# Patient Record
Sex: MALE | Race: WHITE | NOT HISPANIC OR LATINO
[De-identification: names, ages, dates, MRNs, and addresses within clinical notes are randomized per-mention and may not be internally consistent; named-entity substitution may affect disease eponyms.]

---

## 2016-10-12 RX ORDER — DOCUSATE SODIUM 100 MG
1 CAPSULE ORAL
Qty: 0 | Refills: 0 | DISCHARGE
Start: 2016-10-12

## 2017-01-17 ENCOUNTER — APPOINTMENT (OUTPATIENT)
Dept: SPINE | Facility: CLINIC | Age: 62
End: 2017-01-17

## 2017-01-17 VITALS
DIASTOLIC BLOOD PRESSURE: 98 MMHG | SYSTOLIC BLOOD PRESSURE: 186 MMHG | HEART RATE: 70 BPM | WEIGHT: 205 LBS | BODY MASS INDEX: 29.35 KG/M2 | HEIGHT: 70 IN

## 2017-03-02 ENCOUNTER — APPOINTMENT (OUTPATIENT)
Dept: RADIOLOGY | Facility: HOSPITAL | Age: 62
End: 2017-03-02

## 2017-03-02 ENCOUNTER — OUTPATIENT (OUTPATIENT)
Dept: OUTPATIENT SERVICES | Facility: HOSPITAL | Age: 62
LOS: 1 days | End: 2017-03-02
Payer: COMMERCIAL

## 2017-03-02 DIAGNOSIS — Z98.89 OTHER SPECIFIED POSTPROCEDURAL STATES: Chronic | ICD-10-CM

## 2017-03-02 DIAGNOSIS — Z98.1 ARTHRODESIS STATUS: ICD-10-CM

## 2017-03-02 PROCEDURE — 72083 X-RAY EXAM ENTIRE SPI 4/5 VW: CPT | Mod: 26

## 2017-03-07 ENCOUNTER — APPOINTMENT (OUTPATIENT)
Dept: SPINE | Facility: CLINIC | Age: 62
End: 2017-03-07

## 2017-03-07 VITALS
WEIGHT: 210 LBS | SYSTOLIC BLOOD PRESSURE: 186 MMHG | HEIGHT: 70 IN | DIASTOLIC BLOOD PRESSURE: 99 MMHG | HEART RATE: 73 BPM | BODY MASS INDEX: 30.06 KG/M2

## 2017-05-15 ENCOUNTER — INPATIENT (INPATIENT)
Facility: HOSPITAL | Age: 62
LOS: 6 days | Discharge: HOME | DRG: 31 | End: 2017-05-22
Attending: NEUROLOGICAL SURGERY | Admitting: NEUROLOGICAL SURGERY
Payer: COMMERCIAL

## 2017-05-15 VITALS
RESPIRATION RATE: 16 BRPM | DIASTOLIC BLOOD PRESSURE: 105 MMHG | OXYGEN SATURATION: 97 % | TEMPERATURE: 97 F | HEART RATE: 81 BPM | SYSTOLIC BLOOD PRESSURE: 152 MMHG

## 2017-05-15 DIAGNOSIS — Z98.89 OTHER SPECIFIED POSTPROCEDURAL STATES: Chronic | ICD-10-CM

## 2017-05-15 DIAGNOSIS — Z98.2 PRESENCE OF CEREBROSPINAL FLUID DRAINAGE DEVICE: ICD-10-CM

## 2017-05-15 DIAGNOSIS — T85.618A BREAKDOWN (MECHANICAL) OF OTHER SPECIFIED INTERNAL PROSTHETIC DEVICES, IMPLANTS AND GRAFTS, INITIAL ENCOUNTER: ICD-10-CM

## 2017-05-15 DIAGNOSIS — Z98.2 PRESENCE OF CEREBROSPINAL FLUID DRAINAGE DEVICE: Chronic | ICD-10-CM

## 2017-05-15 LAB
ALBUMIN SERPL ELPH-MCNC: 4 G/DL — SIGNIFICANT CHANGE UP (ref 3.3–5)
ALP SERPL-CCNC: 102 U/L — SIGNIFICANT CHANGE UP (ref 40–120)
ALT FLD-CCNC: 22 U/L RC — SIGNIFICANT CHANGE UP (ref 10–45)
APTT BLD: 26.1 SEC — LOW (ref 27.5–37.4)
AST SERPL-CCNC: 71 U/L — HIGH (ref 10–40)
BASOPHILS # BLD AUTO: 0.1 K/UL — SIGNIFICANT CHANGE UP (ref 0–0.2)
BASOPHILS NFR BLD AUTO: 0.8 % — SIGNIFICANT CHANGE UP (ref 0–2)
BILIRUB SERPL-MCNC: 0.2 MG/DL — SIGNIFICANT CHANGE UP (ref 0.2–1.2)
BLD GP AB SCN SERPL QL: NEGATIVE — SIGNIFICANT CHANGE UP
BUN SERPL-MCNC: 19 MG/DL — SIGNIFICANT CHANGE UP (ref 7–23)
CALCIUM SERPL-MCNC: 9.9 MG/DL — SIGNIFICANT CHANGE UP (ref 8.4–10.5)
CHLORIDE SERPL-SCNC: 96 MMOL/L — SIGNIFICANT CHANGE UP (ref 96–108)
CO2 SERPL-SCNC: 32 MMOL/L — HIGH (ref 22–31)
CREAT SERPL-MCNC: 1.12 MG/DL — SIGNIFICANT CHANGE UP (ref 0.5–1.3)
EOSINOPHIL # BLD AUTO: 0.5 K/UL — SIGNIFICANT CHANGE UP (ref 0–0.5)
EOSINOPHIL NFR BLD AUTO: 6.8 % — HIGH (ref 0–6)
GAS PNL BLDV: SIGNIFICANT CHANGE UP
GLUCOSE SERPL-MCNC: 109 MG/DL — HIGH (ref 70–99)
HCT VFR BLD CALC: 40 % — SIGNIFICANT CHANGE UP (ref 39–50)
HGB BLD-MCNC: 12.7 G/DL — LOW (ref 13–17)
INR BLD: 1.14 RATIO — SIGNIFICANT CHANGE UP (ref 0.88–1.16)
LYMPHOCYTES # BLD AUTO: 1.6 K/UL — SIGNIFICANT CHANGE UP (ref 1–3.3)
LYMPHOCYTES # BLD AUTO: 21.7 % — SIGNIFICANT CHANGE UP (ref 13–44)
MCHC RBC-ENTMCNC: 30.8 PG — SIGNIFICANT CHANGE UP (ref 27–34)
MCHC RBC-ENTMCNC: 31.9 GM/DL — LOW (ref 32–36)
MCV RBC AUTO: 96.6 FL — SIGNIFICANT CHANGE UP (ref 80–100)
MONOCYTES # BLD AUTO: 0.6 K/UL — SIGNIFICANT CHANGE UP (ref 0–0.9)
MONOCYTES NFR BLD AUTO: 7.6 % — SIGNIFICANT CHANGE UP (ref 2–14)
NEUTROPHILS # BLD AUTO: 4.8 K/UL — SIGNIFICANT CHANGE UP (ref 1.8–7.4)
NEUTROPHILS NFR BLD AUTO: 63.1 % — SIGNIFICANT CHANGE UP (ref 43–77)
PLATELET # BLD AUTO: 286 K/UL — SIGNIFICANT CHANGE UP (ref 150–400)
POTASSIUM SERPL-MCNC: 5.4 MMOL/L — HIGH (ref 3.5–5.3)
POTASSIUM SERPL-SCNC: 5.4 MMOL/L — HIGH (ref 3.5–5.3)
PROT SERPL-MCNC: 8.3 G/DL — SIGNIFICANT CHANGE UP (ref 6–8.3)
PROTHROM AB SERPL-ACNC: 12.4 SEC — SIGNIFICANT CHANGE UP (ref 9.8–12.7)
RBC # BLD: 4.14 M/UL — LOW (ref 4.2–5.8)
RBC # FLD: 13.2 % — SIGNIFICANT CHANGE UP (ref 10.3–14.5)
RH IG SCN BLD-IMP: POSITIVE — SIGNIFICANT CHANGE UP
SODIUM SERPL-SCNC: 141 MMOL/L — SIGNIFICANT CHANGE UP (ref 135–145)
WBC # BLD: 7.6 K/UL — SIGNIFICANT CHANGE UP (ref 3.8–10.5)
WBC # FLD AUTO: 7.6 K/UL — SIGNIFICANT CHANGE UP (ref 3.8–10.5)

## 2017-05-15 PROCEDURE — 70450 CT HEAD/BRAIN W/O DYE: CPT | Mod: 26

## 2017-05-15 PROCEDURE — 99285 EMERGENCY DEPT VISIT HI MDM: CPT

## 2017-05-15 RX ORDER — DIAZEPAM 5 MG
2 TABLET ORAL THREE TIMES A DAY
Qty: 0 | Refills: 0 | Status: DISCONTINUED | OUTPATIENT
Start: 2017-05-15 | End: 2017-05-16

## 2017-05-15 RX ORDER — SENNA PLUS 8.6 MG/1
2 TABLET ORAL AT BEDTIME
Qty: 0 | Refills: 0 | Status: DISCONTINUED | OUTPATIENT
Start: 2017-05-15 | End: 2017-05-16

## 2017-05-15 RX ORDER — DOCUSATE SODIUM 100 MG
100 CAPSULE ORAL THREE TIMES A DAY
Qty: 0 | Refills: 0 | Status: DISCONTINUED | OUTPATIENT
Start: 2017-05-15 | End: 2017-05-16

## 2017-05-15 RX ORDER — MORPHINE SULFATE 50 MG/1
30 CAPSULE, EXTENDED RELEASE ORAL EVERY 4 HOURS
Qty: 0 | Refills: 0 | Status: DISCONTINUED | OUTPATIENT
Start: 2017-05-15 | End: 2017-05-16

## 2017-05-15 RX ORDER — PANTOPRAZOLE SODIUM 20 MG/1
40 TABLET, DELAYED RELEASE ORAL
Qty: 0 | Refills: 0 | Status: DISCONTINUED | OUTPATIENT
Start: 2017-05-15 | End: 2017-05-16

## 2017-05-15 RX ORDER — ACETAMINOPHEN 500 MG
650 TABLET ORAL EVERY 6 HOURS
Qty: 0 | Refills: 0 | Status: DISCONTINUED | OUTPATIENT
Start: 2017-05-15 | End: 2017-05-16

## 2017-05-15 RX ORDER — HYDROMORPHONE HYDROCHLORIDE 2 MG/ML
1 INJECTION INTRAMUSCULAR; INTRAVENOUS; SUBCUTANEOUS ONCE
Qty: 0 | Refills: 0 | Status: DISCONTINUED | OUTPATIENT
Start: 2017-05-15 | End: 2017-05-15

## 2017-05-15 RX ORDER — METHADONE HYDROCHLORIDE 40 MG/1
10 TABLET ORAL EVERY 6 HOURS
Qty: 0 | Refills: 0 | Status: DISCONTINUED | OUTPATIENT
Start: 2017-05-15 | End: 2017-05-16

## 2017-05-15 RX ORDER — MORPHINE SULFATE 50 MG/1
4 CAPSULE, EXTENDED RELEASE ORAL ONCE
Qty: 0 | Refills: 0 | Status: DISCONTINUED | OUTPATIENT
Start: 2017-05-15 | End: 2017-05-15

## 2017-05-15 RX ORDER — VANCOMYCIN HCL 1 G
1000 VIAL (EA) INTRAVENOUS ONCE
Qty: 0 | Refills: 0 | Status: COMPLETED | OUTPATIENT
Start: 2017-05-15 | End: 2017-05-15

## 2017-05-15 RX ORDER — SODIUM CHLORIDE 9 MG/ML
3 INJECTION INTRAMUSCULAR; INTRAVENOUS; SUBCUTANEOUS EVERY 8 HOURS
Qty: 0 | Refills: 0 | Status: DISCONTINUED | OUTPATIENT
Start: 2017-05-15 | End: 2017-05-16

## 2017-05-15 RX ORDER — CEFTRIAXONE 500 MG/1
2 INJECTION, POWDER, FOR SOLUTION INTRAMUSCULAR; INTRAVENOUS ONCE
Qty: 0 | Refills: 0 | Status: COMPLETED | OUTPATIENT
Start: 2017-05-15 | End: 2017-05-15

## 2017-05-15 RX ORDER — DULOXETINE HYDROCHLORIDE 30 MG/1
60 CAPSULE, DELAYED RELEASE ORAL DAILY
Qty: 0 | Refills: 0 | Status: DISCONTINUED | OUTPATIENT
Start: 2017-05-15 | End: 2017-05-16

## 2017-05-15 RX ORDER — DIPHENHYDRAMINE HCL 50 MG
25 CAPSULE ORAL AT BEDTIME
Qty: 0 | Refills: 0 | Status: DISCONTINUED | OUTPATIENT
Start: 2017-05-15 | End: 2017-05-16

## 2017-05-15 RX ORDER — MORPHINE SULFATE 50 MG/1
10 CAPSULE, EXTENDED RELEASE ORAL
Qty: 0 | Refills: 0 | COMMUNITY

## 2017-05-15 RX ADMIN — MORPHINE SULFATE 30 MILLIGRAM(S): 50 CAPSULE, EXTENDED RELEASE ORAL at 23:49

## 2017-05-15 RX ADMIN — Medication 250 MILLIGRAM(S): at 14:47

## 2017-05-15 RX ADMIN — HYDROMORPHONE HYDROCHLORIDE 1 MILLIGRAM(S): 2 INJECTION INTRAMUSCULAR; INTRAVENOUS; SUBCUTANEOUS at 20:19

## 2017-05-15 RX ADMIN — Medication 25 MILLIGRAM(S): at 21:17

## 2017-05-15 RX ADMIN — Medication 2 MILLIGRAM(S): at 21:17

## 2017-05-15 RX ADMIN — MORPHINE SULFATE 30 MILLIGRAM(S): 50 CAPSULE, EXTENDED RELEASE ORAL at 19:56

## 2017-05-15 RX ADMIN — MORPHINE SULFATE 4 MILLIGRAM(S): 50 CAPSULE, EXTENDED RELEASE ORAL at 13:39

## 2017-05-15 RX ADMIN — SENNA PLUS 2 TABLET(S): 8.6 TABLET ORAL at 21:17

## 2017-05-15 RX ADMIN — Medication 100 MILLIGRAM(S): at 21:17

## 2017-05-15 RX ADMIN — MORPHINE SULFATE 30 MILLIGRAM(S): 50 CAPSULE, EXTENDED RELEASE ORAL at 19:15

## 2017-05-15 RX ADMIN — MORPHINE SULFATE 30 MILLIGRAM(S): 50 CAPSULE, EXTENDED RELEASE ORAL at 23:15

## 2017-05-15 RX ADMIN — HYDROMORPHONE HYDROCHLORIDE 1 MILLIGRAM(S): 2 INJECTION INTRAMUSCULAR; INTRAVENOUS; SUBCUTANEOUS at 14:46

## 2017-05-15 RX ADMIN — Medication 0.1 MILLIGRAM(S): at 21:17

## 2017-05-15 RX ADMIN — CEFTRIAXONE 100 GRAM(S): 500 INJECTION, POWDER, FOR SOLUTION INTRAMUSCULAR; INTRAVENOUS at 13:39

## 2017-05-15 RX ADMIN — HYDROMORPHONE HYDROCHLORIDE 1 MILLIGRAM(S): 2 INJECTION INTRAMUSCULAR; INTRAVENOUS; SUBCUTANEOUS at 18:21

## 2017-05-15 RX ADMIN — SODIUM CHLORIDE 3 MILLILITER(S): 9 INJECTION INTRAMUSCULAR; INTRAVENOUS; SUBCUTANEOUS at 21:20

## 2017-05-15 RX ADMIN — METHADONE HYDROCHLORIDE 10 MILLIGRAM(S): 40 TABLET ORAL at 21:18

## 2017-05-15 RX ADMIN — DULOXETINE HYDROCHLORIDE 60 MILLIGRAM(S): 30 CAPSULE, DELAYED RELEASE ORAL at 21:18

## 2017-05-15 NOTE — H&P ADULT. - HISTORY OF PRESENT ILLNESS
61M s/p previous fusion in October 2016, course c/b CSF leak, s/p VPS, presents now to the ED with erythema and pain around the shunt site. He states that 2 weeks ago he felt fine. Then while in the shower noticed that the shunt valve was tender. Since that time it has become more painful, and the skin around the distal catheter has become painful and erythematous. He presented to the ED for possible shunt infection. He denies fevers/chills/ or systemic symtpoms. Reports HA.     Exam:   AOX3  PERRL, EOMI, Face equal, tongue m/l  5/5 BUE  5/5 BLE  SILT

## 2017-05-15 NOTE — ED ADULT NURSE NOTE - OBJECTIVE STATEMENT
62 y/o male arrived to ED c.o sudden onset head pain when he woke up this morning. Pt states that he feels like "it shifted or has a hole in it it feels different then it did before and is really painful". Pt had shunt placed in 10/2016 for csf leak s/p lumbar procedure. Pt began having difficulty with shunt in january and was scheduled to have it removed 6/1.  Today pain is different then previously. Pt has yellow dried drainage to right occipital head, redness to right neck and chest wall around catheter of shunt. Painful to touch, painful with movement. Pt a&ox4, neg vision changes, neg dizziness, + chills, neg fevers, neg sob, neg chest pain, abd soft nontender, neg nausea/ vomiting, pulse motor sensoryx4, pt ambulates with cane, skin warm dry intact. VSS will continue to monitor.

## 2017-05-15 NOTE — ED ADULT NURSE NOTE - PMH
Anxiety and depression    Diastolic dysfunction  stage I  Empyema lung  11/2015 left lung, s/p VATS, decortication  H/O peptic ulcer  over 10 years ago  Herniated Disc  S/P work injury 2001  history of renal calculus    Hypertension  Dx: 2002  Postlaminectomy Syndrome    Spinal Stenosis

## 2017-05-15 NOTE — ED ADULT NURSE NOTE - CHIEF COMPLAINT
The patient is a 61y Male complaining of The patient is a 61y Male complaining of head pain and redness around shunt site.

## 2017-05-15 NOTE — ED ADULT TRIAGE NOTE - CHIEF COMPLAINT QUOTE
pt scheduled for shunt removal in June states pain right side of head with elevated erythema area right side of neck

## 2017-05-15 NOTE — ED PROVIDER NOTE - SKIN COLOR
right occcipital region ttp, yellow crusting, erythema/ttp over shunt site on right chest and right neck

## 2017-05-15 NOTE — ED PROVIDER NOTE - OBJECTIVE STATEMENT
60yo male s/p  shunt in September. Planned for shunt removal June 1. Pt. has spinal cord stimulator in place. Pt. reports right head pain since this AM, located at site of shunt. Pt. reports shunt areas erythematous x1 week, some drainage since friday, yellow. Denies fevers, changes in vision/hearing, shortness of breath, chest pain, nausea, vomiting, difficulty ambulating, recent antibiotics/recent illnesses. Nsg: Jessica PCP: Dr. Adama Fernández. 62yo male s/p  shunt in September p/w right head pain, redness. Planned for shunt removal June 1. Pt. has spinal cord stimulator in place. Pt. reports right head pain since this AM, located at site of shunt. Pt. reports shunt areas erythematous x1 week, some drainage since friday, yellow. Denies fevers, changes in vision/hearing, shortness of breath, chest pain, nausea, vomiting, difficulty ambulating, recent antibiotics/recent illnesses. Nsg: Jessica PCP: Dr. Adama Fernández.

## 2017-05-15 NOTE — ED PROVIDER NOTE - ATTENDING CONTRIBUTION TO CARE
I have seen and evaluated this patient with the resident.   I agree with the findings  unless other wise stated.  I have made appropriate changes in documentations where needed, After my face to face bedside evaluation, I am further  noting: Pt has possible infection around shunt redness tenderness 62yo male s/p  shunt in September p/w right head pain, redness. TTP at site of shunt, erythema, yellow crusting. Concern for  shunt infection. Will obtain labs, CT head, provide antibiotics, neurosurgery consult.

## 2017-05-15 NOTE — ED PROVIDER NOTE - MEDICAL DECISION MAKING DETAILS
62yo male s/p  shunt in September p/w right head pain, redness. TTP at site of shunt, erythema, yellow crusting. Concern for  shunt infection. Will obtain labs, CT head, provide antibiotics, neurosurgery consult.

## 2017-05-15 NOTE — ED ADULT NURSE NOTE - PSH
Ankle Fracture  s/p ORIF left ankle 2008  History of lumbar fusion  2008  Insertion of Intrathecal Dilaudid Pump  4/2011  S/P Arthroscopy of Left Knee    S/P insertion of spinal cord stimulator  2013  S/P Knee Replacement  left knee 2009  S/P Spinal Surgery  corrective lumbar fusion 2012  S/P Tonsillectomy  childhood Ankle Fracture  s/p ORIF left ankle 2008  History of lumbar fusion  2008  Insertion of Intrathecal Dilaudid Pump  4/2011  S/P Arthroscopy of Left Knee    S/P insertion of spinal cord stimulator  2013  S/P Knee Replacement  left knee 2009  S/P Spinal Surgery  corrective lumbar fusion 2012  S/P Tonsillectomy  childhood  S/P  shunt

## 2017-05-15 NOTE — ED ADULT NURSE NOTE - CHPI ED SYMPTOMS NEG
no fever/no weakness/no confusion/no vomiting/no numbness/no change in level of consciousness/no blurred vision/no dizziness

## 2017-05-15 NOTE — PATIENT PROFILE ADULT. - PSH
Ankle Fracture  s/p ORIF left ankle 2008  History of lumbar fusion  2008  Insertion of Intrathecal Dilaudid Pump  4/2011  S/P Arthroscopy of Left Knee    S/P insertion of spinal cord stimulator  2013  S/P Knee Replacement  left knee 2009  S/P Spinal Surgery  corrective lumbar fusion 2012  S/P Tonsillectomy  childhood

## 2017-05-16 ENCOUNTER — RESULT REVIEW (OUTPATIENT)
Age: 62
End: 2017-05-16

## 2017-05-16 ENCOUNTER — APPOINTMENT (OUTPATIENT)
Dept: SPINE | Facility: CLINIC | Age: 62
End: 2017-05-16

## 2017-05-16 LAB
ANION GAP SERPL CALC-SCNC: 12 MMOL/L — SIGNIFICANT CHANGE UP (ref 5–17)
BLD GP AB SCN SERPL QL: NEGATIVE — SIGNIFICANT CHANGE UP
BUN SERPL-MCNC: 21 MG/DL — SIGNIFICANT CHANGE UP (ref 7–23)
CALCIUM SERPL-MCNC: 8.9 MG/DL — SIGNIFICANT CHANGE UP (ref 8.4–10.5)
CHLORIDE SERPL-SCNC: 97 MMOL/L — SIGNIFICANT CHANGE UP (ref 96–108)
CO2 SERPL-SCNC: 32 MMOL/L — HIGH (ref 22–31)
CREAT SERPL-MCNC: 1.11 MG/DL — SIGNIFICANT CHANGE UP (ref 0.5–1.3)
GLUCOSE SERPL-MCNC: 222 MG/DL — HIGH (ref 70–99)
HCT VFR BLD CALC: 34.5 % — LOW (ref 39–50)
HGB BLD-MCNC: 12.1 G/DL — LOW (ref 13–17)
MAGNESIUM SERPL-MCNC: 2.1 MG/DL — SIGNIFICANT CHANGE UP (ref 1.6–2.6)
MCHC RBC-ENTMCNC: 33.4 PG — SIGNIFICANT CHANGE UP (ref 27–34)
MCHC RBC-ENTMCNC: 34.9 GM/DL — SIGNIFICANT CHANGE UP (ref 32–36)
MCV RBC AUTO: 95.6 FL — SIGNIFICANT CHANGE UP (ref 80–100)
PHOSPHATE SERPL-MCNC: 3 MG/DL — SIGNIFICANT CHANGE UP (ref 2.5–4.5)
PLATELET # BLD AUTO: 237 K/UL — SIGNIFICANT CHANGE UP (ref 150–400)
POTASSIUM SERPL-MCNC: 3.4 MMOL/L — LOW (ref 3.5–5.3)
POTASSIUM SERPL-SCNC: 3.4 MMOL/L — LOW (ref 3.5–5.3)
RBC # BLD: 3.61 M/UL — LOW (ref 4.2–5.8)
RBC # FLD: 13 % — SIGNIFICANT CHANGE UP (ref 10.3–14.5)
RH IG SCN BLD-IMP: POSITIVE — SIGNIFICANT CHANGE UP
SODIUM SERPL-SCNC: 141 MMOL/L — SIGNIFICANT CHANGE UP (ref 135–145)
WBC # BLD: 6.7 K/UL — SIGNIFICANT CHANGE UP (ref 3.8–10.5)
WBC # FLD AUTO: 6.7 K/UL — SIGNIFICANT CHANGE UP (ref 3.8–10.5)

## 2017-05-16 PROCEDURE — 62256 REMOVE BRAIN CAVITY SHUNT: CPT

## 2017-05-16 PROCEDURE — 71010: CPT | Mod: 26

## 2017-05-16 PROCEDURE — 88300 SURGICAL PATH GROSS: CPT | Mod: 26

## 2017-05-16 PROCEDURE — 99291 CRITICAL CARE FIRST HOUR: CPT

## 2017-05-16 RX ORDER — DULOXETINE HYDROCHLORIDE 30 MG/1
60 CAPSULE, DELAYED RELEASE ORAL DAILY
Qty: 0 | Refills: 0 | Status: DISCONTINUED | OUTPATIENT
Start: 2017-05-16 | End: 2017-05-18

## 2017-05-16 RX ORDER — DIPHENHYDRAMINE HCL 50 MG
25 CAPSULE ORAL AT BEDTIME
Qty: 0 | Refills: 0 | Status: DISCONTINUED | OUTPATIENT
Start: 2017-05-16 | End: 2017-05-22

## 2017-05-16 RX ORDER — CEFTRIAXONE 500 MG/1
2 INJECTION, POWDER, FOR SOLUTION INTRAMUSCULAR; INTRAVENOUS EVERY 24 HOURS
Qty: 0 | Refills: 0 | Status: DISCONTINUED | OUTPATIENT
Start: 2017-05-16 | End: 2017-05-17

## 2017-05-16 RX ORDER — SENNA PLUS 8.6 MG/1
2 TABLET ORAL AT BEDTIME
Qty: 0 | Refills: 0 | Status: DISCONTINUED | OUTPATIENT
Start: 2017-05-16 | End: 2017-05-22

## 2017-05-16 RX ORDER — DOCUSATE SODIUM 100 MG
100 CAPSULE ORAL THREE TIMES A DAY
Qty: 0 | Refills: 0 | Status: DISCONTINUED | OUTPATIENT
Start: 2017-05-16 | End: 2017-05-22

## 2017-05-16 RX ORDER — PANTOPRAZOLE SODIUM 20 MG/1
40 TABLET, DELAYED RELEASE ORAL
Qty: 0 | Refills: 0 | Status: DISCONTINUED | OUTPATIENT
Start: 2017-05-16 | End: 2017-05-22

## 2017-05-16 RX ORDER — VANCOMYCIN HCL 1 G
1000 VIAL (EA) INTRAVENOUS ONCE
Qty: 0 | Refills: 0 | Status: COMPLETED | OUTPATIENT
Start: 2017-05-16 | End: 2017-05-16

## 2017-05-16 RX ORDER — VANCOMYCIN HCL 1 G
VIAL (EA) INTRAVENOUS
Qty: 0 | Refills: 0 | Status: DISCONTINUED | OUTPATIENT
Start: 2017-05-16 | End: 2017-05-17

## 2017-05-16 RX ORDER — DIAZEPAM 5 MG
2 TABLET ORAL THREE TIMES A DAY
Qty: 0 | Refills: 0 | Status: DISCONTINUED | OUTPATIENT
Start: 2017-05-16 | End: 2017-05-18

## 2017-05-16 RX ORDER — HYDROMORPHONE HYDROCHLORIDE 2 MG/ML
0.5 INJECTION INTRAMUSCULAR; INTRAVENOUS; SUBCUTANEOUS ONCE
Qty: 0 | Refills: 0 | Status: DISCONTINUED | OUTPATIENT
Start: 2017-05-16 | End: 2017-05-16

## 2017-05-16 RX ORDER — ACETAMINOPHEN 500 MG
650 TABLET ORAL EVERY 6 HOURS
Qty: 0 | Refills: 0 | Status: DISCONTINUED | OUTPATIENT
Start: 2017-05-16 | End: 2017-05-22

## 2017-05-16 RX ORDER — VANCOMYCIN HCL 1 G
1000 VIAL (EA) INTRAVENOUS EVERY 24 HOURS
Qty: 0 | Refills: 0 | Status: DISCONTINUED | OUTPATIENT
Start: 2017-05-17 | End: 2017-05-17

## 2017-05-16 RX ORDER — METHADONE HYDROCHLORIDE 40 MG/1
20 TABLET ORAL EVERY 6 HOURS
Qty: 0 | Refills: 0 | Status: DISCONTINUED | OUTPATIENT
Start: 2017-05-16 | End: 2017-05-16

## 2017-05-16 RX ORDER — HYDROMORPHONE HYDROCHLORIDE 2 MG/ML
0.5 INJECTION INTRAMUSCULAR; INTRAVENOUS; SUBCUTANEOUS
Qty: 0 | Refills: 0 | Status: DISCONTINUED | OUTPATIENT
Start: 2017-05-16 | End: 2017-05-17

## 2017-05-16 RX ORDER — MORPHINE SULFATE 50 MG/1
30 CAPSULE, EXTENDED RELEASE ORAL EVERY 4 HOURS
Qty: 0 | Refills: 0 | Status: DISCONTINUED | OUTPATIENT
Start: 2017-05-16 | End: 2017-05-22

## 2017-05-16 RX ORDER — SODIUM CHLORIDE 9 MG/ML
3 INJECTION INTRAMUSCULAR; INTRAVENOUS; SUBCUTANEOUS EVERY 8 HOURS
Qty: 0 | Refills: 0 | Status: DISCONTINUED | OUTPATIENT
Start: 2017-05-16 | End: 2017-05-22

## 2017-05-16 RX ORDER — METHADONE HYDROCHLORIDE 40 MG/1
10 TABLET ORAL EVERY 6 HOURS
Qty: 0 | Refills: 0 | Status: DISCONTINUED | OUTPATIENT
Start: 2017-05-16 | End: 2017-05-16

## 2017-05-16 RX ORDER — METHADONE HYDROCHLORIDE 40 MG/1
20 TABLET ORAL EVERY 6 HOURS
Qty: 0 | Refills: 0 | Status: DISCONTINUED | OUTPATIENT
Start: 2017-05-16 | End: 2017-05-22

## 2017-05-16 RX ORDER — HYDROMORPHONE HYDROCHLORIDE 2 MG/ML
2 INJECTION INTRAMUSCULAR; INTRAVENOUS; SUBCUTANEOUS EVERY 4 HOURS
Qty: 0 | Refills: 0 | Status: DISCONTINUED | OUTPATIENT
Start: 2017-05-16 | End: 2017-05-18

## 2017-05-16 RX ORDER — SODIUM CHLORIDE 9 MG/ML
1000 INJECTION INTRAMUSCULAR; INTRAVENOUS; SUBCUTANEOUS
Qty: 0 | Refills: 0 | Status: DISCONTINUED | OUTPATIENT
Start: 2017-05-16 | End: 2017-05-16

## 2017-05-16 RX ADMIN — SODIUM CHLORIDE 3 MILLILITER(S): 9 INJECTION INTRAMUSCULAR; INTRAVENOUS; SUBCUTANEOUS at 15:50

## 2017-05-16 RX ADMIN — METHADONE HYDROCHLORIDE 10 MILLIGRAM(S): 40 TABLET ORAL at 09:20

## 2017-05-16 RX ADMIN — MORPHINE SULFATE 30 MILLIGRAM(S): 50 CAPSULE, EXTENDED RELEASE ORAL at 23:00

## 2017-05-16 RX ADMIN — METHADONE HYDROCHLORIDE 20 MILLIGRAM(S): 40 TABLET ORAL at 22:10

## 2017-05-16 RX ADMIN — MORPHINE SULFATE 30 MILLIGRAM(S): 50 CAPSULE, EXTENDED RELEASE ORAL at 07:45

## 2017-05-16 RX ADMIN — SODIUM CHLORIDE 3 MILLILITER(S): 9 INJECTION INTRAMUSCULAR; INTRAVENOUS; SUBCUTANEOUS at 05:25

## 2017-05-16 RX ADMIN — MORPHINE SULFATE 30 MILLIGRAM(S): 50 CAPSULE, EXTENDED RELEASE ORAL at 12:10

## 2017-05-16 RX ADMIN — Medication 100 MILLIGRAM(S): at 22:10

## 2017-05-16 RX ADMIN — Medication 250 MILLIGRAM(S): at 22:13

## 2017-05-16 RX ADMIN — HYDROMORPHONE HYDROCHLORIDE 0.5 MILLIGRAM(S): 2 INJECTION INTRAMUSCULAR; INTRAVENOUS; SUBCUTANEOUS at 20:45

## 2017-05-16 RX ADMIN — Medication 0.1 MILLIGRAM(S): at 22:09

## 2017-05-16 RX ADMIN — Medication 2 MILLIGRAM(S): at 05:43

## 2017-05-16 RX ADMIN — HYDROMORPHONE HYDROCHLORIDE 0.5 MILLIGRAM(S): 2 INJECTION INTRAMUSCULAR; INTRAVENOUS; SUBCUTANEOUS at 20:28

## 2017-05-16 RX ADMIN — MORPHINE SULFATE 30 MILLIGRAM(S): 50 CAPSULE, EXTENDED RELEASE ORAL at 04:14

## 2017-05-16 RX ADMIN — MORPHINE SULFATE 30 MILLIGRAM(S): 50 CAPSULE, EXTENDED RELEASE ORAL at 15:21

## 2017-05-16 RX ADMIN — MORPHINE SULFATE 30 MILLIGRAM(S): 50 CAPSULE, EXTENDED RELEASE ORAL at 03:43

## 2017-05-16 RX ADMIN — PANTOPRAZOLE SODIUM 40 MILLIGRAM(S): 20 TABLET, DELAYED RELEASE ORAL at 05:44

## 2017-05-16 RX ADMIN — METHADONE HYDROCHLORIDE 20 MILLIGRAM(S): 40 TABLET ORAL at 12:09

## 2017-05-16 RX ADMIN — Medication 0.1 MILLIGRAM(S): at 05:44

## 2017-05-16 RX ADMIN — DULOXETINE HYDROCHLORIDE 60 MILLIGRAM(S): 30 CAPSULE, DELAYED RELEASE ORAL at 12:11

## 2017-05-16 RX ADMIN — Medication 0.1 MILLIGRAM(S): at 16:05

## 2017-05-16 RX ADMIN — Medication 100 MILLIGRAM(S): at 05:43

## 2017-05-16 RX ADMIN — Medication 25 MILLIGRAM(S): at 22:39

## 2017-05-16 RX ADMIN — Medication 2 MILLIGRAM(S): at 16:05

## 2017-05-16 RX ADMIN — Medication 2 MILLIGRAM(S): at 22:39

## 2017-05-16 RX ADMIN — SODIUM CHLORIDE 3 MILLILITER(S): 9 INJECTION INTRAMUSCULAR; INTRAVENOUS; SUBCUTANEOUS at 21:51

## 2017-05-16 RX ADMIN — MORPHINE SULFATE 30 MILLIGRAM(S): 50 CAPSULE, EXTENDED RELEASE ORAL at 22:10

## 2017-05-16 RX ADMIN — METHADONE HYDROCHLORIDE 10 MILLIGRAM(S): 40 TABLET ORAL at 03:44

## 2017-05-17 ENCOUNTER — TRANSCRIPTION ENCOUNTER (OUTPATIENT)
Age: 62
End: 2017-05-17

## 2017-05-17 LAB
GRAM STN FLD: SIGNIFICANT CHANGE UP
GRAM STN FLD: SIGNIFICANT CHANGE UP
HBA1C BLD-MCNC: 5.7 % — HIGH (ref 4–5.6)
SPECIMEN SOURCE: SIGNIFICANT CHANGE UP
SPECIMEN SOURCE: SIGNIFICANT CHANGE UP

## 2017-05-17 PROCEDURE — 70450 CT HEAD/BRAIN W/O DYE: CPT | Mod: 26

## 2017-05-17 PROCEDURE — 99233 SBSQ HOSP IP/OBS HIGH 50: CPT

## 2017-05-17 RX ORDER — VANCOMYCIN HCL 1 G
VIAL (EA) INTRAVENOUS
Qty: 0 | Refills: 0 | Status: DISCONTINUED | OUTPATIENT
Start: 2017-05-17 | End: 2017-05-17

## 2017-05-17 RX ORDER — CEFTRIAXONE 500 MG/1
2 INJECTION, POWDER, FOR SOLUTION INTRAMUSCULAR; INTRAVENOUS EVERY 12 HOURS
Qty: 0 | Refills: 0 | Status: DISCONTINUED | OUTPATIENT
Start: 2017-05-17 | End: 2017-05-18

## 2017-05-17 RX ORDER — VANCOMYCIN HCL 1 G
1000 VIAL (EA) INTRAVENOUS ONCE
Qty: 0 | Refills: 0 | Status: COMPLETED | OUTPATIENT
Start: 2017-05-17 | End: 2017-05-17

## 2017-05-17 RX ORDER — POTASSIUM CHLORIDE 20 MEQ
10 PACKET (EA) ORAL
Qty: 0 | Refills: 0 | Status: COMPLETED | OUTPATIENT
Start: 2017-05-17 | End: 2017-05-17

## 2017-05-17 RX ORDER — SODIUM CHLORIDE 9 MG/ML
1000 INJECTION INTRAMUSCULAR; INTRAVENOUS; SUBCUTANEOUS ONCE
Qty: 0 | Refills: 0 | Status: COMPLETED | OUTPATIENT
Start: 2017-05-17 | End: 2017-05-17

## 2017-05-17 RX ORDER — NICOTINE POLACRILEX 2 MG
4 GUM BUCCAL EVERY 4 HOURS
Qty: 0 | Refills: 0 | Status: DISCONTINUED | OUTPATIENT
Start: 2017-05-17 | End: 2017-05-17

## 2017-05-17 RX ORDER — INSULIN LISPRO 100/ML
VIAL (ML) SUBCUTANEOUS EVERY 6 HOURS
Qty: 0 | Refills: 0 | Status: DISCONTINUED | OUTPATIENT
Start: 2017-05-17 | End: 2017-05-17

## 2017-05-17 RX ORDER — VANCOMYCIN HCL 1 G
1000 VIAL (EA) INTRAVENOUS EVERY 12 HOURS
Qty: 0 | Refills: 0 | Status: DISCONTINUED | OUTPATIENT
Start: 2017-05-18 | End: 2017-05-18

## 2017-05-17 RX ORDER — NICOTINE POLACRILEX 2 MG
4 GUM BUCCAL EVERY 4 HOURS
Qty: 0 | Refills: 0 | Status: DISCONTINUED | OUTPATIENT
Start: 2017-05-17 | End: 2017-05-22

## 2017-05-17 RX ORDER — VANCOMYCIN HCL 1 G
VIAL (EA) INTRAVENOUS
Qty: 0 | Refills: 0 | Status: DISCONTINUED | OUTPATIENT
Start: 2017-05-17 | End: 2017-05-18

## 2017-05-17 RX ADMIN — HYDROMORPHONE HYDROCHLORIDE 2 MILLIGRAM(S): 2 INJECTION INTRAMUSCULAR; INTRAVENOUS; SUBCUTANEOUS at 20:30

## 2017-05-17 RX ADMIN — HYDROMORPHONE HYDROCHLORIDE 0.5 MILLIGRAM(S): 2 INJECTION INTRAMUSCULAR; INTRAVENOUS; SUBCUTANEOUS at 00:47

## 2017-05-17 RX ADMIN — DULOXETINE HYDROCHLORIDE 60 MILLIGRAM(S): 30 CAPSULE, DELAYED RELEASE ORAL at 13:14

## 2017-05-17 RX ADMIN — Medication 0.1 MILLIGRAM(S): at 06:13

## 2017-05-17 RX ADMIN — SODIUM CHLORIDE 3 MILLILITER(S): 9 INJECTION INTRAMUSCULAR; INTRAVENOUS; SUBCUTANEOUS at 21:06

## 2017-05-17 RX ADMIN — SODIUM CHLORIDE 3 MILLILITER(S): 9 INJECTION INTRAMUSCULAR; INTRAVENOUS; SUBCUTANEOUS at 06:14

## 2017-05-17 RX ADMIN — Medication 2 MILLIGRAM(S): at 06:14

## 2017-05-17 RX ADMIN — Medication 0.1 MILLIGRAM(S): at 14:15

## 2017-05-17 RX ADMIN — Medication 4 MILLIGRAM(S): at 18:30

## 2017-05-17 RX ADMIN — Medication 100 MILLIGRAM(S): at 06:14

## 2017-05-17 RX ADMIN — Medication 100 MILLIEQUIVALENT(S): at 03:29

## 2017-05-17 RX ADMIN — PANTOPRAZOLE SODIUM 40 MILLIGRAM(S): 20 TABLET, DELAYED RELEASE ORAL at 07:08

## 2017-05-17 RX ADMIN — MORPHINE SULFATE 30 MILLIGRAM(S): 50 CAPSULE, EXTENDED RELEASE ORAL at 09:42

## 2017-05-17 RX ADMIN — Medication 100 MILLIEQUIVALENT(S): at 00:31

## 2017-05-17 RX ADMIN — METHADONE HYDROCHLORIDE 20 MILLIGRAM(S): 40 TABLET ORAL at 03:34

## 2017-05-17 RX ADMIN — HYDROMORPHONE HYDROCHLORIDE 0.5 MILLIGRAM(S): 2 INJECTION INTRAMUSCULAR; INTRAVENOUS; SUBCUTANEOUS at 07:00

## 2017-05-17 RX ADMIN — METHADONE HYDROCHLORIDE 20 MILLIGRAM(S): 40 TABLET ORAL at 16:33

## 2017-05-17 RX ADMIN — CEFTRIAXONE 100 GRAM(S): 500 INJECTION, POWDER, FOR SOLUTION INTRAMUSCULAR; INTRAVENOUS at 05:05

## 2017-05-17 RX ADMIN — Medication 2 MILLIGRAM(S): at 14:15

## 2017-05-17 RX ADMIN — METHADONE HYDROCHLORIDE 20 MILLIGRAM(S): 40 TABLET ORAL at 09:39

## 2017-05-17 RX ADMIN — SODIUM CHLORIDE 1000 MILLILITER(S): 9 INJECTION INTRAMUSCULAR; INTRAVENOUS; SUBCUTANEOUS at 21:18

## 2017-05-17 RX ADMIN — MORPHINE SULFATE 30 MILLIGRAM(S): 50 CAPSULE, EXTENDED RELEASE ORAL at 04:36

## 2017-05-17 RX ADMIN — HYDROMORPHONE HYDROCHLORIDE 2 MILLIGRAM(S): 2 INJECTION INTRAMUSCULAR; INTRAVENOUS; SUBCUTANEOUS at 20:00

## 2017-05-17 RX ADMIN — MORPHINE SULFATE 30 MILLIGRAM(S): 50 CAPSULE, EXTENDED RELEASE ORAL at 05:00

## 2017-05-17 RX ADMIN — Medication 100 MILLIEQUIVALENT(S): at 02:12

## 2017-05-17 RX ADMIN — CEFTRIAXONE 100 GRAM(S): 500 INJECTION, POWDER, FOR SOLUTION INTRAMUSCULAR; INTRAVENOUS at 17:52

## 2017-05-17 RX ADMIN — MORPHINE SULFATE 30 MILLIGRAM(S): 50 CAPSULE, EXTENDED RELEASE ORAL at 13:45

## 2017-05-17 RX ADMIN — Medication 100 MILLIGRAM(S): at 22:05

## 2017-05-17 RX ADMIN — HYDROMORPHONE HYDROCHLORIDE 0.5 MILLIGRAM(S): 2 INJECTION INTRAMUSCULAR; INTRAVENOUS; SUBCUTANEOUS at 07:15

## 2017-05-17 RX ADMIN — Medication 100 MILLIGRAM(S): at 14:16

## 2017-05-17 RX ADMIN — SODIUM CHLORIDE 3 MILLILITER(S): 9 INJECTION INTRAMUSCULAR; INTRAVENOUS; SUBCUTANEOUS at 14:09

## 2017-05-17 RX ADMIN — MORPHINE SULFATE 30 MILLIGRAM(S): 50 CAPSULE, EXTENDED RELEASE ORAL at 09:09

## 2017-05-17 RX ADMIN — METHADONE HYDROCHLORIDE 20 MILLIGRAM(S): 40 TABLET ORAL at 22:05

## 2017-05-17 RX ADMIN — HYDROMORPHONE HYDROCHLORIDE 0.5 MILLIGRAM(S): 2 INJECTION INTRAMUSCULAR; INTRAVENOUS; SUBCUTANEOUS at 00:32

## 2017-05-17 RX ADMIN — MORPHINE SULFATE 30 MILLIGRAM(S): 50 CAPSULE, EXTENDED RELEASE ORAL at 13:14

## 2017-05-17 RX ADMIN — Medication 250 MILLIGRAM(S): at 18:45

## 2017-05-17 NOTE — PROVIDER CONTACT NOTE (OTHER) - ASSESSMENT
patient complains of 10/10 pain in back and head. Patient not due for morphine yet.
100/64, HR 77. Pt is asymptomatic.

## 2017-05-18 LAB
-  OXACILLIN: SIGNIFICANT CHANGE UP
-  OXACILLIN: SIGNIFICANT CHANGE UP
-  RIFAMPIN: SIGNIFICANT CHANGE UP
-  RIFAMPIN: SIGNIFICANT CHANGE UP
-  TRIMETHOPRIM/SULFAMETHOXAZOLE: SIGNIFICANT CHANGE UP
-  TRIMETHOPRIM/SULFAMETHOXAZOLE: SIGNIFICANT CHANGE UP
-  VANCOMYCIN: SIGNIFICANT CHANGE UP
-  VANCOMYCIN: SIGNIFICANT CHANGE UP
BASOPHILS # BLD AUTO: 0.01 K/UL — SIGNIFICANT CHANGE UP (ref 0–0.2)
BASOPHILS NFR BLD AUTO: 0.2 % — SIGNIFICANT CHANGE UP (ref 0–2)
EOSINOPHIL # BLD AUTO: 0.38 K/UL — SIGNIFICANT CHANGE UP (ref 0–0.5)
EOSINOPHIL NFR BLD AUTO: 6.6 % — HIGH (ref 0–6)
HCT VFR BLD CALC: 34 % — LOW (ref 39–50)
HGB BLD-MCNC: 10.8 G/DL — LOW (ref 13–17)
IMM GRANULOCYTES NFR BLD AUTO: 0.2 % — SIGNIFICANT CHANGE UP (ref 0–1.5)
LYMPHOCYTES # BLD AUTO: 1.85 K/UL — SIGNIFICANT CHANGE UP (ref 1–3.3)
LYMPHOCYTES # BLD AUTO: 32 % — SIGNIFICANT CHANGE UP (ref 13–44)
MCHC RBC-ENTMCNC: 30.2 PG — SIGNIFICANT CHANGE UP (ref 27–34)
MCHC RBC-ENTMCNC: 31.8 GM/DL — LOW (ref 32–36)
MCV RBC AUTO: 95 FL — SIGNIFICANT CHANGE UP (ref 80–100)
METHOD TYPE: SIGNIFICANT CHANGE UP
METHOD TYPE: SIGNIFICANT CHANGE UP
MONOCYTES # BLD AUTO: 0.5 K/UL — SIGNIFICANT CHANGE UP (ref 0–0.9)
MONOCYTES NFR BLD AUTO: 8.6 % — SIGNIFICANT CHANGE UP (ref 2–14)
NEUTROPHILS # BLD AUTO: 3.04 K/UL — SIGNIFICANT CHANGE UP (ref 1.8–7.4)
NEUTROPHILS NFR BLD AUTO: 52.4 % — SIGNIFICANT CHANGE UP (ref 43–77)
PLATELET # BLD AUTO: 213 K/UL — SIGNIFICANT CHANGE UP (ref 150–400)
RBC # BLD: 3.58 M/UL — LOW (ref 4.2–5.8)
RBC # FLD: 13.9 % — SIGNIFICANT CHANGE UP (ref 10.3–14.5)
VANCOMYCIN TROUGH SERPL-MCNC: 9.7 UG/ML — LOW (ref 10–20)
WBC # BLD: 5.79 K/UL — SIGNIFICANT CHANGE UP (ref 3.8–10.5)
WBC # FLD AUTO: 5.79 K/UL — SIGNIFICANT CHANGE UP (ref 3.8–10.5)

## 2017-05-18 RX ORDER — NAFCILLIN 10 G/100ML
2 INJECTION, POWDER, FOR SOLUTION INTRAVENOUS EVERY 4 HOURS
Qty: 0 | Refills: 0 | Status: DISCONTINUED | OUTPATIENT
Start: 2017-05-18 | End: 2017-05-22

## 2017-05-18 RX ORDER — DULOXETINE HYDROCHLORIDE 30 MG/1
60 CAPSULE, DELAYED RELEASE ORAL
Qty: 0 | Refills: 0 | Status: DISCONTINUED | OUTPATIENT
Start: 2017-05-18 | End: 2017-05-22

## 2017-05-18 RX ORDER — NAFCILLIN 10 G/100ML
2 INJECTION, POWDER, FOR SOLUTION INTRAVENOUS ONCE
Qty: 0 | Refills: 0 | Status: COMPLETED | OUTPATIENT
Start: 2017-05-18 | End: 2017-05-18

## 2017-05-18 RX ORDER — NAFCILLIN 10 G/100ML
INJECTION, POWDER, FOR SOLUTION INTRAVENOUS
Qty: 0 | Refills: 0 | Status: DISCONTINUED | OUTPATIENT
Start: 2017-05-18 | End: 2017-05-22

## 2017-05-18 RX ORDER — DIAZEPAM 5 MG
2 TABLET ORAL THREE TIMES A DAY
Qty: 0 | Refills: 0 | Status: DISCONTINUED | OUTPATIENT
Start: 2017-05-18 | End: 2017-05-22

## 2017-05-18 RX ADMIN — Medication 100 MILLIGRAM(S): at 05:39

## 2017-05-18 RX ADMIN — METHADONE HYDROCHLORIDE 20 MILLIGRAM(S): 40 TABLET ORAL at 11:35

## 2017-05-18 RX ADMIN — DULOXETINE HYDROCHLORIDE 60 MILLIGRAM(S): 30 CAPSULE, DELAYED RELEASE ORAL at 18:01

## 2017-05-18 RX ADMIN — METHADONE HYDROCHLORIDE 20 MILLIGRAM(S): 40 TABLET ORAL at 18:01

## 2017-05-18 RX ADMIN — Medication 250 MILLIGRAM(S): at 06:27

## 2017-05-18 RX ADMIN — SENNA PLUS 2 TABLET(S): 8.6 TABLET ORAL at 21:25

## 2017-05-18 RX ADMIN — PANTOPRAZOLE SODIUM 40 MILLIGRAM(S): 20 TABLET, DELAYED RELEASE ORAL at 05:39

## 2017-05-18 RX ADMIN — Medication 30 MILLILITER(S): at 02:20

## 2017-05-18 RX ADMIN — SODIUM CHLORIDE 3 MILLILITER(S): 9 INJECTION INTRAMUSCULAR; INTRAVENOUS; SUBCUTANEOUS at 05:26

## 2017-05-18 RX ADMIN — SODIUM CHLORIDE 3 MILLILITER(S): 9 INJECTION INTRAMUSCULAR; INTRAVENOUS; SUBCUTANEOUS at 14:48

## 2017-05-18 RX ADMIN — MORPHINE SULFATE 30 MILLIGRAM(S): 50 CAPSULE, EXTENDED RELEASE ORAL at 02:46

## 2017-05-18 RX ADMIN — MORPHINE SULFATE 30 MILLIGRAM(S): 50 CAPSULE, EXTENDED RELEASE ORAL at 02:16

## 2017-05-18 RX ADMIN — Medication 2 MILLIGRAM(S): at 11:35

## 2017-05-18 RX ADMIN — MORPHINE SULFATE 30 MILLIGRAM(S): 50 CAPSULE, EXTENDED RELEASE ORAL at 09:40

## 2017-05-18 RX ADMIN — NAFCILLIN 200 GRAM(S): 10 INJECTION, POWDER, FOR SOLUTION INTRAVENOUS at 19:40

## 2017-05-18 RX ADMIN — CEFTRIAXONE 100 GRAM(S): 500 INJECTION, POWDER, FOR SOLUTION INTRAMUSCULAR; INTRAVENOUS at 05:39

## 2017-05-18 RX ADMIN — MORPHINE SULFATE 30 MILLIGRAM(S): 50 CAPSULE, EXTENDED RELEASE ORAL at 19:51

## 2017-05-18 RX ADMIN — METHADONE HYDROCHLORIDE 20 MILLIGRAM(S): 40 TABLET ORAL at 05:32

## 2017-05-18 RX ADMIN — Medication 100 MILLIGRAM(S): at 14:52

## 2017-05-18 RX ADMIN — Medication 0.1 MILLIGRAM(S): at 21:25

## 2017-05-18 RX ADMIN — Medication 2 MILLIGRAM(S): at 02:36

## 2017-05-18 RX ADMIN — SODIUM CHLORIDE 3 MILLILITER(S): 9 INJECTION INTRAMUSCULAR; INTRAVENOUS; SUBCUTANEOUS at 21:06

## 2017-05-18 RX ADMIN — Medication 250 MILLIGRAM(S): at 18:02

## 2017-05-18 RX ADMIN — MORPHINE SULFATE 30 MILLIGRAM(S): 50 CAPSULE, EXTENDED RELEASE ORAL at 20:21

## 2017-05-18 RX ADMIN — MORPHINE SULFATE 30 MILLIGRAM(S): 50 CAPSULE, EXTENDED RELEASE ORAL at 15:26

## 2017-05-18 RX ADMIN — Medication 100 MILLIGRAM(S): at 21:25

## 2017-05-18 RX ADMIN — Medication 0.1 MILLIGRAM(S): at 08:22

## 2017-05-18 RX ADMIN — Medication 0.1 MILLIGRAM(S): at 05:32

## 2017-05-18 RX ADMIN — CEFTRIAXONE 100 GRAM(S): 500 INJECTION, POWDER, FOR SOLUTION INTRAMUSCULAR; INTRAVENOUS at 18:01

## 2017-05-18 RX ADMIN — Medication 30 MILLILITER(S): at 21:27

## 2017-05-18 RX ADMIN — Medication 2 MILLIGRAM(S): at 18:01

## 2017-05-18 RX ADMIN — MORPHINE SULFATE 30 MILLIGRAM(S): 50 CAPSULE, EXTENDED RELEASE ORAL at 14:51

## 2017-05-18 RX ADMIN — MORPHINE SULFATE 30 MILLIGRAM(S): 50 CAPSULE, EXTENDED RELEASE ORAL at 10:30

## 2017-05-19 ENCOUNTER — TRANSCRIPTION ENCOUNTER (OUTPATIENT)
Age: 62
End: 2017-05-19

## 2017-05-19 LAB
ANION GAP SERPL CALC-SCNC: 10 MMOL/L — SIGNIFICANT CHANGE UP (ref 5–17)
BASOPHILS # BLD AUTO: 0.1 K/UL — SIGNIFICANT CHANGE UP (ref 0–0.2)
BASOPHILS NFR BLD AUTO: 1.2 % — SIGNIFICANT CHANGE UP (ref 0–2)
BUN SERPL-MCNC: 19 MG/DL — SIGNIFICANT CHANGE UP (ref 7–23)
CALCIUM SERPL-MCNC: 9.1 MG/DL — SIGNIFICANT CHANGE UP (ref 8.4–10.5)
CHLORIDE SERPL-SCNC: 100 MMOL/L — SIGNIFICANT CHANGE UP (ref 96–108)
CO2 SERPL-SCNC: 34 MMOL/L — HIGH (ref 22–31)
CREAT SERPL-MCNC: 1.16 MG/DL — SIGNIFICANT CHANGE UP (ref 0.5–1.3)
EOSINOPHIL # BLD AUTO: 0.3 K/UL — SIGNIFICANT CHANGE UP (ref 0–0.5)
EOSINOPHIL NFR BLD AUTO: 6.2 % — HIGH (ref 0–6)
GLUCOSE SERPL-MCNC: 111 MG/DL — HIGH (ref 70–99)
HCT VFR BLD CALC: 34.3 % — LOW (ref 39–50)
HGB BLD-MCNC: 11.1 G/DL — LOW (ref 13–17)
LYMPHOCYTES # BLD AUTO: 1.8 K/UL — SIGNIFICANT CHANGE UP (ref 1–3.3)
LYMPHOCYTES # BLD AUTO: 33.6 % — SIGNIFICANT CHANGE UP (ref 13–44)
MCHC RBC-ENTMCNC: 31 PG — SIGNIFICANT CHANGE UP (ref 27–34)
MCHC RBC-ENTMCNC: 32.3 GM/DL — SIGNIFICANT CHANGE UP (ref 32–36)
MCV RBC AUTO: 96.1 FL — SIGNIFICANT CHANGE UP (ref 80–100)
MONOCYTES # BLD AUTO: 0.4 K/UL — SIGNIFICANT CHANGE UP (ref 0–0.9)
MONOCYTES NFR BLD AUTO: 8.1 % — SIGNIFICANT CHANGE UP (ref 2–14)
NEUTROPHILS # BLD AUTO: 2.8 K/UL — SIGNIFICANT CHANGE UP (ref 1.8–7.4)
NEUTROPHILS NFR BLD AUTO: 50.9 % — SIGNIFICANT CHANGE UP (ref 43–77)
PLATELET # BLD AUTO: 200 K/UL — SIGNIFICANT CHANGE UP (ref 150–400)
POTASSIUM SERPL-MCNC: 4.2 MMOL/L — SIGNIFICANT CHANGE UP (ref 3.5–5.3)
POTASSIUM SERPL-SCNC: 4.2 MMOL/L — SIGNIFICANT CHANGE UP (ref 3.5–5.3)
RBC # BLD: 3.57 M/UL — LOW (ref 4.2–5.8)
RBC # FLD: 13.2 % — SIGNIFICANT CHANGE UP (ref 10.3–14.5)
SODIUM SERPL-SCNC: 144 MMOL/L — SIGNIFICANT CHANGE UP (ref 135–145)
WBC # BLD: 5.5 K/UL — SIGNIFICANT CHANGE UP (ref 3.8–10.5)
WBC # FLD AUTO: 5.5 K/UL — SIGNIFICANT CHANGE UP (ref 3.8–10.5)

## 2017-05-19 PROCEDURE — 71010: CPT | Mod: 26

## 2017-05-19 RX ORDER — ENOXAPARIN SODIUM 100 MG/ML
40 INJECTION SUBCUTANEOUS
Qty: 0 | Refills: 0 | Status: DISCONTINUED | OUTPATIENT
Start: 2017-05-19 | End: 2017-05-22

## 2017-05-19 RX ORDER — NAFCILLIN 10 G/100ML
100 INJECTION, POWDER, FOR SOLUTION INTRAVENOUS
Qty: 72 | Refills: 0 | OUTPATIENT
Start: 2017-05-19 | End: 2017-05-31

## 2017-05-19 RX ADMIN — NAFCILLIN 200 GRAM(S): 10 INJECTION, POWDER, FOR SOLUTION INTRAVENOUS at 23:10

## 2017-05-19 RX ADMIN — MORPHINE SULFATE 30 MILLIGRAM(S): 50 CAPSULE, EXTENDED RELEASE ORAL at 20:01

## 2017-05-19 RX ADMIN — DULOXETINE HYDROCHLORIDE 60 MILLIGRAM(S): 30 CAPSULE, DELAYED RELEASE ORAL at 17:27

## 2017-05-19 RX ADMIN — METHADONE HYDROCHLORIDE 20 MILLIGRAM(S): 40 TABLET ORAL at 05:04

## 2017-05-19 RX ADMIN — SODIUM CHLORIDE 3 MILLILITER(S): 9 INJECTION INTRAMUSCULAR; INTRAVENOUS; SUBCUTANEOUS at 21:18

## 2017-05-19 RX ADMIN — MORPHINE SULFATE 30 MILLIGRAM(S): 50 CAPSULE, EXTENDED RELEASE ORAL at 08:12

## 2017-05-19 RX ADMIN — Medication 100 MILLIGRAM(S): at 13:03

## 2017-05-19 RX ADMIN — SODIUM CHLORIDE 3 MILLILITER(S): 9 INJECTION INTRAMUSCULAR; INTRAVENOUS; SUBCUTANEOUS at 05:05

## 2017-05-19 RX ADMIN — METHADONE HYDROCHLORIDE 20 MILLIGRAM(S): 40 TABLET ORAL at 13:03

## 2017-05-19 RX ADMIN — NAFCILLIN 200 GRAM(S): 10 INJECTION, POWDER, FOR SOLUTION INTRAVENOUS at 16:27

## 2017-05-19 RX ADMIN — NAFCILLIN 200 GRAM(S): 10 INJECTION, POWDER, FOR SOLUTION INTRAVENOUS at 09:55

## 2017-05-19 RX ADMIN — MORPHINE SULFATE 30 MILLIGRAM(S): 50 CAPSULE, EXTENDED RELEASE ORAL at 19:31

## 2017-05-19 RX ADMIN — SODIUM CHLORIDE 3 MILLILITER(S): 9 INJECTION INTRAMUSCULAR; INTRAVENOUS; SUBCUTANEOUS at 13:07

## 2017-05-19 RX ADMIN — MORPHINE SULFATE 30 MILLIGRAM(S): 50 CAPSULE, EXTENDED RELEASE ORAL at 15:45

## 2017-05-19 RX ADMIN — MORPHINE SULFATE 30 MILLIGRAM(S): 50 CAPSULE, EXTENDED RELEASE ORAL at 08:52

## 2017-05-19 RX ADMIN — MORPHINE SULFATE 30 MILLIGRAM(S): 50 CAPSULE, EXTENDED RELEASE ORAL at 15:15

## 2017-05-19 RX ADMIN — Medication 100 MILLIGRAM(S): at 05:04

## 2017-05-19 RX ADMIN — Medication 100 MILLIGRAM(S): at 21:16

## 2017-05-19 RX ADMIN — METHADONE HYDROCHLORIDE 20 MILLIGRAM(S): 40 TABLET ORAL at 00:03

## 2017-05-19 RX ADMIN — DULOXETINE HYDROCHLORIDE 60 MILLIGRAM(S): 30 CAPSULE, DELAYED RELEASE ORAL at 05:04

## 2017-05-19 RX ADMIN — Medication 0.1 MILLIGRAM(S): at 05:02

## 2017-05-19 RX ADMIN — NAFCILLIN 200 GRAM(S): 10 INJECTION, POWDER, FOR SOLUTION INTRAVENOUS at 04:49

## 2017-05-19 RX ADMIN — Medication 25 MILLIGRAM(S): at 23:59

## 2017-05-19 RX ADMIN — METHADONE HYDROCHLORIDE 20 MILLIGRAM(S): 40 TABLET ORAL at 23:09

## 2017-05-19 RX ADMIN — Medication 25 MILLIGRAM(S): at 00:03

## 2017-05-19 RX ADMIN — Medication 0.1 MILLIGRAM(S): at 13:03

## 2017-05-19 RX ADMIN — SENNA PLUS 2 TABLET(S): 8.6 TABLET ORAL at 21:16

## 2017-05-19 RX ADMIN — Medication 2 MILLIGRAM(S): at 11:24

## 2017-05-19 RX ADMIN — Medication 2 MILLIGRAM(S): at 20:34

## 2017-05-19 RX ADMIN — NAFCILLIN 200 GRAM(S): 10 INJECTION, POWDER, FOR SOLUTION INTRAVENOUS at 13:03

## 2017-05-19 RX ADMIN — ENOXAPARIN SODIUM 40 MILLIGRAM(S): 100 INJECTION SUBCUTANEOUS at 17:27

## 2017-05-19 RX ADMIN — METHADONE HYDROCHLORIDE 20 MILLIGRAM(S): 40 TABLET ORAL at 17:28

## 2017-05-19 RX ADMIN — MORPHINE SULFATE 30 MILLIGRAM(S): 50 CAPSULE, EXTENDED RELEASE ORAL at 02:43

## 2017-05-19 RX ADMIN — PANTOPRAZOLE SODIUM 40 MILLIGRAM(S): 20 TABLET, DELAYED RELEASE ORAL at 05:04

## 2017-05-19 RX ADMIN — NAFCILLIN 200 GRAM(S): 10 INJECTION, POWDER, FOR SOLUTION INTRAVENOUS at 00:04

## 2017-05-19 RX ADMIN — NAFCILLIN 200 GRAM(S): 10 INJECTION, POWDER, FOR SOLUTION INTRAVENOUS at 21:15

## 2017-05-19 RX ADMIN — Medication 0.1 MILLIGRAM(S): at 21:16

## 2017-05-19 RX ADMIN — MORPHINE SULFATE 30 MILLIGRAM(S): 50 CAPSULE, EXTENDED RELEASE ORAL at 03:13

## 2017-05-19 NOTE — PHYSICAL THERAPY INITIAL EVALUATION ADULT - ADDITIONAL COMMENTS
61 year old man who PTA was living in apartment no stairs, (one step) to enter, independent in all functional mobility and all ALD's prior, ambulates with a straight cane community distances. independent in IADL's and BADL's states famly can assist as needed upon DC.

## 2017-05-19 NOTE — DISCHARGE NOTE ADULT - PLAN OF CARE
s/p vps resection on 5/16 s/p vps resection follow up with primary care follow up with cardiologist Dr. Olvera [phone] follow up with cardiologist Dr. Olvera [phone] continue hypertension medication follow up with Dr. Lopez s/p vps removal on 5/16 s/p vps removal due to erythema around the shunt --ID following will need weekly CBC/BMP and c/w Nafcillin till 5/31/17

## 2017-05-19 NOTE — PHYSICAL THERAPY INITIAL EVALUATION ADULT - CRITERIA FOR SKILLED THERAPEUTIC INTERVENTIONS
rehab potential/impairments found/risk reduction/prevention/functional limitations in following categories/therapy frequency/anticipated equipment needs at discharge/anticipated discharge recommendation/predicted duration of therapy intervention

## 2017-05-19 NOTE — DISCHARGE NOTE ADULT - NS AS ACTIVITY OBS
Stairs allowed/Walking-Outdoors allowed/Do not make important decisions/No Heavy lifting/straining/Do not drive or operate machinery/Walking-Indoors allowed/Showering allowed

## 2017-05-19 NOTE — DISCHARGE NOTE ADULT - MEDICATION SUMMARY - MEDICATIONS TO TAKE
I will START or STAY ON the medications listed below when I get home from the hospital:    cbc and bmp  -- weekly infectious disease Dr. Abreu   -- Indication: For for monitoring    sodium chloride 0.9% lock flush  -- 3 milliliter(s) intravenous every 8 hours  -- Indication: For for picc line flush    nicotine polacrilex gum 4 mg  -- 1 dose(s) by mouth every 4 hours, As Needed  -- Indication: For Smoking     acetaminophen 325 mg oral tablet  -- 2 tab(s) by mouth every 6 hours, As needed, Mild Pain (1 - 3)  -- Indication: For pain .fevers    methadone 10 mg oral tablet  -- 2 tab(s) by mouth every 6 hours pain MDD:4 tabs  -- Indication: For pain    Morphine IR  -- 30 milligram(s) by mouth every 4 hours, As Needed  -- Indication: For pain    cloNIDine 0.1 mg oral tablet  -- 1 tab(s) by mouth every 8 hours  -- Indication: For Htn    Valium 2 mg oral tablet  -- 1 tab(s) by mouth 3 times a day  -- Indication: For Anxiety and depression    Cymbalta 60 mg oral delayed release capsule  -- 1 cap(s) by mouth 2 times a day  -- Indication: For Anxiety and depression    docusate sodium 100 mg oral capsule  -- 1 cap(s) by mouth 3 times a day  -- Indication: For Stool softener    lactulose 10 g/15 mL oral syrup  -- 30 milliliter(s) by mouth every 4 hours  -- Indication: For Constipation    senna oral tablet  -- 2 tab(s) by mouth once a day (at bedtime)  -- Indication: For Stool softener    nafcillin 2 g/100 mL intravenous solution  -- 100 milliliter(s) intravenous every 6 hours  -- Indication: For MSSA in CNS    pantoprazole 40 mg oral delayed release tablet  -- 1 tab(s) by mouth once a day (before a meal)  -- Indication: For GERD I will START or STAY ON the medications listed below when I get home from the hospital:    cbc and bmp  -- weekly infectious disease Dr. Abreu   -- Indication: For for monitoring    sodium chloride 0.9% lock flush  -- 3 milliliter(s) intravenous every 8 hours  -- Indication: For for picc line flush    nicotine polacrilex gum 4 mg  -- 1 dose(s) by mouth every 4 hours, As Needed  -- Indication: For Smoking     acetaminophen 325 mg oral tablet  -- 2 tab(s) by mouth every 6 hours, As needed, Mild Pain (1 - 3)  -- Indication: For pain .fevers    methadone 10 mg oral tablet  -- 2 tab(s) by mouth every 6 hours pain MDD:4 tabs  -- Indication: For pain    Morphine IR  -- 30 milligram(s) by mouth every 4 hours, As Needed  -- Indication: For pain    cloNIDine 0.1 mg oral tablet  -- 1 tab(s) by mouth every 8 hours  -- Indication: For Htn    Valium 2 mg oral tablet  -- 1 tab(s) by mouth 3 times a day  -- Indication: For Anxiety and depression    Cymbalta 60 mg oral delayed release capsule  -- 1 cap(s) by mouth 2 times a day  -- Indication: For Anxiety and depression    docusate sodium 100 mg oral capsule  -- 1 cap(s) by mouth 3 times a day  -- Indication: For Stool softener    lactulose 10 g/15 mL oral syrup  -- 30 milliliter(s) by mouth every 4 hours  -- Indication: For Constipation    senna oral tablet  -- 2 tab(s) by mouth once a day (at bedtime)  -- Indication: For Stool softener    nafcillin 2 g/100 mL intravenous solution  -- 100 milliliter(s) intravenous every 4 hours, dispense x 12 days   -- Indication: For MSSA CNS     pantoprazole 40 mg oral delayed release tablet  -- 1 tab(s) by mouth once a day (before a meal)  -- Indication: For GERD

## 2017-05-19 NOTE — DISCHARGE NOTE ADULT - SECONDARY DIAGNOSIS.
Anxiety and depression Diastolic dysfunction Empyema lung Hypertension History of lumbar fusion Spinal stenosis

## 2017-05-19 NOTE — PHYSICAL THERAPY INITIAL EVALUATION ADULT - PERTINENT HX OF CURRENT PROBLEM, REHAB EVAL
61M s/p previous fusion in October 2016, course c/b CSF leak, s/p VPS, presents now to the ED with erythema and pain around the shunt site. He states that 2 weeks ago he felt fine. Then while in the shower noticed that the shunt valve was tender. Since that time it has become more painful, and the skin around the distal catheter has become painful and erythematous.

## 2017-05-19 NOTE — DISCHARGE NOTE ADULT - MEDICATION SUMMARY - MEDICATIONS TO STOP TAKING
I will STOP taking the medications listed below when I get home from the hospital:    enoxaparin  -- 40 milligram(s) subcutaneous once a day (at bedtime)

## 2017-05-19 NOTE — PHYSICAL THERAPY INITIAL EVALUATION ADULT - PRECAUTIONS/LIMITATIONS, REHAB EVAL
He presented to the ED for possible shunt infection. s/p procedure for wound dehisence/infection onsite Sx: Removal of  shunt. Patient is currently on IV abx and pending wound cultures

## 2017-05-19 NOTE — DISCHARGE NOTE ADULT - ADDITIONAL INSTRUCTIONS
follow up in office with Dr. Lopez [phone]  continue nafacillin per infectious disease every 4 hours until 5/31/17.  follow up with infectious disease next week.  Dr. Abreu [phone], cbc and bmp weekly  let attending know if any change in mental status, headaches, pain not controlled by medication, nausea and or vomiting

## 2017-05-19 NOTE — DISCHARGE NOTE ADULT - CARE PROVIDER_API CALL
Bill Abreu (MD), Infectious Disease  [address]  [place] [address]  Phone: [phone]  Fax: [phone]    Bong Lopez (DO), Neurological Surgery  [address] [place] [address]  Phone: [phone]  Fax: [phone]

## 2017-05-19 NOTE — DISCHARGE NOTE ADULT - PATIENT PORTAL LINK FT
“You can access the Follow[facility] Patient Portal, offered by [facility] [place], by registering with the following website: [email or web address]”

## 2017-05-19 NOTE — DISCHARGE NOTE ADULT - HOSPITAL COURSE
60 y/o male PMHx anxiety, depression, diastolic dysfunction, lung empyema, peptic ulcer, htn, spinal stenosis, Recent 9/12/16 L2-S1 revision of hardware, L3 VCR, T7-Illiac fusion and 10/3/16 s/p VPS for CSF leak, Adm 5/15 with pain and erythema around shunt and wound dehiscence over shunt site. 5/16/17 s/p Removal of VPS (CSF ++MSSA) evaluated by Infectious Disease and treated with Nafcillin --recommended continue untill 5/31/17. Patient advised will need Weekly CBC/BMP (rx given next one 5/26/17)  while in hospital: Patient refused Home OT (all the risk were d/w patient he understands and verbalizes).   Advised patient to Follow up with Dr. Lopez x 1 week with Dr. Abreu x 1 week, and Dr. Plummer (Medical DrCas or own PCP)   Private pain mgmt Dr. Keith Sharma called [phone]  May (Cardiologist)-Followed;   ID- F ->(Brady), Plastics (moon)-saw 62 y/o male PMHx anxiety, depression, diastolic dysfunction, lung empyema, peptic ulcer, htn, spinal stenosis, Recent 9/12/16 L2-S1 revision of hardware, L3 VCR, T7-Illiac fusion and 10/3/16 s/p VPS for CSF leak, Adm 5/15 with pain and erythema around shunt and wound dehiscence over shunt site. 5/16/17 s/p Removal of VPS (CSF ++MSSA) evaluated by Infectious Disease and treated with Nafcillin --recommended continue untill 5/31/17. Patient advised will need Weekly CBC/BMP (rx given next one 5/26/17)  while in hospital: Patient refused Home OT (all the risk were d/w patient he understands and verbalizes).  On the Day of Discharge patient was cleared  and agreed with current care PICC line out patient management by Dr. Lopez, and or ID, Cardiologist --   Advised patient to Follow up with Dr. Lopez x 1 week with Dr. Abreu x 1 week, and Dr. Plummer (Medical Dr. or own PCP)   Private pain mgmt Dr. Keith Sharma called [phone]  May (Cardiologist)-Followed;   ID- F ->(Brady), Plastics (moon)-saw

## 2017-05-19 NOTE — DISCHARGE NOTE ADULT - CARE PLAN
Principal Discharge DX:	Shunt malfunction  Goal:	s/p vps resection on 5/16  Instructions for follow-up, activity and diet:	s/p vps resection  Secondary Diagnosis:	Anxiety and depression  Goal:	follow up with primary care  Secondary Diagnosis:	Diastolic dysfunction  Goal:	follow up with cardiologist Dr. Olvera [phone]  Secondary Diagnosis:	Empyema lung  Goal:	follow up with primary care  Secondary Diagnosis:	Hypertension  Goal:	follow up with cardiologist Dr. Olvera [phone] continue hypertension medication  Secondary Diagnosis:	History of lumbar fusion  Goal:	follow up with Dr. Lopez  Secondary Diagnosis:	Spinal stenosis  Goal:	follow up with Dr. Lopez Principal Discharge DX:	Shunt malfunction  Goal:	s/p vps removal on 5/16  Instructions for follow-up, activity and diet:	s/p vps removal due to erythema around the shunt --ID following will need weekly CBC/BMP and c/w Nafcillin till 5/31/17  Secondary Diagnosis:	Anxiety and depression  Goal:	follow up with primary care  Secondary Diagnosis:	Diastolic dysfunction  Goal:	follow up with cardiologist Dr. Olvera [phone]  Secondary Diagnosis:	Empyema lung  Goal:	follow up with primary care  Secondary Diagnosis:	Hypertension  Goal:	follow up with cardiologist Dr. Olvera [phone] continue hypertension medication  Secondary Diagnosis:	History of lumbar fusion  Goal:	follow up with Dr. Lopez  Secondary Diagnosis:	Spinal stenosis  Goal:	follow up with Dr. Lopez Principal Discharge DX:	Shunt malfunction  Goal:	s/p vps removal on 5/16  Instructions for follow-up, activity and diet:	s/p vps removal due to erythema around the shunt --ID following will need weekly CBC/BMP and c/w Nafcillin till 5/31/17  Secondary Diagnosis:	Anxiety and depression  Goal:	follow up with primary care  Secondary Diagnosis:	Diastolic dysfunction  Goal:	follow up with cardiologist Dr. Olvera [phone]  Secondary Diagnosis:	Empyema lung  Goal:	follow up with primary care  Secondary Diagnosis:	Hypertension  Goal:	follow up with cardiologist Dr. Olvera [phone] continue hypertension medication  Secondary Diagnosis:	History of lumbar fusion  Goal:	follow up with Dr. Lopez  Secondary Diagnosis:	Spinal stenosis  Goal:	follow up with Dr. Lopez Principal Discharge DX:	Shunt malfunction  Goal:	s/p vps removal on 5/16  Instructions for follow-up, activity and diet:	s/p vps removal due to erythema around the shunt --ID following will need weekly CBC/BMP and c/w Nafcillin till 5/31/17  Secondary Diagnosis:	Anxiety and depression  Goal:	follow up with primary care  Secondary Diagnosis:	Diastolic dysfunction  Goal:	follow up with cardiologist Dr. Olvera [phone]  Secondary Diagnosis:	Empyema lung  Goal:	follow up with primary care  Secondary Diagnosis:	Hypertension  Goal:	follow up with cardiologist Dr. Olvera [phone] continue hypertension medication  Secondary Diagnosis:	History of lumbar fusion  Goal:	follow up with Dr. Lopez  Secondary Diagnosis:	Spinal stenosis  Goal:	follow up with Dr. Lopez Principal Discharge DX:	Shunt malfunction  Goal:	s/p vps removal on 5/16  Instructions for follow-up, activity and diet:	s/p vps removal due to erythema around the shunt --ID following will need weekly CBC/BMP and c/w Nafcillin till 5/31/17  Secondary Diagnosis:	Anxiety and depression  Goal:	follow up with primary care  Secondary Diagnosis:	Diastolic dysfunction  Goal:	follow up with cardiologist Dr. Olvera [phone]  Secondary Diagnosis:	Empyema lung  Goal:	follow up with primary care  Secondary Diagnosis:	Hypertension  Goal:	follow up with cardiologist Dr. Olvera [phone] continue hypertension medication  Secondary Diagnosis:	History of lumbar fusion  Goal:	follow up with Dr. Lopez  Secondary Diagnosis:	Spinal stenosis  Goal:	follow up with Dr. Lopez Principal Discharge DX:	Shunt malfunction  Goal:	s/p vps removal on 5/16  Instructions for follow-up, activity and diet:	s/p vps removal due to erythema around the shunt --ID following will need weekly CBC/BMP and c/w Nafcillin till 5/31/17  Secondary Diagnosis:	Anxiety and depression  Goal:	follow up with primary care  Secondary Diagnosis:	Diastolic dysfunction  Goal:	follow up with cardiologist Dr. Olvera [phone]  Secondary Diagnosis:	Empyema lung  Goal:	follow up with primary care  Secondary Diagnosis:	Hypertension  Goal:	follow up with cardiologist Dr. Olvera [phone] continue hypertension medication  Secondary Diagnosis:	History of lumbar fusion  Goal:	follow up with Dr. Lopez  Secondary Diagnosis:	Spinal stenosis  Goal:	follow up with Dr. Lopez Principal Discharge DX:	Shunt malfunction  Goal:	s/p vps removal on 5/16  Instructions for follow-up, activity and diet:	s/p vps removal due to erythema around the shunt --ID following will need weekly CBC/BMP and c/w Nafcillin till 5/31/17  Secondary Diagnosis:	Anxiety and depression  Goal:	follow up with primary care  Secondary Diagnosis:	Diastolic dysfunction  Goal:	follow up with cardiologist Dr. Olvera [phone]  Secondary Diagnosis:	Empyema lung  Goal:	follow up with primary care  Secondary Diagnosis:	Hypertension  Goal:	follow up with cardiologist Dr. Olvera [phone] continue hypertension medication  Secondary Diagnosis:	History of lumbar fusion  Goal:	follow up with Dr. Lopez  Secondary Diagnosis:	Spinal stenosis  Goal:	follow up with Dr. Lopez Principal Discharge DX:	Shunt malfunction  Goal:	s/p vps removal on 5/16  Instructions for follow-up, activity and diet:	s/p vps removal due to erythema around the shunt --ID following will need weekly CBC/BMP and c/w Nafcillin till 5/31/17  Secondary Diagnosis:	Anxiety and depression  Goal:	follow up with primary care  Secondary Diagnosis:	Diastolic dysfunction  Goal:	follow up with cardiologist Dr. Olvera [phone]  Secondary Diagnosis:	Empyema lung  Goal:	follow up with primary care  Secondary Diagnosis:	Hypertension  Goal:	follow up with cardiologist Dr. Olvera [phone] continue hypertension medication  Secondary Diagnosis:	History of lumbar fusion  Goal:	follow up with Dr. Lopez  Secondary Diagnosis:	Spinal stenosis  Goal:	follow up with Dr. Lopez

## 2017-05-20 LAB
CULTURE RESULTS: SIGNIFICANT CHANGE UP
CULTURE RESULTS: SIGNIFICANT CHANGE UP
SPECIMEN SOURCE: SIGNIFICANT CHANGE UP
SPECIMEN SOURCE: SIGNIFICANT CHANGE UP

## 2017-05-20 RX ADMIN — Medication 100 MILLIGRAM(S): at 21:28

## 2017-05-20 RX ADMIN — METHADONE HYDROCHLORIDE 20 MILLIGRAM(S): 40 TABLET ORAL at 05:55

## 2017-05-20 RX ADMIN — DULOXETINE HYDROCHLORIDE 60 MILLIGRAM(S): 30 CAPSULE, DELAYED RELEASE ORAL at 05:55

## 2017-05-20 RX ADMIN — MORPHINE SULFATE 30 MILLIGRAM(S): 50 CAPSULE, EXTENDED RELEASE ORAL at 10:30

## 2017-05-20 RX ADMIN — ENOXAPARIN SODIUM 40 MILLIGRAM(S): 100 INJECTION SUBCUTANEOUS at 17:51

## 2017-05-20 RX ADMIN — NAFCILLIN 200 GRAM(S): 10 INJECTION, POWDER, FOR SOLUTION INTRAVENOUS at 05:08

## 2017-05-20 RX ADMIN — METHADONE HYDROCHLORIDE 20 MILLIGRAM(S): 40 TABLET ORAL at 23:58

## 2017-05-20 RX ADMIN — Medication 100 MILLIGRAM(S): at 14:13

## 2017-05-20 RX ADMIN — METHADONE HYDROCHLORIDE 20 MILLIGRAM(S): 40 TABLET ORAL at 11:57

## 2017-05-20 RX ADMIN — Medication 2 MILLIGRAM(S): at 21:32

## 2017-05-20 RX ADMIN — SODIUM CHLORIDE 3 MILLILITER(S): 9 INJECTION INTRAMUSCULAR; INTRAVENOUS; SUBCUTANEOUS at 22:23

## 2017-05-20 RX ADMIN — PANTOPRAZOLE SODIUM 40 MILLIGRAM(S): 20 TABLET, DELAYED RELEASE ORAL at 05:55

## 2017-05-20 RX ADMIN — SENNA PLUS 2 TABLET(S): 8.6 TABLET ORAL at 21:28

## 2017-05-20 RX ADMIN — NAFCILLIN 200 GRAM(S): 10 INJECTION, POWDER, FOR SOLUTION INTRAVENOUS at 11:57

## 2017-05-20 RX ADMIN — NAFCILLIN 200 GRAM(S): 10 INJECTION, POWDER, FOR SOLUTION INTRAVENOUS at 16:02

## 2017-05-20 RX ADMIN — Medication 2 MILLIGRAM(S): at 11:57

## 2017-05-20 RX ADMIN — Medication 0.1 MILLIGRAM(S): at 21:28

## 2017-05-20 RX ADMIN — NAFCILLIN 200 GRAM(S): 10 INJECTION, POWDER, FOR SOLUTION INTRAVENOUS at 20:00

## 2017-05-20 RX ADMIN — METHADONE HYDROCHLORIDE 20 MILLIGRAM(S): 40 TABLET ORAL at 17:51

## 2017-05-20 RX ADMIN — Medication 0.1 MILLIGRAM(S): at 14:13

## 2017-05-20 RX ADMIN — DULOXETINE HYDROCHLORIDE 60 MILLIGRAM(S): 30 CAPSULE, DELAYED RELEASE ORAL at 17:51

## 2017-05-20 RX ADMIN — NAFCILLIN 200 GRAM(S): 10 INJECTION, POWDER, FOR SOLUTION INTRAVENOUS at 09:25

## 2017-05-20 RX ADMIN — Medication 0.1 MILLIGRAM(S): at 05:55

## 2017-05-20 RX ADMIN — NAFCILLIN 200 GRAM(S): 10 INJECTION, POWDER, FOR SOLUTION INTRAVENOUS at 23:58

## 2017-05-20 RX ADMIN — MORPHINE SULFATE 30 MILLIGRAM(S): 50 CAPSULE, EXTENDED RELEASE ORAL at 16:01

## 2017-05-20 RX ADMIN — Medication 2 MILLIGRAM(S): at 00:00

## 2017-05-20 RX ADMIN — SODIUM CHLORIDE 3 MILLILITER(S): 9 INJECTION INTRAMUSCULAR; INTRAVENOUS; SUBCUTANEOUS at 05:55

## 2017-05-20 RX ADMIN — MORPHINE SULFATE 30 MILLIGRAM(S): 50 CAPSULE, EXTENDED RELEASE ORAL at 09:38

## 2017-05-20 RX ADMIN — Medication 100 MILLIGRAM(S): at 05:55

## 2017-05-20 RX ADMIN — SODIUM CHLORIDE 3 MILLILITER(S): 9 INJECTION INTRAMUSCULAR; INTRAVENOUS; SUBCUTANEOUS at 14:11

## 2017-05-21 RX ADMIN — SODIUM CHLORIDE 3 MILLILITER(S): 9 INJECTION INTRAMUSCULAR; INTRAVENOUS; SUBCUTANEOUS at 15:41

## 2017-05-21 RX ADMIN — ENOXAPARIN SODIUM 40 MILLIGRAM(S): 100 INJECTION SUBCUTANEOUS at 17:09

## 2017-05-21 RX ADMIN — SODIUM CHLORIDE 3 MILLILITER(S): 9 INJECTION INTRAMUSCULAR; INTRAVENOUS; SUBCUTANEOUS at 21:58

## 2017-05-21 RX ADMIN — METHADONE HYDROCHLORIDE 20 MILLIGRAM(S): 40 TABLET ORAL at 12:15

## 2017-05-21 RX ADMIN — SODIUM CHLORIDE 3 MILLILITER(S): 9 INJECTION INTRAMUSCULAR; INTRAVENOUS; SUBCUTANEOUS at 06:13

## 2017-05-21 RX ADMIN — Medication 0.1 MILLIGRAM(S): at 15:46

## 2017-05-21 RX ADMIN — Medication 100 MILLIGRAM(S): at 06:12

## 2017-05-21 RX ADMIN — NAFCILLIN 200 GRAM(S): 10 INJECTION, POWDER, FOR SOLUTION INTRAVENOUS at 07:40

## 2017-05-21 RX ADMIN — Medication 2 MILLIGRAM(S): at 15:42

## 2017-05-21 RX ADMIN — NAFCILLIN 200 GRAM(S): 10 INJECTION, POWDER, FOR SOLUTION INTRAVENOUS at 04:26

## 2017-05-21 RX ADMIN — METHADONE HYDROCHLORIDE 20 MILLIGRAM(S): 40 TABLET ORAL at 06:11

## 2017-05-21 RX ADMIN — DULOXETINE HYDROCHLORIDE 60 MILLIGRAM(S): 30 CAPSULE, DELAYED RELEASE ORAL at 06:12

## 2017-05-21 RX ADMIN — NAFCILLIN 200 GRAM(S): 10 INJECTION, POWDER, FOR SOLUTION INTRAVENOUS at 23:48

## 2017-05-21 RX ADMIN — METHADONE HYDROCHLORIDE 20 MILLIGRAM(S): 40 TABLET ORAL at 17:10

## 2017-05-21 RX ADMIN — Medication 30 MILLILITER(S): at 17:11

## 2017-05-21 RX ADMIN — SENNA PLUS 2 TABLET(S): 8.6 TABLET ORAL at 22:02

## 2017-05-21 RX ADMIN — NAFCILLIN 200 GRAM(S): 10 INJECTION, POWDER, FOR SOLUTION INTRAVENOUS at 15:47

## 2017-05-21 RX ADMIN — MORPHINE SULFATE 30 MILLIGRAM(S): 50 CAPSULE, EXTENDED RELEASE ORAL at 11:00

## 2017-05-21 RX ADMIN — Medication 0.1 MILLIGRAM(S): at 06:11

## 2017-05-21 RX ADMIN — MORPHINE SULFATE 30 MILLIGRAM(S): 50 CAPSULE, EXTENDED RELEASE ORAL at 10:01

## 2017-05-21 RX ADMIN — PANTOPRAZOLE SODIUM 40 MILLIGRAM(S): 20 TABLET, DELAYED RELEASE ORAL at 06:12

## 2017-05-21 RX ADMIN — Medication 100 MILLIGRAM(S): at 15:41

## 2017-05-21 RX ADMIN — NAFCILLIN 200 GRAM(S): 10 INJECTION, POWDER, FOR SOLUTION INTRAVENOUS at 12:15

## 2017-05-21 RX ADMIN — DULOXETINE HYDROCHLORIDE 60 MILLIGRAM(S): 30 CAPSULE, DELAYED RELEASE ORAL at 17:10

## 2017-05-21 RX ADMIN — Medication 0.1 MILLIGRAM(S): at 22:01

## 2017-05-21 RX ADMIN — Medication 100 MILLIGRAM(S): at 22:02

## 2017-05-21 RX ADMIN — NAFCILLIN 200 GRAM(S): 10 INJECTION, POWDER, FOR SOLUTION INTRAVENOUS at 19:55

## 2017-05-21 RX ADMIN — METHADONE HYDROCHLORIDE 20 MILLIGRAM(S): 40 TABLET ORAL at 23:48

## 2017-05-22 VITALS — WEIGHT: 222.45 LBS

## 2017-05-22 LAB
ALBUMIN SERPL ELPH-MCNC: 3.4 G/DL — SIGNIFICANT CHANGE UP (ref 3.3–5)
ALP SERPL-CCNC: 70 U/L — SIGNIFICANT CHANGE UP (ref 40–120)
ALT FLD-CCNC: 10 U/L — SIGNIFICANT CHANGE UP (ref 10–45)
ANION GAP SERPL CALC-SCNC: 16 MMOL/L — SIGNIFICANT CHANGE UP (ref 5–17)
AST SERPL-CCNC: 15 U/L — SIGNIFICANT CHANGE UP (ref 10–40)
BASOPHILS # BLD AUTO: 0.03 K/UL — SIGNIFICANT CHANGE UP (ref 0–0.2)
BASOPHILS NFR BLD AUTO: 0.6 % — SIGNIFICANT CHANGE UP (ref 0–2)
BILIRUB SERPL-MCNC: 0.2 MG/DL — SIGNIFICANT CHANGE UP (ref 0.2–1.2)
BUN SERPL-MCNC: 17 MG/DL — SIGNIFICANT CHANGE UP (ref 7–23)
CALCIUM SERPL-MCNC: 9.1 MG/DL — SIGNIFICANT CHANGE UP (ref 8.4–10.5)
CHLORIDE SERPL-SCNC: 102 MMOL/L — SIGNIFICANT CHANGE UP (ref 96–108)
CO2 SERPL-SCNC: 26 MMOL/L — SIGNIFICANT CHANGE UP (ref 22–31)
CREAT SERPL-MCNC: 0.98 MG/DL — SIGNIFICANT CHANGE UP (ref 0.5–1.3)
EOSINOPHIL # BLD AUTO: 0.28 K/UL — SIGNIFICANT CHANGE UP (ref 0–0.5)
EOSINOPHIL NFR BLD AUTO: 5.5 % — SIGNIFICANT CHANGE UP (ref 0–6)
GLUCOSE SERPL-MCNC: 80 MG/DL — SIGNIFICANT CHANGE UP (ref 70–99)
HCT VFR BLD CALC: 33.2 % — LOW (ref 39–50)
HGB BLD-MCNC: 10.4 G/DL — LOW (ref 13–17)
IMM GRANULOCYTES NFR BLD AUTO: 0 % — SIGNIFICANT CHANGE UP (ref 0–1.5)
LYMPHOCYTES # BLD AUTO: 1.89 K/UL — SIGNIFICANT CHANGE UP (ref 1–3.3)
LYMPHOCYTES # BLD AUTO: 37.1 % — SIGNIFICANT CHANGE UP (ref 13–44)
MCHC RBC-ENTMCNC: 29.5 PG — SIGNIFICANT CHANGE UP (ref 27–34)
MCHC RBC-ENTMCNC: 31.3 GM/DL — LOW (ref 32–36)
MCV RBC AUTO: 94.3 FL — SIGNIFICANT CHANGE UP (ref 80–100)
MONOCYTES # BLD AUTO: 0.32 K/UL — SIGNIFICANT CHANGE UP (ref 0–0.9)
MONOCYTES NFR BLD AUTO: 6.3 % — SIGNIFICANT CHANGE UP (ref 2–14)
NEUTROPHILS # BLD AUTO: 2.57 K/UL — SIGNIFICANT CHANGE UP (ref 1.8–7.4)
NEUTROPHILS NFR BLD AUTO: 50.5 % — SIGNIFICANT CHANGE UP (ref 43–77)
PLATELET # BLD AUTO: 204 K/UL — SIGNIFICANT CHANGE UP (ref 150–400)
POTASSIUM SERPL-MCNC: 4 MMOL/L — SIGNIFICANT CHANGE UP (ref 3.5–5.3)
POTASSIUM SERPL-SCNC: 4 MMOL/L — SIGNIFICANT CHANGE UP (ref 3.5–5.3)
PROT SERPL-MCNC: 6.7 G/DL — SIGNIFICANT CHANGE UP (ref 6–8.3)
RBC # BLD: 3.52 M/UL — LOW (ref 4.2–5.8)
RBC # FLD: 14.1 % — SIGNIFICANT CHANGE UP (ref 10.3–14.5)
SODIUM SERPL-SCNC: 144 MMOL/L — SIGNIFICANT CHANGE UP (ref 135–145)
WBC # BLD: 5.09 K/UL — SIGNIFICANT CHANGE UP (ref 3.8–10.5)
WBC # FLD AUTO: 5.09 K/UL — SIGNIFICANT CHANGE UP (ref 3.8–10.5)

## 2017-05-22 PROCEDURE — 82435 ASSAY OF BLOOD CHLORIDE: CPT

## 2017-05-22 PROCEDURE — 87040 BLOOD CULTURE FOR BACTERIA: CPT

## 2017-05-22 PROCEDURE — 80053 COMPREHEN METABOLIC PANEL: CPT

## 2017-05-22 PROCEDURE — 84100 ASSAY OF PHOSPHORUS: CPT

## 2017-05-22 PROCEDURE — 36569 INSJ PICC 5 YR+ W/O IMAGING: CPT

## 2017-05-22 PROCEDURE — 83735 ASSAY OF MAGNESIUM: CPT

## 2017-05-22 PROCEDURE — 84295 ASSAY OF SERUM SODIUM: CPT

## 2017-05-22 PROCEDURE — 99285 EMERGENCY DEPT VISIT HI MDM: CPT | Mod: 25

## 2017-05-22 PROCEDURE — 71045 X-RAY EXAM CHEST 1 VIEW: CPT

## 2017-05-22 PROCEDURE — 85610 PROTHROMBIN TIME: CPT

## 2017-05-22 PROCEDURE — 87205 SMEAR GRAM STAIN: CPT

## 2017-05-22 PROCEDURE — 85730 THROMBOPLASTIN TIME PARTIAL: CPT

## 2017-05-22 PROCEDURE — 70450 CT HEAD/BRAIN W/O DYE: CPT

## 2017-05-22 PROCEDURE — 87186 SC STD MICRODIL/AGAR DIL: CPT

## 2017-05-22 PROCEDURE — 86901 BLOOD TYPING SEROLOGIC RH(D): CPT

## 2017-05-22 PROCEDURE — 80202 ASSAY OF VANCOMYCIN: CPT

## 2017-05-22 PROCEDURE — 88300 SURGICAL PATH GROSS: CPT

## 2017-05-22 PROCEDURE — 85027 COMPLETE CBC AUTOMATED: CPT

## 2017-05-22 PROCEDURE — 80048 BASIC METABOLIC PNL TOTAL CA: CPT

## 2017-05-22 PROCEDURE — 83605 ASSAY OF LACTIC ACID: CPT

## 2017-05-22 PROCEDURE — 86900 BLOOD TYPING SEROLOGIC ABO: CPT

## 2017-05-22 PROCEDURE — 82803 BLOOD GASES ANY COMBINATION: CPT

## 2017-05-22 PROCEDURE — 83036 HEMOGLOBIN GLYCOSYLATED A1C: CPT

## 2017-05-22 PROCEDURE — 87070 CULTURE OTHR SPECIMN AEROBIC: CPT

## 2017-05-22 PROCEDURE — C1751: CPT

## 2017-05-22 PROCEDURE — 82947 ASSAY GLUCOSE BLOOD QUANT: CPT

## 2017-05-22 PROCEDURE — 85014 HEMATOCRIT: CPT

## 2017-05-22 PROCEDURE — 82330 ASSAY OF CALCIUM: CPT

## 2017-05-22 PROCEDURE — 86850 RBC ANTIBODY SCREEN: CPT

## 2017-05-22 PROCEDURE — 97161 PT EVAL LOW COMPLEX 20 MIN: CPT

## 2017-05-22 PROCEDURE — 84132 ASSAY OF SERUM POTASSIUM: CPT

## 2017-05-22 RX ORDER — DOCUSATE SODIUM 100 MG
100 CAPSULE ORAL DAILY
Qty: 0 | Refills: 0 | Status: DISCONTINUED | OUTPATIENT
Start: 2017-05-22 | End: 2017-05-22

## 2017-05-22 RX ORDER — METHADONE HYDROCHLORIDE 40 MG/1
1 TABLET ORAL
Qty: 0 | Refills: 0 | COMMUNITY

## 2017-05-22 RX ORDER — NAFCILLIN 10 G/100ML
100 INJECTION, POWDER, FOR SOLUTION INTRAVENOUS
Qty: 94 | Refills: 0 | OUTPATIENT
Start: 2017-05-22 | End: 2017-06-03

## 2017-05-22 RX ORDER — ACETAMINOPHEN 500 MG
2 TABLET ORAL
Qty: 56 | Refills: 0 | OUTPATIENT
Start: 2017-05-22 | End: 2017-05-29

## 2017-05-22 RX ORDER — SODIUM CHLORIDE 9 MG/ML
3 INJECTION INTRAMUSCULAR; INTRAVENOUS; SUBCUTANEOUS
Qty: 90 | Refills: 0 | OUTPATIENT
Start: 2017-05-22 | End: 2017-06-01

## 2017-05-22 RX ORDER — METHADONE HYDROCHLORIDE 40 MG/1
2 TABLET ORAL
Qty: 24 | Refills: 0 | OUTPATIENT
Start: 2017-05-22 | End: 2017-05-25

## 2017-05-22 RX ADMIN — Medication 100 MILLIGRAM(S): at 13:43

## 2017-05-22 RX ADMIN — DULOXETINE HYDROCHLORIDE 60 MILLIGRAM(S): 30 CAPSULE, DELAYED RELEASE ORAL at 05:46

## 2017-05-22 RX ADMIN — NAFCILLIN 200 GRAM(S): 10 INJECTION, POWDER, FOR SOLUTION INTRAVENOUS at 04:01

## 2017-05-22 RX ADMIN — NAFCILLIN 200 GRAM(S): 10 INJECTION, POWDER, FOR SOLUTION INTRAVENOUS at 08:01

## 2017-05-22 RX ADMIN — Medication 100 MILLIGRAM(S): at 05:45

## 2017-05-22 RX ADMIN — MORPHINE SULFATE 30 MILLIGRAM(S): 50 CAPSULE, EXTENDED RELEASE ORAL at 08:06

## 2017-05-22 RX ADMIN — NAFCILLIN 200 GRAM(S): 10 INJECTION, POWDER, FOR SOLUTION INTRAVENOUS at 11:35

## 2017-05-22 RX ADMIN — PANTOPRAZOLE SODIUM 40 MILLIGRAM(S): 20 TABLET, DELAYED RELEASE ORAL at 05:45

## 2017-05-22 RX ADMIN — METHADONE HYDROCHLORIDE 20 MILLIGRAM(S): 40 TABLET ORAL at 05:45

## 2017-05-22 RX ADMIN — SODIUM CHLORIDE 3 MILLILITER(S): 9 INJECTION INTRAMUSCULAR; INTRAVENOUS; SUBCUTANEOUS at 05:42

## 2017-05-22 RX ADMIN — Medication 0.1 MILLIGRAM(S): at 05:46

## 2017-05-22 RX ADMIN — SODIUM CHLORIDE 3 MILLILITER(S): 9 INJECTION INTRAMUSCULAR; INTRAVENOUS; SUBCUTANEOUS at 13:43

## 2017-05-22 RX ADMIN — METHADONE HYDROCHLORIDE 20 MILLIGRAM(S): 40 TABLET ORAL at 11:35

## 2017-05-22 RX ADMIN — MORPHINE SULFATE 30 MILLIGRAM(S): 50 CAPSULE, EXTENDED RELEASE ORAL at 08:50

## 2017-05-22 RX ADMIN — Medication 0.1 MILLIGRAM(S): at 13:42

## 2017-05-22 NOTE — DIETITIAN INITIAL EVALUATION ADULT. - NS AS NUTRI INTERV ED CONTENT
Priority modifications/Purpose of the nutrition education/Nutrition relationship to health/disease/1) Discussed need for protein to promote wound healing. Discussed sources of protein. 2) Discussed general healthy eating, limiting concentrated sweets, sugary beverages and balancing protein and CHO together. Reviewed sources of CHO and protein./Recommended modifications

## 2017-05-22 NOTE — DIETITIAN INITIAL EVALUATION ADULT. - ADHERENCE
n/a/No specific dietary restrictions PTA, tries to be "healthy".  Confirms NKFA and states he takes a daily MVI, vitamin D and vitamin E PTA.

## 2017-05-22 NOTE — DIETITIAN INITIAL EVALUATION ADULT. - OTHER INFO
Pt seen for length of stay. Pt reports -225lbs, per previous RD note in September 2016 Pt weighed 222.5lbs, current Wt 218lbs. Pt reports good appetite in-house and denies any GI distress. Denies difficulty chewing/swallowing. Questions on general healthy eating for prediabetes, verbal education provided and noted below.

## 2017-05-22 NOTE — DIETITIAN INITIAL EVALUATION ADULT. - NS AS NUTRI INTERV MEALS SNACK
1) Provide food preferences as feasible upon request. 2) Encourage po intake w/ snacks ordered at meals./General/healthful diet

## 2017-05-22 NOTE — DIETITIAN INITIAL EVALUATION ADULT. - ENERGY NEEDS
ht: 71 inches, wt: 218 pounds, BMI: 30.4 kg/m2, IBW: 172 pounds (+/- 10%), 127 %IBW  Edema: none noted. Skin: intact.  Other pertinent information: 60 y/o male presented c erythema and pain around shunt catheter site. POD #6 s/p VPS removal secondary to wound, PICC placed.

## 2017-05-30 ENCOUNTER — APPOINTMENT (OUTPATIENT)
Dept: SPINE | Facility: CLINIC | Age: 62
End: 2017-05-30

## 2017-05-30 VITALS
BODY MASS INDEX: 30.06 KG/M2 | WEIGHT: 210 LBS | SYSTOLIC BLOOD PRESSURE: 190 MMHG | HEIGHT: 70 IN | HEART RATE: 82 BPM | DIASTOLIC BLOOD PRESSURE: 106 MMHG

## 2017-05-30 VITALS — DIASTOLIC BLOOD PRESSURE: 89 MMHG | HEART RATE: 79 BPM | SYSTOLIC BLOOD PRESSURE: 169 MMHG

## 2017-05-30 LAB
CULTURE RESULTS: SIGNIFICANT CHANGE UP
CULTURE RESULTS: SIGNIFICANT CHANGE UP
ORGANISM # SPEC MICROSCOPIC CNT: SIGNIFICANT CHANGE UP
SPECIMEN SOURCE: SIGNIFICANT CHANGE UP
SPECIMEN SOURCE: SIGNIFICANT CHANGE UP

## 2017-06-13 ENCOUNTER — APPOINTMENT (OUTPATIENT)
Dept: SPINE | Facility: CLINIC | Age: 62
End: 2017-06-13

## 2017-06-13 ENCOUNTER — CLINICAL ADVICE (OUTPATIENT)
Age: 62
End: 2017-06-13

## 2017-06-13 ENCOUNTER — OTHER (OUTPATIENT)
Age: 62
End: 2017-06-13

## 2017-07-18 ENCOUNTER — APPOINTMENT (OUTPATIENT)
Dept: SPINE | Facility: CLINIC | Age: 62
End: 2017-07-18
Payer: OTHER MISCELLANEOUS

## 2017-07-18 VITALS
BODY MASS INDEX: 30.78 KG/M2 | HEIGHT: 70 IN | DIASTOLIC BLOOD PRESSURE: 90 MMHG | WEIGHT: 215 LBS | SYSTOLIC BLOOD PRESSURE: 164 MMHG | HEART RATE: 68 BPM

## 2017-07-18 DIAGNOSIS — Q76.49 OTHER CONGENITAL MALFORMATIONS OF SPINE, NOT ASSOCIATED WITH SCOLIOSIS: ICD-10-CM

## 2017-07-18 PROCEDURE — 99214 OFFICE O/P EST MOD 30 MIN: CPT | Mod: 24

## 2017-08-22 ENCOUNTER — APPOINTMENT (OUTPATIENT)
Dept: SPINE | Facility: CLINIC | Age: 62
End: 2017-08-22
Payer: OTHER MISCELLANEOUS

## 2017-08-22 VITALS
WEIGHT: 220 LBS | SYSTOLIC BLOOD PRESSURE: 183 MMHG | BODY MASS INDEX: 31.5 KG/M2 | HEART RATE: 62 BPM | HEIGHT: 70 IN | DIASTOLIC BLOOD PRESSURE: 115 MMHG

## 2017-08-22 PROCEDURE — 99214 OFFICE O/P EST MOD 30 MIN: CPT

## 2017-08-22 RX ORDER — CEPHALEXIN 500 MG/1
500 CAPSULE ORAL
Qty: 30 | Refills: 0 | Status: DISCONTINUED | COMMUNITY
Start: 2017-04-06

## 2017-08-22 RX ORDER — SUVOREXANT 10 MG/1
10 TABLET, FILM COATED ORAL
Qty: 30 | Refills: 0 | Status: DISCONTINUED | COMMUNITY
Start: 2017-05-08

## 2017-08-29 ENCOUNTER — APPOINTMENT (OUTPATIENT)
Dept: SPINE | Facility: CLINIC | Age: 62
End: 2017-08-29

## 2017-09-19 ENCOUNTER — APPOINTMENT (OUTPATIENT)
Dept: SPINE | Facility: CLINIC | Age: 62
End: 2017-09-19

## 2017-09-26 ENCOUNTER — APPOINTMENT (OUTPATIENT)
Dept: SPINE | Facility: CLINIC | Age: 62
End: 2017-09-26
Payer: OTHER MISCELLANEOUS

## 2017-09-26 VITALS
HEIGHT: 70 IN | DIASTOLIC BLOOD PRESSURE: 85 MMHG | HEART RATE: 76 BPM | WEIGHT: 230 LBS | BODY MASS INDEX: 32.93 KG/M2 | SYSTOLIC BLOOD PRESSURE: 155 MMHG

## 2017-09-26 PROCEDURE — 99213 OFFICE O/P EST LOW 20 MIN: CPT

## 2017-10-25 DIAGNOSIS — J98.4 OTHER DISORDERS OF LUNG: ICD-10-CM

## 2017-11-07 ENCOUNTER — APPOINTMENT (OUTPATIENT)
Dept: SPINE | Facility: CLINIC | Age: 62
End: 2017-11-07
Payer: OTHER MISCELLANEOUS

## 2017-11-07 VITALS
BODY MASS INDEX: 32.93 KG/M2 | DIASTOLIC BLOOD PRESSURE: 80 MMHG | HEART RATE: 80 BPM | WEIGHT: 230 LBS | SYSTOLIC BLOOD PRESSURE: 151 MMHG | HEIGHT: 70 IN

## 2017-11-07 DIAGNOSIS — R91.8 OTHER NONSPECIFIC ABNORMAL FINDING OF LUNG FIELD: ICD-10-CM

## 2017-11-07 PROCEDURE — 99214 OFFICE O/P EST MOD 30 MIN: CPT

## 2017-12-19 ENCOUNTER — APPOINTMENT (OUTPATIENT)
Dept: SPINE | Facility: CLINIC | Age: 62
End: 2017-12-19

## 2018-01-01 ENCOUNTER — APPOINTMENT (OUTPATIENT)
Dept: SPINE | Facility: CLINIC | Age: 63
End: 2018-01-01

## 2018-03-06 ENCOUNTER — APPOINTMENT (OUTPATIENT)
Dept: SPINE | Facility: CLINIC | Age: 63
End: 2018-03-06
Payer: OTHER MISCELLANEOUS

## 2018-03-06 VITALS
HEART RATE: 90 BPM | SYSTOLIC BLOOD PRESSURE: 127 MMHG | BODY MASS INDEX: 32.93 KG/M2 | DIASTOLIC BLOOD PRESSURE: 75 MMHG | HEIGHT: 70 IN | WEIGHT: 230 LBS

## 2018-03-06 PROCEDURE — 99213 OFFICE O/P EST LOW 20 MIN: CPT

## 2018-03-13 ENCOUNTER — APPOINTMENT (OUTPATIENT)
Dept: SPINE | Facility: CLINIC | Age: 63
End: 2018-03-13

## 2018-03-16 ENCOUNTER — APPOINTMENT (OUTPATIENT)
Dept: RADIOLOGY | Facility: HOSPITAL | Age: 63
End: 2018-03-16

## 2018-03-19 ENCOUNTER — OUTPATIENT (OUTPATIENT)
Dept: OUTPATIENT SERVICES | Facility: HOSPITAL | Age: 63
LOS: 1 days | End: 2018-03-19
Payer: COMMERCIAL

## 2018-03-19 ENCOUNTER — APPOINTMENT (OUTPATIENT)
Dept: RADIOLOGY | Facility: HOSPITAL | Age: 63
End: 2018-03-19

## 2018-03-19 DIAGNOSIS — Z98.89 OTHER SPECIFIED POSTPROCEDURAL STATES: Chronic | ICD-10-CM

## 2018-03-19 DIAGNOSIS — Z98.2 PRESENCE OF CEREBROSPINAL FLUID DRAINAGE DEVICE: Chronic | ICD-10-CM

## 2018-03-19 DIAGNOSIS — M43.9 DEFORMING DORSOPATHY, UNSPECIFIED: ICD-10-CM

## 2018-03-19 PROCEDURE — 72083 X-RAY EXAM ENTIRE SPI 4/5 VW: CPT | Mod: 26

## 2018-03-20 ENCOUNTER — APPOINTMENT (OUTPATIENT)
Dept: SPINE | Facility: CLINIC | Age: 63
End: 2018-03-20
Payer: OTHER MISCELLANEOUS

## 2018-03-20 VITALS
DIASTOLIC BLOOD PRESSURE: 79 MMHG | BODY MASS INDEX: 32.93 KG/M2 | SYSTOLIC BLOOD PRESSURE: 128 MMHG | WEIGHT: 230 LBS | HEART RATE: 94 BPM | HEIGHT: 70 IN

## 2018-03-20 PROCEDURE — 99213 OFFICE O/P EST LOW 20 MIN: CPT

## 2018-03-25 ENCOUNTER — INPATIENT (INPATIENT)
Facility: HOSPITAL | Age: 63
LOS: 2 days | Discharge: HOME | DRG: 193 | End: 2018-03-28
Attending: INTERNAL MEDICINE | Admitting: INTERNAL MEDICINE
Payer: COMMERCIAL

## 2018-03-25 VITALS
OXYGEN SATURATION: 92 % | DIASTOLIC BLOOD PRESSURE: 70 MMHG | TEMPERATURE: 99 F | SYSTOLIC BLOOD PRESSURE: 121 MMHG | RESPIRATION RATE: 17 BRPM | WEIGHT: 229.94 LBS | HEART RATE: 80 BPM

## 2018-03-25 DIAGNOSIS — M54.9 DORSALGIA, UNSPECIFIED: ICD-10-CM

## 2018-03-25 DIAGNOSIS — Z98.89 OTHER SPECIFIED POSTPROCEDURAL STATES: Chronic | ICD-10-CM

## 2018-03-25 DIAGNOSIS — K92.2 GASTROINTESTINAL HEMORRHAGE, UNSPECIFIED: ICD-10-CM

## 2018-03-25 DIAGNOSIS — I10 ESSENTIAL (PRIMARY) HYPERTENSION: ICD-10-CM

## 2018-03-25 DIAGNOSIS — J18.9 PNEUMONIA, UNSPECIFIED ORGANISM: ICD-10-CM

## 2018-03-25 DIAGNOSIS — J96.01 ACUTE RESPIRATORY FAILURE WITH HYPOXIA: ICD-10-CM

## 2018-03-25 DIAGNOSIS — Z98.2 PRESENCE OF CEREBROSPINAL FLUID DRAINAGE DEVICE: Chronic | ICD-10-CM

## 2018-03-25 LAB
ALBUMIN SERPL ELPH-MCNC: 3.6 G/DL — SIGNIFICANT CHANGE UP (ref 3.3–5)
ALP SERPL-CCNC: 120 U/L — SIGNIFICANT CHANGE UP (ref 40–120)
ALT FLD-CCNC: 22 U/L RC — SIGNIFICANT CHANGE UP (ref 10–45)
ANION GAP SERPL CALC-SCNC: 14 MMOL/L — SIGNIFICANT CHANGE UP (ref 5–17)
APPEARANCE UR: CLEAR — SIGNIFICANT CHANGE UP
APTT BLD: 27.3 SEC — LOW (ref 27.5–37.4)
AST SERPL-CCNC: 21 U/L — SIGNIFICANT CHANGE UP (ref 10–40)
BASE EXCESS BLDV CALC-SCNC: 9.1 MMOL/L — HIGH (ref -2–2)
BASOPHILS # BLD AUTO: 0 K/UL — SIGNIFICANT CHANGE UP (ref 0–0.2)
BASOPHILS NFR BLD AUTO: 0.4 % — SIGNIFICANT CHANGE UP (ref 0–2)
BILIRUB SERPL-MCNC: 0.6 MG/DL — SIGNIFICANT CHANGE UP (ref 0.2–1.2)
BILIRUB UR-MCNC: NEGATIVE — SIGNIFICANT CHANGE UP
BLD GP AB SCN SERPL QL: NEGATIVE — SIGNIFICANT CHANGE UP
BUN SERPL-MCNC: 17 MG/DL — SIGNIFICANT CHANGE UP (ref 7–23)
CA-I SERPL-SCNC: 1.2 MMOL/L — SIGNIFICANT CHANGE UP (ref 1.12–1.3)
CALCIUM SERPL-MCNC: 10.2 MG/DL — SIGNIFICANT CHANGE UP (ref 8.4–10.5)
CHLORIDE BLDV-SCNC: 93 MMOL/L — LOW (ref 96–108)
CHLORIDE SERPL-SCNC: 91 MMOL/L — LOW (ref 96–108)
CK MB BLD-MCNC: 3.7 % — HIGH (ref 0–3.5)
CK MB CFR SERPL CALC: 1.9 NG/ML — SIGNIFICANT CHANGE UP (ref 0–6.7)
CK SERPL-CCNC: 52 U/L — SIGNIFICANT CHANGE UP (ref 30–200)
CO2 BLDV-SCNC: 36 MMOL/L — HIGH (ref 22–30)
CO2 SERPL-SCNC: 31 MMOL/L — SIGNIFICANT CHANGE UP (ref 22–31)
COLOR SPEC: YELLOW — SIGNIFICANT CHANGE UP
CREAT SERPL-MCNC: 1.09 MG/DL — SIGNIFICANT CHANGE UP (ref 0.5–1.3)
DIFF PNL FLD: NEGATIVE — SIGNIFICANT CHANGE UP
EOSINOPHIL # BLD AUTO: 0.1 K/UL — SIGNIFICANT CHANGE UP (ref 0–0.5)
EOSINOPHIL NFR BLD AUTO: 1 % — SIGNIFICANT CHANGE UP (ref 0–6)
EPI CELLS # UR: SIGNIFICANT CHANGE UP /HPF
GAS PNL BLDV: 135 MMOL/L — LOW (ref 136–145)
GAS PNL BLDV: SIGNIFICANT CHANGE UP
GLUCOSE BLDV-MCNC: 133 MG/DL — HIGH (ref 70–99)
GLUCOSE SERPL-MCNC: 141 MG/DL — HIGH (ref 70–99)
GLUCOSE UR QL: NEGATIVE — SIGNIFICANT CHANGE UP
HCO3 BLDV-SCNC: 35 MMOL/L — HIGH (ref 21–29)
HCT VFR BLD CALC: 29 % — LOW (ref 39–50)
HCT VFR BLD CALC: 33 % — LOW (ref 39–50)
HCT VFR BLDA CALC: 32 % — LOW (ref 39–50)
HGB BLD CALC-MCNC: 10.4 G/DL — LOW (ref 13–17)
HGB BLD-MCNC: 10.9 G/DL — LOW (ref 13–17)
HGB BLD-MCNC: 9.3 G/DL — LOW (ref 13–17)
INR BLD: 1.42 RATIO — HIGH (ref 0.88–1.16)
KETONES UR-MCNC: NEGATIVE — SIGNIFICANT CHANGE UP
LACTATE BLDV-MCNC: 1.9 MMOL/L — SIGNIFICANT CHANGE UP (ref 0.7–2)
LEUKOCYTE ESTERASE UR-ACNC: NEGATIVE — SIGNIFICANT CHANGE UP
LYMPHOCYTES # BLD AUTO: 0.9 K/UL — LOW (ref 1–3.3)
LYMPHOCYTES # BLD AUTO: 11.1 % — LOW (ref 13–44)
MCHC RBC-ENTMCNC: 30.6 PG — SIGNIFICANT CHANGE UP (ref 27–34)
MCHC RBC-ENTMCNC: 31.8 PG — SIGNIFICANT CHANGE UP (ref 27–34)
MCHC RBC-ENTMCNC: 32.1 GM/DL — SIGNIFICANT CHANGE UP (ref 32–36)
MCHC RBC-ENTMCNC: 33.1 GM/DL — SIGNIFICANT CHANGE UP (ref 32–36)
MCV RBC AUTO: 95.4 FL — SIGNIFICANT CHANGE UP (ref 80–100)
MCV RBC AUTO: 96 FL — SIGNIFICANT CHANGE UP (ref 80–100)
MONOCYTES # BLD AUTO: 1 K/UL — HIGH (ref 0–0.9)
MONOCYTES NFR BLD AUTO: 13.2 % — SIGNIFICANT CHANGE UP (ref 2–14)
NEUTROPHILS # BLD AUTO: 5.9 K/UL — SIGNIFICANT CHANGE UP (ref 1.8–7.4)
NEUTROPHILS NFR BLD AUTO: 74.3 % — SIGNIFICANT CHANGE UP (ref 43–77)
NITRITE UR-MCNC: NEGATIVE — SIGNIFICANT CHANGE UP
NRBC # BLD: 0 /100 WBCS — SIGNIFICANT CHANGE UP (ref 0–0)
NT-PROBNP SERPL-SCNC: 185 PG/ML — SIGNIFICANT CHANGE UP (ref 0–300)
PCO2 BLDV: 55 MMHG — HIGH (ref 35–50)
PH BLDV: 7.42 — SIGNIFICANT CHANGE UP (ref 7.35–7.45)
PH UR: 7.5 — SIGNIFICANT CHANGE UP (ref 5–8)
PLATELET # BLD AUTO: 268 K/UL — SIGNIFICANT CHANGE UP (ref 150–400)
PLATELET # BLD AUTO: 307 K/UL — SIGNIFICANT CHANGE UP (ref 150–400)
PO2 BLDV: 38 MMHG — SIGNIFICANT CHANGE UP (ref 25–45)
POTASSIUM BLDV-SCNC: 3.4 MMOL/L — LOW (ref 3.5–5)
POTASSIUM SERPL-MCNC: 3.5 MMOL/L — SIGNIFICANT CHANGE UP (ref 3.5–5.3)
POTASSIUM SERPL-SCNC: 3.5 MMOL/L — SIGNIFICANT CHANGE UP (ref 3.5–5.3)
PROT SERPL-MCNC: 7.8 G/DL — SIGNIFICANT CHANGE UP (ref 6–8.3)
PROT UR-MCNC: NEGATIVE — SIGNIFICANT CHANGE UP
PROTHROM AB SERPL-ACNC: 15.5 SEC — HIGH (ref 9.8–12.7)
RBC # BLD: 3.04 M/UL — LOW (ref 4.2–5.8)
RBC # BLD: 3.44 M/UL — LOW (ref 4.2–5.8)
RBC # FLD: 13.3 % — SIGNIFICANT CHANGE UP (ref 10.3–14.5)
RBC # FLD: 15.1 % — HIGH (ref 10.3–14.5)
RBC CASTS # UR COMP ASSIST: ABNORMAL /HPF (ref 0–2)
RH IG SCN BLD-IMP: POSITIVE — SIGNIFICANT CHANGE UP
SAO2 % BLDV: 65 % — LOW (ref 67–88)
SODIUM SERPL-SCNC: 136 MMOL/L — SIGNIFICANT CHANGE UP (ref 135–145)
SP GR SPEC: >1.03 — SIGNIFICANT CHANGE UP (ref 1.01–1.02)
TROPONIN T SERPL-MCNC: <0.01 NG/ML — SIGNIFICANT CHANGE UP (ref 0–0.06)
UROBILINOGEN FLD QL: NEGATIVE — SIGNIFICANT CHANGE UP
WBC # BLD: 6.61 K/UL — SIGNIFICANT CHANGE UP (ref 3.8–10.5)
WBC # BLD: 7.9 K/UL — SIGNIFICANT CHANGE UP (ref 3.8–10.5)
WBC # FLD AUTO: 6.61 K/UL — SIGNIFICANT CHANGE UP (ref 3.8–10.5)
WBC # FLD AUTO: 7.9 K/UL — SIGNIFICANT CHANGE UP (ref 3.8–10.5)
WBC UR QL: SIGNIFICANT CHANGE UP /HPF (ref 0–5)

## 2018-03-25 PROCEDURE — 93010 ELECTROCARDIOGRAM REPORT: CPT | Mod: NC

## 2018-03-25 PROCEDURE — 74177 CT ABD & PELVIS W/CONTRAST: CPT | Mod: 26

## 2018-03-25 PROCEDURE — 99285 EMERGENCY DEPT VISIT HI MDM: CPT | Mod: 25

## 2018-03-25 PROCEDURE — 71046 X-RAY EXAM CHEST 2 VIEWS: CPT | Mod: 26

## 2018-03-25 RX ORDER — INFLUENZA VIRUS VACCINE 15; 15; 15; 15 UG/.5ML; UG/.5ML; UG/.5ML; UG/.5ML
0.5 SUSPENSION INTRAMUSCULAR ONCE
Qty: 0 | Refills: 0 | Status: DISCONTINUED | OUTPATIENT
Start: 2018-03-25 | End: 2018-03-28

## 2018-03-25 RX ORDER — CEFTRIAXONE 500 MG/1
1 INJECTION, POWDER, FOR SOLUTION INTRAMUSCULAR; INTRAVENOUS EVERY 24 HOURS
Qty: 0 | Refills: 0 | Status: DISCONTINUED | OUTPATIENT
Start: 2018-03-25 | End: 2018-03-28

## 2018-03-25 RX ORDER — AZITHROMYCIN 500 MG/1
500 TABLET, FILM COATED ORAL ONCE
Qty: 0 | Refills: 0 | Status: COMPLETED | OUTPATIENT
Start: 2018-03-25 | End: 2018-03-25

## 2018-03-25 RX ORDER — MORPHINE SULFATE 50 MG/1
30 CAPSULE, EXTENDED RELEASE ORAL EVERY 6 HOURS
Qty: 0 | Refills: 0 | Status: DISCONTINUED | OUTPATIENT
Start: 2018-03-25 | End: 2018-03-26

## 2018-03-25 RX ORDER — AZITHROMYCIN 500 MG/1
500 TABLET, FILM COATED ORAL EVERY 24 HOURS
Qty: 0 | Refills: 0 | Status: DISCONTINUED | OUTPATIENT
Start: 2018-03-25 | End: 2018-03-28

## 2018-03-25 RX ORDER — MORPHINE SULFATE 50 MG/1
30 CAPSULE, EXTENDED RELEASE ORAL EVERY 6 HOURS
Qty: 0 | Refills: 0 | Status: DISCONTINUED | OUTPATIENT
Start: 2018-03-25 | End: 2018-03-25

## 2018-03-25 RX ORDER — CEFTRIAXONE 500 MG/1
1 INJECTION, POWDER, FOR SOLUTION INTRAMUSCULAR; INTRAVENOUS EVERY 24 HOURS
Qty: 0 | Refills: 0 | Status: DISCONTINUED | OUTPATIENT
Start: 2018-03-25 | End: 2018-03-25

## 2018-03-25 RX ORDER — ENOXAPARIN SODIUM 100 MG/ML
40 INJECTION SUBCUTANEOUS DAILY
Qty: 0 | Refills: 0 | Status: DISCONTINUED | OUTPATIENT
Start: 2018-03-25 | End: 2018-03-28

## 2018-03-25 RX ORDER — CEFTRIAXONE 500 MG/1
1 INJECTION, POWDER, FOR SOLUTION INTRAMUSCULAR; INTRAVENOUS ONCE
Qty: 0 | Refills: 0 | Status: COMPLETED | OUTPATIENT
Start: 2018-03-25 | End: 2018-03-25

## 2018-03-25 RX ORDER — HYDROCHLOROTHIAZIDE 25 MG
50 TABLET ORAL DAILY
Qty: 0 | Refills: 0 | Status: DISCONTINUED | OUTPATIENT
Start: 2018-03-25 | End: 2018-03-28

## 2018-03-25 RX ORDER — POLYETHYLENE GLYCOL 3350 17 G/17G
17 POWDER, FOR SOLUTION ORAL AT BEDTIME
Qty: 0 | Refills: 0 | Status: DISCONTINUED | OUTPATIENT
Start: 2018-03-25 | End: 2018-03-28

## 2018-03-25 RX ORDER — PANTOPRAZOLE SODIUM 20 MG/1
40 TABLET, DELAYED RELEASE ORAL ONCE
Qty: 0 | Refills: 0 | Status: COMPLETED | OUTPATIENT
Start: 2018-03-25 | End: 2018-03-25

## 2018-03-25 RX ORDER — MORPHINE SULFATE 50 MG/1
30 CAPSULE, EXTENDED RELEASE ORAL
Qty: 0 | Refills: 0 | COMMUNITY

## 2018-03-25 RX ORDER — DOCUSATE SODIUM 100 MG
100 CAPSULE ORAL DAILY
Qty: 0 | Refills: 0 | Status: DISCONTINUED | OUTPATIENT
Start: 2018-03-25 | End: 2018-03-28

## 2018-03-25 RX ORDER — METHADONE HYDROCHLORIDE 40 MG/1
20 TABLET ORAL EVERY 6 HOURS
Qty: 0 | Refills: 0 | Status: DISCONTINUED | OUTPATIENT
Start: 2018-03-25 | End: 2018-03-28

## 2018-03-25 RX ORDER — DULOXETINE HYDROCHLORIDE 30 MG/1
60 CAPSULE, DELAYED RELEASE ORAL
Qty: 0 | Refills: 0 | Status: DISCONTINUED | OUTPATIENT
Start: 2018-03-25 | End: 2018-03-28

## 2018-03-25 RX ORDER — CEFTRIAXONE 500 MG/1
1000 INJECTION, POWDER, FOR SOLUTION INTRAMUSCULAR; INTRAVENOUS ONCE
Qty: 0 | Refills: 0 | Status: DISCONTINUED | OUTPATIENT
Start: 2018-03-25 | End: 2018-03-25

## 2018-03-25 RX ORDER — METHADONE HYDROCHLORIDE 40 MG/1
20 TABLET ORAL EVERY 8 HOURS
Qty: 0 | Refills: 0 | Status: DISCONTINUED | OUTPATIENT
Start: 2018-03-25 | End: 2018-03-25

## 2018-03-25 RX ORDER — DIAZEPAM 5 MG
2 TABLET ORAL THREE TIMES A DAY
Qty: 0 | Refills: 0 | Status: DISCONTINUED | OUTPATIENT
Start: 2018-03-25 | End: 2018-03-28

## 2018-03-25 RX ORDER — ACETAMINOPHEN 500 MG
1000 TABLET ORAL ONCE
Qty: 0 | Refills: 0 | Status: COMPLETED | OUTPATIENT
Start: 2018-03-25 | End: 2018-03-25

## 2018-03-25 RX ORDER — PANTOPRAZOLE SODIUM 20 MG/1
40 TABLET, DELAYED RELEASE ORAL
Qty: 0 | Refills: 0 | Status: DISCONTINUED | OUTPATIENT
Start: 2018-03-25 | End: 2018-03-28

## 2018-03-25 RX ORDER — SODIUM CHLORIDE 9 MG/ML
1000 INJECTION INTRAMUSCULAR; INTRAVENOUS; SUBCUTANEOUS ONCE
Qty: 0 | Refills: 0 | Status: COMPLETED | OUTPATIENT
Start: 2018-03-25 | End: 2018-03-25

## 2018-03-25 RX ADMIN — METHADONE HYDROCHLORIDE 20 MILLIGRAM(S): 40 TABLET ORAL at 22:10

## 2018-03-25 RX ADMIN — Medication 400 MILLIGRAM(S): at 12:17

## 2018-03-25 RX ADMIN — PANTOPRAZOLE SODIUM 40 MILLIGRAM(S): 20 TABLET, DELAYED RELEASE ORAL at 12:15

## 2018-03-25 RX ADMIN — Medication 2 MILLIGRAM(S): at 22:10

## 2018-03-25 RX ADMIN — POLYETHYLENE GLYCOL 3350 17 GRAM(S): 17 POWDER, FOR SOLUTION ORAL at 22:10

## 2018-03-25 RX ADMIN — AZITHROMYCIN 250 MILLIGRAM(S): 500 TABLET, FILM COATED ORAL at 17:58

## 2018-03-25 RX ADMIN — DULOXETINE HYDROCHLORIDE 60 MILLIGRAM(S): 30 CAPSULE, DELAYED RELEASE ORAL at 22:10

## 2018-03-25 RX ADMIN — PANTOPRAZOLE SODIUM 40 MILLIGRAM(S): 20 TABLET, DELAYED RELEASE ORAL at 22:10

## 2018-03-25 RX ADMIN — MORPHINE SULFATE 30 MILLIGRAM(S): 50 CAPSULE, EXTENDED RELEASE ORAL at 23:12

## 2018-03-25 RX ADMIN — SODIUM CHLORIDE 1000 MILLILITER(S): 9 INJECTION INTRAMUSCULAR; INTRAVENOUS; SUBCUTANEOUS at 12:14

## 2018-03-25 RX ADMIN — CEFTRIAXONE 100 GRAM(S): 500 INJECTION, POWDER, FOR SOLUTION INTRAMUSCULAR; INTRAVENOUS at 16:30

## 2018-03-25 NOTE — ED PROVIDER NOTE - PROGRESS NOTE DETAILS
Patient hypoxic to low 90s in need of supplemental O2 and confirmed to have multifocal PNA. No surgical pathology on CT. Admitted to Medicine. IV ABXs provided.

## 2018-03-25 NOTE — ED PROVIDER NOTE - OBJECTIVE STATEMENT
62M pmhx chronic pain 2/2 traumatic work injury s/p several spine surgeries, GERD, htn, chronic constipation requiring nightly miralax, here c/o lower abdominal pain assoc w/ worse-than-usual constipation last 2 weeks and minimal black stools. Pt endorses having black stools before in the past but only a few times and they'd usually self-resolve. This time he has them every time he goes. Assoc. abdominal pain is lower and gives him constant moderate discomfort. Pt also has felt a little bit light-headed as of late and his partner notes he looks more pale than usual. No recent f/c, cp/sob, n/v/d, recent travel. Pt is able to pass gas. Pt says he recently had his urine tested showing microscopic hematuria and he is scheduled to see a urologist this week about it.

## 2018-03-25 NOTE — ED PROVIDER NOTE - ATTENDING CONTRIBUTION TO CARE
63 y/o male with a h/o chronic pain and a significant Ortho history who presents with a cc of abdominal discomfort. It has been present for approximately 2 weeks, constant, mild, perhaps exacerbated by what he describes as constipation, without relieving factor and now associated with black stools. His stools are however described as formed, small pieces as opposed to thick or sticky paste. On exam, he appears comfortable. VSs noted, neck supple, lungs CTA, cardiac sounds s/ audible m/r/g, abdomen soft but initially c/ periumbilical ttp s/ rebound or guarding that was then not as significant on repeat examination, rectal as per the resident, extremities s/ asymmetry, skin c/ trace B/L LE pitting edema and bit pale in complexion and neurologically intact. EKG s/ acute ischemic change. Labs significant for a HGB of 10.9 with an INR of 1.42 and BUN of 17 that to me suggests he is not having an UGIB. His CXR is appreciated. While he hasn't offered any significant symptoms suggestive of PNA, his SpO2 on RA was in the low 90s. We are awaiting his abd CT in light of his presenting complaint and initial abdominal ttp. We will follow his CT, reassess and treat/dispo accordingly.

## 2018-03-25 NOTE — ED PROVIDER NOTE - MEDICAL DECISION MAKING DETAILS
62F pmhx GERD, constipation, chronic pain 2/2 multiple back surgeries here w/ black stool and constipation. No numbness/tingling or focal weakness anywehere. Pt obese, ttp periumbilically. Appears pale and desats down to 90 on room air. Plan for cbc, cmp, vbg, cxr, ua, pro bnp, coags, type&screen, guaic, ctap.

## 2018-03-25 NOTE — ED ADULT NURSE REASSESSMENT NOTE - NS ED NURSE REASSESS COMMENT FT1
Bloods sent. Pt being treated with IV medications. Pt tolerating medication well. Will continue to monitor pt.
Order was placed after first set of antibiotics were administered to pt. Cultures were drawn after the Ceftriaxone was administered. Admitting Mayra ALMANZA called RN to draw cultures after antibiotics were administered.
Pt is receiving IV antibiotics, tolerating them well. Pt is also eating a sandwich and awaiting a bed. Wife at bedside, will continue to monitor pt.
Received report from previous shift RN. Patient resting in bed with family at bedside, no apparent distress noted. Patient reporting chronic back pain and requesting pain medication. Pt reporting lower abd cramping, currently tolerable. Denies CP, SOB,  n/v/d, fever, chills. VSS on 3L NC. Pt and family aware of plan for admission, awaiting bed assignment. Reviewed PM meds with patient, will call pharmacy for medications

## 2018-03-25 NOTE — H&P ADULT - NEGATIVE CARDIOVASCULAR SYMPTOMS
no chest pain/no paroxysmal nocturnal dyspnea/no claudication/no peripheral edema/no dyspnea on exertion/no palpitations/no orthopnea

## 2018-03-25 NOTE — ED ADULT NURSE NOTE - OBJECTIVE STATEMENT
61 y/o M, reported to ED from home. A&Ox3, c/o melena X 3 days. Pt reports black tarry stools and abd pain. Pt reports generalized abd pain with abd distention. Abd soft, tender to palpation in all quadrants. Pt reports dull aching pain in the lower quadrants rating 4/10 on pain scale. Pt reports that this pain has been constant for the past few days. Pt reports feeling weak but denies dizziness or lightheadedness. Pt's Sp02 is 80% on RA, wife reports that when the pt is sick his Sp02 always drops and he is started on 02. Pt is on 4L of NC and Sp02 increased to 94%. Pt doesn't appear to be in respiratory distress currently. Lungs adventitious sounds heard in the LML & LLL and RLL. Pt reports that the melena began after he had severe constipation. Pt reports that for the last 2 weeks he has been adjusting his medications for constipation. Pt reports that he was on Miralax and switched to Metamucil and recently switched back to Miralax since Metamucil wasn't helping him. Pt has some slight BL lower leg edema. Pt reports "I sometimes get this leg swelling." Pt uses a walker to walk primarily. Pt denies LOC, SOB, C/P, N/V/D, chills. Wife at bedside, will continue to monitor pt.

## 2018-03-25 NOTE — ED ADULT NURSE NOTE - PSH
Ankle Fracture  s/p ORIF left ankle 2008  History of lumbar fusion  2008  Insertion of Intrathecal Dilaudid Pump  4/2011  S/P Arthroscopy of Left Knee    S/P insertion of spinal cord stimulator  2013  S/P Knee Replacement  left knee 2009  S/P Spinal Surgery  corrective lumbar fusion 2012  S/P Tonsillectomy  childhood  S/P  shunt

## 2018-03-26 LAB
ANION GAP SERPL CALC-SCNC: 12 MMOL/L — SIGNIFICANT CHANGE UP (ref 5–17)
BUN SERPL-MCNC: 14 MG/DL — SIGNIFICANT CHANGE UP (ref 7–23)
CALCIUM SERPL-MCNC: 8.9 MG/DL — SIGNIFICANT CHANGE UP (ref 8.4–10.5)
CHLORIDE SERPL-SCNC: 93 MMOL/L — LOW (ref 96–108)
CO2 SERPL-SCNC: 29 MMOL/L — SIGNIFICANT CHANGE UP (ref 22–31)
CREAT SERPL-MCNC: 1.13 MG/DL — SIGNIFICANT CHANGE UP (ref 0.5–1.3)
GLUCOSE SERPL-MCNC: 123 MG/DL — HIGH (ref 70–99)
HCT VFR BLD CALC: 29 % — LOW (ref 39–50)
HGB BLD-MCNC: 9.5 G/DL — LOW (ref 13–17)
LACTATE SERPL-SCNC: 0.8 MMOL/L — SIGNIFICANT CHANGE UP (ref 0.7–2)
MCHC RBC-ENTMCNC: 30.8 PG — SIGNIFICANT CHANGE UP (ref 27–34)
MCHC RBC-ENTMCNC: 32.8 GM/DL — SIGNIFICANT CHANGE UP (ref 32–36)
MCV RBC AUTO: 94.2 FL — SIGNIFICANT CHANGE UP (ref 80–100)
NRBC # BLD: 0 /100 WBCS — SIGNIFICANT CHANGE UP (ref 0–0)
PLATELET # BLD AUTO: 269 K/UL — SIGNIFICANT CHANGE UP (ref 150–400)
POTASSIUM SERPL-MCNC: 3.9 MMOL/L — SIGNIFICANT CHANGE UP (ref 3.5–5.3)
POTASSIUM SERPL-SCNC: 3.9 MMOL/L — SIGNIFICANT CHANGE UP (ref 3.5–5.3)
PROCALCITONIN SERPL-MCNC: 0.41 NG/ML — HIGH (ref 0–0.04)
RAPID RVP RESULT: SIGNIFICANT CHANGE UP
RBC # BLD: 3.08 M/UL — LOW (ref 4.2–5.8)
RBC # FLD: 14.9 % — HIGH (ref 10.3–14.5)
SODIUM SERPL-SCNC: 134 MMOL/L — LOW (ref 135–145)
WBC # BLD: 9.36 K/UL — SIGNIFICANT CHANGE UP (ref 3.8–10.5)
WBC # FLD AUTO: 9.36 K/UL — SIGNIFICANT CHANGE UP (ref 3.8–10.5)

## 2018-03-26 RX ORDER — MORPHINE SULFATE 50 MG/1
30 CAPSULE, EXTENDED RELEASE ORAL EVERY 4 HOURS
Qty: 0 | Refills: 0 | Status: DISCONTINUED | OUTPATIENT
Start: 2018-03-26 | End: 2018-03-28

## 2018-03-26 RX ORDER — ACETAMINOPHEN 500 MG
650 TABLET ORAL ONCE
Qty: 0 | Refills: 0 | Status: COMPLETED | OUTPATIENT
Start: 2018-03-26 | End: 2018-03-26

## 2018-03-26 RX ORDER — MORPHINE SULFATE 50 MG/1
30 CAPSULE, EXTENDED RELEASE ORAL EVERY 4 HOURS
Qty: 0 | Refills: 0 | Status: DISCONTINUED | OUTPATIENT
Start: 2018-03-26 | End: 2018-03-26

## 2018-03-26 RX ADMIN — PANTOPRAZOLE SODIUM 40 MILLIGRAM(S): 20 TABLET, DELAYED RELEASE ORAL at 06:58

## 2018-03-26 RX ADMIN — MORPHINE SULFATE 30 MILLIGRAM(S): 50 CAPSULE, EXTENDED RELEASE ORAL at 21:32

## 2018-03-26 RX ADMIN — MORPHINE SULFATE 30 MILLIGRAM(S): 50 CAPSULE, EXTENDED RELEASE ORAL at 17:24

## 2018-03-26 RX ADMIN — MORPHINE SULFATE 30 MILLIGRAM(S): 50 CAPSULE, EXTENDED RELEASE ORAL at 13:15

## 2018-03-26 RX ADMIN — MORPHINE SULFATE 30 MILLIGRAM(S): 50 CAPSULE, EXTENDED RELEASE ORAL at 06:59

## 2018-03-26 RX ADMIN — PANTOPRAZOLE SODIUM 40 MILLIGRAM(S): 20 TABLET, DELAYED RELEASE ORAL at 17:26

## 2018-03-26 RX ADMIN — Medication 650 MILLIGRAM(S): at 01:57

## 2018-03-26 RX ADMIN — METHADONE HYDROCHLORIDE 20 MILLIGRAM(S): 40 TABLET ORAL at 04:36

## 2018-03-26 RX ADMIN — MORPHINE SULFATE 30 MILLIGRAM(S): 50 CAPSULE, EXTENDED RELEASE ORAL at 07:30

## 2018-03-26 RX ADMIN — DULOXETINE HYDROCHLORIDE 60 MILLIGRAM(S): 30 CAPSULE, DELAYED RELEASE ORAL at 06:58

## 2018-03-26 RX ADMIN — METHADONE HYDROCHLORIDE 20 MILLIGRAM(S): 40 TABLET ORAL at 22:32

## 2018-03-26 RX ADMIN — METHADONE HYDROCHLORIDE 20 MILLIGRAM(S): 40 TABLET ORAL at 15:37

## 2018-03-26 RX ADMIN — MORPHINE SULFATE 30 MILLIGRAM(S): 50 CAPSULE, EXTENDED RELEASE ORAL at 18:20

## 2018-03-26 RX ADMIN — METHADONE HYDROCHLORIDE 20 MILLIGRAM(S): 40 TABLET ORAL at 09:29

## 2018-03-26 RX ADMIN — MORPHINE SULFATE 30 MILLIGRAM(S): 50 CAPSULE, EXTENDED RELEASE ORAL at 12:15

## 2018-03-26 RX ADMIN — CEFTRIAXONE 100 GRAM(S): 500 INJECTION, POWDER, FOR SOLUTION INTRAMUSCULAR; INTRAVENOUS at 17:25

## 2018-03-26 RX ADMIN — MORPHINE SULFATE 30 MILLIGRAM(S): 50 CAPSULE, EXTENDED RELEASE ORAL at 00:12

## 2018-03-26 RX ADMIN — DULOXETINE HYDROCHLORIDE 60 MILLIGRAM(S): 30 CAPSULE, DELAYED RELEASE ORAL at 17:26

## 2018-03-26 RX ADMIN — ENOXAPARIN SODIUM 40 MILLIGRAM(S): 100 INJECTION SUBCUTANEOUS at 17:26

## 2018-03-26 RX ADMIN — Medication 2 MILLIGRAM(S): at 06:58

## 2018-03-26 RX ADMIN — MORPHINE SULFATE 30 MILLIGRAM(S): 50 CAPSULE, EXTENDED RELEASE ORAL at 22:36

## 2018-03-26 RX ADMIN — Medication 100 MILLIGRAM(S): at 12:15

## 2018-03-26 RX ADMIN — Medication 50 MILLIGRAM(S): at 13:56

## 2018-03-26 RX ADMIN — POLYETHYLENE GLYCOL 3350 17 GRAM(S): 17 POWDER, FOR SOLUTION ORAL at 21:32

## 2018-03-26 RX ADMIN — Medication 30 MILLILITER(S): at 13:50

## 2018-03-26 RX ADMIN — Medication 2 MILLIGRAM(S): at 13:51

## 2018-03-26 RX ADMIN — Medication 2 MILLIGRAM(S): at 22:32

## 2018-03-26 RX ADMIN — AZITHROMYCIN 250 MILLIGRAM(S): 500 TABLET, FILM COATED ORAL at 17:39

## 2018-03-26 NOTE — CHART NOTE - NSCHARTNOTEFT_GEN_A_CORE
Called by RN that pt spiked Temp of 103.1F [Orally]. Pt was evaluated. c/o Feeling weakness and SOB. Otherwise denies CP/Palpitations/ Diaphoresis, HA/Dizziness/Blurry vision/syncope, Abdominal Pain/N/V/D/C, Dysuria/ Melena/Hematochezia.    PAST MEDICAL & SURGICAL HISTORY:  Anxiety and depression  Diastolic dysfunction: stage I  Empyema lun2015 left lung, s/p VATS, decortication  H/O peptic ulcer: over 10 years ago  history of renal calculus  Herniated Disc: S/P work injury   Hypertension: Dx:   Spinal Stenosis  Postlaminectomy Syndrome  S/P  shunt  S/P insertion of spinal cord stimulator:   History of lumbar fusion:   Insertion of Intrathecal Dilaudid Pump: 2011  S/P Spinal Surgery: corrective lumbar fusion   S/P Knee Replacement: left knee   S/P Arthroscopy of Left Knee  S/P Tonsillectomy: childhood  Ankle Fracture: s/p ORIF left ankle     MEDICATIONS  (STANDING):  azithromycin  IVPB 500 milliGRAM(s) IV Intermittent every 24 hours  cefTRIAXone   IVPB 1 Gram(s) IV Intermittent every 24 hours  cloNIDine 0.1 milliGRAM(s) Oral every 8 hours  diazepam    Tablet 2 milliGRAM(s) Oral three times a day  docusate sodium 100 milliGRAM(s) Oral daily  DULoxetine 60 milliGRAM(s) Oral two times a day  enoxaparin Injectable 40 milliGRAM(s) SubCutaneous daily  hydrochlorothiazide 50 milliGRAM(s) Oral daily  influenza   Vaccine 0.5 milliLiter(s) IntraMuscular once  methadone    Tablet 20 milliGRAM(s) Oral every 6 hours  morphine  IR 30 milliGRAM(s) Oral every 6 hours  pantoprazole    Tablet 40 milliGRAM(s) Oral two times a day  polyethylene glycol 3350 17 Gram(s) Oral at bedtime    MEDICATIONS  (PRN):    Vital Signs Last 24 Hrs  T(C): 39.5 (26 Mar 2018 01:49), Max: 39.5 (26 Mar 2018 01:49)  T(F): 103.1 (26 Mar 2018 01:49), Max: 103.1 (26 Mar 2018 01:49)  HR: 93 (26 Mar 2018 01:49) (61 - 93)  BP: 121/66 (26 Mar 2018 01:49) (108/71 - 122/75)  BP(mean): --  RR: 18 (26 Mar 2018 01:49) (17 - 20)  SpO2: 91% (26 Mar 2018 01:49) (89% - 97%)    PHYSICAL EXAM.  General: NAD.  HEENT: NC/AT.  Neck: No JVD.  Cardio: +s1/s2, RRR, no m/g/t.  Lungs: Bibasilar rales, no wheezing/crackles noted.   GI: Soft, NT,ND, +Normoactive BS in all 4 quadrants.  Neuro: A&O X3, NON- FOCAL.  Vascular: 2+ Pulses in all UE and LE.                       9.3    6.61  )-----------( 268      ( 25 Mar 2018 23:03 )             29.0     03-25    136  |  91<L>  |  17  ----------------------------<  141<H>  3.5   |  31  |  1.09    Ca    10.2      25 Mar 2018 12:15    TPro  7.8  /  Alb  3.6  /  TBili  0.6  /  DBili  x   /  AST  21  /  ALT  22  /  AlkPhos  120  03-25    PT/INR - ( 25 Mar 2018 12:15 )   PT: 15.5 sec;   INR: 1.42 ratio         PTT - ( 25 Mar 2018 12:15 )  PTT:27.3 sec  CARDIAC MARKERS ( 25 Mar 2018 12:15 )  x     / <0.01 ng/mL / 52 U/L / x     / 1.9 ng/mL    A/P: 62M pmhx chronic pain 2/2 traumatic work injury s/p several spine surgeries, GERD, htn, chronic constipation requiring nightly miralax, here c/o lower abdominal pain assoc w/ worse-than-usual constipation last 2 weeks and minimal black stools. Pt endorses having black stools before in the past but only a few times and they'd usually self-resolve. This time he has them every time he goes. Assoc. abdominal pain is lower and gives him constant moderate discomfort. Pt also has felt a little bit light-headed as of late and his partner notes he looks more pale than usual. No recent f/c, cp/sob, n/v/d, recent travel. Pt is able to pass gas. Pt says he recently had his urine tested showing microscopic hematuria and he is scheduled to see a urologist this week about it. Now noted to be spiking fevers.    # Sepsis 2/ LLL PNA.  - WBC: 6.61  - UA []: NTD.  - F/U UCX, BCX sent on .  - CXR []: LLL PNA.  - Abx: c/w Ceftriaxone and Zithromax.  - c/w IVF.  - Tylenol prn fever.  - Cooling Measures.  - If spikes again consider more broad spectrum coverage.  - f/u ID recommendations.  - Will endorse to primary team in am.    BINDU. WADE LORA  #[id number]  Medicine PA. Called by RN that pt spiked Temp of 103.1F [Orally]. Pt was evaluated. c/o Feeling weakness and SOB. Otherwise denies CP/Palpitations/ Diaphoresis, HA/Dizziness/Blurry vision/syncope, Abdominal Pain/N/V/D/C, Dysuria/ Melena/Hematochezia.    PAST MEDICAL & SURGICAL HISTORY:  Anxiety and depression  Diastolic dysfunction: stage I  Empyema lun2015 left lung, s/p VATS, decortication  H/O peptic ulcer: over 10 years ago  history of renal calculus  Herniated Disc: S/P work injury   Hypertension: Dx:   Spinal Stenosis  Postlaminectomy Syndrome  S/P  shunt  S/P insertion of spinal cord stimulator:   History of lumbar fusion:   Insertion of Intrathecal Dilaudid Pump: 2011  S/P Spinal Surgery: corrective lumbar fusion   S/P Knee Replacement: left knee   S/P Arthroscopy of Left Knee  S/P Tonsillectomy: childhood  Ankle Fracture: s/p ORIF left ankle     MEDICATIONS  (STANDING):  azithromycin  IVPB 500 milliGRAM(s) IV Intermittent every 24 hours  cefTRIAXone   IVPB 1 Gram(s) IV Intermittent every 24 hours  cloNIDine 0.1 milliGRAM(s) Oral every 8 hours  diazepam    Tablet 2 milliGRAM(s) Oral three times a day  docusate sodium 100 milliGRAM(s) Oral daily  DULoxetine 60 milliGRAM(s) Oral two times a day  enoxaparin Injectable 40 milliGRAM(s) SubCutaneous daily  hydrochlorothiazide 50 milliGRAM(s) Oral daily  influenza   Vaccine 0.5 milliLiter(s) IntraMuscular once  methadone    Tablet 20 milliGRAM(s) Oral every 6 hours  morphine  IR 30 milliGRAM(s) Oral every 6 hours  pantoprazole    Tablet 40 milliGRAM(s) Oral two times a day  polyethylene glycol 3350 17 Gram(s) Oral at bedtime    MEDICATIONS  (PRN):    Vital Signs Last 24 Hrs  T(C): 39.5 (26 Mar 2018 01:49), Max: 39.5 (26 Mar 2018 01:49)  T(F): 103.1 (26 Mar 2018 01:49), Max: 103.1 (26 Mar 2018 01:49)  HR: 93 (26 Mar 2018 01:49) (61 - 93)  BP: 121/66 (26 Mar 2018 01:49) (108/71 - 122/75)  BP(mean): --  RR: 18 (26 Mar 2018 01:49) (17 - 20)  SpO2: 91% (26 Mar 2018 01:49) (89% - 97%)    PHYSICAL EXAM.  General: NAD.  HEENT: NC/AT.  Neck: No JVD.  Cardio: +s1/s2, RRR, no m/g/t.  Lungs: Bibasilar rales, no wheezing/crackles noted.   GI: Soft, NT,ND, +Normoactive BS in all 4 quadrants.  Neuro: A&O X3, NON- FOCAL.  Vascular: 2+ Pulses in all UE and LE.                       9.3    6.61  )-----------( 268      ( 25 Mar 2018 23:03 )             29.0     03-25    136  |  91<L>  |  17  ----------------------------<  141<H>  3.5   |  31  |  1.09    Ca    10.2      25 Mar 2018 12:15    TPro  7.8  /  Alb  3.6  /  TBili  0.6  /  DBili  x   /  AST  21  /  ALT  22  /  AlkPhos  120  03-25    PT/INR - ( 25 Mar 2018 12:15 )   PT: 15.5 sec;   INR: 1.42 ratio         PTT - ( 25 Mar 2018 12:15 )  PTT:27.3 sec  CARDIAC MARKERS ( 25 Mar 2018 12:15 )  x     / <0.01 ng/mL / 52 U/L / x     / 1.9 ng/mL    A/P: 62M pmhx chronic pain 2/2 traumatic work injury s/p several spine surgeries, GERD, htn, chronic constipation requiring nightly miralax, here c/o lower abdominal pain assoc w/ worse-than-usual constipation last 2 weeks and minimal black stools. Pt endorses having black stools before in the past but only a few times and they'd usually self-resolve. This time he has them every time he goes. Assoc. abdominal pain is lower and gives him constant moderate discomfort. Pt also has felt a little bit light-headed as of late and his partner notes he looks more pale than usual. No recent f/c, cp/sob, n/v/d, recent travel. Pt is able to pass gas. Pt says he recently had his urine tested showing microscopic hematuria and he is scheduled to see a urologist this week about it. Now noted to be spiking fevers.    # Sepsis 2/2 LLL and RLL PNA.  - WBC: 6.61  - UA []: NTD.  - F/U UCX, BCX sent on .  - CXR []: LLL PNA.  - CT A/P []; Left lower lobe pneumonia, additional patchy consolidation in   the right lower lobe concerning for multifocal pneumonia.   - Abx: c/w Ceftriaxone and Zithromax.  - c/w IVF.  - Tylenol prn fever.  - Cooling Measures.  - If spikes again consider more broad spectrum coverage.  - f/u ID recommendations.  - Will endorse to primary team in amCas RUANO. WADE TANG.  #[id number]  Medicine PA. Called by RN that pt spiked Temp of 103.1F [Orally]. Pt was evaluated. c/o Feeling weakness and SOB. Otherwise denies CP/Palpitations/ Diaphoresis, HA/Dizziness/Blurry vision/syncope, Abdominal Pain/N/V/D/C, Dysuria/ Melena/Hematochezia.    PAST MEDICAL & SURGICAL HISTORY:  Anxiety and depression  Diastolic dysfunction: stage I  Empyema lun2015 left lung, s/p VATS, decortication  H/O peptic ulcer: over 10 years ago  history of renal calculus  Herniated Disc: S/P work injury   Hypertension: Dx:   Spinal Stenosis  Postlaminectomy Syndrome  S/P  shunt  S/P insertion of spinal cord stimulator:   History of lumbar fusion:   Insertion of Intrathecal Dilaudid Pump: 2011  S/P Spinal Surgery: corrective lumbar fusion   S/P Knee Replacement: left knee   S/P Arthroscopy of Left Knee  S/P Tonsillectomy: childhood  Ankle Fracture: s/p ORIF left ankle     MEDICATIONS  (STANDING):  azithromycin  IVPB 500 milliGRAM(s) IV Intermittent every 24 hours  cefTRIAXone   IVPB 1 Gram(s) IV Intermittent every 24 hours  cloNIDine 0.1 milliGRAM(s) Oral every 8 hours  diazepam    Tablet 2 milliGRAM(s) Oral three times a day  docusate sodium 100 milliGRAM(s) Oral daily  DULoxetine 60 milliGRAM(s) Oral two times a day  enoxaparin Injectable 40 milliGRAM(s) SubCutaneous daily  hydrochlorothiazide 50 milliGRAM(s) Oral daily  influenza   Vaccine 0.5 milliLiter(s) IntraMuscular once  methadone    Tablet 20 milliGRAM(s) Oral every 6 hours  morphine  IR 30 milliGRAM(s) Oral every 6 hours  pantoprazole    Tablet 40 milliGRAM(s) Oral two times a day  polyethylene glycol 3350 17 Gram(s) Oral at bedtime    MEDICATIONS  (PRN):    Vital Signs Last 24 Hrs  T(C): 39.5 (26 Mar 2018 01:49), Max: 39.5 (26 Mar 2018 01:49)  T(F): 103.1 (26 Mar 2018 01:49), Max: 103.1 (26 Mar 2018 01:49)  HR: 93 (26 Mar 2018 01:49) (61 - 93)  BP: 121/66 (26 Mar 2018 01:49) (108/71 - 122/75)  BP(mean): --  RR: 18 (26 Mar 2018 01:49) (17 - 20)  SpO2: 91% (26 Mar 2018 01:49) (89% - 97%)    PHYSICAL EXAM.  General: NAD.  HEENT: NC/AT.  Neck: No JVD.  Cardio: +s1/s2, RRR, no m/g/t.  Lungs: Bibasilar rales, no wheezing/crackles noted.   GI: Soft, NT,ND, +Normoactive BS in all 4 quadrants.  Neuro: A&O X3, NON- FOCAL.  Vascular: 2+ Pulses in all UE and LE.                       9.3    6.61  )-----------( 268      ( 25 Mar 2018 23:03 )             29.0     03-25    136  |  91<L>  |  17  ----------------------------<  141<H>  3.5   |  31  |  1.09    Ca    10.2      25 Mar 2018 12:15    TPro  7.8  /  Alb  3.6  /  TBili  0.6  /  DBili  x   /  AST  21  /  ALT  22  /  AlkPhos  120  03-25    PT/INR - ( 25 Mar 2018 12:15 )   PT: 15.5 sec;   INR: 1.42 ratio         PTT - ( 25 Mar 2018 12:15 )  PTT:27.3 sec  CARDIAC MARKERS ( 25 Mar 2018 12:15 )  x     / <0.01 ng/mL / 52 U/L / x     / 1.9 ng/mL    A/P: 63 YO M with PMH of HTN,  chronic pain 2/2 traumatic work injury s/p several spine surgeries, GERD,  chronic constipation requiring nightly Miralax, here c/o lower abdominal pain assoc w/ worse-than-usual constipation last 2 weeks and minimal black stools.  Assoc. abdominal pain is lower and gives him constant moderate discomfort. Pt also has felt a little bit light-headed as of late and his partner notes he looks more pale than usual.  Pt says he recently had his urine tested showing microscopic hematuria and he is scheduled to see a urologist this week about it. Pt admitted with Acute Hypoxic Respiratory Failure 2/2 Multifocal PNA and GIB . Now noted to be spiking fevers.    # Sepsis 2/2 LLL and RLL PNA.  - WBC: 6.61  - UA []: NTD.  - F/U UCX, BCX sent on .  - F/U RVP sent on .   - CXR []: LLL PNA.  - CT A/P []; Left lower lobe pneumonia, additional patchy consolidation in   the right lower lobe concerning for multifocal pneumonia.   - Abx: c/w Ceftriaxone and Zithromax.  - c/w IVF.  - Tylenol prn fever.  - Cooling Measures.  - If spikes again consider more broad spectrum coverage.  - f/u ID recommendations.  - Will endorse to primary team in am.    BINDU. WADE LORA  #[id number]  Medicine PA.

## 2018-03-27 ENCOUNTER — TRANSCRIPTION ENCOUNTER (OUTPATIENT)
Age: 63
End: 2018-03-27

## 2018-03-27 LAB
ANION GAP SERPL CALC-SCNC: 12 MMOL/L — SIGNIFICANT CHANGE UP (ref 5–17)
BUN SERPL-MCNC: 14 MG/DL — SIGNIFICANT CHANGE UP (ref 7–23)
CALCIUM SERPL-MCNC: 9 MG/DL — SIGNIFICANT CHANGE UP (ref 8.4–10.5)
CHLORIDE SERPL-SCNC: 94 MMOL/L — LOW (ref 96–108)
CO2 SERPL-SCNC: 30 MMOL/L — SIGNIFICANT CHANGE UP (ref 22–31)
CREAT SERPL-MCNC: 1.05 MG/DL — SIGNIFICANT CHANGE UP (ref 0.5–1.3)
FERRITIN SERPL-MCNC: 178 NG/ML — SIGNIFICANT CHANGE UP (ref 30–400)
FOLATE SERPL-MCNC: >20 NG/ML — SIGNIFICANT CHANGE UP (ref 4.8–24.2)
GLUCOSE SERPL-MCNC: 82 MG/DL — SIGNIFICANT CHANGE UP (ref 70–99)
HAPTOGLOB SERPL-MCNC: 385 MG/DL — HIGH (ref 34–200)
HCT VFR BLD CALC: 31 % — LOW (ref 39–50)
HGB BLD-MCNC: 9.8 G/DL — LOW (ref 13–17)
IRON SATN MFR SERPL: 22 UG/DL — LOW (ref 45–165)
IRON SATN MFR SERPL: 9 % — LOW (ref 16–55)
MCHC RBC-ENTMCNC: 30 PG — SIGNIFICANT CHANGE UP (ref 27–34)
MCHC RBC-ENTMCNC: 31.6 GM/DL — LOW (ref 32–36)
MCV RBC AUTO: 94.8 FL — SIGNIFICANT CHANGE UP (ref 80–100)
NRBC # BLD: 0 /100 WBCS — SIGNIFICANT CHANGE UP (ref 0–0)
OB PNL STL: NEGATIVE — SIGNIFICANT CHANGE UP
PLATELET # BLD AUTO: 285 K/UL — SIGNIFICANT CHANGE UP (ref 150–400)
POTASSIUM SERPL-MCNC: 4 MMOL/L — SIGNIFICANT CHANGE UP (ref 3.5–5.3)
POTASSIUM SERPL-SCNC: 4 MMOL/L — SIGNIFICANT CHANGE UP (ref 3.5–5.3)
RBC # BLD: 3.27 M/UL — LOW (ref 4.2–5.8)
RBC # FLD: 14.5 % — SIGNIFICANT CHANGE UP (ref 10.3–14.5)
SODIUM SERPL-SCNC: 136 MMOL/L — SIGNIFICANT CHANGE UP (ref 135–145)
TIBC SERPL-MCNC: 240 UG/DL — SIGNIFICANT CHANGE UP (ref 220–430)
UIBC SERPL-MCNC: 218 UG/DL — SIGNIFICANT CHANGE UP (ref 110–370)
VIT B12 SERPL-MCNC: >2000 PG/ML — HIGH (ref 232–1245)
WBC # BLD: 7.12 K/UL — SIGNIFICANT CHANGE UP (ref 3.8–10.5)
WBC # FLD AUTO: 7.12 K/UL — SIGNIFICANT CHANGE UP (ref 3.8–10.5)

## 2018-03-27 RX ORDER — ACETAMINOPHEN 500 MG
650 TABLET ORAL ONCE
Qty: 0 | Refills: 0 | Status: COMPLETED | OUTPATIENT
Start: 2018-03-27 | End: 2018-03-27

## 2018-03-27 RX ORDER — MULTIVIT WITH MIN/MFOLATE/K2 340-15/3 G
300 POWDER (GRAM) ORAL ONCE
Qty: 0 | Refills: 0 | Status: COMPLETED | OUTPATIENT
Start: 2018-03-27 | End: 2018-03-27

## 2018-03-27 RX ORDER — CHOLECALCIFEROL (VITAMIN D3) 125 MCG
0 CAPSULE ORAL
Qty: 0 | Refills: 0 | COMMUNITY

## 2018-03-27 RX ORDER — PREGABALIN 225 MG/1
0 CAPSULE ORAL
Qty: 0 | Refills: 0 | COMMUNITY

## 2018-03-27 RX ORDER — ACETAMINOPHEN 500 MG
650 TABLET ORAL EVERY 6 HOURS
Qty: 0 | Refills: 0 | Status: DISCONTINUED | OUTPATIENT
Start: 2018-03-27 | End: 2018-03-28

## 2018-03-27 RX ORDER — DIAZEPAM 5 MG
1 TABLET ORAL
Qty: 0 | Refills: 0 | COMMUNITY

## 2018-03-27 RX ORDER — CINNAMON BARK 500 MG
0 CAPSULE ORAL
Qty: 0 | Refills: 0 | COMMUNITY

## 2018-03-27 RX ORDER — PANTOPRAZOLE SODIUM 20 MG/1
1 TABLET, DELAYED RELEASE ORAL
Qty: 60 | Refills: 0 | OUTPATIENT
Start: 2018-03-27 | End: 2018-04-25

## 2018-03-27 RX ORDER — MULTIVIT-MIN/FERROUS GLUCONATE 9 MG/15 ML
0 LIQUID (ML) ORAL
Qty: 0 | Refills: 0 | COMMUNITY

## 2018-03-27 RX ORDER — LANOLIN ALCOHOL/MO/W.PET/CERES
3 CREAM (GRAM) TOPICAL ONCE
Qty: 0 | Refills: 0 | Status: COMPLETED | OUTPATIENT
Start: 2018-03-27 | End: 2018-03-27

## 2018-03-27 RX ADMIN — MORPHINE SULFATE 30 MILLIGRAM(S): 50 CAPSULE, EXTENDED RELEASE ORAL at 21:33

## 2018-03-27 RX ADMIN — Medication 30 MILLILITER(S): at 10:20

## 2018-03-27 RX ADMIN — MORPHINE SULFATE 30 MILLIGRAM(S): 50 CAPSULE, EXTENDED RELEASE ORAL at 09:30

## 2018-03-27 RX ADMIN — MORPHINE SULFATE 30 MILLIGRAM(S): 50 CAPSULE, EXTENDED RELEASE ORAL at 12:48

## 2018-03-27 RX ADMIN — PANTOPRAZOLE SODIUM 40 MILLIGRAM(S): 20 TABLET, DELAYED RELEASE ORAL at 06:38

## 2018-03-27 RX ADMIN — DULOXETINE HYDROCHLORIDE 60 MILLIGRAM(S): 30 CAPSULE, DELAYED RELEASE ORAL at 06:38

## 2018-03-27 RX ADMIN — Medication 3 MILLIGRAM(S): at 00:28

## 2018-03-27 RX ADMIN — Medication 650 MILLIGRAM(S): at 10:20

## 2018-03-27 RX ADMIN — Medication 2 MILLIGRAM(S): at 13:50

## 2018-03-27 RX ADMIN — Medication 650 MILLIGRAM(S): at 01:06

## 2018-03-27 RX ADMIN — Medication 650 MILLIGRAM(S): at 00:06

## 2018-03-27 RX ADMIN — MORPHINE SULFATE 30 MILLIGRAM(S): 50 CAPSULE, EXTENDED RELEASE ORAL at 17:51

## 2018-03-27 RX ADMIN — METHADONE HYDROCHLORIDE 20 MILLIGRAM(S): 40 TABLET ORAL at 22:02

## 2018-03-27 RX ADMIN — Medication 0.1 MILLIGRAM(S): at 13:51

## 2018-03-27 RX ADMIN — PANTOPRAZOLE SODIUM 40 MILLIGRAM(S): 20 TABLET, DELAYED RELEASE ORAL at 17:00

## 2018-03-27 RX ADMIN — MORPHINE SULFATE 30 MILLIGRAM(S): 50 CAPSULE, EXTENDED RELEASE ORAL at 17:00

## 2018-03-27 RX ADMIN — METHADONE HYDROCHLORIDE 20 MILLIGRAM(S): 40 TABLET ORAL at 15:16

## 2018-03-27 RX ADMIN — POLYETHYLENE GLYCOL 3350 17 GRAM(S): 17 POWDER, FOR SOLUTION ORAL at 21:04

## 2018-03-27 RX ADMIN — Medication 0.1 MILLIGRAM(S): at 21:03

## 2018-03-27 RX ADMIN — MORPHINE SULFATE 30 MILLIGRAM(S): 50 CAPSULE, EXTENDED RELEASE ORAL at 02:33

## 2018-03-27 RX ADMIN — ENOXAPARIN SODIUM 40 MILLIGRAM(S): 100 INJECTION SUBCUTANEOUS at 17:01

## 2018-03-27 RX ADMIN — MORPHINE SULFATE 30 MILLIGRAM(S): 50 CAPSULE, EXTENDED RELEASE ORAL at 01:33

## 2018-03-27 RX ADMIN — CEFTRIAXONE 100 GRAM(S): 500 INJECTION, POWDER, FOR SOLUTION INTRAMUSCULAR; INTRAVENOUS at 16:59

## 2018-03-27 RX ADMIN — Medication 30 MILLILITER(S): at 21:59

## 2018-03-27 RX ADMIN — MORPHINE SULFATE 30 MILLIGRAM(S): 50 CAPSULE, EXTENDED RELEASE ORAL at 08:34

## 2018-03-27 RX ADMIN — MORPHINE SULFATE 30 MILLIGRAM(S): 50 CAPSULE, EXTENDED RELEASE ORAL at 21:03

## 2018-03-27 RX ADMIN — MORPHINE SULFATE 30 MILLIGRAM(S): 50 CAPSULE, EXTENDED RELEASE ORAL at 13:30

## 2018-03-27 RX ADMIN — Medication 2 MILLIGRAM(S): at 06:38

## 2018-03-27 RX ADMIN — Medication 2 MILLIGRAM(S): at 23:22

## 2018-03-27 RX ADMIN — Medication 50 MILLIGRAM(S): at 12:48

## 2018-03-27 RX ADMIN — METHADONE HYDROCHLORIDE 20 MILLIGRAM(S): 40 TABLET ORAL at 04:49

## 2018-03-27 RX ADMIN — Medication 650 MILLIGRAM(S): at 11:20

## 2018-03-27 RX ADMIN — Medication 100 MILLIGRAM(S): at 13:51

## 2018-03-27 RX ADMIN — Medication 300 MILLILITER(S): at 21:04

## 2018-03-27 RX ADMIN — METHADONE HYDROCHLORIDE 20 MILLIGRAM(S): 40 TABLET ORAL at 09:13

## 2018-03-27 RX ADMIN — DULOXETINE HYDROCHLORIDE 60 MILLIGRAM(S): 30 CAPSULE, DELAYED RELEASE ORAL at 17:00

## 2018-03-27 RX ADMIN — AZITHROMYCIN 250 MILLIGRAM(S): 500 TABLET, FILM COATED ORAL at 17:49

## 2018-03-27 NOTE — PROVIDER CONTACT NOTE (MEDICATION) - ASSESSMENT
Pt a&oX4; vss; Pt c/o headache 8/10; pt received morphine IR at 2130 and valium and methadone at 2230

## 2018-03-27 NOTE — CONSULT NOTE ADULT - ASSESSMENT
62 M with h/o chronic pain 2/2 traumatic work injury (on daily opiates), GERD, HTN, chronic constipation who presents with generalized fatigue, weakness, SOB and worsened constipation for the last 2 weeks. Pt febrile during hospital course and found to have multifocal PNA. Currently on antibiotics.   GI consulted because of "black stool". However, stool occult is negative.   Pt reports taking Pepto-Bismol daily for about 2 week due to heartburn, which can turn stool black.   His H/H is stable. No evidence of overt GI bleeding. Heartburn improved with PPI therapy. Pt's primary gastroenterologist is Dr. Edison Bernardo.     - Continue to monitor H/H. No need for inpatient endoscopic work-up at this time.   - Pantoprazole 40mg PO BID for GERD  - Miralax 17 g PO daily. Pt feels "backed up" so I ordered a 1 bottle of magnesium citrate (no evidence of bowel obstruction on recent CT scan).  - Pt should follow-up with his outpatient gastroenterologist, Dr. Bernardo after discharge to schedule upper endoscopy. 62 M with h/o chronic pain 2/2 traumatic work injury (on daily opiates), GERD, HTN, chronic constipation who presents with generalized fatigue, weakness, SOB and worsened constipation for the last 2 weeks. Pt febrile during hospital course and found to have multifocal PNA. Currently on antibiotics.   GI consulted because of "black stool". However, stool occult is negative.   Pt reports taking Pepto-Bismol daily for about 2 week due to heartburn, which can turn stool black.   His H/H is stable. No evidence of overt GI bleeding. Heartburn improved with PPI therapy. Pt's primary gastroenterologist is Dr. Edison Bernardo.     - Continue to monitor H/H. No need for inpatient endoscopic work-up at this time.   - Pantoprazole 40mg PO BID for GERD  - Miralax 17 g PO daily. Pt feels "backed up" so I ordered a 1 bottle of magnesium citrate (no evidence of bowel obstruction on recent CT scan).  - Pt should follow-up with his outpatient gastroenterologist, Dr. Bernardo after discharge to schedule upper endoscopy.    - Continue antibiotics for PNA

## 2018-03-27 NOTE — DISCHARGE NOTE ADULT - CARE PROVIDER_API CALL
Edison Bernardo), Gastroenterology; Internal Medicine  [address] [address] [address]  Phone: [phone]  Fax: [phone]

## 2018-03-27 NOTE — PHYSICAL THERAPY INITIAL EVALUATION ADULT - RANGE OF MOTION EXAMINATION, REHAB EVAL
bilateral upper extremity ROM was WFL (within functional limits)/bilateral lower extremity ROM was WFL (within functional limits) middle R/lower L/lower R

## 2018-03-27 NOTE — DISCHARGE NOTE ADULT - HOSPITAL COURSE
62M pmhx chronic pain 2/2 traumatic work injury s/p several spine surgeries, GERD, htn, chronic constipation requiring nightly miralax, here c/o lower abdominal pain assoc w/ worse-than-usual constipation last 2 weeks and minimal black stools. Pt endorses having black stools before in the past but only a few times and they'd usually self-resolve. This time he has them every time he goes. Assoc. abdominal pain is lower and gives him constant moderate discomfort. Pt also has felt a little bit light-headed as of late and his partner notes he looks more pale than usual. No recent f/c, cp/sob, n/v/d, recent travel. Pt is able to pass gas. Pt says he recently had his urine tested showing microscopic hematuria and he is scheduled to see a urologist this week about it.     Problem: Acute respiratory failure with hypoxia.  Plan: Secondary to Pneumonia.   Multifocal Pneumonia.  Received IV abxs Zithromax and Rocephin  . Blood cultures negative so far.  seen by ID, will complete 5more days of Levaquin  pt also a/w dark stool- guaiac neg- seen by GI, will f/u w/ outpt GI doctor

## 2018-03-27 NOTE — PHYSICAL THERAPY INITIAL EVALUATION ADULT - PERTINENT HX OF CURRENT PROBLEM, REHAB EVAL
62M pmhx chronic pain 2/2 traumatic work injury s/p several spine surgeries, GERD, htn, chronic constipation requiring nightly miralax, here c/o lower abdominal pain assoc w/ worse-than-usual constipation last 2 weeks and minimal black stools.

## 2018-03-27 NOTE — DISCHARGE NOTE ADULT - ADDITIONAL INSTRUCTIONS
Follow up with your Primary care doctor Follow up with your Primary care doctor  Follow-up with your gastroenterologist, Dr. Bernardo after discharge to schedule upper endoscopy. Follow up with your Primary care doctor. You will need a repeat chest xray in 6-8 weeks  Follow-up with your gastroenterologist, Dr. Bernardo after discharge to schedule upper endoscopy.

## 2018-03-27 NOTE — DISCHARGE NOTE ADULT - CARE PLAN
Principal Discharge DX:	Multifocal pneumonia  Goal:	Free from respiratory distress  Assessment and plan of treatment:	Completed course of intravenous antibiotics  Secondary Diagnosis:	Other chronic back pain  Assessment and plan of treatment:	Continue with pain control  Secondary Diagnosis:	Dark stools  Assessment and plan of treatment:	Occult blood negative  continue with Protonix Principal Discharge DX:	Multifocal pneumonia  Goal:	Free from respiratory distress  Assessment and plan of treatment:	Complete course of oral antibiotics  Secondary Diagnosis:	Other chronic back pain  Assessment and plan of treatment:	Continue with pain control  Secondary Diagnosis:	Dark stools  Assessment and plan of treatment:	Occult blood negative  continue with Protonix

## 2018-03-27 NOTE — PHYSICAL THERAPY INITIAL EVALUATION ADULT - PRECAUTIONS/LIMITATIONS, REHAB EVAL
cont... Pt endorses having black stools before in the past but only a few times and they'd usually self-resolve. This time he has them every time he goes. Assoc. abdominal pain is lower and gives him constant moderate discomfort. Pt also has felt a little bit light-headed as of late and his partner notes he looks more pale than usual. No recent f/c, cp/sob, n/v/d, recent travel. Pt is able to pass gas. Pt says he recently had his urine tested showing microscopic hematuria and he is scheduled to see a urologist this week about it.

## 2018-03-27 NOTE — PHYSICAL THERAPY INITIAL EVALUATION ADULT - ADDITIONAL COMMENTS
Patient lives in apt with wife. patient ambulates with cane, has rollator. patient verbalized he goes to outpatient physical therapy for back pain control and overall strengthening.

## 2018-03-27 NOTE — DISCHARGE NOTE ADULT - PLAN OF CARE
Free from respiratory distress Completed course of intravenous antibiotics Continue with pain control Occult blood negative  continue with Protonix Complete course of oral antibiotics

## 2018-03-27 NOTE — PHYSICAL THERAPY INITIAL EVALUATION ADULT - DISCHARGE DISPOSITION, PT EVAL
Pt. verbalized going to outpatient Physical therapy for pain control in low back and overall strengthening./outpatient PT

## 2018-03-27 NOTE — DISCHARGE NOTE ADULT - PATIENT PORTAL LINK FT
You can access the [facility], offered by [facility], by registering with the following website: [email or web address]

## 2018-03-27 NOTE — CONSULT NOTE ADULT - SUBJECTIVE AND OBJECTIVE BOX
Patient is a 62y old  Male who presents with a chief complaint of Black stools and sob (27 Mar 2018 15:14)      HPI:  62 M with h/o chronic pain 2/2 traumatic work injury (on daily opiates), GERD, HTN, chronic constipation who presents with generalized fatigue, weakness, SOB and worsened constipation for the last 2 weeks. Pt felt increasing lethargic, which prompted him to come to ED. Pt also states that he has been passing "black stool" and increased abdominal bloating and lower abdominal cramping. He states that he typically takes miralax everyday, but has noticed that it hasn't been as effective.   Pt also complaining of significant heartburn symptoms and was taking Pepto-Bismol every day for 2 weeks.   He otherwise denies cough, upper abdominal pain, nausea, vomiting, diarrhea, blood in stool.   Yesterday, the patient had a 103 fever. CT of the abdomen and pelvis revealed multifocal PNA. There was no acute GI pathology.   Pt has a primary gastroenterologist in [place], Dr. Edison Bernardo. Pt states that he had a colonoscopy about 3 years ago, which was normal. He went to Dr. Bernardo recently because of severe heartburn and was prescribed Nexium. Pt took and and heartburn was significantly improved. Dr. Bernardo was considering an upper endoscopy but patient ended up in the hospital.     PAST MEDICAL & SURGICAL HISTORY:  Anxiety and depression  Diastolic dysfunction: stage I  Empyema lun2015 left lung, s/p VATS, decortication  H/O peptic ulcer: over 10 years ago  history of renal calculus  Herniated Disc: S/P work injury   Hypertension: Dx: 2002  Spinal Stenosis  Postlaminectomy Syndrome  S/P  shunt  S/P insertion of spinal cord stimulator:   History of lumbar fusion:   Insertion of Intrathecal Dilaudid Pump: 2011  S/P Spinal Surgery: corrective lumbar fusion   S/P Knee Replacement: left knee   S/P Arthroscopy of Left Knee  S/P Tonsillectomy: childhood  Ankle Fracture: s/p ORIF left ankle       Allergies    No Known Allergies      MEDICATIONS  (STANDING):  azithromycin  IVPB 500 milliGRAM(s) IV Intermittent every 24 hours  cefTRIAXone   IVPB 1 Gram(s) IV Intermittent every 24 hours  cloNIDine 0.1 milliGRAM(s) Oral every 8 hours  diazepam    Tablet 2 milliGRAM(s) Oral three times a day  docusate sodium 100 milliGRAM(s) Oral daily  DULoxetine 60 milliGRAM(s) Oral two times a day  enoxaparin Injectable 40 milliGRAM(s) SubCutaneous daily  hydrochlorothiazide 50 milliGRAM(s) Oral daily  influenza   Vaccine 0.5 milliLiter(s) IntraMuscular once  magnesium citrate Solution 300 milliLiter(s) Oral once  methadone    Tablet 20 milliGRAM(s) Oral every 6 hours  pantoprazole    Tablet 40 milliGRAM(s) Oral two times a day  polyethylene glycol 3350 17 Gram(s) Oral at bedtime    MEDICATIONS  (PRN):  acetaminophen   Tablet. 650 milliGRAM(s) Oral every 6 hours PRN Mild Pain (1 - 3)  aluminum hydroxide/magnesium hydroxide/simethicone Suspension 30 milliLiter(s) Oral every 6 hours PRN Dyspepsia  morphine  IR 30 milliGRAM(s) Oral every 4 hours PRN Moderate Pain (4 - 6)      Social History: Denies ETOH, illicit drug use or active smoking.     Family History: No pertinent family history.     Review of Systems:    General:  Fever, fatigue.  CV:  No pain, palpitations, hypo/hypertension  Resp:  No dyspnea, cough, tachypnea, wheezing  GI:  Constipation, Abdominal pain. Black stool. No nausea, No vomiting, No diarrhea.  :  No pain, bleeding, incontinence, nocturia  Muscle:  No pain, weakness  Neuro:  No weakness, tingling, memory problems  Psych:  Denies depression  Endocrine:  No polyuria, polydypsia, cold/heat intolerance  Heme:  No petechiae, ecchymosis, easy bruisability  Skin:  No rash, tattoos, scars, edema    Vital Signs Last 24 Hrs  T(C): 36.8 (27 Mar 2018 16:15), Max: 37.5 (26 Mar 2018 19:48)  T(F): 98.3 (27 Mar 2018 16:15), Max: 99.5 (26 Mar 2018 19:48)  HR: 71 (27 Mar 2018 16:15) (64 - 90)  BP: 123/76 (27 Mar 2018 16:15) (115/69 - 139/90)  BP(mean): --  RR: 18 (27 Mar 2018 16:15) (18 - 18)  SpO2: 94% (27 Mar 2018 16:15) (91% - 97%)    PHYSICAL EXAM:    Constitutional: NAD, well-developed, obese  Neck: No LAD, supple  Respiratory: CTA and P  Cardiovascular: S1 and S2, RRR, no M  Gastrointestinal: BS+, soft, mid periumblical tenderness without rebound or guarding, non-distended. Obese abdomen.  Extremities: No peripheral edema, neg clubing, cyanosis  Vascular: 2+ peripheral pulses  Neurological: A/O x 3, no focal deficits  Psychiatric: Normal mood, normal affect  Skin: No rashes      LABS:                        9.8    7.12  )-----------( 285      ( 27 Mar 2018 07:28 )             31.0     03-    136  |  94<L>  |  14  ----------------------------<  82  4.0   |  30  |  1.05    Ca    9.0      27 Mar 2018 06:57          LIVER FUNCTIONS - ( 25 Mar 2018 12:15 )  Alb: 3.6 g/dL / Pro: 7.8 g/dL / ALK PHOS: 120 U/L / ALT: 22 U/L RC / AST: 21 U/L / GGT: x             RADIOLOGY & ADDITIONAL TESTS:  < from: CT Abdomen and Pelvis w/ Oral Cont and w/ IV Cont (18 @ 13:41) >  FINDINGS:    LOWER CHEST: Small hiatal hernia. Left lower lobe pneumoniaadditional   patchy consolidation in the right lower lobe concerning for multifocal   pneumonia.    LIVER: Within normal limits.  BILE DUCTS: Normal caliber.  GALLBLADDER: Layering sludge and/or stones.  SPLEEN: Within normal limits.  PANCREAS: Within normal limits.  ADRENALS: No change small right adrenal nodule.  KIDNEYS/URETERS: Nonobstructing left renal calculi.    BLADDER: Within normal limits.  REPRODUCTIVE ORGANS: The prostate is within normal limits.    BOWEL: Small to moderate hiatal hernia. No bowel obstruction. Appendix is   normal.  PERITONEUM: No ascites.  VESSELS:  Within normal limits.  RETROPERITONEUM: No lymphadenopathy.    ABDOMINAL WALL: Within normal limits.  BONES: Status post posterior spinal fusion. Interbody disc spacer implant   are again noted at L2-L3 and L3-L4. Chronic appearing compression   deformity of the L3 vertebral body. Degenerative changes of the bilateral   hip joints.    IMPRESSION: Left lower lobe pneumonia, additional patchy consolidation in   the right lower lobe concerning for multifocal pneumonia.    Hiatal hernia.    No acute intra-abdominal pathology.      < end of copied text >

## 2018-03-27 NOTE — DISCHARGE NOTE ADULT - MEDICATION SUMMARY - MEDICATIONS TO TAKE
I will START or STAY ON the medications listed below when I get home from the hospital:    methadone 10 mg oral tablet  -- 2 tab(s) by mouth every 6 to 8 hours, As Needed  -- Indication: For Pain    Morphine IR 30 mg oral tablet  -- 1 tab(s) by mouth every 4 to 6 hours, As Needed  -- Indication: For Pain    cloNIDine 0.1 mg oral tablet  -- 1 tab(s) by mouth every 8 hours  -- Indication: For blood pressure    diazePAM 2 mg oral tablet  -- 1 tab(s) by mouth 3 times a day  -- Indication: For mood    DULoxetine 60 mg oral delayed release capsule  -- 1 cap(s) by mouth 2 times a day  -- Indication: For mood    hydroCHLOROthiazide 50 mg oral tablet  -- 1 tab(s) by mouth once a day  -- Indication: For blood pressure    docusate sodium 100 mg oral capsule  -- 1 cap(s) by mouth once a day  -- Indication: For stool softener    MiraLax oral powder for reconstitution  -- 17 gram(s) by mouth once a day (at bedtime)  -- Indication: For constipation    pantoprazole 40 mg oral delayed release tablet  -- 1 tab(s) by mouth 2 times a day  -- Indication: For stomach I will START or STAY ON the medications listed below when I get home from the hospital:    Physical Therapy  -- Indication: For therapy    methadone 10 mg oral tablet  -- 2 tab(s) by mouth every 6 to 8 hours, As Needed  -- Indication: For Pain    Morphine IR 30 mg oral tablet  -- 1 tab(s) by mouth every 4 to 6 hours, As Needed  -- Indication: For Pain    cloNIDine 0.1 mg oral tablet  -- 1 tab(s) by mouth every 8 hours  -- Indication: For blood pressure    diazePAM 2 mg oral tablet  -- 1 tab(s) by mouth 3 times a day  -- Indication: For mood    DULoxetine 60 mg oral delayed release capsule  -- 1 cap(s) by mouth 2 times a day  -- Indication: For mood    hydroCHLOROthiazide 50 mg oral tablet  -- 1 tab(s) by mouth once a day  -- Indication: For blood pressure    MiraLax oral powder for reconstitution  -- 17 gram(s) by mouth once a day (at bedtime)  -- Indication: For constipation    docusate sodium 100 mg oral capsule  -- 1 cap(s) by mouth once a day  -- Indication: For stool softener    pantoprazole 40 mg oral delayed release tablet  -- 1 tab(s) by mouth 2 times a day  -- Indication: For stomach    Levaquin 500 mg oral tablet  -- 1 tab(s) by mouth every 24 hours  -- Indication: For Antibiotic

## 2018-03-28 VITALS
DIASTOLIC BLOOD PRESSURE: 73 MMHG | TEMPERATURE: 98 F | HEART RATE: 73 BPM | RESPIRATION RATE: 18 BRPM | SYSTOLIC BLOOD PRESSURE: 114 MMHG | OXYGEN SATURATION: 93 %

## 2018-03-28 PROCEDURE — 86900 BLOOD TYPING SEROLOGIC ABO: CPT

## 2018-03-28 PROCEDURE — 85014 HEMATOCRIT: CPT

## 2018-03-28 PROCEDURE — 96375 TX/PRO/DX INJ NEW DRUG ADDON: CPT

## 2018-03-28 PROCEDURE — 93005 ELECTROCARDIOGRAM TRACING: CPT

## 2018-03-28 PROCEDURE — 86850 RBC ANTIBODY SCREEN: CPT

## 2018-03-28 PROCEDURE — 84484 ASSAY OF TROPONIN QUANT: CPT

## 2018-03-28 PROCEDURE — 82947 ASSAY GLUCOSE BLOOD QUANT: CPT

## 2018-03-28 PROCEDURE — 71046 X-RAY EXAM CHEST 2 VIEWS: CPT

## 2018-03-28 PROCEDURE — 82607 VITAMIN B-12: CPT

## 2018-03-28 PROCEDURE — 87633 RESP VIRUS 12-25 TARGETS: CPT

## 2018-03-28 PROCEDURE — 82550 ASSAY OF CK (CPK): CPT

## 2018-03-28 PROCEDURE — 80048 BASIC METABOLIC PNL TOTAL CA: CPT

## 2018-03-28 PROCEDURE — 83605 ASSAY OF LACTIC ACID: CPT

## 2018-03-28 PROCEDURE — 84145 PROCALCITONIN (PCT): CPT

## 2018-03-28 PROCEDURE — 84132 ASSAY OF SERUM POTASSIUM: CPT

## 2018-03-28 PROCEDURE — 80053 COMPREHEN METABOLIC PANEL: CPT

## 2018-03-28 PROCEDURE — 82272 OCCULT BLD FECES 1-3 TESTS: CPT

## 2018-03-28 PROCEDURE — 97161 PT EVAL LOW COMPLEX 20 MIN: CPT

## 2018-03-28 PROCEDURE — 87581 M.PNEUMON DNA AMP PROBE: CPT

## 2018-03-28 PROCEDURE — 82803 BLOOD GASES ANY COMBINATION: CPT

## 2018-03-28 PROCEDURE — 87486 CHLMYD PNEUM DNA AMP PROBE: CPT

## 2018-03-28 PROCEDURE — 84295 ASSAY OF SERUM SODIUM: CPT

## 2018-03-28 PROCEDURE — 81001 URINALYSIS AUTO W/SCOPE: CPT

## 2018-03-28 PROCEDURE — 87040 BLOOD CULTURE FOR BACTERIA: CPT

## 2018-03-28 PROCEDURE — 86901 BLOOD TYPING SEROLOGIC RH(D): CPT

## 2018-03-28 PROCEDURE — 87798 DETECT AGENT NOS DNA AMP: CPT

## 2018-03-28 PROCEDURE — 82746 ASSAY OF FOLIC ACID SERUM: CPT

## 2018-03-28 PROCEDURE — 99254 IP/OBS CNSLTJ NEW/EST MOD 60: CPT

## 2018-03-28 PROCEDURE — 99285 EMERGENCY DEPT VISIT HI MDM: CPT | Mod: 25

## 2018-03-28 PROCEDURE — 74177 CT ABD & PELVIS W/CONTRAST: CPT

## 2018-03-28 PROCEDURE — 82435 ASSAY OF BLOOD CHLORIDE: CPT

## 2018-03-28 PROCEDURE — 82728 ASSAY OF FERRITIN: CPT

## 2018-03-28 PROCEDURE — 96374 THER/PROPH/DIAG INJ IV PUSH: CPT | Mod: XU

## 2018-03-28 PROCEDURE — 83010 ASSAY OF HAPTOGLOBIN QUANT: CPT

## 2018-03-28 PROCEDURE — 83550 IRON BINDING TEST: CPT

## 2018-03-28 PROCEDURE — 82553 CREATINE MB FRACTION: CPT

## 2018-03-28 PROCEDURE — 85730 THROMBOPLASTIN TIME PARTIAL: CPT

## 2018-03-28 PROCEDURE — 85610 PROTHROMBIN TIME: CPT

## 2018-03-28 PROCEDURE — 83880 ASSAY OF NATRIURETIC PEPTIDE: CPT

## 2018-03-28 PROCEDURE — 82330 ASSAY OF CALCIUM: CPT

## 2018-03-28 PROCEDURE — 85027 COMPLETE CBC AUTOMATED: CPT

## 2018-03-28 RX ORDER — PANTOPRAZOLE SODIUM 20 MG/1
1 TABLET, DELAYED RELEASE ORAL
Qty: 60 | Refills: 0 | OUTPATIENT
Start: 2018-03-28 | End: 2018-04-26

## 2018-03-28 RX ORDER — CIPROFLOXACIN LACTATE 400MG/40ML
1 VIAL (ML) INTRAVENOUS
Qty: 5 | Refills: 0 | OUTPATIENT
Start: 2018-03-28 | End: 2018-04-01

## 2018-03-28 RX ADMIN — DULOXETINE HYDROCHLORIDE 60 MILLIGRAM(S): 30 CAPSULE, DELAYED RELEASE ORAL at 05:30

## 2018-03-28 RX ADMIN — METHADONE HYDROCHLORIDE 20 MILLIGRAM(S): 40 TABLET ORAL at 04:31

## 2018-03-28 RX ADMIN — MORPHINE SULFATE 30 MILLIGRAM(S): 50 CAPSULE, EXTENDED RELEASE ORAL at 01:03

## 2018-03-28 RX ADMIN — MORPHINE SULFATE 30 MILLIGRAM(S): 50 CAPSULE, EXTENDED RELEASE ORAL at 05:30

## 2018-03-28 RX ADMIN — Medication 2 MILLIGRAM(S): at 06:52

## 2018-03-28 RX ADMIN — Medication 2 MILLIGRAM(S): at 13:04

## 2018-03-28 RX ADMIN — Medication 0.1 MILLIGRAM(S): at 05:30

## 2018-03-28 RX ADMIN — Medication 100 MILLIGRAM(S): at 11:02

## 2018-03-28 RX ADMIN — MORPHINE SULFATE 30 MILLIGRAM(S): 50 CAPSULE, EXTENDED RELEASE ORAL at 10:30

## 2018-03-28 RX ADMIN — Medication 50 MILLIGRAM(S): at 13:05

## 2018-03-28 RX ADMIN — MORPHINE SULFATE 30 MILLIGRAM(S): 50 CAPSULE, EXTENDED RELEASE ORAL at 06:00

## 2018-03-28 RX ADMIN — MORPHINE SULFATE 30 MILLIGRAM(S): 50 CAPSULE, EXTENDED RELEASE ORAL at 09:51

## 2018-03-28 RX ADMIN — METHADONE HYDROCHLORIDE 20 MILLIGRAM(S): 40 TABLET ORAL at 09:09

## 2018-03-28 RX ADMIN — PANTOPRAZOLE SODIUM 40 MILLIGRAM(S): 20 TABLET, DELAYED RELEASE ORAL at 05:30

## 2018-03-28 RX ADMIN — MORPHINE SULFATE 30 MILLIGRAM(S): 50 CAPSULE, EXTENDED RELEASE ORAL at 01:33

## 2018-03-28 NOTE — CONSULT NOTE ADULT - SUBJECTIVE AND OBJECTIVE BOX
HPI:  62M with chronic back pain and constipation presented on  with a two week history of not feeling well.   He states that he was tired and just did not feel right.  He noted associated constipation- that would get better transiently with laxatives.     He describes fatigue that was persistent.  He denies fever while at home.    He denies associated shortness of breath or cough.     After presentation, he was diagnosed with pna based on imaging and his fever.    With treatment for pneumonia, he feels much better.   His fever has resolved afebrile for 48 hours) .  Less fatigue.             PAST MEDICAL & SURGICAL HISTORY:  Anxiety and depression  Diastolic dysfunction: stage I  Empyema lun2015 left lung, s/p VATS, decortication         - s/p decortication- tx with augmentin for strep intermedius    H/O peptic ulcer: over 10 years ago  history of renal calculus  Herniated Disc: S/P work injury   Hypertension: Dx:   Spinal Stenosis  Postlaminectomy Syndrome  S/P  shunt  	- MSSA infection 2017- s/p removal and tx with nafcillin  S/P insertion of spinal cord stimulator:   History of lumbar fusion:   Insertion of Intrathecal Dilaudid Pump: 2011  S/P Spinal Surgery: corrective lumbar fusion   S/P Knee Replacement: left knee   S/P Arthroscopy of Left Knee  S/P Tonsillectomy: childhood  Ankle Fracture: s/p ORIF left ankle       Allergies    No Known Allergies    Intolerances        ANTIMICROBIALS:  azithromycin  IVPB 500 every 24 hours  cefTRIAXone   IVPB 1 every 24 hours      OTHER MEDS:  acetaminophen   Tablet. 650 milliGRAM(s) Oral every 6 hours PRN  aluminum hydroxide/magnesium hydroxide/simethicone Suspension 30 milliLiter(s) Oral every 6 hours PRN  cloNIDine 0.1 milliGRAM(s) Oral every 8 hours  diazepam    Tablet 2 milliGRAM(s) Oral three times a day  docusate sodium 100 milliGRAM(s) Oral daily  DULoxetine 60 milliGRAM(s) Oral two times a day  enoxaparin Injectable 40 milliGRAM(s) SubCutaneous daily  hydrochlorothiazide 50 milliGRAM(s) Oral daily  influenza   Vaccine 0.5 milliLiter(s) IntraMuscular once  methadone    Tablet 20 milliGRAM(s) Oral every 6 hours  morphine  IR 30 milliGRAM(s) Oral every 4 hours PRN  pantoprazole    Tablet 40 milliGRAM(s) Oral two times a day  polyethylene glycol 3350 17 Gram(s) Oral at bedtime      SOCIAL HISTORY: , no tobacco, no ilicit drug use.      FAMILY HISTORY:  No pertinent family history in first degree relatives      REVIEW OF SYSTEMS  [  ] ROS unobtainable because:    [ x ] All other systems negative except as noted below:	    Constitutional:  [x ] fever [ ] chills  [ ] weight loss  [x ] weakness  Skin:  [ ] rash [ ] phlebitis	  Eyes: [ ] icterus [ ] pain  [ ] discharge	  ENMT: [ ] sore throat  [ ] thrush [ ] ulcers [ ] exudates  Respiratory: [ ] dyspnea [ ] hemoptysis [ ] cough [ ] sputum	  Cardiovascular:  [ ] chest pain [ ] palpitations [ ] edema	  Gastrointestinal:  [ ] nausea [ ] vomiting [ ] diarrhea [ x] constipation [ ] pain	  Genitourinary:  [ ] dysuria [ ] frequency [ ] hematuria [ ] discharge [ ] flank pain  [ ] incontinence  Musculoskeletal:  [ ] myalgias [ ] arthralgias [ ] arthritis  [ ] back pain  Neurological:  [ ] headache [ ] seizures  [ ] confusion/altered mental status  Psychiatric:  [ ] anxiety [ ] depression	  Hematology/Lymphatics:  [ ] lymphadenopathy  Endocrine:  [ ] adrenal [ ] thyroid  Allergic/Immunologic:	 [ ] transplant [ ] seasonal    PHYSICAL EXAM:  General: [x ] non-toxic  HEAD/EYES: [ ] PERRL [ x] white sclera [ ] icterus  ENT:  [ ] normal [x ] supple [ ] thrush [ ] pharyngeal exudate  Cardiovascular:   [ ] murmur [x ] normal [ ] PPM/AICD  Respiratory:  [x ] clear to ausculation bilaterally  GI:  [ x] soft, non-tender, normal bowel sounds  :  [ ] saavedra [ ] no CVA tenderness   Musculoskeletal:  [ ] no synovitis  Neurologic:  [x ] non-focal exam   Skin:  [x ] no rash  Lymph: [ ] no lymphadenopathy  Psychiatric:  [ x] appropriate affect [ ] alert & oriented  Lines:  [x ] no phlebitis [ ] central line          Drug Dosing Weight  Height (cm): 177.8 (25 Mar 2018 21:20)  Weight (kg): 115.6 (25 Mar 2018 21:20)  BMI (kg/m2): 36.6 (25 Mar 2018 21:20)  BSA (m2): 2.31 (25 Mar 2018 21:20)    Vital Signs Last 24 Hrs  T(F): 97.9 (18 @ 05:29), Max: 103.1 (18 @ 01:49)    Vital Signs Last 24 Hrs  HR: 68 (18 @ 05:29) (68 - 84)  BP: 120/78 (18 @ 05:29) (112/73 - 139/90)  RR: 18 (18 @ 05:29)  SpO2: 93% (18 @ 05:29) (93% - 94%)  Wt(kg): --                          9.8    7.12  )-----------( 285      ( 27 Mar 2018 07:28 )             31.0       -    136  |  94<L>  |  14  ----------------------------<  82  4.0   |  30  |  1.05    Ca    9.0      27 Mar 2018 06:57            MICROBIOLOGY:  Culture - Blood (18 @ 22:02)    Specimen Source: .Blood Blood-Peripheral    Culture Results:   No growth to date.        RADIOLOGY:    < from: CT Abdomen and Pelvis w/ Oral Cont and w/ IV Cont (18 @ 13:41) >  IMPRESSION: Left lower lobe pneumonia, additional patchy consolidation in   the right lower lobe concerning for multifocal pneumonia.    Hiatal hernia.    No acute intra-abdominal pathology.    < end of copied text >  < from: Xray Chest 2 Views PA/Lat (18 @ 12:50) >  IMPRESSION:   Left lower lobe opacity likely represents pneumonia.    < end of copied text >

## 2018-03-28 NOTE — PROGRESS NOTE ADULT - ASSESSMENT
62M pmhx chronic pain 2/2 traumatic work injury s/p several spine surgeries, GERD, htn, chronic constipation requiring nightly miralax, here c/o lower abdominal pain assoc w/ worse-than-usual constipation last 2 weeks and minimal black stools. Pt endorses having black stools before in the past but only a few times and they'd usually self-resolve. This time he has them every time he goes. Assoc. abdominal pain is lower and gives him constant moderate discomfort. Pt also has felt a little bit light-headed as of late and his partner notes he looks more pale than usual. No recent f/c, cp/sob, n/v/d, recent travel. Pt is able to pass gas. Pt says he recently had his urine tested showing microscopic hematuria and he is scheduled to see a urologist this week about it.      Problem/Plan - 1:  ·  Problem: Acute respiratory failure with hypoxia.  Plan: Secondary to Pneumonia.  Oxygen to keep saturation >92%.      Problem/Plan - 2:  ·  Problem: Pneumonia.  Plan: Multifocal . IV abxs . Blood cultures Pending.      Problem/Plan - 3:  ·  Problem: GI bleed.  Plan: Hemodynamically stable . CBC Q 12 hrs and will transfuse for Hgb<8G . PPI and stool occult pending.      Problem/Plan - 4:  ·  Problem: Hypertension.  Plan: BP meds with Parameters.      Problem/Plan - 5:  ·  Problem: Anemia .  Plan: Normocytic ; Work up in progress.      Problem/Plan 6:  ·  Problem: Other chronic back pain.  Plan: Morphine and Methadone . PT.
62 M with h/o chronic pain 2/2 traumatic work injury (on daily opiates), GERD, HTN, chronic constipation who presents with generalized fatigue, weakness, SOB and worsened constipation for the last 2 weeks. Pt febrile during hospital course and found to have multifocal PNA. Currently on antibiotics.   GI consulted because of "black stool". However, stool occult is negative. H/H has been stable.  Black stool likely from Pepto-Bismol. Pt feels much better after having multiple large BMs after taking magnesium citrate.     - Pantoprazole 40mg PO BID for GERD  - Miralax daily for chronic constipation  - Continue antibiotics for PNA  - No objections to discharging home from GI standpoint when medically stable.   - Pt has scheduled appointment with his gastroenterologist, Dr. Edison Bernardo tomorrow. If patient can be discharged today, he should make that appointment.
62M pmhx chronic pain 2/2 traumatic work injury s/p several spine surgeries, GERD, htn, chronic constipation requiring nightly miralax, here c/o lower abdominal pain assoc w/ worse-than-usual constipation last 2 weeks and minimal black stools. Pt endorses having black stools before in the past but only a few times and they'd usually self-resolve. This time he has them every time he goes. Assoc. abdominal pain is lower and gives him constant moderate discomfort. Pt also has felt a little bit light-headed as of late and his partner notes he looks more pale than usual. No recent f/c, cp/sob, n/v/d, recent travel. Pt is able to pass gas. Pt says he recently had his urine tested showing microscopic hematuria and he is scheduled to see a urologist this week about it.      Problem/Plan - 1:  ·  Problem: Acute respiratory failure with hypoxia.  Plan: Off Oxygen . Secondary to Pneumonia.  Oxygen to keep saturation >92%.      Problem/Plan - 2:  ·  Problem: Multifocal Pneumonia.  Plan: Spiked temp .  IV abxs Zithromax and Rocephin  . Blood cultures negative so far. D/W ID attending and okay to dc home on PO levaquin.      Problem/Plan - 3:  ·  Problem: GI bleed.  Plan: Hemodynamically stable . CBC Q 12 hrs and stable . Will transfuse for Hgb<8G . PPI and stool occult negative . GI helping and dhas appointment with his GI tomorrow.      Problem/Plan - 4:  ·  Problem: Hypertension.  Plan: BP meds with Parameters.      Problem/Plan - 5:  ·  Problem: Anemia .  Plan: Normocytic ; Work up noted and outpt f/u.      Problem/Plan 6:  ·  Problem: Other chronic back pain.  Plan: Morphine and Methadone .     Plan discussed with pt in detail the dc plan .
62M pmhx chronic pain 2/2 traumatic work injury s/p several spine surgeries, GERD, htn, chronic constipation requiring nightly miralax, here c/o lower abdominal pain assoc w/ worse-than-usual constipation last 2 weeks and minimal black stools. Pt endorses having black stools before in the past but only a few times and they'd usually self-resolve. This time he has them every time he goes. Assoc. abdominal pain is lower and gives him constant moderate discomfort. Pt also has felt a little bit light-headed as of late and his partner notes he looks more pale than usual. No recent f/c, cp/sob, n/v/d, recent travel. Pt is able to pass gas. Pt says he recently had his urine tested showing microscopic hematuria and he is scheduled to see a urologist this week about it.      Problem/Plan - 1:  ·  Problem: Acute respiratory failure with hypoxia.  Plan: Secondary to Pneumonia.  Oxygen to keep saturation >92%.      Problem/Plan - 2:  ·  Problem: Multifocal Pneumonia.  Plan: Spiked temp .  IV abxs Zithromax and Rocephin  . Blood cultures negative so far.  ID consulted.      Problem/Plan - 3:  ·  Problem: GI bleed.  Plan: Hemodynamically stable . CBC Q 12 hrs and stable . Will transfuse for Hgb<8G . PPI and stool occult negative . GI consulted.      Problem/Plan - 4:  ·  Problem: Hypertension.  Plan: BP meds with Parameters.      Problem/Plan - 5:  ·  Problem: Anemia .  Plan: Normocytic ; Work up in progress.      Problem/Plan 6:  ·  Problem: Other chronic back pain.  Plan: Morphine and Methadone . PT.

## 2018-03-28 NOTE — PROGRESS NOTE ADULT - SUBJECTIVE AND OBJECTIVE BOX
INTERVAL HPI/OVERNIGHT EVENTS:    SUBJECTIVE: The patient has no new complaints. He drank 1 bottle of magnesium citrate and had 4 large bowel movements. His abdomen feels much better and less distended/bloated. Stool was "green". No more black stool. Pt eager to go home soon. He states that he has a scheduled appointment with his gastroenterologist. Dr Bernardo tomorrow.      MEDICATIONS  (STANDING):  azithromycin  IVPB 500 milliGRAM(s) IV Intermittent every 24 hours  cefTRIAXone   IVPB 1 Gram(s) IV Intermittent every 24 hours  cloNIDine 0.1 milliGRAM(s) Oral every 8 hours  diazepam    Tablet 2 milliGRAM(s) Oral three times a day  docusate sodium 100 milliGRAM(s) Oral daily  DULoxetine 60 milliGRAM(s) Oral two times a day  enoxaparin Injectable 40 milliGRAM(s) SubCutaneous daily  hydrochlorothiazide 50 milliGRAM(s) Oral daily  influenza   Vaccine 0.5 milliLiter(s) IntraMuscular once  methadone    Tablet 20 milliGRAM(s) Oral every 6 hours  pantoprazole    Tablet 40 milliGRAM(s) Oral two times a day  polyethylene glycol 3350 17 Gram(s) Oral at bedtime    MEDICATIONS  (PRN):  acetaminophen   Tablet. 650 milliGRAM(s) Oral every 6 hours PRN Mild Pain (1 - 3)  aluminum hydroxide/magnesium hydroxide/simethicone Suspension 30 milliLiter(s) Oral every 6 hours PRN Dyspepsia  morphine  IR 30 milliGRAM(s) Oral every 4 hours PRN Moderate Pain (4 - 6)        OBJECTIVE:  Vital Signs Last 24 Hrs  T(C): 36.6 (28 Mar 2018 05:29), Max: 36.8 (27 Mar 2018 16:15)  T(F): 97.9 (28 Mar 2018 05:29), Max: 98.3 (27 Mar 2018 16:15)  HR: 68 (28 Mar 2018 05:29) (68 - 84)  BP: 120/78 (28 Mar 2018 05:29) (112/73 - 139/90)  BP(mean): --  RR: 18 (28 Mar 2018 05:29) (18 - 18)  SpO2: 93% (28 Mar 2018 05:29) (91% - 94%)    PHYSICAL EXAM:  Constitutional: NAD, well-developed, obese  Respiratory: CTA and P  Cardiovascular: S1 and S2, RRR, no M  Gastrointestinal: BS+, soft, non-tender, non-distended. Obese abdomen.  Extremities: No peripheral edema, neg clubbing, cyanosis        LABS:                        9.8    7.12  )-----------( 285      ( 27 Mar 2018 07:28 )             31.0     03-27    136  |  94<L>  |  14  ----------------------------<  82  4.0   |  30  |  1.05    Ca    9.0      27 Mar 2018 06:57          LIVER FUNCTIONS - ( 25 Mar 2018 12:15 )  Alb: 3.6 g/dL / Pro: 7.8 g/dL / ALK PHOS: 120 U/L / ALT: 22 U/L RC / AST: 21 U/L / GGT: x             RADIOLOGY & ADDITIONAL TESTS:
INTERVAL HPI/OVERNIGHT EVENTS: I am ready to go home.   Vital Signs Last 24 Hrs  T(C): 36.7 (28 Mar 2018 12:29), Max: 36.8 (27 Mar 2018 16:15)  T(F): 98 (28 Mar 2018 12:29), Max: 98.3 (27 Mar 2018 16:15)  HR: 73 (28 Mar 2018 12:29) (68 - 84)  BP: 114/73 (28 Mar 2018 12:29) (112/73 - 139/79)  BP(mean): --  RR: 18 (28 Mar 2018 12:29) (18 - 18)  SpO2: 93% (28 Mar 2018 12:29) (93% - 94%)  I&O's Summary    27 Mar 2018 07:01  -  28 Mar 2018 07:00  --------------------------------------------------------  IN: 2280 mL / OUT: 0 mL / NET: 2280 mL    28 Mar 2018 07:01  -  28 Mar 2018 13:58  --------------------------------------------------------  IN: 240 mL / OUT: 0 mL / NET: 240 mL      MEDICATIONS  (STANDING):  azithromycin  IVPB 500 milliGRAM(s) IV Intermittent every 24 hours  cefTRIAXone   IVPB 1 Gram(s) IV Intermittent every 24 hours  cloNIDine 0.1 milliGRAM(s) Oral every 8 hours  diazepam    Tablet 2 milliGRAM(s) Oral three times a day  docusate sodium 100 milliGRAM(s) Oral daily  DULoxetine 60 milliGRAM(s) Oral two times a day  enoxaparin Injectable 40 milliGRAM(s) SubCutaneous daily  hydrochlorothiazide 50 milliGRAM(s) Oral daily  influenza   Vaccine 0.5 milliLiter(s) IntraMuscular once  methadone    Tablet 20 milliGRAM(s) Oral every 6 hours  pantoprazole    Tablet 40 milliGRAM(s) Oral two times a day  polyethylene glycol 3350 17 Gram(s) Oral at bedtime    MEDICATIONS  (PRN):  acetaminophen   Tablet. 650 milliGRAM(s) Oral every 6 hours PRN Mild Pain (1 - 3)  aluminum hydroxide/magnesium hydroxide/simethicone Suspension 30 milliLiter(s) Oral every 6 hours PRN Dyspepsia  morphine  IR 30 milliGRAM(s) Oral every 4 hours PRN Moderate Pain (4 - 6)    LABS:                        9.8    7.12  )-----------( 285      ( 27 Mar 2018 07:28 )             31.0     03-27    136  |  94<L>  |  14  ----------------------------<  82  4.0   |  30  |  1.05    Ca    9.0      27 Mar 2018 06:57          CAPILLARY BLOOD GLUCOSE              REVIEW OF SYSTEMS:  CONSTITUTIONAL: No fever, weight loss, or fatigue  EYES: No eye pain, visual disturbances, or discharge  ENMT:  No difficulty hearing, tinnitus, vertigo; No sinus or throat pain  NECK: No pain or stiffness  BREASTS: No pain, masses, or nipple discharge  RESPIRATORY: No cough, wheezing, chills or hemoptysis; No shortness of breath  CARDIOVASCULAR: No chest pain, palpitations, dizziness, or leg swelling  GASTROINTESTINAL: No abdominal or epigastric pain. No nausea, vomiting, or hematemesis; No diarrhea or constipation. No melena or hematochezia.  GENITOURINARY: No dysuria, frequency, hematuria, or incontinence  NEUROLOGICAL: No headaches, memory loss, loss of strength, numbness, or tremors  SKIN: No itching, burning, rashes, or lesions   LYMPH NODES: No enlarged glands  ENDOCRINE: No heat or cold intolerance; No hair loss  MUSCULOSKELETAL: No joint pain or swelling; No muscle, back, or extremity pain  PSYCHIATRIC: No depression, anxiety, mood swings, or difficulty sleeping  HEME/LYMPH: No easy bruising, or bleeding gums  ALLERY AND IMMUNOLOGIC: No hives or eczema    RADIOLOGY & ADDITIONAL TESTS:    Consultant(s) Notes Reviewed:  [x ] YES  [ ] NO    PHYSICAL EXAM:  GENERAL: NAD, well-groomed, well-developed, not in any distress ,  HEAD:  Atraumatic, Normocephalic  EYES: EOMI, PERRLA, conjunctiva and sclera clear  ENMT: No tonsillar erythema, exudates, or enlargement; Moist mucous membranes, Good dentition, No lesions  NECK: Supple, No JVD, Normal thyroid  NERVOUS SYSTEM:  Alert & Oriented X3, No focal deficit   CHEST/LUNG: Good air entry bilateral with no  rales, rhonchi, wheezing, or rubs  HEART: Regular rate and rhythm; No murmurs, rubs, or gallops  ABDOMEN: Soft, Nontender, Nondistended; Bowel sounds present  EXTREMITIES:  2+ Peripheral Pulses, No clubbing, cyanosis, or edema  SKIN: No rashes or lesions    Care Discussed with Consultants/Other Providers [ x] YES  [ ] NO
INTERVAL HPI/OVERNIGHT EVENTS: Spiked temp 101 and 103 . Today I feel fine. Still have black stools.   Vital Signs Last 24 Hrs  T(C): 36.8 (27 Mar 2018 16:15), Max: 37.5 (26 Mar 2018 19:48)  T(F): 98.3 (27 Mar 2018 16:15), Max: 99.5 (26 Mar 2018 19:48)  HR: 71 (27 Mar 2018 16:15) (64 - 90)  BP: 123/76 (27 Mar 2018 16:15) (115/69 - 139/90)  BP(mean): --  RR: 18 (27 Mar 2018 16:15) (18 - 18)  SpO2: 94% (27 Mar 2018 16:15) (91% - 97%)  I&O's Summary    26 Mar 2018 07:01  -  27 Mar 2018 07:00  --------------------------------------------------------  IN: 1500 mL / OUT: 0 mL / NET: 1500 mL    27 Mar 2018 07:01  -  27 Mar 2018 16:53  --------------------------------------------------------  IN: 720 mL / OUT: 0 mL / NET: 720 mL      MEDICATIONS  (STANDING):  azithromycin  IVPB 500 milliGRAM(s) IV Intermittent every 24 hours  cefTRIAXone   IVPB 1 Gram(s) IV Intermittent every 24 hours  cloNIDine 0.1 milliGRAM(s) Oral every 8 hours  diazepam    Tablet 2 milliGRAM(s) Oral three times a day  docusate sodium 100 milliGRAM(s) Oral daily  DULoxetine 60 milliGRAM(s) Oral two times a day  enoxaparin Injectable 40 milliGRAM(s) SubCutaneous daily  hydrochlorothiazide 50 milliGRAM(s) Oral daily  influenza   Vaccine 0.5 milliLiter(s) IntraMuscular once  methadone    Tablet 20 milliGRAM(s) Oral every 6 hours  pantoprazole    Tablet 40 milliGRAM(s) Oral two times a day  polyethylene glycol 3350 17 Gram(s) Oral at bedtime    MEDICATIONS  (PRN):  acetaminophen   Tablet. 650 milliGRAM(s) Oral every 6 hours PRN Mild Pain (1 - 3)  aluminum hydroxide/magnesium hydroxide/simethicone Suspension 30 milliLiter(s) Oral every 6 hours PRN Dyspepsia  morphine  IR 30 milliGRAM(s) Oral every 4 hours PRN Moderate Pain (4 - 6)    LABS:                        9.8    7.12  )-----------( 285      ( 27 Mar 2018 07:28 )             31.0     03-27    136  |  94<L>  |  14  ----------------------------<  82  4.0   |  30  |  1.05    Ca    9.0      27 Mar 2018 06:57          CAPILLARY BLOOD GLUCOSE              REVIEW OF SYSTEMS:  CONSTITUTIONAL: No fever, weight loss, or fatigue  EYES: No eye pain, visual disturbances, or discharge  ENMT:  No difficulty hearing, tinnitus, vertigo; No sinus or throat pain  NECK: No pain or stiffness  BREASTS: No pain, masses, or nipple discharge  RESPIRATORY: No cough, wheezing, chills or hemoptysis; No shortness of breath  CARDIOVASCULAR: No chest pain, palpitations, dizziness, or leg swelling  GASTROINTESTINAL: No abdominal or epigastric pain. No nausea, vomiting, or hematemesis; No diarrhea or constipation. No melena or hematochezia.  GENITOURINARY: No dysuria, frequency, hematuria, or incontinence  NEUROLOGICAL: No headaches, memory loss, loss of strength, numbness, or tremors      Consultant(s) Notes Reviewed:  [x ] YES  [ ] NO    PHYSICAL EXAM:  GENERAL: NAD, well-groomed, well-developed ,not in any distress ,  HEAD:  Atraumatic, Normocephalic  EYES: EOMI, PERRLA, conjunctiva and sclera clear  ENMT: No tonsillar erythema, exudates, or enlargement; Moist mucous membranes, Good dentition, No lesions  NECK: Supple, No JVD, Normal thyroid  NERVOUS SYSTEM:  Alert & Oriented X3, No focal deficit   CHEST/LUNG: Good air entry bilateral with no  rales, rhonchi, wheezing, or rubs  HEART: Regular rate and rhythm; No murmurs, rubs, or gallops  ABDOMEN: Soft, Nontender, Nondistended; Bowel sounds present  EXTREMITIES:  2+ Peripheral Pulses, No clubbing, cyanosis, or edema      Care Discussed with Consultants/Other Providers [ x] YES  [ ] NO
INTERVAL HPI/OVERNIGHT EVENTS: Seen and examined with wife in room . I had normal color BM . I feel better .   Vital Signs Last 24 Hrs  T(C): 36.9 (26 Mar 2018 16:40), Max: 39.5 (26 Mar 2018 01:49)  T(F): 98.4 (26 Mar 2018 16:40), Max: 103.1 (26 Mar 2018 01:49)  HR: 64 (26 Mar 2018 16:40) (61 - 93)  BP: 107/70 (26 Mar 2018 16:40) (95/60 - 121/66)  BP(mean): --  RR: 17 (26 Mar 2018 16:40) (17 - 18)  SpO2: 97% (26 Mar 2018 16:40) (91% - 97%)  I&O's Summary    25 Mar 2018 07:01  -  26 Mar 2018 07:00  --------------------------------------------------------  IN: 360 mL / OUT: 300 mL / NET: 60 mL    26 Mar 2018 07:01  -  26 Mar 2018 18:07  --------------------------------------------------------  IN: 900 mL / OUT: 0 mL / NET: 900 mL      MEDICATIONS  (STANDING):  azithromycin  IVPB 500 milliGRAM(s) IV Intermittent every 24 hours  cefTRIAXone   IVPB 1 Gram(s) IV Intermittent every 24 hours  cloNIDine 0.1 milliGRAM(s) Oral every 8 hours  diazepam    Tablet 2 milliGRAM(s) Oral three times a day  docusate sodium 100 milliGRAM(s) Oral daily  DULoxetine 60 milliGRAM(s) Oral two times a day  enoxaparin Injectable 40 milliGRAM(s) SubCutaneous daily  hydrochlorothiazide 50 milliGRAM(s) Oral daily  influenza   Vaccine 0.5 milliLiter(s) IntraMuscular once  methadone    Tablet 20 milliGRAM(s) Oral every 6 hours  pantoprazole    Tablet 40 milliGRAM(s) Oral two times a day  polyethylene glycol 3350 17 Gram(s) Oral at bedtime    MEDICATIONS  (PRN):  aluminum hydroxide/magnesium hydroxide/simethicone Suspension 30 milliLiter(s) Oral every 6 hours PRN Dyspepsia  morphine  IR 30 milliGRAM(s) Oral every 4 hours PRN Moderate Pain (4 - 6)    LABS:                        9.5    9.36  )-----------( 269      ( 26 Mar 2018 08:15 )             29.0     03-26    134<L>  |  93<L>  |  14  ----------------------------<  123<H>  3.9   |  29  |  1.13    Ca    8.9      26 Mar 2018 07:23    TPro  7.8  /  Alb  3.6  /  TBili  0.6  /  DBili  x   /  AST  21  /  ALT  22  /  AlkPhos  120  03-25    PT/INR - ( 25 Mar 2018 12:15 )   PT: 15.5 sec;   INR: 1.42 ratio         PTT - ( 25 Mar 2018 12:15 )  PTT:27.3 sec  Urinalysis Basic - ( 25 Mar 2018 16:00 )    Color: Yellow / Appearance: Clear / SG: >1.030 / pH: x  Gluc: x / Ketone: Negative  / Bili: Negative / Urobili: Negative   Blood: x / Protein: Negative / Nitrite: Negative   Leuk Esterase: Negative / RBC: 2-5 /HPF / WBC 2-5 /HPF   Sq Epi: x / Non Sq Epi: Occasional /HPF / Bacteria: x      CAPILLARY BLOOD GLUCOSE            Urinalysis Basic - ( 25 Mar 2018 16:00 )    Color: Yellow / Appearance: Clear / SG: >1.030 / pH: x  Gluc: x / Ketone: Negative  / Bili: Negative / Urobili: Negative   Blood: x / Protein: Negative / Nitrite: Negative   Leuk Esterase: Negative / RBC: 2-5 /HPF / WBC 2-5 /HPF   Sq Epi: x / Non Sq Epi: Occasional /HPF / Bacteria: x      REVIEW OF SYSTEMS:  CONSTITUTIONAL: No fever, weight loss, or fatigue  EYES: No eye pain, visual disturbances, or discharge  ENMT:  No difficulty hearing, tinnitus, vertigo; No sinus or throat pain  NECK: No pain or stiffness  BREASTS: No pain, masses, or nipple discharge  RESPIRATORY: No cough, wheezing, chills or hemoptysis; No shortness of breath  CARDIOVASCULAR: No chest pain, palpitations, dizziness, or leg swelling  GASTROINTESTINAL: No abdominal or epigastric pain. No nausea, vomiting, or hematemesis; No diarrhea or constipation. No melena or hematochezia.  GENITOURINARY: No dysuria, frequency, hematuria, or incontinence  NEUROLOGICAL: No headaches, memory loss, loss of strength, numbness, or tremors  SKIN: No itching, burning, rashes, or lesions   LYMPH NODES: No enlarged glands  ENDOCRINE: No heat or cold intolerance; No hair loss      Consultant(s) Notes Reviewed:  [x ] YES  [ ] NO    PHYSICAL EXAM:  GENERAL: NAD, well-groomed, well-developed, not in any distress ,  HEAD:  Atraumatic, Normocephalic  EYES: EOMI, PERRLA, conjunctiva and sclera clear  ENMT: No tonsillar erythema, exudates, or enlargement; Moist mucous membranes, Good dentition, No lesions  NECK: Supple, No JVD, Normal thyroid  NERVOUS SYSTEM:  Alert & Oriented X3, No focal deficit   CHEST/LUNG: Good air entry bilateral with few  rales,   HEART: Regular rate and rhythm; No murmurs, rubs, or gallops  ABDOMEN: Soft, Nontender, Nondistended; Bowel sounds present  EXTREMITIES:  2+ Peripheral Pulses, No clubbing, cyanosis, or edema  SKIN: No rashes or lesions    Care Discussed with Consultants/Other Providers [ x] YES  [ ] NO

## 2018-03-28 NOTE — CONSULT NOTE ADULT - ASSESSMENT
62 year old with chronic back pain and a prior MSSA  shunt infection/ prior empyema (2015) who presented with fatigue and was found to have fever.    His Chest x ray and CT showed multifocal infiltrates suggestive of pneumonia.  Although he did not note significant cough or shortness of breath, he does seem to have improved on ceftriaxone and azithromycin.    At this time, I would transition the patient to Levaquin and finish a 7 day course of antibiotics on 4/1.      He should repeat a chest x ray with his pmd in 4-6 weeks.    He should follow up if his fever returns or if he does not continue to feel better.     Plan discussed with NP and admitting physician.

## 2018-07-03 ENCOUNTER — APPOINTMENT (OUTPATIENT)
Dept: SPINE | Facility: CLINIC | Age: 63
End: 2018-07-03
Payer: OTHER MISCELLANEOUS

## 2018-07-03 VITALS
BODY MASS INDEX: 35.07 KG/M2 | WEIGHT: 245 LBS | DIASTOLIC BLOOD PRESSURE: 93 MMHG | HEIGHT: 70 IN | HEART RATE: 75 BPM | SYSTOLIC BLOOD PRESSURE: 165 MMHG

## 2018-07-03 PROCEDURE — 99213 OFFICE O/P EST LOW 20 MIN: CPT

## 2018-10-09 ENCOUNTER — APPOINTMENT (OUTPATIENT)
Dept: SPINE | Facility: CLINIC | Age: 63
End: 2018-10-09
Payer: OTHER MISCELLANEOUS

## 2018-10-09 VITALS
BODY MASS INDEX: 35.07 KG/M2 | WEIGHT: 245 LBS | SYSTOLIC BLOOD PRESSURE: 129 MMHG | HEART RATE: 80 BPM | DIASTOLIC BLOOD PRESSURE: 75 MMHG | HEIGHT: 70 IN

## 2018-10-09 PROCEDURE — 99212 OFFICE O/P EST SF 10 MIN: CPT

## 2018-11-01 ENCOUNTER — INPATIENT (INPATIENT)
Facility: HOSPITAL | Age: 63
LOS: 6 days | Discharge: HOME | DRG: 179 | End: 2018-11-08
Attending: INTERNAL MEDICINE | Admitting: INTERNAL MEDICINE
Payer: COMMERCIAL

## 2018-11-01 VITALS
SYSTOLIC BLOOD PRESSURE: 111 MMHG | OXYGEN SATURATION: 98 % | TEMPERATURE: 100 F | HEART RATE: 156 BPM | WEIGHT: 235.01 LBS | RESPIRATION RATE: 20 BRPM | DIASTOLIC BLOOD PRESSURE: 63 MMHG

## 2018-11-01 DIAGNOSIS — Z98.89 OTHER SPECIFIED POSTPROCEDURAL STATES: Chronic | ICD-10-CM

## 2018-11-01 DIAGNOSIS — R50.9 FEVER, UNSPECIFIED: ICD-10-CM

## 2018-11-01 DIAGNOSIS — Z98.2 PRESENCE OF CEREBROSPINAL FLUID DRAINAGE DEVICE: Chronic | ICD-10-CM

## 2018-11-01 LAB
ALBUMIN SERPL ELPH-MCNC: 4 G/DL — SIGNIFICANT CHANGE UP (ref 3.3–5)
ALP SERPL-CCNC: 84 U/L — SIGNIFICANT CHANGE UP (ref 40–120)
ALT FLD-CCNC: 21 U/L — SIGNIFICANT CHANGE UP (ref 10–45)
ANION GAP SERPL CALC-SCNC: 15 MMOL/L — SIGNIFICANT CHANGE UP (ref 5–17)
AST SERPL-CCNC: 24 U/L — SIGNIFICANT CHANGE UP (ref 10–40)
BASE EXCESS BLDV CALC-SCNC: 6.7 MMOL/L — HIGH (ref -2–2)
BASOPHILS # BLD AUTO: 0 K/UL — SIGNIFICANT CHANGE UP (ref 0–0.2)
BASOPHILS NFR BLD AUTO: 0 % — SIGNIFICANT CHANGE UP (ref 0–2)
BILIRUB SERPL-MCNC: 0.4 MG/DL — SIGNIFICANT CHANGE UP (ref 0.2–1.2)
BUN SERPL-MCNC: 18 MG/DL — SIGNIFICANT CHANGE UP (ref 7–23)
CA-I SERPL-SCNC: 1.2 MMOL/L — SIGNIFICANT CHANGE UP (ref 1.12–1.3)
CALCIUM SERPL-MCNC: 9.4 MG/DL — SIGNIFICANT CHANGE UP (ref 8.4–10.5)
CHLORIDE BLDV-SCNC: 96 MMOL/L — SIGNIFICANT CHANGE UP (ref 96–108)
CHLORIDE SERPL-SCNC: 96 MMOL/L — SIGNIFICANT CHANGE UP (ref 96–108)
CO2 BLDV-SCNC: 36 MMOL/L — HIGH (ref 22–30)
CO2 SERPL-SCNC: 29 MMOL/L — SIGNIFICANT CHANGE UP (ref 22–31)
CREAT SERPL-MCNC: 1.09 MG/DL — SIGNIFICANT CHANGE UP (ref 0.5–1.3)
EOSINOPHIL # BLD AUTO: 0 K/UL — SIGNIFICANT CHANGE UP (ref 0–0.5)
EOSINOPHIL NFR BLD AUTO: 0.2 % — SIGNIFICANT CHANGE UP (ref 0–6)
GAS PNL BLDV: 139 MMOL/L — SIGNIFICANT CHANGE UP (ref 136–145)
GAS PNL BLDV: SIGNIFICANT CHANGE UP
GAS PNL BLDV: SIGNIFICANT CHANGE UP
GLUCOSE BLDV-MCNC: 108 MG/DL — HIGH (ref 70–99)
GLUCOSE SERPL-MCNC: 109 MG/DL — HIGH (ref 70–99)
HCO3 BLDV-SCNC: 34 MMOL/L — HIGH (ref 21–29)
HCT VFR BLD CALC: 35.7 % — LOW (ref 39–50)
HCT VFR BLDA CALC: 35 % — LOW (ref 39–50)
HGB BLD CALC-MCNC: 11.4 G/DL — LOW (ref 13–17)
HGB BLD-MCNC: 11.8 G/DL — LOW (ref 13–17)
LACTATE BLDV-MCNC: 2.7 MMOL/L — HIGH (ref 0.7–2)
LYMPHOCYTES # BLD AUTO: 1.1 K/UL — SIGNIFICANT CHANGE UP (ref 1–3.3)
LYMPHOCYTES # BLD AUTO: 6.3 % — LOW (ref 13–44)
MCHC RBC-ENTMCNC: 28.7 PG — SIGNIFICANT CHANGE UP (ref 27–34)
MCHC RBC-ENTMCNC: 33 GM/DL — SIGNIFICANT CHANGE UP (ref 32–36)
MCV RBC AUTO: 86.9 FL — SIGNIFICANT CHANGE UP (ref 80–100)
MONOCYTES # BLD AUTO: 0.5 K/UL — SIGNIFICANT CHANGE UP (ref 0–0.9)
MONOCYTES NFR BLD AUTO: 2.9 % — SIGNIFICANT CHANGE UP (ref 2–14)
NEUTROPHILS # BLD AUTO: 15.7 K/UL — HIGH (ref 1.8–7.4)
NEUTROPHILS NFR BLD AUTO: 90.6 % — HIGH (ref 43–77)
PCO2 BLDV: 66 MMHG — HIGH (ref 35–50)
PH BLDV: 7.33 — LOW (ref 7.35–7.45)
PLATELET # BLD AUTO: 274 K/UL — SIGNIFICANT CHANGE UP (ref 150–400)
PO2 BLDV: <50 MMHG — LOW (ref 25–45)
POTASSIUM BLDV-SCNC: 3.8 MMOL/L — SIGNIFICANT CHANGE UP (ref 3.5–5.3)
POTASSIUM SERPL-MCNC: 4.3 MMOL/L — SIGNIFICANT CHANGE UP (ref 3.5–5.3)
POTASSIUM SERPL-SCNC: 4.3 MMOL/L — SIGNIFICANT CHANGE UP (ref 3.5–5.3)
PROT SERPL-MCNC: 7.7 G/DL — SIGNIFICANT CHANGE UP (ref 6–8.3)
RAPID RVP RESULT: SIGNIFICANT CHANGE UP
RBC # BLD: 4.1 M/UL — LOW (ref 4.2–5.8)
RBC # FLD: 15.5 % — HIGH (ref 10.3–14.5)
SAO2 % BLDV: 33 % — LOW (ref 67–88)
SODIUM SERPL-SCNC: 140 MMOL/L — SIGNIFICANT CHANGE UP (ref 135–145)
WBC # BLD: 17.3 K/UL — HIGH (ref 3.8–10.5)
WBC # FLD AUTO: 17.3 K/UL — HIGH (ref 3.8–10.5)

## 2018-11-01 PROCEDURE — 99284 EMERGENCY DEPT VISIT MOD MDM: CPT

## 2018-11-01 PROCEDURE — 71046 X-RAY EXAM CHEST 2 VIEWS: CPT | Mod: 26

## 2018-11-01 PROCEDURE — 71250 CT THORAX DX C-: CPT | Mod: 26

## 2018-11-01 RX ORDER — CEFTRIAXONE 500 MG/1
1 INJECTION, POWDER, FOR SOLUTION INTRAMUSCULAR; INTRAVENOUS EVERY 24 HOURS
Qty: 0 | Refills: 0 | Status: DISCONTINUED | OUTPATIENT
Start: 2018-11-01 | End: 2018-11-08

## 2018-11-01 RX ORDER — SODIUM CHLORIDE 9 MG/ML
1000 INJECTION INTRAMUSCULAR; INTRAVENOUS; SUBCUTANEOUS ONCE
Qty: 0 | Refills: 0 | Status: COMPLETED | OUTPATIENT
Start: 2018-11-01 | End: 2018-11-01

## 2018-11-01 RX ORDER — INFLUENZA VIRUS VACCINE 15; 15; 15; 15 UG/.5ML; UG/.5ML; UG/.5ML; UG/.5ML
0.5 SUSPENSION INTRAMUSCULAR ONCE
Qty: 0 | Refills: 0 | Status: DISCONTINUED | OUTPATIENT
Start: 2018-11-01 | End: 2018-11-08

## 2018-11-01 RX ORDER — POLYETHYLENE GLYCOL 3350 17 G/17G
17 POWDER, FOR SOLUTION ORAL
Qty: 0 | Refills: 0 | COMMUNITY

## 2018-11-01 RX ORDER — AZITHROMYCIN 500 MG/1
500 TABLET, FILM COATED ORAL EVERY 24 HOURS
Qty: 0 | Refills: 0 | Status: DISCONTINUED | OUTPATIENT
Start: 2018-11-01 | End: 2018-11-05

## 2018-11-01 RX ORDER — ACETAMINOPHEN 500 MG
650 TABLET ORAL EVERY 6 HOURS
Qty: 0 | Refills: 0 | Status: DISCONTINUED | OUTPATIENT
Start: 2018-11-01 | End: 2018-11-08

## 2018-11-01 RX ORDER — AZITHROMYCIN 500 MG/1
500 TABLET, FILM COATED ORAL ONCE
Qty: 0 | Refills: 0 | Status: COMPLETED | OUTPATIENT
Start: 2018-11-01 | End: 2018-11-01

## 2018-11-01 RX ORDER — CEFTRIAXONE 500 MG/1
1 INJECTION, POWDER, FOR SOLUTION INTRAMUSCULAR; INTRAVENOUS ONCE
Qty: 0 | Refills: 0 | Status: COMPLETED | OUTPATIENT
Start: 2018-11-01 | End: 2018-11-01

## 2018-11-01 RX ORDER — MORPHINE SULFATE 50 MG/1
30 CAPSULE, EXTENDED RELEASE ORAL EVERY 8 HOURS
Qty: 0 | Refills: 0 | Status: DISCONTINUED | OUTPATIENT
Start: 2018-11-01 | End: 2018-11-08

## 2018-11-01 RX ORDER — SODIUM CHLORIDE 9 MG/ML
1000 INJECTION INTRAMUSCULAR; INTRAVENOUS; SUBCUTANEOUS
Qty: 0 | Refills: 0 | Status: DISCONTINUED | OUTPATIENT
Start: 2018-11-01 | End: 2018-11-08

## 2018-11-01 RX ORDER — METHADONE HYDROCHLORIDE 40 MG/1
10 TABLET ORAL EVERY 8 HOURS
Qty: 0 | Refills: 0 | Status: DISCONTINUED | OUTPATIENT
Start: 2018-11-01 | End: 2018-11-08

## 2018-11-01 RX ORDER — POLYETHYLENE GLYCOL 3350 17 G/17G
17 POWDER, FOR SOLUTION ORAL
Qty: 0 | Refills: 0 | Status: DISCONTINUED | OUTPATIENT
Start: 2018-11-01 | End: 2018-11-08

## 2018-11-01 RX ORDER — SUCRALFATE 1 G
1 TABLET ORAL
Qty: 0 | Refills: 0 | Status: DISCONTINUED | OUTPATIENT
Start: 2018-11-01 | End: 2018-11-08

## 2018-11-01 RX ORDER — HEPARIN SODIUM 5000 [USP'U]/ML
5000 INJECTION INTRAVENOUS; SUBCUTANEOUS EVERY 12 HOURS
Qty: 0 | Refills: 0 | Status: DISCONTINUED | OUTPATIENT
Start: 2018-11-01 | End: 2018-11-08

## 2018-11-01 RX ORDER — DULOXETINE HYDROCHLORIDE 30 MG/1
60 CAPSULE, DELAYED RELEASE ORAL
Qty: 0 | Refills: 0 | Status: DISCONTINUED | OUTPATIENT
Start: 2018-11-01 | End: 2018-11-08

## 2018-11-01 RX ORDER — MORPHINE SULFATE 50 MG/1
1 CAPSULE, EXTENDED RELEASE ORAL
Qty: 0 | Refills: 0 | COMMUNITY

## 2018-11-01 RX ORDER — DIAZEPAM 5 MG
2 TABLET ORAL THREE TIMES A DAY
Qty: 0 | Refills: 0 | Status: COMPLETED | OUTPATIENT
Start: 2018-11-01 | End: 2018-11-08

## 2018-11-01 RX ORDER — ALBUTEROL 90 UG/1
1.25 AEROSOL, METERED ORAL EVERY 6 HOURS
Qty: 0 | Refills: 0 | Status: DISCONTINUED | OUTPATIENT
Start: 2018-11-01 | End: 2018-11-08

## 2018-11-01 RX ADMIN — CEFTRIAXONE 100 GRAM(S): 500 INJECTION, POWDER, FOR SOLUTION INTRAMUSCULAR; INTRAVENOUS at 18:39

## 2018-11-01 RX ADMIN — AZITHROMYCIN 250 MILLIGRAM(S): 500 TABLET, FILM COATED ORAL at 19:47

## 2018-11-01 RX ADMIN — Medication 650 MILLIGRAM(S): at 16:58

## 2018-11-01 RX ADMIN — SODIUM CHLORIDE 75 MILLILITER(S): 9 INJECTION INTRAMUSCULAR; INTRAVENOUS; SUBCUTANEOUS at 19:47

## 2018-11-01 RX ADMIN — METHADONE HYDROCHLORIDE 10 MILLIGRAM(S): 40 TABLET ORAL at 22:49

## 2018-11-01 RX ADMIN — Medication 650 MILLIGRAM(S): at 18:21

## 2018-11-01 RX ADMIN — SODIUM CHLORIDE 1000 MILLILITER(S): 9 INJECTION INTRAMUSCULAR; INTRAVENOUS; SUBCUTANEOUS at 16:59

## 2018-11-01 RX ADMIN — MORPHINE SULFATE 30 MILLIGRAM(S): 50 CAPSULE, EXTENDED RELEASE ORAL at 22:49

## 2018-11-01 RX ADMIN — Medication 0.1 MILLIGRAM(S): at 22:49

## 2018-11-01 NOTE — H&P ADULT - SKIN/BREAST
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

## 2018-11-01 NOTE — H&P ADULT - ASSESSMENT
pt w/ pna /  ct chest   id eval  pulm eval   nebs  dvt proph  ceft/ zithro  cont outpt meds  analgesics for pain

## 2018-11-01 NOTE — H&P ADULT - NSHPLABSRESULTS_GEN_ALL_CORE
11.8   17.3  )-----------( 274      ( 01 Nov 2018 17:17 )             35.7       11-01    140  |  96  |  18  ----------------------------<  109<H>  4.3   |  29  |  1.09    Ca    9.4      01 Nov 2018 17:17    TPro  7.7  /  Alb  4.0  /  TBili  0.4  /  DBili  x   /  AST  24  /  ALT  21  /  AlkPhos  84  11-01                      Lactate Trend            CAPILLARY BLOOD GLUCOSE

## 2018-11-01 NOTE — ED ADULT NURSE NOTE - NSIMPLEMENTINTERV_GEN_ALL_ED
Implemented All Fall with Harm Risk Interventions:  [facility] to call system. Call bell, personal items and telephone within reach. Instruct patient to call for assistance. Room bathroom lighting operational. Non-slip footwear when patient is off stretcher. Physically safe environment: no spills, clutter or unnecessary equipment. Stretcher in lowest position, wheels locked, appropriate side rails in place. Provide visual cue, wrist band, yellow gown, etc. Monitor gait and stability. Monitor for mental status changes and reorient to person, place, and time. Review medications for side effects contributing to fall risk. Reinforce activity limits and safety measures with patient and family. Provide visual clues: red socks.

## 2018-11-01 NOTE — H&P ADULT - HISTORY OF PRESENT ILLNESS
62 y old male with a history of hypertension ,s/p back surgery 3 times last one 2 y ago ,in 4/18 was admitted to [facility] for multilobar pneumonia .came in complains of fever chills ,couth with yellow sputum ,chills and rigor since yesterday ,ex smoker stopped 6 m ago,feels weak /malaise

## 2018-11-01 NOTE — ED ADULT NURSE NOTE - OBJECTIVE STATEMENT
62 y.o male pmh HTN, back surgery x 3 presenting to ED accompanied by his wife c/o fever, chills, productive cough, and weakness worsening over the passed few days. pt states he is unsure of what his temp has been at home as he has not taken his temperature. describes cough as productive and intermittent with yellow sputum expectorated. denies any sick contacts cp, sob, dizziness, nausea, vomiting, or diarrhea. no difficulty breathing. VS stable, afebrile. labs, blood cultures and RBP obtained. pt pending chest xray, results pending safety and fall precautions maintained.

## 2018-11-01 NOTE — ED PROVIDER NOTE - MEDICAL DECISION MAKING DETAILS
ro pneumonia vs bronchitis ,will obtain blood work and cultures ,chest xray ,iv fluids and antibiotics ZR

## 2018-11-01 NOTE — ED PROVIDER NOTE - OBJECTIVE STATEMENT
62 y old male with a history of hypertension ,sp back surgery 3 times last one 2 y ago ,in 4/18 was admitted to [facility] for multilobar pneumonia .came in complains of fever chills ,couth with yellow sputum ,chills and rigor since yesterday ,ex smoker stopped 6 m ago,feels weak ans sweaty   PMD dr Adama Jean-Baptiste 62 y old male with a history of hypertension ,sp back surgery 3 times last one 2 y ago ,in 4/18 was admitted to [facility] for multilobar pneumonia .came in complains of fever chills ,couth with yellow sputum ,chills and rigor since yesterday ,ex smoker stopped 6 m ago,feels weak ans sweaty   PMD dr Adama Fernández

## 2018-11-01 NOTE — ED PROVIDER NOTE - CONSTITUTIONAL, MLM
normal... morbidly obese, awake, alert, oriented to person, place, time/situation and in no apparent distress.

## 2018-11-02 DIAGNOSIS — J18.9 PNEUMONIA, UNSPECIFIED ORGANISM: ICD-10-CM

## 2018-11-02 DIAGNOSIS — K44.9 DIAPHRAGMATIC HERNIA WITHOUT OBSTRUCTION OR GANGRENE: ICD-10-CM

## 2018-11-02 LAB
HCT VFR BLD CALC: 36.1 % — LOW (ref 39–50)
HGB BLD-MCNC: 10.9 G/DL — LOW (ref 13–17)
MCHC RBC-ENTMCNC: 27.2 PG — SIGNIFICANT CHANGE UP (ref 27–34)
MCHC RBC-ENTMCNC: 30.2 GM/DL — LOW (ref 32–36)
MCV RBC AUTO: 90 FL — SIGNIFICANT CHANGE UP (ref 80–100)
PLATELET # BLD AUTO: 239 K/UL — SIGNIFICANT CHANGE UP (ref 150–400)
RBC # BLD: 4.01 M/UL — LOW (ref 4.2–5.8)
RBC # FLD: 16.9 % — HIGH (ref 10.3–14.5)
WBC # BLD: 6.82 K/UL — SIGNIFICANT CHANGE UP (ref 3.8–10.5)
WBC # FLD AUTO: 6.82 K/UL — SIGNIFICANT CHANGE UP (ref 3.8–10.5)

## 2018-11-02 PROCEDURE — 99254 IP/OBS CNSLTJ NEW/EST MOD 60: CPT | Mod: GC

## 2018-11-02 RX ADMIN — Medication 2 MILLIGRAM(S): at 03:28

## 2018-11-02 RX ADMIN — DULOXETINE HYDROCHLORIDE 60 MILLIGRAM(S): 30 CAPSULE, DELAYED RELEASE ORAL at 18:25

## 2018-11-02 RX ADMIN — ALBUTEROL 1.25 MILLIGRAM(S): 90 AEROSOL, METERED ORAL at 18:26

## 2018-11-02 RX ADMIN — Medication 0.1 MILLIGRAM(S): at 21:30

## 2018-11-02 RX ADMIN — METHADONE HYDROCHLORIDE 10 MILLIGRAM(S): 40 TABLET ORAL at 16:12

## 2018-11-02 RX ADMIN — HEPARIN SODIUM 5000 UNIT(S): 5000 INJECTION INTRAVENOUS; SUBCUTANEOUS at 18:26

## 2018-11-02 RX ADMIN — Medication 0.1 MILLIGRAM(S): at 14:21

## 2018-11-02 RX ADMIN — Medication 1 GRAM(S): at 18:25

## 2018-11-02 RX ADMIN — AZITHROMYCIN 250 MILLIGRAM(S): 500 TABLET, FILM COATED ORAL at 21:30

## 2018-11-02 RX ADMIN — CEFTRIAXONE 100 GRAM(S): 500 INJECTION, POWDER, FOR SOLUTION INTRAMUSCULAR; INTRAVENOUS at 18:32

## 2018-11-02 RX ADMIN — MORPHINE SULFATE 30 MILLIGRAM(S): 50 CAPSULE, EXTENDED RELEASE ORAL at 06:40

## 2018-11-02 RX ADMIN — ALBUTEROL 1.25 MILLIGRAM(S): 90 AEROSOL, METERED ORAL at 12:13

## 2018-11-02 RX ADMIN — MORPHINE SULFATE 30 MILLIGRAM(S): 50 CAPSULE, EXTENDED RELEASE ORAL at 16:41

## 2018-11-02 RX ADMIN — MORPHINE SULFATE 30 MILLIGRAM(S): 50 CAPSULE, EXTENDED RELEASE ORAL at 16:11

## 2018-11-02 RX ADMIN — Medication 2 MILLIGRAM(S): at 11:27

## 2018-11-02 RX ADMIN — MORPHINE SULFATE 30 MILLIGRAM(S): 50 CAPSULE, EXTENDED RELEASE ORAL at 07:00

## 2018-11-02 RX ADMIN — METHADONE HYDROCHLORIDE 10 MILLIGRAM(S): 40 TABLET ORAL at 06:39

## 2018-11-02 NOTE — CONSULT NOTE ADULT - ATTENDING COMMENTS
63 yo M with HTN, prior back surgeries, prior episode of multilobar pneumonia presenting with cough, fever.  No documented fevers; has leukocytosis  CT with R sided bacterial pneumonia, distended esophagus (fluid), and hiatal hernia  No apparent history of episodes of aspiration  Otherwise mildly improved with present therapy  Overall, CAP, leukocytosis, abnormal finding on imaging  - Ceftriaxone 1g q 24  - Azithromycin 500mg q 24  - Check sputum/legionella (ordered)  - Ordered HIV test for the AM (patient agreeable)  - Follow up GI regarding esophageal findings  - Low threshold to change Ceftriaxone to Zosyn if non-improving or worsening (if so, then would check swallow eval)  - Over weekend, please call [phone] with questions or change in status.     Rupesh Jane MD  Pager [phone]  After 5pm and on weekends call [phone]    I was physically present for the key portions of the evaluation and management service provided. I saw and examined the patient. I agree with the above history, physical, and plan except for any discrepancies which I have documented in “Attending Attestation.” Please refer to “Attending Attestation” for final plan.
suspecting achalasia  await barium esophagram  may need repeat egd

## 2018-11-02 NOTE — PROGRESS NOTE ADULT - ASSESSMENT
pt w/ pna /  ct chest showing intrathoracic stomach and dilated esophagus  concern for aspiration  GI eval  id eval noted  c/w abx per ID  pulm eval   nebs  dvt proph  cont outpt meds  analgesics for pain

## 2018-11-02 NOTE — CONSULT NOTE ADULT - ASSESSMENT
62 year old male admitted with PNA, GI consulted for a hiatal hernia and distended fluid-filled esophagus.

## 2018-11-02 NOTE — CONSULT NOTE ADULT - PROBLEM SELECTOR RECOMMENDATION 9
- fluid filled esophagus seen on CT chest  - check esophogram   - pending results, may require upper gastrointestinal endoscopy  on admission pending pulmonary clearance   - start PPI daily, carafate TID ( Home dose)

## 2018-11-02 NOTE — CONSULT NOTE ADULT - ASSESSMENT
62 y old male with a history of hypertension ,s/p back surgery 3 times last one 2 y ago ,in 4/18 was admitted to [facility] for multilobar pneumonia, presented for fever, chills and productive cough for 2 days, afebrile here, leukocytosis to 17.3, chest CT with right lung pneumonia, no sick contacts, no recent travel, wife at home not sick. Currently on Ceftriaxone and Azithromycin. 62 y old male with a history of hypertension ,s/p back surgery 3 times last one 2 y ago ,in 4/18 was admitted to [facility] for multilobar pneumonia, presented for fever, chills and productive cough for 2 days, afebrile here, leukocytosis to 17.3, chest CT with right lung pneumonia, no sick contacts, no recent travel, wife at home not sick. Currently on Ceftriaxone and Azithromycin.     Community acquired pneumonia   Suggest:  *c/w ceftriaxone and azithromycin   *f/u blood cx  *hiatal hernia and distended fluid filled esophagus on CT- ? aspiration   if does not improve will add anaerobic coverage. 62 y old male with a history of hypertension ,s/p back surgery 3 times last one 2 y ago ,in 4/18 was admitted to [facility] for multilobar pneumonia, presented for fever, chills and productive cough for 2 days, afebrile here, leukocytosis to 17.3, chest CT with right lung pneumonia, no sick contacts, no recent travel, wife at home not sick. Currently on Ceftriaxone and Azithromycin.     Community acquired pneumonia   Suggest:  *c/w ceftriaxone and azithromycin   *f/u blood cx  *hiatal hernia and distended fluid filled esophagus on CT- ? aspiration   if does not improve will add anaerobic coverage.   *f/u legionella

## 2018-11-02 NOTE — CONSULT NOTE ADULT - SUBJECTIVE AND OBJECTIVE BOX
Patient is a 62y old  Male who presents with a chief complaint of     HPI:  62 y old male with a history of hypertension ,s/p back surgery 3 times last one 2 y ago ,in  was admitted to [facility] for multilobar pneumonia .came in complains of fever chills ,couth with yellow sputum ,chills and rigor since yesterday ,ex smoker stopped 6 m ago,feels weak /malaise (2018 19:05)      PAST MEDICAL & SURGICAL HISTORY:  Anxiety and depression  Diastolic dysfunction: stage I  Empyema lun2015 left lung, s/p VATS, decortication  H/O peptic ulcer: over 10 years ago  history of renal calculus  Herniated Disc: S/P work injury   Hypertension: Dx:   Spinal Stenosis  Postlaminectomy Syndrome  S/P  shunt  S/P insertion of spinal cord stimulator:   History of lumbar fusion:   Insertion of Intrathecal Dilaudid Pump: 2011  S/P Spinal Surgery: corrective lumbar fusion   S/P Knee Replacement: left knee   S/P Arthroscopy of Left Knee  S/P Tonsillectomy: childhood  Ankle Fracture: s/p ORIF left ankle       Allergies  No Known Allergies        ANTIMICROBIALS:  azithromycin  IVPB 500 every 24 hours  cefTRIAXone   IVPB 1 every 24 hours      OTHER MEDS: MEDICATIONS  (STANDING):  acetaminophen   Tablet .. 650 every 6 hours PRN  ALBUTerol   0.042% 1.25 every 6 hours  cloNIDine 0.1 every 8 hours  diazepam    Tablet 2 three times a day  DULoxetine 60 two times a day  heparin  Injectable 5000 every 12 hours  influenza   Vaccine 0.5 once  methadone    Tablet 10 every 8 hours PRN  morphine  IR 30 every 8 hours PRN  polyethylene glycol 3350 17 two times a day PRN  sucralfate suspension 1 two times a day      SOCIAL HISTORY:       FAMILY HISTORY:  No pertinent family history in first degree relatives      REVIEW OF SYSTEMS  [  ] ROS unobtainable because:    [  ] All other systems negative except as noted below:	    Constitutional:  [ ] fever [ ] chills  [ ] weight loss  [ ] weakness  Skin:  [ ] rash [ ] phlebitis	  Eyes: [ ] icterus [ ] pain  [ ] discharge	  ENMT: [ ] sore throat  [ ] thrush [ ] ulcers [ ] exudates  Respiratory: [ ] dyspnea [ ] hemoptysis [ ] cough [ ] sputum	  Cardiovascular:  [ ] chest pain [ ] palpitations [ ] edema	  Gastrointestinal:  [ ] nausea [ ] vomiting [ ] diarrhea [ ] constipation [ ] pain	  Genitourinary:  [ ] dysuria [ ] frequency [ ] hematuria [ ] discharge [ ] flank pain  [ ] incontinence  Musculoskeletal:  [ ] myalgias [ ] arthralgias [ ] arthritis  [ ] back pain  Neurological:  [ ] headache [ ] seizures  [ ] confusion/altered mental status  Psychiatric:  [ ] anxiety [ ] depression	  Hematology/Lymphatics:  [ ] lymphadenopathy  Endocrine:  [ ] adrenal [ ] thyroid  Allergic/Immunologic:	 [ ] transplant [ ] seasonal    Vital Signs Last 24 Hrs  T(F): 97.5 (18 @ 05:26), Max: 99.5 (18 @ 14:09)    Vital Signs Last 24 Hrs  HR: 56 (18 @ 05:26) (53 - 84)  BP: 108/71 (18 @ 05:26) (103/65 - 114/72)  RR: 14 (18 @ 05:26)  SpO2: 96% (18 @ 05:26) (93% - 98%)  Wt(kg): --    PHYSICAL EXAM:  General: non-toxic  HEAD/EYES: anicteric, PERRL  ENT:  supple  Cardiovascular:   S1, S2  Respiratory:  clear bilaterally  GI:  soft, non-tender, normal bowel sounds  :  no CVA tenderness   Musculoskeletal:  no synovitis  Neurologic:  grossly non-focal  Skin:  no rash  Lymph: no lymphadenopathy  Psychiatric:  appropriate affect  Vascular:  no phlebitis                                11.8   17.3  )-----------( 274      ( 2018 17:17 )             35.7           140  |  96  |  18  ----------------------------<  109<H>  4.3   |  29  |  1.09    Ca    9.4      2018 17:17    TPro  7.7  /  Alb  4.0  /  TBili  0.4  /  DBili  x   /  AST  24  /  ALT  21  /  AlkPhos  84            MICROBIOLOGY:          v    Rapid RVP Result: Derrick ( @ 17:17)          RADIOLOGY:  imaging below personally reviewed Patient is a 62y old  Male who presents with a chief complaint of     HPI:  62 y old male with a history of hypertension ,s/p back surgery 3 times last one 2 y ago ,in  was admitted to [facility] for multilobar pneumonia .came in complains of fever chills ,couth with yellow sputum ,chills and rigor since yesterday ,ex smoker stopped 6 m ago,feels weak /malaise (2018 19:05)      PAST MEDICAL & SURGICAL HISTORY:  Anxiety and depression  Diastolic dysfunction: stage I  Empyema lun2015 left lung, s/p VATS, decortication  H/O peptic ulcer: over 10 years ago  history of renal calculus  Herniated Disc: S/P work injury   Hypertension: Dx:   Spinal Stenosis  Postlaminectomy Syndrome  S/P  shunt  S/P insertion of spinal cord stimulator:   History of lumbar fusion:   Insertion of Intrathecal Dilaudid Pump: 2011  S/P Spinal Surgery: corrective lumbar fusion   S/P Knee Replacement: left knee   S/P Arthroscopy of Left Knee  S/P Tonsillectomy: childhood  Ankle Fracture: s/p ORIF left ankle     Allergies  No Known Allergies    ANTIMICROBIALS:  azithromycin  IVPB 500 every 24 hours  cefTRIAXone   IVPB 1 every 24 hours    OTHER MEDS: MEDICATIONS  (STANDING):  acetaminophen   Tablet .. 650 every 6 hours PRN  ALBUTerol   0.042% 1.25 every 6 hours  cloNIDine 0.1 every 8 hours  diazepam    Tablet 2 three times a day  DULoxetine 60 two times a day  heparin  Injectable 5000 every 12 hours  influenza   Vaccine 0.5 once  methadone    Tablet 10 every 8 hours PRN  morphine  IR 30 every 8 hours PRN  polyethylene glycol 3350 17 two times a day PRN  sucralfate suspension 1 two times a day      SOCIAL HISTORY:   lives at home with wife  No smoking, no alcohol use    FAMILY HISTORY:  father, , dementia  mother, , old age     REVIEW OF SYSTEMS  [  ] ROS unobtainable because:    [ x ] All other systems negative except as noted below:	    Constitutional:  [x ] fever [ ] chills  [ ] weight loss  [ ] weakness  Skin:  [ ] rash [ ] phlebitis	  Eyes: [ ] icterus [ ] pain  [ ] discharge	  ENMT: [ ] sore throat  [ ] thrush [ ] ulcers [ ] exudates  Respiratory: [ ] dyspnea [ ] hemoptysis [x ] cough [ ] sputum	  Cardiovascular:  [ ] chest pain [ ] palpitations [ ] edema	  Gastrointestinal:  [ ] nausea [ ] vomiting [ ] diarrhea [ ] constipation [ ] pain	  Genitourinary:  [ ] dysuria [ ] frequency [ ] hematuria [ ] discharge [ ] flank pain  [ ] incontinence  Musculoskeletal:  [ ] myalgias [ ] arthralgias [ ] arthritis  [ ] back pain  Neurological:  [ ] headache [ ] seizures  [ ] confusion/altered mental status  Psychiatric:  [ ] anxiety [ ] depression	  Hematology/Lymphatics:  [ ] lymphadenopathy  Endocrine:  [ ] adrenal [ ] thyroid  Allergic/Immunologic:	 [ ] transplant [ ] seasonal    Vital Signs Last 24 Hrs  T(F): 97.5 (18 @ 05:26), Max: 99.5 (18 @ 14:09)    Vital Signs Last 24 Hrs  HR: 56 (18 @ 05:26) (53 - 84)  BP: 108/71 (18 @ 05:26) (103/65 - 114/72)  RR: 14 (18 @ 05:26)  SpO2: 96% (18 @ 05:26) (93% - 98%)  Wt(kg): --    PHYSICAL EXAM:  General: non-toxic, awake and alert   HEAD/EYES: anicteric, PERRL  ENT:  supple  Cardiovascular:   S1, S2  Respiratory:  clear bilaterally  GI:  soft, non-tender, normal bowel sounds  :  no CVA tenderness   Musculoskeletal:  no synovitis  Neurologic:  grossly non-focal  Skin:  no rash  Lymph: no lymphadenopathy  Psychiatric:  appropriate affect  Vascular:  no phlebitis                          11.8   17.3  )-----------( 274      ( 2018 17:17 )             35.7           140  |  96  |  18  ----------------------------<  109<H>  4.3   |  29  |  1.09    Ca    9.4      2018 17:17    TPro  7.7  /  Alb  4.0  /  TBili  0.4  /  DBili  x   /  AST  24  /  ALT  21  /  AlkPhos  84          MICROBIOLOGY:  Rapid RVP Result: NotDetec ( @ 17:17)    blood cx pending     RADIOLOGY:  < from: CT Chest No Cont (18 @ 19:24) >      IMPRESSION:     1.  Pneumonia in the right lung HPI:  62 y old male with a history of hypertension ,s/p back surgery 3 times last one 2 y ago ,in  was admitted to [facility] for multilobar pneumonia .came in complains of fever chills ,couth with yellow sputum ,chills and rigor since yesterday ,ex smoker stopped 6 m ago,feels weak /malaise (2018 19:05)    Above reviewed. Saw/spoke to patient. Patient states has had acute onset of fevers/chills. Cough, with sputum. Feels fatigued today but slightly improved with hospital treatment. Has had prior episode of pneumonia. No overt chest pain. No abd pain or GI symptoms. No dysuria, no pyuria. No other new complaints. No rashes. Patient does not give history of difficulty with swallowing or episodes of aspiration. ID called for further eval, ? PNA.    PAST MEDICAL & SURGICAL HISTORY:  Anxiety and depression  Diastolic dysfunction: stage I  Empyema lun2015 left lung, s/p VATS, decortication  H/O peptic ulcer: over 10 years ago  history of renal calculus  Herniated Disc: S/P work injury   Hypertension: Dx:   Spinal Stenosis  Postlaminectomy Syndrome  S/P  shunt  S/P insertion of spinal cord stimulator:   History of lumbar fusion:   Insertion of Intrathecal Dilaudid Pump: 2011  S/P Spinal Surgery: corrective lumbar fusion   S/P Knee Replacement: left knee   S/P Arthroscopy of Left Knee  S/P Tonsillectomy: childhood  Ankle Fracture: s/p ORIF left ankle     Allergies  No Known Allergies    ANTIMICROBIALS:  azithromycin  IVPB 500 every 24 hours  cefTRIAXone   IVPB 1 every 24 hours    OTHER MEDS: MEDICATIONS  (STANDING):  acetaminophen   Tablet .. 650 every 6 hours PRN  ALBUTerol   0.042% 1.25 every 6 hours  cloNIDine 0.1 every 8 hours  diazepam    Tablet 2 three times a day  DULoxetine 60 two times a day  heparin  Injectable 5000 every 12 hours  influenza   Vaccine 0.5 once  methadone    Tablet 10 every 8 hours PRN  morphine  IR 30 every 8 hours PRN  polyethylene glycol 3350 17 two times a day PRN  sucralfate suspension 1 two times a day    SOCIAL HISTORY:   lives at home with wife  No smoking, no alcohol use    FAMILY HISTORY:  father, , dementia  mother, , old age     REVIEW OF SYSTEMS  [  ] ROS unobtainable because:    [ x ] All other systems negative except as noted below:	    Constitutional:  [x ] fever [ ] chills  [ ] weight loss  [ ] weakness  Skin:  [ ] rash [ ] phlebitis	  Eyes: [ ] icterus [ ] pain  [ ] discharge	  ENMT: [ ] sore throat  [ ] thrush [ ] ulcers [ ] exudates  Respiratory: [ ] dyspnea [ ] hemoptysis [x ] cough [ ] sputum	  Cardiovascular:  [ ] chest pain [ ] palpitations [ ] edema	  Gastrointestinal:  [ ] nausea [ ] vomiting [ ] diarrhea [ ] constipation [ ] pain	  Genitourinary:  [ ] dysuria [ ] frequency [ ] hematuria [ ] discharge [ ] flank pain  [ ] incontinence  Musculoskeletal:  [ ] myalgias [ ] arthralgias [ ] arthritis  [ ] back pain  Neurological:  [ ] headache [ ] seizures  [ ] confusion/altered mental status  Psychiatric:  [ ] anxiety [ ] depression	  Hematology/Lymphatics:  [ ] lymphadenopathy  Endocrine:  [ ] adrenal [ ] thyroid  Allergic/Immunologic:	 [ ] transplant [ ] seasonal    Vital Signs Last 24 Hrs  T(F): 97.5 (18 @ 05:26), Max: 99.5 (18 @ 14:09)    Vital Signs Last 24 Hrs  HR: 56 (18 @ 05:26) (53 - 84)  BP: 108/71 (18 @ 05:26) (103/65 - 114/72)  RR: 14 (18 @ 05:26)  SpO2: 96% (18 @ 05:26) (93% - 98%)    PHYSICAL EXAM:  General: non-toxic, awake and alert   HEAD/EYES: anicteric, PERRL  ENT:  supple  Cardiovascular:   S1, S2  Respiratory:  clear bilaterally  GI:  soft, non-tender, normal bowel sounds  :  no CVA tenderness   Musculoskeletal:  no synovitis  Neurologic:  grossly non-focal  Skin:  no rash  Lymph: no lymphadenopathy  Psychiatric:  appropriate affect  Vascular:  no phlebitis    Labs:                        11.8   17.3  )-----------( 274      ( 2018 17:17 )             35.7         140  |  96  |  18  ----------------------------<  109<H>  4.3   |  29  |  1.09    Ca    9.4      2018 17:17    TPro  7.7  /  Alb  4.0  /  TBili  0.4  /  DBili  x   /  AST  24  /  ALT  21  /  AlkPhos  84      MICROBIOLOGY:  Rapid RVP Result: NotDetec ( @ 17:17)    blood cx pending     RADIOLOGY:  < from: CT Chest No Cont (18 @ 19:24) >    FINDINGS:     Lungs, Pleura, and Airways:   There are no endobronchial lesions.  There is a patchy opacity with tree-in-bud opacities in the right lung,   likely represents pneumonia.    Previously noted 2 mm left upper lobe nodule is not visualized in this   study.    No pneumothorax.  No pleural effusion.    Mediastinum: There are multiple nonenlarged paratracheal, prevascular,   axillary, mediastinal lymph nodes. The visualized portion of the thyroid   gland is unremarkable.     There is a hiatal hernia and distended fluid-filled esophagus.    Heart and Vasculature: The heart is normal in size.  There is no   pericardial effusion.   Aortic valve calcifications.  The aorta is normal in course and caliber. The main pulmonary artery is   normal in size.    Upper Abdomen: Nonobstructing left renal calculi. Previously noted right   adrenal nodule is grossly unchanged.    Bones And Soft Tissues: Partially visualized orthopedic hardware in the   thoracic spine. Epidural catheter is in place. The soft tissues are   unremarkable.      IMPRESSION:     1.  Pneumonia in the right lung

## 2018-11-02 NOTE — CONSULT NOTE ADULT - SUBJECTIVE AND OBJECTIVE BOX
Chief Complaint:  Patient is a 62y old  Male who presents with a chief complaint of cough (02 Nov 2018 13:28)      HPI: 62 y old male with a history of hypertension, s/p back surgery 3 times last one 2 y ago ,in 4/18 was admitted to [facility] for multilobar pneumonia .came in complains of fever chills ,couth with yellow sputum ,chills and rigor since yesterday ,ex smoker stopped 6 m ago.     Allergies:  No Known Allergies      Medications:  acetaminophen   Tablet .. 650 milliGRAM(s) Oral every 6 hours PRN  ALBUTerol   0.042% 1.25 milliGRAM(s) Nebulizer every 6 hours  azithromycin  IVPB 500 milliGRAM(s) IV Intermittent every 24 hours  cefTRIAXone   IVPB 1 Gram(s) IV Intermittent every 24 hours  cloNIDine 0.1 milliGRAM(s) Oral every 8 hours  diazepam    Tablet 2 milliGRAM(s) Oral three times a day  DULoxetine 60 milliGRAM(s) Oral two times a day  heparin  Injectable 5000 Unit(s) SubCutaneous every 12 hours  influenza   Vaccine 0.5 milliLiter(s) IntraMuscular once  methadone    Tablet 10 milliGRAM(s) Oral every 8 hours PRN  morphine  IR 30 milliGRAM(s) Oral every 8 hours PRN  polyethylene glycol 3350 17 Gram(s) Oral two times a day PRN  sodium chloride 0.9%. 1000 milliLiter(s) IV Continuous <Continuous>  sucralfate suspension 1 Gram(s) Oral two times a day      PMHX/PSHX:  Anxiety and depression  Diastolic dysfunction  Empyema lung  H/O peptic ulcer  history of renal calculus  Renal calculus  Current smoker  Herniated Disc  Hypertension  Depression  Spinal Stenosis  Peripheral Neuropathy  Postlaminectomy Syndrome  S/P  shunt  S/P insertion of spinal cord stimulator  History of lumbar fusion  Insertion of Intrathecal Dilaudid Pump  S/P Spinal Surgery  S/P Knee Replacement  S/P Arthroscopy of Left Knee  S/P Tonsillectomy  Ankle Fracture      Family history:  No pertinent family history in first degree relatives      Social History:     ROS:     General:  No wt loss, fevers, chills, night sweats, fatigue,   Eyes:  Good vision, no reported pain  ENT:  No sore throat, pain, runny nose, dysphagia  CV:  No pain, palpitations, hypo/hypertension  Resp:  No dyspnea, cough, tachypnea, wheezing  GI:  No pain, No nausea, No vomiting, No diarrhea, No constipation, No weight loss, No fever, No pruritis, No rectal bleeding, No tarry stools, No dysphagia,  :  No pain, bleeding, incontinence, nocturia  Muscle:  No pain, weakness  Neuro:  No weakness, tingling, memory problems  Psych:  No fatigue, insomnia, mood problems, depression  Endocrine:  No polyuria, polydipsia, cold/heat intolerance  Heme:  No petechiae, ecchymosis, easy bruisability  Skin:  No rash, tattoos, scars, edema      PHYSICAL EXAM:   Vital Signs:  Vital Signs Last 24 Hrs  T(C): 36.6 (02 Nov 2018 14:18), Max: 36.9 (02 Nov 2018 09:30)  T(F): 97.9 (02 Nov 2018 14:18), Max: 98.5 (02 Nov 2018 09:30)  HR: 64 (02 Nov 2018 14:18) (53 - 64)  BP: 129/70 (02 Nov 2018 14:18) (103/65 - 129/70)  BP(mean): --  RR: 18 (02 Nov 2018 14:18) (10 - 18)  SpO2: 95% (02 Nov 2018 14:18) (93% - 96%)  Daily     Daily     GENERAL:  Appears stated age, well-groomed, well-nourished, no distress  HEENT:  NC/AT,  conjunctivae clear and pink, no thyromegaly, nodules, adenopathy, no JVD, sclera -anicteric  CHEST:  Full & symmetric excursion, no increased effort, breath sounds clear  HEART:  Regular rhythm, S1, S2, no murmur/rub/S3/S4, no abdominal bruit, no edema  ABDOMEN:  Soft, non-tender, non-distended, normoactive bowel sounds,  no masses ,no hepato-splenomegaly, no signs of chronic liver disease  EXTEREMITIES:  no cyanosis,clubbing or edema  SKIN:  No rash/erythema/ecchymoses/petechiae/wounds/abscess/warm/dry  NEURO:  Alert, oriented, no asterixis, no tremor, no encephalopathy    LABS:                        11.8   17.3  )-----------( 274      ( 01 Nov 2018 17:17 )             35.7     11-01    140  |  96  |  18  ----------------------------<  109<H>  4.3   |  29  |  1.09    Ca    9.4      01 Nov 2018 17:17    TPro  7.7  /  Alb  4.0  /  TBili  0.4  /  DBili  x   /  AST  24  /  ALT  21  /  AlkPhos  84  11-01    LIVER FUNCTIONS - ( 01 Nov 2018 17:17 )  Alb: 4.0 g/dL / Pro: 7.7 g/dL / ALK PHOS: 84 U/L / ALT: 21 U/L / AST: 24 U/L / GGT: x                   Imaging: Chief Complaint:  Patient is a 62y old  Male who presents with a chief complaint of cough (02 Nov 2018 13:28)      HPI: 62 y old male with a history of hypertension, s/p back surgery 3 times last one 2 y ago ,in 4/18 was admitted to [facility] for multilobar pneumonia .came in complains of fever chills ,couth with yellow sputum, chills and rigor since yesterday ,ex smoker stopped 6 m ago. GI consulted for hiatal hernia and distended fluid-filled esophagus seen on CT chest. Pt denies abdominal pain, n/v/d/c, brbpr/melena, dysphagia. Pt underwent EGD 2 months ago and was told he had a large hiatal hernia, pt takes Carafate TID at home. Last colonoscopy 4 years ago which was reportedly negative     Allergies:  No Known Allergies      Medications:  acetaminophen   Tablet .. 650 milliGRAM(s) Oral every 6 hours PRN  ALBUTerol   0.042% 1.25 milliGRAM(s) Nebulizer every 6 hours  azithromycin  IVPB 500 milliGRAM(s) IV Intermittent every 24 hours  cefTRIAXone   IVPB 1 Gram(s) IV Intermittent every 24 hours  cloNIDine 0.1 milliGRAM(s) Oral every 8 hours  diazepam    Tablet 2 milliGRAM(s) Oral three times a day  DULoxetine 60 milliGRAM(s) Oral two times a day  heparin  Injectable 5000 Unit(s) SubCutaneous every 12 hours  influenza   Vaccine 0.5 milliLiter(s) IntraMuscular once  methadone    Tablet 10 milliGRAM(s) Oral every 8 hours PRN  morphine  IR 30 milliGRAM(s) Oral every 8 hours PRN  polyethylene glycol 3350 17 Gram(s) Oral two times a day PRN  sodium chloride 0.9%. 1000 milliLiter(s) IV Continuous <Continuous>  sucralfate suspension 1 Gram(s) Oral two times a day      PMHX/PSHX:  Anxiety and depression  Diastolic dysfunction  Empyema lung  H/O peptic ulcer  history of renal calculus  Renal calculus  Current smoker  Herniated Disc  Hypertension  Depression  Spinal Stenosis  Peripheral Neuropathy  Postlaminectomy Syndrome  S/P  shunt  S/P insertion of spinal cord stimulator  History of lumbar fusion  Insertion of Intrathecal Dilaudid Pump  S/P Spinal Surgery  S/P Knee Replacement  S/P Arthroscopy of Left Knee  S/P Tonsillectomy  Ankle Fracture      Family history:  No pertinent family history in first degree relatives      Social History:     ROS:     General:  No wt loss, fevers, chills, night sweats, fatigue,   Eyes:  Good vision, no reported pain  ENT:  No sore throat, pain, runny nose, dysphagia  CV:  No pain, palpitations, hypo/hypertension  Resp:  No dyspnea, cough, tachypnea, wheezing  GI:  No pain, No nausea, No vomiting, No diarrhea, No constipation, No weight loss, No fever, No pruritis, No rectal bleeding, No tarry stools, No dysphagia,  :  No pain, bleeding, incontinence, nocturia  Muscle:  No pain, weakness  Neuro:  No weakness, tingling, memory problems  Psych:  No fatigue, insomnia, mood problems, depression  Endocrine:  No polyuria, polydipsia, cold/heat intolerance  Heme:  No petechiae, ecchymosis, easy bruisability  Skin:  No rash, tattoos, scars, edema      PHYSICAL EXAM:   Vital Signs:  Vital Signs Last 24 Hrs  T(C): 36.6 (02 Nov 2018 14:18), Max: 36.9 (02 Nov 2018 09:30)  T(F): 97.9 (02 Nov 2018 14:18), Max: 98.5 (02 Nov 2018 09:30)  HR: 64 (02 Nov 2018 14:18) (53 - 64)  BP: 129/70 (02 Nov 2018 14:18) (103/65 - 129/70)  BP(mean): --  RR: 18 (02 Nov 2018 14:18) (10 - 18)  SpO2: 95% (02 Nov 2018 14:18) (93% - 96%)  Daily     Daily     GENERAL:  Appears stated age, well-groomed, well-nourished, no distress  HEENT:  NC/AT,  conjunctivae clear and pink, no thyromegaly, nodules, adenopathy, no JVD, sclera -anicteric  CHEST:  Full & symmetric excursion, no increased effort, breath sounds clear  HEART:  Regular rhythm, S1, S2, no murmur/rub/S3/S4, no abdominal bruit, no edema  ABDOMEN:  Soft, non-tender, non-distended, normoactive bowel sounds,  no masses ,no hepato-splenomegaly, no signs of chronic liver disease  EXTEREMITIES:  no cyanosis,clubbing or edema  SKIN:  No rash/erythema/ecchymoses/petechiae/wounds/abscess/warm/dry  NEURO:  Alert, oriented, no asterixis, no tremor, no encephalopathy    LABS:                        11.8   17.3  )-----------( 274      ( 01 Nov 2018 17:17 )             35.7     11-01    140  |  96  |  18  ----------------------------<  109<H>  4.3   |  29  |  1.09    Ca    9.4      01 Nov 2018 17:17    TPro  7.7  /  Alb  4.0  /  TBili  0.4  /  DBili  x   /  AST  24  /  ALT  21  /  AlkPhos  84  11-01    LIVER FUNCTIONS - ( 01 Nov 2018 17:17 )  Alb: 4.0 g/dL / Pro: 7.7 g/dL / ALK PHOS: 84 U/L / ALT: 21 U/L / AST: 24 U/L / GGT: x                   Imaging:

## 2018-11-03 LAB
APPEARANCE UR: CLEAR — SIGNIFICANT CHANGE UP
BILIRUB UR-MCNC: NEGATIVE — SIGNIFICANT CHANGE UP
COLOR SPEC: SIGNIFICANT CHANGE UP
DIFF PNL FLD: NEGATIVE — SIGNIFICANT CHANGE UP
GLUCOSE UR QL: NEGATIVE — SIGNIFICANT CHANGE UP
HIV 1+2 AB+HIV1 P24 AG SERPL QL IA: SIGNIFICANT CHANGE UP
KETONES UR-MCNC: NEGATIVE — SIGNIFICANT CHANGE UP
LEGIONELLA AG UR QL: NEGATIVE — SIGNIFICANT CHANGE UP
LEUKOCYTE ESTERASE UR-ACNC: NEGATIVE — SIGNIFICANT CHANGE UP
NITRITE UR-MCNC: NEGATIVE — SIGNIFICANT CHANGE UP
PH UR: 6 — SIGNIFICANT CHANGE UP (ref 5–8)
PROT UR-MCNC: NEGATIVE — SIGNIFICANT CHANGE UP
SP GR SPEC: 1.02 — SIGNIFICANT CHANGE UP (ref 1.01–1.02)
UROBILINOGEN FLD QL: NEGATIVE — SIGNIFICANT CHANGE UP

## 2018-11-03 RX ORDER — DOCUSATE SODIUM 100 MG
100 CAPSULE ORAL THREE TIMES A DAY
Qty: 0 | Refills: 0 | Status: DISCONTINUED | OUTPATIENT
Start: 2018-11-03 | End: 2018-11-08

## 2018-11-03 RX ADMIN — HEPARIN SODIUM 5000 UNIT(S): 5000 INJECTION INTRAVENOUS; SUBCUTANEOUS at 05:40

## 2018-11-03 RX ADMIN — DULOXETINE HYDROCHLORIDE 60 MILLIGRAM(S): 30 CAPSULE, DELAYED RELEASE ORAL at 05:40

## 2018-11-03 RX ADMIN — AZITHROMYCIN 250 MILLIGRAM(S): 500 TABLET, FILM COATED ORAL at 21:14

## 2018-11-03 RX ADMIN — Medication 1 GRAM(S): at 05:40

## 2018-11-03 RX ADMIN — METHADONE HYDROCHLORIDE 10 MILLIGRAM(S): 40 TABLET ORAL at 00:07

## 2018-11-03 RX ADMIN — ALBUTEROL 1.25 MILLIGRAM(S): 90 AEROSOL, METERED ORAL at 00:09

## 2018-11-03 RX ADMIN — MORPHINE SULFATE 30 MILLIGRAM(S): 50 CAPSULE, EXTENDED RELEASE ORAL at 19:54

## 2018-11-03 RX ADMIN — Medication 2 MILLIGRAM(S): at 01:26

## 2018-11-03 RX ADMIN — DULOXETINE HYDROCHLORIDE 60 MILLIGRAM(S): 30 CAPSULE, DELAYED RELEASE ORAL at 17:02

## 2018-11-03 RX ADMIN — Medication 100 MILLIGRAM(S): at 21:39

## 2018-11-03 RX ADMIN — ALBUTEROL 1.25 MILLIGRAM(S): 90 AEROSOL, METERED ORAL at 17:50

## 2018-11-03 RX ADMIN — SODIUM CHLORIDE 75 MILLILITER(S): 9 INJECTION INTRAMUSCULAR; INTRAVENOUS; SUBCUTANEOUS at 17:50

## 2018-11-03 RX ADMIN — Medication 1 GRAM(S): at 17:02

## 2018-11-03 RX ADMIN — HEPARIN SODIUM 5000 UNIT(S): 5000 INJECTION INTRAVENOUS; SUBCUTANEOUS at 17:02

## 2018-11-03 RX ADMIN — MORPHINE SULFATE 30 MILLIGRAM(S): 50 CAPSULE, EXTENDED RELEASE ORAL at 20:30

## 2018-11-03 RX ADMIN — Medication 0.1 MILLIGRAM(S): at 05:40

## 2018-11-03 RX ADMIN — Medication 0.1 MILLIGRAM(S): at 21:14

## 2018-11-03 RX ADMIN — POLYETHYLENE GLYCOL 3350 17 GRAM(S): 17 POWDER, FOR SOLUTION ORAL at 17:53

## 2018-11-03 RX ADMIN — CEFTRIAXONE 100 GRAM(S): 500 INJECTION, POWDER, FOR SOLUTION INTRAMUSCULAR; INTRAVENOUS at 17:50

## 2018-11-03 RX ADMIN — MORPHINE SULFATE 30 MILLIGRAM(S): 50 CAPSULE, EXTENDED RELEASE ORAL at 00:06

## 2018-11-03 RX ADMIN — SODIUM CHLORIDE 75 MILLILITER(S): 9 INJECTION INTRAMUSCULAR; INTRAVENOUS; SUBCUTANEOUS at 05:40

## 2018-11-03 RX ADMIN — MORPHINE SULFATE 30 MILLIGRAM(S): 50 CAPSULE, EXTENDED RELEASE ORAL at 00:36

## 2018-11-03 RX ADMIN — ALBUTEROL 1.25 MILLIGRAM(S): 90 AEROSOL, METERED ORAL at 05:40

## 2018-11-03 RX ADMIN — POLYETHYLENE GLYCOL 3350 17 GRAM(S): 17 POWDER, FOR SOLUTION ORAL at 21:37

## 2018-11-03 RX ADMIN — ALBUTEROL 1.25 MILLIGRAM(S): 90 AEROSOL, METERED ORAL at 12:58

## 2018-11-03 RX ADMIN — Medication 0.1 MILLIGRAM(S): at 14:00

## 2018-11-03 RX ADMIN — Medication 2 MILLIGRAM(S): at 10:33

## 2018-11-03 RX ADMIN — METHADONE HYDROCHLORIDE 10 MILLIGRAM(S): 40 TABLET ORAL at 19:55

## 2018-11-03 NOTE — CONSULT NOTE ADULT - SUBJECTIVE AND OBJECTIVE BOX
CHIEF COMPLAINT: Cough    HPI:61 yo male presents from home complaining of few day hx of productive cough with weakness a ?fever. He denies chest pain, hemoptysis, n/v or diarrhea  Chest CT upon admission revealed a right lung infiltrate    PAST MEDICAL & SURGICAL HISTORY:  Anxiety and depression  Diastolic dysfunction: stage I  Empyema lun2015 left lung, s/p VATS, decortication  H/O peptic ulcer: over 10 years ago  history of renal calculus  Herniated Disc: S/P work injury   Hypertension: Dx:   Spinal Stenosis  Postlaminectomy Syndrome  S/P  shunt  S/P insertion of spinal cord stimulator:   History of lumbar fusion:   Insertion of Intrathecal Dilaudid Pump: 2011  S/P Spinal Surgery: corrective lumbar fusion   S/P Knee Replacement: left knee   S/P Arthroscopy of Left Knee  S/P Tonsillectomy: childhood  Ankle Fracture: s/p ORIF left ankle     Former smoker  > 40 pack years having stopped 6 yrs ago; never treated with inhaled meds    No Known Allergies    T(C): 36.6 (2018 17:32), Max: 37.2 (2018 01:23)  T(F): 97.9 (2018 17:32), Max: 98.9 (2018 01:23)  HR: 86 (2018 17:32) (53 - 86)  BP: 124/67 (2018 17:32) (124/67 - 143/80)  RR: 18 (2018 17:32) (18 - 18)  SpO2: 95% (2018 17:32) (95% - 98%)         @ 07: @ 07:00  --------------------------------------------------------  IN: 1750 mL / OUT: 2175 mL / NET: -425 mL     @ 08: @ 18:28  --------------------------------------------------------  IN: 950 mL / OUT: 0 mL / NET: 950 mL      PHYSICAL EXAM:  General: A&O x3  NAD  HEENT: nl  Neck: supple No JVD  Respiratory: Minimal rhonchi bilat  Good air entry  Cardiovascular: Reg s1s2 nl  Abdomen: non tender  Extremities: no edema    HOSPITAL MEDICATIONS:  MEDICATIONS  (STANDING):  ALBUTerol   0.042% 1.25 milliGRAM(s) Nebulizer every 6 hours  azithromycin  IVPB 500 milliGRAM(s) IV Intermittent every 24 hours  cefTRIAXone   IVPB 1 Gram(s) IV Intermittent every 24 hours  cloNIDine 0.1 milliGRAM(s) Oral every 8 hours  diazepam    Tablet 2 milliGRAM(s) Oral three times a day  DULoxetine 60 milliGRAM(s) Oral two times a day  heparin  Injectable 5000 Unit(s) SubCutaneous every 12 hours  influenza   Vaccine 0.5 milliLiter(s) IntraMuscular once  sodium chloride 0.9%. 1000 milliLiter(s) (75 mL/Hr) IV Continuous <Continuous>  sucralfate suspension 1 Gram(s) Oral two times a day    MEDICATIONS  (PRN):  acetaminophen   Tablet .. 650 milliGRAM(s) Oral every 6 hours PRN Temp greater or equal to 38C (100.4F)  methadone    Tablet 10 milliGRAM(s) Oral every 8 hours PRN Severe Pain (7 - 10)  morphine  IR 30 milliGRAM(s) Oral every 8 hours PRN Severe Pain (7 - 10)  polyethylene glycol 3350 17 Gram(s) Oral two times a day PRN Constipation      LABS:                        10.9   6.82  )-----------( 239      ( 2018 20:37 )             36.1       Chest Ct- R lower lobe infiltrate . Hiatal hernia with esophageal distension

## 2018-11-03 NOTE — PROGRESS NOTE ADULT - ASSESSMENT
pt w/ pna /  ct chest showing intrathoracic stomach and dilated esophagus  concern for aspiration  GI eval noted  esophogram pending   id eval noted  c/w abx per ID  pulm eval   nebs  dvt proph  cont outpt meds  analgesics for pain

## 2018-11-04 LAB
ANION GAP SERPL CALC-SCNC: 10 MMOL/L — SIGNIFICANT CHANGE UP (ref 5–17)
BUN SERPL-MCNC: 11 MG/DL — SIGNIFICANT CHANGE UP (ref 7–23)
CALCIUM SERPL-MCNC: 9.3 MG/DL — SIGNIFICANT CHANGE UP (ref 8.4–10.5)
CHLORIDE SERPL-SCNC: 106 MMOL/L — SIGNIFICANT CHANGE UP (ref 96–108)
CO2 SERPL-SCNC: 29 MMOL/L — SIGNIFICANT CHANGE UP (ref 22–31)
CREAT SERPL-MCNC: 0.92 MG/DL — SIGNIFICANT CHANGE UP (ref 0.5–1.3)
GLUCOSE SERPL-MCNC: 73 MG/DL — SIGNIFICANT CHANGE UP (ref 70–99)
GRAM STN FLD: SIGNIFICANT CHANGE UP
HCT VFR BLD CALC: 31.6 % — LOW (ref 39–50)
HGB BLD-MCNC: 9.7 G/DL — LOW (ref 13–17)
MCHC RBC-ENTMCNC: 26.6 PG — LOW (ref 27–34)
MCHC RBC-ENTMCNC: 30.7 GM/DL — LOW (ref 32–36)
MCV RBC AUTO: 86.8 FL — SIGNIFICANT CHANGE UP (ref 80–100)
PLATELET # BLD AUTO: 249 K/UL — SIGNIFICANT CHANGE UP (ref 150–400)
POTASSIUM SERPL-MCNC: 3.7 MMOL/L — SIGNIFICANT CHANGE UP (ref 3.5–5.3)
POTASSIUM SERPL-SCNC: 3.7 MMOL/L — SIGNIFICANT CHANGE UP (ref 3.5–5.3)
RBC # BLD: 3.64 M/UL — LOW (ref 4.2–5.8)
RBC # FLD: 16.6 % — HIGH (ref 10.3–14.5)
SODIUM SERPL-SCNC: 145 MMOL/L — SIGNIFICANT CHANGE UP (ref 135–145)
SPECIMEN SOURCE: SIGNIFICANT CHANGE UP
WBC # BLD: 5.23 K/UL — SIGNIFICANT CHANGE UP (ref 3.8–10.5)
WBC # FLD AUTO: 5.23 K/UL — SIGNIFICANT CHANGE UP (ref 3.8–10.5)

## 2018-11-04 PROCEDURE — 99232 SBSQ HOSP IP/OBS MODERATE 35: CPT

## 2018-11-04 RX ORDER — DIAZEPAM 5 MG
5 TABLET ORAL ONCE
Qty: 0 | Refills: 0 | Status: DISCONTINUED | OUTPATIENT
Start: 2018-11-04 | End: 2018-11-04

## 2018-11-04 RX ADMIN — Medication 100 MILLIGRAM(S): at 14:20

## 2018-11-04 RX ADMIN — HEPARIN SODIUM 5000 UNIT(S): 5000 INJECTION INTRAVENOUS; SUBCUTANEOUS at 06:09

## 2018-11-04 RX ADMIN — MORPHINE SULFATE 30 MILLIGRAM(S): 50 CAPSULE, EXTENDED RELEASE ORAL at 16:00

## 2018-11-04 RX ADMIN — Medication 2 MILLIGRAM(S): at 18:30

## 2018-11-04 RX ADMIN — METHADONE HYDROCHLORIDE 10 MILLIGRAM(S): 40 TABLET ORAL at 16:11

## 2018-11-04 RX ADMIN — Medication 100 MILLIGRAM(S): at 21:27

## 2018-11-04 RX ADMIN — CEFTRIAXONE 100 GRAM(S): 500 INJECTION, POWDER, FOR SOLUTION INTRAMUSCULAR; INTRAVENOUS at 17:08

## 2018-11-04 RX ADMIN — Medication 2 MILLIGRAM(S): at 11:55

## 2018-11-04 RX ADMIN — Medication 1 GRAM(S): at 06:09

## 2018-11-04 RX ADMIN — MORPHINE SULFATE 30 MILLIGRAM(S): 50 CAPSULE, EXTENDED RELEASE ORAL at 15:24

## 2018-11-04 RX ADMIN — Medication 0.1 MILLIGRAM(S): at 21:27

## 2018-11-04 RX ADMIN — MORPHINE SULFATE 30 MILLIGRAM(S): 50 CAPSULE, EXTENDED RELEASE ORAL at 03:28

## 2018-11-04 RX ADMIN — DULOXETINE HYDROCHLORIDE 60 MILLIGRAM(S): 30 CAPSULE, DELAYED RELEASE ORAL at 17:07

## 2018-11-04 RX ADMIN — AZITHROMYCIN 250 MILLIGRAM(S): 500 TABLET, FILM COATED ORAL at 21:29

## 2018-11-04 RX ADMIN — Medication 0.1 MILLIGRAM(S): at 06:09

## 2018-11-04 RX ADMIN — SODIUM CHLORIDE 75 MILLILITER(S): 9 INJECTION INTRAMUSCULAR; INTRAVENOUS; SUBCUTANEOUS at 06:10

## 2018-11-04 RX ADMIN — ALBUTEROL 1.25 MILLIGRAM(S): 90 AEROSOL, METERED ORAL at 11:55

## 2018-11-04 RX ADMIN — ALBUTEROL 1.25 MILLIGRAM(S): 90 AEROSOL, METERED ORAL at 06:10

## 2018-11-04 RX ADMIN — POLYETHYLENE GLYCOL 3350 17 GRAM(S): 17 POWDER, FOR SOLUTION ORAL at 14:20

## 2018-11-04 RX ADMIN — Medication 1 GRAM(S): at 17:07

## 2018-11-04 RX ADMIN — ALBUTEROL 1.25 MILLIGRAM(S): 90 AEROSOL, METERED ORAL at 17:08

## 2018-11-04 RX ADMIN — METHADONE HYDROCHLORIDE 10 MILLIGRAM(S): 40 TABLET ORAL at 03:28

## 2018-11-04 RX ADMIN — POLYETHYLENE GLYCOL 3350 17 GRAM(S): 17 POWDER, FOR SOLUTION ORAL at 21:27

## 2018-11-04 RX ADMIN — DULOXETINE HYDROCHLORIDE 60 MILLIGRAM(S): 30 CAPSULE, DELAYED RELEASE ORAL at 06:10

## 2018-11-04 RX ADMIN — Medication 0.1 MILLIGRAM(S): at 15:23

## 2018-11-04 RX ADMIN — HEPARIN SODIUM 5000 UNIT(S): 5000 INJECTION INTRAVENOUS; SUBCUTANEOUS at 17:07

## 2018-11-04 RX ADMIN — Medication 100 MILLIGRAM(S): at 06:09

## 2018-11-04 RX ADMIN — MORPHINE SULFATE 30 MILLIGRAM(S): 50 CAPSULE, EXTENDED RELEASE ORAL at 04:00

## 2018-11-04 NOTE — PROGRESS NOTE ADULT - ASSESSMENT
pt w/ pna /  ct chest showing intrathoracic stomach and dilated esophagus  concern for aspiration  GI eval noted  esophogram pending, possibly EGD after  id eval noted  c/w abx per ID  pulm eval noted  nebs  dvt proph  cont outpt meds  analgesics for pain

## 2018-11-05 LAB
HCT VFR BLD CALC: 30.1 % — LOW (ref 39–50)
HGB BLD-MCNC: 9.2 G/DL — LOW (ref 13–17)
MCHC RBC-ENTMCNC: 26.5 PG — LOW (ref 27–34)
MCHC RBC-ENTMCNC: 30.6 GM/DL — LOW (ref 32–36)
MCV RBC AUTO: 86.7 FL — SIGNIFICANT CHANGE UP (ref 80–100)
PLATELET # BLD AUTO: 234 K/UL — SIGNIFICANT CHANGE UP (ref 150–400)
RBC # BLD: 3.47 M/UL — LOW (ref 4.2–5.8)
RBC # FLD: 16.5 % — HIGH (ref 10.3–14.5)
WBC # BLD: 5.35 K/UL — SIGNIFICANT CHANGE UP (ref 3.8–10.5)
WBC # FLD AUTO: 5.35 K/UL — SIGNIFICANT CHANGE UP (ref 3.8–10.5)

## 2018-11-05 PROCEDURE — 99232 SBSQ HOSP IP/OBS MODERATE 35: CPT

## 2018-11-05 RX ORDER — CALCIUM CARBONATE 500(1250)
2 TABLET ORAL ONCE
Qty: 0 | Refills: 0 | Status: COMPLETED | OUTPATIENT
Start: 2018-11-05 | End: 2018-11-05

## 2018-11-05 RX ADMIN — Medication 100 MILLIGRAM(S): at 18:08

## 2018-11-05 RX ADMIN — MORPHINE SULFATE 30 MILLIGRAM(S): 50 CAPSULE, EXTENDED RELEASE ORAL at 10:56

## 2018-11-05 RX ADMIN — ALBUTEROL 1.25 MILLIGRAM(S): 90 AEROSOL, METERED ORAL at 12:25

## 2018-11-05 RX ADMIN — MORPHINE SULFATE 30 MILLIGRAM(S): 50 CAPSULE, EXTENDED RELEASE ORAL at 21:30

## 2018-11-05 RX ADMIN — MORPHINE SULFATE 30 MILLIGRAM(S): 50 CAPSULE, EXTENDED RELEASE ORAL at 01:01

## 2018-11-05 RX ADMIN — METHADONE HYDROCHLORIDE 10 MILLIGRAM(S): 40 TABLET ORAL at 21:31

## 2018-11-05 RX ADMIN — Medication 1 GRAM(S): at 05:58

## 2018-11-05 RX ADMIN — Medication 0.1 MILLIGRAM(S): at 17:12

## 2018-11-05 RX ADMIN — HEPARIN SODIUM 5000 UNIT(S): 5000 INJECTION INTRAVENOUS; SUBCUTANEOUS at 05:58

## 2018-11-05 RX ADMIN — MORPHINE SULFATE 30 MILLIGRAM(S): 50 CAPSULE, EXTENDED RELEASE ORAL at 00:31

## 2018-11-05 RX ADMIN — Medication 2 MILLIGRAM(S): at 18:18

## 2018-11-05 RX ADMIN — Medication 0.1 MILLIGRAM(S): at 05:59

## 2018-11-05 RX ADMIN — CEFTRIAXONE 100 GRAM(S): 500 INJECTION, POWDER, FOR SOLUTION INTRAMUSCULAR; INTRAVENOUS at 18:09

## 2018-11-05 RX ADMIN — POLYETHYLENE GLYCOL 3350 17 GRAM(S): 17 POWDER, FOR SOLUTION ORAL at 21:30

## 2018-11-05 RX ADMIN — Medication 100 MILLIGRAM(S): at 21:30

## 2018-11-05 RX ADMIN — Medication 2 MILLIGRAM(S): at 12:24

## 2018-11-05 RX ADMIN — HEPARIN SODIUM 5000 UNIT(S): 5000 INJECTION INTRAVENOUS; SUBCUTANEOUS at 17:15

## 2018-11-05 RX ADMIN — SODIUM CHLORIDE 75 MILLILITER(S): 9 INJECTION INTRAMUSCULAR; INTRAVENOUS; SUBCUTANEOUS at 02:50

## 2018-11-05 RX ADMIN — Medication 2 TABLET(S): at 22:52

## 2018-11-05 RX ADMIN — METHADONE HYDROCHLORIDE 10 MILLIGRAM(S): 40 TABLET ORAL at 10:56

## 2018-11-05 RX ADMIN — DULOXETINE HYDROCHLORIDE 60 MILLIGRAM(S): 30 CAPSULE, DELAYED RELEASE ORAL at 17:14

## 2018-11-05 RX ADMIN — Medication 2 MILLIGRAM(S): at 02:50

## 2018-11-05 RX ADMIN — ALBUTEROL 1.25 MILLIGRAM(S): 90 AEROSOL, METERED ORAL at 05:59

## 2018-11-05 RX ADMIN — DULOXETINE HYDROCHLORIDE 60 MILLIGRAM(S): 30 CAPSULE, DELAYED RELEASE ORAL at 05:59

## 2018-11-05 RX ADMIN — MORPHINE SULFATE 30 MILLIGRAM(S): 50 CAPSULE, EXTENDED RELEASE ORAL at 22:00

## 2018-11-05 RX ADMIN — Medication 1 GRAM(S): at 17:15

## 2018-11-05 RX ADMIN — ALBUTEROL 1.25 MILLIGRAM(S): 90 AEROSOL, METERED ORAL at 17:13

## 2018-11-05 RX ADMIN — MORPHINE SULFATE 30 MILLIGRAM(S): 50 CAPSULE, EXTENDED RELEASE ORAL at 18:59

## 2018-11-05 RX ADMIN — METHADONE HYDROCHLORIDE 10 MILLIGRAM(S): 40 TABLET ORAL at 00:31

## 2018-11-05 RX ADMIN — ALBUTEROL 1.25 MILLIGRAM(S): 90 AEROSOL, METERED ORAL at 23:12

## 2018-11-05 RX ADMIN — ALBUTEROL 1.25 MILLIGRAM(S): 90 AEROSOL, METERED ORAL at 00:33

## 2018-11-05 RX ADMIN — Medication 0.1 MILLIGRAM(S): at 21:31

## 2018-11-05 NOTE — PROGRESS NOTE ADULT - ASSESSMENT
63 yo M with HTN, prior back surgeries, prior episode of multilobar pneumonia presenting with cough, fever.  No documented fevers; has leukocytosis  CT with R sided bacterial pneumonia, distended esophagus (fluid), and hiatal hernia  No apparent history of episodes of aspiration  HIV, legionella negative  Evaluation for esophagus dilation underway by GI  Staph aureus in sputum--S pending  Overall, CAP, leukocytosis, abnormal finding on imaging  - Ceftriaxone 1g q 24  - Change Azithro to Doxycyline 100mg q 12 (both atypical and staph coverage)  - Follow up pending sputum culture result   - Workup for ? achalasia per GI    Rupesh Jane MD  Pager [phone]  After 5pm and on weekends call [phone]

## 2018-11-05 NOTE — PROGRESS NOTE ADULT - ASSESSMENT
pt w/ pna /  ct chest showing intrathoracic stomach and dilated esophagus  concern for aspiration  GI eval noted  esophogram pending, possibly EGD after  id eval noted  c/w abx per ID, ID to comment on duration/PO options  pulm eval noted  nebs  dvt proph  cont outpt meds  analgesics for pain

## 2018-11-05 NOTE — PROVIDER CONTACT NOTE (OTHER) - ASSESSMENT
/84. BP at 23:35 was 153/55 and at 21:00 160/89. Patient asymptomatic and received clonidine at 21:30. Patient normal BP ranges from 130's-140's.

## 2018-11-06 LAB
-  AMPICILLIN/SULBACTAM: SIGNIFICANT CHANGE UP
-  CEFAZOLIN: SIGNIFICANT CHANGE UP
-  CLINDAMYCIN: SIGNIFICANT CHANGE UP
-  ERYTHROMYCIN: SIGNIFICANT CHANGE UP
-  GENTAMICIN: SIGNIFICANT CHANGE UP
-  LINEZOLID: SIGNIFICANT CHANGE UP
-  OXACILLIN: SIGNIFICANT CHANGE UP
-  PENICILLIN: SIGNIFICANT CHANGE UP
-  RIFAMPIN: SIGNIFICANT CHANGE UP
-  TETRACYCLINE: SIGNIFICANT CHANGE UP
-  TRIMETHOPRIM/SULFAMETHOXAZOLE: SIGNIFICANT CHANGE UP
-  VANCOMYCIN: SIGNIFICANT CHANGE UP
CULTURE RESULTS: SIGNIFICANT CHANGE UP
HCT VFR BLD CALC: 32.1 % — LOW (ref 39–50)
HGB BLD-MCNC: 10 G/DL — LOW (ref 13–17)
MCHC RBC-ENTMCNC: 27.4 PG — SIGNIFICANT CHANGE UP (ref 27–34)
MCHC RBC-ENTMCNC: 31.2 GM/DL — LOW (ref 32–36)
MCV RBC AUTO: 87.9 FL — SIGNIFICANT CHANGE UP (ref 80–100)
METHOD TYPE: SIGNIFICANT CHANGE UP
ORGANISM # SPEC MICROSCOPIC CNT: SIGNIFICANT CHANGE UP
ORGANISM # SPEC MICROSCOPIC CNT: SIGNIFICANT CHANGE UP
PLATELET # BLD AUTO: 258 K/UL — SIGNIFICANT CHANGE UP (ref 150–400)
RBC # BLD: 3.65 M/UL — LOW (ref 4.2–5.8)
RBC # FLD: 16.3 % — HIGH (ref 10.3–14.5)
SPECIMEN SOURCE: SIGNIFICANT CHANGE UP
WBC # BLD: 5.49 K/UL — SIGNIFICANT CHANGE UP (ref 3.8–10.5)
WBC # FLD AUTO: 5.49 K/UL — SIGNIFICANT CHANGE UP (ref 3.8–10.5)

## 2018-11-06 PROCEDURE — 99232 SBSQ HOSP IP/OBS MODERATE 35: CPT

## 2018-11-06 PROCEDURE — 74220 X-RAY XM ESOPHAGUS 1CNTRST: CPT | Mod: 26

## 2018-11-06 RX ADMIN — Medication 2 MILLIGRAM(S): at 18:19

## 2018-11-06 RX ADMIN — Medication 2 TABLET(S): at 18:26

## 2018-11-06 RX ADMIN — Medication 100 MILLIGRAM(S): at 21:51

## 2018-11-06 RX ADMIN — METHADONE HYDROCHLORIDE 10 MILLIGRAM(S): 40 TABLET ORAL at 10:17

## 2018-11-06 RX ADMIN — CEFTRIAXONE 100 GRAM(S): 500 INJECTION, POWDER, FOR SOLUTION INTRAMUSCULAR; INTRAVENOUS at 18:19

## 2018-11-06 RX ADMIN — Medication 0.1 MILLIGRAM(S): at 14:48

## 2018-11-06 RX ADMIN — Medication 2 MILLIGRAM(S): at 02:20

## 2018-11-06 RX ADMIN — HEPARIN SODIUM 5000 UNIT(S): 5000 INJECTION INTRAVENOUS; SUBCUTANEOUS at 18:20

## 2018-11-06 RX ADMIN — Medication 30 MILLILITER(S): at 22:59

## 2018-11-06 RX ADMIN — Medication 1 GRAM(S): at 18:19

## 2018-11-06 RX ADMIN — POLYETHYLENE GLYCOL 3350 17 GRAM(S): 17 POWDER, FOR SOLUTION ORAL at 22:59

## 2018-11-06 RX ADMIN — ALBUTEROL 1.25 MILLIGRAM(S): 90 AEROSOL, METERED ORAL at 12:32

## 2018-11-06 RX ADMIN — ALBUTEROL 1.25 MILLIGRAM(S): 90 AEROSOL, METERED ORAL at 21:55

## 2018-11-06 RX ADMIN — METHADONE HYDROCHLORIDE 10 MILLIGRAM(S): 40 TABLET ORAL at 21:51

## 2018-11-06 RX ADMIN — Medication 100 MILLIGRAM(S): at 06:37

## 2018-11-06 RX ADMIN — Medication 0.1 MILLIGRAM(S): at 06:37

## 2018-11-06 RX ADMIN — SODIUM CHLORIDE 75 MILLILITER(S): 9 INJECTION INTRAMUSCULAR; INTRAVENOUS; SUBCUTANEOUS at 00:54

## 2018-11-06 RX ADMIN — ALBUTEROL 1.25 MILLIGRAM(S): 90 AEROSOL, METERED ORAL at 06:37

## 2018-11-06 RX ADMIN — DULOXETINE HYDROCHLORIDE 60 MILLIGRAM(S): 30 CAPSULE, DELAYED RELEASE ORAL at 06:37

## 2018-11-06 RX ADMIN — Medication 1 GRAM(S): at 06:37

## 2018-11-06 RX ADMIN — MORPHINE SULFATE 30 MILLIGRAM(S): 50 CAPSULE, EXTENDED RELEASE ORAL at 21:52

## 2018-11-06 RX ADMIN — HEPARIN SODIUM 5000 UNIT(S): 5000 INJECTION INTRAVENOUS; SUBCUTANEOUS at 06:36

## 2018-11-06 RX ADMIN — MORPHINE SULFATE 30 MILLIGRAM(S): 50 CAPSULE, EXTENDED RELEASE ORAL at 10:17

## 2018-11-06 RX ADMIN — Medication 100 MILLIGRAM(S): at 14:48

## 2018-11-06 RX ADMIN — Medication 0.1 MILLIGRAM(S): at 21:52

## 2018-11-06 RX ADMIN — MORPHINE SULFATE 30 MILLIGRAM(S): 50 CAPSULE, EXTENDED RELEASE ORAL at 10:47

## 2018-11-06 RX ADMIN — MORPHINE SULFATE 30 MILLIGRAM(S): 50 CAPSULE, EXTENDED RELEASE ORAL at 22:30

## 2018-11-06 RX ADMIN — DULOXETINE HYDROCHLORIDE 60 MILLIGRAM(S): 30 CAPSULE, DELAYED RELEASE ORAL at 18:19

## 2018-11-06 NOTE — SWALLOW BEDSIDE ASSESSMENT ADULT - COMMENTS
Per medicine, ct chest showing intrathoracic stomach and dilated esophagus concern for aspiration GI eval noted esophogram pending, possibly EGD after id eval noted c/w abx per ID. Seen by GI, may need barium esophagogram if positive for achalasia will need esophageal manometry as well as  upper gastrointestinal endoscopy with balloon dilation to occur. Planned for esophagram->radiology referred for swallow consult prior to esophagram. 11/6 +MRSA in sputum  11/1 CT Chest: Pneumonia in the right lung

## 2018-11-06 NOTE — SWALLOW BEDSIDE ASSESSMENT ADULT - SLP PERTINENT HISTORY OF CURRENT PROBLEM
62 y old male with a history of hypertension, s/p back surgery 3 times last one 2 y ago ,in 4/18 was admitted to [facility] for multilobar pneumonia .came in complains of fever chills ,couth with yellow sputum, chills and rigor since yesterday ,ex smoker stopped 6 m ago. GI consulted for hiatal hernia and distended fluid-filled esophagus seen on CT chest. Pt denies abdominal pain, n/v/d/c, brbpr/melena, dysphagia. Pt underwent EGD 2 months ago and was told he had a large hiatal hernia, pt takes Carafate TID at home. Last colonoscopy 4 years ago which was reportedly negative

## 2018-11-06 NOTE — PROGRESS NOTE ADULT - ASSESSMENT
61 yo M with HTN, prior back surgeries, prior episode of multilobar pneumonia presenting with cough, fever.  No documented fevers; has leukocytosis  CT with R sided bacterial pneumonia, distended esophagus (fluid), and hiatal hernia  No apparent history of episodes of aspiration  HIV, legionella negative  Evaluation for esophagus dilation underway by GI  MRSA in sputum  Note that despite sputum finding, patient had significant improvement in leukocytosis and clinical condition on non-MRSA active meds--give brief course for MRSA  Overall, CAP, leukocytosis, abnormal finding on imaging  - Ceftriaxone 1g q 24 through 11/7/18; can complete with Ceftin PO if DC planning  - Bactrim DS 2 tabs q 12 through 11/13/18  - Workup for ? achalasia per GI    Rupesh Jane MD  Pager [phone]  After 5pm and on weekends call [phone]

## 2018-11-06 NOTE — SWALLOW BEDSIDE ASSESSMENT ADULT - SWALLOW EVAL: PATIENT/FAMILY GOALS STATEMENT
Pt c/o "severe" reflux for which he takes tums and carafate. Admits to sleeping flat, prone on right side. Coughs after meals sometimes.

## 2018-11-06 NOTE — PROGRESS NOTE ADULT - ASSESSMENT
pt w/ pna /  ct chest showing intrathoracic stomach and dilated esophagus  concern for aspiration  GI eval noted  esophogram and MBS pending, ? EGD after  id eval noted  c/w abx per ID, ID to comment on duration/PO options  pulm eval noted  nebs  dvt proph  cont outpt meds  analgesics for pain

## 2018-11-07 PROCEDURE — 99232 SBSQ HOSP IP/OBS MODERATE 35: CPT

## 2018-11-07 RX ORDER — CHLORHEXIDINE GLUCONATE 213 G/1000ML
1 SOLUTION TOPICAL
Qty: 0 | Refills: 0 | Status: DISCONTINUED | OUTPATIENT
Start: 2018-11-07 | End: 2018-11-08

## 2018-11-07 RX ADMIN — Medication 0.1 MILLIGRAM(S): at 13:34

## 2018-11-07 RX ADMIN — Medication 2 MILLIGRAM(S): at 01:41

## 2018-11-07 RX ADMIN — Medication 0.1 MILLIGRAM(S): at 21:34

## 2018-11-07 RX ADMIN — Medication 1 GRAM(S): at 06:11

## 2018-11-07 RX ADMIN — ALBUTEROL 1.25 MILLIGRAM(S): 90 AEROSOL, METERED ORAL at 23:16

## 2018-11-07 RX ADMIN — Medication 2 MILLIGRAM(S): at 10:22

## 2018-11-07 RX ADMIN — ALBUTEROL 1.25 MILLIGRAM(S): 90 AEROSOL, METERED ORAL at 18:21

## 2018-11-07 RX ADMIN — MORPHINE SULFATE 30 MILLIGRAM(S): 50 CAPSULE, EXTENDED RELEASE ORAL at 23:20

## 2018-11-07 RX ADMIN — METHADONE HYDROCHLORIDE 10 MILLIGRAM(S): 40 TABLET ORAL at 14:56

## 2018-11-07 RX ADMIN — Medication 100 MILLIGRAM(S): at 13:33

## 2018-11-07 RX ADMIN — MORPHINE SULFATE 30 MILLIGRAM(S): 50 CAPSULE, EXTENDED RELEASE ORAL at 06:22

## 2018-11-07 RX ADMIN — Medication 2 TABLET(S): at 18:22

## 2018-11-07 RX ADMIN — METHADONE HYDROCHLORIDE 10 MILLIGRAM(S): 40 TABLET ORAL at 23:20

## 2018-11-07 RX ADMIN — Medication 1 GRAM(S): at 18:21

## 2018-11-07 RX ADMIN — HEPARIN SODIUM 5000 UNIT(S): 5000 INJECTION INTRAVENOUS; SUBCUTANEOUS at 06:11

## 2018-11-07 RX ADMIN — POLYETHYLENE GLYCOL 3350 17 GRAM(S): 17 POWDER, FOR SOLUTION ORAL at 21:34

## 2018-11-07 RX ADMIN — MORPHINE SULFATE 30 MILLIGRAM(S): 50 CAPSULE, EXTENDED RELEASE ORAL at 15:15

## 2018-11-07 RX ADMIN — HEPARIN SODIUM 5000 UNIT(S): 5000 INJECTION INTRAVENOUS; SUBCUTANEOUS at 18:22

## 2018-11-07 RX ADMIN — MORPHINE SULFATE 30 MILLIGRAM(S): 50 CAPSULE, EXTENDED RELEASE ORAL at 06:47

## 2018-11-07 RX ADMIN — METHADONE HYDROCHLORIDE 10 MILLIGRAM(S): 40 TABLET ORAL at 06:22

## 2018-11-07 RX ADMIN — Medication 100 MILLIGRAM(S): at 21:34

## 2018-11-07 RX ADMIN — Medication 2 MILLIGRAM(S): at 18:21

## 2018-11-07 RX ADMIN — ALBUTEROL 1.25 MILLIGRAM(S): 90 AEROSOL, METERED ORAL at 13:36

## 2018-11-07 RX ADMIN — DULOXETINE HYDROCHLORIDE 60 MILLIGRAM(S): 30 CAPSULE, DELAYED RELEASE ORAL at 18:22

## 2018-11-07 RX ADMIN — MORPHINE SULFATE 30 MILLIGRAM(S): 50 CAPSULE, EXTENDED RELEASE ORAL at 14:52

## 2018-11-07 RX ADMIN — Medication 100 MILLIGRAM(S): at 06:14

## 2018-11-07 RX ADMIN — CEFTRIAXONE 100 GRAM(S): 500 INJECTION, POWDER, FOR SOLUTION INTRAMUSCULAR; INTRAVENOUS at 18:31

## 2018-11-07 RX ADMIN — Medication 0.1 MILLIGRAM(S): at 06:11

## 2018-11-07 RX ADMIN — DULOXETINE HYDROCHLORIDE 60 MILLIGRAM(S): 30 CAPSULE, DELAYED RELEASE ORAL at 06:11

## 2018-11-07 RX ADMIN — Medication 2 TABLET(S): at 06:11

## 2018-11-07 RX ADMIN — ALBUTEROL 1.25 MILLIGRAM(S): 90 AEROSOL, METERED ORAL at 06:13

## 2018-11-07 NOTE — PROGRESS NOTE ADULT - ASSESSMENT
63 yo M with HTN, prior back surgeries, prior episode of multilobar pneumonia presenting with cough, fever.  No documented fevers; has leukocytosis  CT with R sided bacterial pneumonia, distended esophagus (fluid), and hiatal hernia  No apparent history of episodes of aspiration  HIV, legionella negative  Evaluation for esophagus dilation underway by GI  MRSA in sputum  Note that despite sputum finding, patient had significant improvement in leukocytosis and clinical condition on non-MRSA active meds--give brief course for MRSA  Patient appears well today  Overall, CAP, leukocytosis, abnormal finding on imaging  - Ceftriaxone 1g q 24 through 11/7/18 then discontinue  - Bactrim DS 2 tabs q 12 through 11/13/18  - Planned for EGD with GI  - Will sign off. Please call with further questions or change in status.    Rupesh Jane MD  Pager [phone]  After 5pm and on weekends call [phone]

## 2018-11-07 NOTE — PROGRESS NOTE ADULT - ASSESSMENT
pt w/ pna /  ct chest showing intrathoracic stomach and dilated esophagus  concern for aspiration  GI eval noted  esophogram reviewed  EGD tomorrow  id eval noted  c/w abx per ID, transition to PO upon discharge  pulm eval noted  nebs  dvt proph  cont outpt meds  analgesics for pain   ? d/c planning after EGD if no other procedures from GI

## 2018-11-07 NOTE — PROGRESS NOTE ADULT - PROBLEM SELECTOR PLAN 1
gerd precautions  in and out  ppi once a day  may need egd  advance diet    may need barium esophagogram if positive for achalasia will need esophageal manometry as well as  upper gastrointestinal endoscopy with botox injection
gerd precautions  in and out  ppi once a day  may need  upper gastrointestinal endoscopy pending esophagram and mbs results   resume previous diet
gerd precautions  in and out  ppi once a day  may need egd  advance diet    may need barium esophagogram if positive for achalasia will need esophageal manometry as well as  upper gastrointestinal endoscopy with balloon dilation to occur
gerd precautions  in and out  ppi once a day  resume previous diet  esophagram reviewed   upper gastrointestinal endoscopy in am  npo p mn

## 2018-11-08 ENCOUNTER — RESULT REVIEW (OUTPATIENT)
Age: 63
End: 2018-11-08

## 2018-11-08 ENCOUNTER — TRANSCRIPTION ENCOUNTER (OUTPATIENT)
Age: 63
End: 2018-11-08

## 2018-11-08 VITALS
RESPIRATION RATE: 18 BRPM | SYSTOLIC BLOOD PRESSURE: 151 MMHG | OXYGEN SATURATION: 97 % | HEART RATE: 60 BPM | DIASTOLIC BLOOD PRESSURE: 82 MMHG | TEMPERATURE: 98 F

## 2018-11-08 LAB
ANION GAP SERPL CALC-SCNC: 15 MMOL/L — SIGNIFICANT CHANGE UP (ref 5–17)
BUN SERPL-MCNC: 15 MG/DL — SIGNIFICANT CHANGE UP (ref 7–23)
CALCIUM SERPL-MCNC: 9.4 MG/DL — SIGNIFICANT CHANGE UP (ref 8.4–10.5)
CHLORIDE SERPL-SCNC: 103 MMOL/L — SIGNIFICANT CHANGE UP (ref 96–108)
CO2 SERPL-SCNC: 25 MMOL/L — SIGNIFICANT CHANGE UP (ref 22–31)
CREAT SERPL-MCNC: 1.24 MG/DL — SIGNIFICANT CHANGE UP (ref 0.5–1.3)
GLUCOSE SERPL-MCNC: 91 MG/DL — SIGNIFICANT CHANGE UP (ref 70–99)
HCT VFR BLD CALC: 32.7 % — LOW (ref 39–50)
HGB BLD-MCNC: 10.2 G/DL — LOW (ref 13–17)
MCHC RBC-ENTMCNC: 26.4 PG — LOW (ref 27–34)
MCHC RBC-ENTMCNC: 31.2 GM/DL — LOW (ref 32–36)
MCV RBC AUTO: 84.7 FL — SIGNIFICANT CHANGE UP (ref 80–100)
PLATELET # BLD AUTO: 256 K/UL — SIGNIFICANT CHANGE UP (ref 150–400)
POTASSIUM SERPL-MCNC: 3.9 MMOL/L — SIGNIFICANT CHANGE UP (ref 3.5–5.3)
POTASSIUM SERPL-SCNC: 3.9 MMOL/L — SIGNIFICANT CHANGE UP (ref 3.5–5.3)
RBC # BLD: 3.86 M/UL — LOW (ref 4.2–5.8)
RBC # FLD: 16.2 % — HIGH (ref 10.3–14.5)
SODIUM SERPL-SCNC: 143 MMOL/L — SIGNIFICANT CHANGE UP (ref 135–145)
WBC # BLD: 6.07 K/UL — SIGNIFICANT CHANGE UP (ref 3.8–10.5)
WBC # FLD AUTO: 6.07 K/UL — SIGNIFICANT CHANGE UP (ref 3.8–10.5)

## 2018-11-08 PROCEDURE — 83605 ASSAY OF LACTIC ACID: CPT

## 2018-11-08 PROCEDURE — 82435 ASSAY OF BLOOD CHLORIDE: CPT

## 2018-11-08 PROCEDURE — 80053 COMPREHEN METABOLIC PANEL: CPT

## 2018-11-08 PROCEDURE — 88312 SPECIAL STAINS GROUP 1: CPT | Mod: 26

## 2018-11-08 PROCEDURE — 94640 AIRWAY INHALATION TREATMENT: CPT

## 2018-11-08 PROCEDURE — 82947 ASSAY GLUCOSE BLOOD QUANT: CPT

## 2018-11-08 PROCEDURE — 87040 BLOOD CULTURE FOR BACTERIA: CPT

## 2018-11-08 PROCEDURE — 85027 COMPLETE CBC AUTOMATED: CPT

## 2018-11-08 PROCEDURE — 82803 BLOOD GASES ANY COMBINATION: CPT

## 2018-11-08 PROCEDURE — 71250 CT THORAX DX C-: CPT

## 2018-11-08 PROCEDURE — 81003 URINALYSIS AUTO W/O SCOPE: CPT

## 2018-11-08 PROCEDURE — 82330 ASSAY OF CALCIUM: CPT

## 2018-11-08 PROCEDURE — 92610 EVALUATE SWALLOWING FUNCTION: CPT

## 2018-11-08 PROCEDURE — 85014 HEMATOCRIT: CPT

## 2018-11-08 PROCEDURE — 87186 SC STD MICRODIL/AGAR DIL: CPT

## 2018-11-08 PROCEDURE — 84295 ASSAY OF SERUM SODIUM: CPT

## 2018-11-08 PROCEDURE — 71046 X-RAY EXAM CHEST 2 VIEWS: CPT

## 2018-11-08 PROCEDURE — 88305 TISSUE EXAM BY PATHOLOGIST: CPT | Mod: 26

## 2018-11-08 PROCEDURE — 87486 CHLMYD PNEUM DNA AMP PROBE: CPT

## 2018-11-08 PROCEDURE — 87798 DETECT AGENT NOS DNA AMP: CPT

## 2018-11-08 PROCEDURE — 84132 ASSAY OF SERUM POTASSIUM: CPT

## 2018-11-08 PROCEDURE — 87389 HIV-1 AG W/HIV-1&-2 AB AG IA: CPT

## 2018-11-08 PROCEDURE — 36415 COLL VENOUS BLD VENIPUNCTURE: CPT

## 2018-11-08 PROCEDURE — 74220 X-RAY XM ESOPHAGUS 1CNTRST: CPT

## 2018-11-08 PROCEDURE — 80048 BASIC METABOLIC PNL TOTAL CA: CPT

## 2018-11-08 PROCEDURE — 87449 NOS EACH ORGANISM AG IA: CPT

## 2018-11-08 PROCEDURE — 87070 CULTURE OTHR SPECIMN AEROBIC: CPT

## 2018-11-08 PROCEDURE — 88312 SPECIAL STAINS GROUP 1: CPT

## 2018-11-08 PROCEDURE — 99285 EMERGENCY DEPT VISIT HI MDM: CPT

## 2018-11-08 PROCEDURE — 87581 M.PNEUMON DNA AMP PROBE: CPT

## 2018-11-08 PROCEDURE — 88305 TISSUE EXAM BY PATHOLOGIST: CPT

## 2018-11-08 PROCEDURE — 87633 RESP VIRUS 12-25 TARGETS: CPT

## 2018-11-08 RX ORDER — ALBUTEROL 90 UG/1
2 AEROSOL, METERED ORAL
Qty: 1 | Refills: 0
Start: 2018-11-08 | End: 2018-01-01

## 2018-11-08 RX ADMIN — Medication 2 MILLIGRAM(S): at 01:33

## 2018-11-08 RX ADMIN — MORPHINE SULFATE 30 MILLIGRAM(S): 50 CAPSULE, EXTENDED RELEASE ORAL at 07:44

## 2018-11-08 RX ADMIN — CHLORHEXIDINE GLUCONATE 1 APPLICATION(S): 213 SOLUTION TOPICAL at 06:00

## 2018-11-08 RX ADMIN — METHADONE HYDROCHLORIDE 10 MILLIGRAM(S): 40 TABLET ORAL at 13:50

## 2018-11-08 RX ADMIN — ALBUTEROL 1.25 MILLIGRAM(S): 90 AEROSOL, METERED ORAL at 05:42

## 2018-11-08 RX ADMIN — METHADONE HYDROCHLORIDE 10 MILLIGRAM(S): 40 TABLET ORAL at 07:46

## 2018-11-08 RX ADMIN — MORPHINE SULFATE 30 MILLIGRAM(S): 50 CAPSULE, EXTENDED RELEASE ORAL at 13:49

## 2018-11-08 RX ADMIN — Medication 2 TABLET(S): at 05:40

## 2018-11-08 RX ADMIN — HEPARIN SODIUM 5000 UNIT(S): 5000 INJECTION INTRAVENOUS; SUBCUTANEOUS at 05:41

## 2018-11-08 RX ADMIN — MORPHINE SULFATE 30 MILLIGRAM(S): 50 CAPSULE, EXTENDED RELEASE ORAL at 00:24

## 2018-11-08 RX ADMIN — MORPHINE SULFATE 30 MILLIGRAM(S): 50 CAPSULE, EXTENDED RELEASE ORAL at 08:40

## 2018-11-08 RX ADMIN — Medication 1 GRAM(S): at 06:01

## 2018-11-08 RX ADMIN — DULOXETINE HYDROCHLORIDE 60 MILLIGRAM(S): 30 CAPSULE, DELAYED RELEASE ORAL at 05:40

## 2018-11-08 RX ADMIN — Medication 0.1 MILLIGRAM(S): at 05:40

## 2018-11-08 RX ADMIN — Medication 0.1 MILLIGRAM(S): at 13:30

## 2018-11-08 RX ADMIN — Medication 100 MILLIGRAM(S): at 05:40

## 2018-11-08 RX ADMIN — ALBUTEROL 1.25 MILLIGRAM(S): 90 AEROSOL, METERED ORAL at 13:29

## 2018-11-08 RX ADMIN — Medication 100 MILLIGRAM(S): at 13:30

## 2018-11-08 NOTE — DISCHARGE NOTE ADULT - PROVIDER TOKENS
TOKEN:'[id number]',FREE:[LAST:[Pradeep],FIRST:[Adama],PHONE:[phone]],FAX:[(   )    -],ADDRESS:[[address] [place] [address]  [phone]]] TOKEN:'[id number]',FREE:[LAST:[Pradeep],FIRST:[Adama],PHONE:[phone]],FAX:[(   )    -],ADDRESS:[[address] [place] [address]  [phone]]],TOKEN:'[id number]'

## 2018-11-08 NOTE — PROGRESS NOTE ADULT - REASON FOR ADMISSION
ID seeing to help w pna management
dyspnea
cough
dysphagia   ? achalasia

## 2018-11-08 NOTE — DISCHARGE NOTE ADULT - CARE PROVIDER_API CALL
Roby Slaughter (DO), Gastroenterology; Internal Medicine  [address] [place] [address]  Phone: [phone]  Fax: [phone]    Adama Fernández  [address] [place] [address]  [phone]  Phone: [phone]  Fax: (       - Roby Slaughter (DO), Gastroenterology; Internal Medicine  [address] [place] [address]  Phone: [phone]  Fax: [phone]    Adama Fernández  [address] [place] [address]  [phone]  Phone: [phone]  Fax: (   )    -    Kei Sanders), Pulmonary Disease  [address] [place] [address]  Phone: [phone]  Fax: [phone]

## 2018-11-08 NOTE — DISCHARGE NOTE ADULT - HOSPITAL COURSE
62y old  Male with PAST MEDICAL & SURGICAL HISTORY:  Anxiety and depression  Diastolic dysfunction: stage I  Empyema lun2015 left lung, s/p VATS, decortication  H/O peptic ulcer: over 10 years ago  history of renal calculus  Herniated Disc: S/P work injury   Hypertension: Dx:   Spinal Stenosis  Postlaminectomy Syndrome  S/P  shunt  S/P insertion of spinal cord stimulator:   History of lumbar fusion:   Insertion of Intrathecal Dilaudid Pump: 2011  S/P Spinal Surgery: corrective lumbar fusion   S/P Knee Replacement: left knee   S/P Arthroscopy of Left Knee  S/P Tonsillectomy: childhood  Ankle Fracture: s/p ORIF left ankle   Presented with a chief complaint of cough (2018 13:28)  Diagnosed with Pneumonia.   ct chest showing intrathoracic stomach and dilated esophagus  concern for aspiration  GI eval noted  esophogram reviewed  EGD today  id eval noted  c/w abx per ID, transition to PO upon discharge  pulm eval noted  nebs  dvt proph  cont outpt meds  analgesics for pain   Discharge Planning after EGD since no other procedures from GI.

## 2018-11-08 NOTE — DISCHARGE NOTE ADULT - CARE PROVIDERS DIRECT ADDRESSES
,DirectAddress_Unknown,DirectAddress_Unknown ,DirectAddress_Unknown,DirectAddress_Unknown,[email or web address]

## 2018-11-08 NOTE — PROGRESS NOTE ADULT - SUBJECTIVE AND OBJECTIVE BOX
Chief Complaint:  Patient is a 62y old  Male who presents with a chief complaint of ID seeing to help w pna management tolerating po intake no n/v/d/f/c     Allergies:  No Known Allergies      Medications:  acetaminophen   Tablet .. 650 milliGRAM(s) Oral every 6 hours PRN  ALBUTerol   0.042% 1.25 milliGRAM(s) Nebulizer every 6 hours  azithromycin  IVPB 500 milliGRAM(s) IV Intermittent every 24 hours  cefTRIAXone   IVPB 1 Gram(s) IV Intermittent every 24 hours  cloNIDine 0.1 milliGRAM(s) Oral every 8 hours  diazepam    Tablet 2 milliGRAM(s) Oral three times a day  docusate sodium 100 milliGRAM(s) Oral three times a day  DULoxetine 60 milliGRAM(s) Oral two times a day  heparin  Injectable 5000 Unit(s) SubCutaneous every 12 hours  influenza   Vaccine 0.5 milliLiter(s) IntraMuscular once  methadone    Tablet 10 milliGRAM(s) Oral every 8 hours PRN  morphine  IR 30 milliGRAM(s) Oral every 8 hours PRN  polyethylene glycol 3350 17 Gram(s) Oral two times a day PRN  sodium chloride 0.9%. 1000 milliLiter(s) IV Continuous <Continuous>  sucralfate suspension 1 Gram(s) Oral two times a day      PMHX/PSHX:  Anxiety and depression  Diastolic dysfunction  Empyema lung  H/O peptic ulcer  history of renal calculus  Renal calculus  Current smoker  Herniated Disc  Hypertension  Depression  Spinal Stenosis  Peripheral Neuropathy  Postlaminectomy Syndrome  S/P  shunt  S/P insertion of spinal cord stimulator  History of lumbar fusion  Insertion of Intrathecal Dilaudid Pump  S/P Spinal Surgery  S/P Knee Replacement  S/P Arthroscopy of Left Knee  S/P Tonsillectomy  Ankle Fracture      Family history:  No pertinent family history in first degree relatives      Social History:     ROS:     General:  No wt loss, fevers, chills, night sweats, fatigue,   Eyes:  Good vision, no reported pain  ENT:  No sore throat, pain, runny nose, dysphagia  CV:  No pain, palpitations, hypo/hypertension  Resp:  No dyspnea, cough, tachypnea, wheezing  GI:  No pain, No nausea, No vomiting, No diarrhea, No constipation, No weight loss, No fever, No pruritis, No rectal bleeding, No tarry stools, No dysphagia,  :  No pain, bleeding, incontinence, nocturia  Muscle:  No pain, weakness  Neuro:  No weakness, tingling, memory problems  Psych:  No fatigue, insomnia, mood problems, depression  Endocrine:  No polyuria, polydipsia, cold/heat intolerance  Heme:  No petechiae, ecchymosis, easy bruisability  Skin:  No rash, tattoos, scars, edema      PHYSICAL EXAM:   Vital Signs:  Vital Signs Last 24 Hrs  T(C): 36.9 (2018 14:40), Max: 37.1 (2018 21:14)  T(F): 98.4 (2018 14:40), Max: 98.8 (2018 21:14)  HR: 61 (2018 14:40) (61 - 86)  BP: 142/85 (2018 14:40) (121/77 - 143/82)  BP(mean): --  RR: 18 (2018 14:40) (18 - 18)  SpO2: 96% (2018 14:40) (93% - 96%)  Daily     Daily     GENERAL:  Appears stated age, well-groomed, well-nourished, no distress  HEENT:  NC/AT,  conjunctivae clear and pink, no thyromegaly, nodules, adenopathy, no JVD, sclera -anicteric  CHEST:  Full & symmetric excursion, no increased effort, breath sounds clear  HEART:  Regular rhythm, S1, S2, no murmur/rub/S3/S4, no abdominal bruit, no edema  ABDOMEN:  Soft, non-tender, non-distended, normoactive bowel sounds,  no masses ,no hepato-splenomegaly, no signs of chronic liver disease  EXTEREMITIES:  no cyanosis,clubbing or edema  SKIN:  No rash/erythema/ecchymoses/petechiae/wounds/abscess/warm/dry  NEURO:  Alert, oriented, no asterixis, no tremor, no encephalopathy    LABS:                        9.7    5.23  )-----------( 249      ( 2018 08:56 )             31.6     11-04    145  |  106  |  11  ----------------------------<  73  3.7   |  29  |  0.92    Ca    9.3      2018 07:24          Urinalysis Basic - ( 2018 22:41 )    Color: Light Yellow / Appearance: Clear / S.016 / pH: x  Gluc: x / Ketone: Negative  / Bili: Negative / Urobili: Negative   Blood: x / Protein: Negative / Nitrite: Negative   Leuk Esterase: Negative / RBC: x / WBC x   Sq Epi: x / Non Sq Epi: x / Bacteria: x          Imaging:
CC: F/U for PNA    Saw/spoke to patient. No fevers, no chills. Otherwise reviewed. No new available.    Allergies  No Known Allergies    ANTIMICROBIALS:  cefTRIAXone   IVPB 1 every 24 hours  trimethoprim  160 mG/sulfamethoxazole 800 mG 2 every 12 hours    PE:    Vital Signs Last 24 Hrs  T(C): 36.4 (07 Nov 2018 13:28), Max: 36.9 (06 Nov 2018 15:31)  T(F): 97.5 (07 Nov 2018 13:28), Max: 98.5 (07 Nov 2018 10:40)  HR: 60 (07 Nov 2018 13:28) (54 - 98)  BP: 134/80 (07 Nov 2018 13:28) (117/76 - 168/83)  RR: 18 (07 Nov 2018 13:28) (17 - 18)  SpO2: 94% (07 Nov 2018 13:28) (93% - 97%)    Gen: AOx3, NAD, non-toxic, pleasant  CV: S1+S2 normal, nontachycardic  Resp: Clear bilat, no resp distress, no crackles/wheezes  Abd: Soft, nontender, +BS  Ext: No LE edema, no wounds    LABS:                        10.0   5.49  )-----------( 258      ( 06 Nov 2018 09:25 )             32.1     MICROBIOLOGY:    .Sputum Sputum  11-04-18   Moderate Methicillin resistant Staphylococcus aureus  Normal Respiratory Veronika present  --  Methicillin resistant Staphylococcus aureus    .Blood Blood  11-01-18   No growth at 5 days.     Rapid RVP Result: NotDetec (11-01 @ 17:17)    (otherwise reviewed)    RADIOLOGY:    11/6 XR:    FINDINGS:   A  view of the chest demonstrates minimal persistent patchy opacity   in the right lower lung, a rounded retrocardiac opacity consistent with   hiatal hernia, and unremarkable bones and soft tissues. Orthopedic spinal   fusion hardware and a spinal stimulator are noted.    The patient was given a liquid barium suspension and fluoroscopic spot   films were obtained. The esophagus showed significant stasis of contrast   with abnormal peristalsis. Tertiary contractions were identified. No   esophageal narrowing or strictures were identified. Contrast passed   unimpeded through the gastroesophageal junction into the stomach. A large   hiatal hernia was identified. Provoked gastroesophageal reflux to the   level of clavicles was noted.     A barium tablet was swallowed without delay.    IMPRESSION:  Significant stasis of contrast with abnormal peristalsis and tertiary   contractions.    Large hiatal hernia.    Moderate gastroesophageal reflux.
CC: F/U for PNA    Saw/spoke to patient. No fevers, no chills. Overall unchanged.    Allergies  No Known Allergies    ANTIMICROBIALS:  azithromycin  IVPB 500 every 24 hours  cefTRIAXone   IVPB 1 every 24 hours    PE:    Vital Signs Last 24 Hrs  T(C): 37.4 (2018 14:07), Max: 37.4 (2018 14:07)  T(F): 99.4 (2018 14:07), Max: 99.4 (2018 14:07)  HR: 66 (2018 14:07) (51 - 66)  BP: 126/72 (2018 14:07) (126/72 - 160/89)    RR: 18 (2018 14:07) (18 - 18)  SpO2: 97% (2018 14:07) (95% - 98%)    Gen: AOx3, NAD, non-toxic, pleasant  CV: S1+S2 normal, nontachycardic  Resp: Clear bilat, no resp distress, no crackles/wheezes  Abd: Soft, nontender, +BS  Ext: No LE edema, no wounds    LABS:                        9.2    5.35  )-----------( 234      ( 2018 08:27 )             30.1     11    145  |  106  |  11  ----------------------------<  73  3.7   |  29  |  0.92    Ca    9.3      2018 07:24    Urinalysis Basic - ( 2018 22:41 )    Color: Light Yellow / Appearance: Clear / S.016 / pH: x  Gluc: x / Ketone: Negative  / Bili: Negative / Urobili: Negative   Blood: x / Protein: Negative / Nitrite: Negative   Leuk Esterase: Negative / RBC: x / WBC x   Sq Epi: x / Non Sq Epi: x / Bacteria: x    MICROBIOLOGY:    .Sputum Sputum  18   Moderate Staphylococcus aureus    .Blood Blood  18   No growth to date.    Rapid RVP Result: NotDetec ( @ 17:17)    (otherwise reviewed)    RADIOLOGY:     CT:    IMPRESSION:     1.  Pneumonia in the right lung
CC: F/U for PNA    Saw/spoke to patient. No fevers, no chills. Overall well. No new complaints.    Allergies  No Known Allergies    ANTIMICROBIALS:  cefTRIAXone   IVPB 1 every 24 hours  trimethoprim  160 mG/sulfamethoxazole 800 mG 2 every 12 hours    PE:    Vital Signs Last 24 Hrs  T(C): 36.9 (06 Nov 2018 15:31), Max: 37.2 (05 Nov 2018 21:33)  T(F): 98.4 (06 Nov 2018 15:31), Max: 98.9 (05 Nov 2018 21:33)  HR: 59 (06 Nov 2018 15:31) (56 - 85)  BP: 168/83 (06 Nov 2018 15:31) (149/81 - 181/94)  RR: 18 (06 Nov 2018 15:31) (18 - 18)  SpO2: 97% (06 Nov 2018 15:31) (95% - 97%)    Gen: AOx3, NAD, non-toxic, pleasant  CV: S1+S2 normal, nontachycardic  Resp: Clear bilat, no resp distress, no crackles/wheezes  Abd: Soft, nontender, +BS  Ext: No LE edema, no wounds    LABS:                        10.0   5.49  )-----------( 258      ( 06 Nov 2018 09:25 )             32.1     MICROBIOLOGY:    .Sputum Sputum  11-04-18   Moderate Methicillin resistant Staphylococcus aureus S Bactrim  Normal Respiratory Veronika present  --  Methicillin resistant Staphylococcus aureus    .Blood Blood  11-01-18   No growth to date.     Rapid RVP Result: NotDetec (11-01 @ 17:17)    RADIOLOGY:    CT 11/1:    IMPRESSION:     1.  Pneumonia in the right lung
CHIEF COMPLAINT:Patient is a 62y old  Male who presents with a chief complaint of   	  Interval history:      Allergies:  No Known Allergies      PAST MEDICAL & SURGICAL HISTORY:  Anxiety and depression  Diastolic dysfunction: stage I  Empyema lun2015 left lung, s/p VATS, decortication  H/O peptic ulcer: over 10 years ago  history of renal calculus  Herniated Disc: S/P work injury   Hypertension: Dx:   Spinal Stenosis  Postlaminectomy Syndrome  S/P  shunt  S/P insertion of spinal cord stimulator:   History of lumbar fusion:   Insertion of Intrathecal Dilaudid Pump: 2011  S/P Spinal Surgery: corrective lumbar fusion   S/P Knee Replacement: left knee   S/P Arthroscopy of Left Knee  S/P Tonsillectomy: childhood  Ankle Fracture: s/p ORIF left ankle       FAMILY HISTORY:  No pertinent family history in first degree relatives      REVIEW OF SYSTEMS:  CONSTITUTIONAL: No fever, weight loss, or fatigue  EYES: No eye pain, visual disturbances, or discharge  NECK: No pain or stiffness  RESPIRATORY: less cough, no wheezing, no shortness of breath  CARDIOVASCULAR: No chest pain, palpitations, dizziness, or leg swelling  GASTROINTESTINAL: No abdominal or epigastric pain. No nausea, vomiting, diarrhea or constipation  GENITOURINARY: No dysuria, urinary frequency or urgency, no hematuria  NEUROLOGICAL: No headaches, memory loss, loss of strength, numbness, or tremors  SKIN: No itching, burning, rashes, or lesions   MUSCULOSKELETAL: No joint pain or swelling; No muscle, back, or extremity pain    Medications:  MEDICATIONS  (STANDING):  ALBUTerol   0.042% 1.25 milliGRAM(s) Nebulizer every 6 hours  azithromycin  IVPB 500 milliGRAM(s) IV Intermittent every 24 hours  cefTRIAXone   IVPB 1 Gram(s) IV Intermittent every 24 hours  cloNIDine 0.1 milliGRAM(s) Oral every 8 hours  diazepam    Tablet 2 milliGRAM(s) Oral three times a day  DULoxetine 60 milliGRAM(s) Oral two times a day  heparin  Injectable 5000 Unit(s) SubCutaneous every 12 hours  influenza   Vaccine 0.5 milliLiter(s) IntraMuscular once  sodium chloride 0.9%. 1000 milliLiter(s) (75 mL/Hr) IV Continuous <Continuous>  sucralfate suspension 1 Gram(s) Oral two times a day    MEDICATIONS  (PRN):  acetaminophen   Tablet .. 650 milliGRAM(s) Oral every 6 hours PRN Temp greater or equal to 38C (100.4F)  methadone    Tablet 10 milliGRAM(s) Oral every 8 hours PRN Severe Pain (7 - 10)  morphine  IR 30 milliGRAM(s) Oral every 8 hours PRN Severe Pain (7 - 10)  polyethylene glycol 3350 17 Gram(s) Oral two times a day PRN Constipation    	    PHYSICAL EXAM:  T(C): 36.9 (18 @ 09:30), Max: 37.5 (18 @ 14:09)  HR: 59 (18 @ 09:30) (53 - 84)  BP: 110/72 (18 @ 09:30) (103/65 - 114/72)  RR: 18 (18 @ 09:30) (10 - 20)  SpO2: 95% (18 @ 09:30) (93% - 98%)  Wt(kg): --  I&O's Summary      Appearance: Normal	  HEENT:   NCAT, PERRL, EOMI	  Lymphatic: No lymphadenopathy  Cardiovascular: Normal S1 S2, RRR  Respiratory: Lungs clear to auscultation BL  Psychiatry: A & O x 3, Mood & affect appropriate  Gastrointestinal:  Soft, Non-tender, + BS  Skin: No rashes, No ecchymoses, No cyanosis	  Neurologic: Non-focal  Extremities: Normal range of motion, No clubbing, cyanosis or edema    	  LABS:	 	    CARDIAC MARKERS:                                11.8   17.3  )-----------( 274      ( 2018 17:17 )             35.7         140  |  96  |  18  ----------------------------<  109<H>  4.3   |  29  |  1.09    Ca    9.4      2018 17:17    TPro  7.7  /  Alb  4.0  /  TBili  0.4  /  DBili  x   /  AST  24  /  ALT  21  /  AlkPhos  84      proBNP:   Lipid Profile:   HgA1c:   TSH:
CHIEF COMPLAINT:Patient is a 62y old  Male who presents with a chief complaint of cough (2018 13:28)    	        PAST MEDICAL & SURGICAL HISTORY:  Anxiety and depression  Diastolic dysfunction: stage I  Empyema lun2015 left lung, s/p VATS, decortication  H/O peptic ulcer: over 10 years ago  history of renal calculus  Herniated Disc: S/P work injury   Hypertension: Dx:   Spinal Stenosis  Postlaminectomy Syndrome  S/P  shunt  S/P insertion of spinal cord stimulator:   History of lumbar fusion:   Insertion of Intrathecal Dilaudid Pump: 2011  S/P Spinal Surgery: corrective lumbar fusion   S/P Knee Replacement: left knee   S/P Arthroscopy of Left Knee  S/P Tonsillectomy: childhood  Ankle Fracture: s/p ORIF left ankle           REVIEW OF SYSTEMS:  CONSTITUTIONAL:feels better  EYES: No eye pain, visual disturbances, or discharge  NECK: No pain or stiffness  RESPIRATORY: dec cough dec sob  CARDIOVASCULAR: No chest pain, palpitations, passing out, dizziness, or leg swelling  GASTROINTESTINAL: No abdominal or epigastric pain. No nausea, vomiting, or hematemesis; No diarrhea or constipation. No melena or hematochezia.  GENITOURINARY: No dysuria, frequency, hematuria, or incontinence  NEUROLOGICAL: No headaches, memory loss, loss of strength, numbness, or tremors  SKIN: No itching, burning, rashes, or lesions   LYMPH Nodes: No enlarged glands  ENDOCRINE: No heat or cold intolerance; No hair loss  MUSCULOSKELETAL: No joint pain or swelling; No muscle, back, or extremity pain    Medications:  MEDICATIONS  (STANDING):  ALBUTerol   0.042% 1.25 milliGRAM(s) Nebulizer every 6 hours  azithromycin  IVPB 500 milliGRAM(s) IV Intermittent every 24 hours  cefTRIAXone   IVPB 1 Gram(s) IV Intermittent every 24 hours  cloNIDine 0.1 milliGRAM(s) Oral every 8 hours  diazepam    Tablet 2 milliGRAM(s) Oral three times a day  DULoxetine 60 milliGRAM(s) Oral two times a day  heparin  Injectable 5000 Unit(s) SubCutaneous every 12 hours  influenza   Vaccine 0.5 milliLiter(s) IntraMuscular once  sodium chloride 0.9%. 1000 milliLiter(s) (75 mL/Hr) IV Continuous <Continuous>  sucralfate suspension 1 Gram(s) Oral two times a day    MEDICATIONS  (PRN):  acetaminophen   Tablet .. 650 milliGRAM(s) Oral every 6 hours PRN Temp greater or equal to 38C (100.4F)  methadone    Tablet 10 milliGRAM(s) Oral every 8 hours PRN Severe Pain (7 - 10)  morphine  IR 30 milliGRAM(s) Oral every 8 hours PRN Severe Pain (7 - 10)  polyethylene glycol 3350 17 Gram(s) Oral two times a day PRN Constipation    	    PHYSICAL EXAM:  T(C): 36.7 (18 @ 13:38), Max: 37.2 (18 @ 17:54)  HR: 61 (18 @ 13:38) (53 - 70)  BP: 133/74 (18 @ 13:38) (124/71 - 143/80)  RR: 18 (18 @ 13:38) (18 - 18)  SpO2: 97% (18 @ 13:38) (95% - 98%)  Wt(kg): --  I&O's Summary    2018 07:01  -  2018 07:00  --------------------------------------------------------  IN: 1750 mL / OUT: 2175 mL / NET: -425 mL    2018 08:01  -  2018 14:58  --------------------------------------------------------  IN: 0 mL / OUT: 0 mL / NET: 0 mL        Appearance: Normal	  HEENT:   Normal oral mucosa, PERRL, EOMI	  Lymphatic: No lymphadenopathy  Cardiovascular: Normal S1 S2, No JVD, No murmurs, No edema  Respiratory: dec bs   Psychiatry: A & O x 3, Mood & affect appropriate  Gastrointestinal:  Soft, Non-tender, + BS	  Skin: No rashes, No ecchymoses, No cyanosis	  Neurologic: Non-focal  Extremities: Normal range of motion, No clubbing, cyanosis or edema  Vascular: Peripheral pulses palpable 2+ bilaterally    TELEMETRY: 	    ECG:  	  RADIOLOGY:  OTHER: 	  	  LABS:	 	    CARDIAC MARKERS:                                10.9   6.82  )-----------( 239      ( 2018 20:37 )             36.1         140  |  96  |  18  ----------------------------<  109<H>  4.3   |  29  |  1.09    Ca    9.4      2018 17:17    TPro  7.7  /  Alb  4.0  /  TBili  0.4  /  DBili  x   /  AST  24  /  ALT  21  /  AlkPhos  84  11-    proBNP:   Lipid Profile:   HgA1c:   TSH:
CHIEF COMPLAINT:Patient is a 62y old  Male who presents with a chief complaint of cough (2018 13:28)  no acute events      PAST MEDICAL & SURGICAL HISTORY:  Anxiety and depression  Diastolic dysfunction: stage I  Empyema lun2015 left lung, s/p VATS, decortication  H/O peptic ulcer: over 10 years ago  history of renal calculus  Herniated Disc: S/P work injury   Hypertension: Dx:   Spinal Stenosis  Postlaminectomy Syndrome  S/P  shunt  S/P insertion of spinal cord stimulator:   History of lumbar fusion:   Insertion of Intrathecal Dilaudid Pump: 2011  S/P Spinal Surgery: corrective lumbar fusion   S/P Knee Replacement: left knee   S/P Arthroscopy of Left Knee  S/P Tonsillectomy: childhood  Ankle Fracture: s/p ORIF left ankle           REVIEW OF SYSTEMS:  CONSTITUTIONAL:feels better  EYES: No eye pain, visual disturbances, or discharge  NECK: No pain or stiffness  RESPIRATORY: dec cough dec sob  CARDIOVASCULAR: No chest pain, palpitations, passing out, dizziness, or leg swelling  GASTROINTESTINAL: No abdominal or epigastric pain. No nausea, vomiting, or hematemesis; No diarrhea or constipation. No melena or hematochezia.  GENITOURINARY: No dysuria, frequency, hematuria, or incontinence  NEUROLOGICAL: No headaches, memory loss, loss of strength, numbness, or tremors  SKIN: No itching, burning, rashes, or lesions   LYMPH Nodes: No enlarged glands  ENDOCRINE: No heat or cold intolerance; No hair loss  MUSCULOSKELETAL: No joint pain or swelling; No muscle, back, or extremity pain      Medications:  MEDICATIONS  (STANDING):  ALBUTerol   0.042% 1.25 milliGRAM(s) Nebulizer every 6 hours  azithromycin  IVPB 500 milliGRAM(s) IV Intermittent every 24 hours  cefTRIAXone   IVPB 1 Gram(s) IV Intermittent every 24 hours  cloNIDine 0.1 milliGRAM(s) Oral every 8 hours  diazepam    Tablet 2 milliGRAM(s) Oral three times a day  docusate sodium 100 milliGRAM(s) Oral three times a day  DULoxetine 60 milliGRAM(s) Oral two times a day  heparin  Injectable 5000 Unit(s) SubCutaneous every 12 hours  influenza   Vaccine 0.5 milliLiter(s) IntraMuscular once  sodium chloride 0.9%. 1000 milliLiter(s) (75 mL/Hr) IV Continuous <Continuous>  sucralfate suspension 1 Gram(s) Oral two times a day    MEDICATIONS  (PRN):  acetaminophen   Tablet .. 650 milliGRAM(s) Oral every 6 hours PRN Temp greater or equal to 38C (100.4F)  methadone    Tablet 10 milliGRAM(s) Oral every 8 hours PRN Severe Pain (7 - 10)  morphine  IR 30 milliGRAM(s) Oral every 8 hours PRN Severe Pain (7 - 10)  polyethylene glycol 3350 17 Gram(s) Oral two times a day PRN Constipation    	    PHYSICAL EXAM:  T(C): 36.4 (18 @ 01:33), Max: 37.1 (18 @ 21:14)  HR: 61 (18 @ 01:33) (60 - 86)  BP: 143/82 (18 @ 01:33) (121/77 - 143/82)  RR: 18 (18 @ 01:33) (18 - 18)  SpO2: 93% (18 @ 01:33) (93% - 97%)  Wt(kg): --  I&O's Summary    2018 08:01  -  2018 07:00  --------------------------------------------------------  IN: 2340 mL / OUT: 400 mL / NET: 1940 mL      Appearance: Normal	  HEENT:   Normal oral mucosa, PERRL, EOMI	  Lymphatic: No lymphadenopathy  Cardiovascular: Normal S1 S2, No JVD, No murmurs, No edema  Respiratory: dec bs   Psychiatry: A & O x 3, Mood & affect appropriate  Gastrointestinal:  Soft, Non-tender, + BS	  Skin: No rashes, No ecchymoses, No cyanosis	  Neurologic: Non-focal  Extremities: Normal range of motion, No clubbing, cyanosis or edema  Vascular: Peripheral pulses palpable 2+ bilaterally    LABS:	 	    CARDIAC MARKERS:                                10.9   6.82  )-----------( 239      ( 2018 20:37 )             36.1     -    145  |  106  |  11  ----------------------------<  73  3.7   |  29  |  0.92    Ca    9.3      2018 07:24      proBNP:   Lipid Profile:   HgA1c:   TSH:
CHIEF COMPLAINT:Patient is a 62y old  Male who presents with a chief complaint of cough (2018 13:28)  no acute events      PAST MEDICAL & SURGICAL HISTORY:  Anxiety and depression  Diastolic dysfunction: stage I  Empyema lun2015 left lung, s/p VATS, decortication  H/O peptic ulcer: over 10 years ago  history of renal calculus  Herniated Disc: S/P work injury   Hypertension: Dx:   Spinal Stenosis  Postlaminectomy Syndrome  S/P  shunt  S/P insertion of spinal cord stimulator:   History of lumbar fusion:   Insertion of Intrathecal Dilaudid Pump: 2011  S/P Spinal Surgery: corrective lumbar fusion   S/P Knee Replacement: left knee   S/P Arthroscopy of Left Knee  S/P Tonsillectomy: childhood  Ankle Fracture: s/p ORIF left ankle           REVIEW OF SYSTEMS:  CONSTITUTIONAL:feels well  EYES: No eye pain, visual disturbances, or discharge  NECK: No pain or stiffness  RESPIRATORY: dec cough dec sob  CARDIOVASCULAR: No chest pain, palpitations, passing out, dizziness, or leg swelling  GASTROINTESTINAL: No abdominal or epigastric pain. No nausea, vomiting, or hematemesis; No diarrhea or constipation. No melena or hematochezia.  GENITOURINARY: No dysuria, frequency, hematuria, or incontinence  NEUROLOGICAL: No headaches, memory loss, loss of strength, numbness, or tremors  SKIN: No itching, burning, rashes, or lesions   LYMPH Nodes: No enlarged glands  ENDOCRINE: No heat or cold intolerance; No hair loss  MUSCULOSKELETAL: No joint pain or swelling; No muscle, back, or extremity pain      Medications:  MEDICATIONS  (STANDING):  ALBUTerol   0.042% 1.25 milliGRAM(s) Nebulizer every 6 hours  cefTRIAXone   IVPB 1 Gram(s) IV Intermittent every 24 hours  cloNIDine 0.1 milliGRAM(s) Oral every 8 hours  diazepam    Tablet 2 milliGRAM(s) Oral three times a day  docusate sodium 100 milliGRAM(s) Oral three times a day  DULoxetine 60 milliGRAM(s) Oral two times a day  heparin  Injectable 5000 Unit(s) SubCutaneous every 12 hours  influenza   Vaccine 0.5 milliLiter(s) IntraMuscular once  sodium chloride 0.9%. 1000 milliLiter(s) (75 mL/Hr) IV Continuous <Continuous>  sucralfate suspension 1 Gram(s) Oral two times a day  trimethoprim  160 mG/sulfamethoxazole 800 mG 2 Tablet(s) Oral every 12 hours    MEDICATIONS  (PRN):  acetaminophen   Tablet .. 650 milliGRAM(s) Oral every 6 hours PRN Temp greater or equal to 38C (100.4F)  methadone    Tablet 10 milliGRAM(s) Oral every 8 hours PRN Severe Pain (7 - 10)  morphine  IR 30 milliGRAM(s) Oral every 8 hours PRN Severe Pain (7 - 10)  polyethylene glycol 3350 17 Gram(s) Oral two times a day PRN Constipation    	    PHYSICAL EXAM:  T(C): 36.4 (18 @ 13:28), Max: 37.1 (18 @ 14:38)  HR: 60 (18 @ 13:28) (54 - 98)  BP: 134/80 (18 @ 13:28) (117/76 - 168/83)  RR: 18 (18 @ 13:28) (17 - 18)  SpO2: 94% (18 @ 13:28) (93% - 97%)  Wt(kg): --  I&O's Summary    2018 07:  -  2018 07:00  --------------------------------------------------------  IN: 220 mL / OUT: 1100 mL / NET: -880 mL    2018 07:  -  2018 13:34  --------------------------------------------------------  IN: 240 mL / OUT: 0 mL / NET: 240 mL      Appearance: Normal	  HEENT:   Normal oral mucosa, PERRL, EOMI	  Lymphatic: No lymphadenopathy  Cardiovascular: Normal S1 S2, No JVD, No murmurs, No edema  Respiratory: dec bs   Psychiatry: A & O x 3, Mood & affect appropriate  Gastrointestinal:  Soft, Non-tender, + BS	  Skin: No rashes, No ecchymoses, No cyanosis	  Neurologic: Non-focal  Extremities: Normal range of motion, No clubbing, cyanosis or edema  Vascular: Peripheral pulses palpable 2+ bilaterally    LABS:	 	    CARDIAC MARKERS:                                10.0   5.49  )-----------( 258      ( 2018 09:25 )             32.1           proBNP:   Lipid Profile:   HgA1c:   TSH:
CHIEF COMPLAINT:Patient is a 62y old  Male who presents with a chief complaint of cough (2018 13:28)  no acute events      PAST MEDICAL & SURGICAL HISTORY:  Anxiety and depression  Diastolic dysfunction: stage I  Empyema lun2015 left lung, s/p VATS, decortication  H/O peptic ulcer: over 10 years ago  history of renal calculus  Herniated Disc: S/P work injury   Hypertension: Dx:   Spinal Stenosis  Postlaminectomy Syndrome  S/P  shunt  S/P insertion of spinal cord stimulator:   History of lumbar fusion:   Insertion of Intrathecal Dilaudid Pump: 2011  S/P Spinal Surgery: corrective lumbar fusion   S/P Knee Replacement: left knee   S/P Arthroscopy of Left Knee  S/P Tonsillectomy: childhood  Ankle Fracture: s/p ORIF left ankle           REVIEW OF SYSTEMS:  CONSTITUTIONAL:feels well  EYES: No eye pain, visual disturbances, or discharge  NECK: No pain or stiffness  RESPIRATORY: dec cough dec sob  CARDIOVASCULAR: No chest pain, palpitations, passing out, dizziness, or leg swelling  GASTROINTESTINAL: No abdominal or epigastric pain. No nausea, vomiting, or hematemesis; No diarrhea or constipation. No melena or hematochezia.  GENITOURINARY: No dysuria, frequency, hematuria, or incontinence  NEUROLOGICAL: No headaches, memory loss, loss of strength, numbness, or tremors  SKIN: No itching, burning, rashes, or lesions   LYMPH Nodes: No enlarged glands  ENDOCRINE: No heat or cold intolerance; No hair loss  MUSCULOSKELETAL: No joint pain or swelling; No muscle, back, or extremity pain      Medications:  MEDICATIONS  (STANDING):  ALBUTerol   0.042% 1.25 milliGRAM(s) Nebulizer every 6 hours  cefTRIAXone   IVPB 1 Gram(s) IV Intermittent every 24 hours  cloNIDine 0.1 milliGRAM(s) Oral every 8 hours  diazepam    Tablet 2 milliGRAM(s) Oral three times a day  docusate sodium 100 milliGRAM(s) Oral three times a day  doxycycline hyclate Capsule 100 milliGRAM(s) Oral every 12 hours  DULoxetine 60 milliGRAM(s) Oral two times a day  heparin  Injectable 5000 Unit(s) SubCutaneous every 12 hours  influenza   Vaccine 0.5 milliLiter(s) IntraMuscular once  sodium chloride 0.9%. 1000 milliLiter(s) (75 mL/Hr) IV Continuous <Continuous>  sucralfate suspension 1 Gram(s) Oral two times a day    MEDICATIONS  (PRN):  acetaminophen   Tablet .. 650 milliGRAM(s) Oral every 6 hours PRN Temp greater or equal to 38C (100.4F)  methadone    Tablet 10 milliGRAM(s) Oral every 8 hours PRN Severe Pain (7 - 10)  morphine  IR 30 milliGRAM(s) Oral every 8 hours PRN Severe Pain (7 - 10)  polyethylene glycol 3350 17 Gram(s) Oral two times a day PRN Constipation    	    PHYSICAL EXAM:  T(C): 36.6 (18 @ 17:17), Max: 37.4 (18 @ 14:07)  HR: 85 (18 @ 17:17) (51 - 85)  BP: 181/94 (18 @ 17:17) (126/72 - 181/94)  RR: 18 (18 @ 17:17) (18 - 18)  SpO2: 96% (18 @ 17:17) (95% - 98%)  Wt(kg): --  I&O's Summary    2018 07:  -  2018 07:00  --------------------------------------------------------  IN: 3140 mL / OUT: 1500 mL / NET: 1640 mL    2018 07:  -  2018 20:06  --------------------------------------------------------  IN: 950 mL / OUT: 500 mL / NET: 450 mL      Appearance: Normal	  HEENT:   Normal oral mucosa, PERRL, EOMI	  Lymphatic: No lymphadenopathy  Cardiovascular: Normal S1 S2, No JVD, No murmurs, No edema  Respiratory: dec bs   Psychiatry: A & O x 3, Mood & affect appropriate  Gastrointestinal:  Soft, Non-tender, + BS	  Skin: No rashes, No ecchymoses, No cyanosis	  Neurologic: Non-focal  Extremities: Normal range of motion, No clubbing, cyanosis or edema  Vascular: Peripheral pulses palpable 2+ bilaterally    LABS:	 	    CARDIAC MARKERS:                                9.2    5.35  )-----------( 234      ( 2018 08:27 )             30.1     11-04    145  |  106  |  11  ----------------------------<  73  3.7   |  29  |  0.92    Ca    9.3      2018 07:24      proBNP:   Lipid Profile:   HgA1c:   TSH:
CHIEF COMPLAINT:Patient is a 62y old  Male who presents with a chief complaint of cough (2018 13:28)  no acute events      PAST MEDICAL & SURGICAL HISTORY:  Anxiety and depression  Diastolic dysfunction: stage I  Empyema lun2015 left lung, s/p VATS, decortication  H/O peptic ulcer: over 10 years ago  history of renal calculus  Herniated Disc: S/P work injury   Hypertension: Dx:   Spinal Stenosis  Postlaminectomy Syndrome  S/P  shunt  S/P insertion of spinal cord stimulator:   History of lumbar fusion:   Insertion of Intrathecal Dilaudid Pump: 2011  S/P Spinal Surgery: corrective lumbar fusion   S/P Knee Replacement: left knee   S/P Arthroscopy of Left Knee  S/P Tonsillectomy: childhood  Ankle Fracture: s/p ORIF left ankle           REVIEW OF SYSTEMS:  CONSTITUTIONAL:feels well  EYES: No eye pain, visual disturbances, or discharge  NECK: No pain or stiffness  RESPIRATORY: dec cough dec sob  CARDIOVASCULAR: No chest pain, palpitations, passing out, dizziness, or leg swelling  GASTROINTESTINAL: No abdominal or epigastric pain. No nausea, vomiting, or hematemesis; No diarrhea or constipation. No melena or hematochezia.  GENITOURINARY: No dysuria, frequency, hematuria, or incontinence  NEUROLOGICAL: No headaches, memory loss, loss of strength, numbness, or tremors  SKIN: No itching, burning, rashes, or lesions   LYMPH Nodes: No enlarged glands  ENDOCRINE: No heat or cold intolerance; No hair loss  MUSCULOSKELETAL: No joint pain or swelling; No muscle, back, or extremity pain      Medications:  MEDICATIONS  (STANDING):  ALBUTerol   0.042% 1.25 milliGRAM(s) Nebulizer every 6 hours  cefTRIAXone   IVPB 1 Gram(s) IV Intermittent every 24 hours  cloNIDine 0.1 milliGRAM(s) Oral every 8 hours  diazepam    Tablet 2 milliGRAM(s) Oral three times a day  docusate sodium 100 milliGRAM(s) Oral three times a day  doxycycline hyclate Capsule 100 milliGRAM(s) Oral every 12 hours  DULoxetine 60 milliGRAM(s) Oral two times a day  heparin  Injectable 5000 Unit(s) SubCutaneous every 12 hours  influenza   Vaccine 0.5 milliLiter(s) IntraMuscular once  sodium chloride 0.9%. 1000 milliLiter(s) (75 mL/Hr) IV Continuous <Continuous>  sucralfate suspension 1 Gram(s) Oral two times a day    MEDICATIONS  (PRN):  acetaminophen   Tablet .. 650 milliGRAM(s) Oral every 6 hours PRN Temp greater or equal to 38C (100.4F)  methadone    Tablet 10 milliGRAM(s) Oral every 8 hours PRN Severe Pain (7 - 10)  morphine  IR 30 milliGRAM(s) Oral every 8 hours PRN Severe Pain (7 - 10)  polyethylene glycol 3350 17 Gram(s) Oral two times a day PRN Constipation    	    PHYSICAL EXAM:  T(C): 36.7 (18 @ 10:18), Max: 37.4 (18 @ 14:07)  HR: 65 (18 @ 10:18) (56 - 85)  BP: 152/87 (18 @ 10:18) (126/72 - 181/94)  RR: 18 (18 @ 10:18) (18 - 18)  SpO2: 96% (18 @ 10:18) (95% - 97%)  Wt(kg): --  I&O's Summary    2018 07:  -  2018 07:00  --------------------------------------------------------  IN: 1850 mL / OUT: 1300 mL / NET: 550 mL    2018 07:  -  2018 12:46  --------------------------------------------------------  IN: 0 mL / OUT: 1100 mL / NET: -1100 mL      Appearance: Normal	  HEENT:   Normal oral mucosa, PERRL, EOMI	  Lymphatic: No lymphadenopathy  Cardiovascular: Normal S1 S2, No JVD, No murmurs, No edema  Respiratory: dec bs   Psychiatry: A & O x 3, Mood & affect appropriate  Gastrointestinal:  Soft, Non-tender, + BS	  Skin: No rashes, No ecchymoses, No cyanosis	  Neurologic: Non-focal  Extremities: Normal range of motion, No clubbing, cyanosis or edema  Vascular: Peripheral pulses palpable 2+ bilaterally    LABS:	 	    CARDIAC MARKERS:                                10.0   5.49  )-----------( 258      ( 2018 09:25 )             32.1           proBNP:   Lipid Profile:   HgA1c:   TSH:
CHIEF COMPLAINT:Patient is a 62y old  Male who presents with a chief complaint of cough (2018 13:28)  no acute events, EGD today      PAST MEDICAL & SURGICAL HISTORY:  Anxiety and depression  Diastolic dysfunction: stage I  Empyema lun2015 left lung, s/p VATS, decortication  H/O peptic ulcer: over 10 years ago  history of renal calculus  Herniated Disc: S/P work injury   Hypertension: Dx:   Spinal Stenosis  Postlaminectomy Syndrome  S/P  shunt  S/P insertion of spinal cord stimulator:   History of lumbar fusion:   Insertion of Intrathecal Dilaudid Pump: 2011  S/P Spinal Surgery: corrective lumbar fusion   S/P Knee Replacement: left knee   S/P Arthroscopy of Left Knee  S/P Tonsillectomy: childhood  Ankle Fracture: s/p ORIF left ankle           REVIEW OF SYSTEMS:  CONSTITUTIONAL:feels well  EYES: No eye pain, visual disturbances, or discharge  NECK: No pain or stiffness  RESPIRATORY: dec cough dec sob  CARDIOVASCULAR: No chest pain, palpitations, passing out, dizziness, or leg swelling  GASTROINTESTINAL: No abdominal or epigastric pain. No nausea, vomiting, or hematemesis; No diarrhea or constipation. No melena or hematochezia.  GENITOURINARY: No dysuria, frequency, hematuria, or incontinence  NEUROLOGICAL: No headaches, memory loss, loss of strength, numbness, or tremors  SKIN: No itching, burning, rashes, or lesions   LYMPH Nodes: No enlarged glands  ENDOCRINE: No heat or cold intolerance; No hair loss  MUSCULOSKELETAL: No joint pain or swelling; No muscle, back, or extremity pain      Medications:  MEDICATIONS  (STANDING):  ALBUTerol   0.042% 1.25 milliGRAM(s) Nebulizer every 6 hours  cefTRIAXone   IVPB 1 Gram(s) IV Intermittent every 24 hours  chlorhexidine 4% Liquid 1 Application(s) Topical <User Schedule>  cloNIDine 0.1 milliGRAM(s) Oral every 8 hours  diazepam    Tablet 2 milliGRAM(s) Oral three times a day  docusate sodium 100 milliGRAM(s) Oral three times a day  DULoxetine 60 milliGRAM(s) Oral two times a day  heparin  Injectable 5000 Unit(s) SubCutaneous every 12 hours  influenza   Vaccine 0.5 milliLiter(s) IntraMuscular once  sodium chloride 0.9%. 1000 milliLiter(s) (75 mL/Hr) IV Continuous <Continuous>  sucralfate suspension 1 Gram(s) Oral two times a day  trimethoprim  160 mG/sulfamethoxazole 800 mG 2 Tablet(s) Oral every 12 hours    MEDICATIONS  (PRN):  acetaminophen   Tablet .. 650 milliGRAM(s) Oral every 6 hours PRN Temp greater or equal to 38C (100.4F)  methadone    Tablet 10 milliGRAM(s) Oral every 8 hours PRN Severe Pain (7 - 10)  morphine  IR 30 milliGRAM(s) Oral every 8 hours PRN Severe Pain (7 - 10)  polyethylene glycol 3350 17 Gram(s) Oral two times a day PRN Constipation    	    PHYSICAL EXAM:  T(C): 36.7 (18 @ 08:08), Max: 36.9 (18 @ 00:38)  HR: 71 (18 @ 08:08) (60 - 71)  BP: 132/86 (18 @ 08:08) (132/86 - 159/81)  RR: 18 (18 @ 08:08) (17 - 18)  SpO2: 95% (18 @ 08:08) (94% - 97%)  Wt(kg): --  I&O's Summary    2018 07:01  -  2018 07:00  --------------------------------------------------------  IN: 1965 mL / OUT: 1100 mL / NET: 865 mL      Appearance: Normal	  HEENT:   Normal oral mucosa, PERRL, EOMI	  Lymphatic: No lymphadenopathy  Cardiovascular: Normal S1 S2, No JVD, No murmurs, No edema  Respiratory: dec bs   Psychiatry: A & O x 3, Mood & affect appropriate  Gastrointestinal:  Soft, Non-tender, + BS	  Skin: No rashes, No ecchymoses, No cyanosis	  Neurologic: Non-focal  Extremities: Normal range of motion, No clubbing, cyanosis or edema  Vascular: Peripheral pulses palpable 2+ bilaterally    LABS:	 	    CARDIAC MARKERS:                                10.2   6.07  )-----------( 256      ( 2018 08:11 )             32.7     11-08    143  |  103  |  15  ----------------------------<  91  3.9   |  25  |  1.24    Ca    9.4      2018 07:07      proBNP:   Lipid Profile:   HgA1c:   TSH:
Chief Complaint:  Patient is a 62y old  Male who presents with a chief complaint of ID seeing to help w pna management tolerating po intake no n/v/d/f/c     Allergies:  No Known Allergies      Medications:  acetaminophen   Tablet .. 650 milliGRAM(s) Oral every 6 hours PRN  ALBUTerol   0.042% 1.25 milliGRAM(s) Nebulizer every 6 hours  azithromycin  IVPB 500 milliGRAM(s) IV Intermittent every 24 hours  cefTRIAXone   IVPB 1 Gram(s) IV Intermittent every 24 hours  cloNIDine 0.1 milliGRAM(s) Oral every 8 hours  diazepam    Tablet 2 milliGRAM(s) Oral three times a day  docusate sodium 100 milliGRAM(s) Oral three times a day  DULoxetine 60 milliGRAM(s) Oral two times a day  heparin  Injectable 5000 Unit(s) SubCutaneous every 12 hours  influenza   Vaccine 0.5 milliLiter(s) IntraMuscular once  methadone    Tablet 10 milliGRAM(s) Oral every 8 hours PRN  morphine  IR 30 milliGRAM(s) Oral every 8 hours PRN  polyethylene glycol 3350 17 Gram(s) Oral two times a day PRN  sodium chloride 0.9%. 1000 milliLiter(s) IV Continuous <Continuous>  sucralfate suspension 1 Gram(s) Oral two times a day      PMHX/PSHX:  Anxiety and depression  Diastolic dysfunction  Empyema lung  H/O peptic ulcer  history of renal calculus  Renal calculus  Current smoker  Herniated Disc  Hypertension  Depression  Spinal Stenosis  Peripheral Neuropathy  Postlaminectomy Syndrome  S/P  shunt  S/P insertion of spinal cord stimulator  History of lumbar fusion  Insertion of Intrathecal Dilaudid Pump  S/P Spinal Surgery  S/P Knee Replacement  S/P Arthroscopy of Left Knee  S/P Tonsillectomy  Ankle Fracture      Family history:  No pertinent family history in first degree relatives      Social History:     ROS:     General:  No wt loss, fevers, chills, night sweats, fatigue,   Eyes:  Good vision, no reported pain  ENT:  No sore throat, pain, runny nose, dysphagia  CV:  No pain, palpitations, hypo/hypertension  Resp:  No dyspnea, cough, tachypnea, wheezing  GI:  No pain, No nausea, No vomiting, No diarrhea, No constipation, No weight loss, No fever, No pruritis, No rectal bleeding, No tarry stools, No dysphagia,  :  No pain, bleeding, incontinence, nocturia  Muscle:  No pain, weakness  Neuro:  No weakness, tingling, memory problems  Psych:  No fatigue, insomnia, mood problems, depression  Endocrine:  No polyuria, polydipsia, cold/heat intolerance  Heme:  No petechiae, ecchymosis, easy bruisability  Skin:  No rash, tattoos, scars, edema      PHYSICAL EXAM:   Vital Signs:  Vital Signs Last 24 Hrs  T(C): 36.9 (2018 14:40), Max: 37.1 (2018 21:14)  T(F): 98.4 (2018 14:40), Max: 98.8 (2018 21:14)  HR: 61 (2018 14:40) (61 - 86)  BP: 142/85 (2018 14:40) (121/77 - 143/82)  BP(mean): --  RR: 18 (2018 14:40) (18 - 18)  SpO2: 96% (2018 14:40) (93% - 96%)  Daily     Daily     GENERAL:  Appears stated age, well-groomed, well-nourished, no distress  HEENT:  NC/AT,  conjunctivae clear and pink, no thyromegaly, nodules, adenopathy, no JVD, sclera -anicteric  CHEST:  Full & symmetric excursion, no increased effort, breath sounds clear  HEART:  Regular rhythm, S1, S2, no murmur/rub/S3/S4, no abdominal bruit, no edema  ABDOMEN:  Soft, non-tender, non-distended, normoactive bowel sounds,  no masses ,no hepato-splenomegaly, no signs of chronic liver disease  EXTEREMITIES:  no cyanosis,clubbing or edema  SKIN:  No rash/erythema/ecchymoses/petechiae/wounds/abscess/warm/dry  NEURO:  Alert, oriented, no asterixis, no tremor, no encephalopathy    LABS:                        9.7    5.23  )-----------( 249      ( 2018 08:56 )             31.6     11-04    145  |  106  |  11  ----------------------------<  73  3.7   |  29  |  0.92    Ca    9.3      2018 07:24          Urinalysis Basic - ( 2018 22:41 )    Color: Light Yellow / Appearance: Clear / S.016 / pH: x  Gluc: x / Ketone: Negative  / Bili: Negative / Urobili: Negative   Blood: x / Protein: Negative / Nitrite: Negative   Leuk Esterase: Negative / RBC: x / WBC x   Sq Epi: x / Non Sq Epi: x / Bacteria: x          Imaging:
Comfortable at rest.  Decreased cough  Complaining of "heartburn"    VS Stable Afeb    Lungs- clear  Heart-Reg  Abd- non tender   Ext- no edema    IMP: Improved symptomatically on CAP tx    PLAN: PO steroids soon  Outpt chest imaging  GI evaluation as planned    Paul Watt MD [facility]  [phone]    (Shukri/Sue/Tommy)
INFECTIOUS DISEASES FOLLOW UP--Beingno Johnston MD  Pager [phone]    This is a follow up note for this  62y Male with  CAP  improving  eating ok, ambulating, no new complaints    Further ROS:  CONSTITUTIONAL:  No fever, good appetite  CARDIOVASCULAR:  No chest pain or palpitations  RESPIRATORY:  No dyspnea  GASTROINTESTINAL:  No nausea, vomiting, diarrhea, or abdominal pain  GENITOURINARY:  No dysuria  NEUROLOGIC:  No headache,     Allergies  No Known Allergies    ANTIBIOTICS/RELEVANT:  antimicrobials  azithromycin  IVPB 500 milliGRAM(s) IV Intermittent every 24 hours  cefTRIAXone   IVPB 1 Gram(s) IV Intermittent every 24 hours    immunologic:  influenza   Vaccine 0.5 milliLiter(s) IntraMuscular once    OTHER:  acetaminophen   Tablet .. 650 milliGRAM(s) Oral every 6 hours PRN  ALBUTerol   0.042% 1.25 milliGRAM(s) Nebulizer every 6 hours  cloNIDine 0.1 milliGRAM(s) Oral every 8 hours  diazepam    Tablet 2 milliGRAM(s) Oral three times a day  docusate sodium 100 milliGRAM(s) Oral three times a day  DULoxetine 60 milliGRAM(s) Oral two times a day  heparin  Injectable 5000 Unit(s) SubCutaneous every 12 hours  methadone    Tablet 10 milliGRAM(s) Oral every 8 hours PRN  morphine  IR 30 milliGRAM(s) Oral every 8 hours PRN  polyethylene glycol 3350 17 Gram(s) Oral two times a day PRN  sodium chloride 0.9%. 1000 milliLiter(s) IV Continuous <Continuous>  sucralfate suspension 1 Gram(s) Oral two times a day    Objective:  Vital Signs Last 24 Hrs  T(C): 36.4 (:33), Max: 37.1 (2018 21:14)  T(F): 97.6 (:), Max: 98.8 (2018 21:14)  HR: 61 (:) (60 - 86)  BP: 143/82 (:) (121/77 - 143/82)  BP(mean): --  RR: 18 (:) (18 - 18)  SpO2: 93% (:) (93% - 97%)    PHYSICAL EXAM:  Constitutional:no acute distress  Eyes:VAHID, EOMI  Ear/Nose/Throat: no oral lesions, 	  Respiratory: clear BL  Cardiovascular: crackles at lower 1/3 bl  Gastrointestinal:soft, (+) BS, no tenderness  Extremities:no e/e/c  No Lymphadenopathy  IV sites not inflammed.    LABS:                        10.9   6.82  )-----------( 239      ( 2018 20:37 )             36.1     11-04    145  |  106  |  11  ----------------------------<  73  3.7   |  29  |  0.92    Ca    9.3      2018 07:24        Urinalysis Basic - ( 2018 22:41 )    Color: Light Yellow / Appearance: Clear / S.016 / pH: x  Gluc: x / Ketone: Negative  / Bili: Negative / Urobili: Negative   Blood: x / Protein: Negative / Nitrite: Negative   Leuk Esterase: Negative / RBC: x / WBC x   Sq Epi: x / Non Sq Epi: x / Bacteria: x    Imp/Rx:  Improved  CAP with normal Wbc count now and no fevers.  continue same antimicrobials
NAD Decreased cough    VS Stable Ageb    Lungs- minimal rhonchi both bases  Heart- Reg  Abd-  non tender  Ext- no edema    IMP: CAP; improved with nl WBC #, decreased cough    PLAN: Continue antibx; po soon  OOB  Outpt chest imaging    Paul Watt MD [facility]  [phone]    (Shukri/Sue/Tommy)
NAD on contact isolation for MRSA in sputum    Decreased cough    VS Stable Afeb    Lungs- clear  Heart- Reg  Abd-  non tender  Ext- no edema    IMP: CAP; MRSA positive sputum; improved  ID note appreciated    PLAN: Antibx as ordered  Outpt chest imaging    Paul Watt MD [facility]  [phone]  (александр/Sue/Tommy)
Pre-Endoscopy Evaluation      Referring Physician: dr. sarahi gardner                                Procedure:  upper gastrointestinal endoscopy     Indication for Procedure: abnormal imaging, dilated esophagus    Pertinent History: 62y male with HTN, prior back surgeries, prior episode of multilobar pneumonia presenting with cough, fever, found to have dilated esophagus on imaging      Sedation by Anesthesia [X]    PAST MEDICAL & SURGICAL HISTORY:  Anxiety and depression  Diastolic dysfunction: stage I  Empyema lun2015 left lung, s/p VATS, decortication  H/O peptic ulcer: over 10 years ago  history of renal calculus  Herniated Disc: S/P work injury   Hypertension: Dx:   Spinal Stenosis  Postlaminectomy Syndrome  S/P  shunt  S/P insertion of spinal cord stimulator:   History of lumbar fusion:   Insertion of Intrathecal Dilaudid Pump: 2011  S/P Spinal Surgery: corrective lumbar fusion   S/P Knee Replacement: left knee   S/P Arthroscopy of Left Knee  S/P Tonsillectomy: childhood  Ankle Fracture: s/p ORIF left ankle       PMH of Gastroparesis [ ]  Gastric Surgery [ ]  Gastric Outlet Obstruction [ ]    Allergies:    No Known Allergies    Intolerances:    Latex allergy: [ ] yes [X] no    Medications:MEDICATIONS  (STANDING):  ALBUTerol   0.042% 1.25 milliGRAM(s) Nebulizer every 6 hours  cefTRIAXone   IVPB 1 Gram(s) IV Intermittent every 24 hours  chlorhexidine 4% Liquid 1 Application(s) Topical <User Schedule>  cloNIDine 0.1 milliGRAM(s) Oral every 8 hours  diazepam    Tablet 2 milliGRAM(s) Oral three times a day  docusate sodium 100 milliGRAM(s) Oral three times a day  DULoxetine 60 milliGRAM(s) Oral two times a day  heparin  Injectable 5000 Unit(s) SubCutaneous every 12 hours  influenza   Vaccine 0.5 milliLiter(s) IntraMuscular once  sodium chloride 0.9%. 1000 milliLiter(s) (75 mL/Hr) IV Continuous <Continuous>  sucralfate suspension 1 Gram(s) Oral two times a day  trimethoprim  160 mG/sulfamethoxazole 800 mG 2 Tablet(s) Oral every 12 hours    MEDICATIONS  (PRN):  acetaminophen   Tablet .. 650 milliGRAM(s) Oral every 6 hours PRN Temp greater or equal to 38C (100.4F)  methadone    Tablet 10 milliGRAM(s) Oral every 8 hours PRN Severe Pain (7 - 10)  morphine  IR 30 milliGRAM(s) Oral every 8 hours PRN Severe Pain (7 - 10)  polyethylene glycol 3350 17 Gram(s) Oral two times a day PRN Constipation      Smoking: [ ] yes  [X] no    AICD/PPM: [ ] yes   [X] no    Pertinent lab data:                        10.2   6.07  )-----------( 256      ( 2018 08:11 )             32.7     11-    143  |  103  |  15  ----------------------------<  91  3.9   |  25  |  1.24    Ca    9.4      2018 07:07      < from: Transthoracic Echocardiogram (11.04.15 @ 10:11) >    Fractional short: 50 %  EF (Teicholtz): 81 %  ------------------------------------------------------------------------  Observations:  Mitral Valve: Thickened mitral valve.  Aortic Valve/Aorta: Thickened aortic valve. No aortic valve  regurgitation seen.  Aortic Root: 3.2 cm.  Left Atrium: Moderately dilated left atrium.  LA volume  index = 39 cc/m2.  Left Ventricle: Normal left ventricular systolic function.  No segmental wall motion abnormalities. Normal left  ventricular internal dimensions and wall thicknesses. Mild  diastolic dysfunction (Stage I).  Right Heart: Normal right atrium. Normal right ventricular  size and function. Normal tricuspid valve. Normal pulmonic  valve.  Pericardium/Pleura: Normal pericardium with no pericardial  effusion.  Hemodynamic: Estimated right atrial pressure is 8 mm Hg.  ------------------------------------------------------------------------  Conclusions:  1. Normal left ventricular systolic function. No segmental  wall motion abnormalities.  2. Mild diastolic dysfunction (Stage I).  3. Normal right ventricular size and function.  ------------------------------------------------------------------------          Physical Examination:  Daily     Daily Weight in k.8 (2018 10:40)  Vital Signs Last 24 Hrs  T(C): 36.7 (2018 08:08), Max: 36.9 (2018 10:40)  T(F): 98 (2018 08:08), Max: 98.5 (2018 10:40)  HR: 71 (2018 08:08) (60 - 98)  BP: 132/86 (2018 08:08) (117/76 - 159/81)  BP(mean): --  RR: 18 (2018 08:08) (17 - 18)  SpO2: 95% (2018 08:08) (94% - 97%)    Drug Dosing Weight  Height (cm): 177.8 (25 Mar 2018 21:20)  Weight (kg): 106.6 (2018 14:09)  BMI (kg/m2): 33.7 (2018 14:09)  BSA (m2): 2.24 (2018 14:09)    Constitutional: NAD     Neck:  No JVD    Respiratory: CTAB/L    Cardiovascular: S1 and S2    Gastrointestinal: BS+, soft, NT/ND    Extremities: No peripheral edema    Neurological: A/O x 3    : No Heredia    Skin: No rashes    Comments:    ASA Class: I [ ]  II [ ]  III [x]  IV [ ]    The patient is a suitable candidate for the planned procedure unless box checked [ ]  No, explain:
Pulmonary      less dyspnea      elevated BMI    hx lumbar vertebral surgery, rods    elevated BMI    he has hiatus hernia with portion of stomach in chest    he has chronic constipation    lower abdominal pain      PMH    Diastolic dysfunction: stage I  Empyema lun2015 left lung, s/p VATS, decortication  H/O peptic ulcer: over 10 years ago  history of renal calculus  Herniated Disc: S/P work injury   Hypertension: Dx:   Spinal Stenosis  Postlaminectomy Syndrome  S/P  shunt  S/P insertion of spinal cord stimulator:   History of lumbar fusion:       admitted with cough dyspnea.  CT chesr RLL pna    afebrile    lungs clear but diminished, decreased aeration at right base, no wheeze  cor rrr no murmur  abd nt obese, no visible abdominal wall hernia  alert conversant  skin warm    IMPR    RLL pneumonia    MRSA     on ceftriaxone and TMP-SM    plan    cont abx  Gi following for hiatus hernia    aspiration precautions    labs meds reviewed      Kei Sanders MD    Pulmonology      MD Vick Delvalle MD George Haralambou, MD    [phone]
o/n events: esophagram reviewed       Allergies:  No Known Allergies      Medications:  acetaminophen   Tablet .. 650 milliGRAM(s) Oral every 6 hours PRN  ALBUTerol   0.042% 1.25 milliGRAM(s) Nebulizer every 6 hours  azithromycin  IVPB 500 milliGRAM(s) IV Intermittent every 24 hours  cefTRIAXone   IVPB 1 Gram(s) IV Intermittent every 24 hours  cloNIDine 0.1 milliGRAM(s) Oral every 8 hours  diazepam    Tablet 2 milliGRAM(s) Oral three times a day  docusate sodium 100 milliGRAM(s) Oral three times a day  DULoxetine 60 milliGRAM(s) Oral two times a day  heparin  Injectable 5000 Unit(s) SubCutaneous every 12 hours  influenza   Vaccine 0.5 milliLiter(s) IntraMuscular once  methadone    Tablet 10 milliGRAM(s) Oral every 8 hours PRN  morphine  IR 30 milliGRAM(s) Oral every 8 hours PRN  polyethylene glycol 3350 17 Gram(s) Oral two times a day PRN  sodium chloride 0.9%. 1000 milliLiter(s) IV Continuous <Continuous>  sucralfate suspension 1 Gram(s) Oral two times a day      PMHX/PSHX:  Anxiety and depression  Diastolic dysfunction  Empyema lung  H/O peptic ulcer  history of renal calculus  Renal calculus  Current smoker  Herniated Disc  Hypertension  Depression  Spinal Stenosis  Peripheral Neuropathy  Postlaminectomy Syndrome  S/P  shunt  S/P insertion of spinal cord stimulator  History of lumbar fusion  Insertion of Intrathecal Dilaudid Pump  S/P Spinal Surgery  S/P Knee Replacement  S/P Arthroscopy of Left Knee  S/P Tonsillectomy  Ankle Fracture      Family history:  No pertinent family history in first degree relatives      Social History:     ROS:     General:  No wt loss, fevers, chills, night sweats, fatigue,   Eyes:  Good vision, no reported pain  ENT:  No sore throat, pain, runny nose, dysphagia  CV:  No pain, palpitations, hypo/hypertension  Resp:  No dyspnea, cough, tachypnea, wheezing  GI:  No pain, No nausea, No vomiting, No diarrhea, No constipation, No weight loss, No fever, No pruritis, No rectal bleeding, No tarry stools, No dysphagia,  :  No pain, bleeding, incontinence, nocturia  Muscle:  No pain, weakness  Neuro:  No weakness, tingling, memory problems  Psych:  No fatigue, insomnia, mood problems, depression  Endocrine:  No polyuria, polydipsia, cold/heat intolerance  Heme:  No petechiae, ecchymosis, easy bruisability  Skin:  No rash, tattoos, scars, edema      PHYSICAL EXAM:   Vital Signs:  Vital Signs Last 24 Hrs  T(C): 36.9 (2018 14:40), Max: 37.1 (2018 21:14)  T(F): 98.4 (2018 14:40), Max: 98.8 (2018 21:14)  HR: 61 (2018 14:40) (61 - 86)  BP: 142/85 (2018 14:40) (121/77 - 143/82)  BP(mean): --  RR: 18 (2018 14:40) (18 - 18)  SpO2: 96% (2018 14:40) (93% - 96%)  Daily     Daily     GENERAL:  Appears stated age, well-groomed, well-nourished, no distress  HEENT:  NC/AT,  conjunctivae clear and pink, no thyromegaly, nodules, adenopathy, no JVD, sclera -anicteric  CHEST:  Full & symmetric excursion, no increased effort, breath sounds clear  HEART:  Regular rhythm, S1, S2, no murmur/rub/S3/S4, no abdominal bruit, no edema  ABDOMEN:  Soft, non-tender, non-distended, normoactive bowel sounds,  no masses ,no hepato-splenomegaly, no signs of chronic liver disease  EXTEREMITIES:  no cyanosis,clubbing or edema  SKIN:  No rash/erythema/ecchymoses/petechiae/wounds/abscess/warm/dry  NEURO:  Alert, oriented, no asterixis, no tremor, no encephalopathy    LABS:                        9.7    5.23  )-----------( 249      ( 2018 08:56 )             31.6     11-04    145  |  106  |  11  ----------------------------<  73  3.7   |  29  |  0.92    Ca    9.3      2018 07:24          Urinalysis Basic - ( 2018 22:41 )    Color: Light Yellow / Appearance: Clear / S.016 / pH: x  Gluc: x / Ketone: Negative  / Bili: Negative / Urobili: Negative   Blood: x / Protein: Negative / Nitrite: Negative   Leuk Esterase: Negative / RBC: x / WBC x   Sq Epi: x / Non Sq Epi: x / Bacteria: x          Imaging:
o/n events: unable to get esophagram      Allergies:  No Known Allergies      Medications:  acetaminophen   Tablet .. 650 milliGRAM(s) Oral every 6 hours PRN  ALBUTerol   0.042% 1.25 milliGRAM(s) Nebulizer every 6 hours  azithromycin  IVPB 500 milliGRAM(s) IV Intermittent every 24 hours  cefTRIAXone   IVPB 1 Gram(s) IV Intermittent every 24 hours  cloNIDine 0.1 milliGRAM(s) Oral every 8 hours  diazepam    Tablet 2 milliGRAM(s) Oral three times a day  docusate sodium 100 milliGRAM(s) Oral three times a day  DULoxetine 60 milliGRAM(s) Oral two times a day  heparin  Injectable 5000 Unit(s) SubCutaneous every 12 hours  influenza   Vaccine 0.5 milliLiter(s) IntraMuscular once  methadone    Tablet 10 milliGRAM(s) Oral every 8 hours PRN  morphine  IR 30 milliGRAM(s) Oral every 8 hours PRN  polyethylene glycol 3350 17 Gram(s) Oral two times a day PRN  sodium chloride 0.9%. 1000 milliLiter(s) IV Continuous <Continuous>  sucralfate suspension 1 Gram(s) Oral two times a day      PMHX/PSHX:  Anxiety and depression  Diastolic dysfunction  Empyema lung  H/O peptic ulcer  history of renal calculus  Renal calculus  Current smoker  Herniated Disc  Hypertension  Depression  Spinal Stenosis  Peripheral Neuropathy  Postlaminectomy Syndrome  S/P  shunt  S/P insertion of spinal cord stimulator  History of lumbar fusion  Insertion of Intrathecal Dilaudid Pump  S/P Spinal Surgery  S/P Knee Replacement  S/P Arthroscopy of Left Knee  S/P Tonsillectomy  Ankle Fracture      Family history:  No pertinent family history in first degree relatives      Social History:     ROS:     General:  No wt loss, fevers, chills, night sweats, fatigue,   Eyes:  Good vision, no reported pain  ENT:  No sore throat, pain, runny nose, dysphagia  CV:  No pain, palpitations, hypo/hypertension  Resp:  No dyspnea, cough, tachypnea, wheezing  GI:  No pain, No nausea, No vomiting, No diarrhea, No constipation, No weight loss, No fever, No pruritis, No rectal bleeding, No tarry stools, No dysphagia,  :  No pain, bleeding, incontinence, nocturia  Muscle:  No pain, weakness  Neuro:  No weakness, tingling, memory problems  Psych:  No fatigue, insomnia, mood problems, depression  Endocrine:  No polyuria, polydipsia, cold/heat intolerance  Heme:  No petechiae, ecchymosis, easy bruisability  Skin:  No rash, tattoos, scars, edema      PHYSICAL EXAM:   Vital Signs:  Vital Signs Last 24 Hrs  T(C): 36.9 (2018 14:40), Max: 37.1 (2018 21:14)  T(F): 98.4 (2018 14:40), Max: 98.8 (2018 21:14)  HR: 61 (2018 14:40) (61 - 86)  BP: 142/85 (2018 14:40) (121/77 - 143/82)  BP(mean): --  RR: 18 (2018 14:40) (18 - 18)  SpO2: 96% (2018 14:40) (93% - 96%)  Daily     Daily     GENERAL:  Appears stated age, well-groomed, well-nourished, no distress  HEENT:  NC/AT,  conjunctivae clear and pink, no thyromegaly, nodules, adenopathy, no JVD, sclera -anicteric  CHEST:  Full & symmetric excursion, no increased effort, breath sounds clear  HEART:  Regular rhythm, S1, S2, no murmur/rub/S3/S4, no abdominal bruit, no edema  ABDOMEN:  Soft, non-tender, non-distended, normoactive bowel sounds,  no masses ,no hepato-splenomegaly, no signs of chronic liver disease  EXTEREMITIES:  no cyanosis,clubbing or edema  SKIN:  No rash/erythema/ecchymoses/petechiae/wounds/abscess/warm/dry  NEURO:  Alert, oriented, no asterixis, no tremor, no encephalopathy    LABS:                        9.7    5.23  )-----------( 249      ( 2018 08:56 )             31.6     11-04    145  |  106  |  11  ----------------------------<  73  3.7   |  29  |  0.92    Ca    9.3      2018 07:24          Urinalysis Basic - ( 2018 22:41 )    Color: Light Yellow / Appearance: Clear / S.016 / pH: x  Gluc: x / Ketone: Negative  / Bili: Negative / Urobili: Negative   Blood: x / Protein: Negative / Nitrite: Negative   Leuk Esterase: Negative / RBC: x / WBC x   Sq Epi: x / Non Sq Epi: x / Bacteria: x          Imaging:

## 2018-11-08 NOTE — DISCHARGE NOTE ADULT - ADDITIONAL INSTRUCTIONS
Follow up with Gastroenterologist Dr Slaughter in 1 week.  Follow up with  Pulmonologist Dr Kei Sanders, and your Primary Care Dr Fernández in 1 week.  Call to schedule appointments. Follow up with Gastroenterologist Dr Slaughter in 1 week.  Follow up with  Pulmonologist Dr Kei Sanders for evaluation of your Pneumonia, and with your Primary Care Dr Fernández in 1 week for management of your medical conditions.  Call to schedule appointments.

## 2018-11-08 NOTE — PROGRESS NOTE ADULT - PROVIDER SPECIALTY LIST ADULT
Gastroenterology
Infectious Disease
Internal Medicine
Pulmonology
Gastroenterology

## 2018-11-08 NOTE — PROGRESS NOTE ADULT - ASSESSMENT
pt w/ pna /  ct chest showing intrathoracic stomach and dilated esophagus  concern for aspiration  GI eval noted  esophogram reviewed  EGD today  id eval noted  c/w abx per ID, transition to PO upon discharge  pulm eval noted  nebs  dvt proph  cont outpt meds  analgesics for pain   ? d/c planning after EGD if no other procedures from GI

## 2018-11-08 NOTE — DISCHARGE NOTE ADULT - PATIENT PORTAL LINK FT
You can access the [facility] Patient Portal, offered by [facility], by registering with the following website: [email or web address]

## 2018-11-08 NOTE — DISCHARGE NOTE ADULT - CARE PLAN
Principal Discharge DX:	PNA (pneumonia)  Secondary Diagnosis:	Hypertension Principal Discharge DX:	PNA (pneumonia)  Goal:	Resolution.  Assessment and plan of treatment:	Pneumonia is a lung infection that can cause a fever, cough, and trouble breathing.  Continue all antibiotics as ordered until complete.  Nutrition is important, eat small frequent meals.  Get lots of rest and drink fluids.  Call your health care provider upon arrival home from hospital and make a follow up appointment for one week.  If your cough worsens, you develop fever greater than 101', you have shaking chills, a fast heartbeat, trouble breathing and/or feel your are breathing much faster than usual, call your healthcare provider.  Make sure you wash your hands frequently.  Secondary Diagnosis:	Hypertension  Assessment and plan of treatment:	Take your medication as prescribed.  Follow up with your medical doctor for routine blood pressure monitoring, and to establish long term blood pressure treatment goals.  Low salt diet  Activity as tolerated.  Notify your doctor if you have any of the following symptoms:   Dizziness, Lightheadedness, Blurry vision, Headache, Chest pain, Shortness of breath  Secondary Diagnosis:	Diastolic dysfunction  Assessment and plan of treatment:	Weigh yourself daily.  If you gain 3lbs in 3 days, or 5lbs in a week call your Health Care Provider.  Do not eat or drink foods containing more than 2000mg of salt (sodium) in your diet every day.  Call your Health Care Provider if you have any swelling or increased swelling in your feet, ankles, and/or stomach.  Take all of your medication as directed.  If you become dizzy call your Health Care Provider.  Secondary Diagnosis:	Hiatal hernia  Assessment and plan of treatment:	You had Endoscopy don't today.  Follow up with Gastroenterologist Dr Slaughter in 1 week.  Secondary Diagnosis:	Anxiety and depression  Assessment and plan of treatment:	Take your medication as prescribed.   Follow up with your medical doctor for routine  monitoring, and to establish long term treatment goals.

## 2018-11-08 NOTE — DISCHARGE NOTE ADULT - PLAN OF CARE
Resolution. Pneumonia is a lung infection that can cause a fever, cough, and trouble breathing.  Continue all antibiotics as ordered until complete.  Nutrition is important, eat small frequent meals.  Get lots of rest and drink fluids.  Call your health care provider upon arrival home from hospital and make a follow up appointment for one week.  If your cough worsens, you develop fever greater than 101', you have shaking chills, a fast heartbeat, trouble breathing and/or feel your are breathing much faster than usual, call your healthcare provider.  Make sure you wash your hands frequently. Take your medication as prescribed.  Follow up with your medical doctor for routine blood pressure monitoring, and to establish long term blood pressure treatment goals.  Low salt diet  Activity as tolerated.  Notify your doctor if you have any of the following symptoms:   Dizziness, Lightheadedness, Blurry vision, Headache, Chest pain, Shortness of breath Weigh yourself daily.  If you gain 3lbs in 3 days, or 5lbs in a week call your Health Care Provider.  Do not eat or drink foods containing more than 2000mg of salt (sodium) in your diet every day.  Call your Health Care Provider if you have any swelling or increased swelling in your feet, ankles, and/or stomach.  Take all of your medication as directed.  If you become dizzy call your Health Care Provider. You had Endoscopy don't today.  Follow up with Gastroenterologist Dr Slaughter in 1 week. Take your medication as prescribed.   Follow up with your medical doctor for routine  monitoring, and to establish long term treatment goals.

## 2018-11-08 NOTE — DISCHARGE NOTE ADULT - MEDICATION SUMMARY - MEDICATIONS TO TAKE
I will START or STAY ON the medications listed below when I get home from the hospital:    Potassium OTC  -- once a day  -- Indication: For Supplement    Omega Red  -- once a day  -- Indication: For Supplement    Vitamin B  -- orally once a day  -- Indication: For Supplement    morphine 30 mg oral tablet  -- 1 tab(s) by mouth every 8 hours, As Needed  -- Indication: For Pain    methadone 10 mg oral tablet  -- 2 tab(s) by mouth every 6 to 8 hours, As Needed  -- Indication: For Pain    cloNIDine 0.1 mg oral tablet  -- 1 tab(s) by mouth every 8 hours  -- Indication: For Hypertension    diazePAM 2 mg oral tablet  -- 1 tab(s) by mouth 3 times a day  -- Indication: For Mood    DULoxetine 60 mg oral delayed release capsule  -- 1 cap(s) by mouth 2 times a day  -- Indication: For Mood    Proventil HFA 90 mcg/inh inhalation aerosol  -- 2 puff(s) inhaled 4 times a day, As Needed for shortness of breath.   -- For inhalation only.  It is very important that you take or use this exactly as directed.  Do not skip doses or discontinue unless directed by your doctor.  Obtain medical advice before taking any non-prescription drugs as some may affect the action of this medication.  Shake well before use.    -- Indication: For Bronchodilator for shortness of breath    hydroCHLOROthiazide 50 mg oral tablet  -- 1 tab(s) by mouth once a day  -- Indication: For Hypertension    MiraLax oral powder for reconstitution  -- 17 gram(s) by mouth 2 times a day  -- Indication: For Laxative    docusate sodium 100 mg oral capsule  -- 1 cap(s) by mouth once a day, As Needed  -- Indication: For Stool softener    sucralfate 1 g/10 mL oral suspension  -- 10 milliliter(s) by mouth 3 times a day  -- Indication: For Stomach acid    chlorzoxazone 250 mg oral tablet  -- 1 tab(s) by mouth 2 times a day  -- Indication: For Muscle relaxant    sulfamethoxazole-trimethoprim 800 mg-160 mg oral tablet  -- 2 tab(s) by mouth every 12 hours  -- Indication: For PNA (pneumonia)    Centrum oral tablet  -- 1 tab(s) by mouth once a day  -- Indication: For Vitamin Supplement

## 2018-11-09 LAB — SURGICAL PATHOLOGY STUDY: SIGNIFICANT CHANGE UP

## 2018-11-13 ENCOUNTER — MOBILE ON CALL (OUTPATIENT)
Age: 63
End: 2018-11-13

## 2018-11-13 ENCOUNTER — EMERGENCY (EMERGENCY)
Facility: HOSPITAL | Age: 63
LOS: 1 days | End: 2018-11-13
Attending: EMERGENCY MEDICINE
Payer: COMMERCIAL

## 2018-11-13 VITALS
SYSTOLIC BLOOD PRESSURE: 145 MMHG | HEART RATE: 84 BPM | DIASTOLIC BLOOD PRESSURE: 97 MMHG | OXYGEN SATURATION: 96 % | TEMPERATURE: 98 F | HEIGHT: 71 IN | WEIGHT: 235.01 LBS | RESPIRATION RATE: 18 BRPM

## 2018-11-13 VITALS
DIASTOLIC BLOOD PRESSURE: 86 MMHG | SYSTOLIC BLOOD PRESSURE: 159 MMHG | RESPIRATION RATE: 18 BRPM | HEART RATE: 64 BPM | TEMPERATURE: 98 F | OXYGEN SATURATION: 98 %

## 2018-11-13 DIAGNOSIS — Z98.2 PRESENCE OF CEREBROSPINAL FLUID DRAINAGE DEVICE: Chronic | ICD-10-CM

## 2018-11-13 DIAGNOSIS — Z98.89 OTHER SPECIFIED POSTPROCEDURAL STATES: Chronic | ICD-10-CM

## 2018-11-13 LAB
ALBUMIN SERPL ELPH-MCNC: 4.6 G/DL — SIGNIFICANT CHANGE UP (ref 3.3–5)
ALP SERPL-CCNC: 96 U/L — SIGNIFICANT CHANGE UP (ref 40–120)
ALT FLD-CCNC: 25 U/L — SIGNIFICANT CHANGE UP (ref 10–45)
ANION GAP SERPL CALC-SCNC: 17 MMOL/L — SIGNIFICANT CHANGE UP (ref 5–17)
AST SERPL-CCNC: 28 U/L — SIGNIFICANT CHANGE UP (ref 10–40)
BASOPHILS # BLD AUTO: 0 K/UL — SIGNIFICANT CHANGE UP (ref 0–0.2)
BASOPHILS NFR BLD AUTO: 0.4 % — SIGNIFICANT CHANGE UP (ref 0–2)
BILIRUB SERPL-MCNC: 0.2 MG/DL — SIGNIFICANT CHANGE UP (ref 0.2–1.2)
BUN SERPL-MCNC: 14 MG/DL — SIGNIFICANT CHANGE UP (ref 7–23)
CALCIUM SERPL-MCNC: 9.9 MG/DL — SIGNIFICANT CHANGE UP (ref 8.4–10.5)
CHLORIDE SERPL-SCNC: 100 MMOL/L — SIGNIFICANT CHANGE UP (ref 96–108)
CO2 SERPL-SCNC: 23 MMOL/L — SIGNIFICANT CHANGE UP (ref 22–31)
CREAT SERPL-MCNC: 1.31 MG/DL — HIGH (ref 0.5–1.3)
EOSINOPHIL # BLD AUTO: 0.2 K/UL — SIGNIFICANT CHANGE UP (ref 0–0.5)
EOSINOPHIL NFR BLD AUTO: 3.8 % — SIGNIFICANT CHANGE UP (ref 0–6)
GAS PNL BLDV: SIGNIFICANT CHANGE UP
GLUCOSE SERPL-MCNC: 96 MG/DL — SIGNIFICANT CHANGE UP (ref 70–99)
HCT VFR BLD CALC: 37.1 % — LOW (ref 39–50)
HGB BLD-MCNC: 12 G/DL — LOW (ref 13–17)
LIDOCAIN IGE QN: 21 U/L — SIGNIFICANT CHANGE UP (ref 7–60)
LYMPHOCYTES # BLD AUTO: 1.2 K/UL — SIGNIFICANT CHANGE UP (ref 1–3.3)
LYMPHOCYTES # BLD AUTO: 22.2 % — SIGNIFICANT CHANGE UP (ref 13–44)
MCHC RBC-ENTMCNC: 27.8 PG — SIGNIFICANT CHANGE UP (ref 27–34)
MCHC RBC-ENTMCNC: 32.3 GM/DL — SIGNIFICANT CHANGE UP (ref 32–36)
MCV RBC AUTO: 86.1 FL — SIGNIFICANT CHANGE UP (ref 80–100)
MONOCYTES # BLD AUTO: 0.4 K/UL — SIGNIFICANT CHANGE UP (ref 0–0.9)
MONOCYTES NFR BLD AUTO: 7.8 % — SIGNIFICANT CHANGE UP (ref 2–14)
NEUTROPHILS # BLD AUTO: 3.6 K/UL — SIGNIFICANT CHANGE UP (ref 1.8–7.4)
NEUTROPHILS NFR BLD AUTO: 65.9 % — SIGNIFICANT CHANGE UP (ref 43–77)
PLATELET # BLD AUTO: 318 K/UL — SIGNIFICANT CHANGE UP (ref 150–400)
POTASSIUM SERPL-MCNC: 4.6 MMOL/L — SIGNIFICANT CHANGE UP (ref 3.5–5.3)
POTASSIUM SERPL-SCNC: 4.6 MMOL/L — SIGNIFICANT CHANGE UP (ref 3.5–5.3)
PROT SERPL-MCNC: 8.5 G/DL — HIGH (ref 6–8.3)
RBC # BLD: 4.31 M/UL — SIGNIFICANT CHANGE UP (ref 4.2–5.8)
RBC # FLD: 15.5 % — HIGH (ref 10.3–14.5)
SODIUM SERPL-SCNC: 140 MMOL/L — SIGNIFICANT CHANGE UP (ref 135–145)
WBC # BLD: 5.5 K/UL — SIGNIFICANT CHANGE UP (ref 3.8–10.5)
WBC # FLD AUTO: 5.5 K/UL — SIGNIFICANT CHANGE UP (ref 3.8–10.5)

## 2018-11-13 PROCEDURE — 82803 BLOOD GASES ANY COMBINATION: CPT

## 2018-11-13 PROCEDURE — 99284 EMERGENCY DEPT VISIT MOD MDM: CPT | Mod: 25

## 2018-11-13 PROCEDURE — 82435 ASSAY OF BLOOD CHLORIDE: CPT

## 2018-11-13 PROCEDURE — 99284 EMERGENCY DEPT VISIT MOD MDM: CPT

## 2018-11-13 PROCEDURE — 80053 COMPREHEN METABOLIC PANEL: CPT

## 2018-11-13 PROCEDURE — 84295 ASSAY OF SERUM SODIUM: CPT

## 2018-11-13 PROCEDURE — 85027 COMPLETE CBC AUTOMATED: CPT

## 2018-11-13 PROCEDURE — 82330 ASSAY OF CALCIUM: CPT

## 2018-11-13 PROCEDURE — 74177 CT ABD & PELVIS W/CONTRAST: CPT | Mod: 26

## 2018-11-13 PROCEDURE — 96361 HYDRATE IV INFUSION ADD-ON: CPT

## 2018-11-13 PROCEDURE — 84132 ASSAY OF SERUM POTASSIUM: CPT

## 2018-11-13 PROCEDURE — 74177 CT ABD & PELVIS W/CONTRAST: CPT

## 2018-11-13 PROCEDURE — 82947 ASSAY GLUCOSE BLOOD QUANT: CPT

## 2018-11-13 PROCEDURE — 83690 ASSAY OF LIPASE: CPT

## 2018-11-13 PROCEDURE — 85014 HEMATOCRIT: CPT

## 2018-11-13 PROCEDURE — 96375 TX/PRO/DX INJ NEW DRUG ADDON: CPT

## 2018-11-13 PROCEDURE — 96374 THER/PROPH/DIAG INJ IV PUSH: CPT | Mod: XU

## 2018-11-13 PROCEDURE — 83605 ASSAY OF LACTIC ACID: CPT

## 2018-11-13 RX ORDER — SODIUM CHLORIDE 9 MG/ML
1000 INJECTION INTRAMUSCULAR; INTRAVENOUS; SUBCUTANEOUS ONCE
Qty: 0 | Refills: 0 | Status: COMPLETED | OUTPATIENT
Start: 2018-11-13 | End: 2018-11-13

## 2018-11-13 RX ORDER — ONDANSETRON 8 MG/1
4 TABLET, FILM COATED ORAL ONCE
Qty: 0 | Refills: 0 | Status: COMPLETED | OUTPATIENT
Start: 2018-11-13 | End: 2018-11-13

## 2018-11-13 RX ORDER — ACETAMINOPHEN 500 MG
1000 TABLET ORAL ONCE
Qty: 0 | Refills: 0 | Status: COMPLETED | OUTPATIENT
Start: 2018-11-13 | End: 2018-11-13

## 2018-11-13 RX ORDER — MORPHINE SULFATE 50 MG/1
4 CAPSULE, EXTENDED RELEASE ORAL ONCE
Qty: 0 | Refills: 0 | Status: DISCONTINUED | OUTPATIENT
Start: 2018-11-13 | End: 2018-11-13

## 2018-11-13 RX ORDER — FAMOTIDINE 10 MG/ML
20 INJECTION INTRAVENOUS ONCE
Qty: 0 | Refills: 0 | Status: COMPLETED | OUTPATIENT
Start: 2018-11-13 | End: 2018-11-13

## 2018-11-13 RX ADMIN — MORPHINE SULFATE 4 MILLIGRAM(S): 50 CAPSULE, EXTENDED RELEASE ORAL at 19:22

## 2018-11-13 RX ADMIN — ONDANSETRON 4 MILLIGRAM(S): 8 TABLET, FILM COATED ORAL at 16:44

## 2018-11-13 RX ADMIN — SODIUM CHLORIDE 1000 MILLILITER(S): 9 INJECTION INTRAMUSCULAR; INTRAVENOUS; SUBCUTANEOUS at 18:23

## 2018-11-13 RX ADMIN — SODIUM CHLORIDE 1000 MILLILITER(S): 9 INJECTION INTRAMUSCULAR; INTRAVENOUS; SUBCUTANEOUS at 18:24

## 2018-11-13 RX ADMIN — Medication 400 MILLIGRAM(S): at 18:35

## 2018-11-13 RX ADMIN — Medication 30 MILLILITER(S): at 16:43

## 2018-11-13 RX ADMIN — FAMOTIDINE 20 MILLIGRAM(S): 10 INJECTION INTRAVENOUS at 16:44

## 2018-11-13 RX ADMIN — SODIUM CHLORIDE 1000 MILLILITER(S): 9 INJECTION INTRAMUSCULAR; INTRAVENOUS; SUBCUTANEOUS at 16:43

## 2018-11-13 NOTE — ED ADULT NURSE REASSESSMENT NOTE - NS ED NURSE REASSESS COMMENT FT1
Pt brought to CT, stable. Medicated for pain prior to leaving Pt. prefers I communicate in Cuban during PST visit today

## 2018-11-13 NOTE — ED ADULT NURSE NOTE - OBJECTIVE STATEMENT
62y Male PMH chronic back pain with multiple spine surgeries on Morphine at home, htn, PNA, hiatel hernia presents to the ED c/o abd pain. Pt states he was recently admitted to the hospital for PNA, ended up having an endoscopy done for abdominal pain/acid reflux and was found to have a 10cm hiatel hernia. Pt was discharged last week and reports increased abdominal pain and distension X2 days. Pt reports periumbilical pain for about a month that worsens after eating and radiates across abd. Pt reports intermittent nausea and decreased PO intake today. Pt also reports 4 loose bowel movements today but states he is on a stool softener at home, denies melena. Pt was supposed to follow up with GI after discharge but states he has not made an appointment yet. Pt presents a&oX4, well in appearance, airway intact, breathing spontaneously and unlabored, lung sounds clear bilaterally, abd soft, distended, no abd tenderness noted at this time, skin warm dry and intact, cap refill <3 seconds, +peripheral pulses, denies fever/chills, cough, CP, SOB, vomiting, melena, hematuria, dysuria. MD at bedside for eval. safety maintained.

## 2018-11-13 NOTE — ED PROVIDER NOTE - CARE PLAN
Assessment and plan of treatment:	Continue your current medication regimen.  Follow up with your Gastroenterologist, Dr. Vega, this week as instructed upon discharge from the hospital.   Follow up with your Primary Care Physician within the next 2-3 days.  Bring a copy of your test results with you to your appointment.  Return to the Emergency Room if you experience new or worsening symptoms including worsening abdominal pain, vomiting, inability to tolerate food/fluid, blood in stool, fever/chills, or any other concerning symptoms. Principal Discharge DX:	Abdominal pain  Assessment and plan of treatment:	Continue your current medication regimen.  Follow up with your Gastroenterologist, Dr. Vega, this week as instructed upon discharge from the hospital.   Follow up with your Primary Care Physician within the next 2-3 days.  Bring a copy of your test results with you to your appointment.  Return to the Emergency Room if you experience new or worsening symptoms including worsening abdominal pain, vomiting, inability to tolerate food/fluid, blood in stool, fever/chills, or any other concerning symptoms.  Secondary Diagnosis:	Hiatal hernia

## 2018-11-13 NOTE — ED ADULT TRIAGE NOTE - CHIEF COMPLAINT QUOTE
Lower abdominal pain x 1 day, diarrhea/nausea, Admitted x 2 weeks ago for Hital hernia, continued pain

## 2018-11-13 NOTE — ED ADULT NURSE NOTE - INTERVENTIONS DEFINITIONS
Monitor gait and stability/[place] to call system/Stretcher in lowest position, wheels locked, appropriate side rails in place/Instruct patient to call for assistance/Provide visual cue, wrist band, yellow gown, etc./Physically safe environment: no spills, clutter or unnecessary equipment/Reinforce activity limits and safety measures with patient and family

## 2018-11-13 NOTE — ED PROVIDER NOTE - ABDOMINAL EXAM
+ R-sided abd scar/scar tissue (reports from old pain pump), no tenderness in this area/soft/tender.../DISTENDED

## 2018-11-13 NOTE — ED PROVIDER NOTE - OBJECTIVE STATEMENT
Pt is a 63yo M with PMH HTN, chronic back pain s/p multiple spine surgeries, recent admission 11/1-11/8 for MRSA PNA, found to have 10 cm hiatal hernia during admission presenting with worsening abdominal pain x 2 days. Pt reports he had intermittent, dull, periumbilical pain for about 1 month. Abd pain worsened yesterday after eating, described as sharp/stabbing, 10/10, radiating across mid abd. Reports associated nausea and poor PO intake. Also reports 4 episodes of nb diarrhea today,  but reports this is not unusual for him because he takes stool softeners with his chronic pain meds. Has not f/u with GI since discharge. Denies any fever/chills, cough, CP, SOB, vomiting, melena, BRBPR, urinary symptoms.     PCP Adama Fernández Pt is a 63yo M with PMH HTN, chronic back pain s/p multiple spine surgeries, recent admission 11/1-11/8/18 for MRSA PNA, found to have 10cm hiatal hernia during admission presenting with worsening abdominal pain and distension x 2 days. Pt reports he had intermittent, dull, periumbilical pain for about 1 month. Abd pain worsened yesterday after eating, described as sharp/stabbing, 10/10, radiating across mid abd, worse with eating. Reports associated nausea and poor PO intake. Also reports 4 episodes of nb diarrhea today,  but reports this is not unusual for him because he takes stool softeners with his chronic pain meds. Has not f/u with GI since discharge. Denies any fever/chills, cough, CP, SOB, vomiting, melena, BRBPR, urinary symptoms.     PCP Adama Fernández

## 2018-11-13 NOTE — ED ADULT NURSE NOTE - CAS ELECT INFOMATION PROVIDED
follow up with gastroenterologist, return for new or worsening s/s (vomiting, increased abd pain, inability to tolerate PO fluids)/DC instructions

## 2018-11-13 NOTE — ED PROVIDER NOTE - ATTENDING CONTRIBUTION TO CARE
Patient presenting c/o one month of central abdominal pains without nausea, vomiting, diarrhea.  Found to have hiatal hernia on recent admission, supposed to be following with GI for f/u.  Reportedly not responding to home pain medications.  On exam patient well appearing, vital signs within normal limits, RRR S1/S2, lungs clear to ascultation bilaterally, abdomen soft, mild periumbilical tenderness without rebound or guarding, no CVA tenderness.  Cause of abdominal pain unclear, given chronicity have lower suspicion for acute surgical emergency, plan for labs/CTAP to evaluate and if no acute cause appreciated have follow up with outpatient GI.

## 2018-11-13 NOTE — ED PROVIDER NOTE - CONDUCTED A DETAILED DISCUSSION WITH PATIENT AND/OR GUARDIAN REGARDING, MDM
return to ED if symptoms worsen, persist or questions arise/lab results/need for outpatient follow-up lab results/return to ED if symptoms worsen, persist or questions arise/radiology results/need for outpatient follow-up

## 2018-11-13 NOTE — ED PROVIDER NOTE - PROGRESS NOTE DETAILS
Pt reports some improvement of pain after morphine. Nausea resolved. Pending CT results. - Madina Martini PA-C CT revealing unchanged hiatal hernia. Pt offered sandwich and ginger ale but wishes to leave. Copy of results provided to patient and recommended f/u with GI. Stable for d/c, d/w Dr. Guzman. Strict return precautions given. - Madina Martini PA-C

## 2018-11-13 NOTE — ED PROVIDER NOTE - PLAN OF CARE
Continue your current medication regimen.  Follow up with your Gastroenterologist, Dr. Vega, this week as instructed upon discharge from the hospital.   Follow up with your Primary Care Physician within the next 2-3 days.  Bring a copy of your test results with you to your appointment.  Return to the Emergency Room if you experience new or worsening symptoms including worsening abdominal pain, vomiting, inability to tolerate food/fluid, blood in stool, fever/chills, or any other concerning symptoms.

## 2018-11-19 ENCOUNTER — APPOINTMENT (OUTPATIENT)
Dept: SURGERY | Facility: CLINIC | Age: 63
End: 2018-11-19
Payer: COMMERCIAL

## 2018-11-19 VITALS
TEMPERATURE: 98.3 F | HEIGHT: 71 IN | DIASTOLIC BLOOD PRESSURE: 81 MMHG | WEIGHT: 235 LBS | BODY MASS INDEX: 32.9 KG/M2 | OXYGEN SATURATION: 96 % | RESPIRATION RATE: 18 BRPM | SYSTOLIC BLOOD PRESSURE: 117 MMHG | HEART RATE: 82 BPM

## 2018-11-19 DIAGNOSIS — Z98.890 OTHER SPECIFIED POSTPROCEDURAL STATES: ICD-10-CM

## 2018-11-19 DIAGNOSIS — Z91.81 HISTORY OF FALLING: ICD-10-CM

## 2018-11-19 DIAGNOSIS — Z98.1 ARTHRODESIS STATUS: ICD-10-CM

## 2018-11-19 DIAGNOSIS — Z82.0 FAMILY HISTORY OF EPILEPSY AND OTHER DISEASES OF THE NERVOUS SYSTEM: ICD-10-CM

## 2018-11-19 PROCEDURE — 99245 OFF/OP CONSLTJ NEW/EST HI 55: CPT

## 2018-11-19 RX ORDER — HYDROCHLOROTHIAZIDE 50 MG/1
50 TABLET ORAL
Refills: 0 | Status: ACTIVE | COMMUNITY

## 2018-11-19 RX ORDER — NICOTINE POLACRILEX 4 MG/1
4 GUM, CHEWING BUCCAL
Qty: 50 | Refills: 0 | Status: DISCONTINUED | COMMUNITY
Start: 2017-05-22 | End: 2018-11-19

## 2018-11-19 RX ORDER — DIAZEPAM 5 MG/1
TABLET ORAL
Refills: 0 | Status: DISCONTINUED | COMMUNITY
End: 2018-11-19

## 2018-11-19 RX ORDER — ESZOPICLONE 3 MG/1
3 TABLET, FILM COATED ORAL
Qty: 30 | Refills: 0 | Status: DISCONTINUED | COMMUNITY
Start: 2017-03-30 | End: 2018-11-19

## 2018-11-19 RX ORDER — TRIAMTERENE AND HYDROCHLOROTHIAZIDE 37.5; 25 MG/1; MG/1
37.5-25 CAPSULE ORAL
Qty: 30 | Refills: 0 | Status: DISCONTINUED | COMMUNITY
Start: 2017-04-20 | End: 2018-11-19

## 2018-12-31 NOTE — PROVIDER CONTACT NOTE (MEDICATION) - SITUATION
PLEASE CALL AGAIN WITH TEST RESULTS.  YOUR MESSAGE WAS CUT OFF.  
Pt admit with pneumonia; Pt c/o headache 8/10; pt received morphine IR 30mg at 2132 and valium 2mg and methadone 20mg at 2232

## 2019-01-01 ENCOUNTER — RESULT REVIEW (OUTPATIENT)
Age: 64
End: 2019-01-01

## 2019-01-01 ENCOUNTER — OUTPATIENT (OUTPATIENT)
Dept: OUTPATIENT SERVICES | Facility: HOSPITAL | Age: 64
LOS: 1 days | End: 2019-01-01
Payer: COMMERCIAL

## 2019-01-01 ENCOUNTER — INPATIENT (INPATIENT)
Facility: HOSPITAL | Age: 64
LOS: 23 days | DRG: 871 | End: 2019-12-02
Attending: INTERNAL MEDICINE | Admitting: HOSPITALIST
Payer: COMMERCIAL

## 2019-01-01 ENCOUNTER — APPOINTMENT (OUTPATIENT)
Dept: HEMATOLOGY ONCOLOGY | Facility: CLINIC | Age: 64
End: 2019-01-01
Payer: COMMERCIAL

## 2019-01-01 ENCOUNTER — TRANSCRIPTION ENCOUNTER (OUTPATIENT)
Age: 64
End: 2019-01-01

## 2019-01-01 ENCOUNTER — APPOINTMENT (OUTPATIENT)
Dept: SPINE | Facility: CLINIC | Age: 64
End: 2019-01-01
Payer: OTHER MISCELLANEOUS

## 2019-01-01 ENCOUNTER — APPOINTMENT (OUTPATIENT)
Dept: INFUSION THERAPY | Facility: HOSPITAL | Age: 64
End: 2019-01-01

## 2019-01-01 ENCOUNTER — INPATIENT (INPATIENT)
Facility: HOSPITAL | Age: 64
LOS: 4 days | Discharge: HOME | DRG: 871 | End: 2019-05-26
Attending: GENERAL PRACTICE | Admitting: GENERAL PRACTICE
Payer: COMMERCIAL

## 2019-01-01 ENCOUNTER — OUTPATIENT (OUTPATIENT)
Dept: OUTPATIENT SERVICES | Facility: HOSPITAL | Age: 64
LOS: 1 days | Discharge: HOME | End: 2019-01-01

## 2019-01-01 ENCOUNTER — LABORATORY RESULT (OUTPATIENT)
Age: 64
End: 2019-01-01

## 2019-01-01 ENCOUNTER — INPATIENT (INPATIENT)
Facility: HOSPITAL | Age: 64
LOS: 14 days | Discharge: HOME | DRG: 435 | End: 2019-10-02
Attending: STUDENT IN AN ORGANIZED HEALTH CARE EDUCATION/TRAINING PROGRAM | Admitting: HOSPITALIST
Payer: COMMERCIAL

## 2019-01-01 ENCOUNTER — APPOINTMENT (OUTPATIENT)
Dept: HEMATOLOGY ONCOLOGY | Facility: CLINIC | Age: 64
End: 2019-01-01

## 2019-01-01 ENCOUNTER — MOBILE ON CALL (OUTPATIENT)
Age: 64
End: 2019-01-01

## 2019-01-01 ENCOUNTER — APPOINTMENT (OUTPATIENT)
Dept: SPINE | Facility: CLINIC | Age: 64
End: 2019-01-01

## 2019-01-01 ENCOUNTER — FORM ENCOUNTER (OUTPATIENT)
Age: 64
End: 2019-01-01

## 2019-01-01 VITALS
SYSTOLIC BLOOD PRESSURE: 138 MMHG | DIASTOLIC BLOOD PRESSURE: 87 MMHG | TEMPERATURE: 98 F | RESPIRATION RATE: 18 BRPM | HEART RATE: 65 BPM | OXYGEN SATURATION: 95 %

## 2019-01-01 VITALS
TEMPERATURE: 98.6 F | WEIGHT: 205.03 LBS | DIASTOLIC BLOOD PRESSURE: 77 MMHG | HEART RATE: 78 BPM | RESPIRATION RATE: 16 BRPM | BODY MASS INDEX: 29.68 KG/M2 | SYSTOLIC BLOOD PRESSURE: 123 MMHG | OXYGEN SATURATION: 98 %

## 2019-01-01 VITALS
DIASTOLIC BLOOD PRESSURE: 80 MMHG | HEART RATE: 59 BPM | SYSTOLIC BLOOD PRESSURE: 129 MMHG | OXYGEN SATURATION: 99 % | BODY MASS INDEX: 32.2 KG/M2 | WEIGHT: 230 LBS | RESPIRATION RATE: 16 BRPM | HEIGHT: 71 IN

## 2019-01-01 VITALS
SYSTOLIC BLOOD PRESSURE: 125 MMHG | BODY MASS INDEX: 32.21 KG/M2 | HEART RATE: 72 BPM | WEIGHT: 225 LBS | HEIGHT: 70 IN | DIASTOLIC BLOOD PRESSURE: 77 MMHG

## 2019-01-01 VITALS
DIASTOLIC BLOOD PRESSURE: 86 MMHG | OXYGEN SATURATION: 96 % | HEART RATE: 106 BPM | HEIGHT: 70 IN | SYSTOLIC BLOOD PRESSURE: 132 MMHG | WEIGHT: 190.04 LBS | TEMPERATURE: 98 F | RESPIRATION RATE: 20 BRPM

## 2019-01-01 VITALS
RESPIRATION RATE: 18 BRPM | DIASTOLIC BLOOD PRESSURE: 81 MMHG | OXYGEN SATURATION: 98 % | SYSTOLIC BLOOD PRESSURE: 149 MMHG | WEIGHT: 220.02 LBS | HEIGHT: 71 IN | HEART RATE: 76 BPM | TEMPERATURE: 98 F

## 2019-01-01 VITALS
RESPIRATION RATE: 18 BRPM | WEIGHT: 202.8 LBS | OXYGEN SATURATION: 98 % | BODY MASS INDEX: 29.36 KG/M2 | HEART RATE: 79 BPM | DIASTOLIC BLOOD PRESSURE: 99 MMHG | TEMPERATURE: 98.7 F | SYSTOLIC BLOOD PRESSURE: 152 MMHG | HEIGHT: 69.69 IN

## 2019-01-01 VITALS
HEART RATE: 98 BPM | DIASTOLIC BLOOD PRESSURE: 81 MMHG | OXYGEN SATURATION: 93 % | HEIGHT: 72 IN | WEIGHT: 229.94 LBS | RESPIRATION RATE: 18 BRPM | SYSTOLIC BLOOD PRESSURE: 146 MMHG | TEMPERATURE: 100 F

## 2019-01-01 VITALS
DIASTOLIC BLOOD PRESSURE: 74 MMHG | SYSTOLIC BLOOD PRESSURE: 114 MMHG | OXYGEN SATURATION: 99 % | HEART RATE: 80 BPM | RESPIRATION RATE: 18 BRPM | TEMPERATURE: 98 F

## 2019-01-01 VITALS
BODY MASS INDEX: 27.86 KG/M2 | HEART RATE: 115 BPM | TEMPERATURE: 97.8 F | WEIGHT: 192.46 LBS | RESPIRATION RATE: 18 BRPM | OXYGEN SATURATION: 98 % | DIASTOLIC BLOOD PRESSURE: 85 MMHG | SYSTOLIC BLOOD PRESSURE: 121 MMHG

## 2019-01-01 VITALS
HEART RATE: 88 BPM | RESPIRATION RATE: 14 BRPM | DIASTOLIC BLOOD PRESSURE: 43 MMHG | OXYGEN SATURATION: 77 % | TEMPERATURE: 100 F | SYSTOLIC BLOOD PRESSURE: 69 MMHG

## 2019-01-01 VITALS
SYSTOLIC BLOOD PRESSURE: 119 MMHG | HEIGHT: 70 IN | HEART RATE: 73 BPM | DIASTOLIC BLOOD PRESSURE: 82 MMHG | BODY MASS INDEX: 32.93 KG/M2 | WEIGHT: 230 LBS

## 2019-01-01 DIAGNOSIS — Z71.89 OTHER SPECIFIED COUNSELING: ICD-10-CM

## 2019-01-01 DIAGNOSIS — R53.2 FUNCTIONAL QUADRIPLEGIA: ICD-10-CM

## 2019-01-01 DIAGNOSIS — Z98.89 OTHER SPECIFIED POSTPROCEDURAL STATES: Chronic | ICD-10-CM

## 2019-01-01 DIAGNOSIS — F41.9 ANXIETY DISORDER, UNSPECIFIED: ICD-10-CM

## 2019-01-01 DIAGNOSIS — M43.9 DEFORMING DORSOPATHY, UNSPECIFIED: ICD-10-CM

## 2019-01-01 DIAGNOSIS — F32.9 ANXIETY DISORDER, UNSPECIFIED: ICD-10-CM

## 2019-01-01 DIAGNOSIS — M96.1 POSTLAMINECTOMY SYNDROME, NOT ELSEWHERE CLASSIFIED: ICD-10-CM

## 2019-01-01 DIAGNOSIS — I10 ESSENTIAL (PRIMARY) HYPERTENSION: ICD-10-CM

## 2019-01-01 DIAGNOSIS — Z29.9 ENCOUNTER FOR PROPHYLACTIC MEASURES, UNSPECIFIED: ICD-10-CM

## 2019-01-01 DIAGNOSIS — A41.9 SEPSIS, UNSPECIFIED ORGANISM: ICD-10-CM

## 2019-01-01 DIAGNOSIS — R10.9 UNSPECIFIED ABDOMINAL PAIN: ICD-10-CM

## 2019-01-01 DIAGNOSIS — Z98.2 PRESENCE OF CEREBROSPINAL FLUID DRAINAGE DEVICE: ICD-10-CM

## 2019-01-01 DIAGNOSIS — Z51.5 ENCOUNTER FOR PALLIATIVE CARE: ICD-10-CM

## 2019-01-01 DIAGNOSIS — M48.05 SPINAL STENOSIS, THORACOLUMBAR REGION: ICD-10-CM

## 2019-01-01 DIAGNOSIS — G89.3 NEOPLASM RELATED PAIN (ACUTE) (CHRONIC): ICD-10-CM

## 2019-01-01 DIAGNOSIS — R94.5 ABNORMAL RESULTS OF LIVER FUNCTION STUDIES: ICD-10-CM

## 2019-01-01 DIAGNOSIS — C25.9 MALIGNANT NEOPLASM OF PANCREAS, UNSPECIFIED: ICD-10-CM

## 2019-01-01 DIAGNOSIS — K76.9 LIVER DISEASE, UNSPECIFIED: ICD-10-CM

## 2019-01-01 DIAGNOSIS — Z98.2 PRESENCE OF CEREBROSPINAL FLUID DRAINAGE DEVICE: Chronic | ICD-10-CM

## 2019-01-01 DIAGNOSIS — R52 PAIN, UNSPECIFIED: ICD-10-CM

## 2019-01-01 DIAGNOSIS — K21.9 GASTRO-ESOPHAGEAL REFLUX DISEASE WITHOUT ESOPHAGITIS: ICD-10-CM

## 2019-01-01 DIAGNOSIS — R19.7 DIARRHEA, UNSPECIFIED: ICD-10-CM

## 2019-01-01 DIAGNOSIS — K65.9 PERITONITIS, UNSPECIFIED: ICD-10-CM

## 2019-01-01 DIAGNOSIS — G89.29 OTHER CHRONIC PAIN: ICD-10-CM

## 2019-01-01 DIAGNOSIS — K44.9 DIAPHRAGMATIC HERNIA W/OUT OBSTRUCTION OR GANGRENE: ICD-10-CM

## 2019-01-01 DIAGNOSIS — K59.00 CONSTIPATION, UNSPECIFIED: ICD-10-CM

## 2019-01-01 DIAGNOSIS — Z45.2 ENCOUNTER FOR ADJUSTMENT AND MANAGEMENT OF VASCULAR ACCESS DEVICE: ICD-10-CM

## 2019-01-01 DIAGNOSIS — N17.9 ACUTE KIDNEY FAILURE, UNSPECIFIED: ICD-10-CM

## 2019-01-01 DIAGNOSIS — E11.9 TYPE 2 DIABETES MELLITUS WITHOUT COMPLICATIONS: ICD-10-CM

## 2019-01-01 DIAGNOSIS — Z86.39 PERSONAL HISTORY OF OTHER ENDOCRINE, NUTRITIONAL AND METABOLIC DISEASE: ICD-10-CM

## 2019-01-01 DIAGNOSIS — K21.0 GASTRO-ESOPHAGEAL REFLUX DISEASE WITH ESOPHAGITIS: ICD-10-CM

## 2019-01-01 DIAGNOSIS — R17 UNSPECIFIED JAUNDICE: ICD-10-CM

## 2019-01-01 DIAGNOSIS — E87.1 HYPO-OSMOLALITY AND HYPONATREMIA: ICD-10-CM

## 2019-01-01 DIAGNOSIS — K81.0 ACUTE CHOLECYSTITIS: ICD-10-CM

## 2019-01-01 DIAGNOSIS — R11.2 NAUSEA WITH VOMITING, UNSPECIFIED: ICD-10-CM

## 2019-01-01 DIAGNOSIS — Z51.11 ENCOUNTER FOR ANTINEOPLASTIC CHEMOTHERAPY: ICD-10-CM

## 2019-01-01 DIAGNOSIS — R20.8 OTHER DISTURBANCES OF SKIN SENSATION: ICD-10-CM

## 2019-01-01 DIAGNOSIS — R18.8 OTHER ASCITES: ICD-10-CM

## 2019-01-01 DIAGNOSIS — M48.00 SPINAL STENOSIS, SITE UNSPECIFIED: ICD-10-CM

## 2019-01-01 DIAGNOSIS — M54.16 RADICULOPATHY, LUMBAR REGION: ICD-10-CM

## 2019-01-01 DIAGNOSIS — G89.21 CHRONIC PAIN DUE TO TRAUMA: ICD-10-CM

## 2019-01-01 DIAGNOSIS — R11.0 NAUSEA: ICD-10-CM

## 2019-01-01 DIAGNOSIS — Z87.891 PERSONAL HISTORY OF NICOTINE DEPENDENCE: ICD-10-CM

## 2019-01-01 DIAGNOSIS — K86.9 DISEASE OF PANCREAS, UNSPECIFIED: ICD-10-CM

## 2019-01-01 DIAGNOSIS — J86.9 PYOTHORAX WITHOUT FISTULA: ICD-10-CM

## 2019-01-01 DIAGNOSIS — J18.9 PNEUMONIA, UNSPECIFIED ORGANISM: ICD-10-CM

## 2019-01-01 LAB
-  AMIKACIN: SIGNIFICANT CHANGE UP
-  AMOXICILLIN/CLAVULANIC ACID: SIGNIFICANT CHANGE UP
-  AMPICILLIN/SULBACTAM: SIGNIFICANT CHANGE UP
-  AMPICILLIN: SIGNIFICANT CHANGE UP
-  AZTREONAM: SIGNIFICANT CHANGE UP
-  CEFAZOLIN: SIGNIFICANT CHANGE UP
-  CEFEPIME: SIGNIFICANT CHANGE UP
-  CEFOXITIN: SIGNIFICANT CHANGE UP
-  CEFTRIAXONE: SIGNIFICANT CHANGE UP
-  CIPROFLOXACIN: SIGNIFICANT CHANGE UP
-  ERTAPENEM: SIGNIFICANT CHANGE UP
-  GENTAMICIN: SIGNIFICANT CHANGE UP
-  IMIPENEM: SIGNIFICANT CHANGE UP
-  LEVOFLOXACIN: SIGNIFICANT CHANGE UP
-  MEROPENEM: SIGNIFICANT CHANGE UP
-  PIPERACILLIN/TAZOBACTAM: SIGNIFICANT CHANGE UP
-  TOBRAMYCIN: SIGNIFICANT CHANGE UP
-  TRIMETHOPRIM/SULFAMETHOXAZOLE: SIGNIFICANT CHANGE UP
AFP-TM SERPL-MCNC: <1.8 NG/ML — SIGNIFICANT CHANGE UP
ALBUMIN FLD-MCNC: 1 G/DL — SIGNIFICANT CHANGE UP
ALBUMIN FLD-MCNC: 1.4 G/DL — SIGNIFICANT CHANGE UP
ALBUMIN SERPL ELPH-MCNC: 2.1 G/DL — LOW (ref 3.3–5)
ALBUMIN SERPL ELPH-MCNC: 2.2 G/DL — LOW (ref 3.3–5)
ALBUMIN SERPL ELPH-MCNC: 2.3 G/DL — LOW (ref 3.3–5)
ALBUMIN SERPL ELPH-MCNC: 2.5 G/DL — LOW (ref 3.3–5)
ALBUMIN SERPL ELPH-MCNC: 2.7 G/DL — LOW (ref 3.3–5)
ALBUMIN SERPL ELPH-MCNC: 2.8 G/DL — LOW (ref 3.3–5)
ALBUMIN SERPL ELPH-MCNC: 3.2 G/DL
ALBUMIN SERPL ELPH-MCNC: 3.6 G/DL — SIGNIFICANT CHANGE UP (ref 3.3–5)
ALBUMIN SERPL ELPH-MCNC: 3.6 G/DL — SIGNIFICANT CHANGE UP (ref 3.3–5)
ALBUMIN SERPL ELPH-MCNC: 3.7 G/DL — SIGNIFICANT CHANGE UP (ref 3.3–5)
ALBUMIN SERPL ELPH-MCNC: 3.8 G/DL — SIGNIFICANT CHANGE UP (ref 3.3–5)
ALBUMIN SERPL ELPH-MCNC: 3.9 G/DL
ALBUMIN SERPL ELPH-MCNC: 4 G/DL — SIGNIFICANT CHANGE UP (ref 3.3–5)
ALBUMIN SERPL ELPH-MCNC: 4.1 G/DL — SIGNIFICANT CHANGE UP (ref 3.3–5)
ALBUMIN SERPL ELPH-MCNC: 4.2 G/DL — SIGNIFICANT CHANGE UP (ref 3.3–5)
ALBUMIN SERPL ELPH-MCNC: 4.6 G/DL
ALP BLD-CCNC: 181 U/L
ALP BLD-CCNC: 205 U/L
ALP BLD-CCNC: 432 U/L
ALP SERPL-CCNC: 101 U/L — SIGNIFICANT CHANGE UP (ref 40–120)
ALP SERPL-CCNC: 150 U/L — HIGH (ref 40–120)
ALP SERPL-CCNC: 151 U/L — HIGH (ref 40–120)
ALP SERPL-CCNC: 176 U/L — HIGH (ref 40–120)
ALP SERPL-CCNC: 178 U/L — HIGH (ref 40–120)
ALP SERPL-CCNC: 182 U/L — HIGH (ref 40–120)
ALP SERPL-CCNC: 183 U/L — HIGH (ref 40–120)
ALP SERPL-CCNC: 185 U/L — HIGH (ref 40–120)
ALP SERPL-CCNC: 186 U/L — HIGH (ref 40–120)
ALP SERPL-CCNC: 201 U/L — HIGH (ref 40–120)
ALP SERPL-CCNC: 242 U/L — HIGH (ref 40–120)
ALP SERPL-CCNC: 250 U/L — HIGH (ref 40–120)
ALP SERPL-CCNC: 254 U/L — HIGH (ref 40–120)
ALP SERPL-CCNC: 262 U/L — HIGH (ref 40–120)
ALP SERPL-CCNC: 280 U/L — HIGH (ref 40–120)
ALP SERPL-CCNC: 285 U/L — HIGH (ref 40–120)
ALP SERPL-CCNC: 781 U/L — HIGH (ref 40–120)
ALP SERPL-CCNC: 854 U/L — HIGH (ref 40–120)
ALP SERPL-CCNC: 86 U/L — SIGNIFICANT CHANGE UP (ref 40–120)
ALP SERPL-CCNC: 897 U/L — HIGH (ref 40–120)
ALT FLD-CCNC: 14 U/L — SIGNIFICANT CHANGE UP (ref 10–45)
ALT FLD-CCNC: 16 U/L — SIGNIFICANT CHANGE UP (ref 10–45)
ALT FLD-CCNC: 17 U/L — SIGNIFICANT CHANGE UP (ref 10–45)
ALT FLD-CCNC: 18 U/L — SIGNIFICANT CHANGE UP (ref 10–45)
ALT FLD-CCNC: 19 U/L — SIGNIFICANT CHANGE UP (ref 10–45)
ALT FLD-CCNC: 23 U/L — SIGNIFICANT CHANGE UP (ref 10–45)
ALT FLD-CCNC: 24 U/L — SIGNIFICANT CHANGE UP (ref 10–45)
ALT FLD-CCNC: 25 U/L — SIGNIFICANT CHANGE UP (ref 10–45)
ALT FLD-CCNC: 28 U/L — SIGNIFICANT CHANGE UP (ref 10–45)
ALT FLD-CCNC: 399 U/L — HIGH (ref 10–45)
ALT FLD-CCNC: 403 U/L — HIGH (ref 10–45)
ALT FLD-CCNC: 415 U/L — HIGH (ref 10–45)
ALT FLD-CCNC: 433 U/L — HIGH (ref 10–45)
ALT FLD-CCNC: 438 U/L — HIGH (ref 10–45)
ALT FLD-CCNC: 479 U/L — HIGH (ref 10–45)
ALT FLD-CCNC: 602 U/L — HIGH (ref 10–45)
ALT FLD-CCNC: 7 U/L — LOW (ref 10–45)
ALT FLD-CCNC: 92 U/L — HIGH (ref 10–45)
ALT FLD-CCNC: 94 U/L — HIGH (ref 10–45)
ALT FLD-CCNC: 95 U/L — HIGH (ref 10–45)
ALT SERPL-CCNC: 126 U/L
ALT SERPL-CCNC: 28 U/L
ALT SERPL-CCNC: 423 U/L
AMYLASE/CREAT SERPL: 18 U/L
AMYLASE/CREAT SERPL: 50 U/L
ANION GAP SERPL CALC-SCNC: 10 MMOL/L — SIGNIFICANT CHANGE UP (ref 5–17)
ANION GAP SERPL CALC-SCNC: 10 MMOL/L — SIGNIFICANT CHANGE UP (ref 5–17)
ANION GAP SERPL CALC-SCNC: 11 MMOL/L — SIGNIFICANT CHANGE UP (ref 5–17)
ANION GAP SERPL CALC-SCNC: 11 MMOL/L — SIGNIFICANT CHANGE UP (ref 5–17)
ANION GAP SERPL CALC-SCNC: 12 MMOL/L — SIGNIFICANT CHANGE UP (ref 5–17)
ANION GAP SERPL CALC-SCNC: 13 MMOL/L — SIGNIFICANT CHANGE UP (ref 5–17)
ANION GAP SERPL CALC-SCNC: 14 MMOL/L — SIGNIFICANT CHANGE UP (ref 5–17)
ANION GAP SERPL CALC-SCNC: 15 MMOL/L — SIGNIFICANT CHANGE UP (ref 5–17)
ANION GAP SERPL CALC-SCNC: 16 MMOL/L
ANION GAP SERPL CALC-SCNC: 16 MMOL/L
ANION GAP SERPL CALC-SCNC: 16 MMOL/L — SIGNIFICANT CHANGE UP (ref 5–17)
ANION GAP SERPL CALC-SCNC: 19 MMOL/L — HIGH (ref 5–17)
ANION GAP SERPL CALC-SCNC: 20 MMOL/L
ANION GAP SERPL CALC-SCNC: 7 MMOL/L — SIGNIFICANT CHANGE UP (ref 5–17)
ANION GAP SERPL CALC-SCNC: 8 MMOL/L — SIGNIFICANT CHANGE UP (ref 5–17)
ANION GAP SERPL CALC-SCNC: 8 MMOL/L — SIGNIFICANT CHANGE UP (ref 5–17)
ANION GAP SERPL CALC-SCNC: 9 MMOL/L — SIGNIFICANT CHANGE UP (ref 5–17)
ANISOCYTOSIS BLD QL: SLIGHT — SIGNIFICANT CHANGE UP
APAP SERPL-MCNC: <15 UG/ML — SIGNIFICANT CHANGE UP (ref 10–30)
APPEARANCE UR: CLEAR — SIGNIFICANT CHANGE UP
APPEARANCE: ABNORMAL
APTT BLD: 27.9 SEC — SIGNIFICANT CHANGE UP (ref 27.5–36.3)
APTT BLD: 28 SEC — SIGNIFICANT CHANGE UP (ref 27.5–36.3)
APTT BLD: 28.5 SEC — SIGNIFICANT CHANGE UP (ref 27.5–36.3)
APTT BLD: 29.6 SEC — SIGNIFICANT CHANGE UP (ref 27.5–36.3)
APTT BLD: 29.7 SEC — SIGNIFICANT CHANGE UP (ref 27.5–36.3)
APTT BLD: 29.8 SEC — SIGNIFICANT CHANGE UP (ref 27.5–36.3)
APTT BLD: 30.9 SEC — SIGNIFICANT CHANGE UP (ref 27.5–36.3)
APTT BLD: 32.1 SEC
APTT BLD: 32.1 SEC — SIGNIFICANT CHANGE UP (ref 27.5–36.3)
APTT BLD: 32.2 SEC — SIGNIFICANT CHANGE UP (ref 27.5–36.3)
APTT BLD: 33 SEC — SIGNIFICANT CHANGE UP (ref 27.5–36.3)
AST SERPL-CCNC: 11 U/L — SIGNIFICANT CHANGE UP (ref 10–40)
AST SERPL-CCNC: 11 U/L — SIGNIFICANT CHANGE UP (ref 10–40)
AST SERPL-CCNC: 14 U/L — SIGNIFICANT CHANGE UP (ref 10–40)
AST SERPL-CCNC: 17 U/L — SIGNIFICANT CHANGE UP (ref 10–40)
AST SERPL-CCNC: 18 U/L — SIGNIFICANT CHANGE UP (ref 10–40)
AST SERPL-CCNC: 180 U/L
AST SERPL-CCNC: 184 U/L — HIGH (ref 10–40)
AST SERPL-CCNC: 199 U/L — HIGH (ref 10–40)
AST SERPL-CCNC: 20 U/L — SIGNIFICANT CHANGE UP (ref 10–40)
AST SERPL-CCNC: 217 U/L — HIGH (ref 10–40)
AST SERPL-CCNC: 230 U/L — HIGH (ref 10–40)
AST SERPL-CCNC: 244 U/L — HIGH (ref 10–40)
AST SERPL-CCNC: 249 U/L — HIGH (ref 10–40)
AST SERPL-CCNC: 25 U/L
AST SERPL-CCNC: 26 U/L — SIGNIFICANT CHANGE UP (ref 10–40)
AST SERPL-CCNC: 267 U/L — HIGH (ref 10–40)
AST SERPL-CCNC: 277 U/L — HIGH (ref 10–40)
AST SERPL-CCNC: 28 U/L — SIGNIFICANT CHANGE UP (ref 10–40)
AST SERPL-CCNC: 282 U/L — HIGH (ref 10–40)
AST SERPL-CCNC: 29 U/L — SIGNIFICANT CHANGE UP (ref 10–40)
AST SERPL-CCNC: 31 U/L — SIGNIFICANT CHANGE UP (ref 10–40)
AST SERPL-CCNC: 405 U/L — HIGH (ref 10–40)
AST SERPL-CCNC: 84 U/L
B PERT IGG+IGM PNL SER: ABNORMAL
B PERT IGG+IGM PNL SER: ABNORMAL
BACTERIA # UR AUTO: NEGATIVE — SIGNIFICANT CHANGE UP
BACTERIA # UR AUTO: NEGATIVE — SIGNIFICANT CHANGE UP
BACTERIA BLD CULT: NORMAL
BASE EXCESS BLDV CALC-SCNC: 4.9 MMOL/L — HIGH (ref -2–2)
BASE EXCESS BLDV CALC-SCNC: 7.7 MMOL/L — HIGH (ref -2–2)
BASOPHILS # BLD AUTO: 0 K/UL — SIGNIFICANT CHANGE UP (ref 0–0.2)
BASOPHILS # BLD AUTO: 0.02 K/UL — SIGNIFICANT CHANGE UP (ref 0–0.2)
BASOPHILS # BLD AUTO: 0.02 K/UL — SIGNIFICANT CHANGE UP (ref 0–0.2)
BASOPHILS # BLD AUTO: 0.03 K/UL — SIGNIFICANT CHANGE UP (ref 0–0.2)
BASOPHILS # BLD AUTO: 0.04 K/UL — SIGNIFICANT CHANGE UP (ref 0–0.2)
BASOPHILS # BLD AUTO: 0.23 K/UL — HIGH (ref 0–0.2)
BASOPHILS # BLD AUTO: 0.24 K/UL — HIGH (ref 0–0.2)
BASOPHILS NFR BLD AUTO: 0 % — SIGNIFICANT CHANGE UP (ref 0–2)
BASOPHILS NFR BLD AUTO: 0.1 % — SIGNIFICANT CHANGE UP (ref 0–2)
BASOPHILS NFR BLD AUTO: 0.2 % — SIGNIFICANT CHANGE UP (ref 0–2)
BASOPHILS NFR BLD AUTO: 0.2 % — SIGNIFICANT CHANGE UP (ref 0–2)
BASOPHILS NFR BLD AUTO: 0.3 % — SIGNIFICANT CHANGE UP (ref 0–2)
BASOPHILS NFR BLD AUTO: 0.4 % — SIGNIFICANT CHANGE UP (ref 0–2)
BASOPHILS NFR BLD AUTO: 0.4 % — SIGNIFICANT CHANGE UP (ref 0–2)
BASOPHILS NFR BLD AUTO: 0.5 % — SIGNIFICANT CHANGE UP (ref 0–2)
BASOPHILS NFR BLD AUTO: 0.5 % — SIGNIFICANT CHANGE UP (ref 0–2)
BASOPHILS NFR BLD AUTO: 0.6 % — SIGNIFICANT CHANGE UP (ref 0–2)
BASOPHILS NFR BLD AUTO: 0.6 % — SIGNIFICANT CHANGE UP (ref 0–2)
BASOPHILS NFR BLD AUTO: 0.7 % — SIGNIFICANT CHANGE UP (ref 0–2)
BASOPHILS NFR BLD AUTO: 0.7 % — SIGNIFICANT CHANGE UP (ref 0–2)
BASOPHILS NFR BLD AUTO: 0.8 % — SIGNIFICANT CHANGE UP (ref 0–2)
BASOPHILS NFR BLD AUTO: 0.9 % — SIGNIFICANT CHANGE UP (ref 0–2)
BASOPHILS NFR BLD AUTO: 0.9 % — SIGNIFICANT CHANGE UP (ref 0–2)
BILIRUB DIRECT SERPL-MCNC: 0.3 MG/DL — HIGH (ref 0–0.2)
BILIRUB DIRECT SERPL-MCNC: 0.5 MG/DL — HIGH (ref 0–0.2)
BILIRUB DIRECT SERPL-MCNC: 4.4 MG/DL — HIGH (ref 0–0.2)
BILIRUB INDIRECT FLD-MCNC: 0.4 MG/DL — SIGNIFICANT CHANGE UP (ref 0.2–1)
BILIRUB INDIRECT FLD-MCNC: 0.5 MG/DL — SIGNIFICANT CHANGE UP (ref 0.2–1)
BILIRUB SERPL-MCNC: 0.3 MG/DL — SIGNIFICANT CHANGE UP (ref 0.2–1.2)
BILIRUB SERPL-MCNC: 0.4 MG/DL — SIGNIFICANT CHANGE UP (ref 0.2–1.2)
BILIRUB SERPL-MCNC: 0.7 MG/DL — SIGNIFICANT CHANGE UP (ref 0.2–1.2)
BILIRUB SERPL-MCNC: 0.8 MG/DL — SIGNIFICANT CHANGE UP (ref 0.2–1.2)
BILIRUB SERPL-MCNC: 0.8 MG/DL — SIGNIFICANT CHANGE UP (ref 0.2–1.2)
BILIRUB SERPL-MCNC: 0.9 MG/DL
BILIRUB SERPL-MCNC: 0.9 MG/DL — SIGNIFICANT CHANGE UP (ref 0.2–1.2)
BILIRUB SERPL-MCNC: 1 MG/DL — SIGNIFICANT CHANGE UP (ref 0.2–1.2)
BILIRUB SERPL-MCNC: 1.2 MG/DL
BILIRUB SERPL-MCNC: 13.2 MG/DL — HIGH (ref 0.2–1.2)
BILIRUB SERPL-MCNC: 2.4 MG/DL
BILIRUB SERPL-MCNC: 2.4 MG/DL — HIGH (ref 0.2–1.2)
BILIRUB SERPL-MCNC: 4.9 MG/DL — HIGH (ref 0.2–1.2)
BILIRUB SERPL-MCNC: 5.6 MG/DL — HIGH (ref 0.2–1.2)
BILIRUB SERPL-MCNC: 5.8 MG/DL — HIGH (ref 0.2–1.2)
BILIRUB SERPL-MCNC: 6 MG/DL — HIGH (ref 0.2–1.2)
BILIRUB SERPL-MCNC: 6.2 MG/DL — HIGH (ref 0.2–1.2)
BILIRUB SERPL-MCNC: 6.6 MG/DL — HIGH (ref 0.2–1.2)
BILIRUB SERPL-MCNC: 6.7 MG/DL — HIGH (ref 0.2–1.2)
BILIRUB SERPL-MCNC: 7 MG/DL — HIGH (ref 0.2–1.2)
BILIRUB UR-MCNC: ABNORMAL
BILIRUB UR-MCNC: NEGATIVE — SIGNIFICANT CHANGE UP
BILIRUB UR-MCNC: NEGATIVE — SIGNIFICANT CHANGE UP
BILIRUBIN URINE: NEGATIVE
BLD GP AB SCN SERPL QL: NEGATIVE — SIGNIFICANT CHANGE UP
BLOOD URINE: NEGATIVE
BUN SERPL-MCNC: 10 MG/DL — SIGNIFICANT CHANGE UP (ref 7–23)
BUN SERPL-MCNC: 11 MG/DL
BUN SERPL-MCNC: 11 MG/DL — SIGNIFICANT CHANGE UP (ref 7–23)
BUN SERPL-MCNC: 12 MG/DL — SIGNIFICANT CHANGE UP (ref 7–23)
BUN SERPL-MCNC: 13 MG/DL — SIGNIFICANT CHANGE UP (ref 7–23)
BUN SERPL-MCNC: 14 MG/DL — SIGNIFICANT CHANGE UP (ref 7–23)
BUN SERPL-MCNC: 15 MG/DL
BUN SERPL-MCNC: 15 MG/DL — SIGNIFICANT CHANGE UP (ref 7–23)
BUN SERPL-MCNC: 15 MG/DL — SIGNIFICANT CHANGE UP (ref 7–23)
BUN SERPL-MCNC: 16 MG/DL — SIGNIFICANT CHANGE UP (ref 7–23)
BUN SERPL-MCNC: 18 MG/DL — SIGNIFICANT CHANGE UP (ref 7–23)
BUN SERPL-MCNC: 21 MG/DL — SIGNIFICANT CHANGE UP (ref 7–23)
BUN SERPL-MCNC: 22 MG/DL — SIGNIFICANT CHANGE UP (ref 7–23)
BUN SERPL-MCNC: 24 MG/DL — HIGH (ref 7–23)
BUN SERPL-MCNC: 24 MG/DL — HIGH (ref 7–23)
BUN SERPL-MCNC: 41 MG/DL — HIGH (ref 7–23)
BUN SERPL-MCNC: 50 MG/DL — HIGH (ref 7–23)
BUN SERPL-MCNC: 54 MG/DL
BUN SERPL-MCNC: 6 MG/DL — LOW (ref 7–23)
BUN SERPL-MCNC: 7 MG/DL — SIGNIFICANT CHANGE UP (ref 7–23)
BUN SERPL-MCNC: 8 MG/DL — SIGNIFICANT CHANGE UP (ref 7–23)
BUN SERPL-MCNC: 9 MG/DL — SIGNIFICANT CHANGE UP (ref 7–23)
CA-I BLDA-SCNC: 1.09 MMOL/L — LOW (ref 1.12–1.3)
CA-I SERPL-SCNC: 1.14 MMOL/L — SIGNIFICANT CHANGE UP (ref 1.12–1.3)
CALCIUM SERPL-MCNC: 10.4 MG/DL
CALCIUM SERPL-MCNC: 10.5 MG/DL
CALCIUM SERPL-MCNC: 10.5 MG/DL — SIGNIFICANT CHANGE UP (ref 8.4–10.5)
CALCIUM SERPL-MCNC: 8.2 MG/DL — LOW (ref 8.4–10.5)
CALCIUM SERPL-MCNC: 8.4 MG/DL — SIGNIFICANT CHANGE UP (ref 8.4–10.5)
CALCIUM SERPL-MCNC: 8.5 MG/DL — SIGNIFICANT CHANGE UP (ref 8.4–10.5)
CALCIUM SERPL-MCNC: 8.6 MG/DL — SIGNIFICANT CHANGE UP (ref 8.4–10.5)
CALCIUM SERPL-MCNC: 8.7 MG/DL — SIGNIFICANT CHANGE UP (ref 8.4–10.5)
CALCIUM SERPL-MCNC: 8.8 MG/DL — SIGNIFICANT CHANGE UP (ref 8.4–10.5)
CALCIUM SERPL-MCNC: 8.9 MG/DL — SIGNIFICANT CHANGE UP (ref 8.4–10.5)
CALCIUM SERPL-MCNC: 8.9 MG/DL — SIGNIFICANT CHANGE UP (ref 8.4–10.5)
CALCIUM SERPL-MCNC: 9 MG/DL — SIGNIFICANT CHANGE UP (ref 8.4–10.5)
CALCIUM SERPL-MCNC: 9.2 MG/DL
CALCIUM SERPL-MCNC: 9.2 MG/DL — SIGNIFICANT CHANGE UP (ref 8.4–10.5)
CALCIUM SERPL-MCNC: 9.2 MG/DL — SIGNIFICANT CHANGE UP (ref 8.4–10.5)
CALCIUM SERPL-MCNC: 9.3 MG/DL — SIGNIFICANT CHANGE UP (ref 8.4–10.5)
CALCIUM SERPL-MCNC: 9.4 MG/DL — SIGNIFICANT CHANGE UP (ref 8.4–10.5)
CALCIUM SERPL-MCNC: 9.4 MG/DL — SIGNIFICANT CHANGE UP (ref 8.4–10.5)
CALCIUM SERPL-MCNC: 9.6 MG/DL — SIGNIFICANT CHANGE UP (ref 8.4–10.5)
CALCIUM SERPL-MCNC: 9.7 MG/DL — SIGNIFICANT CHANGE UP (ref 8.4–10.5)
CALCIUM SERPL-MCNC: 9.8 MG/DL — SIGNIFICANT CHANGE UP (ref 8.4–10.5)
CALCIUM SERPL-MCNC: 9.8 MG/DL — SIGNIFICANT CHANGE UP (ref 8.4–10.5)
CALCIUM SERPL-MCNC: 9.9 MG/DL — SIGNIFICANT CHANGE UP (ref 8.4–10.5)
CALCIUM SERPL-MCNC: 9.9 MG/DL — SIGNIFICANT CHANGE UP (ref 8.4–10.5)
CANCER AG19-9 SERPL-ACNC: 1832 U/ML — HIGH
CANCER AG19-9 SERPL-ACNC: 3747 U/ML
CANCER AG19-9 SERPL-ACNC: 4215 U/ML
CEA SERPL-MCNC: 3.6 NG/ML — SIGNIFICANT CHANGE UP (ref 0–3.8)
CEA SERPL-MCNC: 5 NG/ML
CEA SERPL-MCNC: 6.4 NG/ML
CHLORIDE BLDV-SCNC: 88 MMOL/L — LOW (ref 96–108)
CHLORIDE SERPL-SCNC: 84 MMOL/L
CHLORIDE SERPL-SCNC: 84 MMOL/L — LOW (ref 96–108)
CHLORIDE SERPL-SCNC: 86 MMOL/L — LOW (ref 96–108)
CHLORIDE SERPL-SCNC: 88 MMOL/L
CHLORIDE SERPL-SCNC: 88 MMOL/L
CHLORIDE SERPL-SCNC: 89 MMOL/L — LOW (ref 96–108)
CHLORIDE SERPL-SCNC: 89 MMOL/L — LOW (ref 96–108)
CHLORIDE SERPL-SCNC: 90 MMOL/L — LOW (ref 96–108)
CHLORIDE SERPL-SCNC: 91 MMOL/L — LOW (ref 96–108)
CHLORIDE SERPL-SCNC: 92 MMOL/L — LOW (ref 96–108)
CHLORIDE SERPL-SCNC: 93 MMOL/L — LOW (ref 96–108)
CHLORIDE SERPL-SCNC: 94 MMOL/L — LOW (ref 96–108)
CHLORIDE SERPL-SCNC: 95 MMOL/L — LOW (ref 96–108)
CHLORIDE SERPL-SCNC: 96 MMOL/L — SIGNIFICANT CHANGE UP (ref 96–108)
CHLORIDE SERPL-SCNC: 97 MMOL/L — SIGNIFICANT CHANGE UP (ref 96–108)
CHLORIDE SERPL-SCNC: 98 MMOL/L — SIGNIFICANT CHANGE UP (ref 96–108)
CHLORIDE SERPL-SCNC: 98 MMOL/L — SIGNIFICANT CHANGE UP (ref 96–108)
CHLORIDE SERPL-SCNC: 99 MMOL/L — SIGNIFICANT CHANGE UP (ref 96–108)
CHOLEST SERPL-MCNC: 132 MG/DL — SIGNIFICANT CHANGE UP (ref 10–199)
CO2 BLDV-SCNC: 32 MMOL/L — HIGH (ref 22–30)
CO2 BLDV-SCNC: 34 MMOL/L — HIGH (ref 22–30)
CO2 SERPL-SCNC: 25 MMOL/L — SIGNIFICANT CHANGE UP (ref 22–31)
CO2 SERPL-SCNC: 25 MMOL/L — SIGNIFICANT CHANGE UP (ref 22–31)
CO2 SERPL-SCNC: 26 MMOL/L
CO2 SERPL-SCNC: 26 MMOL/L — SIGNIFICANT CHANGE UP (ref 22–31)
CO2 SERPL-SCNC: 26 MMOL/L — SIGNIFICANT CHANGE UP (ref 22–31)
CO2 SERPL-SCNC: 27 MMOL/L — SIGNIFICANT CHANGE UP (ref 22–31)
CO2 SERPL-SCNC: 28 MMOL/L — SIGNIFICANT CHANGE UP (ref 22–31)
CO2 SERPL-SCNC: 29 MMOL/L — SIGNIFICANT CHANGE UP (ref 22–31)
CO2 SERPL-SCNC: 30 MMOL/L
CO2 SERPL-SCNC: 30 MMOL/L
CO2 SERPL-SCNC: 30 MMOL/L — SIGNIFICANT CHANGE UP (ref 22–31)
CO2 SERPL-SCNC: 31 MMOL/L — SIGNIFICANT CHANGE UP (ref 22–31)
CO2 SERPL-SCNC: 31 MMOL/L — SIGNIFICANT CHANGE UP (ref 22–31)
CO2 SERPL-SCNC: 32 MMOL/L — HIGH (ref 22–31)
CO2 SERPL-SCNC: 32 MMOL/L — HIGH (ref 22–31)
CO2 SERPL-SCNC: 33 MMOL/L — HIGH (ref 22–31)
COLOR FLD: YELLOW — SIGNIFICANT CHANGE UP
COLOR FLD: YELLOW — SIGNIFICANT CHANGE UP
COLOR SPEC: SIGNIFICANT CHANGE UP
COLOR SPEC: YELLOW — SIGNIFICANT CHANGE UP
COLOR SPEC: YELLOW — SIGNIFICANT CHANGE UP
COLOR: YELLOW
CREAT SERPL-MCNC: 0.67 MG/DL — SIGNIFICANT CHANGE UP (ref 0.5–1.3)
CREAT SERPL-MCNC: 0.69 MG/DL — SIGNIFICANT CHANGE UP (ref 0.5–1.3)
CREAT SERPL-MCNC: 0.69 MG/DL — SIGNIFICANT CHANGE UP (ref 0.5–1.3)
CREAT SERPL-MCNC: 0.7 MG/DL — SIGNIFICANT CHANGE UP (ref 0.5–1.3)
CREAT SERPL-MCNC: 0.71 MG/DL — SIGNIFICANT CHANGE UP (ref 0.5–1.3)
CREAT SERPL-MCNC: 0.71 MG/DL — SIGNIFICANT CHANGE UP (ref 0.5–1.3)
CREAT SERPL-MCNC: 0.72 MG/DL — SIGNIFICANT CHANGE UP (ref 0.5–1.3)
CREAT SERPL-MCNC: 0.72 MG/DL — SIGNIFICANT CHANGE UP (ref 0.5–1.3)
CREAT SERPL-MCNC: 0.77 MG/DL — SIGNIFICANT CHANGE UP (ref 0.5–1.3)
CREAT SERPL-MCNC: 0.77 MG/DL — SIGNIFICANT CHANGE UP (ref 0.5–1.3)
CREAT SERPL-MCNC: 0.78 MG/DL — SIGNIFICANT CHANGE UP (ref 0.5–1.3)
CREAT SERPL-MCNC: 0.79 MG/DL — SIGNIFICANT CHANGE UP (ref 0.5–1.3)
CREAT SERPL-MCNC: 0.8 MG/DL — SIGNIFICANT CHANGE UP (ref 0.5–1.3)
CREAT SERPL-MCNC: 0.81 MG/DL
CREAT SERPL-MCNC: 0.81 MG/DL — SIGNIFICANT CHANGE UP (ref 0.5–1.3)
CREAT SERPL-MCNC: 0.81 MG/DL — SIGNIFICANT CHANGE UP (ref 0.5–1.3)
CREAT SERPL-MCNC: 0.82 MG/DL — SIGNIFICANT CHANGE UP (ref 0.5–1.3)
CREAT SERPL-MCNC: 0.83 MG/DL — SIGNIFICANT CHANGE UP (ref 0.5–1.3)
CREAT SERPL-MCNC: 0.84 MG/DL — SIGNIFICANT CHANGE UP (ref 0.5–1.3)
CREAT SERPL-MCNC: 0.84 MG/DL — SIGNIFICANT CHANGE UP (ref 0.5–1.3)
CREAT SERPL-MCNC: 0.85 MG/DL — SIGNIFICANT CHANGE UP (ref 0.5–1.3)
CREAT SERPL-MCNC: 0.86 MG/DL — SIGNIFICANT CHANGE UP (ref 0.5–1.3)
CREAT SERPL-MCNC: 0.87 MG/DL — SIGNIFICANT CHANGE UP (ref 0.5–1.3)
CREAT SERPL-MCNC: 0.9 MG/DL — SIGNIFICANT CHANGE UP (ref 0.5–1.3)
CREAT SERPL-MCNC: 0.92 MG/DL — SIGNIFICANT CHANGE UP (ref 0.5–1.3)
CREAT SERPL-MCNC: 0.94 MG/DL — SIGNIFICANT CHANGE UP (ref 0.5–1.3)
CREAT SERPL-MCNC: 0.95 MG/DL — SIGNIFICANT CHANGE UP (ref 0.5–1.3)
CREAT SERPL-MCNC: 0.96 MG/DL — SIGNIFICANT CHANGE UP (ref 0.5–1.3)
CREAT SERPL-MCNC: 0.96 MG/DL — SIGNIFICANT CHANGE UP (ref 0.5–1.3)
CREAT SERPL-MCNC: 1.11 MG/DL
CREAT SERPL-MCNC: 1.24 MG/DL — SIGNIFICANT CHANGE UP (ref 0.5–1.3)
CREAT SERPL-MCNC: 1.57 MG/DL — HIGH (ref 0.5–1.3)
CREAT SERPL-MCNC: 2.03 MG/DL
CRP SERPL-MCNC: 3.03 MG/DL
CULTURE RESULTS: NO GROWTH — SIGNIFICANT CHANGE UP
CULTURE RESULTS: SIGNIFICANT CHANGE UP
DIFF PNL FLD: NEGATIVE — SIGNIFICANT CHANGE UP
ELLIPTOCYTES BLD QL SMEAR: SLIGHT — SIGNIFICANT CHANGE UP
ELLIPTOCYTES BLD QL SMEAR: SLIGHT — SIGNIFICANT CHANGE UP
EOSINOPHIL # BLD AUTO: 0 K/UL — SIGNIFICANT CHANGE UP (ref 0–0.5)
EOSINOPHIL # BLD AUTO: 0.1 K/UL — SIGNIFICANT CHANGE UP (ref 0–0.5)
EOSINOPHIL # BLD AUTO: 0.1 K/UL — SIGNIFICANT CHANGE UP (ref 0–0.5)
EOSINOPHIL # BLD AUTO: 0.2 K/UL — SIGNIFICANT CHANGE UP (ref 0–0.5)
EOSINOPHIL # BLD AUTO: 0.23 K/UL — SIGNIFICANT CHANGE UP (ref 0–0.5)
EOSINOPHIL # BLD AUTO: 0.28 K/UL — SIGNIFICANT CHANGE UP (ref 0–0.5)
EOSINOPHIL # BLD AUTO: 0.34 K/UL — SIGNIFICANT CHANGE UP (ref 0–0.5)
EOSINOPHIL # BLD AUTO: 0.38 K/UL — SIGNIFICANT CHANGE UP (ref 0–0.5)
EOSINOPHIL # BLD AUTO: 0.4 K/UL — SIGNIFICANT CHANGE UP (ref 0–0.5)
EOSINOPHIL # BLD AUTO: 0.44 K/UL — SIGNIFICANT CHANGE UP (ref 0–0.5)
EOSINOPHIL NFR BLD AUTO: 0 % — SIGNIFICANT CHANGE UP (ref 0–6)
EOSINOPHIL NFR BLD AUTO: 0.9 % — SIGNIFICANT CHANGE UP (ref 0–6)
EOSINOPHIL NFR BLD AUTO: 1 % — SIGNIFICANT CHANGE UP (ref 0–6)
EOSINOPHIL NFR BLD AUTO: 1.1 % — SIGNIFICANT CHANGE UP (ref 0–6)
EOSINOPHIL NFR BLD AUTO: 1.2 % — SIGNIFICANT CHANGE UP (ref 0–6)
EOSINOPHIL NFR BLD AUTO: 1.4 % — SIGNIFICANT CHANGE UP (ref 0–6)
EOSINOPHIL NFR BLD AUTO: 2.5 % — SIGNIFICANT CHANGE UP (ref 0–6)
EOSINOPHIL NFR BLD AUTO: 2.9 % — SIGNIFICANT CHANGE UP (ref 0–6)
EOSINOPHIL NFR BLD AUTO: 3.7 % — SIGNIFICANT CHANGE UP (ref 0–6)
EOSINOPHIL NFR BLD AUTO: 4 % — SIGNIFICANT CHANGE UP (ref 0–6)
EOSINOPHIL NFR BLD AUTO: 4.7 % — SIGNIFICANT CHANGE UP (ref 0–6)
EOSINOPHIL NFR BLD AUTO: 5.3 % — SIGNIFICANT CHANGE UP (ref 0–6)
EOSINOPHIL NFR BLD AUTO: 8 % — HIGH (ref 0–6)
EOSINOPHIL NFR BLD AUTO: 8 % — HIGH (ref 0–6)
EOSINOPHIL NFR BLD AUTO: 8.1 % — HIGH (ref 0–6)
EOSINOPHIL NFR BLD AUTO: 8.8 % — HIGH (ref 0–6)
EPI CELLS # UR: 0 /HPF — SIGNIFICANT CHANGE UP
EPI CELLS # UR: 0 — SIGNIFICANT CHANGE UP
ESTIMATED AVERAGE GLUCOSE: 146 MG/DL
FLUID INTAKE SUBSTANCE CLASS: SIGNIFICANT CHANGE UP
FLUID INTAKE SUBSTANCE CLASS: SIGNIFICANT CHANGE UP
FLUID SEGMENTED GRANULOCYTES: 50 % — SIGNIFICANT CHANGE UP
FLUID SEGMENTED GRANULOCYTES: 9 % — SIGNIFICANT CHANGE UP
GAS PNL BLDV: 127 MMOL/L — LOW (ref 135–145)
GAS PNL BLDV: SIGNIFICANT CHANGE UP
GIANT PLATELETS BLD QL SMEAR: PRESENT — SIGNIFICANT CHANGE UP
GLUCOSE BLDC GLUCOMTR-MCNC: 107 MG/DL — HIGH (ref 70–99)
GLUCOSE BLDC GLUCOMTR-MCNC: 112 MG/DL — HIGH (ref 70–99)
GLUCOSE BLDC GLUCOMTR-MCNC: 113 MG/DL — HIGH (ref 70–99)
GLUCOSE BLDC GLUCOMTR-MCNC: 113 MG/DL — HIGH (ref 70–99)
GLUCOSE BLDC GLUCOMTR-MCNC: 114 MG/DL — HIGH (ref 70–99)
GLUCOSE BLDC GLUCOMTR-MCNC: 114 MG/DL — HIGH (ref 70–99)
GLUCOSE BLDC GLUCOMTR-MCNC: 115 MG/DL — HIGH (ref 70–99)
GLUCOSE BLDC GLUCOMTR-MCNC: 115 MG/DL — HIGH (ref 70–99)
GLUCOSE BLDC GLUCOMTR-MCNC: 116 MG/DL — HIGH (ref 70–99)
GLUCOSE BLDC GLUCOMTR-MCNC: 117 MG/DL — HIGH (ref 70–99)
GLUCOSE BLDC GLUCOMTR-MCNC: 118 MG/DL — HIGH (ref 70–99)
GLUCOSE BLDC GLUCOMTR-MCNC: 118 MG/DL — HIGH (ref 70–99)
GLUCOSE BLDC GLUCOMTR-MCNC: 119 MG/DL — HIGH (ref 70–99)
GLUCOSE BLDC GLUCOMTR-MCNC: 120 MG/DL — HIGH (ref 70–99)
GLUCOSE BLDC GLUCOMTR-MCNC: 120 MG/DL — HIGH (ref 70–99)
GLUCOSE BLDC GLUCOMTR-MCNC: 121 MG/DL — HIGH (ref 70–99)
GLUCOSE BLDC GLUCOMTR-MCNC: 122 MG/DL — HIGH (ref 70–99)
GLUCOSE BLDC GLUCOMTR-MCNC: 122 MG/DL — HIGH (ref 70–99)
GLUCOSE BLDC GLUCOMTR-MCNC: 124 MG/DL — HIGH (ref 70–99)
GLUCOSE BLDC GLUCOMTR-MCNC: 126 MG/DL — HIGH (ref 70–99)
GLUCOSE BLDC GLUCOMTR-MCNC: 126 MG/DL — HIGH (ref 70–99)
GLUCOSE BLDC GLUCOMTR-MCNC: 127 MG/DL — HIGH (ref 70–99)
GLUCOSE BLDC GLUCOMTR-MCNC: 129 MG/DL — HIGH (ref 70–99)
GLUCOSE BLDC GLUCOMTR-MCNC: 132 MG/DL — HIGH (ref 70–99)
GLUCOSE BLDC GLUCOMTR-MCNC: 133 MG/DL — HIGH (ref 70–99)
GLUCOSE BLDC GLUCOMTR-MCNC: 134 MG/DL — HIGH (ref 70–99)
GLUCOSE BLDC GLUCOMTR-MCNC: 135 MG/DL — HIGH (ref 70–99)
GLUCOSE BLDC GLUCOMTR-MCNC: 136 MG/DL — HIGH (ref 70–99)
GLUCOSE BLDC GLUCOMTR-MCNC: 137 MG/DL — HIGH (ref 70–99)
GLUCOSE BLDC GLUCOMTR-MCNC: 138 MG/DL — HIGH (ref 70–99)
GLUCOSE BLDC GLUCOMTR-MCNC: 140 MG/DL — HIGH (ref 70–99)
GLUCOSE BLDC GLUCOMTR-MCNC: 141 MG/DL — HIGH (ref 70–99)
GLUCOSE BLDC GLUCOMTR-MCNC: 144 MG/DL — HIGH (ref 70–99)
GLUCOSE BLDC GLUCOMTR-MCNC: 146 MG/DL — HIGH (ref 70–99)
GLUCOSE BLDC GLUCOMTR-MCNC: 147 MG/DL — HIGH (ref 70–99)
GLUCOSE BLDC GLUCOMTR-MCNC: 148 MG/DL — HIGH (ref 70–99)
GLUCOSE BLDC GLUCOMTR-MCNC: 148 MG/DL — HIGH (ref 70–99)
GLUCOSE BLDC GLUCOMTR-MCNC: 149 MG/DL — HIGH (ref 70–99)
GLUCOSE BLDC GLUCOMTR-MCNC: 150 MG/DL — HIGH (ref 70–99)
GLUCOSE BLDC GLUCOMTR-MCNC: 151 MG/DL — HIGH (ref 70–99)
GLUCOSE BLDC GLUCOMTR-MCNC: 153 MG/DL — HIGH (ref 70–99)
GLUCOSE BLDC GLUCOMTR-MCNC: 153 MG/DL — HIGH (ref 70–99)
GLUCOSE BLDC GLUCOMTR-MCNC: 157 MG/DL — HIGH (ref 70–99)
GLUCOSE BLDC GLUCOMTR-MCNC: 158 MG/DL — HIGH (ref 70–99)
GLUCOSE BLDC GLUCOMTR-MCNC: 159 MG/DL — HIGH (ref 70–99)
GLUCOSE BLDC GLUCOMTR-MCNC: 159 MG/DL — HIGH (ref 70–99)
GLUCOSE BLDC GLUCOMTR-MCNC: 160 MG/DL — HIGH (ref 70–99)
GLUCOSE BLDC GLUCOMTR-MCNC: 161 MG/DL — HIGH (ref 70–99)
GLUCOSE BLDC GLUCOMTR-MCNC: 162 MG/DL — HIGH (ref 70–99)
GLUCOSE BLDC GLUCOMTR-MCNC: 163 MG/DL — HIGH (ref 70–99)
GLUCOSE BLDC GLUCOMTR-MCNC: 163 MG/DL — HIGH (ref 70–99)
GLUCOSE BLDC GLUCOMTR-MCNC: 164 MG/DL — HIGH (ref 70–99)
GLUCOSE BLDC GLUCOMTR-MCNC: 164 MG/DL — HIGH (ref 70–99)
GLUCOSE BLDC GLUCOMTR-MCNC: 166 MG/DL — HIGH (ref 70–99)
GLUCOSE BLDC GLUCOMTR-MCNC: 172 MG/DL — HIGH (ref 70–99)
GLUCOSE BLDC GLUCOMTR-MCNC: 172 MG/DL — HIGH (ref 70–99)
GLUCOSE BLDC GLUCOMTR-MCNC: 173 MG/DL — HIGH (ref 70–99)
GLUCOSE BLDC GLUCOMTR-MCNC: 173 MG/DL — HIGH (ref 70–99)
GLUCOSE BLDC GLUCOMTR-MCNC: 174 MG/DL — HIGH (ref 70–99)
GLUCOSE BLDC GLUCOMTR-MCNC: 176 MG/DL — HIGH (ref 70–99)
GLUCOSE BLDC GLUCOMTR-MCNC: 176 MG/DL — HIGH (ref 70–99)
GLUCOSE BLDC GLUCOMTR-MCNC: 177 MG/DL — HIGH (ref 70–99)
GLUCOSE BLDC GLUCOMTR-MCNC: 178 MG/DL — HIGH (ref 70–99)
GLUCOSE BLDC GLUCOMTR-MCNC: 178 MG/DL — HIGH (ref 70–99)
GLUCOSE BLDC GLUCOMTR-MCNC: 179 MG/DL — HIGH (ref 70–99)
GLUCOSE BLDC GLUCOMTR-MCNC: 181 MG/DL — HIGH (ref 70–99)
GLUCOSE BLDC GLUCOMTR-MCNC: 182 MG/DL — HIGH (ref 70–99)
GLUCOSE BLDC GLUCOMTR-MCNC: 183 MG/DL — HIGH (ref 70–99)
GLUCOSE BLDC GLUCOMTR-MCNC: 183 MG/DL — HIGH (ref 70–99)
GLUCOSE BLDC GLUCOMTR-MCNC: 184 MG/DL — HIGH (ref 70–99)
GLUCOSE BLDC GLUCOMTR-MCNC: 186 MG/DL — HIGH (ref 70–99)
GLUCOSE BLDC GLUCOMTR-MCNC: 187 MG/DL — HIGH (ref 70–99)
GLUCOSE BLDC GLUCOMTR-MCNC: 190 MG/DL — HIGH (ref 70–99)
GLUCOSE BLDC GLUCOMTR-MCNC: 191 MG/DL — HIGH (ref 70–99)
GLUCOSE BLDC GLUCOMTR-MCNC: 192 MG/DL — HIGH (ref 70–99)
GLUCOSE BLDC GLUCOMTR-MCNC: 193 MG/DL — HIGH (ref 70–99)
GLUCOSE BLDC GLUCOMTR-MCNC: 195 MG/DL — HIGH (ref 70–99)
GLUCOSE BLDC GLUCOMTR-MCNC: 197 MG/DL — HIGH (ref 70–99)
GLUCOSE BLDC GLUCOMTR-MCNC: 201 MG/DL — HIGH (ref 70–99)
GLUCOSE BLDC GLUCOMTR-MCNC: 203 MG/DL — HIGH (ref 70–99)
GLUCOSE BLDC GLUCOMTR-MCNC: 203 MG/DL — HIGH (ref 70–99)
GLUCOSE BLDC GLUCOMTR-MCNC: 210 MG/DL — HIGH (ref 70–99)
GLUCOSE BLDC GLUCOMTR-MCNC: 211 MG/DL — HIGH (ref 70–99)
GLUCOSE BLDC GLUCOMTR-MCNC: 214 MG/DL — HIGH (ref 70–99)
GLUCOSE BLDC GLUCOMTR-MCNC: 219 MG/DL — HIGH (ref 70–99)
GLUCOSE BLDC GLUCOMTR-MCNC: 221 MG/DL — HIGH (ref 70–99)
GLUCOSE BLDC GLUCOMTR-MCNC: 222 MG/DL — HIGH (ref 70–99)
GLUCOSE BLDC GLUCOMTR-MCNC: 224 MG/DL — HIGH (ref 70–99)
GLUCOSE BLDC GLUCOMTR-MCNC: 226 MG/DL — HIGH (ref 70–99)
GLUCOSE BLDC GLUCOMTR-MCNC: 228 MG/DL — HIGH (ref 70–99)
GLUCOSE BLDC GLUCOMTR-MCNC: 231 MG/DL — HIGH (ref 70–99)
GLUCOSE BLDC GLUCOMTR-MCNC: 233 MG/DL — HIGH (ref 70–99)
GLUCOSE BLDC GLUCOMTR-MCNC: 234 MG/DL — HIGH (ref 70–99)
GLUCOSE BLDC GLUCOMTR-MCNC: 236 MG/DL — HIGH (ref 70–99)
GLUCOSE BLDC GLUCOMTR-MCNC: 237 MG/DL — HIGH (ref 70–99)
GLUCOSE BLDC GLUCOMTR-MCNC: 238 MG/DL — HIGH (ref 70–99)
GLUCOSE BLDC GLUCOMTR-MCNC: 244 MG/DL — HIGH (ref 70–99)
GLUCOSE BLDC GLUCOMTR-MCNC: 252 MG/DL — HIGH (ref 70–99)
GLUCOSE BLDC GLUCOMTR-MCNC: 261 MG/DL — HIGH (ref 70–99)
GLUCOSE BLDC GLUCOMTR-MCNC: 268 MG/DL — HIGH (ref 70–99)
GLUCOSE BLDC GLUCOMTR-MCNC: 279 MG/DL — HIGH (ref 70–99)
GLUCOSE BLDC GLUCOMTR-MCNC: 279 MG/DL — HIGH (ref 70–99)
GLUCOSE BLDC GLUCOMTR-MCNC: 282 MG/DL — HIGH (ref 70–99)
GLUCOSE BLDC GLUCOMTR-MCNC: 282 MG/DL — HIGH (ref 70–99)
GLUCOSE BLDC GLUCOMTR-MCNC: 84 MG/DL — SIGNIFICANT CHANGE UP (ref 70–99)
GLUCOSE BLDC GLUCOMTR-MCNC: 92 MG/DL — SIGNIFICANT CHANGE UP (ref 70–99)
GLUCOSE BLDC GLUCOMTR-MCNC: 95 MG/DL — SIGNIFICANT CHANGE UP (ref 70–99)
GLUCOSE BLDV-MCNC: 174 MG/DL — HIGH (ref 70–99)
GLUCOSE FLD-MCNC: 126 MG/DL — SIGNIFICANT CHANGE UP
GLUCOSE FLD-MCNC: 146 MG/DL — SIGNIFICANT CHANGE UP
GLUCOSE QUALITATIVE U: NEGATIVE
GLUCOSE SERPL-MCNC: 104 MG/DL — HIGH (ref 70–99)
GLUCOSE SERPL-MCNC: 105 MG/DL — HIGH (ref 70–99)
GLUCOSE SERPL-MCNC: 107 MG/DL — HIGH (ref 70–99)
GLUCOSE SERPL-MCNC: 110 MG/DL — HIGH (ref 70–99)
GLUCOSE SERPL-MCNC: 117 MG/DL — HIGH (ref 70–99)
GLUCOSE SERPL-MCNC: 117 MG/DL — HIGH (ref 70–99)
GLUCOSE SERPL-MCNC: 121 MG/DL — HIGH (ref 70–99)
GLUCOSE SERPL-MCNC: 127 MG/DL — HIGH (ref 70–99)
GLUCOSE SERPL-MCNC: 129 MG/DL — HIGH (ref 70–99)
GLUCOSE SERPL-MCNC: 129 MG/DL — HIGH (ref 70–99)
GLUCOSE SERPL-MCNC: 132 MG/DL — HIGH (ref 70–99)
GLUCOSE SERPL-MCNC: 133 MG/DL — HIGH (ref 70–99)
GLUCOSE SERPL-MCNC: 136 MG/DL — HIGH (ref 70–99)
GLUCOSE SERPL-MCNC: 139 MG/DL — HIGH (ref 70–99)
GLUCOSE SERPL-MCNC: 143 MG/DL — HIGH (ref 70–99)
GLUCOSE SERPL-MCNC: 145 MG/DL — HIGH (ref 70–99)
GLUCOSE SERPL-MCNC: 145 MG/DL — HIGH (ref 70–99)
GLUCOSE SERPL-MCNC: 146 MG/DL — HIGH (ref 70–99)
GLUCOSE SERPL-MCNC: 147 MG/DL — HIGH (ref 70–99)
GLUCOSE SERPL-MCNC: 147 MG/DL — HIGH (ref 70–99)
GLUCOSE SERPL-MCNC: 149 MG/DL — HIGH (ref 70–99)
GLUCOSE SERPL-MCNC: 150 MG/DL — HIGH (ref 70–99)
GLUCOSE SERPL-MCNC: 158 MG/DL — HIGH (ref 70–99)
GLUCOSE SERPL-MCNC: 162 MG/DL — HIGH (ref 70–99)
GLUCOSE SERPL-MCNC: 166 MG/DL — HIGH (ref 70–99)
GLUCOSE SERPL-MCNC: 166 MG/DL — HIGH (ref 70–99)
GLUCOSE SERPL-MCNC: 176 MG/DL
GLUCOSE SERPL-MCNC: 180 MG/DL — HIGH (ref 70–99)
GLUCOSE SERPL-MCNC: 180 MG/DL — HIGH (ref 70–99)
GLUCOSE SERPL-MCNC: 182 MG/DL — HIGH (ref 70–99)
GLUCOSE SERPL-MCNC: 194 MG/DL — HIGH (ref 70–99)
GLUCOSE SERPL-MCNC: 198 MG/DL — HIGH (ref 70–99)
GLUCOSE SERPL-MCNC: 216 MG/DL
GLUCOSE SERPL-MCNC: 230 MG/DL — HIGH (ref 70–99)
GLUCOSE SERPL-MCNC: 243 MG/DL — HIGH (ref 70–99)
GLUCOSE SERPL-MCNC: 245 MG/DL
GLUCOSE SERPL-MCNC: 245 MG/DL — HIGH (ref 70–99)
GLUCOSE SERPL-MCNC: 383 MG/DL — HIGH (ref 70–99)
GLUCOSE SERPL-MCNC: 96 MG/DL — SIGNIFICANT CHANGE UP (ref 70–99)
GLUCOSE UR QL: ABNORMAL
GLUCOSE UR QL: NEGATIVE — SIGNIFICANT CHANGE UP
GLUCOSE UR QL: NEGATIVE — SIGNIFICANT CHANGE UP
GRAM STN FLD: SIGNIFICANT CHANGE UP
HAV IGM SER-ACNC: SIGNIFICANT CHANGE UP
HBA1C BLD-MCNC: 6.8 % — HIGH (ref 4–5.6)
HBA1C BLD-MCNC: 7.7 % — HIGH (ref 4–5.6)
HBA1C BLD-MCNC: 8.2 % — HIGH (ref 4–5.6)
HBA1C MFR BLD HPLC: 6.7 %
HBV CORE IGG+IGM SER QL: NONREACTIVE
HBV CORE IGM SER-ACNC: SIGNIFICANT CHANGE UP
HBV SURFACE AB SER QL: NONREACTIVE
HBV SURFACE AG SER QL: NONREACTIVE
HBV SURFACE AG SER-ACNC: SIGNIFICANT CHANGE UP
HCO3 BLDV-SCNC: 30 MMOL/L — HIGH (ref 21–29)
HCO3 BLDV-SCNC: 33 MMOL/L — HIGH (ref 21–29)
HCT VFR BLD CALC: 27.7 % — LOW (ref 39–50)
HCT VFR BLD CALC: 28.9 % — LOW (ref 39–50)
HCT VFR BLD CALC: 29.6 % — LOW (ref 39–50)
HCT VFR BLD CALC: 30.4 % — LOW (ref 39–50)
HCT VFR BLD CALC: 31.1 % — LOW (ref 39–50)
HCT VFR BLD CALC: 31.3 % — LOW (ref 39–50)
HCT VFR BLD CALC: 31.6 % — LOW (ref 39–50)
HCT VFR BLD CALC: 31.9 % — LOW (ref 39–50)
HCT VFR BLD CALC: 32.2 % — LOW (ref 39–50)
HCT VFR BLD CALC: 32.3 % — LOW (ref 39–50)
HCT VFR BLD CALC: 32.4 % — LOW (ref 39–50)
HCT VFR BLD CALC: 32.6 % — LOW (ref 39–50)
HCT VFR BLD CALC: 32.8 % — LOW (ref 39–50)
HCT VFR BLD CALC: 32.9 % — LOW (ref 39–50)
HCT VFR BLD CALC: 33 % — LOW (ref 39–50)
HCT VFR BLD CALC: 33.3 % — LOW (ref 39–50)
HCT VFR BLD CALC: 33.6 % — LOW (ref 39–50)
HCT VFR BLD CALC: 33.7 % — LOW (ref 39–50)
HCT VFR BLD CALC: 34.4 % — LOW (ref 39–50)
HCT VFR BLD CALC: 34.5 % — LOW (ref 39–50)
HCT VFR BLD CALC: 34.8 % — LOW (ref 39–50)
HCT VFR BLD CALC: 34.8 % — LOW (ref 39–50)
HCT VFR BLD CALC: 35.5 % — LOW (ref 39–50)
HCT VFR BLD CALC: 35.6 % — LOW (ref 39–50)
HCT VFR BLD CALC: 35.8 % — LOW (ref 39–50)
HCT VFR BLD CALC: 36 % — LOW (ref 39–50)
HCT VFR BLD CALC: 36.2 % — LOW (ref 39–50)
HCT VFR BLD CALC: 36.5 % — LOW (ref 39–50)
HCT VFR BLD CALC: 36.7 % — LOW (ref 39–50)
HCT VFR BLD CALC: 36.8 % — LOW (ref 39–50)
HCT VFR BLD CALC: 37.3 % — LOW (ref 39–50)
HCT VFR BLD CALC: 37.4 % — LOW (ref 39–50)
HCT VFR BLD CALC: 37.6 % — LOW (ref 39–50)
HCT VFR BLD CALC: 38 % — LOW (ref 39–50)
HCT VFR BLD CALC: 38.8 % — LOW (ref 39–50)
HCT VFR BLD CALC: 39 % — SIGNIFICANT CHANGE UP (ref 39–50)
HCT VFR BLD CALC: 40.3 % — SIGNIFICANT CHANGE UP (ref 39–50)
HCT VFR BLDA CALC: 32 % — LOW (ref 39–50)
HCV AB S/CO SERPL IA: 0.15 S/CO — SIGNIFICANT CHANGE UP (ref 0–0.99)
HCV AB S/CO SERPL IA: 0.15 S/CO — SIGNIFICANT CHANGE UP (ref 0–0.99)
HCV AB SER QL: NONREACTIVE
HCV AB SERPL-IMP: SIGNIFICANT CHANGE UP
HCV AB SERPL-IMP: SIGNIFICANT CHANGE UP
HCV S/CO RATIO: 0.17 S/CO
HDLC SERPL-MCNC: 28 MG/DL — LOW
HGB BLD CALC-MCNC: 10.4 G/DL — LOW (ref 13–17)
HGB BLD-MCNC: 10 G/DL — LOW (ref 13–17)
HGB BLD-MCNC: 10.1 G/DL — LOW (ref 13–17)
HGB BLD-MCNC: 10.2 G/DL — LOW (ref 13–17)
HGB BLD-MCNC: 10.3 G/DL — LOW (ref 13–17)
HGB BLD-MCNC: 10.3 G/DL — LOW (ref 13–17)
HGB BLD-MCNC: 10.4 G/DL — LOW (ref 13–17)
HGB BLD-MCNC: 10.5 G/DL — LOW (ref 13–17)
HGB BLD-MCNC: 10.6 G/DL — LOW (ref 13–17)
HGB BLD-MCNC: 10.8 G/DL — LOW (ref 13–17)
HGB BLD-MCNC: 10.9 G/DL — LOW (ref 13–17)
HGB BLD-MCNC: 11.1 G/DL — LOW (ref 13–17)
HGB BLD-MCNC: 11.2 G/DL — LOW (ref 13–17)
HGB BLD-MCNC: 11.3 G/DL — LOW (ref 13–17)
HGB BLD-MCNC: 11.4 G/DL — LOW (ref 13–17)
HGB BLD-MCNC: 11.5 G/DL — LOW (ref 13–17)
HGB BLD-MCNC: 11.6 G/DL — LOW (ref 13–17)
HGB BLD-MCNC: 11.6 G/DL — LOW (ref 13–17)
HGB BLD-MCNC: 11.7 G/DL — LOW (ref 13–17)
HGB BLD-MCNC: 11.8 G/DL — LOW (ref 13–17)
HGB BLD-MCNC: 12.2 G/DL — LOW (ref 13–17)
HGB BLD-MCNC: 12.3 G/DL — LOW (ref 13–17)
HGB BLD-MCNC: 12.4 G/DL — LOW (ref 13–17)
HGB BLD-MCNC: 13.2 G/DL — SIGNIFICANT CHANGE UP (ref 13–17)
HGB BLD-MCNC: 9.1 G/DL — LOW (ref 13–17)
HGB BLD-MCNC: 9.1 G/DL — LOW (ref 13–17)
HGB BLD-MCNC: 9.7 G/DL — LOW (ref 13–17)
HGB BLD-MCNC: 9.9 G/DL — LOW (ref 13–17)
HOROWITZ INDEX BLDV+IHG-RTO: SIGNIFICANT CHANGE UP
HYALINE CASTS # UR AUTO: 0 /LPF — SIGNIFICANT CHANGE UP (ref 0–7)
HYALINE CASTS # UR AUTO: 4 /LPF — HIGH (ref 0–2)
HYPOCHROMIA BLD QL: SLIGHT — SIGNIFICANT CHANGE UP
IMM GRANULOCYTES NFR BLD AUTO: 0.2 % — SIGNIFICANT CHANGE UP (ref 0–1.5)
IMM GRANULOCYTES NFR BLD AUTO: 0.3 % — SIGNIFICANT CHANGE UP (ref 0–1.5)
IMM GRANULOCYTES NFR BLD AUTO: 0.4 % — SIGNIFICANT CHANGE UP (ref 0–1.5)
IMM GRANULOCYTES NFR BLD AUTO: 0.6 % — SIGNIFICANT CHANGE UP (ref 0–1.5)
INR BLD: 1.05 RATIO — SIGNIFICANT CHANGE UP (ref 0.88–1.16)
INR BLD: 1.07 RATIO — SIGNIFICANT CHANGE UP (ref 0.88–1.16)
INR BLD: 1.08 RATIO — SIGNIFICANT CHANGE UP (ref 0.88–1.16)
INR BLD: 1.08 RATIO — SIGNIFICANT CHANGE UP (ref 0.88–1.16)
INR BLD: 1.09 RATIO — SIGNIFICANT CHANGE UP (ref 0.88–1.16)
INR BLD: 1.18 RATIO — HIGH (ref 0.88–1.16)
INR BLD: 1.28 RATIO — HIGH (ref 0.88–1.16)
INR BLD: 1.35 RATIO — HIGH (ref 0.88–1.16)
INR BLD: 1.35 RATIO — HIGH (ref 0.88–1.16)
INR BLD: 1.36 RATIO — HIGH (ref 0.88–1.16)
INR BLD: 1.39 RATIO — HIGH (ref 0.88–1.16)
INR PPP: 1.14 RATIO
KETONES UR-MCNC: NEGATIVE — SIGNIFICANT CHANGE UP
KETONES URINE: NEGATIVE
LACTATE BLDA-MCNC: 4.4 MMOL/L
LACTATE BLDV-MCNC: 1.3 MMOL/L — SIGNIFICANT CHANGE UP (ref 0.7–2)
LACTATE BLDV-MCNC: 2 MMOL/L — SIGNIFICANT CHANGE UP (ref 0.7–2)
LACTATE BLDV-MCNC: 3 MMOL/L — HIGH (ref 0.7–2)
LACTATE SERPL-SCNC: 1.7 MMOL/L — SIGNIFICANT CHANGE UP (ref 0.7–2)
LDH SERPL L TO P-CCNC: 225 U/L — SIGNIFICANT CHANGE UP
LDH SERPL L TO P-CCNC: 59 U/L — SIGNIFICANT CHANGE UP
LDH SERPL-CCNC: 162 U/L
LDH SERPL-CCNC: 325 U/L
LEUKOCYTE ESTERASE UR-ACNC: NEGATIVE — SIGNIFICANT CHANGE UP
LEUKOCYTE ESTERASE URINE: NEGATIVE
LG PLATELETS BLD QL AUTO: SLIGHT — SIGNIFICANT CHANGE UP
LIDOCAIN IGE QN: 141 U/L — HIGH (ref 7–60)
LIDOCAIN IGE QN: 58 U/L — SIGNIFICANT CHANGE UP (ref 7–60)
LIDOCAIN IGE QN: 8 U/L — SIGNIFICANT CHANGE UP (ref 7–60)
LIDOCAIN IGE QN: 92 U/L — HIGH (ref 7–60)
LIPID PNL WITH DIRECT LDL SERPL: 78 MG/DL — SIGNIFICANT CHANGE UP
LPL SERPL-CCNC: 33 U/L
LPL SERPL-CCNC: 79 U/L
LYMPHOCYTES # BLD AUTO: 0.3 K/UL — LOW (ref 1–3.3)
LYMPHOCYTES # BLD AUTO: 0.4 K/UL — LOW (ref 1–3.3)
LYMPHOCYTES # BLD AUTO: 0.4 K/UL — LOW (ref 1–3.3)
LYMPHOCYTES # BLD AUTO: 0.43 K/UL — LOW (ref 1–3.3)
LYMPHOCYTES # BLD AUTO: 0.67 K/UL — LOW (ref 1–3.3)
LYMPHOCYTES # BLD AUTO: 0.7 K/UL — LOW (ref 1–3.3)
LYMPHOCYTES # BLD AUTO: 0.7 K/UL — LOW (ref 1–3.3)
LYMPHOCYTES # BLD AUTO: 0.8 K/UL — LOW (ref 1–3.3)
LYMPHOCYTES # BLD AUTO: 0.88 K/UL — LOW (ref 1–3.3)
LYMPHOCYTES # BLD AUTO: 0.9 K/UL — LOW (ref 1–3.3)
LYMPHOCYTES # BLD AUTO: 0.9 K/UL — LOW (ref 1–3.3)
LYMPHOCYTES # BLD AUTO: 0.92 K/UL — LOW (ref 1–3.3)
LYMPHOCYTES # BLD AUTO: 1 K/UL — SIGNIFICANT CHANGE UP (ref 1–3.3)
LYMPHOCYTES # BLD AUTO: 1.04 K/UL — SIGNIFICANT CHANGE UP (ref 1–3.3)
LYMPHOCYTES # BLD AUTO: 1.26 K/UL — SIGNIFICANT CHANGE UP (ref 1–3.3)
LYMPHOCYTES # BLD AUTO: 1.3 K/UL — SIGNIFICANT CHANGE UP (ref 1–3.3)
LYMPHOCYTES # BLD AUTO: 1.48 K/UL — SIGNIFICANT CHANGE UP (ref 1–3.3)
LYMPHOCYTES # BLD AUTO: 1.7 % — LOW (ref 13–44)
LYMPHOCYTES # BLD AUTO: 1.72 K/UL — SIGNIFICANT CHANGE UP (ref 1–3.3)
LYMPHOCYTES # BLD AUTO: 1.8 % — LOW (ref 13–44)
LYMPHOCYTES # BLD AUTO: 16 % — SIGNIFICANT CHANGE UP (ref 13–44)
LYMPHOCYTES # BLD AUTO: 16.4 % — SIGNIFICANT CHANGE UP (ref 13–44)
LYMPHOCYTES # BLD AUTO: 16.8 % — SIGNIFICANT CHANGE UP (ref 13–44)
LYMPHOCYTES # BLD AUTO: 18.1 % — SIGNIFICANT CHANGE UP (ref 13–44)
LYMPHOCYTES # BLD AUTO: 18.3 % — SIGNIFICANT CHANGE UP (ref 13–44)
LYMPHOCYTES # BLD AUTO: 20.5 % — SIGNIFICANT CHANGE UP (ref 13–44)
LYMPHOCYTES # BLD AUTO: 22.7 % — SIGNIFICANT CHANGE UP (ref 13–44)
LYMPHOCYTES # BLD AUTO: 24.8 % — SIGNIFICANT CHANGE UP (ref 13–44)
LYMPHOCYTES # BLD AUTO: 28.3 % — SIGNIFICANT CHANGE UP (ref 13–44)
LYMPHOCYTES # BLD AUTO: 3.5 % — LOW (ref 13–44)
LYMPHOCYTES # BLD AUTO: 3.5 % — LOW (ref 13–44)
LYMPHOCYTES # BLD AUTO: 31.2 % — SIGNIFICANT CHANGE UP (ref 13–44)
LYMPHOCYTES # BLD AUTO: 36.1 % — SIGNIFICANT CHANGE UP (ref 13–44)
LYMPHOCYTES # BLD AUTO: 5.8 % — LOW (ref 13–44)
LYMPHOCYTES # BLD AUTO: 6.7 % — LOW (ref 13–44)
LYMPHOCYTES # BLD AUTO: 7 % — LOW (ref 13–44)
LYMPHOCYTES # BLD AUTO: 7 % — LOW (ref 13–44)
LYMPHOCYTES # BLD AUTO: 7.1 % — LOW (ref 13–44)
LYMPHOCYTES # BLD AUTO: 7.2 % — LOW (ref 13–44)
LYMPHOCYTES # FLD: 20 % — SIGNIFICANT CHANGE UP
LYMPHOCYTES # FLD: 81 % — SIGNIFICANT CHANGE UP
MACROCYTES BLD QL: SLIGHT — SIGNIFICANT CHANGE UP
MACROCYTES BLD QL: SLIGHT — SIGNIFICANT CHANGE UP
MAGNESIUM SERPL-MCNC: 1.8 MG/DL — SIGNIFICANT CHANGE UP (ref 1.6–2.6)
MAGNESIUM SERPL-MCNC: 1.8 MG/DL — SIGNIFICANT CHANGE UP (ref 1.6–2.6)
MAGNESIUM SERPL-MCNC: 1.9 MG/DL — SIGNIFICANT CHANGE UP (ref 1.6–2.6)
MAGNESIUM SERPL-MCNC: 2 MG/DL — SIGNIFICANT CHANGE UP (ref 1.6–2.6)
MAGNESIUM SERPL-MCNC: 2 MG/DL — SIGNIFICANT CHANGE UP (ref 1.6–2.6)
MAGNESIUM SERPL-MCNC: 2.1 MG/DL — SIGNIFICANT CHANGE UP (ref 1.6–2.6)
MANUAL SMEAR VERIFICATION: SIGNIFICANT CHANGE UP
MCHC RBC-ENTMCNC: 26.1 PG — LOW (ref 27–34)
MCHC RBC-ENTMCNC: 26.2 PG — LOW (ref 27–34)
MCHC RBC-ENTMCNC: 26.2 PG — LOW (ref 27–34)
MCHC RBC-ENTMCNC: 26.4 PG — LOW (ref 27–34)
MCHC RBC-ENTMCNC: 26.5 PG — LOW (ref 27–34)
MCHC RBC-ENTMCNC: 26.6 PG — LOW (ref 27–34)
MCHC RBC-ENTMCNC: 26.6 PG — LOW (ref 27–34)
MCHC RBC-ENTMCNC: 26.7 PG — LOW (ref 27–34)
MCHC RBC-ENTMCNC: 26.7 PG — LOW (ref 27–34)
MCHC RBC-ENTMCNC: 26.8 PG — LOW (ref 27–34)
MCHC RBC-ENTMCNC: 26.8 PG — LOW (ref 27–34)
MCHC RBC-ENTMCNC: 27.1 PG — SIGNIFICANT CHANGE UP (ref 27–34)
MCHC RBC-ENTMCNC: 27.1 PG — SIGNIFICANT CHANGE UP (ref 27–34)
MCHC RBC-ENTMCNC: 27.2 PG — SIGNIFICANT CHANGE UP (ref 27–34)
MCHC RBC-ENTMCNC: 27.2 PG — SIGNIFICANT CHANGE UP (ref 27–34)
MCHC RBC-ENTMCNC: 27.4 PG — SIGNIFICANT CHANGE UP (ref 27–34)
MCHC RBC-ENTMCNC: 27.9 PG — SIGNIFICANT CHANGE UP (ref 27–34)
MCHC RBC-ENTMCNC: 28.1 PG — SIGNIFICANT CHANGE UP (ref 27–34)
MCHC RBC-ENTMCNC: 28.2 PG — SIGNIFICANT CHANGE UP (ref 27–34)
MCHC RBC-ENTMCNC: 28.2 PG — SIGNIFICANT CHANGE UP (ref 27–34)
MCHC RBC-ENTMCNC: 28.4 PG — SIGNIFICANT CHANGE UP (ref 27–34)
MCHC RBC-ENTMCNC: 28.6 PG — SIGNIFICANT CHANGE UP (ref 27–34)
MCHC RBC-ENTMCNC: 28.7 PG — SIGNIFICANT CHANGE UP (ref 27–34)
MCHC RBC-ENTMCNC: 28.7 PG — SIGNIFICANT CHANGE UP (ref 27–34)
MCHC RBC-ENTMCNC: 28.8 PG — SIGNIFICANT CHANGE UP (ref 27–34)
MCHC RBC-ENTMCNC: 28.9 PG — SIGNIFICANT CHANGE UP (ref 27–34)
MCHC RBC-ENTMCNC: 28.9 PG — SIGNIFICANT CHANGE UP (ref 27–34)
MCHC RBC-ENTMCNC: 29.2 PG — SIGNIFICANT CHANGE UP (ref 27–34)
MCHC RBC-ENTMCNC: 29.3 PG — SIGNIFICANT CHANGE UP (ref 27–34)
MCHC RBC-ENTMCNC: 29.4 PG — SIGNIFICANT CHANGE UP (ref 27–34)
MCHC RBC-ENTMCNC: 29.8 PG — SIGNIFICANT CHANGE UP (ref 27–34)
MCHC RBC-ENTMCNC: 30.3 PG — SIGNIFICANT CHANGE UP (ref 27–34)
MCHC RBC-ENTMCNC: 30.4 GM/DL — LOW (ref 32–36)
MCHC RBC-ENTMCNC: 30.8 GM/DL — LOW (ref 32–36)
MCHC RBC-ENTMCNC: 30.9 GM/DL — LOW (ref 32–36)
MCHC RBC-ENTMCNC: 31 GM/DL — LOW (ref 32–36)
MCHC RBC-ENTMCNC: 31.1 GM/DL — LOW (ref 32–36)
MCHC RBC-ENTMCNC: 31.2 GM/DL — LOW (ref 32–36)
MCHC RBC-ENTMCNC: 31.3 GM/DL — LOW (ref 32–36)
MCHC RBC-ENTMCNC: 31.3 GM/DL — LOW (ref 32–36)
MCHC RBC-ENTMCNC: 31.4 GM/DL — LOW (ref 32–36)
MCHC RBC-ENTMCNC: 31.5 GM/DL — LOW (ref 32–36)
MCHC RBC-ENTMCNC: 31.5 GM/DL — LOW (ref 32–36)
MCHC RBC-ENTMCNC: 31.6 GM/DL — LOW (ref 32–36)
MCHC RBC-ENTMCNC: 31.7 G/DL — LOW (ref 32–36)
MCHC RBC-ENTMCNC: 31.7 G/DL — LOW (ref 32–36)
MCHC RBC-ENTMCNC: 31.7 GM/DL — LOW (ref 32–36)
MCHC RBC-ENTMCNC: 31.7 GM/DL — LOW (ref 32–36)
MCHC RBC-ENTMCNC: 31.8 GM/DL — LOW (ref 32–36)
MCHC RBC-ENTMCNC: 31.9 GM/DL — LOW (ref 32–36)
MCHC RBC-ENTMCNC: 32.2 G/DL — SIGNIFICANT CHANGE UP (ref 32–36)
MCHC RBC-ENTMCNC: 32.2 G/DL — SIGNIFICANT CHANGE UP (ref 32–36)
MCHC RBC-ENTMCNC: 32.3 GM/DL — SIGNIFICANT CHANGE UP (ref 32–36)
MCHC RBC-ENTMCNC: 32.7 G/DL — SIGNIFICANT CHANGE UP (ref 32–36)
MCHC RBC-ENTMCNC: 32.8 GM/DL — SIGNIFICANT CHANGE UP (ref 32–36)
MCHC RBC-ENTMCNC: 32.9 GM/DL — SIGNIFICANT CHANGE UP (ref 32–36)
MCHC RBC-ENTMCNC: 32.9 GM/DL — SIGNIFICANT CHANGE UP (ref 32–36)
MCHC RBC-ENTMCNC: 33.1 GM/DL — SIGNIFICANT CHANGE UP (ref 32–36)
MCHC RBC-ENTMCNC: 33.1 GM/DL — SIGNIFICANT CHANGE UP (ref 32–36)
MCHC RBC-ENTMCNC: 33.4 G/DL — SIGNIFICANT CHANGE UP (ref 32–36)
MCHC RBC-ENTMCNC: 34.3 G/DL — SIGNIFICANT CHANGE UP (ref 32–36)
MCV RBC AUTO: 82.7 FL — SIGNIFICANT CHANGE UP (ref 80–100)
MCV RBC AUTO: 82.7 FL — SIGNIFICANT CHANGE UP (ref 80–100)
MCV RBC AUTO: 82.9 FL — SIGNIFICANT CHANGE UP (ref 80–100)
MCV RBC AUTO: 83.2 FL — SIGNIFICANT CHANGE UP (ref 80–100)
MCV RBC AUTO: 83.4 FL — SIGNIFICANT CHANGE UP (ref 80–100)
MCV RBC AUTO: 83.7 FL — SIGNIFICANT CHANGE UP (ref 80–100)
MCV RBC AUTO: 83.8 FL — SIGNIFICANT CHANGE UP (ref 80–100)
MCV RBC AUTO: 84.1 FL — SIGNIFICANT CHANGE UP (ref 80–100)
MCV RBC AUTO: 84.3 FL — SIGNIFICANT CHANGE UP (ref 80–100)
MCV RBC AUTO: 84.3 FL — SIGNIFICANT CHANGE UP (ref 80–100)
MCV RBC AUTO: 84.4 FL — SIGNIFICANT CHANGE UP (ref 80–100)
MCV RBC AUTO: 84.9 FL — SIGNIFICANT CHANGE UP (ref 80–100)
MCV RBC AUTO: 85.2 FL — SIGNIFICANT CHANGE UP (ref 80–100)
MCV RBC AUTO: 86.1 FL — SIGNIFICANT CHANGE UP (ref 80–100)
MCV RBC AUTO: 86.5 FL — SIGNIFICANT CHANGE UP (ref 80–100)
MCV RBC AUTO: 86.9 FL — SIGNIFICANT CHANGE UP (ref 80–100)
MCV RBC AUTO: 87.6 FL — SIGNIFICANT CHANGE UP (ref 80–100)
MCV RBC AUTO: 88 FL — SIGNIFICANT CHANGE UP (ref 80–100)
MCV RBC AUTO: 88.2 FL — SIGNIFICANT CHANGE UP (ref 80–100)
MCV RBC AUTO: 88.7 FL — SIGNIFICANT CHANGE UP (ref 80–100)
MCV RBC AUTO: 89.8 FL — SIGNIFICANT CHANGE UP (ref 80–100)
MCV RBC AUTO: 90.5 FL — SIGNIFICANT CHANGE UP (ref 80–100)
MCV RBC AUTO: 90.5 FL — SIGNIFICANT CHANGE UP (ref 80–100)
MCV RBC AUTO: 90.6 FL — SIGNIFICANT CHANGE UP (ref 80–100)
MCV RBC AUTO: 90.9 FL — SIGNIFICANT CHANGE UP (ref 80–100)
MCV RBC AUTO: 90.9 FL — SIGNIFICANT CHANGE UP (ref 80–100)
MCV RBC AUTO: 91 FL — SIGNIFICANT CHANGE UP (ref 80–100)
MCV RBC AUTO: 91.2 FL — SIGNIFICANT CHANGE UP (ref 80–100)
MCV RBC AUTO: 91.4 FL — SIGNIFICANT CHANGE UP (ref 80–100)
MCV RBC AUTO: 91.6 FL — SIGNIFICANT CHANGE UP (ref 80–100)
MCV RBC AUTO: 91.9 FL — SIGNIFICANT CHANGE UP (ref 80–100)
MCV RBC AUTO: 92 FL — SIGNIFICANT CHANGE UP (ref 80–100)
MCV RBC AUTO: 92.1 FL — SIGNIFICANT CHANGE UP (ref 80–100)
MCV RBC AUTO: 92.2 FL — SIGNIFICANT CHANGE UP (ref 80–100)
MCV RBC AUTO: 92.3 FL — SIGNIFICANT CHANGE UP (ref 80–100)
MESOTHL CELL # FLD: 5 % — SIGNIFICANT CHANGE UP
METAMYELOCYTES # FLD: 0.9 % — HIGH (ref 0–0)
METAMYELOCYTES # FLD: 1.8 % — HIGH (ref 0–0)
METHOD TYPE: SIGNIFICANT CHANGE UP
MICROCYTES BLD QL: SLIGHT — SIGNIFICANT CHANGE UP
MITOCHONDRIA AB SER-ACNC: SIGNIFICANT CHANGE UP
MONOCYTES # BLD AUTO: 0.2 K/UL — SIGNIFICANT CHANGE UP (ref 0–0.9)
MONOCYTES # BLD AUTO: 0.3 K/UL — SIGNIFICANT CHANGE UP (ref 0–0.9)
MONOCYTES # BLD AUTO: 0.35 K/UL — SIGNIFICANT CHANGE UP (ref 0–0.9)
MONOCYTES # BLD AUTO: 0.36 K/UL — SIGNIFICANT CHANGE UP (ref 0–0.9)
MONOCYTES # BLD AUTO: 0.4 K/UL — SIGNIFICANT CHANGE UP (ref 0–0.9)
MONOCYTES # BLD AUTO: 0.42 K/UL — SIGNIFICANT CHANGE UP (ref 0–0.9)
MONOCYTES # BLD AUTO: 0.49 K/UL — SIGNIFICANT CHANGE UP (ref 0–0.9)
MONOCYTES # BLD AUTO: 0.5 K/UL — SIGNIFICANT CHANGE UP (ref 0–0.9)
MONOCYTES # BLD AUTO: 0.5 K/UL — SIGNIFICANT CHANGE UP (ref 0–0.9)
MONOCYTES # BLD AUTO: 0.6 K/UL — SIGNIFICANT CHANGE UP (ref 0–0.9)
MONOCYTES # BLD AUTO: 0.6 K/UL — SIGNIFICANT CHANGE UP (ref 0–0.9)
MONOCYTES # BLD AUTO: 0.7 K/UL — SIGNIFICANT CHANGE UP (ref 0–0.9)
MONOCYTES # BLD AUTO: 1.32 K/UL — HIGH (ref 0–0.9)
MONOCYTES # BLD AUTO: 1.78 K/UL — HIGH (ref 0–0.9)
MONOCYTES # BLD AUTO: 1.83 K/UL — HIGH (ref 0–0.9)
MONOCYTES # BLD AUTO: 2 K/UL — HIGH (ref 0–0.9)
MONOCYTES # BLD AUTO: 2.2 K/UL — HIGH (ref 0–0.9)
MONOCYTES # BLD AUTO: 3.01 K/UL — HIGH (ref 0–0.9)
MONOCYTES # BLD AUTO: 3.48 K/UL — HIGH (ref 0–0.9)
MONOCYTES NFR BLD AUTO: 10 % — SIGNIFICANT CHANGE UP (ref 2–14)
MONOCYTES NFR BLD AUTO: 10.3 % — SIGNIFICANT CHANGE UP (ref 2–14)
MONOCYTES NFR BLD AUTO: 10.7 % — SIGNIFICANT CHANGE UP (ref 2–14)
MONOCYTES NFR BLD AUTO: 11.4 % — SIGNIFICANT CHANGE UP (ref 2–14)
MONOCYTES NFR BLD AUTO: 23.9 % — HIGH (ref 2–14)
MONOCYTES NFR BLD AUTO: 3.8 % — SIGNIFICANT CHANGE UP (ref 2–14)
MONOCYTES NFR BLD AUTO: 4.2 % — SIGNIFICANT CHANGE UP (ref 2–14)
MONOCYTES NFR BLD AUTO: 4.3 % — SIGNIFICANT CHANGE UP (ref 2–14)
MONOCYTES NFR BLD AUTO: 5.8 % — SIGNIFICANT CHANGE UP (ref 2–14)
MONOCYTES NFR BLD AUTO: 6 % — SIGNIFICANT CHANGE UP (ref 2–14)
MONOCYTES NFR BLD AUTO: 6.1 % — SIGNIFICANT CHANGE UP (ref 2–14)
MONOCYTES NFR BLD AUTO: 7 % — SIGNIFICANT CHANGE UP (ref 2–14)
MONOCYTES NFR BLD AUTO: 7 % — SIGNIFICANT CHANGE UP (ref 2–14)
MONOCYTES NFR BLD AUTO: 7.6 % — SIGNIFICANT CHANGE UP (ref 2–14)
MONOCYTES NFR BLD AUTO: 7.7 % — SIGNIFICANT CHANGE UP (ref 2–14)
MONOCYTES NFR BLD AUTO: 8 % — SIGNIFICANT CHANGE UP (ref 2–14)
MONOCYTES NFR BLD AUTO: 8 % — SIGNIFICANT CHANGE UP (ref 2–14)
MONOCYTES NFR BLD AUTO: 9 % — SIGNIFICANT CHANGE UP (ref 2–14)
MONOCYTES NFR BLD AUTO: 9.1 % — SIGNIFICANT CHANGE UP (ref 2–14)
MONOCYTES NFR BLD AUTO: 9.1 % — SIGNIFICANT CHANGE UP (ref 2–14)
MONOCYTES NFR BLD AUTO: 9.6 % — SIGNIFICANT CHANGE UP (ref 2–14)
MONOS+MACROS # FLD: 10 % — SIGNIFICANT CHANGE UP
MONOS+MACROS # FLD: 25 % — SIGNIFICANT CHANGE UP
MRSA PCR RESULT.: SIGNIFICANT CHANGE UP
MYELOCYTES NFR BLD: 1.7 % — HIGH (ref 0–0)
MYELOCYTES NFR BLD: 2.6 % — HIGH (ref 0–0)
MYELOCYTES NFR BLD: 3.5 % — HIGH (ref 0–0)
NEUTROPHILS # BLD AUTO: 1.55 K/UL — LOW (ref 1.8–7.4)
NEUTROPHILS # BLD AUTO: 1.6 K/UL — LOW (ref 1.8–7.4)
NEUTROPHILS # BLD AUTO: 10.1 K/UL — HIGH (ref 1.8–7.4)
NEUTROPHILS # BLD AUTO: 13 K/UL — HIGH (ref 1.8–7.4)
NEUTROPHILS # BLD AUTO: 14.77 K/UL — HIGH (ref 1.8–7.4)
NEUTROPHILS # BLD AUTO: 16.09 K/UL — HIGH (ref 1.8–7.4)
NEUTROPHILS # BLD AUTO: 2.28 K/UL — SIGNIFICANT CHANGE UP (ref 1.8–7.4)
NEUTROPHILS # BLD AUTO: 2.5 K/UL — SIGNIFICANT CHANGE UP (ref 1.8–7.4)
NEUTROPHILS # BLD AUTO: 2.6 K/UL — SIGNIFICANT CHANGE UP (ref 1.8–7.4)
NEUTROPHILS # BLD AUTO: 2.7 K/UL — SIGNIFICANT CHANGE UP (ref 1.8–7.4)
NEUTROPHILS # BLD AUTO: 2.89 K/UL — SIGNIFICANT CHANGE UP (ref 1.8–7.4)
NEUTROPHILS # BLD AUTO: 20.8 K/UL — HIGH (ref 1.8–7.4)
NEUTROPHILS # BLD AUTO: 21.61 K/UL — HIGH (ref 1.8–7.4)
NEUTROPHILS # BLD AUTO: 21.98 K/UL — HIGH (ref 1.8–7.4)
NEUTROPHILS # BLD AUTO: 23 K/UL — HIGH (ref 1.8–7.4)
NEUTROPHILS # BLD AUTO: 3.4 K/UL — SIGNIFICANT CHANGE UP (ref 1.8–7.4)
NEUTROPHILS # BLD AUTO: 4 K/UL — SIGNIFICANT CHANGE UP (ref 1.8–7.4)
NEUTROPHILS # BLD AUTO: 4.3 K/UL — SIGNIFICANT CHANGE UP (ref 1.8–7.4)
NEUTROPHILS # BLD AUTO: 5.66 K/UL — SIGNIFICANT CHANGE UP (ref 1.8–7.4)
NEUTROPHILS # BLD AUTO: 5.8 K/UL — SIGNIFICANT CHANGE UP (ref 1.8–7.4)
NEUTROPHILS # BLD AUTO: 9.42 K/UL — HIGH (ref 1.8–7.4)
NEUTROPHILS NFR BLD AUTO: 44.4 % — SIGNIFICANT CHANGE UP (ref 43–77)
NEUTROPHILS NFR BLD AUTO: 52.3 % — SIGNIFICANT CHANGE UP (ref 43–77)
NEUTROPHILS NFR BLD AUTO: 54 % — SIGNIFICANT CHANGE UP (ref 43–77)
NEUTROPHILS NFR BLD AUTO: 58.7 % — SIGNIFICANT CHANGE UP (ref 43–77)
NEUTROPHILS NFR BLD AUTO: 61.5 % — SIGNIFICANT CHANGE UP (ref 43–77)
NEUTROPHILS NFR BLD AUTO: 64.6 % — SIGNIFICANT CHANGE UP (ref 43–77)
NEUTROPHILS NFR BLD AUTO: 66.1 % — SIGNIFICANT CHANGE UP (ref 43–77)
NEUTROPHILS NFR BLD AUTO: 68 % — SIGNIFICANT CHANGE UP (ref 43–77)
NEUTROPHILS NFR BLD AUTO: 69.3 % — SIGNIFICANT CHANGE UP (ref 43–77)
NEUTROPHILS NFR BLD AUTO: 70.1 % — SIGNIFICANT CHANGE UP (ref 43–77)
NEUTROPHILS NFR BLD AUTO: 72.4 % — SIGNIFICANT CHANGE UP (ref 43–77)
NEUTROPHILS NFR BLD AUTO: 73.4 % — SIGNIFICANT CHANGE UP (ref 43–77)
NEUTROPHILS NFR BLD AUTO: 77 % — SIGNIFICANT CHANGE UP (ref 43–77)
NEUTROPHILS NFR BLD AUTO: 82.4 % — HIGH (ref 43–77)
NEUTROPHILS NFR BLD AUTO: 82.6 % — HIGH (ref 43–77)
NEUTROPHILS NFR BLD AUTO: 84.2 % — HIGH (ref 43–77)
NEUTROPHILS NFR BLD AUTO: 84.9 % — HIGH (ref 43–77)
NEUTROPHILS NFR BLD AUTO: 85.1 % — HIGH (ref 43–77)
NEUTROPHILS NFR BLD AUTO: 86 % — HIGH (ref 43–77)
NEUTROPHILS NFR BLD AUTO: 88.1 % — HIGH (ref 43–77)
NEUTROPHILS NFR BLD AUTO: 88.8 % — HIGH (ref 43–77)
NEUTS BAND # BLD: 0.9 % — SIGNIFICANT CHANGE UP (ref 0–8)
NEUTS BAND # BLD: 0.9 % — SIGNIFICANT CHANGE UP (ref 0–8)
NEUTS BAND # BLD: 1.7 % — SIGNIFICANT CHANGE UP (ref 0–8)
NEUTS BAND # BLD: 15 % — HIGH (ref 0–8)
NITRITE UR-MCNC: NEGATIVE — SIGNIFICANT CHANGE UP
NITRITE URINE: NEGATIVE
NON-GYNECOLOGICAL CYTOLOGY STUDY: SIGNIFICANT CHANGE UP
NRBC # BLD: 0 /100 WBCS — SIGNIFICANT CHANGE UP (ref 0–0)
ORGANISM # SPEC MICROSCOPIC CNT: SIGNIFICANT CHANGE UP
ORGANISM # SPEC MICROSCOPIC CNT: SIGNIFICANT CHANGE UP
OSMOLALITY SERPL: 278 MOSMOL/KG — LOW (ref 280–301)
OSMOLALITY UR: 526 MOSM/KG — SIGNIFICANT CHANGE UP (ref 50–1200)
OSMOLALITY UR: 631 MOSM/KG — SIGNIFICANT CHANGE UP (ref 50–1200)
OVALOCYTES BLD QL SMEAR: SLIGHT — SIGNIFICANT CHANGE UP
PCO2 BLDV: 51 MMHG — HIGH (ref 35–50)
PCO2 BLDV: 53 MMHG — HIGH (ref 35–50)
PH BLDV: 7.38 — SIGNIFICANT CHANGE UP (ref 7.35–7.45)
PH BLDV: 7.42 — SIGNIFICANT CHANGE UP (ref 7.35–7.45)
PH UR: 5.5 — SIGNIFICANT CHANGE UP (ref 5–8)
PH UR: 5.5 — SIGNIFICANT CHANGE UP (ref 5–8)
PH UR: 6.5 — SIGNIFICANT CHANGE UP (ref 5–8)
PH URINE: 5.5
PHOSPHATE SERPL-MCNC: 2.6 MG/DL — SIGNIFICANT CHANGE UP (ref 2.5–4.5)
PHOSPHATE SERPL-MCNC: 3.3 MG/DL — SIGNIFICANT CHANGE UP (ref 2.5–4.5)
PHOSPHATE SERPL-MCNC: 3.4 MG/DL — SIGNIFICANT CHANGE UP (ref 2.5–4.5)
PHOSPHATE SERPL-MCNC: 3.6 MG/DL — SIGNIFICANT CHANGE UP (ref 2.5–4.5)
PHOSPHATE SERPL-MCNC: 3.6 MG/DL — SIGNIFICANT CHANGE UP (ref 2.5–4.5)
PLAT MORPH BLD: NORMAL — SIGNIFICANT CHANGE UP
PLATELET # BLD AUTO: 156 K/UL — SIGNIFICANT CHANGE UP (ref 150–400)
PLATELET # BLD AUTO: 158 K/UL — SIGNIFICANT CHANGE UP (ref 150–400)
PLATELET # BLD AUTO: 160 K/UL — SIGNIFICANT CHANGE UP (ref 150–400)
PLATELET # BLD AUTO: 164 K/UL — SIGNIFICANT CHANGE UP (ref 150–400)
PLATELET # BLD AUTO: 173 K/UL — SIGNIFICANT CHANGE UP (ref 150–400)
PLATELET # BLD AUTO: 176 K/UL — SIGNIFICANT CHANGE UP (ref 150–400)
PLATELET # BLD AUTO: 178 K/UL — SIGNIFICANT CHANGE UP (ref 150–400)
PLATELET # BLD AUTO: 178 K/UL — SIGNIFICANT CHANGE UP (ref 150–400)
PLATELET # BLD AUTO: 182 K/UL — SIGNIFICANT CHANGE UP (ref 150–400)
PLATELET # BLD AUTO: 186 K/UL — SIGNIFICANT CHANGE UP (ref 150–400)
PLATELET # BLD AUTO: 199 K/UL — SIGNIFICANT CHANGE UP (ref 150–400)
PLATELET # BLD AUTO: 200 K/UL — SIGNIFICANT CHANGE UP (ref 150–400)
PLATELET # BLD AUTO: 202 K/UL — SIGNIFICANT CHANGE UP (ref 150–400)
PLATELET # BLD AUTO: 209 K/UL — SIGNIFICANT CHANGE UP (ref 150–400)
PLATELET # BLD AUTO: 230 K/UL — SIGNIFICANT CHANGE UP (ref 150–400)
PLATELET # BLD AUTO: 237 K/UL — SIGNIFICANT CHANGE UP (ref 150–400)
PLATELET # BLD AUTO: 274 K/UL — SIGNIFICANT CHANGE UP (ref 150–400)
PLATELET # BLD AUTO: 323 K/UL — SIGNIFICANT CHANGE UP (ref 150–400)
PLATELET # BLD AUTO: 415 K/UL — HIGH (ref 150–400)
PLATELET # BLD AUTO: 431 K/UL — HIGH (ref 150–400)
PLATELET # BLD AUTO: 443 K/UL — HIGH (ref 150–400)
PLATELET # BLD AUTO: 469 K/UL — HIGH (ref 150–400)
PLATELET # BLD AUTO: 469 K/UL — HIGH (ref 150–400)
PLATELET # BLD AUTO: 518 K/UL — HIGH (ref 150–400)
PLATELET # BLD AUTO: 523 K/UL — HIGH (ref 150–400)
PLATELET # BLD AUTO: 549 K/UL — HIGH (ref 150–400)
PLATELET # BLD AUTO: 563 K/UL — HIGH (ref 150–400)
PLATELET # BLD AUTO: 569 K/UL — HIGH (ref 150–400)
PLATELET # BLD AUTO: 577 K/UL — HIGH (ref 150–400)
PLATELET # BLD AUTO: 584 K/UL — HIGH (ref 150–400)
PLATELET # BLD AUTO: 600 K/UL — HIGH (ref 150–400)
PLATELET # BLD AUTO: 631 K/UL — HIGH (ref 150–400)
PLATELET # BLD AUTO: 691 K/UL — HIGH (ref 150–400)
PLATELET # BLD AUTO: 70 K/UL — LOW (ref 150–400)
PLATELET # BLD AUTO: 751 K/UL — HIGH (ref 150–400)
PLATELET # BLD AUTO: 905 K/UL — HIGH (ref 150–400)
PLATELET # BLD AUTO: 914 K/UL — HIGH (ref 150–400)
PO2 BLDV: 36 MMHG — SIGNIFICANT CHANGE UP (ref 25–45)
PO2 BLDV: <20 MMHG — LOW (ref 25–45)
POIKILOCYTOSIS BLD QL AUTO: SLIGHT — SIGNIFICANT CHANGE UP
POLYCHROMASIA BLD QL SMEAR: SLIGHT — SIGNIFICANT CHANGE UP
POTASSIUM BLDV-SCNC: 4.2 MMOL/L — SIGNIFICANT CHANGE UP (ref 3.5–5.3)
POTASSIUM SERPL-MCNC: 3.3 MMOL/L — LOW (ref 3.5–5.3)
POTASSIUM SERPL-MCNC: 3.3 MMOL/L — LOW (ref 3.5–5.3)
POTASSIUM SERPL-MCNC: 3.4 MMOL/L — LOW (ref 3.5–5.3)
POTASSIUM SERPL-MCNC: 3.5 MMOL/L — SIGNIFICANT CHANGE UP (ref 3.5–5.3)
POTASSIUM SERPL-MCNC: 3.6 MMOL/L — SIGNIFICANT CHANGE UP (ref 3.5–5.3)
POTASSIUM SERPL-MCNC: 3.6 MMOL/L — SIGNIFICANT CHANGE UP (ref 3.5–5.3)
POTASSIUM SERPL-MCNC: 3.7 MMOL/L — SIGNIFICANT CHANGE UP (ref 3.5–5.3)
POTASSIUM SERPL-MCNC: 3.7 MMOL/L — SIGNIFICANT CHANGE UP (ref 3.5–5.3)
POTASSIUM SERPL-MCNC: 3.8 MMOL/L — SIGNIFICANT CHANGE UP (ref 3.5–5.3)
POTASSIUM SERPL-MCNC: 3.8 MMOL/L — SIGNIFICANT CHANGE UP (ref 3.5–5.3)
POTASSIUM SERPL-MCNC: 4 MMOL/L — SIGNIFICANT CHANGE UP (ref 3.5–5.3)
POTASSIUM SERPL-MCNC: 4.1 MMOL/L — SIGNIFICANT CHANGE UP (ref 3.5–5.3)
POTASSIUM SERPL-MCNC: 4.2 MMOL/L — SIGNIFICANT CHANGE UP (ref 3.5–5.3)
POTASSIUM SERPL-MCNC: 4.3 MMOL/L — SIGNIFICANT CHANGE UP (ref 3.5–5.3)
POTASSIUM SERPL-MCNC: 4.6 MMOL/L — SIGNIFICANT CHANGE UP (ref 3.5–5.3)
POTASSIUM SERPL-MCNC: 4.7 MMOL/L — SIGNIFICANT CHANGE UP (ref 3.5–5.3)
POTASSIUM SERPL-MCNC: 5.4 MMOL/L — HIGH (ref 3.5–5.3)
POTASSIUM SERPL-SCNC: 3.3 MMOL/L — LOW (ref 3.5–5.3)
POTASSIUM SERPL-SCNC: 3.3 MMOL/L — LOW (ref 3.5–5.3)
POTASSIUM SERPL-SCNC: 3.4 MMOL/L — LOW (ref 3.5–5.3)
POTASSIUM SERPL-SCNC: 3.5 MMOL/L — SIGNIFICANT CHANGE UP (ref 3.5–5.3)
POTASSIUM SERPL-SCNC: 3.6 MMOL/L — SIGNIFICANT CHANGE UP (ref 3.5–5.3)
POTASSIUM SERPL-SCNC: 3.6 MMOL/L — SIGNIFICANT CHANGE UP (ref 3.5–5.3)
POTASSIUM SERPL-SCNC: 3.7 MMOL/L — SIGNIFICANT CHANGE UP (ref 3.5–5.3)
POTASSIUM SERPL-SCNC: 3.7 MMOL/L — SIGNIFICANT CHANGE UP (ref 3.5–5.3)
POTASSIUM SERPL-SCNC: 3.8 MMOL/L — SIGNIFICANT CHANGE UP (ref 3.5–5.3)
POTASSIUM SERPL-SCNC: 3.8 MMOL/L — SIGNIFICANT CHANGE UP (ref 3.5–5.3)
POTASSIUM SERPL-SCNC: 4 MMOL/L — SIGNIFICANT CHANGE UP (ref 3.5–5.3)
POTASSIUM SERPL-SCNC: 4.1 MMOL/L — SIGNIFICANT CHANGE UP (ref 3.5–5.3)
POTASSIUM SERPL-SCNC: 4.2 MMOL/L
POTASSIUM SERPL-SCNC: 4.2 MMOL/L — SIGNIFICANT CHANGE UP (ref 3.5–5.3)
POTASSIUM SERPL-SCNC: 4.3 MMOL/L — SIGNIFICANT CHANGE UP (ref 3.5–5.3)
POTASSIUM SERPL-SCNC: 4.5 MMOL/L
POTASSIUM SERPL-SCNC: 4.6 MMOL/L — SIGNIFICANT CHANGE UP (ref 3.5–5.3)
POTASSIUM SERPL-SCNC: 4.7 MMOL/L — SIGNIFICANT CHANGE UP (ref 3.5–5.3)
POTASSIUM SERPL-SCNC: 5.4 MMOL/L — HIGH (ref 3.5–5.3)
POTASSIUM SERPL-SCNC: 5.5 MMOL/L
PROT FLD-MCNC: 2.8 G/DL — SIGNIFICANT CHANGE UP
PROT FLD-MCNC: 3.1 G/DL — SIGNIFICANT CHANGE UP
PROT SERPL-MCNC: 6 G/DL — SIGNIFICANT CHANGE UP (ref 6–8.3)
PROT SERPL-MCNC: 6.1 G/DL — SIGNIFICANT CHANGE UP (ref 6–8.3)
PROT SERPL-MCNC: 6.2 G/DL — SIGNIFICANT CHANGE UP (ref 6–8.3)
PROT SERPL-MCNC: 6.2 G/DL — SIGNIFICANT CHANGE UP (ref 6–8.3)
PROT SERPL-MCNC: 6.3 G/DL — SIGNIFICANT CHANGE UP (ref 6–8.3)
PROT SERPL-MCNC: 6.4 G/DL — SIGNIFICANT CHANGE UP (ref 6–8.3)
PROT SERPL-MCNC: 6.6 G/DL — SIGNIFICANT CHANGE UP (ref 6–8.3)
PROT SERPL-MCNC: 6.8 G/DL
PROT SERPL-MCNC: 6.8 G/DL — SIGNIFICANT CHANGE UP (ref 6–8.3)
PROT SERPL-MCNC: 7 G/DL — SIGNIFICANT CHANGE UP (ref 6–8.3)
PROT SERPL-MCNC: 7.2 G/DL — SIGNIFICANT CHANGE UP (ref 6–8.3)
PROT SERPL-MCNC: 7.3 G/DL — SIGNIFICANT CHANGE UP (ref 6–8.3)
PROT SERPL-MCNC: 7.4 G/DL — SIGNIFICANT CHANGE UP (ref 6–8.3)
PROT SERPL-MCNC: 7.5 G/DL — SIGNIFICANT CHANGE UP (ref 6–8.3)
PROT SERPL-MCNC: 7.6 G/DL
PROT SERPL-MCNC: 7.6 G/DL — SIGNIFICANT CHANGE UP (ref 6–8.3)
PROT SERPL-MCNC: 7.7 G/DL — SIGNIFICANT CHANGE UP (ref 6–8.3)
PROT SERPL-MCNC: 7.8 G/DL — SIGNIFICANT CHANGE UP (ref 6–8.3)
PROT SERPL-MCNC: 8.1 G/DL — SIGNIFICANT CHANGE UP (ref 6–8.3)
PROT SERPL-MCNC: 8.2 G/DL — SIGNIFICANT CHANGE UP (ref 6–8.3)
PROT SERPL-MCNC: 8.5 G/DL
PROT UR-MCNC: ABNORMAL
PROT UR-MCNC: NEGATIVE — SIGNIFICANT CHANGE UP
PROT UR-MCNC: NEGATIVE — SIGNIFICANT CHANGE UP
PROTEIN URINE: ABNORMAL
PROTHROM AB SERPL-ACNC: 12.1 SEC — SIGNIFICANT CHANGE UP (ref 10–12.9)
PROTHROM AB SERPL-ACNC: 12.2 SEC — SIGNIFICANT CHANGE UP (ref 10–12.9)
PROTHROM AB SERPL-ACNC: 12.3 SEC — SIGNIFICANT CHANGE UP (ref 10–12.9)
PROTHROM AB SERPL-ACNC: 12.4 SEC — SIGNIFICANT CHANGE UP (ref 10–12.9)
PROTHROM AB SERPL-ACNC: 12.6 SEC — SIGNIFICANT CHANGE UP (ref 10–12.9)
PROTHROM AB SERPL-ACNC: 13.5 SEC — HIGH (ref 10–12.9)
PROTHROM AB SERPL-ACNC: 14.7 SEC — HIGH (ref 10–12.9)
PROTHROM AB SERPL-ACNC: 15.4 SEC — HIGH (ref 10–13.1)
PROTHROM AB SERPL-ACNC: 15.7 SEC — HIGH (ref 10–12.9)
PROTHROM AB SERPL-ACNC: 15.8 SEC — HIGH (ref 10–12.9)
PROTHROM AB SERPL-ACNC: 16.2 SEC — HIGH (ref 10–13.1)
PT BLD: 13.1 SEC
RAPID RVP RESULT: SIGNIFICANT CHANGE UP
RAPID RVP RESULT: SIGNIFICANT CHANGE UP
RBC # BLD: 3.34 M/UL — LOW (ref 4.2–5.8)
RBC # BLD: 3.43 M/UL — LOW (ref 4.2–5.8)
RBC # BLD: 3.5 M/UL — LOW (ref 4.2–5.8)
RBC # BLD: 3.58 M/UL — LOW (ref 4.2–5.8)
RBC # BLD: 3.58 M/UL — LOW (ref 4.2–5.8)
RBC # BLD: 3.59 M/UL — LOW (ref 4.2–5.8)
RBC # BLD: 3.62 M/UL — LOW (ref 4.2–5.8)
RBC # BLD: 3.7 M/UL — LOW (ref 4.2–5.8)
RBC # BLD: 3.72 M/UL — LOW (ref 4.2–5.8)
RBC # BLD: 3.74 M/UL — LOW (ref 4.2–5.8)
RBC # BLD: 3.78 M/UL — LOW (ref 4.2–5.8)
RBC # BLD: 3.78 M/UL — LOW (ref 4.2–5.8)
RBC # BLD: 3.8 M/UL — LOW (ref 4.2–5.8)
RBC # BLD: 3.8 M/UL — LOW (ref 4.2–5.8)
RBC # BLD: 3.86 M/UL — LOW (ref 4.2–5.8)
RBC # BLD: 3.87 M/UL — LOW (ref 4.2–5.8)
RBC # BLD: 3.87 M/UL — LOW (ref 4.2–5.8)
RBC # BLD: 3.89 M/UL — LOW (ref 4.2–5.8)
RBC # BLD: 3.89 M/UL — LOW (ref 4.2–5.8)
RBC # BLD: 3.9 M/UL — LOW (ref 4.2–5.8)
RBC # BLD: 3.94 M/UL — LOW (ref 4.2–5.8)
RBC # BLD: 3.96 M/UL — LOW (ref 4.2–5.8)
RBC # BLD: 3.97 M/UL — LOW (ref 4.2–5.8)
RBC # BLD: 3.98 M/UL — LOW (ref 4.2–5.8)
RBC # BLD: 4.02 M/UL — LOW (ref 4.2–5.8)
RBC # BLD: 4.03 M/UL — LOW (ref 4.2–5.8)
RBC # BLD: 4.05 M/UL — LOW (ref 4.2–5.8)
RBC # BLD: 4.05 M/UL — LOW (ref 4.2–5.8)
RBC # BLD: 4.11 M/UL — LOW (ref 4.2–5.8)
RBC # BLD: 4.14 M/UL — LOW (ref 4.2–5.8)
RBC # BLD: 4.15 M/UL — LOW (ref 4.2–5.8)
RBC # BLD: 4.2 M/UL — SIGNIFICANT CHANGE UP (ref 4.2–5.8)
RBC # BLD: 4.24 M/UL — SIGNIFICANT CHANGE UP (ref 4.2–5.8)
RBC # BLD: 4.31 M/UL — SIGNIFICANT CHANGE UP (ref 4.2–5.8)
RBC # BLD: 4.35 M/UL — SIGNIFICANT CHANGE UP (ref 4.2–5.8)
RBC # BLD: 4.37 M/UL — SIGNIFICANT CHANGE UP (ref 4.2–5.8)
RBC # BLD: 4.49 M/UL — SIGNIFICANT CHANGE UP (ref 4.2–5.8)
RBC # FLD: 13.5 % — SIGNIFICANT CHANGE UP (ref 10.3–14.5)
RBC # FLD: 13.8 % — SIGNIFICANT CHANGE UP (ref 10.3–14.5)
RBC # FLD: 13.9 % — SIGNIFICANT CHANGE UP (ref 10.3–14.5)
RBC # FLD: 14 % — SIGNIFICANT CHANGE UP (ref 10.3–14.5)
RBC # FLD: 14.1 % — SIGNIFICANT CHANGE UP (ref 10.3–14.5)
RBC # FLD: 14.1 % — SIGNIFICANT CHANGE UP (ref 10.3–14.5)
RBC # FLD: 14.3 % — SIGNIFICANT CHANGE UP (ref 10.3–14.5)
RBC # FLD: 14.5 % — SIGNIFICANT CHANGE UP (ref 10.3–14.5)
RBC # FLD: 14.5 % — SIGNIFICANT CHANGE UP (ref 10.3–14.5)
RBC # FLD: 14.6 % — HIGH (ref 10.3–14.5)
RBC # FLD: 14.6 % — HIGH (ref 10.3–14.5)
RBC # FLD: 14.7 % — HIGH (ref 10.3–14.5)
RBC # FLD: 14.7 % — HIGH (ref 10.3–14.5)
RBC # FLD: 14.8 % — HIGH (ref 10.3–14.5)
RBC # FLD: 14.8 % — HIGH (ref 10.3–14.5)
RBC # FLD: 14.9 % — HIGH (ref 10.3–14.5)
RBC # FLD: 15 % — HIGH (ref 10.3–14.5)
RBC # FLD: 15.6 % — HIGH (ref 10.3–14.5)
RBC # FLD: 16.2 % — HIGH (ref 10.3–14.5)
RBC # FLD: 16.6 % — HIGH (ref 10.3–14.5)
RBC # FLD: 16.8 % — HIGH (ref 10.3–14.5)
RBC # FLD: 16.8 % — HIGH (ref 10.3–14.5)
RBC # FLD: 17 % — HIGH (ref 10.3–14.5)
RBC # FLD: 17.2 % — HIGH (ref 10.3–14.5)
RBC # FLD: 17.4 % — HIGH (ref 10.3–14.5)
RBC # FLD: 17.4 % — HIGH (ref 10.3–14.5)
RBC # FLD: 17.8 % — HIGH (ref 10.3–14.5)
RBC # FLD: 18.1 % — HIGH (ref 10.3–14.5)
RBC # FLD: 18.7 % — HIGH (ref 10.3–14.5)
RBC # FLD: 19 % — HIGH (ref 10.3–14.5)
RBC # FLD: 19.4 % — HIGH (ref 10.3–14.5)
RBC # FLD: 20 % — HIGH (ref 10.3–14.5)
RBC # FLD: 20.5 % — HIGH (ref 10.3–14.5)
RBC # FLD: 21.5 % — HIGH (ref 10.3–14.5)
RBC # FLD: 23.8 % — HIGH (ref 10.3–14.5)
RBC BLD AUTO: ABNORMAL
RBC BLD AUTO: NORMAL — SIGNIFICANT CHANGE UP
RBC BLD AUTO: SIGNIFICANT CHANGE UP
RBC CASTS # UR COMP ASSIST: 0 /HPF — SIGNIFICANT CHANGE UP (ref 0–4)
RBC CASTS # UR COMP ASSIST: 12 /HPF — HIGH (ref 0–4)
RCV VOL RI: 1110 /UL — HIGH (ref 0–5)
RCV VOL RI: 730 /UL — HIGH (ref 0–5)
RH IG SCN BLD-IMP: POSITIVE — SIGNIFICANT CHANGE UP
S AUREUS DNA NOSE QL NAA+PROBE: SIGNIFICANT CHANGE UP
SAO2 % BLDV: 20 % — LOW (ref 67–88)
SAO2 % BLDV: 60 % — LOW (ref 67–88)
SCHISTOCYTES BLD QL AUTO: SLIGHT — SIGNIFICANT CHANGE UP
SODIUM SERPL-SCNC: 127 MMOL/L — LOW (ref 135–145)
SODIUM SERPL-SCNC: 127 MMOL/L — LOW (ref 135–145)
SODIUM SERPL-SCNC: 128 MMOL/L — LOW (ref 135–145)
SODIUM SERPL-SCNC: 129 MMOL/L — LOW (ref 135–145)
SODIUM SERPL-SCNC: 130 MMOL/L
SODIUM SERPL-SCNC: 130 MMOL/L — LOW (ref 135–145)
SODIUM SERPL-SCNC: 131 MMOL/L — LOW (ref 135–145)
SODIUM SERPL-SCNC: 132 MMOL/L — LOW (ref 135–145)
SODIUM SERPL-SCNC: 133 MMOL/L — LOW (ref 135–145)
SODIUM SERPL-SCNC: 134 MMOL/L
SODIUM SERPL-SCNC: 134 MMOL/L
SODIUM SERPL-SCNC: 134 MMOL/L — LOW (ref 135–145)
SODIUM SERPL-SCNC: 134 MMOL/L — LOW (ref 135–145)
SODIUM SERPL-SCNC: 135 MMOL/L — SIGNIFICANT CHANGE UP (ref 135–145)
SODIUM SERPL-SCNC: 135 MMOL/L — SIGNIFICANT CHANGE UP (ref 135–145)
SODIUM SERPL-SCNC: 136 MMOL/L — SIGNIFICANT CHANGE UP (ref 135–145)
SODIUM SERPL-SCNC: 137 MMOL/L — SIGNIFICANT CHANGE UP (ref 135–145)
SODIUM SERPL-SCNC: 138 MMOL/L — SIGNIFICANT CHANGE UP (ref 135–145)
SODIUM SERPL-SCNC: 139 MMOL/L — SIGNIFICANT CHANGE UP (ref 135–145)
SODIUM SERPL-SCNC: 139 MMOL/L — SIGNIFICANT CHANGE UP (ref 135–145)
SODIUM SERPL-SCNC: 140 MMOL/L — SIGNIFICANT CHANGE UP (ref 135–145)
SODIUM SERPL-SCNC: 140 MMOL/L — SIGNIFICANT CHANGE UP (ref 135–145)
SODIUM UR-SCNC: <35 MMOL/L — SIGNIFICANT CHANGE UP
SP GR SPEC: 1.02 — SIGNIFICANT CHANGE UP (ref 1.01–1.02)
SP GR SPEC: 1.02 — SIGNIFICANT CHANGE UP (ref 1.01–1.02)
SP GR SPEC: 1.03 — HIGH (ref 1.01–1.02)
SPECIFIC GRAVITY URINE: 1.02
SPECIMEN SOURCE: SIGNIFICANT CHANGE UP
TOTAL CHOLESTEROL/HDL RATIO MEASUREMENT: 4.7 RATIO — SIGNIFICANT CHANGE UP (ref 3.4–9.6)
TOTAL NUCLEATED CELL COUNT, BODY FLUID: 125 /UL — HIGH (ref 0–5)
TOTAL NUCLEATED CELL COUNT, BODY FLUID: 1370 /UL — HIGH (ref 0–5)
TRIGL SERPL-MCNC: 129 MG/DL — SIGNIFICANT CHANGE UP (ref 10–149)
TROPONIN T, HIGH SENSITIVITY RESULT: 6 NG/L — SIGNIFICANT CHANGE UP (ref 0–51)
TSH SERPL-MCNC: 0.7 UIU/ML — SIGNIFICANT CHANGE UP (ref 0.27–4.2)
TUBE TYPE: SIGNIFICANT CHANGE UP
TUBE TYPE: SIGNIFICANT CHANGE UP
URATE CRY FLD QL MICRO: ABNORMAL
UROBILINOGEN FLD QL: NEGATIVE — SIGNIFICANT CHANGE UP
UROBILINOGEN URINE: NORMAL
VANCOMYCIN TROUGH SERPL-MCNC: 16.4 UG/ML — SIGNIFICANT CHANGE UP (ref 10–20)
VANCOMYCIN TROUGH SERPL-MCNC: 9.8 UG/ML — LOW (ref 10–20)
WBC # BLD: 11.9 K/UL — HIGH (ref 3.8–10.5)
WBC # BLD: 14.58 K/UL — HIGH (ref 3.8–10.5)
WBC # BLD: 14.6 K/UL — HIGH (ref 3.8–10.5)
WBC # BLD: 16.54 K/UL — HIGH (ref 3.8–10.5)
WBC # BLD: 17.27 K/UL — HIGH (ref 3.8–10.5)
WBC # BLD: 17.97 K/UL — HIGH (ref 3.8–10.5)
WBC # BLD: 19.09 K/UL — HIGH (ref 3.8–10.5)
WBC # BLD: 2.2 K/UL — LOW (ref 3.8–10.5)
WBC # BLD: 21.43 K/UL — HIGH (ref 3.8–10.5)
WBC # BLD: 23.52 K/UL — HIGH (ref 3.8–10.5)
WBC # BLD: 23.9 K/UL — HIGH (ref 3.8–10.5)
WBC # BLD: 25.39 K/UL — HIGH (ref 3.8–10.5)
WBC # BLD: 25.8 K/UL — HIGH (ref 3.8–10.5)
WBC # BLD: 26.39 K/UL — HIGH (ref 3.8–10.5)
WBC # BLD: 27.64 K/UL — HIGH (ref 3.8–10.5)
WBC # BLD: 27.7 K/UL — HIGH (ref 3.8–10.5)
WBC # BLD: 28.57 K/UL — HIGH (ref 3.8–10.5)
WBC # BLD: 3.49 K/UL — LOW (ref 3.8–10.5)
WBC # BLD: 3.7 K/UL — LOW (ref 3.8–10.5)
WBC # BLD: 3.88 K/UL — SIGNIFICANT CHANGE UP (ref 3.8–10.5)
WBC # BLD: 30.08 K/UL — HIGH (ref 3.8–10.5)
WBC # BLD: 31.01 K/UL — HIGH (ref 3.8–10.5)
WBC # BLD: 33.84 K/UL — HIGH (ref 3.8–10.5)
WBC # BLD: 4.2 K/UL — SIGNIFICANT CHANGE UP (ref 3.8–10.5)
WBC # BLD: 4.7 K/UL — SIGNIFICANT CHANGE UP (ref 3.8–10.5)
WBC # BLD: 40.49 K/UL — CRITICAL HIGH (ref 3.8–10.5)
WBC # BLD: 5 K/UL — SIGNIFICANT CHANGE UP (ref 3.8–10.5)
WBC # BLD: 5.1 K/UL — SIGNIFICANT CHANGE UP (ref 3.8–10.5)
WBC # BLD: 5.52 K/UL — SIGNIFICANT CHANGE UP (ref 3.8–10.5)
WBC # BLD: 5.7 K/UL — SIGNIFICANT CHANGE UP (ref 3.8–10.5)
WBC # BLD: 5.8 K/UL — SIGNIFICANT CHANGE UP (ref 3.8–10.5)
WBC # BLD: 6 K/UL — SIGNIFICANT CHANGE UP (ref 3.8–10.5)
WBC # BLD: 6 K/UL — SIGNIFICANT CHANGE UP (ref 3.8–10.5)
WBC # BLD: 6.1 K/UL — SIGNIFICANT CHANGE UP (ref 3.8–10.5)
WBC # BLD: 6.6 K/UL — SIGNIFICANT CHANGE UP (ref 3.8–10.5)
WBC # BLD: 6.6 K/UL — SIGNIFICANT CHANGE UP (ref 3.8–10.5)
WBC # BLD: 8.07 K/UL — SIGNIFICANT CHANGE UP (ref 3.8–10.5)
WBC # FLD AUTO: 11.9 K/UL — HIGH (ref 3.8–10.5)
WBC # FLD AUTO: 14.58 K/UL — HIGH (ref 3.8–10.5)
WBC # FLD AUTO: 14.6 K/UL — HIGH (ref 3.8–10.5)
WBC # FLD AUTO: 16.54 K/UL — HIGH (ref 3.8–10.5)
WBC # FLD AUTO: 17.27 K/UL — HIGH (ref 3.8–10.5)
WBC # FLD AUTO: 17.97 K/UL — HIGH (ref 3.8–10.5)
WBC # FLD AUTO: 19.09 K/UL — HIGH (ref 3.8–10.5)
WBC # FLD AUTO: 2.2 K/UL — LOW (ref 3.8–10.5)
WBC # FLD AUTO: 21.43 K/UL — HIGH (ref 3.8–10.5)
WBC # FLD AUTO: 23.52 K/UL — HIGH (ref 3.8–10.5)
WBC # FLD AUTO: 23.9 K/UL — HIGH (ref 3.8–10.5)
WBC # FLD AUTO: 25.39 K/UL — HIGH (ref 3.8–10.5)
WBC # FLD AUTO: 25.8 K/UL — HIGH (ref 3.8–10.5)
WBC # FLD AUTO: 26.39 K/UL — HIGH (ref 3.8–10.5)
WBC # FLD AUTO: 27.64 K/UL — HIGH (ref 3.8–10.5)
WBC # FLD AUTO: 27.7 K/UL — HIGH (ref 3.8–10.5)
WBC # FLD AUTO: 28.57 K/UL — HIGH (ref 3.8–10.5)
WBC # FLD AUTO: 3.49 K/UL — LOW (ref 3.8–10.5)
WBC # FLD AUTO: 3.7 K/UL — LOW (ref 3.8–10.5)
WBC # FLD AUTO: 3.88 K/UL — SIGNIFICANT CHANGE UP (ref 3.8–10.5)
WBC # FLD AUTO: 30.08 K/UL — HIGH (ref 3.8–10.5)
WBC # FLD AUTO: 31.01 K/UL — HIGH (ref 3.8–10.5)
WBC # FLD AUTO: 33.84 K/UL — HIGH (ref 3.8–10.5)
WBC # FLD AUTO: 4.2 K/UL — SIGNIFICANT CHANGE UP (ref 3.8–10.5)
WBC # FLD AUTO: 4.7 K/UL — SIGNIFICANT CHANGE UP (ref 3.8–10.5)
WBC # FLD AUTO: 40.49 K/UL — CRITICAL HIGH (ref 3.8–10.5)
WBC # FLD AUTO: 5 K/UL — SIGNIFICANT CHANGE UP (ref 3.8–10.5)
WBC # FLD AUTO: 5.1 K/UL — SIGNIFICANT CHANGE UP (ref 3.8–10.5)
WBC # FLD AUTO: 5.52 K/UL — SIGNIFICANT CHANGE UP (ref 3.8–10.5)
WBC # FLD AUTO: 5.7 K/UL — SIGNIFICANT CHANGE UP (ref 3.8–10.5)
WBC # FLD AUTO: 5.8 K/UL — SIGNIFICANT CHANGE UP (ref 3.8–10.5)
WBC # FLD AUTO: 6 K/UL — SIGNIFICANT CHANGE UP (ref 3.8–10.5)
WBC # FLD AUTO: 6 K/UL — SIGNIFICANT CHANGE UP (ref 3.8–10.5)
WBC # FLD AUTO: 6.1 K/UL — SIGNIFICANT CHANGE UP (ref 3.8–10.5)
WBC # FLD AUTO: 6.6 K/UL — SIGNIFICANT CHANGE UP (ref 3.8–10.5)
WBC # FLD AUTO: 6.6 K/UL — SIGNIFICANT CHANGE UP (ref 3.8–10.5)
WBC # FLD AUTO: 8.07 K/UL — SIGNIFICANT CHANGE UP (ref 3.8–10.5)
WBC UR QL: 1 /HPF — SIGNIFICANT CHANGE UP (ref 0–5)
WBC UR QL: 2 /HPF — SIGNIFICANT CHANGE UP (ref 0–5)

## 2019-01-01 PROCEDURE — 99223 1ST HOSP IP/OBS HIGH 75: CPT

## 2019-01-01 PROCEDURE — 82105 ALPHA-FETOPROTEIN SERUM: CPT

## 2019-01-01 PROCEDURE — 82435 ASSAY OF BLOOD CHLORIDE: CPT

## 2019-01-01 PROCEDURE — 83930 ASSAY OF BLOOD OSMOLALITY: CPT

## 2019-01-01 PROCEDURE — 99233 SBSQ HOSP IP/OBS HIGH 50: CPT | Mod: GC

## 2019-01-01 PROCEDURE — 99213 OFFICE O/P EST LOW 20 MIN: CPT

## 2019-01-01 PROCEDURE — 71250 CT THORAX DX C-: CPT | Mod: 26

## 2019-01-01 PROCEDURE — 47000 NEEDLE BIOPSY OF LIVER PERQ: CPT

## 2019-01-01 PROCEDURE — 88307 TISSUE EXAM BY PATHOLOGIST: CPT | Mod: 26

## 2019-01-01 PROCEDURE — 93010 ELECTROCARDIOGRAM REPORT: CPT

## 2019-01-01 PROCEDURE — 99233 SBSQ HOSP IP/OBS HIGH 50: CPT

## 2019-01-01 PROCEDURE — 99285 EMERGENCY DEPT VISIT HI MDM: CPT | Mod: 25

## 2019-01-01 PROCEDURE — 86381 MITOCHONDRIAL ANTIBODY EACH: CPT

## 2019-01-01 PROCEDURE — 82330 ASSAY OF CALCIUM: CPT

## 2019-01-01 PROCEDURE — 82962 GLUCOSE BLOOD TEST: CPT

## 2019-01-01 PROCEDURE — 80053 COMPREHEN METABOLIC PANEL: CPT

## 2019-01-01 PROCEDURE — 88172 CYTP DX EVAL FNA 1ST EA SITE: CPT

## 2019-01-01 PROCEDURE — 99215 OFFICE O/P EST HI 40 MIN: CPT

## 2019-01-01 PROCEDURE — 87581 M.PNEUMON DNA AMP PROBE: CPT

## 2019-01-01 PROCEDURE — 88305 TISSUE EXAM BY PATHOLOGIST: CPT | Mod: 26

## 2019-01-01 PROCEDURE — 83735 ASSAY OF MAGNESIUM: CPT

## 2019-01-01 PROCEDURE — 99223 1ST HOSP IP/OBS HIGH 75: CPT | Mod: GC

## 2019-01-01 PROCEDURE — 80307 DRUG TEST PRSMV CHEM ANLYZR: CPT

## 2019-01-01 PROCEDURE — 96374 THER/PROPH/DIAG INJ IV PUSH: CPT

## 2019-01-01 PROCEDURE — 93925 LOWER EXTREMITY STUDY: CPT | Mod: 26

## 2019-01-01 PROCEDURE — 76700 US EXAM ABDOM COMPLETE: CPT | Mod: 26

## 2019-01-01 PROCEDURE — 88305 TISSUE EXAM BY PATHOLOGIST: CPT

## 2019-01-01 PROCEDURE — 71260 CT THORAX DX C+: CPT

## 2019-01-01 PROCEDURE — 82945 GLUCOSE OTHER FLUID: CPT

## 2019-01-01 PROCEDURE — 49083 ABD PARACENTESIS W/IMAGING: CPT

## 2019-01-01 PROCEDURE — 93975 VASCULAR STUDY: CPT

## 2019-01-01 PROCEDURE — 99231 SBSQ HOSP IP/OBS SF/LOW 25: CPT

## 2019-01-01 PROCEDURE — 83690 ASSAY OF LIPASE: CPT

## 2019-01-01 PROCEDURE — 43274 ERCP DUCT STENT PLACEMENT: CPT | Mod: GC

## 2019-01-01 PROCEDURE — 77001 FLUOROGUIDE FOR VEIN DEVICE: CPT | Mod: 26

## 2019-01-01 PROCEDURE — 84484 ASSAY OF TROPONIN QUANT: CPT

## 2019-01-01 PROCEDURE — 74177 CT ABD & PELVIS W/CONTRAST: CPT | Mod: 26

## 2019-01-01 PROCEDURE — 99291 CRITICAL CARE FIRST HOUR: CPT

## 2019-01-01 PROCEDURE — 77001 FLUOROGUIDE FOR VEIN DEVICE: CPT

## 2019-01-01 PROCEDURE — 12345: CPT | Mod: NC

## 2019-01-01 PROCEDURE — 99285 EMERGENCY DEPT VISIT HI MDM: CPT

## 2019-01-01 PROCEDURE — 99232 SBSQ HOSP IP/OBS MODERATE 35: CPT

## 2019-01-01 PROCEDURE — 71046 X-RAY EXAM CHEST 2 VIEWS: CPT

## 2019-01-01 PROCEDURE — 82947 ASSAY GLUCOSE BLOOD QUANT: CPT

## 2019-01-01 PROCEDURE — 96375 TX/PRO/DX INJ NEW DRUG ADDON: CPT

## 2019-01-01 PROCEDURE — 86900 BLOOD TYPING SEROLOGIC ABO: CPT

## 2019-01-01 PROCEDURE — 86850 RBC ANTIBODY SCREEN: CPT

## 2019-01-01 PROCEDURE — 85027 COMPLETE CBC AUTOMATED: CPT

## 2019-01-01 PROCEDURE — 82042 OTHER SOURCE ALBUMIN QUAN EA: CPT

## 2019-01-01 PROCEDURE — 74177 CT ABD & PELVIS W/CONTRAST: CPT

## 2019-01-01 PROCEDURE — 84132 ASSAY OF SERUM POTASSIUM: CPT

## 2019-01-01 PROCEDURE — 87641 MR-STAPH DNA AMP PROBE: CPT

## 2019-01-01 PROCEDURE — 87086 URINE CULTURE/COLONY COUNT: CPT

## 2019-01-01 PROCEDURE — 76937 US GUIDE VASCULAR ACCESS: CPT

## 2019-01-01 PROCEDURE — 76705 ECHO EXAM OF ABDOMEN: CPT | Mod: 26

## 2019-01-01 PROCEDURE — 88342 IMHCHEM/IMCYTCHM 1ST ANTB: CPT

## 2019-01-01 PROCEDURE — 85610 PROTHROMBIN TIME: CPT

## 2019-01-01 PROCEDURE — 71046 X-RAY EXAM CHEST 2 VIEWS: CPT | Mod: 26

## 2019-01-01 PROCEDURE — 99232 SBSQ HOSP IP/OBS MODERATE 35: CPT | Mod: GC

## 2019-01-01 PROCEDURE — 97162 PT EVAL MOD COMPLEX 30 MIN: CPT

## 2019-01-01 PROCEDURE — 86901 BLOOD TYPING SEROLOGIC RH(D): CPT

## 2019-01-01 PROCEDURE — 88173 CYTOPATH EVAL FNA REPORT: CPT

## 2019-01-01 PROCEDURE — 80048 BASIC METABOLIC PNL TOTAL CA: CPT

## 2019-01-01 PROCEDURE — 88112 CYTOPATH CELL ENHANCE TECH: CPT | Mod: 26

## 2019-01-01 PROCEDURE — 93970 EXTREMITY STUDY: CPT | Mod: 26

## 2019-01-01 PROCEDURE — 82803 BLOOD GASES ANY COMBINATION: CPT

## 2019-01-01 PROCEDURE — C1769: CPT

## 2019-01-01 PROCEDURE — 76705 ECHO EXAM OF ABDOMEN: CPT

## 2019-01-01 PROCEDURE — 87486 CHLMYD PNEUM DNA AMP PROBE: CPT

## 2019-01-01 PROCEDURE — 71250 CT THORAX DX C-: CPT

## 2019-01-01 PROCEDURE — 93970 EXTREMITY STUDY: CPT

## 2019-01-01 PROCEDURE — 84295 ASSAY OF SERUM SODIUM: CPT

## 2019-01-01 PROCEDURE — 85730 THROMBOPLASTIN TIME PARTIAL: CPT

## 2019-01-01 PROCEDURE — 76937 US GUIDE VASCULAR ACCESS: CPT | Mod: 26

## 2019-01-01 PROCEDURE — 87640 STAPH A DNA AMP PROBE: CPT

## 2019-01-01 PROCEDURE — 99222 1ST HOSP IP/OBS MODERATE 55: CPT | Mod: GC

## 2019-01-01 PROCEDURE — 71260 CT THORAX DX C+: CPT | Mod: 26

## 2019-01-01 PROCEDURE — 93005 ELECTROCARDIOGRAM TRACING: CPT

## 2019-01-01 PROCEDURE — 88112 CYTOPATH CELL ENHANCE TECH: CPT

## 2019-01-01 PROCEDURE — 87186 SC STD MICRODIL/AGAR DIL: CPT

## 2019-01-01 PROCEDURE — C1876: CPT

## 2019-01-01 PROCEDURE — 84100 ASSAY OF PHOSPHORUS: CPT

## 2019-01-01 PROCEDURE — 97530 THERAPEUTIC ACTIVITIES: CPT

## 2019-01-01 PROCEDURE — 87070 CULTURE OTHR SPECIMN AEROBIC: CPT

## 2019-01-01 PROCEDURE — 12345: CPT | Mod: NC,GC

## 2019-01-01 PROCEDURE — C1894: CPT

## 2019-01-01 PROCEDURE — 99255 IP/OBS CONSLTJ NEW/EST HI 80: CPT | Mod: GC

## 2019-01-01 PROCEDURE — 80076 HEPATIC FUNCTION PANEL: CPT

## 2019-01-01 PROCEDURE — 93975 VASCULAR STUDY: CPT | Mod: 26

## 2019-01-01 PROCEDURE — 87633 RESP VIRUS 12-25 TARGETS: CPT

## 2019-01-01 PROCEDURE — 99223 1ST HOSP IP/OBS HIGH 75: CPT | Mod: GC,AI

## 2019-01-01 PROCEDURE — 89051 BODY FLUID CELL COUNT: CPT

## 2019-01-01 PROCEDURE — 80202 ASSAY OF VANCOMYCIN: CPT

## 2019-01-01 PROCEDURE — 77012 CT SCAN FOR NEEDLE BIOPSY: CPT | Mod: 26

## 2019-01-01 PROCEDURE — 83605 ASSAY OF LACTIC ACID: CPT

## 2019-01-01 PROCEDURE — 96376 TX/PRO/DX INJ SAME DRUG ADON: CPT

## 2019-01-01 PROCEDURE — 83615 LACTATE (LD) (LDH) ENZYME: CPT

## 2019-01-01 PROCEDURE — 87102 FUNGUS ISOLATION CULTURE: CPT

## 2019-01-01 PROCEDURE — 84157 ASSAY OF PROTEIN OTHER: CPT

## 2019-01-01 PROCEDURE — 93925 LOWER EXTREMITY STUDY: CPT

## 2019-01-01 PROCEDURE — C1788: CPT

## 2019-01-01 PROCEDURE — 83036 HEMOGLOBIN GLYCOSYLATED A1C: CPT

## 2019-01-01 PROCEDURE — C1729: CPT

## 2019-01-01 PROCEDURE — 87798 DETECT AGENT NOS DNA AMP: CPT

## 2019-01-01 PROCEDURE — 99239 HOSP IP/OBS DSCHRG MGMT >30: CPT | Mod: GC

## 2019-01-01 PROCEDURE — 76700 US EXAM ABDOM COMPLETE: CPT

## 2019-01-01 PROCEDURE — 85014 HEMATOCRIT: CPT

## 2019-01-01 PROCEDURE — 81001 URINALYSIS AUTO W/SCOPE: CPT

## 2019-01-01 PROCEDURE — 97116 GAIT TRAINING THERAPY: CPT

## 2019-01-01 PROCEDURE — 74328 X-RAY BILE DUCT ENDOSCOPY: CPT

## 2019-01-01 PROCEDURE — 49083 ABD PARACENTESIS W/IMAGING: CPT | Mod: 78

## 2019-01-01 PROCEDURE — 70450 CT HEAD/BRAIN W/O DYE: CPT

## 2019-01-01 PROCEDURE — 88341 IMHCHEM/IMCYTCHM EA ADD ANTB: CPT | Mod: 26

## 2019-01-01 PROCEDURE — 86301 IMMUNOASSAY TUMOR CA 19-9: CPT

## 2019-01-01 PROCEDURE — 96374 THER/PROPH/DIAG INJ IV PUSH: CPT | Mod: XU

## 2019-01-01 PROCEDURE — 84300 ASSAY OF URINE SODIUM: CPT

## 2019-01-01 PROCEDURE — 97161 PT EVAL LOW COMPLEX 20 MIN: CPT

## 2019-01-01 PROCEDURE — 36561 INSERT TUNNELED CV CATH: CPT

## 2019-01-01 PROCEDURE — 86803 HEPATITIS C AB TEST: CPT

## 2019-01-01 PROCEDURE — 82248 BILIRUBIN DIRECT: CPT

## 2019-01-01 PROCEDURE — 88341 IMHCHEM/IMCYTCHM EA ADD ANTB: CPT

## 2019-01-01 PROCEDURE — 88342 IMHCHEM/IMCYTCHM 1ST ANTB: CPT | Mod: 26

## 2019-01-01 PROCEDURE — 93923 UPR/LXTR ART STDY 3+ LVLS: CPT

## 2019-01-01 PROCEDURE — 43242 EGD US FINE NEEDLE BX/ASPIR: CPT | Mod: GC

## 2019-01-01 PROCEDURE — 77012 CT SCAN FOR NEEDLE BIOPSY: CPT

## 2019-01-01 PROCEDURE — 93010 ELECTROCARDIOGRAM REPORT: CPT | Mod: NC

## 2019-01-01 PROCEDURE — 82378 CARCINOEMBRYONIC ANTIGEN: CPT

## 2019-01-01 PROCEDURE — 47490 INCISION OF GALLBLADDER: CPT

## 2019-01-01 PROCEDURE — 87205 SMEAR GRAM STAIN: CPT

## 2019-01-01 PROCEDURE — 83935 ASSAY OF URINE OSMOLALITY: CPT

## 2019-01-01 PROCEDURE — 88307 TISSUE EXAM BY PATHOLOGIST: CPT

## 2019-01-01 PROCEDURE — 87040 BLOOD CULTURE FOR BACTERIA: CPT

## 2019-01-01 PROCEDURE — 81003 URINALYSIS AUTO W/O SCOPE: CPT

## 2019-01-01 PROCEDURE — 70450 CT HEAD/BRAIN W/O DYE: CPT | Mod: 26

## 2019-01-01 PROCEDURE — 99222 1ST HOSP IP/OBS MODERATE 55: CPT

## 2019-01-01 PROCEDURE — 99214 OFFICE O/P EST MOD 30 MIN: CPT

## 2019-01-01 PROCEDURE — 84443 ASSAY THYROID STIM HORMONE: CPT

## 2019-01-01 PROCEDURE — 87075 CULTR BACTERIA EXCEPT BLOOD: CPT

## 2019-01-01 PROCEDURE — 88173 CYTOPATH EVAL FNA REPORT: CPT | Mod: 26

## 2019-01-01 PROCEDURE — 99254 IP/OBS CNSLTJ NEW/EST MOD 60: CPT | Mod: GC

## 2019-01-01 PROCEDURE — 80061 LIPID PANEL: CPT

## 2019-01-01 PROCEDURE — 80074 ACUTE HEPATITIS PANEL: CPT

## 2019-01-01 RX ORDER — DIAZEPAM 5 MG
2 TABLET ORAL EVERY 8 HOURS
Refills: 0 | Status: DISCONTINUED | OUTPATIENT
Start: 2019-01-01 | End: 2019-01-01

## 2019-01-01 RX ORDER — HYDROMORPHONE HYDROCHLORIDE 2 MG/ML
4 INJECTION INTRAMUSCULAR; INTRAVENOUS; SUBCUTANEOUS
Qty: 64 | Refills: 0
Start: 2019-01-01 | End: 2019-01-01

## 2019-01-01 RX ORDER — INSULIN LISPRO 100/ML
VIAL (ML) SUBCUTANEOUS
Refills: 0 | Status: DISCONTINUED | OUTPATIENT
Start: 2019-01-01 | End: 2019-01-01

## 2019-01-01 RX ORDER — DEXTROSE 50 % IN WATER 50 %
12.5 SYRINGE (ML) INTRAVENOUS ONCE
Refills: 0 | Status: DISCONTINUED | OUTPATIENT
Start: 2019-01-01 | End: 2019-01-01

## 2019-01-01 RX ORDER — TAMSULOSIN HYDROCHLORIDE 0.4 MG/1
0.4 CAPSULE ORAL AT BEDTIME
Refills: 0 | Status: DISCONTINUED | OUTPATIENT
Start: 2019-01-01 | End: 2019-01-01

## 2019-01-01 RX ORDER — MORPHINE SULFATE 50 MG/1
5 CAPSULE, EXTENDED RELEASE ORAL
Qty: 70 | Refills: 0
Start: 2019-01-01 | End: 2019-01-01

## 2019-01-01 RX ORDER — CEFEPIME 1 G/1
1000 INJECTION, POWDER, FOR SOLUTION INTRAMUSCULAR; INTRAVENOUS EVERY 8 HOURS
Refills: 0 | Status: DISCONTINUED | OUTPATIENT
Start: 2019-01-01 | End: 2019-01-01

## 2019-01-01 RX ORDER — MEROPENEM 1 G/30ML
1000 INJECTION INTRAVENOUS EVERY 8 HOURS
Refills: 0 | Status: DISCONTINUED | OUTPATIENT
Start: 2019-01-01 | End: 2019-01-01

## 2019-01-01 RX ORDER — HYDROMORPHONE HYDROCHLORIDE 2 MG/ML
2 INJECTION INTRAMUSCULAR; INTRAVENOUS; SUBCUTANEOUS
Refills: 0 | Status: DISCONTINUED | OUTPATIENT
Start: 2019-01-01 | End: 2019-01-01

## 2019-01-01 RX ORDER — HYDROMORPHONE HYDROCHLORIDE 2 MG/ML
6 INJECTION INTRAMUSCULAR; INTRAVENOUS; SUBCUTANEOUS
Qty: 100 | Refills: 0 | Status: DISCONTINUED | OUTPATIENT
Start: 2019-01-01 | End: 2019-01-01

## 2019-01-01 RX ORDER — CYCLOBENZAPRINE HYDROCHLORIDE 10 MG/1
5 TABLET, FILM COATED ORAL THREE TIMES A DAY
Refills: 0 | Status: DISCONTINUED | OUTPATIENT
Start: 2019-01-01 | End: 2019-01-01

## 2019-01-01 RX ORDER — SODIUM CHLORIDE 9 MG/ML
1000 INJECTION, SOLUTION INTRAVENOUS
Refills: 0 | Status: DISCONTINUED | OUTPATIENT
Start: 2019-01-01 | End: 2019-01-01

## 2019-01-01 RX ORDER — HYDROMORPHONE HYDROCHLORIDE 2 MG/ML
30 INJECTION INTRAMUSCULAR; INTRAVENOUS; SUBCUTANEOUS
Refills: 0 | Status: DISCONTINUED | OUTPATIENT
Start: 2019-01-01 | End: 2019-01-01

## 2019-01-01 RX ORDER — PIPERACILLIN AND TAZOBACTAM 4; .5 G/20ML; G/20ML
3.38 INJECTION, POWDER, LYOPHILIZED, FOR SOLUTION INTRAVENOUS ONCE
Refills: 0 | Status: COMPLETED | OUTPATIENT
Start: 2019-01-01 | End: 2019-01-01

## 2019-01-01 RX ORDER — FENTANYL CITRATE 50 UG/ML
1 INJECTION INTRAVENOUS
Refills: 0 | Status: DISCONTINUED | OUTPATIENT
Start: 2019-01-01 | End: 2019-01-01

## 2019-01-01 RX ORDER — INSULIN LISPRO 100/ML
VIAL (ML) SUBCUTANEOUS AT BEDTIME
Refills: 0 | Status: DISCONTINUED | OUTPATIENT
Start: 2019-01-01 | End: 2019-01-01

## 2019-01-01 RX ORDER — DEXTROSE 50 % IN WATER 50 %
25 SYRINGE (ML) INTRAVENOUS ONCE
Refills: 0 | Status: DISCONTINUED | OUTPATIENT
Start: 2019-01-01 | End: 2019-01-01

## 2019-01-01 RX ORDER — METFORMIN HYDROCHLORIDE 850 MG/1
500 TABLET ORAL
Refills: 0 | Status: DISCONTINUED | OUTPATIENT
Start: 2019-01-01 | End: 2019-01-01

## 2019-01-01 RX ORDER — SUCRALFATE 1 G
10 TABLET ORAL
Qty: 280 | Refills: 0
Start: 2019-01-01 | End: 2019-01-01

## 2019-01-01 RX ORDER — ONDANSETRON 8 MG/1
4 TABLET, FILM COATED ORAL EVERY 6 HOURS
Refills: 0 | Status: DISCONTINUED | OUTPATIENT
Start: 2019-01-01 | End: 2019-01-01

## 2019-01-01 RX ORDER — MORPHINE SULFATE 50 MG/1
30 CAPSULE, EXTENDED RELEASE ORAL EVERY 8 HOURS
Refills: 0 | Status: DISCONTINUED | OUTPATIENT
Start: 2019-01-01 | End: 2019-01-01

## 2019-01-01 RX ORDER — HEPARIN SODIUM 5000 [USP'U]/ML
5000 INJECTION INTRAVENOUS; SUBCUTANEOUS EVERY 12 HOURS
Refills: 0 | Status: DISCONTINUED | OUTPATIENT
Start: 2019-01-01 | End: 2019-01-01

## 2019-01-01 RX ORDER — METHADONE HYDROCHLORIDE 40 MG/1
9 TABLET ORAL
Qty: 36 | Refills: 0
Start: 2019-01-01 | End: 2019-01-01

## 2019-01-01 RX ORDER — INSULIN LISPRO 100/ML
VIAL (ML) SUBCUTANEOUS EVERY 6 HOURS
Refills: 0 | Status: DISCONTINUED | OUTPATIENT
Start: 2019-01-01 | End: 2019-01-01

## 2019-01-01 RX ORDER — PIPERACILLIN AND TAZOBACTAM 4; .5 G/20ML; G/20ML
3.38 INJECTION, POWDER, LYOPHILIZED, FOR SOLUTION INTRAVENOUS EVERY 8 HOURS
Refills: 0 | Status: DISCONTINUED | OUTPATIENT
Start: 2019-01-01 | End: 2019-01-01

## 2019-01-01 RX ORDER — DULOXETINE HYDROCHLORIDE 30 MG/1
60 CAPSULE, DELAYED RELEASE ORAL
Refills: 0 | Status: DISCONTINUED | OUTPATIENT
Start: 2019-01-01 | End: 2019-01-01

## 2019-01-01 RX ORDER — MORPHINE SULFATE 50 MG/1
30 CAPSULE, EXTENDED RELEASE ORAL EVERY 6 HOURS
Refills: 0 | Status: DISCONTINUED | OUTPATIENT
Start: 2019-01-01 | End: 2019-01-01

## 2019-01-01 RX ORDER — SCOPALAMINE 1 MG/3D
1 PATCH, EXTENDED RELEASE TRANSDERMAL
Refills: 0 | Status: DISCONTINUED | OUTPATIENT
Start: 2019-01-01 | End: 2019-01-01

## 2019-01-01 RX ORDER — FENTANYL CITRATE 50 UG/ML
30 INJECTION INTRAVENOUS ONCE
Refills: 0 | Status: DISCONTINUED | OUTPATIENT
Start: 2019-01-01 | End: 2019-01-01

## 2019-01-01 RX ORDER — SENNA PLUS 8.6 MG/1
2 TABLET ORAL AT BEDTIME
Refills: 0 | Status: DISCONTINUED | OUTPATIENT
Start: 2019-01-01 | End: 2019-01-01

## 2019-01-01 RX ORDER — HEPARIN SODIUM 5000 [USP'U]/ML
5000 INJECTION INTRAVENOUS; SUBCUTANEOUS EVERY 8 HOURS
Refills: 0 | Status: DISCONTINUED | OUTPATIENT
Start: 2019-01-01 | End: 2019-01-01

## 2019-01-01 RX ORDER — METHADONE HYDROCHLORIDE 40 MG/1
2 TABLET ORAL
Qty: 0 | Refills: 0 | DISCHARGE

## 2019-01-01 RX ORDER — VANCOMYCIN HCL 1 G
1000 VIAL (EA) INTRAVENOUS ONCE
Refills: 0 | Status: COMPLETED | OUTPATIENT
Start: 2019-01-01 | End: 2019-01-01

## 2019-01-01 RX ORDER — HYDROMORPHONE HYDROCHLORIDE 2 MG/ML
4 INJECTION INTRAMUSCULAR; INTRAVENOUS; SUBCUTANEOUS
Refills: 0 | Status: DISCONTINUED | OUTPATIENT
Start: 2019-01-01 | End: 2019-01-01

## 2019-01-01 RX ORDER — GLUCAGON INJECTION, SOLUTION 0.5 MG/.1ML
1 INJECTION, SOLUTION SUBCUTANEOUS ONCE
Refills: 0 | Status: DISCONTINUED | OUTPATIENT
Start: 2019-01-01 | End: 2019-01-01

## 2019-01-01 RX ORDER — MINERAL OIL
30 OIL (ML) MISCELLANEOUS DAILY
Refills: 0 | Status: DISCONTINUED | OUTPATIENT
Start: 2019-01-01 | End: 2019-01-01

## 2019-01-01 RX ORDER — PANCRELIPASE 36000; 180000; 114000 [USP'U]/1; [USP'U]/1; [USP'U]/1
36000-114000 CAPSULE, DELAYED RELEASE PELLETS ORAL
Qty: 180 | Refills: 2 | Status: ACTIVE | COMMUNITY
Start: 2019-01-01 | End: 1900-01-01

## 2019-01-01 RX ORDER — METHADONE HYDROCHLORIDE 40 MG/1
20 TABLET ORAL EVERY 6 HOURS
Refills: 0 | Status: DISCONTINUED | OUTPATIENT
Start: 2019-01-01 | End: 2019-01-01

## 2019-01-01 RX ORDER — POTASSIUM CHLORIDE 20 MEQ
20 PACKET (EA) ORAL ONCE
Refills: 0 | Status: COMPLETED | OUTPATIENT
Start: 2019-01-01 | End: 2019-01-01

## 2019-01-01 RX ORDER — MORPHINE SULFATE 50 MG/1
60 CAPSULE, EXTENDED RELEASE ORAL
Refills: 0 | Status: DISCONTINUED | OUTPATIENT
Start: 2019-01-01 | End: 2019-01-01

## 2019-01-01 RX ORDER — MORPHINE SULFATE 50 MG/1
1 CAPSULE, EXTENDED RELEASE ORAL
Qty: 0 | Refills: 0 | DISCHARGE

## 2019-01-01 RX ORDER — HYDROMORPHONE HYDROCHLORIDE 2 MG/ML
4 INJECTION INTRAMUSCULAR; INTRAVENOUS; SUBCUTANEOUS
Qty: 224 | Refills: 0
Start: 2019-01-01 | End: 2019-01-01

## 2019-01-01 RX ORDER — ONDANSETRON 8 MG/1
4 TABLET, FILM COATED ORAL ONCE
Refills: 0 | Status: COMPLETED | OUTPATIENT
Start: 2019-01-01 | End: 2019-01-01

## 2019-01-01 RX ORDER — MORPHINE SULFATE 50 MG/1
90 CAPSULE, EXTENDED RELEASE ORAL
Refills: 0 | Status: DISCONTINUED | OUTPATIENT
Start: 2019-01-01 | End: 2019-01-01

## 2019-01-01 RX ORDER — LACTOBACILLUS ACIDOPHILUS 100MM CELL
1 CAPSULE ORAL
Qty: 28 | Refills: 0
Start: 2019-01-01 | End: 2019-01-01

## 2019-01-01 RX ORDER — HYDROMORPHONE HYDROCHLORIDE 2 MG/ML
1 INJECTION INTRAMUSCULAR; INTRAVENOUS; SUBCUTANEOUS ONCE
Refills: 0 | Status: DISCONTINUED | OUTPATIENT
Start: 2019-01-01 | End: 2019-01-01

## 2019-01-01 RX ORDER — ROBINUL 0.2 MG/ML
0.4 INJECTION INTRAMUSCULAR; INTRAVENOUS ONCE
Refills: 0 | Status: COMPLETED | OUTPATIENT
Start: 2019-01-01 | End: 2019-01-01

## 2019-01-01 RX ORDER — DIAZEPAM 5 MG
2 TABLET ORAL THREE TIMES A DAY
Refills: 0 | Status: DISCONTINUED | OUTPATIENT
Start: 2019-01-01 | End: 2019-01-01

## 2019-01-01 RX ORDER — HYDROMORPHONE HYDROCHLORIDE 2 MG/ML
12 INJECTION INTRAMUSCULAR; INTRAVENOUS; SUBCUTANEOUS
Refills: 0 | Status: DISCONTINUED | OUTPATIENT
Start: 2019-01-01 | End: 2019-01-01

## 2019-01-01 RX ORDER — HYDROMORPHONE HYDROCHLORIDE 2 MG/ML
12 INJECTION INTRAMUSCULAR; INTRAVENOUS; SUBCUTANEOUS ONCE
Refills: 0 | Status: DISCONTINUED | OUTPATIENT
Start: 2019-01-01 | End: 2019-01-01

## 2019-01-01 RX ORDER — HYDROMORPHONE HYDROCHLORIDE 2 MG/ML
3 INJECTION INTRAMUSCULAR; INTRAVENOUS; SUBCUTANEOUS
Refills: 0 | Status: DISCONTINUED | OUTPATIENT
Start: 2019-01-01 | End: 2019-01-01

## 2019-01-01 RX ORDER — POLYETHYLENE GLYCOL 3350 17 G/17G
17 POWDER, FOR SOLUTION ORAL
Refills: 0 | Status: DISCONTINUED | OUTPATIENT
Start: 2019-01-01 | End: 2019-01-01

## 2019-01-01 RX ORDER — SODIUM CHLORIDE 9 MG/ML
1000 INJECTION INTRAMUSCULAR; INTRAVENOUS; SUBCUTANEOUS
Refills: 0 | Status: COMPLETED | OUTPATIENT
Start: 2019-01-01 | End: 2019-01-01

## 2019-01-01 RX ORDER — HYDROMORPHONE HYDROCHLORIDE 2 MG/ML
12 INJECTION INTRAMUSCULAR; INTRAVENOUS; SUBCUTANEOUS EVERY 4 HOURS
Refills: 0 | Status: DISCONTINUED | OUTPATIENT
Start: 2019-01-01 | End: 2019-01-01

## 2019-01-01 RX ORDER — HYDROMORPHONE HYDROCHLORIDE 2 MG/ML
3.2 INJECTION INTRAMUSCULAR; INTRAVENOUS; SUBCUTANEOUS
Refills: 0 | Status: DISCONTINUED | OUTPATIENT
Start: 2019-01-01 | End: 2019-01-01

## 2019-01-01 RX ORDER — SODIUM CHLORIDE 9 MG/ML
500 INJECTION INTRAMUSCULAR; INTRAVENOUS; SUBCUTANEOUS ONCE
Refills: 0 | Status: COMPLETED | OUTPATIENT
Start: 2019-01-01 | End: 2019-01-01

## 2019-01-01 RX ORDER — ONDANSETRON 8 MG/1
4 TABLET, FILM COATED ORAL ONCE
Refills: 0 | Status: DISCONTINUED | OUTPATIENT
Start: 2019-01-01 | End: 2019-01-01

## 2019-01-01 RX ORDER — NALOXONE HYDROCHLORIDE 4 MG/.1ML
0.4 SPRAY NASAL
Refills: 0 | Status: DISCONTINUED | OUTPATIENT
Start: 2019-01-01 | End: 2019-01-01

## 2019-01-01 RX ORDER — DEXTROSE 50 % IN WATER 50 %
15 SYRINGE (ML) INTRAVENOUS ONCE
Refills: 0 | Status: DISCONTINUED | OUTPATIENT
Start: 2019-01-01 | End: 2019-01-01

## 2019-01-01 RX ORDER — METHADONE HYDROCHLORIDE 40 MG/1
20 TABLET ORAL EVERY 8 HOURS
Refills: 0 | Status: DISCONTINUED | OUTPATIENT
Start: 2019-01-01 | End: 2019-01-01

## 2019-01-01 RX ORDER — ACETAMINOPHEN 500 MG
650 TABLET ORAL ONCE
Refills: 0 | Status: COMPLETED | OUTPATIENT
Start: 2019-01-01 | End: 2019-01-01

## 2019-01-01 RX ORDER — ENOXAPARIN SODIUM 100 MG/ML
40 INJECTION SUBCUTANEOUS DAILY
Refills: 0 | Status: DISCONTINUED | OUTPATIENT
Start: 2019-01-01 | End: 2019-01-01

## 2019-01-01 RX ORDER — FAMOTIDINE 10 MG/ML
20 INJECTION INTRAVENOUS DAILY
Refills: 0 | Status: DISCONTINUED | OUTPATIENT
Start: 2019-01-01 | End: 2019-01-01

## 2019-01-01 RX ORDER — HYDROMORPHONE HYDROCHLORIDE 2 MG/ML
16 INJECTION INTRAMUSCULAR; INTRAVENOUS; SUBCUTANEOUS
Refills: 0 | Status: DISCONTINUED | OUTPATIENT
Start: 2019-01-01 | End: 2019-01-01

## 2019-01-01 RX ORDER — HYDROMORPHONE HYDROCHLORIDE 2 MG/ML
4 INJECTION INTRAMUSCULAR; INTRAVENOUS; SUBCUTANEOUS ONCE
Refills: 0 | Status: DISCONTINUED | OUTPATIENT
Start: 2019-01-01 | End: 2019-01-01

## 2019-01-01 RX ORDER — SODIUM CHLORIDE 9 MG/ML
2700 INJECTION INTRAMUSCULAR; INTRAVENOUS; SUBCUTANEOUS ONCE
Refills: 0 | Status: COMPLETED | OUTPATIENT
Start: 2019-01-01 | End: 2019-01-01

## 2019-01-01 RX ORDER — METHADONE HYDROCHLORIDE 40 MG/1
20 TABLET ORAL
Refills: 0 | Status: DISCONTINUED | OUTPATIENT
Start: 2019-01-01 | End: 2019-01-01

## 2019-01-01 RX ORDER — DULOXETINE HYDROCHLORIDE 30 MG/1
1 CAPSULE, DELAYED RELEASE ORAL
Qty: 0 | Refills: 0 | DISCHARGE

## 2019-01-01 RX ORDER — HYDROMORPHONE HYDROCHLORIDE 2 MG/ML
1 INJECTION INTRAMUSCULAR; INTRAVENOUS; SUBCUTANEOUS
Qty: 4 | Refills: 0
Start: 2019-01-01 | End: 2019-01-01

## 2019-01-01 RX ORDER — POTASSIUM CHLORIDE 20 MEQ
40 PACKET (EA) ORAL EVERY 4 HOURS
Refills: 0 | Status: COMPLETED | OUTPATIENT
Start: 2019-01-01 | End: 2019-01-01

## 2019-01-01 RX ORDER — LACTULOSE 10 G/15ML
10 SOLUTION ORAL 3 TIMES DAILY
Qty: 1 | Refills: 2 | Status: ACTIVE | COMMUNITY
Start: 2019-01-01 | End: 1900-01-01

## 2019-01-01 RX ORDER — ENOXAPARIN SODIUM 100 MG/ML
80 INJECTION SUBCUTANEOUS DAILY
Qty: 30 | Refills: 2 | Status: ACTIVE | COMMUNITY
Start: 2019-01-01 | End: 1900-01-01

## 2019-01-01 RX ORDER — CHLORZOXAZONE 250 MG
1 TABLET ORAL
Qty: 0 | Refills: 0 | DISCHARGE

## 2019-01-01 RX ORDER — HYDROCHLOROTHIAZIDE 25 MG
50 TABLET ORAL DAILY
Refills: 0 | Status: DISCONTINUED | OUTPATIENT
Start: 2019-01-01 | End: 2019-01-01

## 2019-01-01 RX ORDER — SODIUM CHLORIDE 9 MG/ML
1000 INJECTION INTRAMUSCULAR; INTRAVENOUS; SUBCUTANEOUS
Refills: 0 | Status: DISCONTINUED | OUTPATIENT
Start: 2019-01-01 | End: 2019-01-01

## 2019-01-01 RX ORDER — METOCLOPRAMIDE HCL 10 MG
10 TABLET ORAL ONCE
Refills: 0 | Status: COMPLETED | OUTPATIENT
Start: 2019-01-01 | End: 2019-01-01

## 2019-01-01 RX ORDER — SUCRALFATE 1 G
10 TABLET ORAL
Qty: 0 | Refills: 0 | DISCHARGE
Start: 2019-01-01

## 2019-01-01 RX ORDER — HYDROMORPHONE HYDROCHLORIDE 2 MG/ML
2.5 INJECTION INTRAMUSCULAR; INTRAVENOUS; SUBCUTANEOUS
Refills: 0 | Status: DISCONTINUED | OUTPATIENT
Start: 2019-01-01 | End: 2019-01-01

## 2019-01-01 RX ORDER — MORPHINE SULFATE 50 MG/1
1 CAPSULE, EXTENDED RELEASE ORAL ONCE
Refills: 0 | Status: DISCONTINUED | OUTPATIENT
Start: 2019-01-01 | End: 2019-01-01

## 2019-01-01 RX ORDER — HYDROMORPHONE HYDROCHLORIDE 2 MG/ML
7.5 INJECTION INTRAMUSCULAR; INTRAVENOUS; SUBCUTANEOUS
Refills: 0 | Status: DISCONTINUED | OUTPATIENT
Start: 2019-01-01 | End: 2019-01-01

## 2019-01-01 RX ORDER — SODIUM CHLORIDE 9 MG/ML
1000 INJECTION INTRAMUSCULAR; INTRAVENOUS; SUBCUTANEOUS ONCE
Refills: 0 | Status: COMPLETED | OUTPATIENT
Start: 2019-01-01 | End: 2019-01-01

## 2019-01-01 RX ORDER — POTASSIUM CHLORIDE 20 MEQ
20 PACKET (EA) ORAL
Refills: 0 | Status: COMPLETED | OUTPATIENT
Start: 2019-01-01 | End: 2019-01-01

## 2019-01-01 RX ORDER — SUCRALFATE 1 G
1 TABLET ORAL
Refills: 0 | Status: DISCONTINUED | OUTPATIENT
Start: 2019-01-01 | End: 2019-01-01

## 2019-01-01 RX ORDER — ALBUTEROL 90 UG/1
2 AEROSOL, METERED ORAL EVERY 6 HOURS
Refills: 0 | Status: DISCONTINUED | OUTPATIENT
Start: 2019-01-01 | End: 2019-01-01

## 2019-01-01 RX ORDER — INSULIN HUMAN 100 [IU]/ML
INJECTION, SOLUTION SUBCUTANEOUS EVERY 6 HOURS
Refills: 0 | Status: DISCONTINUED | OUTPATIENT
Start: 2019-01-01 | End: 2019-01-01

## 2019-01-01 RX ORDER — MORPHINE SULFATE 30 MG/1
30 TABLET ORAL
Refills: 0 | Status: ACTIVE | COMMUNITY

## 2019-01-01 RX ORDER — METHADONE HYDROCHLORIDE 40 MG/1
25 TABLET ORAL EVERY 6 HOURS
Refills: 0 | Status: DISCONTINUED | OUTPATIENT
Start: 2019-01-01 | End: 2019-01-01

## 2019-01-01 RX ORDER — NALOXONE HYDROCHLORIDE 4 MG/.1ML
0.1 SPRAY NASAL
Refills: 0 | Status: DISCONTINUED | OUTPATIENT
Start: 2019-01-01 | End: 2019-01-01

## 2019-01-01 RX ORDER — POLYETHYLENE GLYCOL 3350 17 G/17G
17 POWDER, FOR SOLUTION ORAL DAILY
Refills: 0 | Status: DISCONTINUED | OUTPATIENT
Start: 2019-01-01 | End: 2019-01-01

## 2019-01-01 RX ORDER — MEROPENEM 1 G/30ML
1000 INJECTION INTRAVENOUS EVERY 8 HOURS
Refills: 0 | Status: COMPLETED | OUTPATIENT
Start: 2019-01-01 | End: 2019-01-01

## 2019-01-01 RX ORDER — SENNA PLUS 8.6 MG/1
2 TABLET ORAL
Qty: 0 | Refills: 0 | DISCHARGE
Start: 2019-01-01

## 2019-01-01 RX ORDER — VANCOMYCIN HCL 1 G
1250 VIAL (EA) INTRAVENOUS EVERY 12 HOURS
Refills: 0 | Status: DISCONTINUED | OUTPATIENT
Start: 2019-01-01 | End: 2019-01-01

## 2019-01-01 RX ORDER — MORPHINE SULFATE 50 MG/1
2 CAPSULE, EXTENDED RELEASE ORAL EVERY 4 HOURS
Refills: 0 | Status: DISCONTINUED | OUTPATIENT
Start: 2019-01-01 | End: 2019-01-01

## 2019-01-01 RX ORDER — POTASSIUM CHLORIDE 20 MEQ
20 PACKET (EA) ORAL ONCE
Refills: 0 | Status: DISCONTINUED | OUTPATIENT
Start: 2019-01-01 | End: 2019-01-01

## 2019-01-01 RX ORDER — MORPHINE SULFATE 50 MG/1
2 CAPSULE, EXTENDED RELEASE ORAL ONCE
Refills: 0 | Status: DISCONTINUED | OUTPATIENT
Start: 2019-01-01 | End: 2019-01-01

## 2019-01-01 RX ORDER — MORPHINE SULFATE 50 MG/1
1 CAPSULE, EXTENDED RELEASE ORAL EVERY 4 HOURS
Refills: 0 | Status: DISCONTINUED | OUTPATIENT
Start: 2019-01-01 | End: 2019-01-01

## 2019-01-01 RX ORDER — MORPHINE SULFATE 50 MG/1
5 CAPSULE, EXTENDED RELEASE ORAL
Qty: 20 | Refills: 0
Start: 2019-01-01 | End: 2019-01-01

## 2019-01-01 RX ORDER — SUCRALFATE 1 G
10 TABLET ORAL
Qty: 0 | Refills: 0 | DISCHARGE

## 2019-01-01 RX ORDER — DOCUSATE SODIUM 100 MG
100 CAPSULE ORAL DAILY
Refills: 0 | Status: DISCONTINUED | OUTPATIENT
Start: 2019-01-01 | End: 2019-01-01

## 2019-01-01 RX ORDER — HYDROMORPHONE HYDROCHLORIDE 2 MG/ML
8 INJECTION INTRAMUSCULAR; INTRAVENOUS; SUBCUTANEOUS
Refills: 0 | Status: DISCONTINUED | OUTPATIENT
Start: 2019-01-01 | End: 2019-01-01

## 2019-01-01 RX ORDER — HYDROMORPHONE HYDROCHLORIDE 2 MG/ML
2 INJECTION INTRAMUSCULAR; INTRAVENOUS; SUBCUTANEOUS ONCE
Refills: 0 | Status: DISCONTINUED | OUTPATIENT
Start: 2019-01-01 | End: 2019-01-01

## 2019-01-01 RX ORDER — DEXAMETHASONE 4 MG/1
4 TABLET ORAL
Qty: 40 | Refills: 2 | Status: ACTIVE | COMMUNITY
Start: 2019-01-01 | End: 1900-01-01

## 2019-01-01 RX ORDER — HYDROMORPHONE HYDROCHLORIDE 2 MG/ML
3 INJECTION INTRAMUSCULAR; INTRAVENOUS; SUBCUTANEOUS
Qty: 168 | Refills: 0
Start: 2019-01-01 | End: 2019-01-01

## 2019-01-01 RX ORDER — DIAZEPAM 5 MG
1 TABLET ORAL
Qty: 6 | Refills: 0
Start: 2019-01-01 | End: 2019-01-01

## 2019-01-01 RX ORDER — CIPROFLOXACIN HYDROCHLORIDE 500 MG/1
500 TABLET, FILM COATED ORAL TWICE DAILY
Qty: 14 | Refills: 0 | Status: ACTIVE | COMMUNITY
Start: 2019-01-01 | End: 1900-01-01

## 2019-01-01 RX ORDER — METHADONE HYDROCHLORIDE 40 MG/1
40 TABLET ORAL
Refills: 0 | Status: DISCONTINUED | OUTPATIENT
Start: 2019-01-01 | End: 2019-01-01

## 2019-01-01 RX ORDER — LACTULOSE 10 G/15ML
10 SOLUTION ORAL ONCE
Refills: 0 | Status: COMPLETED | OUTPATIENT
Start: 2019-01-01 | End: 2019-01-01

## 2019-01-01 RX ORDER — DIAZEPAM 5 MG
1 TABLET ORAL
Qty: 21 | Refills: 0
Start: 2019-01-01 | End: 2019-01-01

## 2019-01-01 RX ORDER — ENOXAPARIN SODIUM 100 MG/ML
40 INJECTION SUBCUTANEOUS EVERY 12 HOURS
Refills: 0 | Status: DISCONTINUED | OUTPATIENT
Start: 2019-01-01 | End: 2019-01-01

## 2019-01-01 RX ORDER — POTASSIUM CHLORIDE 20 MEQ
40 PACKET (EA) ORAL ONCE
Refills: 0 | Status: COMPLETED | OUTPATIENT
Start: 2019-01-01 | End: 2019-01-01

## 2019-01-01 RX ORDER — INFLUENZA VIRUS VACCINE 15; 15; 15; 15 UG/.5ML; UG/.5ML; UG/.5ML; UG/.5ML
0.5 SUSPENSION INTRAMUSCULAR ONCE
Refills: 0 | Status: DISCONTINUED | OUTPATIENT
Start: 2019-01-01 | End: 2019-01-01

## 2019-01-01 RX ORDER — OMEPRAZOLE 10 MG/1
1 CAPSULE, DELAYED RELEASE ORAL
Qty: 0 | Refills: 0 | DISCHARGE

## 2019-01-01 RX ORDER — MULTIVIT-MIN/FERROUS GLUCONATE 9 MG/15 ML
1 LIQUID (ML) ORAL DAILY
Refills: 0 | Status: DISCONTINUED | OUTPATIENT
Start: 2019-01-01 | End: 2019-01-01

## 2019-01-01 RX ORDER — POLYETHYLENE GLYCOL 3350 17 G/17G
34 POWDER, FOR SOLUTION ORAL
Qty: 0 | Refills: 0 | DISCHARGE
Start: 2019-01-01

## 2019-01-01 RX ORDER — METHADONE HYDROCHLORIDE 40 MG/1
45 TABLET ORAL
Refills: 0 | Status: DISCONTINUED | OUTPATIENT
Start: 2019-01-01 | End: 2019-01-01

## 2019-01-01 RX ORDER — FAMOTIDINE 10 MG/ML
20 INJECTION INTRAVENOUS ONCE
Refills: 0 | Status: COMPLETED | OUTPATIENT
Start: 2019-01-01 | End: 2019-01-01

## 2019-01-01 RX ORDER — DIAZEPAM 5 MG
1 TABLET ORAL
Qty: 0 | Refills: 0 | DISCHARGE

## 2019-01-01 RX ORDER — MORPHINE SULFATE 50 MG/1
75 CAPSULE, EXTENDED RELEASE ORAL
Refills: 0 | Status: DISCONTINUED | OUTPATIENT
Start: 2019-01-01 | End: 2019-01-01

## 2019-01-01 RX ORDER — SIMETHICONE 80 MG/1
80 TABLET, CHEWABLE ORAL EVERY 8 HOURS
Refills: 0 | Status: DISCONTINUED | OUTPATIENT
Start: 2019-01-01 | End: 2019-01-01

## 2019-01-01 RX ORDER — DULOXETINE HYDROCHLORIDE 30 MG/1
60 CAPSULE, DELAYED RELEASE ORAL DAILY
Refills: 0 | Status: DISCONTINUED | OUTPATIENT
Start: 2019-01-01 | End: 2019-01-01

## 2019-01-01 RX ORDER — POLYETHYLENE GLYCOL 3350 17 G/17G
17 POWDER, FOR SOLUTION ORAL
Qty: 0 | Refills: 0 | DISCHARGE

## 2019-01-01 RX ORDER — VANCOMYCIN HCL 1 G
1000 VIAL (EA) INTRAVENOUS EVERY 12 HOURS
Refills: 0 | Status: DISCONTINUED | OUTPATIENT
Start: 2019-01-01 | End: 2019-01-01

## 2019-01-01 RX ORDER — DIAZEPAM 5 MG
1 TABLET ORAL
Qty: 0 | Refills: 0 | DISCHARGE
Start: 2019-01-01

## 2019-01-01 RX ORDER — PANTOPRAZOLE 40 MG/1
40 TABLET, DELAYED RELEASE ORAL DAILY
Qty: 30 | Refills: 3 | Status: ACTIVE | COMMUNITY
Start: 2019-01-01 | End: 1900-01-01

## 2019-01-01 RX ORDER — ALBUTEROL 90 UG/1
2 AEROSOL, METERED ORAL
Qty: 1 | Refills: 0
Start: 2019-01-01

## 2019-01-01 RX ORDER — METHADONE HYDROCHLORIDE 40 MG/1
9 TABLET ORAL
Qty: 126 | Refills: 0
Start: 2019-01-01 | End: 2019-01-01

## 2019-01-01 RX ORDER — METFORMIN HYDROCHLORIDE 850 MG/1
1 TABLET ORAL
Qty: 30 | Refills: 0
Start: 2019-01-01 | End: 2019-01-01

## 2019-01-01 RX ORDER — DIAZEPAM 2 MG/1
2 TABLET ORAL
Qty: 45 | Refills: 0 | Status: DISCONTINUED | COMMUNITY
Start: 2017-05-08 | End: 2019-01-01

## 2019-01-01 RX ORDER — MORPHINE SULFATE 50 MG/1
4 CAPSULE, EXTENDED RELEASE ORAL ONCE
Refills: 0 | Status: DISCONTINUED | OUTPATIENT
Start: 2019-01-01 | End: 2019-01-01

## 2019-01-01 RX ORDER — CHLORHEXIDINE GLUCONATE 213 G/1000ML
1 SOLUTION TOPICAL DAILY
Refills: 0 | Status: DISCONTINUED | OUTPATIENT
Start: 2019-01-01 | End: 2019-01-01

## 2019-01-01 RX ORDER — METHADONE HYDROCHLORIDE 10 MG/1
10 TABLET ORAL
Refills: 0 | Status: ACTIVE | COMMUNITY

## 2019-01-01 RX ORDER — POLYETHYLENE GLYCOL 3350 17 G/17G
17 POWDER, FOR SOLUTION ORAL
Qty: 0 | Refills: 0 | DISCHARGE
Start: 2019-01-01

## 2019-01-01 RX ORDER — SODIUM CHLORIDE 9 MG/ML
1500 INJECTION INTRAMUSCULAR; INTRAVENOUS; SUBCUTANEOUS ONCE
Refills: 0 | Status: COMPLETED | OUTPATIENT
Start: 2019-01-01 | End: 2019-01-01

## 2019-01-01 RX ORDER — HYDROMORPHONE HYDROCHLORIDE 2 MG/ML
5 INJECTION INTRAMUSCULAR; INTRAVENOUS; SUBCUTANEOUS
Refills: 0 | Status: DISCONTINUED | OUTPATIENT
Start: 2019-01-01 | End: 2019-01-01

## 2019-01-01 RX ORDER — MULTIVIT-MIN/FERROUS GLUCONATE 9 MG/15 ML
1 LIQUID (ML) ORAL
Qty: 0 | Refills: 0 | DISCHARGE

## 2019-01-01 RX ORDER — METFORMIN HYDROCHLORIDE 850 MG/1
1 TABLET ORAL
Qty: 60 | Refills: 0
Start: 2019-01-01 | End: 2019-01-01

## 2019-01-01 RX ORDER — METHADONE HYDROCHLORIDE 40 MG/1
12.5 TABLET ORAL EVERY 6 HOURS
Refills: 0 | Status: DISCONTINUED | OUTPATIENT
Start: 2019-01-01 | End: 2019-01-01

## 2019-01-01 RX ORDER — MINERAL OIL
30 OIL (ML) MISCELLANEOUS
Qty: 210 | Refills: 0
Start: 2019-01-01 | End: 2019-01-01

## 2019-01-01 RX ORDER — ONDANSETRON 4 MG/1
4 TABLET, ORALLY DISINTEGRATING ORAL
Qty: 30 | Refills: 3 | Status: ACTIVE | COMMUNITY
Start: 2019-01-01 | End: 1900-01-01

## 2019-01-01 RX ORDER — HYDROMORPHONE HYDROCHLORIDE 8 MG/1
8 TABLET ORAL
Refills: 0 | Status: ACTIVE | COMMUNITY
Start: 2019-01-01

## 2019-01-01 RX ORDER — HYDROMORPHONE HYDROCHLORIDE 2 MG/ML
1 INJECTION INTRAMUSCULAR; INTRAVENOUS; SUBCUTANEOUS EVERY 4 HOURS
Refills: 0 | Status: DISCONTINUED | OUTPATIENT
Start: 2019-01-01 | End: 2019-01-01

## 2019-01-01 RX ORDER — ACETAMINOPHEN 500 MG
1000 TABLET ORAL EVERY 8 HOURS
Refills: 0 | Status: COMPLETED | OUTPATIENT
Start: 2019-01-01 | End: 2019-01-01

## 2019-01-01 RX ORDER — LACTOBACILLUS ACIDOPHILUS 100MM CELL
1 CAPSULE ORAL THREE TIMES A DAY
Refills: 0 | Status: DISCONTINUED | OUTPATIENT
Start: 2019-01-01 | End: 2019-01-01

## 2019-01-01 RX ORDER — ROBINUL 0.2 MG/ML
0.4 INJECTION INTRAMUSCULAR; INTRAVENOUS EVERY 6 HOURS
Refills: 0 | Status: DISCONTINUED | OUTPATIENT
Start: 2019-01-01 | End: 2019-01-01

## 2019-01-01 RX ORDER — HYDROMORPHONE HYDROCHLORIDE 2 MG/ML
16 INJECTION INTRAMUSCULAR; INTRAVENOUS; SUBCUTANEOUS ONCE
Refills: 0 | Status: DISCONTINUED | OUTPATIENT
Start: 2019-01-01 | End: 2019-01-01

## 2019-01-01 RX ORDER — HYDROMORPHONE HYDROCHLORIDE 2 MG/ML
2 INJECTION INTRAMUSCULAR; INTRAVENOUS; SUBCUTANEOUS EVERY 4 HOURS
Refills: 0 | Status: DISCONTINUED | OUTPATIENT
Start: 2019-01-01 | End: 2019-01-01

## 2019-01-01 RX ORDER — LIDOCAINE AND PRILOCAINE 25; 25 MG/G; MG/G
2.5-2.5 CREAM TOPICAL
Qty: 1 | Refills: 3 | Status: ACTIVE | COMMUNITY
Start: 2019-01-01 | End: 1900-01-01

## 2019-01-01 RX ORDER — METHADONE HYDROCHLORIDE 40 MG/1
9 TABLET ORAL
Qty: 0 | Refills: 0 | DISCHARGE
Start: 2019-01-01

## 2019-01-01 RX ORDER — MORPHINE SULFATE 50 MG/1
80 CAPSULE, EXTENDED RELEASE ORAL
Refills: 0 | Status: DISCONTINUED | OUTPATIENT
Start: 2019-01-01 | End: 2019-01-01

## 2019-01-01 RX ORDER — METHADONE HYDROCHLORIDE 40 MG/1
8 TABLET ORAL
Qty: 32 | Refills: 0
Start: 2019-01-01 | End: 2019-01-01

## 2019-01-01 RX ORDER — ACETAMINOPHEN 500 MG
650 TABLET ORAL EVERY 6 HOURS
Refills: 0 | Status: DISCONTINUED | OUTPATIENT
Start: 2019-01-01 | End: 2019-01-01

## 2019-01-01 RX ORDER — DULOXETINE HYDROCHLORIDE 30 MG/1
1 CAPSULE, DELAYED RELEASE ORAL
Qty: 0 | Refills: 0 | DISCHARGE
Start: 2019-01-01

## 2019-01-01 RX ORDER — MINERAL OIL
30 OIL (ML) MISCELLANEOUS ONCE
Refills: 0 | Status: COMPLETED | OUTPATIENT
Start: 2019-01-01 | End: 2019-01-01

## 2019-01-01 RX ORDER — SIMETHICONE 80 MG/1
1 TABLET, CHEWABLE ORAL
Qty: 0 | Refills: 0 | DISCHARGE
Start: 2019-01-01

## 2019-01-01 RX ADMIN — HYDROMORPHONE HYDROCHLORIDE 2.5 MILLIGRAM(S): 2 INJECTION INTRAMUSCULAR; INTRAVENOUS; SUBCUTANEOUS at 12:00

## 2019-01-01 RX ADMIN — HYDROMORPHONE HYDROCHLORIDE 6 MG/HR: 2 INJECTION INTRAMUSCULAR; INTRAVENOUS; SUBCUTANEOUS at 19:26

## 2019-01-01 RX ADMIN — ENOXAPARIN SODIUM 40 MILLIGRAM(S): 100 INJECTION SUBCUTANEOUS at 12:38

## 2019-01-01 RX ADMIN — METHADONE HYDROCHLORIDE 45 MILLIGRAM(S): 40 TABLET ORAL at 05:36

## 2019-01-01 RX ADMIN — Medication 1 GRAM(S): at 00:30

## 2019-01-01 RX ADMIN — SCOPALAMINE 1 PATCH: 1 PATCH, EXTENDED RELEASE TRANSDERMAL at 19:20

## 2019-01-01 RX ADMIN — Medication 1 GRAM(S): at 12:20

## 2019-01-01 RX ADMIN — Medication 1 GRAM(S): at 13:03

## 2019-01-01 RX ADMIN — METHADONE HYDROCHLORIDE 20 MILLIGRAM(S): 40 TABLET ORAL at 14:46

## 2019-01-01 RX ADMIN — Medication 0.1 MILLIGRAM(S): at 10:05

## 2019-01-01 RX ADMIN — DULOXETINE HYDROCHLORIDE 60 MILLIGRAM(S): 30 CAPSULE, DELAYED RELEASE ORAL at 06:54

## 2019-01-01 RX ADMIN — CHLORHEXIDINE GLUCONATE 1 APPLICATION(S): 213 SOLUTION TOPICAL at 12:39

## 2019-01-01 RX ADMIN — METHADONE HYDROCHLORIDE 45 MILLIGRAM(S): 40 TABLET ORAL at 06:26

## 2019-01-01 RX ADMIN — Medication 166.67 MILLIGRAM(S): at 05:32

## 2019-01-01 RX ADMIN — ENOXAPARIN SODIUM 40 MILLIGRAM(S): 100 INJECTION SUBCUTANEOUS at 13:04

## 2019-01-01 RX ADMIN — Medication 0.2 MG/KG/HR: at 19:27

## 2019-01-01 RX ADMIN — METHADONE HYDROCHLORIDE 20 MILLIGRAM(S): 40 TABLET ORAL at 21:36

## 2019-01-01 RX ADMIN — POLYETHYLENE GLYCOL 3350 17 GRAM(S): 17 POWDER, FOR SOLUTION ORAL at 06:05

## 2019-01-01 RX ADMIN — ENOXAPARIN SODIUM 40 MILLIGRAM(S): 100 INJECTION SUBCUTANEOUS at 20:48

## 2019-01-01 RX ADMIN — SCOPALAMINE 1 PATCH: 1 PATCH, EXTENDED RELEASE TRANSDERMAL at 15:24

## 2019-01-01 RX ADMIN — CHLORHEXIDINE GLUCONATE 1 APPLICATION(S): 213 SOLUTION TOPICAL at 11:05

## 2019-01-01 RX ADMIN — Medication 0.1 MILLIGRAM(S): at 13:36

## 2019-01-01 RX ADMIN — POLYETHYLENE GLYCOL 3350 17 GRAM(S): 17 POWDER, FOR SOLUTION ORAL at 17:34

## 2019-01-01 RX ADMIN — HYDROMORPHONE HYDROCHLORIDE 2 MILLIGRAM(S): 2 INJECTION INTRAMUSCULAR; INTRAVENOUS; SUBCUTANEOUS at 04:22

## 2019-01-01 RX ADMIN — Medication 1: at 12:00

## 2019-01-01 RX ADMIN — Medication 1 GRAM(S): at 17:34

## 2019-01-01 RX ADMIN — Medication 3: at 13:11

## 2019-01-01 RX ADMIN — HYDROMORPHONE HYDROCHLORIDE 12 MILLIGRAM(S): 2 INJECTION INTRAMUSCULAR; INTRAVENOUS; SUBCUTANEOUS at 13:17

## 2019-01-01 RX ADMIN — MORPHINE SULFATE 1 MILLIGRAM(S): 50 CAPSULE, EXTENDED RELEASE ORAL at 02:59

## 2019-01-01 RX ADMIN — MORPHINE SULFATE 2 MILLIGRAM(S): 50 CAPSULE, EXTENDED RELEASE ORAL at 13:56

## 2019-01-01 RX ADMIN — Medication 1 GRAM(S): at 00:13

## 2019-01-01 RX ADMIN — HEPARIN SODIUM 5000 UNIT(S): 5000 INJECTION INTRAVENOUS; SUBCUTANEOUS at 12:38

## 2019-01-01 RX ADMIN — ROBINUL 0.4 MILLIGRAM(S): 0.2 INJECTION INTRAMUSCULAR; INTRAVENOUS at 18:18

## 2019-01-01 RX ADMIN — Medication 1 GRAM(S): at 13:17

## 2019-01-01 RX ADMIN — HYDROMORPHONE HYDROCHLORIDE 3 MILLIGRAM(S): 2 INJECTION INTRAMUSCULAR; INTRAVENOUS; SUBCUTANEOUS at 20:00

## 2019-01-01 RX ADMIN — POLYETHYLENE GLYCOL 3350 17 GRAM(S): 17 POWDER, FOR SOLUTION ORAL at 17:58

## 2019-01-01 RX ADMIN — Medication 5 MILLIGRAM(S): at 21:56

## 2019-01-01 RX ADMIN — Medication 0.1 MILLIGRAM(S): at 22:00

## 2019-01-01 RX ADMIN — SIMETHICONE 80 MILLIGRAM(S): 80 TABLET, CHEWABLE ORAL at 22:50

## 2019-01-01 RX ADMIN — HYDROMORPHONE HYDROCHLORIDE 2 MILLIGRAM(S): 2 INJECTION INTRAMUSCULAR; INTRAVENOUS; SUBCUTANEOUS at 08:50

## 2019-01-01 RX ADMIN — MEROPENEM 100 MILLIGRAM(S): 1 INJECTION INTRAVENOUS at 15:22

## 2019-01-01 RX ADMIN — HYDROMORPHONE HYDROCHLORIDE 2 MILLIGRAM(S): 2 INJECTION INTRAMUSCULAR; INTRAVENOUS; SUBCUTANEOUS at 19:10

## 2019-01-01 RX ADMIN — PIPERACILLIN AND TAZOBACTAM 25 GRAM(S): 4; .5 INJECTION, POWDER, LYOPHILIZED, FOR SOLUTION INTRAVENOUS at 00:04

## 2019-01-01 RX ADMIN — METHADONE HYDROCHLORIDE 20 MILLIGRAM(S): 40 TABLET ORAL at 05:01

## 2019-01-01 RX ADMIN — DULOXETINE HYDROCHLORIDE 60 MILLIGRAM(S): 30 CAPSULE, DELAYED RELEASE ORAL at 18:30

## 2019-01-01 RX ADMIN — Medication 0.1 MILLIGRAM(S): at 05:32

## 2019-01-01 RX ADMIN — PIPERACILLIN AND TAZOBACTAM 25 GRAM(S): 4; .5 INJECTION, POWDER, LYOPHILIZED, FOR SOLUTION INTRAVENOUS at 15:21

## 2019-01-01 RX ADMIN — HYDROMORPHONE HYDROCHLORIDE 4 MILLIGRAM(S): 2 INJECTION INTRAMUSCULAR; INTRAVENOUS; SUBCUTANEOUS at 02:16

## 2019-01-01 RX ADMIN — Medication 1: at 18:30

## 2019-01-01 RX ADMIN — Medication 1 GRAM(S): at 18:22

## 2019-01-01 RX ADMIN — CHLORHEXIDINE GLUCONATE 1 APPLICATION(S): 213 SOLUTION TOPICAL at 11:47

## 2019-01-01 RX ADMIN — PIPERACILLIN AND TAZOBACTAM 25 GRAM(S): 4; .5 INJECTION, POWDER, LYOPHILIZED, FOR SOLUTION INTRAVENOUS at 08:50

## 2019-01-01 RX ADMIN — FENTANYL CITRATE 1 PATCH: 50 INJECTION INTRAVENOUS at 19:34

## 2019-01-01 RX ADMIN — Medication 0.1 MILLIGRAM(S): at 06:25

## 2019-01-01 RX ADMIN — POLYETHYLENE GLYCOL 3350 17 GRAM(S): 17 POWDER, FOR SOLUTION ORAL at 22:32

## 2019-01-01 RX ADMIN — HYDROMORPHONE HYDROCHLORIDE 3 MILLIGRAM(S): 2 INJECTION INTRAMUSCULAR; INTRAVENOUS; SUBCUTANEOUS at 09:15

## 2019-01-01 RX ADMIN — MEROPENEM 100 MILLIGRAM(S): 1 INJECTION INTRAVENOUS at 05:33

## 2019-01-01 RX ADMIN — FAMOTIDINE 20 MILLIGRAM(S): 10 INJECTION INTRAVENOUS at 11:04

## 2019-01-01 RX ADMIN — HYDROMORPHONE HYDROCHLORIDE 2 MILLIGRAM(S): 2 INJECTION INTRAMUSCULAR; INTRAVENOUS; SUBCUTANEOUS at 09:00

## 2019-01-01 RX ADMIN — Medication 1 GRAM(S): at 12:30

## 2019-01-01 RX ADMIN — Medication 0.1 MILLIGRAM(S): at 07:32

## 2019-01-01 RX ADMIN — METHADONE HYDROCHLORIDE 20 MILLIGRAM(S): 40 TABLET ORAL at 06:33

## 2019-01-01 RX ADMIN — Medication 5 MILLIGRAM(S): at 22:36

## 2019-01-01 RX ADMIN — Medication 10 MILLIGRAM(S): at 15:00

## 2019-01-01 RX ADMIN — Medication 2: at 12:59

## 2019-01-01 RX ADMIN — HYDROMORPHONE HYDROCHLORIDE 12 MILLIGRAM(S): 2 INJECTION INTRAMUSCULAR; INTRAVENOUS; SUBCUTANEOUS at 15:58

## 2019-01-01 RX ADMIN — DULOXETINE HYDROCHLORIDE 60 MILLIGRAM(S): 30 CAPSULE, DELAYED RELEASE ORAL at 05:29

## 2019-01-01 RX ADMIN — Medication 1 TABLET(S): at 14:00

## 2019-01-01 RX ADMIN — HYDROMORPHONE HYDROCHLORIDE 6 MG/HR: 2 INJECTION INTRAMUSCULAR; INTRAVENOUS; SUBCUTANEOUS at 07:43

## 2019-01-01 RX ADMIN — Medication 40 MILLIEQUIVALENT(S): at 17:58

## 2019-01-01 RX ADMIN — Medication 1 TABLET(S): at 12:48

## 2019-01-01 RX ADMIN — Medication 0.1 MILLIGRAM(S): at 06:06

## 2019-01-01 RX ADMIN — Medication 1 GRAM(S): at 12:47

## 2019-01-01 RX ADMIN — PIPERACILLIN AND TAZOBACTAM 25 GRAM(S): 4; .5 INJECTION, POWDER, LYOPHILIZED, FOR SOLUTION INTRAVENOUS at 07:55

## 2019-01-01 RX ADMIN — ENOXAPARIN SODIUM 40 MILLIGRAM(S): 100 INJECTION SUBCUTANEOUS at 12:15

## 2019-01-01 RX ADMIN — CEFEPIME 100 MILLIGRAM(S): 1 INJECTION, POWDER, FOR SOLUTION INTRAMUSCULAR; INTRAVENOUS at 12:47

## 2019-01-01 RX ADMIN — SENNA PLUS 2 TABLET(S): 8.6 TABLET ORAL at 22:47

## 2019-01-01 RX ADMIN — METHADONE HYDROCHLORIDE 20 MILLIGRAM(S): 40 TABLET ORAL at 18:07

## 2019-01-01 RX ADMIN — Medication 2 MILLIGRAM(S): at 22:03

## 2019-01-01 RX ADMIN — Medication 0.1 MILLIGRAM(S): at 13:31

## 2019-01-01 RX ADMIN — HYDROMORPHONE HYDROCHLORIDE 12 MILLIGRAM(S): 2 INJECTION INTRAMUSCULAR; INTRAVENOUS; SUBCUTANEOUS at 23:46

## 2019-01-01 RX ADMIN — HYDROMORPHONE HYDROCHLORIDE 12 MILLIGRAM(S): 2 INJECTION INTRAMUSCULAR; INTRAVENOUS; SUBCUTANEOUS at 19:45

## 2019-01-01 RX ADMIN — HYDROMORPHONE HYDROCHLORIDE 30 MILLILITER(S): 2 INJECTION INTRAMUSCULAR; INTRAVENOUS; SUBCUTANEOUS at 07:32

## 2019-01-01 RX ADMIN — Medication 650 MILLIGRAM(S): at 08:46

## 2019-01-01 RX ADMIN — DULOXETINE HYDROCHLORIDE 60 MILLIGRAM(S): 30 CAPSULE, DELAYED RELEASE ORAL at 12:38

## 2019-01-01 RX ADMIN — MEROPENEM 100 MILLIGRAM(S): 1 INJECTION INTRAVENOUS at 22:46

## 2019-01-01 RX ADMIN — METHADONE HYDROCHLORIDE 20 MILLIGRAM(S): 40 TABLET ORAL at 12:38

## 2019-01-01 RX ADMIN — POLYETHYLENE GLYCOL 3350 17 GRAM(S): 17 POWDER, FOR SOLUTION ORAL at 04:16

## 2019-01-01 RX ADMIN — MORPHINE SULFATE 30 MILLIGRAM(S): 50 CAPSULE, EXTENDED RELEASE ORAL at 01:25

## 2019-01-01 RX ADMIN — HYDROMORPHONE HYDROCHLORIDE 30 MILLILITER(S): 2 INJECTION INTRAMUSCULAR; INTRAVENOUS; SUBCUTANEOUS at 18:47

## 2019-01-01 RX ADMIN — PIPERACILLIN AND TAZOBACTAM 25 GRAM(S): 4; .5 INJECTION, POWDER, LYOPHILIZED, FOR SOLUTION INTRAVENOUS at 16:46

## 2019-01-01 RX ADMIN — Medication 1 GRAM(S): at 23:51

## 2019-01-01 RX ADMIN — PIPERACILLIN AND TAZOBACTAM 25 GRAM(S): 4; .5 INJECTION, POWDER, LYOPHILIZED, FOR SOLUTION INTRAVENOUS at 22:26

## 2019-01-01 RX ADMIN — Medication 1 MILLIGRAM(S): at 09:45

## 2019-01-01 RX ADMIN — SCOPALAMINE 1 PATCH: 1 PATCH, EXTENDED RELEASE TRANSDERMAL at 07:17

## 2019-01-01 RX ADMIN — METHADONE HYDROCHLORIDE 45 MILLIGRAM(S): 40 TABLET ORAL at 17:44

## 2019-01-01 RX ADMIN — MORPHINE SULFATE 4 MILLIGRAM(S): 50 CAPSULE, EXTENDED RELEASE ORAL at 23:09

## 2019-01-01 RX ADMIN — HYDROMORPHONE HYDROCHLORIDE 7.5 MILLIGRAM(S): 2 INJECTION INTRAMUSCULAR; INTRAVENOUS; SUBCUTANEOUS at 15:47

## 2019-01-01 RX ADMIN — METHADONE HYDROCHLORIDE 25 MILLIGRAM(S): 40 TABLET ORAL at 17:34

## 2019-01-01 RX ADMIN — HYDROMORPHONE HYDROCHLORIDE 12 MILLIGRAM(S): 2 INJECTION INTRAMUSCULAR; INTRAVENOUS; SUBCUTANEOUS at 11:58

## 2019-01-01 RX ADMIN — MORPHINE SULFATE 1 MILLIGRAM(S): 50 CAPSULE, EXTENDED RELEASE ORAL at 16:39

## 2019-01-01 RX ADMIN — ENOXAPARIN SODIUM 40 MILLIGRAM(S): 100 INJECTION SUBCUTANEOUS at 12:39

## 2019-01-01 RX ADMIN — Medication 5 MILLIGRAM(S): at 23:15

## 2019-01-01 RX ADMIN — Medication 166.67 MILLIGRAM(S): at 17:37

## 2019-01-01 RX ADMIN — Medication 0.1 MILLIGRAM(S): at 22:08

## 2019-01-01 RX ADMIN — Medication 1 GRAM(S): at 18:05

## 2019-01-01 RX ADMIN — METHADONE HYDROCHLORIDE 20 MILLIGRAM(S): 40 TABLET ORAL at 11:59

## 2019-01-01 RX ADMIN — FAMOTIDINE 20 MILLIGRAM(S): 10 INJECTION INTRAVENOUS at 12:15

## 2019-01-01 RX ADMIN — Medication 1 GRAM(S): at 18:26

## 2019-01-01 RX ADMIN — SODIUM CHLORIDE 20 MILLILITER(S): 9 INJECTION INTRAMUSCULAR; INTRAVENOUS; SUBCUTANEOUS at 07:26

## 2019-01-01 RX ADMIN — METHADONE HYDROCHLORIDE 25 MILLIGRAM(S): 40 TABLET ORAL at 12:38

## 2019-01-01 RX ADMIN — Medication 3: at 19:34

## 2019-01-01 RX ADMIN — Medication 1 GRAM(S): at 06:05

## 2019-01-01 RX ADMIN — Medication 1 GRAM(S): at 05:56

## 2019-01-01 RX ADMIN — ROBINUL 0.4 MILLIGRAM(S): 0.2 INJECTION INTRAMUSCULAR; INTRAVENOUS at 00:15

## 2019-01-01 RX ADMIN — ENOXAPARIN SODIUM 40 MILLIGRAM(S): 100 INJECTION SUBCUTANEOUS at 13:37

## 2019-01-01 RX ADMIN — Medication 0.1 MILLIGRAM(S): at 10:18

## 2019-01-01 RX ADMIN — Medication 1 GRAM(S): at 21:18

## 2019-01-01 RX ADMIN — ROBINUL 0.4 MILLIGRAM(S): 0.2 INJECTION INTRAMUSCULAR; INTRAVENOUS at 12:01

## 2019-01-01 RX ADMIN — METHADONE HYDROCHLORIDE 45 MILLIGRAM(S): 40 TABLET ORAL at 18:30

## 2019-01-01 RX ADMIN — Medication 0.2 MG/KG/HR: at 19:31

## 2019-01-01 RX ADMIN — HYDROMORPHONE HYDROCHLORIDE 4 MILLIGRAM(S): 2 INJECTION INTRAMUSCULAR; INTRAVENOUS; SUBCUTANEOUS at 02:33

## 2019-01-01 RX ADMIN — Medication 0.1 MILLIGRAM(S): at 21:13

## 2019-01-01 RX ADMIN — MEROPENEM 100 MILLIGRAM(S): 1 INJECTION INTRAVENOUS at 23:00

## 2019-01-01 RX ADMIN — Medication 1 GRAM(S): at 00:28

## 2019-01-01 RX ADMIN — Medication 0.1 MILLIGRAM(S): at 12:30

## 2019-01-01 RX ADMIN — HYDROMORPHONE HYDROCHLORIDE 4 MILLIGRAM(S): 2 INJECTION INTRAMUSCULAR; INTRAVENOUS; SUBCUTANEOUS at 13:40

## 2019-01-01 RX ADMIN — SENNA PLUS 2 TABLET(S): 8.6 TABLET ORAL at 22:02

## 2019-01-01 RX ADMIN — MEROPENEM 100 MILLIGRAM(S): 1 INJECTION INTRAVENOUS at 14:23

## 2019-01-01 RX ADMIN — HYDROMORPHONE HYDROCHLORIDE 2 MILLIGRAM(S): 2 INJECTION INTRAMUSCULAR; INTRAVENOUS; SUBCUTANEOUS at 21:00

## 2019-01-01 RX ADMIN — PIPERACILLIN AND TAZOBACTAM 25 GRAM(S): 4; .5 INJECTION, POWDER, LYOPHILIZED, FOR SOLUTION INTRAVENOUS at 00:33

## 2019-01-01 RX ADMIN — HYDROMORPHONE HYDROCHLORIDE 3 MILLIGRAM(S): 2 INJECTION INTRAMUSCULAR; INTRAVENOUS; SUBCUTANEOUS at 00:45

## 2019-01-01 RX ADMIN — Medication 2 MILLIGRAM(S): at 06:06

## 2019-01-01 RX ADMIN — SCOPALAMINE 1 PATCH: 1 PATCH, EXTENDED RELEASE TRANSDERMAL at 07:24

## 2019-01-01 RX ADMIN — HYDROMORPHONE HYDROCHLORIDE 6 MG/HR: 2 INJECTION INTRAMUSCULAR; INTRAVENOUS; SUBCUTANEOUS at 18:26

## 2019-01-01 RX ADMIN — METHADONE HYDROCHLORIDE 25 MILLIGRAM(S): 40 TABLET ORAL at 05:34

## 2019-01-01 RX ADMIN — MORPHINE SULFATE 60 MILLIGRAM(S): 50 CAPSULE, EXTENDED RELEASE ORAL at 19:37

## 2019-01-01 RX ADMIN — Medication 0.5 MILLIGRAM(S): at 01:33

## 2019-01-01 RX ADMIN — Medication 1: at 08:23

## 2019-01-01 RX ADMIN — MORPHINE SULFATE 30 MILLIGRAM(S): 50 CAPSULE, EXTENDED RELEASE ORAL at 17:21

## 2019-01-01 RX ADMIN — HYDROMORPHONE HYDROCHLORIDE 12 MILLIGRAM(S): 2 INJECTION INTRAMUSCULAR; INTRAVENOUS; SUBCUTANEOUS at 15:08

## 2019-01-01 RX ADMIN — FENTANYL CITRATE 1 PATCH: 50 INJECTION INTRAVENOUS at 07:22

## 2019-01-01 RX ADMIN — ENOXAPARIN SODIUM 40 MILLIGRAM(S): 100 INJECTION SUBCUTANEOUS at 13:08

## 2019-01-01 RX ADMIN — FAMOTIDINE 20 MILLIGRAM(S): 10 INJECTION INTRAVENOUS at 12:10

## 2019-01-01 RX ADMIN — Medication 2: at 13:22

## 2019-01-01 RX ADMIN — SCOPALAMINE 1 PATCH: 1 PATCH, EXTENDED RELEASE TRANSDERMAL at 15:39

## 2019-01-01 RX ADMIN — Medication 1: at 13:19

## 2019-01-01 RX ADMIN — MORPHINE SULFATE 75 MILLIGRAM(S): 50 CAPSULE, EXTENDED RELEASE ORAL at 18:20

## 2019-01-01 RX ADMIN — ROBINUL 0.4 MILLIGRAM(S): 0.2 INJECTION INTRAMUSCULAR; INTRAVENOUS at 17:45

## 2019-01-01 RX ADMIN — MORPHINE SULFATE 30 MILLIGRAM(S): 50 CAPSULE, EXTENDED RELEASE ORAL at 09:08

## 2019-01-01 RX ADMIN — MORPHINE SULFATE 60 MILLIGRAM(S): 50 CAPSULE, EXTENDED RELEASE ORAL at 20:20

## 2019-01-01 RX ADMIN — SODIUM CHLORIDE 100 MILLILITER(S): 9 INJECTION INTRAMUSCULAR; INTRAVENOUS; SUBCUTANEOUS at 00:13

## 2019-01-01 RX ADMIN — DULOXETINE HYDROCHLORIDE 60 MILLIGRAM(S): 30 CAPSULE, DELAYED RELEASE ORAL at 12:23

## 2019-01-01 RX ADMIN — Medication 5 MILLIGRAM(S): at 22:17

## 2019-01-01 RX ADMIN — SODIUM CHLORIDE 20 MILLILITER(S): 9 INJECTION INTRAMUSCULAR; INTRAVENOUS; SUBCUTANEOUS at 05:59

## 2019-01-01 RX ADMIN — CHLORHEXIDINE GLUCONATE 1 APPLICATION(S): 213 SOLUTION TOPICAL at 13:04

## 2019-01-01 RX ADMIN — HYDROMORPHONE HYDROCHLORIDE 30 MILLILITER(S): 2 INJECTION INTRAMUSCULAR; INTRAVENOUS; SUBCUTANEOUS at 19:10

## 2019-01-01 RX ADMIN — Medication 5 MILLIGRAM(S): at 22:15

## 2019-01-01 RX ADMIN — Medication 0.1 MILLIGRAM(S): at 23:00

## 2019-01-01 RX ADMIN — HYDROMORPHONE HYDROCHLORIDE 4 MILLIGRAM(S): 2 INJECTION INTRAMUSCULAR; INTRAVENOUS; SUBCUTANEOUS at 08:25

## 2019-01-01 RX ADMIN — Medication 0.1 MILLIGRAM(S): at 22:14

## 2019-01-01 RX ADMIN — FENTANYL CITRATE 1 PATCH: 50 INJECTION INTRAVENOUS at 07:23

## 2019-01-01 RX ADMIN — Medication 0.2 MG/KG/HR: at 15:28

## 2019-01-01 RX ADMIN — HYDROMORPHONE HYDROCHLORIDE 2.5 MILLIGRAM(S): 2 INJECTION INTRAMUSCULAR; INTRAVENOUS; SUBCUTANEOUS at 22:10

## 2019-01-01 RX ADMIN — Medication 0.1 MILLIGRAM(S): at 05:46

## 2019-01-01 RX ADMIN — HYDROMORPHONE HYDROCHLORIDE 12 MILLIGRAM(S): 2 INJECTION INTRAMUSCULAR; INTRAVENOUS; SUBCUTANEOUS at 20:00

## 2019-01-01 RX ADMIN — Medication 2 MILLIGRAM(S): at 10:34

## 2019-01-01 RX ADMIN — HYDROMORPHONE HYDROCHLORIDE 16 MILLIGRAM(S): 2 INJECTION INTRAMUSCULAR; INTRAVENOUS; SUBCUTANEOUS at 19:52

## 2019-01-01 RX ADMIN — SODIUM CHLORIDE 20 MILLILITER(S): 9 INJECTION INTRAMUSCULAR; INTRAVENOUS; SUBCUTANEOUS at 19:33

## 2019-01-01 RX ADMIN — MORPHINE SULFATE 75 MILLIGRAM(S): 50 CAPSULE, EXTENDED RELEASE ORAL at 06:06

## 2019-01-01 RX ADMIN — HYDROMORPHONE HYDROCHLORIDE 2 MILLIGRAM(S): 2 INJECTION INTRAMUSCULAR; INTRAVENOUS; SUBCUTANEOUS at 13:20

## 2019-01-01 RX ADMIN — HYDROMORPHONE HYDROCHLORIDE 6 MG/HR: 2 INJECTION INTRAMUSCULAR; INTRAVENOUS; SUBCUTANEOUS at 07:26

## 2019-01-01 RX ADMIN — METHADONE HYDROCHLORIDE 20 MILLIGRAM(S): 40 TABLET ORAL at 12:28

## 2019-01-01 RX ADMIN — Medication 100 MILLIGRAM(S): at 11:56

## 2019-01-01 RX ADMIN — HYDROMORPHONE HYDROCHLORIDE 2.5 MILLIGRAM(S): 2 INJECTION INTRAMUSCULAR; INTRAVENOUS; SUBCUTANEOUS at 02:29

## 2019-01-01 RX ADMIN — METHADONE HYDROCHLORIDE 20 MILLIGRAM(S): 40 TABLET ORAL at 23:52

## 2019-01-01 RX ADMIN — FAMOTIDINE 20 MILLIGRAM(S): 10 INJECTION INTRAVENOUS at 00:09

## 2019-01-01 RX ADMIN — DULOXETINE HYDROCHLORIDE 60 MILLIGRAM(S): 30 CAPSULE, DELAYED RELEASE ORAL at 12:37

## 2019-01-01 RX ADMIN — CHLORHEXIDINE GLUCONATE 1 APPLICATION(S): 213 SOLUTION TOPICAL at 12:35

## 2019-01-01 RX ADMIN — ENOXAPARIN SODIUM 40 MILLIGRAM(S): 100 INJECTION SUBCUTANEOUS at 15:23

## 2019-01-01 RX ADMIN — HYDROMORPHONE HYDROCHLORIDE 30 MILLILITER(S): 2 INJECTION INTRAMUSCULAR; INTRAVENOUS; SUBCUTANEOUS at 12:53

## 2019-01-01 RX ADMIN — HYDROMORPHONE HYDROCHLORIDE 16 MILLIGRAM(S): 2 INJECTION INTRAMUSCULAR; INTRAVENOUS; SUBCUTANEOUS at 15:16

## 2019-01-01 RX ADMIN — Medication 0.4 MG/KG/HR: at 17:24

## 2019-01-01 RX ADMIN — SODIUM CHLORIDE 20 MILLILITER(S): 9 INJECTION INTRAMUSCULAR; INTRAVENOUS; SUBCUTANEOUS at 15:22

## 2019-01-01 RX ADMIN — METHADONE HYDROCHLORIDE 20 MILLIGRAM(S): 40 TABLET ORAL at 06:06

## 2019-01-01 RX ADMIN — HYDROMORPHONE HYDROCHLORIDE 30 MILLILITER(S): 2 INJECTION INTRAMUSCULAR; INTRAVENOUS; SUBCUTANEOUS at 20:51

## 2019-01-01 RX ADMIN — POLYETHYLENE GLYCOL 3350 17 GRAM(S): 17 POWDER, FOR SOLUTION ORAL at 09:26

## 2019-01-01 RX ADMIN — Medication 1 GRAM(S): at 22:32

## 2019-01-01 RX ADMIN — HYDROMORPHONE HYDROCHLORIDE 2.5 MILLIGRAM(S): 2 INJECTION INTRAMUSCULAR; INTRAVENOUS; SUBCUTANEOUS at 21:28

## 2019-01-01 RX ADMIN — DULOXETINE HYDROCHLORIDE 60 MILLIGRAM(S): 30 CAPSULE, DELAYED RELEASE ORAL at 17:07

## 2019-01-01 RX ADMIN — ENOXAPARIN SODIUM 40 MILLIGRAM(S): 100 INJECTION SUBCUTANEOUS at 11:50

## 2019-01-01 RX ADMIN — MORPHINE SULFATE 30 MILLIGRAM(S): 50 CAPSULE, EXTENDED RELEASE ORAL at 06:40

## 2019-01-01 RX ADMIN — METHADONE HYDROCHLORIDE 20 MILLIGRAM(S): 40 TABLET ORAL at 06:08

## 2019-01-01 RX ADMIN — Medication 1 GRAM(S): at 13:20

## 2019-01-01 RX ADMIN — PIPERACILLIN AND TAZOBACTAM 25 GRAM(S): 4; .5 INJECTION, POWDER, LYOPHILIZED, FOR SOLUTION INTRAVENOUS at 23:16

## 2019-01-01 RX ADMIN — HYDROMORPHONE HYDROCHLORIDE 16 MILLIGRAM(S): 2 INJECTION INTRAMUSCULAR; INTRAVENOUS; SUBCUTANEOUS at 03:50

## 2019-01-01 RX ADMIN — Medication 50 MILLIGRAM(S): at 06:26

## 2019-01-01 RX ADMIN — MORPHINE SULFATE 75 MILLIGRAM(S): 50 CAPSULE, EXTENDED RELEASE ORAL at 06:32

## 2019-01-01 RX ADMIN — Medication 0.1 MILLIGRAM(S): at 08:02

## 2019-01-01 RX ADMIN — HYDROMORPHONE HYDROCHLORIDE 12 MILLIGRAM(S): 2 INJECTION INTRAMUSCULAR; INTRAVENOUS; SUBCUTANEOUS at 16:10

## 2019-01-01 RX ADMIN — Medication 1 GRAM(S): at 01:26

## 2019-01-01 RX ADMIN — LACTULOSE 10 GRAM(S): 10 SOLUTION ORAL at 17:38

## 2019-01-01 RX ADMIN — HYDROMORPHONE HYDROCHLORIDE 3.2 MILLIGRAM(S): 2 INJECTION INTRAMUSCULAR; INTRAVENOUS; SUBCUTANEOUS at 21:52

## 2019-01-01 RX ADMIN — PIPERACILLIN AND TAZOBACTAM 25 GRAM(S): 4; .5 INJECTION, POWDER, LYOPHILIZED, FOR SOLUTION INTRAVENOUS at 17:48

## 2019-01-01 RX ADMIN — ENOXAPARIN SODIUM 40 MILLIGRAM(S): 100 INJECTION SUBCUTANEOUS at 11:59

## 2019-01-01 RX ADMIN — METHADONE HYDROCHLORIDE 20 MILLIGRAM(S): 40 TABLET ORAL at 23:34

## 2019-01-01 RX ADMIN — HYDROMORPHONE HYDROCHLORIDE 6 MG/HR: 2 INJECTION INTRAMUSCULAR; INTRAVENOUS; SUBCUTANEOUS at 11:39

## 2019-01-01 RX ADMIN — SENNA PLUS 2 TABLET(S): 8.6 TABLET ORAL at 23:00

## 2019-01-01 RX ADMIN — DULOXETINE HYDROCHLORIDE 60 MILLIGRAM(S): 30 CAPSULE, DELAYED RELEASE ORAL at 12:13

## 2019-01-01 RX ADMIN — HYDROMORPHONE HYDROCHLORIDE 16 MILLIGRAM(S): 2 INJECTION INTRAMUSCULAR; INTRAVENOUS; SUBCUTANEOUS at 00:02

## 2019-01-01 RX ADMIN — SODIUM CHLORIDE 20 MILLILITER(S): 9 INJECTION INTRAMUSCULAR; INTRAVENOUS; SUBCUTANEOUS at 23:35

## 2019-01-01 RX ADMIN — POLYETHYLENE GLYCOL 3350 17 GRAM(S): 17 POWDER, FOR SOLUTION ORAL at 17:05

## 2019-01-01 RX ADMIN — Medication 0.1 MILLIGRAM(S): at 17:21

## 2019-01-01 RX ADMIN — FENTANYL CITRATE 1 PATCH: 50 INJECTION INTRAVENOUS at 14:30

## 2019-01-01 RX ADMIN — POLYETHYLENE GLYCOL 3350 17 GRAM(S): 17 POWDER, FOR SOLUTION ORAL at 06:09

## 2019-01-01 RX ADMIN — SENNA PLUS 2 TABLET(S): 8.6 TABLET ORAL at 21:37

## 2019-01-01 RX ADMIN — Medication 50 MILLIGRAM(S): at 05:32

## 2019-01-01 RX ADMIN — Medication 0.1 MILLIGRAM(S): at 21:37

## 2019-01-01 RX ADMIN — POLYETHYLENE GLYCOL 3350 17 GRAM(S): 17 POWDER, FOR SOLUTION ORAL at 05:56

## 2019-01-01 RX ADMIN — HYDROMORPHONE HYDROCHLORIDE 30 MILLILITER(S): 2 INJECTION INTRAMUSCULAR; INTRAVENOUS; SUBCUTANEOUS at 07:11

## 2019-01-01 RX ADMIN — Medication 1 GRAM(S): at 17:07

## 2019-01-01 RX ADMIN — DULOXETINE HYDROCHLORIDE 60 MILLIGRAM(S): 30 CAPSULE, DELAYED RELEASE ORAL at 17:20

## 2019-01-01 RX ADMIN — HYDROMORPHONE HYDROCHLORIDE 8 MILLIGRAM(S): 2 INJECTION INTRAMUSCULAR; INTRAVENOUS; SUBCUTANEOUS at 12:15

## 2019-01-01 RX ADMIN — HYDROMORPHONE HYDROCHLORIDE 6 MG/HR: 2 INJECTION INTRAMUSCULAR; INTRAVENOUS; SUBCUTANEOUS at 03:38

## 2019-01-01 RX ADMIN — ONDANSETRON 4 MILLIGRAM(S): 8 TABLET, FILM COATED ORAL at 01:46

## 2019-01-01 RX ADMIN — HYDROMORPHONE HYDROCHLORIDE 3 MILLIGRAM(S): 2 INJECTION INTRAMUSCULAR; INTRAVENOUS; SUBCUTANEOUS at 01:00

## 2019-01-01 RX ADMIN — MORPHINE SULFATE 60 MILLIGRAM(S): 50 CAPSULE, EXTENDED RELEASE ORAL at 05:35

## 2019-01-01 RX ADMIN — Medication 1 GRAM(S): at 06:28

## 2019-01-01 RX ADMIN — HYDROMORPHONE HYDROCHLORIDE 3 MILLIGRAM(S): 2 INJECTION INTRAMUSCULAR; INTRAVENOUS; SUBCUTANEOUS at 14:50

## 2019-01-01 RX ADMIN — PIPERACILLIN AND TAZOBACTAM 25 GRAM(S): 4; .5 INJECTION, POWDER, LYOPHILIZED, FOR SOLUTION INTRAVENOUS at 08:40

## 2019-01-01 RX ADMIN — Medication 0.1 MILLIGRAM(S): at 11:42

## 2019-01-01 RX ADMIN — METHADONE HYDROCHLORIDE 20 MILLIGRAM(S): 40 TABLET ORAL at 05:43

## 2019-01-01 RX ADMIN — ENOXAPARIN SODIUM 40 MILLIGRAM(S): 100 INJECTION SUBCUTANEOUS at 12:10

## 2019-01-01 RX ADMIN — METHADONE HYDROCHLORIDE 20 MILLIGRAM(S): 40 TABLET ORAL at 03:20

## 2019-01-01 RX ADMIN — HYDROMORPHONE HYDROCHLORIDE 3.2 MILLIGRAM(S): 2 INJECTION INTRAMUSCULAR; INTRAVENOUS; SUBCUTANEOUS at 14:45

## 2019-01-01 RX ADMIN — HYDROMORPHONE HYDROCHLORIDE 12 MILLIGRAM(S): 2 INJECTION INTRAMUSCULAR; INTRAVENOUS; SUBCUTANEOUS at 22:47

## 2019-01-01 RX ADMIN — Medication 1: at 12:43

## 2019-01-01 RX ADMIN — MORPHINE SULFATE 90 MILLIGRAM(S): 50 CAPSULE, EXTENDED RELEASE ORAL at 18:15

## 2019-01-01 RX ADMIN — HYDROMORPHONE HYDROCHLORIDE 3.2 MILLIGRAM(S): 2 INJECTION INTRAMUSCULAR; INTRAVENOUS; SUBCUTANEOUS at 18:37

## 2019-01-01 RX ADMIN — Medication 1: at 08:58

## 2019-01-01 RX ADMIN — DULOXETINE HYDROCHLORIDE 60 MILLIGRAM(S): 30 CAPSULE, DELAYED RELEASE ORAL at 06:06

## 2019-01-01 RX ADMIN — MORPHINE SULFATE 2 MILLIGRAM(S): 50 CAPSULE, EXTENDED RELEASE ORAL at 06:19

## 2019-01-01 RX ADMIN — Medication 0.1 MILLIGRAM(S): at 09:21

## 2019-01-01 RX ADMIN — ENOXAPARIN SODIUM 40 MILLIGRAM(S): 100 INJECTION SUBCUTANEOUS at 12:32

## 2019-01-01 RX ADMIN — ONDANSETRON 4 MILLIGRAM(S): 8 TABLET, FILM COATED ORAL at 16:07

## 2019-01-01 RX ADMIN — MORPHINE SULFATE 30 MILLIGRAM(S): 50 CAPSULE, EXTENDED RELEASE ORAL at 14:45

## 2019-01-01 RX ADMIN — METHADONE HYDROCHLORIDE 45 MILLIGRAM(S): 40 TABLET ORAL at 06:32

## 2019-01-01 RX ADMIN — HYDROMORPHONE HYDROCHLORIDE 30 MILLILITER(S): 2 INJECTION INTRAMUSCULAR; INTRAVENOUS; SUBCUTANEOUS at 12:42

## 2019-01-01 RX ADMIN — Medication 1: at 17:38

## 2019-01-01 RX ADMIN — Medication 0.1 MILLIGRAM(S): at 13:40

## 2019-01-01 RX ADMIN — CHLORHEXIDINE GLUCONATE 1 APPLICATION(S): 213 SOLUTION TOPICAL at 11:39

## 2019-01-01 RX ADMIN — HYDROMORPHONE HYDROCHLORIDE 30 MILLILITER(S): 2 INJECTION INTRAMUSCULAR; INTRAVENOUS; SUBCUTANEOUS at 19:16

## 2019-01-01 RX ADMIN — DULOXETINE HYDROCHLORIDE 60 MILLIGRAM(S): 30 CAPSULE, DELAYED RELEASE ORAL at 13:03

## 2019-01-01 RX ADMIN — FENTANYL CITRATE 30 MICROGRAM(S): 50 INJECTION INTRAVENOUS at 17:02

## 2019-01-01 RX ADMIN — Medication 0.1 MILLIGRAM(S): at 21:18

## 2019-01-01 RX ADMIN — HYDROMORPHONE HYDROCHLORIDE 6 MG/HR: 2 INJECTION INTRAMUSCULAR; INTRAVENOUS; SUBCUTANEOUS at 19:32

## 2019-01-01 RX ADMIN — HYDROMORPHONE HYDROCHLORIDE 3.2 MILLIGRAM(S): 2 INJECTION INTRAMUSCULAR; INTRAVENOUS; SUBCUTANEOUS at 06:06

## 2019-01-01 RX ADMIN — METHADONE HYDROCHLORIDE 12.5 MILLIGRAM(S): 40 TABLET ORAL at 05:21

## 2019-01-01 RX ADMIN — Medication 0.1 MILLIGRAM(S): at 22:57

## 2019-01-01 RX ADMIN — Medication 1 GRAM(S): at 06:27

## 2019-01-01 RX ADMIN — Medication 1: at 17:34

## 2019-01-01 RX ADMIN — HYDROMORPHONE HYDROCHLORIDE 6 MG/HR: 2 INJECTION INTRAMUSCULAR; INTRAVENOUS; SUBCUTANEOUS at 07:28

## 2019-01-01 RX ADMIN — HYDROMORPHONE HYDROCHLORIDE 4 MILLIGRAM(S): 2 INJECTION INTRAMUSCULAR; INTRAVENOUS; SUBCUTANEOUS at 06:53

## 2019-01-01 RX ADMIN — Medication 0.1 MILLIGRAM(S): at 21:42

## 2019-01-01 RX ADMIN — Medication 0.1 MILLIGRAM(S): at 13:17

## 2019-01-01 RX ADMIN — MORPHINE SULFATE 2 MILLIGRAM(S): 50 CAPSULE, EXTENDED RELEASE ORAL at 06:35

## 2019-01-01 RX ADMIN — Medication 1 GRAM(S): at 23:52

## 2019-01-01 RX ADMIN — HYDROMORPHONE HYDROCHLORIDE 2.5 MILLIGRAM(S): 2 INJECTION INTRAMUSCULAR; INTRAVENOUS; SUBCUTANEOUS at 03:08

## 2019-01-01 RX ADMIN — DULOXETINE HYDROCHLORIDE 60 MILLIGRAM(S): 30 CAPSULE, DELAYED RELEASE ORAL at 06:52

## 2019-01-01 RX ADMIN — POLYETHYLENE GLYCOL 3350 17 GRAM(S): 17 POWDER, FOR SOLUTION ORAL at 08:57

## 2019-01-01 RX ADMIN — HYDROMORPHONE HYDROCHLORIDE 3 MILLIGRAM(S): 2 INJECTION INTRAMUSCULAR; INTRAVENOUS; SUBCUTANEOUS at 14:45

## 2019-01-01 RX ADMIN — SODIUM CHLORIDE 1000 MILLILITER(S): 9 INJECTION INTRAMUSCULAR; INTRAVENOUS; SUBCUTANEOUS at 13:57

## 2019-01-01 RX ADMIN — MORPHINE SULFATE 30 MILLIGRAM(S): 50 CAPSULE, EXTENDED RELEASE ORAL at 23:33

## 2019-01-01 RX ADMIN — HYDROMORPHONE HYDROCHLORIDE 30 MILLILITER(S): 2 INJECTION INTRAMUSCULAR; INTRAVENOUS; SUBCUTANEOUS at 07:20

## 2019-01-01 RX ADMIN — HYDROMORPHONE HYDROCHLORIDE 2 MILLIGRAM(S): 2 INJECTION INTRAMUSCULAR; INTRAVENOUS; SUBCUTANEOUS at 00:45

## 2019-01-01 RX ADMIN — Medication 1000 MILLIGRAM(S): at 13:35

## 2019-01-01 RX ADMIN — POLYETHYLENE GLYCOL 3350 17 GRAM(S): 17 POWDER, FOR SOLUTION ORAL at 18:10

## 2019-01-01 RX ADMIN — HYDROMORPHONE HYDROCHLORIDE 12 MILLIGRAM(S): 2 INJECTION INTRAMUSCULAR; INTRAVENOUS; SUBCUTANEOUS at 10:22

## 2019-01-01 RX ADMIN — MORPHINE SULFATE 30 MILLIGRAM(S): 50 CAPSULE, EXTENDED RELEASE ORAL at 13:55

## 2019-01-01 RX ADMIN — ENOXAPARIN SODIUM 40 MILLIGRAM(S): 100 INJECTION SUBCUTANEOUS at 13:00

## 2019-01-01 RX ADMIN — Medication 2 MILLIGRAM(S): at 21:42

## 2019-01-01 RX ADMIN — HYDROMORPHONE HYDROCHLORIDE 2.5 MILLIGRAM(S): 2 INJECTION INTRAMUSCULAR; INTRAVENOUS; SUBCUTANEOUS at 00:03

## 2019-01-01 RX ADMIN — METHADONE HYDROCHLORIDE 20 MILLIGRAM(S): 40 TABLET ORAL at 05:37

## 2019-01-01 RX ADMIN — DULOXETINE HYDROCHLORIDE 60 MILLIGRAM(S): 30 CAPSULE, DELAYED RELEASE ORAL at 17:37

## 2019-01-01 RX ADMIN — HYDROMORPHONE HYDROCHLORIDE 30 MILLILITER(S): 2 INJECTION INTRAMUSCULAR; INTRAVENOUS; SUBCUTANEOUS at 00:34

## 2019-01-01 RX ADMIN — MORPHINE SULFATE 30 MILLIGRAM(S): 50 CAPSULE, EXTENDED RELEASE ORAL at 06:23

## 2019-01-01 RX ADMIN — Medication 20 MILLIEQUIVALENT(S): at 10:57

## 2019-01-01 RX ADMIN — METHADONE HYDROCHLORIDE 20 MILLIGRAM(S): 40 TABLET ORAL at 05:59

## 2019-01-01 RX ADMIN — SENNA PLUS 2 TABLET(S): 8.6 TABLET ORAL at 22:38

## 2019-01-01 RX ADMIN — Medication 1 GRAM(S): at 00:33

## 2019-01-01 RX ADMIN — Medication 650 MILLIGRAM(S): at 00:15

## 2019-01-01 RX ADMIN — HYDROMORPHONE HYDROCHLORIDE 12 MILLIGRAM(S): 2 INJECTION INTRAMUSCULAR; INTRAVENOUS; SUBCUTANEOUS at 08:38

## 2019-01-01 RX ADMIN — METHADONE HYDROCHLORIDE 20 MILLIGRAM(S): 40 TABLET ORAL at 20:44

## 2019-01-01 RX ADMIN — POLYETHYLENE GLYCOL 3350 17 GRAM(S): 17 POWDER, FOR SOLUTION ORAL at 16:50

## 2019-01-01 RX ADMIN — DULOXETINE HYDROCHLORIDE 60 MILLIGRAM(S): 30 CAPSULE, DELAYED RELEASE ORAL at 05:01

## 2019-01-01 RX ADMIN — HYDROMORPHONE HYDROCHLORIDE 2.5 MILLIGRAM(S): 2 INJECTION INTRAMUSCULAR; INTRAVENOUS; SUBCUTANEOUS at 07:58

## 2019-01-01 RX ADMIN — HYDROMORPHONE HYDROCHLORIDE 7.5 MILLIGRAM(S): 2 INJECTION INTRAMUSCULAR; INTRAVENOUS; SUBCUTANEOUS at 17:28

## 2019-01-01 RX ADMIN — Medication 2: at 13:40

## 2019-01-01 RX ADMIN — Medication 0.1 MILLIGRAM(S): at 13:12

## 2019-01-01 RX ADMIN — HYDROMORPHONE HYDROCHLORIDE 2 MILLIGRAM(S): 2 INJECTION INTRAMUSCULAR; INTRAVENOUS; SUBCUTANEOUS at 08:45

## 2019-01-01 RX ADMIN — HYDROMORPHONE HYDROCHLORIDE 16 MILLIGRAM(S): 2 INJECTION INTRAMUSCULAR; INTRAVENOUS; SUBCUTANEOUS at 15:50

## 2019-01-01 RX ADMIN — Medication 1 GRAM(S): at 05:40

## 2019-01-01 RX ADMIN — Medication 1: at 09:08

## 2019-01-01 RX ADMIN — FENTANYL CITRATE 1 PATCH: 50 INJECTION INTRAVENOUS at 07:16

## 2019-01-01 RX ADMIN — Medication 0.1 MILLIGRAM(S): at 01:39

## 2019-01-01 RX ADMIN — HYDROMORPHONE HYDROCHLORIDE 2 MILLIGRAM(S): 2 INJECTION INTRAMUSCULAR; INTRAVENOUS; SUBCUTANEOUS at 18:55

## 2019-01-01 RX ADMIN — Medication 0.1 MILLIGRAM(S): at 17:07

## 2019-01-01 RX ADMIN — SENNA PLUS 2 TABLET(S): 8.6 TABLET ORAL at 22:57

## 2019-01-01 RX ADMIN — HYDROMORPHONE HYDROCHLORIDE 2 MILLIGRAM(S): 2 INJECTION INTRAMUSCULAR; INTRAVENOUS; SUBCUTANEOUS at 04:02

## 2019-01-01 RX ADMIN — Medication 1 GRAM(S): at 17:44

## 2019-01-01 RX ADMIN — HYDROMORPHONE HYDROCHLORIDE 12 MILLIGRAM(S): 2 INJECTION INTRAMUSCULAR; INTRAVENOUS; SUBCUTANEOUS at 21:05

## 2019-01-01 RX ADMIN — SODIUM CHLORIDE 20 MILLILITER(S): 9 INJECTION INTRAMUSCULAR; INTRAVENOUS; SUBCUTANEOUS at 07:16

## 2019-01-01 RX ADMIN — Medication 0.1 MILLIGRAM(S): at 10:29

## 2019-01-01 RX ADMIN — SODIUM CHLORIDE 100 MILLILITER(S): 9 INJECTION, SOLUTION INTRAVENOUS at 00:19

## 2019-01-01 RX ADMIN — DULOXETINE HYDROCHLORIDE 60 MILLIGRAM(S): 30 CAPSULE, DELAYED RELEASE ORAL at 12:15

## 2019-01-01 RX ADMIN — SODIUM CHLORIDE 20 MILLILITER(S): 9 INJECTION INTRAMUSCULAR; INTRAVENOUS; SUBCUTANEOUS at 19:19

## 2019-01-01 RX ADMIN — HYDROMORPHONE HYDROCHLORIDE 4 MILLIGRAM(S): 2 INJECTION INTRAMUSCULAR; INTRAVENOUS; SUBCUTANEOUS at 17:35

## 2019-01-01 RX ADMIN — DULOXETINE HYDROCHLORIDE 60 MILLIGRAM(S): 30 CAPSULE, DELAYED RELEASE ORAL at 12:19

## 2019-01-01 RX ADMIN — MEROPENEM 100 MILLIGRAM(S): 1 INJECTION INTRAVENOUS at 21:25

## 2019-01-01 RX ADMIN — Medication 50 MILLIGRAM(S): at 05:46

## 2019-01-01 RX ADMIN — MORPHINE SULFATE 30 MILLIGRAM(S): 50 CAPSULE, EXTENDED RELEASE ORAL at 01:50

## 2019-01-01 RX ADMIN — POLYETHYLENE GLYCOL 3350 17 GRAM(S): 17 POWDER, FOR SOLUTION ORAL at 12:38

## 2019-01-01 RX ADMIN — METHADONE HYDROCHLORIDE 25 MILLIGRAM(S): 40 TABLET ORAL at 05:59

## 2019-01-01 RX ADMIN — DULOXETINE HYDROCHLORIDE 60 MILLIGRAM(S): 30 CAPSULE, DELAYED RELEASE ORAL at 17:54

## 2019-01-01 RX ADMIN — DULOXETINE HYDROCHLORIDE 60 MILLIGRAM(S): 30 CAPSULE, DELAYED RELEASE ORAL at 18:03

## 2019-01-01 RX ADMIN — SODIUM CHLORIDE 75 MILLILITER(S): 9 INJECTION INTRAMUSCULAR; INTRAVENOUS; SUBCUTANEOUS at 12:24

## 2019-01-01 RX ADMIN — Medication 1 GRAM(S): at 23:02

## 2019-01-01 RX ADMIN — Medication 1 GRAM(S): at 12:13

## 2019-01-01 RX ADMIN — Medication 1: at 18:40

## 2019-01-01 RX ADMIN — Medication 30 MILLILITER(S): at 11:48

## 2019-01-01 RX ADMIN — HYDROMORPHONE HYDROCHLORIDE 3 MILLIGRAM(S): 2 INJECTION INTRAMUSCULAR; INTRAVENOUS; SUBCUTANEOUS at 05:55

## 2019-01-01 RX ADMIN — SENNA PLUS 2 TABLET(S): 8.6 TABLET ORAL at 22:15

## 2019-01-01 RX ADMIN — Medication 1000 MILLIGRAM(S): at 06:10

## 2019-01-01 RX ADMIN — DULOXETINE HYDROCHLORIDE 60 MILLIGRAM(S): 30 CAPSULE, DELAYED RELEASE ORAL at 05:33

## 2019-01-01 RX ADMIN — HYDROMORPHONE HYDROCHLORIDE 3 MILLIGRAM(S): 2 INJECTION INTRAMUSCULAR; INTRAVENOUS; SUBCUTANEOUS at 17:45

## 2019-01-01 RX ADMIN — Medication 40 MILLIEQUIVALENT(S): at 17:08

## 2019-01-01 RX ADMIN — Medication 2: at 19:05

## 2019-01-01 RX ADMIN — Medication 0.1 MILLIGRAM(S): at 13:04

## 2019-01-01 RX ADMIN — MORPHINE SULFATE 30 MILLIGRAM(S): 50 CAPSULE, EXTENDED RELEASE ORAL at 20:30

## 2019-01-01 RX ADMIN — DULOXETINE HYDROCHLORIDE 60 MILLIGRAM(S): 30 CAPSULE, DELAYED RELEASE ORAL at 17:11

## 2019-01-01 RX ADMIN — METHADONE HYDROCHLORIDE 20 MILLIGRAM(S): 40 TABLET ORAL at 12:15

## 2019-01-01 RX ADMIN — Medication 0.1 MILLIGRAM(S): at 11:40

## 2019-01-01 RX ADMIN — PIPERACILLIN AND TAZOBACTAM 25 GRAM(S): 4; .5 INJECTION, POWDER, LYOPHILIZED, FOR SOLUTION INTRAVENOUS at 13:59

## 2019-01-01 RX ADMIN — Medication 1 GRAM(S): at 13:37

## 2019-01-01 RX ADMIN — Medication 0.1 MILLIGRAM(S): at 05:29

## 2019-01-01 RX ADMIN — POLYETHYLENE GLYCOL 3350 17 GRAM(S): 17 POWDER, FOR SOLUTION ORAL at 05:59

## 2019-01-01 RX ADMIN — HYDROMORPHONE HYDROCHLORIDE 2 MILLIGRAM(S): 2 INJECTION INTRAMUSCULAR; INTRAVENOUS; SUBCUTANEOUS at 17:20

## 2019-01-01 RX ADMIN — Medication 0.1 MILLIGRAM(S): at 13:20

## 2019-01-01 RX ADMIN — HYDROMORPHONE HYDROCHLORIDE 3 MILLIGRAM(S): 2 INJECTION INTRAMUSCULAR; INTRAVENOUS; SUBCUTANEOUS at 13:21

## 2019-01-01 RX ADMIN — HYDROMORPHONE HYDROCHLORIDE 3 MILLIGRAM(S): 2 INJECTION INTRAMUSCULAR; INTRAVENOUS; SUBCUTANEOUS at 20:17

## 2019-01-01 RX ADMIN — Medication 1: at 12:38

## 2019-01-01 RX ADMIN — HYDROMORPHONE HYDROCHLORIDE 30 MILLILITER(S): 2 INJECTION INTRAMUSCULAR; INTRAVENOUS; SUBCUTANEOUS at 18:01

## 2019-01-01 RX ADMIN — Medication 50 MILLIGRAM(S): at 06:32

## 2019-01-01 RX ADMIN — CYCLOBENZAPRINE HYDROCHLORIDE 5 MILLIGRAM(S): 10 TABLET, FILM COATED ORAL at 22:09

## 2019-01-01 RX ADMIN — MORPHINE SULFATE 60 MILLIGRAM(S): 50 CAPSULE, EXTENDED RELEASE ORAL at 06:41

## 2019-01-01 RX ADMIN — Medication 1: at 17:21

## 2019-01-01 RX ADMIN — Medication 1 GRAM(S): at 13:08

## 2019-01-01 RX ADMIN — ENOXAPARIN SODIUM 40 MILLIGRAM(S): 100 INJECTION SUBCUTANEOUS at 12:14

## 2019-01-01 RX ADMIN — Medication 0.1 MILLIGRAM(S): at 21:17

## 2019-01-01 RX ADMIN — FENTANYL CITRATE 1 PATCH: 50 INJECTION INTRAVENOUS at 14:56

## 2019-01-01 RX ADMIN — Medication 1 GRAM(S): at 18:31

## 2019-01-01 RX ADMIN — DULOXETINE HYDROCHLORIDE 60 MILLIGRAM(S): 30 CAPSULE, DELAYED RELEASE ORAL at 11:04

## 2019-01-01 RX ADMIN — Medication 1 GRAM(S): at 17:05

## 2019-01-01 RX ADMIN — Medication 1 GRAM(S): at 13:39

## 2019-01-01 RX ADMIN — Medication 2 MILLIGRAM(S): at 23:57

## 2019-01-01 RX ADMIN — DULOXETINE HYDROCHLORIDE 60 MILLIGRAM(S): 30 CAPSULE, DELAYED RELEASE ORAL at 18:50

## 2019-01-01 RX ADMIN — Medication 20 MILLIEQUIVALENT(S): at 22:57

## 2019-01-01 RX ADMIN — Medication 50 MILLIGRAM(S): at 06:25

## 2019-01-01 RX ADMIN — POLYETHYLENE GLYCOL 3350 17 GRAM(S): 17 POWDER, FOR SOLUTION ORAL at 17:13

## 2019-01-01 RX ADMIN — HYDROMORPHONE HYDROCHLORIDE 4 MILLIGRAM(S): 2 INJECTION INTRAMUSCULAR; INTRAVENOUS; SUBCUTANEOUS at 15:31

## 2019-01-01 RX ADMIN — Medication 1 GRAM(S): at 05:29

## 2019-01-01 RX ADMIN — CHLORHEXIDINE GLUCONATE 1 APPLICATION(S): 213 SOLUTION TOPICAL at 14:09

## 2019-01-01 RX ADMIN — HYDROMORPHONE HYDROCHLORIDE 16 MILLIGRAM(S): 2 INJECTION INTRAMUSCULAR; INTRAVENOUS; SUBCUTANEOUS at 09:30

## 2019-01-01 RX ADMIN — HYDROMORPHONE HYDROCHLORIDE 2.5 MILLIGRAM(S): 2 INJECTION INTRAMUSCULAR; INTRAVENOUS; SUBCUTANEOUS at 11:42

## 2019-01-01 RX ADMIN — Medication 0.1 MILLIGRAM(S): at 16:37

## 2019-01-01 RX ADMIN — METHADONE HYDROCHLORIDE 20 MILLIGRAM(S): 40 TABLET ORAL at 21:10

## 2019-01-01 RX ADMIN — Medication 50 MILLIGRAM(S): at 05:47

## 2019-01-01 RX ADMIN — Medication 1 GRAM(S): at 17:26

## 2019-01-01 RX ADMIN — FENTANYL CITRATE 1 PATCH: 50 INJECTION INTRAVENOUS at 14:29

## 2019-01-01 RX ADMIN — Medication 5 MILLIGRAM(S): at 22:02

## 2019-01-01 RX ADMIN — Medication 2: at 13:36

## 2019-01-01 RX ADMIN — HYDROMORPHONE HYDROCHLORIDE 30 MILLILITER(S): 2 INJECTION INTRAMUSCULAR; INTRAVENOUS; SUBCUTANEOUS at 11:13

## 2019-01-01 RX ADMIN — ENOXAPARIN SODIUM 40 MILLIGRAM(S): 100 INJECTION SUBCUTANEOUS at 13:21

## 2019-01-01 RX ADMIN — HYDROMORPHONE HYDROCHLORIDE 3 MILLIGRAM(S): 2 INJECTION INTRAMUSCULAR; INTRAVENOUS; SUBCUTANEOUS at 13:15

## 2019-01-01 RX ADMIN — Medication 1 GRAM(S): at 04:16

## 2019-01-01 RX ADMIN — Medication 5 MILLIGRAM(S): at 22:46

## 2019-01-01 RX ADMIN — HYDROMORPHONE HYDROCHLORIDE 4 MILLIGRAM(S): 2 INJECTION INTRAMUSCULAR; INTRAVENOUS; SUBCUTANEOUS at 19:38

## 2019-01-01 RX ADMIN — Medication 2: at 17:30

## 2019-01-01 RX ADMIN — FENTANYL CITRATE 1 PATCH: 50 INJECTION INTRAVENOUS at 14:47

## 2019-01-01 RX ADMIN — HYDROMORPHONE HYDROCHLORIDE 3 MILLIGRAM(S): 2 INJECTION INTRAMUSCULAR; INTRAVENOUS; SUBCUTANEOUS at 06:30

## 2019-01-01 RX ADMIN — FAMOTIDINE 20 MILLIGRAM(S): 10 INJECTION INTRAVENOUS at 12:23

## 2019-01-01 RX ADMIN — FENTANYL CITRATE 1 PATCH: 50 INJECTION INTRAVENOUS at 10:41

## 2019-01-01 RX ADMIN — DULOXETINE HYDROCHLORIDE 60 MILLIGRAM(S): 30 CAPSULE, DELAYED RELEASE ORAL at 06:27

## 2019-01-01 RX ADMIN — Medication 2 MILLIGRAM(S): at 06:26

## 2019-01-01 RX ADMIN — FAMOTIDINE 20 MILLIGRAM(S): 10 INJECTION INTRAVENOUS at 11:50

## 2019-01-01 RX ADMIN — SODIUM CHLORIDE 20 MILLILITER(S): 9 INJECTION INTRAMUSCULAR; INTRAVENOUS; SUBCUTANEOUS at 07:24

## 2019-01-01 RX ADMIN — POLYETHYLENE GLYCOL 3350 17 GRAM(S): 17 POWDER, FOR SOLUTION ORAL at 05:20

## 2019-01-01 RX ADMIN — MORPHINE SULFATE 90 MILLIGRAM(S): 50 CAPSULE, EXTENDED RELEASE ORAL at 18:42

## 2019-01-01 RX ADMIN — HYDROMORPHONE HYDROCHLORIDE 3 MILLIGRAM(S): 2 INJECTION INTRAMUSCULAR; INTRAVENOUS; SUBCUTANEOUS at 00:43

## 2019-01-01 RX ADMIN — Medication 1 GRAM(S): at 12:53

## 2019-01-01 RX ADMIN — Medication 1 GRAM(S): at 05:46

## 2019-01-01 RX ADMIN — Medication 166.67 MILLIGRAM(S): at 17:22

## 2019-01-01 RX ADMIN — HYDROMORPHONE HYDROCHLORIDE 3.2 MILLIGRAM(S): 2 INJECTION INTRAMUSCULAR; INTRAVENOUS; SUBCUTANEOUS at 11:41

## 2019-01-01 RX ADMIN — HEPARIN SODIUM 5000 UNIT(S): 5000 INJECTION INTRAVENOUS; SUBCUTANEOUS at 06:26

## 2019-01-01 RX ADMIN — MORPHINE SULFATE 1 MILLIGRAM(S): 50 CAPSULE, EXTENDED RELEASE ORAL at 17:59

## 2019-01-01 RX ADMIN — FAMOTIDINE 20 MILLIGRAM(S): 10 INJECTION INTRAVENOUS at 13:37

## 2019-01-01 RX ADMIN — SODIUM CHLORIDE 20 MILLILITER(S): 9 INJECTION INTRAMUSCULAR; INTRAVENOUS; SUBCUTANEOUS at 04:52

## 2019-01-01 RX ADMIN — Medication 0.1 MILLIGRAM(S): at 21:07

## 2019-01-01 RX ADMIN — POLYETHYLENE GLYCOL 3350 17 GRAM(S): 17 POWDER, FOR SOLUTION ORAL at 08:46

## 2019-01-01 RX ADMIN — Medication 40 MILLIEQUIVALENT(S): at 13:03

## 2019-01-01 RX ADMIN — METHADONE HYDROCHLORIDE 25 MILLIGRAM(S): 40 TABLET ORAL at 23:35

## 2019-01-01 RX ADMIN — HYDROMORPHONE HYDROCHLORIDE 2.5 MILLIGRAM(S): 2 INJECTION INTRAMUSCULAR; INTRAVENOUS; SUBCUTANEOUS at 17:30

## 2019-01-01 RX ADMIN — Medication 1 GRAM(S): at 12:38

## 2019-01-01 RX ADMIN — METHADONE HYDROCHLORIDE 20 MILLIGRAM(S): 40 TABLET ORAL at 13:12

## 2019-01-01 RX ADMIN — Medication 50 MILLIGRAM(S): at 05:01

## 2019-01-01 RX ADMIN — FENTANYL CITRATE 1 PATCH: 50 INJECTION INTRAVENOUS at 20:02

## 2019-01-01 RX ADMIN — HYDROMORPHONE HYDROCHLORIDE 30 MILLILITER(S): 2 INJECTION INTRAMUSCULAR; INTRAVENOUS; SUBCUTANEOUS at 19:14

## 2019-01-01 RX ADMIN — METHADONE HYDROCHLORIDE 20 MILLIGRAM(S): 40 TABLET ORAL at 08:24

## 2019-01-01 RX ADMIN — HYDROMORPHONE HYDROCHLORIDE 6 MG/HR: 2 INJECTION INTRAMUSCULAR; INTRAVENOUS; SUBCUTANEOUS at 09:49

## 2019-01-01 RX ADMIN — HYDROMORPHONE HYDROCHLORIDE 12 MILLIGRAM(S): 2 INJECTION INTRAMUSCULAR; INTRAVENOUS; SUBCUTANEOUS at 14:47

## 2019-01-01 RX ADMIN — METHADONE HYDROCHLORIDE 12.5 MILLIGRAM(S): 40 TABLET ORAL at 05:41

## 2019-01-01 RX ADMIN — DULOXETINE HYDROCHLORIDE 60 MILLIGRAM(S): 30 CAPSULE, DELAYED RELEASE ORAL at 05:36

## 2019-01-01 RX ADMIN — DULOXETINE HYDROCHLORIDE 60 MILLIGRAM(S): 30 CAPSULE, DELAYED RELEASE ORAL at 12:44

## 2019-01-01 RX ADMIN — DULOXETINE HYDROCHLORIDE 60 MILLIGRAM(S): 30 CAPSULE, DELAYED RELEASE ORAL at 13:08

## 2019-01-01 RX ADMIN — Medication 2 MILLIGRAM(S): at 14:00

## 2019-01-01 RX ADMIN — Medication 0.1 MILLIGRAM(S): at 22:15

## 2019-01-01 RX ADMIN — METHADONE HYDROCHLORIDE 20 MILLIGRAM(S): 40 TABLET ORAL at 13:03

## 2019-01-01 RX ADMIN — MEROPENEM 100 MILLIGRAM(S): 1 INJECTION INTRAVENOUS at 21:18

## 2019-01-01 RX ADMIN — METHADONE HYDROCHLORIDE 20 MILLIGRAM(S): 40 TABLET ORAL at 17:54

## 2019-01-01 RX ADMIN — Medication 1 GRAM(S): at 22:09

## 2019-01-01 RX ADMIN — MORPHINE SULFATE 30 MILLIGRAM(S): 50 CAPSULE, EXTENDED RELEASE ORAL at 22:11

## 2019-01-01 RX ADMIN — METHADONE HYDROCHLORIDE 12.5 MILLIGRAM(S): 40 TABLET ORAL at 06:03

## 2019-01-01 RX ADMIN — Medication 1 GRAM(S): at 11:57

## 2019-01-01 RX ADMIN — Medication 2: at 14:38

## 2019-01-01 RX ADMIN — Medication 2 MILLIGRAM(S): at 22:08

## 2019-01-01 RX ADMIN — HYDROMORPHONE HYDROCHLORIDE 4 MILLIGRAM(S): 2 INJECTION INTRAMUSCULAR; INTRAVENOUS; SUBCUTANEOUS at 13:24

## 2019-01-01 RX ADMIN — HYDROMORPHONE HYDROCHLORIDE 2 MILLIGRAM(S): 2 INJECTION INTRAMUSCULAR; INTRAVENOUS; SUBCUTANEOUS at 15:59

## 2019-01-01 RX ADMIN — POLYETHYLENE GLYCOL 3350 17 GRAM(S): 17 POWDER, FOR SOLUTION ORAL at 13:12

## 2019-01-01 RX ADMIN — POLYETHYLENE GLYCOL 3350 17 GRAM(S): 17 POWDER, FOR SOLUTION ORAL at 13:20

## 2019-01-01 RX ADMIN — HYDROMORPHONE HYDROCHLORIDE 16 MILLIGRAM(S): 2 INJECTION INTRAMUSCULAR; INTRAVENOUS; SUBCUTANEOUS at 21:30

## 2019-01-01 RX ADMIN — SIMETHICONE 80 MILLIGRAM(S): 80 TABLET, CHEWABLE ORAL at 06:23

## 2019-01-01 RX ADMIN — HYDROMORPHONE HYDROCHLORIDE 12 MILLIGRAM(S): 2 INJECTION INTRAMUSCULAR; INTRAVENOUS; SUBCUTANEOUS at 03:20

## 2019-01-01 RX ADMIN — Medication 1 GRAM(S): at 17:20

## 2019-01-01 RX ADMIN — Medication 1: at 18:46

## 2019-01-01 RX ADMIN — METHADONE HYDROCHLORIDE 12.5 MILLIGRAM(S): 40 TABLET ORAL at 11:48

## 2019-01-01 RX ADMIN — POLYETHYLENE GLYCOL 3350 17 GRAM(S): 17 POWDER, FOR SOLUTION ORAL at 18:50

## 2019-01-01 RX ADMIN — HYDROMORPHONE HYDROCHLORIDE 30 MILLILITER(S): 2 INJECTION INTRAMUSCULAR; INTRAVENOUS; SUBCUTANEOUS at 07:37

## 2019-01-01 RX ADMIN — HYDROMORPHONE HYDROCHLORIDE 12 MILLIGRAM(S): 2 INJECTION INTRAMUSCULAR; INTRAVENOUS; SUBCUTANEOUS at 12:20

## 2019-01-01 RX ADMIN — PIPERACILLIN AND TAZOBACTAM 25 GRAM(S): 4; .5 INJECTION, POWDER, LYOPHILIZED, FOR SOLUTION INTRAVENOUS at 16:00

## 2019-01-01 RX ADMIN — FAMOTIDINE 20 MILLIGRAM(S): 10 INJECTION INTRAVENOUS at 12:38

## 2019-01-01 RX ADMIN — FENTANYL CITRATE 1 PATCH: 50 INJECTION INTRAVENOUS at 19:18

## 2019-01-01 RX ADMIN — ENOXAPARIN SODIUM 40 MILLIGRAM(S): 100 INJECTION SUBCUTANEOUS at 13:26

## 2019-01-01 RX ADMIN — MEROPENEM 100 MILLIGRAM(S): 1 INJECTION INTRAVENOUS at 14:10

## 2019-01-01 RX ADMIN — METHADONE HYDROCHLORIDE 20 MILLIGRAM(S): 40 TABLET ORAL at 23:51

## 2019-01-01 RX ADMIN — Medication 0.1 MILLIGRAM(S): at 17:37

## 2019-01-01 RX ADMIN — METHADONE HYDROCHLORIDE 20 MILLIGRAM(S): 40 TABLET ORAL at 05:40

## 2019-01-01 RX ADMIN — HYDROMORPHONE HYDROCHLORIDE 2 MILLIGRAM(S): 2 INJECTION INTRAMUSCULAR; INTRAVENOUS; SUBCUTANEOUS at 09:05

## 2019-01-01 RX ADMIN — ENOXAPARIN SODIUM 40 MILLIGRAM(S): 100 INJECTION SUBCUTANEOUS at 21:02

## 2019-01-01 RX ADMIN — ENOXAPARIN SODIUM 40 MILLIGRAM(S): 100 INJECTION SUBCUTANEOUS at 12:43

## 2019-01-01 RX ADMIN — HYDROMORPHONE HYDROCHLORIDE 3 MILLIGRAM(S): 2 INJECTION INTRAMUSCULAR; INTRAVENOUS; SUBCUTANEOUS at 01:01

## 2019-01-01 RX ADMIN — Medication 1 GRAM(S): at 12:00

## 2019-01-01 RX ADMIN — METHADONE HYDROCHLORIDE 20 MILLIGRAM(S): 40 TABLET ORAL at 02:56

## 2019-01-01 RX ADMIN — Medication 20 MILLIEQUIVALENT(S): at 17:34

## 2019-01-01 RX ADMIN — Medication 1 GRAM(S): at 22:04

## 2019-01-01 RX ADMIN — Medication 1 GRAM(S): at 11:56

## 2019-01-01 RX ADMIN — PIPERACILLIN AND TAZOBACTAM 200 GRAM(S): 4; .5 INJECTION, POWDER, LYOPHILIZED, FOR SOLUTION INTRAVENOUS at 22:43

## 2019-01-01 RX ADMIN — FAMOTIDINE 20 MILLIGRAM(S): 10 INJECTION INTRAVENOUS at 12:37

## 2019-01-01 RX ADMIN — HYDROMORPHONE HYDROCHLORIDE 3 MILLIGRAM(S): 2 INJECTION INTRAMUSCULAR; INTRAVENOUS; SUBCUTANEOUS at 14:23

## 2019-01-01 RX ADMIN — MORPHINE SULFATE 30 MILLIGRAM(S): 50 CAPSULE, EXTENDED RELEASE ORAL at 11:30

## 2019-01-01 RX ADMIN — HYDROMORPHONE HYDROCHLORIDE 2.5 MILLIGRAM(S): 2 INJECTION INTRAMUSCULAR; INTRAVENOUS; SUBCUTANEOUS at 00:45

## 2019-01-01 RX ADMIN — DULOXETINE HYDROCHLORIDE 60 MILLIGRAM(S): 30 CAPSULE, DELAYED RELEASE ORAL at 21:41

## 2019-01-01 RX ADMIN — SODIUM CHLORIDE 500 MILLILITER(S): 9 INJECTION INTRAMUSCULAR; INTRAVENOUS; SUBCUTANEOUS at 19:40

## 2019-01-01 RX ADMIN — Medication 0.1 MILLIGRAM(S): at 09:55

## 2019-01-01 RX ADMIN — HYDROMORPHONE HYDROCHLORIDE 2 MILLIGRAM(S): 2 INJECTION INTRAMUSCULAR; INTRAVENOUS; SUBCUTANEOUS at 01:25

## 2019-01-01 RX ADMIN — Medication 1 GRAM(S): at 11:49

## 2019-01-01 RX ADMIN — Medication 1 GRAM(S): at 17:59

## 2019-01-01 RX ADMIN — PIPERACILLIN AND TAZOBACTAM 25 GRAM(S): 4; .5 INJECTION, POWDER, LYOPHILIZED, FOR SOLUTION INTRAVENOUS at 13:37

## 2019-01-01 RX ADMIN — Medication 1 GRAM(S): at 05:59

## 2019-01-01 RX ADMIN — Medication 0.1 MILLIGRAM(S): at 14:14

## 2019-01-01 RX ADMIN — HYDROMORPHONE HYDROCHLORIDE 6 MG/HR: 2 INJECTION INTRAMUSCULAR; INTRAVENOUS; SUBCUTANEOUS at 10:19

## 2019-01-01 RX ADMIN — METHADONE HYDROCHLORIDE 20 MILLIGRAM(S): 40 TABLET ORAL at 14:56

## 2019-01-01 RX ADMIN — METHADONE HYDROCHLORIDE 25 MILLIGRAM(S): 40 TABLET ORAL at 17:46

## 2019-01-01 RX ADMIN — SODIUM CHLORIDE 20 MILLILITER(S): 9 INJECTION INTRAMUSCULAR; INTRAVENOUS; SUBCUTANEOUS at 07:29

## 2019-01-01 RX ADMIN — ROBINUL 0.4 MILLIGRAM(S): 0.2 INJECTION INTRAMUSCULAR; INTRAVENOUS at 05:22

## 2019-01-01 RX ADMIN — Medication 1 GRAM(S): at 12:23

## 2019-01-01 RX ADMIN — MEROPENEM 100 MILLIGRAM(S): 1 INJECTION INTRAVENOUS at 22:16

## 2019-01-01 RX ADMIN — Medication 1 GRAM(S): at 12:45

## 2019-01-01 RX ADMIN — ENOXAPARIN SODIUM 40 MILLIGRAM(S): 100 INJECTION SUBCUTANEOUS at 13:02

## 2019-01-01 RX ADMIN — HYDROMORPHONE HYDROCHLORIDE 30 MILLILITER(S): 2 INJECTION INTRAMUSCULAR; INTRAVENOUS; SUBCUTANEOUS at 22:58

## 2019-01-01 RX ADMIN — METHADONE HYDROCHLORIDE 20 MILLIGRAM(S): 40 TABLET ORAL at 19:56

## 2019-01-01 RX ADMIN — DULOXETINE HYDROCHLORIDE 60 MILLIGRAM(S): 30 CAPSULE, DELAYED RELEASE ORAL at 08:35

## 2019-01-01 RX ADMIN — POLYETHYLENE GLYCOL 3350 17 GRAM(S): 17 POWDER, FOR SOLUTION ORAL at 16:48

## 2019-01-01 RX ADMIN — FAMOTIDINE 20 MILLIGRAM(S): 10 INJECTION INTRAVENOUS at 12:43

## 2019-01-01 RX ADMIN — MORPHINE SULFATE 30 MILLIGRAM(S): 50 CAPSULE, EXTENDED RELEASE ORAL at 15:18

## 2019-01-01 RX ADMIN — METHADONE HYDROCHLORIDE 20 MILLIGRAM(S): 40 TABLET ORAL at 21:30

## 2019-01-01 RX ADMIN — METHADONE HYDROCHLORIDE 20 MILLIGRAM(S): 40 TABLET ORAL at 12:35

## 2019-01-01 RX ADMIN — Medication 1 MILLIGRAM(S): at 11:53

## 2019-01-01 RX ADMIN — HYDROMORPHONE HYDROCHLORIDE 12 MILLIGRAM(S): 2 INJECTION INTRAMUSCULAR; INTRAVENOUS; SUBCUTANEOUS at 01:01

## 2019-01-01 RX ADMIN — Medication 1 GRAM(S): at 23:14

## 2019-01-01 RX ADMIN — ROBINUL 0.4 MILLIGRAM(S): 0.2 INJECTION INTRAMUSCULAR; INTRAVENOUS at 11:49

## 2019-01-01 RX ADMIN — Medication 1 GRAM(S): at 12:32

## 2019-01-01 RX ADMIN — HYDROMORPHONE HYDROCHLORIDE 12 MILLIGRAM(S): 2 INJECTION INTRAMUSCULAR; INTRAVENOUS; SUBCUTANEOUS at 02:53

## 2019-01-01 RX ADMIN — MORPHINE SULFATE 60 MILLIGRAM(S): 50 CAPSULE, EXTENDED RELEASE ORAL at 06:27

## 2019-01-01 RX ADMIN — Medication 2 MILLIGRAM(S): at 14:09

## 2019-01-01 RX ADMIN — HYDROMORPHONE HYDROCHLORIDE 12 MILLIGRAM(S): 2 INJECTION INTRAMUSCULAR; INTRAVENOUS; SUBCUTANEOUS at 16:50

## 2019-01-01 RX ADMIN — Medication 0.1 MILLIGRAM(S): at 21:35

## 2019-01-01 RX ADMIN — Medication 0.1 MILLIGRAM(S): at 10:08

## 2019-01-01 RX ADMIN — Medication 166.67 MILLIGRAM(S): at 05:04

## 2019-01-01 RX ADMIN — MORPHINE SULFATE 2 MILLIGRAM(S): 50 CAPSULE, EXTENDED RELEASE ORAL at 09:44

## 2019-01-01 RX ADMIN — Medication 1 GRAM(S): at 23:38

## 2019-01-01 RX ADMIN — Medication 1 GRAM(S): at 06:09

## 2019-01-01 RX ADMIN — Medication 0.4 MG/KG/HR: at 19:23

## 2019-01-01 RX ADMIN — PIPERACILLIN AND TAZOBACTAM 25 GRAM(S): 4; .5 INJECTION, POWDER, LYOPHILIZED, FOR SOLUTION INTRAVENOUS at 08:31

## 2019-01-01 RX ADMIN — Medication 2: at 13:03

## 2019-01-01 RX ADMIN — SENNA PLUS 2 TABLET(S): 8.6 TABLET ORAL at 22:54

## 2019-01-01 RX ADMIN — CHLORHEXIDINE GLUCONATE 1 APPLICATION(S): 213 SOLUTION TOPICAL at 12:23

## 2019-01-01 RX ADMIN — HYDROMORPHONE HYDROCHLORIDE 2 MILLIGRAM(S): 2 INJECTION INTRAMUSCULAR; INTRAVENOUS; SUBCUTANEOUS at 00:14

## 2019-01-01 RX ADMIN — Medication 1 GRAM(S): at 01:40

## 2019-01-01 RX ADMIN — Medication 1 GRAM(S): at 06:35

## 2019-01-01 RX ADMIN — DULOXETINE HYDROCHLORIDE 60 MILLIGRAM(S): 30 CAPSULE, DELAYED RELEASE ORAL at 12:43

## 2019-01-01 RX ADMIN — MEROPENEM 100 MILLIGRAM(S): 1 INJECTION INTRAVENOUS at 06:34

## 2019-01-01 RX ADMIN — Medication 0.1 MILLIGRAM(S): at 22:46

## 2019-01-01 RX ADMIN — Medication 50 MILLIGRAM(S): at 10:58

## 2019-01-01 RX ADMIN — SCOPALAMINE 1 PATCH: 1 PATCH, EXTENDED RELEASE TRANSDERMAL at 19:27

## 2019-01-01 RX ADMIN — HYDROMORPHONE HYDROCHLORIDE 30 MILLILITER(S): 2 INJECTION INTRAMUSCULAR; INTRAVENOUS; SUBCUTANEOUS at 15:40

## 2019-01-01 RX ADMIN — Medication 1: at 17:13

## 2019-01-01 RX ADMIN — Medication 0.1 MILLIGRAM(S): at 06:35

## 2019-01-01 RX ADMIN — HYDROMORPHONE HYDROCHLORIDE 4 MILLIGRAM(S): 2 INJECTION INTRAMUSCULAR; INTRAVENOUS; SUBCUTANEOUS at 16:59

## 2019-01-01 RX ADMIN — HYDROMORPHONE HYDROCHLORIDE 5 MILLIGRAM(S): 2 INJECTION INTRAMUSCULAR; INTRAVENOUS; SUBCUTANEOUS at 13:36

## 2019-01-01 RX ADMIN — MEROPENEM 100 MILLIGRAM(S): 1 INJECTION INTRAVENOUS at 22:57

## 2019-01-01 RX ADMIN — CHLORHEXIDINE GLUCONATE 1 APPLICATION(S): 213 SOLUTION TOPICAL at 12:15

## 2019-01-01 RX ADMIN — DULOXETINE HYDROCHLORIDE 60 MILLIGRAM(S): 30 CAPSULE, DELAYED RELEASE ORAL at 18:15

## 2019-01-01 RX ADMIN — PIPERACILLIN AND TAZOBACTAM 25 GRAM(S): 4; .5 INJECTION, POWDER, LYOPHILIZED, FOR SOLUTION INTRAVENOUS at 17:05

## 2019-01-01 RX ADMIN — PIPERACILLIN AND TAZOBACTAM 25 GRAM(S): 4; .5 INJECTION, POWDER, LYOPHILIZED, FOR SOLUTION INTRAVENOUS at 06:26

## 2019-01-01 RX ADMIN — Medication 5 MILLIGRAM(S): at 22:39

## 2019-01-01 RX ADMIN — MORPHINE SULFATE 90 MILLIGRAM(S): 50 CAPSULE, EXTENDED RELEASE ORAL at 23:38

## 2019-01-01 RX ADMIN — HYDROMORPHONE HYDROCHLORIDE 3 MILLIGRAM(S): 2 INJECTION INTRAMUSCULAR; INTRAVENOUS; SUBCUTANEOUS at 16:49

## 2019-01-01 RX ADMIN — Medication 0.4 MG/KG/HR: at 19:14

## 2019-01-01 RX ADMIN — METHADONE HYDROCHLORIDE 20 MILLIGRAM(S): 40 TABLET ORAL at 06:22

## 2019-01-01 RX ADMIN — HYDROMORPHONE HYDROCHLORIDE 4 MILLIGRAM(S): 2 INJECTION INTRAMUSCULAR; INTRAVENOUS; SUBCUTANEOUS at 00:45

## 2019-01-01 RX ADMIN — Medication 1 TABLET(S): at 11:56

## 2019-01-01 RX ADMIN — HYDROMORPHONE HYDROCHLORIDE 12 MILLIGRAM(S): 2 INJECTION INTRAMUSCULAR; INTRAVENOUS; SUBCUTANEOUS at 17:10

## 2019-01-01 RX ADMIN — METHADONE HYDROCHLORIDE 25 MILLIGRAM(S): 40 TABLET ORAL at 18:47

## 2019-01-01 RX ADMIN — HYDROMORPHONE HYDROCHLORIDE 3.2 MILLIGRAM(S): 2 INJECTION INTRAMUSCULAR; INTRAVENOUS; SUBCUTANEOUS at 12:15

## 2019-01-01 RX ADMIN — PIPERACILLIN AND TAZOBACTAM 25 GRAM(S): 4; .5 INJECTION, POWDER, LYOPHILIZED, FOR SOLUTION INTRAVENOUS at 13:04

## 2019-01-01 RX ADMIN — MORPHINE SULFATE 60 MILLIGRAM(S): 50 CAPSULE, EXTENDED RELEASE ORAL at 07:30

## 2019-01-01 RX ADMIN — Medication 1: at 18:16

## 2019-01-01 RX ADMIN — HYDROMORPHONE HYDROCHLORIDE 16 MILLIGRAM(S): 2 INJECTION INTRAMUSCULAR; INTRAVENOUS; SUBCUTANEOUS at 12:30

## 2019-01-01 RX ADMIN — Medication 1 GRAM(S): at 18:02

## 2019-01-01 RX ADMIN — SODIUM CHLORIDE 2700 MILLILITER(S): 9 INJECTION INTRAMUSCULAR; INTRAVENOUS; SUBCUTANEOUS at 22:43

## 2019-01-01 RX ADMIN — HYDROMORPHONE HYDROCHLORIDE 1 MILLIGRAM(S): 2 INJECTION INTRAMUSCULAR; INTRAVENOUS; SUBCUTANEOUS at 22:44

## 2019-01-01 RX ADMIN — METHADONE HYDROCHLORIDE 20 MILLIGRAM(S): 40 TABLET ORAL at 17:47

## 2019-01-01 RX ADMIN — CHLORHEXIDINE GLUCONATE 1 APPLICATION(S): 213 SOLUTION TOPICAL at 11:53

## 2019-01-01 RX ADMIN — MORPHINE SULFATE 60 MILLIGRAM(S): 50 CAPSULE, EXTENDED RELEASE ORAL at 05:31

## 2019-01-01 RX ADMIN — HYDROMORPHONE HYDROCHLORIDE 12 MILLIGRAM(S): 2 INJECTION INTRAMUSCULAR; INTRAVENOUS; SUBCUTANEOUS at 13:09

## 2019-01-01 RX ADMIN — HYDROMORPHONE HYDROCHLORIDE 30 MILLILITER(S): 2 INJECTION INTRAMUSCULAR; INTRAVENOUS; SUBCUTANEOUS at 15:28

## 2019-01-01 RX ADMIN — POLYETHYLENE GLYCOL 3350 17 GRAM(S): 17 POWDER, FOR SOLUTION ORAL at 17:08

## 2019-01-01 RX ADMIN — HYDROMORPHONE HYDROCHLORIDE 12 MILLIGRAM(S): 2 INJECTION INTRAMUSCULAR; INTRAVENOUS; SUBCUTANEOUS at 22:30

## 2019-01-01 RX ADMIN — HYDROMORPHONE HYDROCHLORIDE 12 MILLIGRAM(S): 2 INJECTION INTRAMUSCULAR; INTRAVENOUS; SUBCUTANEOUS at 04:02

## 2019-01-01 RX ADMIN — METHADONE HYDROCHLORIDE 40 MILLIGRAM(S): 40 TABLET ORAL at 23:38

## 2019-01-01 RX ADMIN — METHADONE HYDROCHLORIDE 20 MILLIGRAM(S): 40 TABLET ORAL at 19:27

## 2019-01-01 RX ADMIN — PIPERACILLIN AND TAZOBACTAM 25 GRAM(S): 4; .5 INJECTION, POWDER, LYOPHILIZED, FOR SOLUTION INTRAVENOUS at 21:42

## 2019-01-01 RX ADMIN — MEROPENEM 100 MILLIGRAM(S): 1 INJECTION INTRAVENOUS at 14:45

## 2019-01-01 RX ADMIN — Medication 1 GRAM(S): at 18:07

## 2019-01-01 RX ADMIN — Medication 0.5 MILLIGRAM(S): at 18:47

## 2019-01-01 RX ADMIN — HYDROMORPHONE HYDROCHLORIDE 2.5 MILLIGRAM(S): 2 INJECTION INTRAMUSCULAR; INTRAVENOUS; SUBCUTANEOUS at 20:15

## 2019-01-01 RX ADMIN — MORPHINE SULFATE 75 MILLIGRAM(S): 50 CAPSULE, EXTENDED RELEASE ORAL at 07:25

## 2019-01-01 RX ADMIN — HYDROMORPHONE HYDROCHLORIDE 30 MILLILITER(S): 2 INJECTION INTRAMUSCULAR; INTRAVENOUS; SUBCUTANEOUS at 19:17

## 2019-01-01 RX ADMIN — Medication 1 GRAM(S): at 00:11

## 2019-01-01 RX ADMIN — MORPHINE SULFATE 60 MILLIGRAM(S): 50 CAPSULE, EXTENDED RELEASE ORAL at 05:48

## 2019-01-01 RX ADMIN — DULOXETINE HYDROCHLORIDE 60 MILLIGRAM(S): 30 CAPSULE, DELAYED RELEASE ORAL at 18:26

## 2019-01-01 RX ADMIN — METHADONE HYDROCHLORIDE 45 MILLIGRAM(S): 40 TABLET ORAL at 18:29

## 2019-01-01 RX ADMIN — METHADONE HYDROCHLORIDE 25 MILLIGRAM(S): 40 TABLET ORAL at 12:09

## 2019-01-01 RX ADMIN — Medication 1000 MILLIGRAM(S): at 07:22

## 2019-01-01 RX ADMIN — METHADONE HYDROCHLORIDE 20 MILLIGRAM(S): 40 TABLET ORAL at 21:42

## 2019-01-01 RX ADMIN — MORPHINE SULFATE 2 MILLIGRAM(S): 50 CAPSULE, EXTENDED RELEASE ORAL at 22:15

## 2019-01-01 RX ADMIN — Medication 1 GRAM(S): at 17:08

## 2019-01-01 RX ADMIN — HYDROMORPHONE HYDROCHLORIDE 8 MILLIGRAM(S): 2 INJECTION INTRAMUSCULAR; INTRAVENOUS; SUBCUTANEOUS at 11:57

## 2019-01-01 RX ADMIN — PIPERACILLIN AND TAZOBACTAM 25 GRAM(S): 4; .5 INJECTION, POWDER, LYOPHILIZED, FOR SOLUTION INTRAVENOUS at 15:34

## 2019-01-01 RX ADMIN — Medication 1 GRAM(S): at 23:40

## 2019-01-01 RX ADMIN — POLYETHYLENE GLYCOL 3350 17 GRAM(S): 17 POWDER, FOR SOLUTION ORAL at 17:18

## 2019-01-01 RX ADMIN — Medication 1000 MILLIGRAM(S): at 22:57

## 2019-01-01 RX ADMIN — HYDROMORPHONE HYDROCHLORIDE 16 MILLIGRAM(S): 2 INJECTION INTRAMUSCULAR; INTRAVENOUS; SUBCUTANEOUS at 18:18

## 2019-01-01 RX ADMIN — DULOXETINE HYDROCHLORIDE 60 MILLIGRAM(S): 30 CAPSULE, DELAYED RELEASE ORAL at 17:58

## 2019-01-01 RX ADMIN — PIPERACILLIN AND TAZOBACTAM 25 GRAM(S): 4; .5 INJECTION, POWDER, LYOPHILIZED, FOR SOLUTION INTRAVENOUS at 23:52

## 2019-01-01 RX ADMIN — METHADONE HYDROCHLORIDE 40 MILLIGRAM(S): 40 TABLET ORAL at 06:54

## 2019-01-01 RX ADMIN — PIPERACILLIN AND TAZOBACTAM 25 GRAM(S): 4; .5 INJECTION, POWDER, LYOPHILIZED, FOR SOLUTION INTRAVENOUS at 16:39

## 2019-01-01 RX ADMIN — METHADONE HYDROCHLORIDE 20 MILLIGRAM(S): 40 TABLET ORAL at 00:13

## 2019-01-01 RX ADMIN — Medication 0.1 MILLIGRAM(S): at 01:27

## 2019-01-01 RX ADMIN — METHADONE HYDROCHLORIDE 20 MILLIGRAM(S): 40 TABLET ORAL at 09:35

## 2019-01-01 RX ADMIN — HYDROMORPHONE HYDROCHLORIDE 16 MILLIGRAM(S): 2 INJECTION INTRAMUSCULAR; INTRAVENOUS; SUBCUTANEOUS at 17:10

## 2019-01-01 RX ADMIN — HYDROMORPHONE HYDROCHLORIDE 3 MILLIGRAM(S): 2 INJECTION INTRAMUSCULAR; INTRAVENOUS; SUBCUTANEOUS at 11:11

## 2019-01-01 RX ADMIN — Medication 1 GRAM(S): at 00:19

## 2019-01-01 RX ADMIN — FENTANYL CITRATE 1 PATCH: 50 INJECTION INTRAVENOUS at 06:52

## 2019-01-01 RX ADMIN — Medication 1 GRAM(S): at 17:54

## 2019-01-01 RX ADMIN — HYDROMORPHONE HYDROCHLORIDE 6 MG/HR: 2 INJECTION INTRAMUSCULAR; INTRAVENOUS; SUBCUTANEOUS at 19:28

## 2019-01-01 RX ADMIN — Medication 1: at 09:11

## 2019-01-01 RX ADMIN — MORPHINE SULFATE 60 MILLIGRAM(S): 50 CAPSULE, EXTENDED RELEASE ORAL at 06:26

## 2019-01-01 RX ADMIN — Medication 1 GRAM(S): at 00:46

## 2019-01-01 RX ADMIN — Medication 1 TABLET(S): at 17:21

## 2019-01-01 RX ADMIN — Medication 0.1 MILLIGRAM(S): at 22:04

## 2019-01-01 RX ADMIN — MORPHINE SULFATE 1 MILLIGRAM(S): 50 CAPSULE, EXTENDED RELEASE ORAL at 09:43

## 2019-01-01 RX ADMIN — Medication 1 GRAM(S): at 23:37

## 2019-01-01 RX ADMIN — Medication 5 MILLIGRAM(S): at 22:45

## 2019-01-01 RX ADMIN — HYDROMORPHONE HYDROCHLORIDE 3 MILLIGRAM(S): 2 INJECTION INTRAMUSCULAR; INTRAVENOUS; SUBCUTANEOUS at 10:28

## 2019-01-01 RX ADMIN — HYDROMORPHONE HYDROCHLORIDE 12 MILLIGRAM(S): 2 INJECTION INTRAMUSCULAR; INTRAVENOUS; SUBCUTANEOUS at 14:00

## 2019-01-01 RX ADMIN — SODIUM CHLORIDE 20 MILLILITER(S): 9 INJECTION INTRAMUSCULAR; INTRAVENOUS; SUBCUTANEOUS at 07:38

## 2019-01-01 RX ADMIN — HYDROMORPHONE HYDROCHLORIDE 12 MILLIGRAM(S): 2 INJECTION INTRAMUSCULAR; INTRAVENOUS; SUBCUTANEOUS at 09:45

## 2019-01-01 RX ADMIN — MEROPENEM 100 MILLIGRAM(S): 1 INJECTION INTRAVENOUS at 05:59

## 2019-01-01 RX ADMIN — HYDROMORPHONE HYDROCHLORIDE 12 MILLIGRAM(S): 2 INJECTION INTRAMUSCULAR; INTRAVENOUS; SUBCUTANEOUS at 15:03

## 2019-01-01 RX ADMIN — HYDROMORPHONE HYDROCHLORIDE 4 MILLIGRAM(S): 2 INJECTION INTRAMUSCULAR; INTRAVENOUS; SUBCUTANEOUS at 13:38

## 2019-01-01 RX ADMIN — METHADONE HYDROCHLORIDE 20 MILLIGRAM(S): 40 TABLET ORAL at 23:14

## 2019-01-01 RX ADMIN — Medication 166.67 MILLIGRAM(S): at 15:14

## 2019-01-01 RX ADMIN — Medication 100 MILLIGRAM(S): at 12:47

## 2019-01-01 RX ADMIN — Medication 0.1 MILLIGRAM(S): at 06:27

## 2019-01-01 RX ADMIN — Medication 2 MILLIGRAM(S): at 06:32

## 2019-01-01 RX ADMIN — SODIUM CHLORIDE 20 MILLILITER(S): 9 INJECTION INTRAMUSCULAR; INTRAVENOUS; SUBCUTANEOUS at 05:21

## 2019-01-01 RX ADMIN — SODIUM CHLORIDE 1500 MILLILITER(S): 9 INJECTION INTRAMUSCULAR; INTRAVENOUS; SUBCUTANEOUS at 13:07

## 2019-01-01 RX ADMIN — MORPHINE SULFATE 30 MILLIGRAM(S): 50 CAPSULE, EXTENDED RELEASE ORAL at 16:00

## 2019-01-01 RX ADMIN — Medication 1: at 17:43

## 2019-01-01 RX ADMIN — SODIUM CHLORIDE 100 MILLILITER(S): 9 INJECTION INTRAMUSCULAR; INTRAVENOUS; SUBCUTANEOUS at 12:39

## 2019-01-01 RX ADMIN — Medication 1: at 13:04

## 2019-01-01 RX ADMIN — PIPERACILLIN AND TAZOBACTAM 25 GRAM(S): 4; .5 INJECTION, POWDER, LYOPHILIZED, FOR SOLUTION INTRAVENOUS at 05:40

## 2019-01-01 RX ADMIN — Medication 1 GRAM(S): at 00:01

## 2019-01-01 RX ADMIN — METHADONE HYDROCHLORIDE 25 MILLIGRAM(S): 40 TABLET ORAL at 23:01

## 2019-01-01 RX ADMIN — ENOXAPARIN SODIUM 40 MILLIGRAM(S): 100 INJECTION SUBCUTANEOUS at 11:44

## 2019-01-01 RX ADMIN — Medication 50 MILLIGRAM(S): at 05:36

## 2019-01-01 RX ADMIN — METHADONE HYDROCHLORIDE 20 MILLIGRAM(S): 40 TABLET ORAL at 04:15

## 2019-01-01 RX ADMIN — METHADONE HYDROCHLORIDE 20 MILLIGRAM(S): 40 TABLET ORAL at 12:04

## 2019-01-01 RX ADMIN — MORPHINE SULFATE 30 MILLIGRAM(S): 50 CAPSULE, EXTENDED RELEASE ORAL at 10:57

## 2019-01-01 RX ADMIN — Medication 1 GRAM(S): at 06:25

## 2019-01-01 RX ADMIN — Medication 1 GRAM(S): at 00:59

## 2019-01-01 RX ADMIN — HYDROMORPHONE HYDROCHLORIDE 12 MILLIGRAM(S): 2 INJECTION INTRAMUSCULAR; INTRAVENOUS; SUBCUTANEOUS at 09:53

## 2019-01-01 RX ADMIN — HYDROMORPHONE HYDROCHLORIDE 2.5 MILLIGRAM(S): 2 INJECTION INTRAMUSCULAR; INTRAVENOUS; SUBCUTANEOUS at 22:21

## 2019-01-01 RX ADMIN — HYDROMORPHONE HYDROCHLORIDE 4 MILLIGRAM(S): 2 INJECTION INTRAMUSCULAR; INTRAVENOUS; SUBCUTANEOUS at 16:44

## 2019-01-01 RX ADMIN — Medication 30 MILLILITER(S): at 19:49

## 2019-01-01 RX ADMIN — METHADONE HYDROCHLORIDE 20 MILLIGRAM(S): 40 TABLET ORAL at 08:53

## 2019-01-01 RX ADMIN — HYDROMORPHONE HYDROCHLORIDE 2 MILLIGRAM(S): 2 INJECTION INTRAMUSCULAR; INTRAVENOUS; SUBCUTANEOUS at 09:52

## 2019-01-01 RX ADMIN — Medication 1: at 10:01

## 2019-01-01 RX ADMIN — HYDROMORPHONE HYDROCHLORIDE 12 MILLIGRAM(S): 2 INJECTION INTRAMUSCULAR; INTRAVENOUS; SUBCUTANEOUS at 11:36

## 2019-01-01 RX ADMIN — PIPERACILLIN AND TAZOBACTAM 200 GRAM(S): 4; .5 INJECTION, POWDER, LYOPHILIZED, FOR SOLUTION INTRAVENOUS at 13:06

## 2019-01-01 RX ADMIN — PIPERACILLIN AND TAZOBACTAM 25 GRAM(S): 4; .5 INJECTION, POWDER, LYOPHILIZED, FOR SOLUTION INTRAVENOUS at 17:34

## 2019-01-01 RX ADMIN — POLYETHYLENE GLYCOL 3350 17 GRAM(S): 17 POWDER, FOR SOLUTION ORAL at 08:00

## 2019-01-01 RX ADMIN — Medication 1 GRAM(S): at 06:43

## 2019-01-01 RX ADMIN — ENOXAPARIN SODIUM 40 MILLIGRAM(S): 100 INJECTION SUBCUTANEOUS at 12:45

## 2019-01-01 RX ADMIN — Medication 0.2 MG/KG/HR: at 07:15

## 2019-01-01 RX ADMIN — DULOXETINE HYDROCHLORIDE 60 MILLIGRAM(S): 30 CAPSULE, DELAYED RELEASE ORAL at 13:37

## 2019-01-01 RX ADMIN — HYDROMORPHONE HYDROCHLORIDE 6 MG/HR: 2 INJECTION INTRAMUSCULAR; INTRAVENOUS; SUBCUTANEOUS at 07:14

## 2019-01-01 RX ADMIN — Medication 1 GRAM(S): at 13:00

## 2019-01-01 RX ADMIN — Medication 1 GRAM(S): at 00:49

## 2019-01-01 RX ADMIN — Medication 1: at 09:27

## 2019-01-01 RX ADMIN — POLYETHYLENE GLYCOL 3350 17 GRAM(S): 17 POWDER, FOR SOLUTION ORAL at 13:04

## 2019-01-01 RX ADMIN — MORPHINE SULFATE 1 MILLIGRAM(S): 50 CAPSULE, EXTENDED RELEASE ORAL at 23:05

## 2019-01-01 RX ADMIN — HYDROMORPHONE HYDROCHLORIDE 12 MILLIGRAM(S): 2 INJECTION INTRAMUSCULAR; INTRAVENOUS; SUBCUTANEOUS at 19:37

## 2019-01-01 RX ADMIN — ENOXAPARIN SODIUM 40 MILLIGRAM(S): 100 INJECTION SUBCUTANEOUS at 13:41

## 2019-01-01 RX ADMIN — ROBINUL 0.4 MILLIGRAM(S): 0.2 INJECTION INTRAMUSCULAR; INTRAVENOUS at 05:40

## 2019-01-01 RX ADMIN — METHADONE HYDROCHLORIDE 20 MILLIGRAM(S): 40 TABLET ORAL at 23:05

## 2019-01-01 RX ADMIN — Medication 1 TABLET(S): at 05:46

## 2019-01-01 RX ADMIN — SENNA PLUS 2 TABLET(S): 8.6 TABLET ORAL at 21:31

## 2019-01-01 RX ADMIN — HYDROMORPHONE HYDROCHLORIDE 2 MILLIGRAM(S): 2 INJECTION INTRAMUSCULAR; INTRAVENOUS; SUBCUTANEOUS at 17:06

## 2019-01-01 RX ADMIN — Medication 1 GRAM(S): at 13:02

## 2019-01-01 RX ADMIN — SODIUM CHLORIDE 20 MILLILITER(S): 9 INJECTION INTRAMUSCULAR; INTRAVENOUS; SUBCUTANEOUS at 05:22

## 2019-01-01 RX ADMIN — SENNA PLUS 2 TABLET(S): 8.6 TABLET ORAL at 22:00

## 2019-01-01 RX ADMIN — POLYETHYLENE GLYCOL 3350 17 GRAM(S): 17 POWDER, FOR SOLUTION ORAL at 17:51

## 2019-01-01 RX ADMIN — HYDROMORPHONE HYDROCHLORIDE 30 MILLILITER(S): 2 INJECTION INTRAMUSCULAR; INTRAVENOUS; SUBCUTANEOUS at 16:41

## 2019-01-01 RX ADMIN — HYDROMORPHONE HYDROCHLORIDE 3 MILLIGRAM(S): 2 INJECTION INTRAMUSCULAR; INTRAVENOUS; SUBCUTANEOUS at 13:50

## 2019-01-01 RX ADMIN — MORPHINE SULFATE 30 MILLIGRAM(S): 50 CAPSULE, EXTENDED RELEASE ORAL at 18:00

## 2019-01-01 RX ADMIN — HYDROMORPHONE HYDROCHLORIDE 3 MILLIGRAM(S): 2 INJECTION INTRAMUSCULAR; INTRAVENOUS; SUBCUTANEOUS at 09:45

## 2019-01-01 RX ADMIN — METHADONE HYDROCHLORIDE 20 MILLIGRAM(S): 40 TABLET ORAL at 17:34

## 2019-01-01 RX ADMIN — Medication 0.1 MILLIGRAM(S): at 13:38

## 2019-01-01 RX ADMIN — Medication 2 MILLIGRAM(S): at 14:25

## 2019-01-01 RX ADMIN — Medication 50 MILLIGRAM(S): at 05:16

## 2019-01-01 RX ADMIN — METHADONE HYDROCHLORIDE 12.5 MILLIGRAM(S): 40 TABLET ORAL at 23:18

## 2019-01-01 RX ADMIN — Medication 250 MILLIGRAM(S): at 03:32

## 2019-01-01 RX ADMIN — POLYETHYLENE GLYCOL 3350 17 GRAM(S): 17 POWDER, FOR SOLUTION ORAL at 21:35

## 2019-01-01 RX ADMIN — HYDROMORPHONE HYDROCHLORIDE 4 MILLIGRAM(S): 2 INJECTION INTRAMUSCULAR; INTRAVENOUS; SUBCUTANEOUS at 14:47

## 2019-01-01 RX ADMIN — Medication 1 GRAM(S): at 12:44

## 2019-01-01 RX ADMIN — HYDROMORPHONE HYDROCHLORIDE 2.5 MILLIGRAM(S): 2 INJECTION INTRAMUSCULAR; INTRAVENOUS; SUBCUTANEOUS at 09:23

## 2019-01-01 RX ADMIN — Medication 0.1 MILLIGRAM(S): at 17:53

## 2019-01-01 RX ADMIN — SODIUM CHLORIDE 20 MILLILITER(S): 9 INJECTION INTRAMUSCULAR; INTRAVENOUS; SUBCUTANEOUS at 06:07

## 2019-01-01 RX ADMIN — MEROPENEM 100 MILLIGRAM(S): 1 INJECTION INTRAVENOUS at 05:40

## 2019-01-01 RX ADMIN — Medication 1 TABLET(S): at 13:03

## 2019-01-01 RX ADMIN — POLYETHYLENE GLYCOL 3350 17 GRAM(S): 17 POWDER, FOR SOLUTION ORAL at 13:38

## 2019-01-01 RX ADMIN — HYDROMORPHONE HYDROCHLORIDE 2 MILLIGRAM(S): 2 INJECTION INTRAMUSCULAR; INTRAVENOUS; SUBCUTANEOUS at 01:10

## 2019-01-01 RX ADMIN — HYDROMORPHONE HYDROCHLORIDE 2 MILLIGRAM(S): 2 INJECTION INTRAMUSCULAR; INTRAVENOUS; SUBCUTANEOUS at 14:24

## 2019-01-01 RX ADMIN — MORPHINE SULFATE 75 MILLIGRAM(S): 50 CAPSULE, EXTENDED RELEASE ORAL at 18:29

## 2019-01-01 RX ADMIN — Medication 0.1 MILLIGRAM(S): at 23:57

## 2019-01-01 RX ADMIN — HYDROMORPHONE HYDROCHLORIDE 16 MILLIGRAM(S): 2 INJECTION INTRAMUSCULAR; INTRAVENOUS; SUBCUTANEOUS at 03:35

## 2019-01-01 RX ADMIN — POLYETHYLENE GLYCOL 3350 17 GRAM(S): 17 POWDER, FOR SOLUTION ORAL at 13:31

## 2019-01-01 RX ADMIN — Medication 50 MILLIGRAM(S): at 06:35

## 2019-01-01 RX ADMIN — Medication 30 MILLILITER(S): at 23:49

## 2019-01-01 RX ADMIN — HYDROMORPHONE HYDROCHLORIDE 16 MILLIGRAM(S): 2 INJECTION INTRAMUSCULAR; INTRAVENOUS; SUBCUTANEOUS at 16:49

## 2019-01-01 RX ADMIN — Medication 0.1 MILLIGRAM(S): at 22:54

## 2019-01-01 RX ADMIN — HYDROMORPHONE HYDROCHLORIDE 4 MILLIGRAM(S): 2 INJECTION INTRAMUSCULAR; INTRAVENOUS; SUBCUTANEOUS at 11:08

## 2019-01-01 RX ADMIN — METHADONE HYDROCHLORIDE 20 MILLIGRAM(S): 40 TABLET ORAL at 17:26

## 2019-01-01 RX ADMIN — PIPERACILLIN AND TAZOBACTAM 25 GRAM(S): 4; .5 INJECTION, POWDER, LYOPHILIZED, FOR SOLUTION INTRAVENOUS at 00:59

## 2019-01-01 RX ADMIN — HYDROMORPHONE HYDROCHLORIDE 4 MILLIGRAM(S): 2 INJECTION INTRAMUSCULAR; INTRAVENOUS; SUBCUTANEOUS at 10:38

## 2019-01-01 RX ADMIN — Medication 250 MILLIGRAM(S): at 13:57

## 2019-01-01 RX ADMIN — Medication 1 GRAM(S): at 17:46

## 2019-01-01 RX ADMIN — Medication 1 GRAM(S): at 13:21

## 2019-01-01 RX ADMIN — MORPHINE SULFATE 60 MILLIGRAM(S): 50 CAPSULE, EXTENDED RELEASE ORAL at 05:36

## 2019-01-01 RX ADMIN — MORPHINE SULFATE 4 MILLIGRAM(S): 50 CAPSULE, EXTENDED RELEASE ORAL at 19:44

## 2019-01-01 RX ADMIN — MORPHINE SULFATE 30 MILLIGRAM(S): 50 CAPSULE, EXTENDED RELEASE ORAL at 06:33

## 2019-01-01 RX ADMIN — ROBINUL 0.4 MILLIGRAM(S): 0.2 INJECTION INTRAMUSCULAR; INTRAVENOUS at 11:39

## 2019-01-01 RX ADMIN — PIPERACILLIN AND TAZOBACTAM 25 GRAM(S): 4; .5 INJECTION, POWDER, LYOPHILIZED, FOR SOLUTION INTRAVENOUS at 00:01

## 2019-01-01 RX ADMIN — MORPHINE SULFATE 4 MILLIGRAM(S): 50 CAPSULE, EXTENDED RELEASE ORAL at 00:30

## 2019-01-01 RX ADMIN — HYDROMORPHONE HYDROCHLORIDE 4 MILLIGRAM(S): 2 INJECTION INTRAMUSCULAR; INTRAVENOUS; SUBCUTANEOUS at 09:06

## 2019-01-01 RX ADMIN — Medication 1 GRAM(S): at 11:44

## 2019-01-01 RX ADMIN — HYDROMORPHONE HYDROCHLORIDE 12 MILLIGRAM(S): 2 INJECTION INTRAMUSCULAR; INTRAVENOUS; SUBCUTANEOUS at 07:48

## 2019-01-01 RX ADMIN — HYDROMORPHONE HYDROCHLORIDE 3 MILLIGRAM(S): 2 INJECTION INTRAMUSCULAR; INTRAVENOUS; SUBCUTANEOUS at 00:36

## 2019-01-01 RX ADMIN — Medication 0.1 MILLIGRAM(S): at 12:38

## 2019-01-01 RX ADMIN — Medication 0.5 MILLIGRAM(S): at 14:23

## 2019-01-01 RX ADMIN — SENNA PLUS 2 TABLET(S): 8.6 TABLET ORAL at 22:44

## 2019-01-01 RX ADMIN — POLYETHYLENE GLYCOL 3350 17 GRAM(S): 17 POWDER, FOR SOLUTION ORAL at 17:19

## 2019-01-01 RX ADMIN — SENNA PLUS 2 TABLET(S): 8.6 TABLET ORAL at 21:42

## 2019-01-01 RX ADMIN — HYDROMORPHONE HYDROCHLORIDE 6 MG/HR: 2 INJECTION INTRAMUSCULAR; INTRAVENOUS; SUBCUTANEOUS at 15:28

## 2019-01-01 RX ADMIN — POLYETHYLENE GLYCOL 3350 17 GRAM(S): 17 POWDER, FOR SOLUTION ORAL at 13:40

## 2019-01-01 RX ADMIN — MEROPENEM 100 MILLIGRAM(S): 1 INJECTION INTRAVENOUS at 05:21

## 2019-01-01 RX ADMIN — Medication 2 MILLIGRAM(S): at 22:38

## 2019-01-01 RX ADMIN — Medication 1 ENEMA: at 17:24

## 2019-01-01 RX ADMIN — Medication 2 MILLIGRAM(S): at 06:22

## 2019-01-01 RX ADMIN — POLYETHYLENE GLYCOL 3350 17 GRAM(S): 17 POWDER, FOR SOLUTION ORAL at 08:39

## 2019-01-01 RX ADMIN — Medication 0.1 MILLIGRAM(S): at 10:07

## 2019-01-01 RX ADMIN — HYDROMORPHONE HYDROCHLORIDE 2 MILLIGRAM(S): 2 INJECTION INTRAMUSCULAR; INTRAVENOUS; SUBCUTANEOUS at 15:10

## 2019-01-01 RX ADMIN — HYDROMORPHONE HYDROCHLORIDE 30 MILLILITER(S): 2 INJECTION INTRAMUSCULAR; INTRAVENOUS; SUBCUTANEOUS at 17:26

## 2019-01-01 RX ADMIN — METHADONE HYDROCHLORIDE 20 MILLIGRAM(S): 40 TABLET ORAL at 06:09

## 2019-01-01 RX ADMIN — SODIUM CHLORIDE 100 MILLILITER(S): 9 INJECTION INTRAMUSCULAR; INTRAVENOUS; SUBCUTANEOUS at 12:21

## 2019-01-01 RX ADMIN — Medication 1 GRAM(S): at 06:22

## 2019-01-01 RX ADMIN — Medication 0.1 MILLIGRAM(S): at 22:18

## 2019-01-01 RX ADMIN — HYDROMORPHONE HYDROCHLORIDE 30 MILLILITER(S): 2 INJECTION INTRAMUSCULAR; INTRAVENOUS; SUBCUTANEOUS at 04:33

## 2019-01-01 RX ADMIN — PIPERACILLIN AND TAZOBACTAM 25 GRAM(S): 4; .5 INJECTION, POWDER, LYOPHILIZED, FOR SOLUTION INTRAVENOUS at 22:36

## 2019-01-01 RX ADMIN — METHADONE HYDROCHLORIDE 25 MILLIGRAM(S): 40 TABLET ORAL at 05:20

## 2019-01-01 RX ADMIN — Medication 0.1 MILLIGRAM(S): at 00:11

## 2019-01-01 RX ADMIN — METHADONE HYDROCHLORIDE 20 MILLIGRAM(S): 40 TABLET ORAL at 23:15

## 2019-01-01 RX ADMIN — PIPERACILLIN AND TAZOBACTAM 25 GRAM(S): 4; .5 INJECTION, POWDER, LYOPHILIZED, FOR SOLUTION INTRAVENOUS at 06:32

## 2019-01-01 RX ADMIN — HYDROMORPHONE HYDROCHLORIDE 30 MILLILITER(S): 2 INJECTION INTRAMUSCULAR; INTRAVENOUS; SUBCUTANEOUS at 19:18

## 2019-01-01 RX ADMIN — HYDROMORPHONE HYDROCHLORIDE 30 MILLILITER(S): 2 INJECTION INTRAMUSCULAR; INTRAVENOUS; SUBCUTANEOUS at 19:09

## 2019-01-01 RX ADMIN — METHADONE HYDROCHLORIDE 20 MILLIGRAM(S): 40 TABLET ORAL at 05:20

## 2019-01-01 RX ADMIN — POLYETHYLENE GLYCOL 3350 17 GRAM(S): 17 POWDER, FOR SOLUTION ORAL at 17:07

## 2019-01-01 RX ADMIN — POLYETHYLENE GLYCOL 3350 17 GRAM(S): 17 POWDER, FOR SOLUTION ORAL at 13:36

## 2019-01-01 RX ADMIN — PIPERACILLIN AND TAZOBACTAM 25 GRAM(S): 4; .5 INJECTION, POWDER, LYOPHILIZED, FOR SOLUTION INTRAVENOUS at 08:54

## 2019-01-01 RX ADMIN — FENTANYL CITRATE 1 PATCH: 50 INJECTION INTRAVENOUS at 19:38

## 2019-01-01 RX ADMIN — Medication 1 GRAM(S): at 21:37

## 2019-01-01 RX ADMIN — MORPHINE SULFATE 2 MILLIGRAM(S): 50 CAPSULE, EXTENDED RELEASE ORAL at 17:53

## 2019-01-01 RX ADMIN — HYDROMORPHONE HYDROCHLORIDE 12 MILLIGRAM(S): 2 INJECTION INTRAMUSCULAR; INTRAVENOUS; SUBCUTANEOUS at 16:15

## 2019-01-01 RX ADMIN — HYDROMORPHONE HYDROCHLORIDE 3 MILLIGRAM(S): 2 INJECTION INTRAMUSCULAR; INTRAVENOUS; SUBCUTANEOUS at 17:15

## 2019-01-01 RX ADMIN — MEROPENEM 100 MILLIGRAM(S): 1 INJECTION INTRAVENOUS at 21:36

## 2019-01-01 RX ADMIN — DULOXETINE HYDROCHLORIDE 60 MILLIGRAM(S): 30 CAPSULE, DELAYED RELEASE ORAL at 17:22

## 2019-01-01 RX ADMIN — HYDROMORPHONE HYDROCHLORIDE 2.5 MILLIGRAM(S): 2 INJECTION INTRAMUSCULAR; INTRAVENOUS; SUBCUTANEOUS at 19:58

## 2019-01-01 RX ADMIN — METHADONE HYDROCHLORIDE 45 MILLIGRAM(S): 40 TABLET ORAL at 05:31

## 2019-01-01 RX ADMIN — MEROPENEM 100 MILLIGRAM(S): 1 INJECTION INTRAVENOUS at 14:08

## 2019-01-01 RX ADMIN — Medication 1 GRAM(S): at 23:16

## 2019-01-01 RX ADMIN — Medication 1: at 13:38

## 2019-01-01 RX ADMIN — MORPHINE SULFATE 30 MILLIGRAM(S): 50 CAPSULE, EXTENDED RELEASE ORAL at 06:53

## 2019-01-01 RX ADMIN — SCOPALAMINE 1 PATCH: 1 PATCH, EXTENDED RELEASE TRANSDERMAL at 19:39

## 2019-01-01 RX ADMIN — HYDROMORPHONE HYDROCHLORIDE 5 MILLIGRAM(S): 2 INJECTION INTRAMUSCULAR; INTRAVENOUS; SUBCUTANEOUS at 10:30

## 2019-01-01 RX ADMIN — Medication 650 MILLIGRAM(S): at 14:00

## 2019-01-01 RX ADMIN — Medication 50 MILLIGRAM(S): at 05:44

## 2019-01-01 RX ADMIN — ROBINUL 0.4 MILLIGRAM(S): 0.2 INJECTION INTRAMUSCULAR; INTRAVENOUS at 15:05

## 2019-01-01 RX ADMIN — DULOXETINE HYDROCHLORIDE 60 MILLIGRAM(S): 30 CAPSULE, DELAYED RELEASE ORAL at 13:21

## 2019-01-01 RX ADMIN — Medication 1 GRAM(S): at 17:11

## 2019-01-01 RX ADMIN — Medication 650 MILLIGRAM(S): at 13:06

## 2019-01-01 RX ADMIN — Medication 1: at 12:44

## 2019-01-01 RX ADMIN — MEROPENEM 100 MILLIGRAM(S): 1 INJECTION INTRAVENOUS at 14:05

## 2019-01-01 RX ADMIN — MORPHINE SULFATE 30 MILLIGRAM(S): 50 CAPSULE, EXTENDED RELEASE ORAL at 12:37

## 2019-01-01 RX ADMIN — HYDROMORPHONE HYDROCHLORIDE 6 MG/HR: 2 INJECTION INTRAMUSCULAR; INTRAVENOUS; SUBCUTANEOUS at 19:23

## 2019-01-01 RX ADMIN — HYDROMORPHONE HYDROCHLORIDE 6 MG/HR: 2 INJECTION INTRAMUSCULAR; INTRAVENOUS; SUBCUTANEOUS at 19:14

## 2019-01-01 RX ADMIN — METHADONE HYDROCHLORIDE 20 MILLIGRAM(S): 40 TABLET ORAL at 23:37

## 2019-01-01 RX ADMIN — Medication 40 MILLIEQUIVALENT(S): at 10:18

## 2019-01-01 RX ADMIN — ROBINUL 0.4 MILLIGRAM(S): 0.2 INJECTION INTRAMUSCULAR; INTRAVENOUS at 23:53

## 2019-01-01 RX ADMIN — METHADONE HYDROCHLORIDE 20 MILLIGRAM(S): 40 TABLET ORAL at 14:48

## 2019-01-01 RX ADMIN — HYDROMORPHONE HYDROCHLORIDE 4 MILLIGRAM(S): 2 INJECTION INTRAMUSCULAR; INTRAVENOUS; SUBCUTANEOUS at 09:31

## 2019-01-01 RX ADMIN — Medication 1000 MILLIGRAM(S): at 06:09

## 2019-01-01 RX ADMIN — POLYETHYLENE GLYCOL 3350 17 GRAM(S): 17 POWDER, FOR SOLUTION ORAL at 18:07

## 2019-01-01 RX ADMIN — Medication 1000 MILLIGRAM(S): at 13:02

## 2019-01-01 RX ADMIN — METHADONE HYDROCHLORIDE 20 MILLIGRAM(S): 40 TABLET ORAL at 06:32

## 2019-01-01 RX ADMIN — HYDROMORPHONE HYDROCHLORIDE 5 MILLIGRAM(S): 2 INJECTION INTRAMUSCULAR; INTRAVENOUS; SUBCUTANEOUS at 03:00

## 2019-01-01 RX ADMIN — FENTANYL CITRATE 1 PATCH: 50 INJECTION INTRAVENOUS at 19:27

## 2019-01-01 RX ADMIN — HYDROMORPHONE HYDROCHLORIDE 3.2 MILLIGRAM(S): 2 INJECTION INTRAMUSCULAR; INTRAVENOUS; SUBCUTANEOUS at 17:05

## 2019-01-01 RX ADMIN — HYDROMORPHONE HYDROCHLORIDE 2.5 MILLIGRAM(S): 2 INJECTION INTRAMUSCULAR; INTRAVENOUS; SUBCUTANEOUS at 12:32

## 2019-01-01 RX ADMIN — METHADONE HYDROCHLORIDE 45 MILLIGRAM(S): 40 TABLET ORAL at 05:48

## 2019-01-01 RX ADMIN — POLYETHYLENE GLYCOL 3350 17 GRAM(S): 17 POWDER, FOR SOLUTION ORAL at 05:43

## 2019-01-01 RX ADMIN — METHADONE HYDROCHLORIDE 25 MILLIGRAM(S): 40 TABLET ORAL at 00:12

## 2019-01-01 RX ADMIN — HYDROMORPHONE HYDROCHLORIDE 4 MILLIGRAM(S): 2 INJECTION INTRAMUSCULAR; INTRAVENOUS; SUBCUTANEOUS at 07:54

## 2019-01-01 RX ADMIN — Medication 1000 MILLIGRAM(S): at 06:52

## 2019-01-01 RX ADMIN — HYDROMORPHONE HYDROCHLORIDE 3.2 MILLIGRAM(S): 2 INJECTION INTRAMUSCULAR; INTRAVENOUS; SUBCUTANEOUS at 15:00

## 2019-01-01 RX ADMIN — HYDROMORPHONE HYDROCHLORIDE 2.5 MILLIGRAM(S): 2 INJECTION INTRAMUSCULAR; INTRAVENOUS; SUBCUTANEOUS at 17:15

## 2019-01-01 RX ADMIN — DULOXETINE HYDROCHLORIDE 60 MILLIGRAM(S): 30 CAPSULE, DELAYED RELEASE ORAL at 17:04

## 2019-01-01 RX ADMIN — HYDROMORPHONE HYDROCHLORIDE 12 MILLIGRAM(S): 2 INJECTION INTRAMUSCULAR; INTRAVENOUS; SUBCUTANEOUS at 22:16

## 2019-01-01 RX ADMIN — Medication 3: at 18:10

## 2019-01-01 RX ADMIN — Medication 2 MILLIGRAM(S): at 12:38

## 2019-01-01 RX ADMIN — ROBINUL 0.4 MILLIGRAM(S): 0.2 INJECTION INTRAMUSCULAR; INTRAVENOUS at 06:06

## 2019-01-01 RX ADMIN — HYDROMORPHONE HYDROCHLORIDE 12 MILLIGRAM(S): 2 INJECTION INTRAMUSCULAR; INTRAVENOUS; SUBCUTANEOUS at 19:05

## 2019-01-01 RX ADMIN — FENTANYL CITRATE 1 PATCH: 50 INJECTION INTRAVENOUS at 13:00

## 2019-01-01 RX ADMIN — FAMOTIDINE 20 MILLIGRAM(S): 10 INJECTION INTRAVENOUS at 12:46

## 2019-01-01 RX ADMIN — ROBINUL 0.4 MILLIGRAM(S): 0.2 INJECTION INTRAMUSCULAR; INTRAVENOUS at 23:18

## 2019-01-01 RX ADMIN — Medication 0.1 MILLIGRAM(S): at 09:08

## 2019-01-01 RX ADMIN — Medication 2 MILLIGRAM(S): at 13:03

## 2019-01-01 RX ADMIN — Medication 1: at 10:09

## 2019-01-01 RX ADMIN — METHADONE HYDROCHLORIDE 20 MILLIGRAM(S): 40 TABLET ORAL at 05:16

## 2019-01-01 RX ADMIN — HYDROMORPHONE HYDROCHLORIDE 30 MILLILITER(S): 2 INJECTION INTRAMUSCULAR; INTRAVENOUS; SUBCUTANEOUS at 06:00

## 2019-01-01 RX ADMIN — METHADONE HYDROCHLORIDE 20 MILLIGRAM(S): 40 TABLET ORAL at 22:08

## 2019-01-01 RX ADMIN — METHADONE HYDROCHLORIDE 12.5 MILLIGRAM(S): 40 TABLET ORAL at 18:25

## 2019-01-01 RX ADMIN — HYDROMORPHONE HYDROCHLORIDE 12 MILLIGRAM(S): 2 INJECTION INTRAMUSCULAR; INTRAVENOUS; SUBCUTANEOUS at 01:30

## 2019-01-01 RX ADMIN — Medication 1 GRAM(S): at 05:47

## 2019-01-01 RX ADMIN — METHADONE HYDROCHLORIDE 20 MILLIGRAM(S): 40 TABLET ORAL at 13:37

## 2019-01-01 RX ADMIN — FAMOTIDINE 20 MILLIGRAM(S): 10 INJECTION INTRAVENOUS at 12:44

## 2019-01-01 RX ADMIN — FAMOTIDINE 20 MILLIGRAM(S): 10 INJECTION INTRAVENOUS at 13:08

## 2019-01-01 RX ADMIN — PIPERACILLIN AND TAZOBACTAM 25 GRAM(S): 4; .5 INJECTION, POWDER, LYOPHILIZED, FOR SOLUTION INTRAVENOUS at 18:00

## 2019-01-01 RX ADMIN — CHLORHEXIDINE GLUCONATE 1 APPLICATION(S): 213 SOLUTION TOPICAL at 12:02

## 2019-01-01 RX ADMIN — Medication 2 MILLIGRAM(S): at 05:20

## 2019-01-01 RX ADMIN — SIMETHICONE 80 MILLIGRAM(S): 80 TABLET, CHEWABLE ORAL at 11:37

## 2019-01-01 RX ADMIN — METHADONE HYDROCHLORIDE 25 MILLIGRAM(S): 40 TABLET ORAL at 12:15

## 2019-01-01 RX ADMIN — HYDROMORPHONE HYDROCHLORIDE 12 MILLIGRAM(S): 2 INJECTION INTRAMUSCULAR; INTRAVENOUS; SUBCUTANEOUS at 08:45

## 2019-01-01 RX ADMIN — Medication 1 GRAM(S): at 17:38

## 2019-01-01 RX ADMIN — HYDROMORPHONE HYDROCHLORIDE 12 MILLIGRAM(S): 2 INJECTION INTRAMUSCULAR; INTRAVENOUS; SUBCUTANEOUS at 13:38

## 2019-01-01 RX ADMIN — FAMOTIDINE 20 MILLIGRAM(S): 10 INJECTION INTRAVENOUS at 12:32

## 2019-01-01 RX ADMIN — HYDROMORPHONE HYDROCHLORIDE 2.5 MILLIGRAM(S): 2 INJECTION INTRAMUSCULAR; INTRAVENOUS; SUBCUTANEOUS at 02:55

## 2019-01-01 RX ADMIN — METHADONE HYDROCHLORIDE 12.5 MILLIGRAM(S): 40 TABLET ORAL at 12:30

## 2019-01-01 RX ADMIN — Medication 1: at 08:53

## 2019-01-01 RX ADMIN — Medication 1 GRAM(S): at 05:44

## 2019-01-01 RX ADMIN — METHADONE HYDROCHLORIDE 20 MILLIGRAM(S): 40 TABLET ORAL at 21:39

## 2019-01-01 RX ADMIN — Medication 0.5 MILLIGRAM(S): at 13:52

## 2019-01-01 RX ADMIN — Medication 1 GRAM(S): at 23:34

## 2019-01-01 RX ADMIN — HYDROMORPHONE HYDROCHLORIDE 1 MILLIGRAM(S): 2 INJECTION INTRAMUSCULAR; INTRAVENOUS; SUBCUTANEOUS at 13:12

## 2019-01-01 RX ADMIN — DULOXETINE HYDROCHLORIDE 60 MILLIGRAM(S): 30 CAPSULE, DELAYED RELEASE ORAL at 06:35

## 2019-01-01 RX ADMIN — POLYETHYLENE GLYCOL 3350 17 GRAM(S): 17 POWDER, FOR SOLUTION ORAL at 08:50

## 2019-01-01 RX ADMIN — MORPHINE SULFATE 30 MILLIGRAM(S): 50 CAPSULE, EXTENDED RELEASE ORAL at 08:28

## 2019-01-01 RX ADMIN — Medication 5 MILLIGRAM(S): at 22:54

## 2019-01-01 RX ADMIN — Medication 3: at 11:08

## 2019-01-01 RX ADMIN — MORPHINE SULFATE 30 MILLIGRAM(S): 50 CAPSULE, EXTENDED RELEASE ORAL at 07:03

## 2019-01-01 RX ADMIN — METHADONE HYDROCHLORIDE 20 MILLIGRAM(S): 40 TABLET ORAL at 08:47

## 2019-01-01 RX ADMIN — SENNA PLUS 2 TABLET(S): 8.6 TABLET ORAL at 22:17

## 2019-01-01 RX ADMIN — CHLORHEXIDINE GLUCONATE 1 APPLICATION(S): 213 SOLUTION TOPICAL at 12:45

## 2019-01-01 RX ADMIN — MORPHINE SULFATE 30 MILLIGRAM(S): 50 CAPSULE, EXTENDED RELEASE ORAL at 11:54

## 2019-01-01 RX ADMIN — DULOXETINE HYDROCHLORIDE 60 MILLIGRAM(S): 30 CAPSULE, DELAYED RELEASE ORAL at 05:44

## 2019-01-01 RX ADMIN — Medication 1 GRAM(S): at 14:37

## 2019-01-01 RX ADMIN — MORPHINE SULFATE 30 MILLIGRAM(S): 50 CAPSULE, EXTENDED RELEASE ORAL at 18:24

## 2019-01-01 RX ADMIN — Medication 2: at 12:28

## 2019-01-01 RX ADMIN — Medication 1 GRAM(S): at 17:48

## 2019-01-01 RX ADMIN — DULOXETINE HYDROCHLORIDE 60 MILLIGRAM(S): 30 CAPSULE, DELAYED RELEASE ORAL at 13:04

## 2019-01-01 RX ADMIN — HYDROMORPHONE HYDROCHLORIDE 3 MILLIGRAM(S): 2 INJECTION INTRAMUSCULAR; INTRAVENOUS; SUBCUTANEOUS at 11:30

## 2019-01-01 RX ADMIN — HYDROMORPHONE HYDROCHLORIDE 3 MILLIGRAM(S): 2 INJECTION INTRAMUSCULAR; INTRAVENOUS; SUBCUTANEOUS at 22:47

## 2019-01-01 RX ADMIN — Medication 0.1 MILLIGRAM(S): at 08:16

## 2019-01-01 RX ADMIN — HYDROMORPHONE HYDROCHLORIDE 12 MILLIGRAM(S): 2 INJECTION INTRAMUSCULAR; INTRAVENOUS; SUBCUTANEOUS at 09:37

## 2019-01-01 RX ADMIN — HYDROMORPHONE HYDROCHLORIDE 2 MILLIGRAM(S): 2 INJECTION INTRAMUSCULAR; INTRAVENOUS; SUBCUTANEOUS at 04:29

## 2019-01-01 RX ADMIN — HYDROMORPHONE HYDROCHLORIDE 2 MILLIGRAM(S): 2 INJECTION INTRAMUSCULAR; INTRAVENOUS; SUBCUTANEOUS at 05:00

## 2019-01-01 RX ADMIN — CEFEPIME 100 MILLIGRAM(S): 1 INJECTION, POWDER, FOR SOLUTION INTRAMUSCULAR; INTRAVENOUS at 05:16

## 2019-01-01 RX ADMIN — Medication 40 MILLIEQUIVALENT(S): at 13:05

## 2019-01-01 RX ADMIN — ENOXAPARIN SODIUM 40 MILLIGRAM(S): 100 INJECTION SUBCUTANEOUS at 11:57

## 2019-01-01 RX ADMIN — Medication 1: at 17:56

## 2019-01-01 RX ADMIN — HYDROMORPHONE HYDROCHLORIDE 4 MILLIGRAM(S): 2 INJECTION INTRAMUSCULAR; INTRAVENOUS; SUBCUTANEOUS at 10:42

## 2019-01-01 RX ADMIN — METHADONE HYDROCHLORIDE 20 MILLIGRAM(S): 40 TABLET ORAL at 00:33

## 2019-01-01 RX ADMIN — ENOXAPARIN SODIUM 40 MILLIGRAM(S): 100 INJECTION SUBCUTANEOUS at 12:31

## 2019-01-01 RX ADMIN — Medication 1 GRAM(S): at 06:34

## 2019-01-01 RX ADMIN — Medication 2 MILLIGRAM(S): at 10:18

## 2019-01-01 RX ADMIN — ENOXAPARIN SODIUM 40 MILLIGRAM(S): 100 INJECTION SUBCUTANEOUS at 13:17

## 2019-01-01 RX ADMIN — METHADONE HYDROCHLORIDE 20 MILLIGRAM(S): 40 TABLET ORAL at 02:05

## 2019-01-01 RX ADMIN — Medication 50 MILLIGRAM(S): at 05:33

## 2019-01-01 RX ADMIN — MEROPENEM 100 MILLIGRAM(S): 1 INJECTION INTRAVENOUS at 13:14

## 2019-01-01 RX ADMIN — Medication 1000 MILLIGRAM(S): at 23:41

## 2019-01-01 RX ADMIN — Medication 1 GRAM(S): at 05:16

## 2019-01-01 RX ADMIN — HYDROMORPHONE HYDROCHLORIDE 3.2 MILLIGRAM(S): 2 INJECTION INTRAMUSCULAR; INTRAVENOUS; SUBCUTANEOUS at 17:43

## 2019-01-01 RX ADMIN — MORPHINE SULFATE 30 MILLIGRAM(S): 50 CAPSULE, EXTENDED RELEASE ORAL at 23:00

## 2019-01-01 RX ADMIN — FENTANYL CITRATE 1 PATCH: 50 INJECTION INTRAVENOUS at 13:37

## 2019-01-01 RX ADMIN — HYDROMORPHONE HYDROCHLORIDE 3 MILLIGRAM(S): 2 INJECTION INTRAMUSCULAR; INTRAVENOUS; SUBCUTANEOUS at 06:09

## 2019-01-01 RX ADMIN — Medication 1 GRAM(S): at 17:22

## 2019-01-01 RX ADMIN — Medication 2 MILLIGRAM(S): at 05:50

## 2019-01-01 RX ADMIN — METHADONE HYDROCHLORIDE 45 MILLIGRAM(S): 40 TABLET ORAL at 06:06

## 2019-01-01 RX ADMIN — HYDROMORPHONE HYDROCHLORIDE 3.2 MILLIGRAM(S): 2 INJECTION INTRAMUSCULAR; INTRAVENOUS; SUBCUTANEOUS at 23:06

## 2019-01-01 RX ADMIN — POLYETHYLENE GLYCOL 3350 17 GRAM(S): 17 POWDER, FOR SOLUTION ORAL at 10:09

## 2019-01-01 RX ADMIN — METHADONE HYDROCHLORIDE 20 MILLIGRAM(S): 40 TABLET ORAL at 06:26

## 2019-01-01 RX ADMIN — Medication 0.1 MILLIGRAM(S): at 05:36

## 2019-01-01 RX ADMIN — HYDROMORPHONE HYDROCHLORIDE 12 MILLIGRAM(S): 2 INJECTION INTRAMUSCULAR; INTRAVENOUS; SUBCUTANEOUS at 14:43

## 2019-01-01 RX ADMIN — Medication 2 MILLIGRAM(S): at 13:37

## 2019-01-01 RX ADMIN — MORPHINE SULFATE 30 MILLIGRAM(S): 50 CAPSULE, EXTENDED RELEASE ORAL at 00:33

## 2019-01-01 RX ADMIN — FAMOTIDINE 20 MILLIGRAM(S): 10 INJECTION INTRAVENOUS at 13:05

## 2019-01-01 RX ADMIN — MORPHINE SULFATE 30 MILLIGRAM(S): 50 CAPSULE, EXTENDED RELEASE ORAL at 07:10

## 2019-01-01 RX ADMIN — HYDROMORPHONE HYDROCHLORIDE 30 MILLILITER(S): 2 INJECTION INTRAMUSCULAR; INTRAVENOUS; SUBCUTANEOUS at 17:23

## 2019-01-01 RX ADMIN — Medication 1 GRAM(S): at 06:50

## 2019-01-01 RX ADMIN — PIPERACILLIN AND TAZOBACTAM 25 GRAM(S): 4; .5 INJECTION, POWDER, LYOPHILIZED, FOR SOLUTION INTRAVENOUS at 05:32

## 2019-01-01 RX ADMIN — HYDROMORPHONE HYDROCHLORIDE 2 MILLIGRAM(S): 2 INJECTION INTRAMUSCULAR; INTRAVENOUS; SUBCUTANEOUS at 10:15

## 2019-01-01 RX ADMIN — HYDROMORPHONE HYDROCHLORIDE 2 MILLIGRAM(S): 2 INJECTION INTRAMUSCULAR; INTRAVENOUS; SUBCUTANEOUS at 22:40

## 2019-01-01 RX ADMIN — ROBINUL 0.4 MILLIGRAM(S): 0.2 INJECTION INTRAMUSCULAR; INTRAVENOUS at 17:44

## 2019-01-01 RX ADMIN — Medication 1 GRAM(S): at 05:43

## 2019-01-01 RX ADMIN — HYDROMORPHONE HYDROCHLORIDE 3 MILLIGRAM(S): 2 INJECTION INTRAMUSCULAR; INTRAVENOUS; SUBCUTANEOUS at 04:30

## 2019-01-01 RX ADMIN — FENTANYL CITRATE 1 PATCH: 50 INJECTION INTRAVENOUS at 19:32

## 2019-01-01 RX ADMIN — PIPERACILLIN AND TAZOBACTAM 25 GRAM(S): 4; .5 INJECTION, POWDER, LYOPHILIZED, FOR SOLUTION INTRAVENOUS at 16:04

## 2019-01-01 RX ADMIN — Medication 2: at 12:56

## 2019-01-01 RX ADMIN — METHADONE HYDROCHLORIDE 20 MILLIGRAM(S): 40 TABLET ORAL at 13:08

## 2019-01-01 RX ADMIN — Medication 50 MILLIGRAM(S): at 05:29

## 2019-01-01 RX ADMIN — HYDROMORPHONE HYDROCHLORIDE 30 MILLILITER(S): 2 INJECTION INTRAMUSCULAR; INTRAVENOUS; SUBCUTANEOUS at 13:45

## 2019-01-01 RX ADMIN — POLYETHYLENE GLYCOL 3350 17 GRAM(S): 17 POWDER, FOR SOLUTION ORAL at 09:11

## 2019-01-01 RX ADMIN — HYDROMORPHONE HYDROCHLORIDE 30 MILLILITER(S): 2 INJECTION INTRAMUSCULAR; INTRAVENOUS; SUBCUTANEOUS at 07:27

## 2019-01-01 RX ADMIN — HYDROMORPHONE HYDROCHLORIDE 30 MILLILITER(S): 2 INJECTION INTRAMUSCULAR; INTRAVENOUS; SUBCUTANEOUS at 03:07

## 2019-01-01 RX ADMIN — DULOXETINE HYDROCHLORIDE 60 MILLIGRAM(S): 30 CAPSULE, DELAYED RELEASE ORAL at 17:43

## 2019-01-01 RX ADMIN — MORPHINE SULFATE 60 MILLIGRAM(S): 50 CAPSULE, EXTENDED RELEASE ORAL at 17:44

## 2019-01-01 RX ADMIN — Medication 250 MILLIGRAM(S): at 00:01

## 2019-01-01 RX ADMIN — PIPERACILLIN AND TAZOBACTAM 25 GRAM(S): 4; .5 INJECTION, POWDER, LYOPHILIZED, FOR SOLUTION INTRAVENOUS at 08:02

## 2019-01-01 RX ADMIN — HYDROMORPHONE HYDROCHLORIDE 30 MILLILITER(S): 2 INJECTION INTRAMUSCULAR; INTRAVENOUS; SUBCUTANEOUS at 13:55

## 2019-01-01 RX ADMIN — DULOXETINE HYDROCHLORIDE 60 MILLIGRAM(S): 30 CAPSULE, DELAYED RELEASE ORAL at 23:57

## 2019-01-01 RX ADMIN — HYDROMORPHONE HYDROCHLORIDE 2 MILLIGRAM(S): 2 INJECTION INTRAMUSCULAR; INTRAVENOUS; SUBCUTANEOUS at 20:29

## 2019-01-01 RX ADMIN — SCOPALAMINE 1 PATCH: 1 PATCH, EXTENDED RELEASE TRANSDERMAL at 19:15

## 2019-01-01 RX ADMIN — DULOXETINE HYDROCHLORIDE 60 MILLIGRAM(S): 30 CAPSULE, DELAYED RELEASE ORAL at 23:00

## 2019-01-01 RX ADMIN — MORPHINE SULFATE 30 MILLIGRAM(S): 50 CAPSULE, EXTENDED RELEASE ORAL at 20:07

## 2019-01-01 RX ADMIN — POLYETHYLENE GLYCOL 3350 17 GRAM(S): 17 POWDER, FOR SOLUTION ORAL at 17:14

## 2019-01-01 RX ADMIN — HYDROMORPHONE HYDROCHLORIDE 3 MILLIGRAM(S): 2 INJECTION INTRAMUSCULAR; INTRAVENOUS; SUBCUTANEOUS at 11:00

## 2019-01-01 RX ADMIN — POLYETHYLENE GLYCOL 3350 17 GRAM(S): 17 POWDER, FOR SOLUTION ORAL at 06:50

## 2019-01-01 RX ADMIN — FAMOTIDINE 20 MILLIGRAM(S): 10 INJECTION INTRAVENOUS at 15:46

## 2019-01-01 RX ADMIN — DULOXETINE HYDROCHLORIDE 60 MILLIGRAM(S): 30 CAPSULE, DELAYED RELEASE ORAL at 12:47

## 2019-01-01 RX ADMIN — SENNA PLUS 2 TABLET(S): 8.6 TABLET ORAL at 21:24

## 2019-01-01 RX ADMIN — Medication 0.1 MILLIGRAM(S): at 13:55

## 2019-01-01 RX ADMIN — SODIUM CHLORIDE 20 MILLILITER(S): 9 INJECTION INTRAMUSCULAR; INTRAVENOUS; SUBCUTANEOUS at 19:28

## 2019-01-01 RX ADMIN — Medication 1: at 08:50

## 2019-01-01 RX ADMIN — Medication 1 GRAM(S): at 00:22

## 2019-01-01 RX ADMIN — SODIUM CHLORIDE 100 MILLILITER(S): 9 INJECTION, SOLUTION INTRAVENOUS at 19:29

## 2019-01-01 RX ADMIN — METHADONE HYDROCHLORIDE 20 MILLIGRAM(S): 40 TABLET ORAL at 17:17

## 2019-01-01 RX ADMIN — METHADONE HYDROCHLORIDE 20 MILLIGRAM(S): 40 TABLET ORAL at 20:42

## 2019-01-01 RX ADMIN — METHADONE HYDROCHLORIDE 25 MILLIGRAM(S): 40 TABLET ORAL at 00:28

## 2019-01-01 RX ADMIN — Medication 1 GRAM(S): at 13:04

## 2019-01-01 RX ADMIN — SODIUM CHLORIDE 20 MILLILITER(S): 9 INJECTION INTRAMUSCULAR; INTRAVENOUS; SUBCUTANEOUS at 17:25

## 2019-01-01 RX ADMIN — METHADONE HYDROCHLORIDE 12.5 MILLIGRAM(S): 40 TABLET ORAL at 18:07

## 2019-01-01 RX ADMIN — HYDROMORPHONE HYDROCHLORIDE 3.2 MILLIGRAM(S): 2 INJECTION INTRAMUSCULAR; INTRAVENOUS; SUBCUTANEOUS at 21:37

## 2019-01-01 RX ADMIN — Medication 1 GRAM(S): at 05:20

## 2019-01-01 RX ADMIN — SENNA PLUS 2 TABLET(S): 8.6 TABLET ORAL at 21:13

## 2019-01-01 RX ADMIN — METHADONE HYDROCHLORIDE 12.5 MILLIGRAM(S): 40 TABLET ORAL at 17:25

## 2019-01-01 RX ADMIN — MORPHINE SULFATE 1 MILLIGRAM(S): 50 CAPSULE, EXTENDED RELEASE ORAL at 03:15

## 2019-01-01 RX ADMIN — METHADONE HYDROCHLORIDE 20 MILLIGRAM(S): 40 TABLET ORAL at 06:50

## 2019-01-01 RX ADMIN — SODIUM CHLORIDE 20 MILLILITER(S): 9 INJECTION INTRAMUSCULAR; INTRAVENOUS; SUBCUTANEOUS at 07:56

## 2019-01-01 RX ADMIN — Medication 30 MILLILITER(S): at 01:10

## 2019-01-01 RX ADMIN — HYDROMORPHONE HYDROCHLORIDE 16 MILLIGRAM(S): 2 INJECTION INTRAMUSCULAR; INTRAVENOUS; SUBCUTANEOUS at 22:15

## 2019-01-01 RX ADMIN — Medication 2: at 13:41

## 2019-01-01 RX ADMIN — Medication 650 MILLIGRAM(S): at 08:00

## 2019-01-01 RX ADMIN — Medication 0.1 MILLIGRAM(S): at 22:11

## 2019-01-01 RX ADMIN — PIPERACILLIN AND TAZOBACTAM 25 GRAM(S): 4; .5 INJECTION, POWDER, LYOPHILIZED, FOR SOLUTION INTRAVENOUS at 21:39

## 2019-01-01 RX ADMIN — METHADONE HYDROCHLORIDE 12.5 MILLIGRAM(S): 40 TABLET ORAL at 23:54

## 2019-01-01 RX ADMIN — DULOXETINE HYDROCHLORIDE 60 MILLIGRAM(S): 30 CAPSULE, DELAYED RELEASE ORAL at 12:31

## 2019-01-01 RX ADMIN — Medication 250 MILLIGRAM(S): at 01:26

## 2019-01-01 RX ADMIN — ENOXAPARIN SODIUM 40 MILLIGRAM(S): 100 INJECTION SUBCUTANEOUS at 22:03

## 2019-01-01 RX ADMIN — Medication 1 GRAM(S): at 05:32

## 2019-01-01 RX ADMIN — MEROPENEM 100 MILLIGRAM(S): 1 INJECTION INTRAVENOUS at 21:34

## 2019-01-01 RX ADMIN — HYDROMORPHONE HYDROCHLORIDE 12 MILLIGRAM(S): 2 INJECTION INTRAMUSCULAR; INTRAVENOUS; SUBCUTANEOUS at 09:16

## 2019-01-01 RX ADMIN — SCOPALAMINE 1 PATCH: 1 PATCH, EXTENDED RELEASE TRANSDERMAL at 07:30

## 2019-01-01 RX ADMIN — POLYETHYLENE GLYCOL 3350 17 GRAM(S): 17 POWDER, FOR SOLUTION ORAL at 06:22

## 2019-01-01 RX ADMIN — DULOXETINE HYDROCHLORIDE 60 MILLIGRAM(S): 30 CAPSULE, DELAYED RELEASE ORAL at 05:46

## 2019-01-01 RX ADMIN — HYDROMORPHONE HYDROCHLORIDE 12 MILLIGRAM(S): 2 INJECTION INTRAMUSCULAR; INTRAVENOUS; SUBCUTANEOUS at 13:00

## 2019-01-01 RX ADMIN — Medication 1 GRAM(S): at 05:36

## 2019-01-01 RX ADMIN — HYDROMORPHONE HYDROCHLORIDE 2.5 MILLIGRAM(S): 2 INJECTION INTRAMUSCULAR; INTRAVENOUS; SUBCUTANEOUS at 03:25

## 2019-01-01 RX ADMIN — Medication 250 MILLIGRAM(S): at 15:09

## 2019-01-01 RX ADMIN — Medication 2: at 10:15

## 2019-01-01 RX ADMIN — Medication 2 MILLIGRAM(S): at 09:34

## 2019-01-01 RX ADMIN — Medication 50 MILLIGRAM(S): at 06:05

## 2019-01-01 RX ADMIN — HYDROMORPHONE HYDROCHLORIDE 1 MILLIGRAM(S): 2 INJECTION INTRAMUSCULAR; INTRAVENOUS; SUBCUTANEOUS at 13:31

## 2019-01-01 RX ADMIN — PIPERACILLIN AND TAZOBACTAM 25 GRAM(S): 4; .5 INJECTION, POWDER, LYOPHILIZED, FOR SOLUTION INTRAVENOUS at 17:08

## 2019-01-01 RX ADMIN — HYDROMORPHONE HYDROCHLORIDE 16 MILLIGRAM(S): 2 INJECTION INTRAMUSCULAR; INTRAVENOUS; SUBCUTANEOUS at 20:22

## 2019-01-01 RX ADMIN — MORPHINE SULFATE 2 MILLIGRAM(S): 50 CAPSULE, EXTENDED RELEASE ORAL at 22:00

## 2019-01-01 RX ADMIN — MEROPENEM 100 MILLIGRAM(S): 1 INJECTION INTRAVENOUS at 21:56

## 2019-01-01 RX ADMIN — SCOPALAMINE 1 PATCH: 1 PATCH, EXTENDED RELEASE TRANSDERMAL at 07:44

## 2019-01-01 RX ADMIN — METHADONE HYDROCHLORIDE 12.5 MILLIGRAM(S): 40 TABLET ORAL at 12:01

## 2019-01-01 RX ADMIN — MORPHINE SULFATE 2 MILLIGRAM(S): 50 CAPSULE, EXTENDED RELEASE ORAL at 02:26

## 2019-01-01 RX ADMIN — DULOXETINE HYDROCHLORIDE 60 MILLIGRAM(S): 30 CAPSULE, DELAYED RELEASE ORAL at 18:22

## 2019-01-01 RX ADMIN — Medication 100 MILLIGRAM(S): at 23:57

## 2019-01-01 RX ADMIN — MORPHINE SULFATE 2 MILLIGRAM(S): 50 CAPSULE, EXTENDED RELEASE ORAL at 02:45

## 2019-01-01 RX ADMIN — Medication 0.4 MG/KG/HR: at 07:43

## 2019-01-01 RX ADMIN — METHADONE HYDROCHLORIDE 20 MILLIGRAM(S): 40 TABLET ORAL at 05:46

## 2019-01-01 RX ADMIN — MORPHINE SULFATE 30 MILLIGRAM(S): 50 CAPSULE, EXTENDED RELEASE ORAL at 18:15

## 2019-01-01 RX ADMIN — Medication 1 GRAM(S): at 17:14

## 2019-01-01 RX ADMIN — MORPHINE SULFATE 60 MILLIGRAM(S): 50 CAPSULE, EXTENDED RELEASE ORAL at 07:00

## 2019-01-01 RX ADMIN — FENTANYL CITRATE 1 PATCH: 50 INJECTION INTRAVENOUS at 07:48

## 2019-01-01 RX ADMIN — MORPHINE SULFATE 30 MILLIGRAM(S): 50 CAPSULE, EXTENDED RELEASE ORAL at 00:30

## 2019-01-01 RX ADMIN — HYDROMORPHONE HYDROCHLORIDE 3.2 MILLIGRAM(S): 2 INJECTION INTRAMUSCULAR; INTRAVENOUS; SUBCUTANEOUS at 18:54

## 2019-01-01 RX ADMIN — FENTANYL CITRATE 1 PATCH: 50 INJECTION INTRAVENOUS at 13:39

## 2019-01-01 RX ADMIN — Medication 2 MILLIGRAM(S): at 05:46

## 2019-01-01 RX ADMIN — HYDROMORPHONE HYDROCHLORIDE 2.5 MILLIGRAM(S): 2 INJECTION INTRAMUSCULAR; INTRAVENOUS; SUBCUTANEOUS at 22:37

## 2019-01-01 RX ADMIN — MEROPENEM 100 MILLIGRAM(S): 1 INJECTION INTRAVENOUS at 06:50

## 2019-01-01 RX ADMIN — METHADONE HYDROCHLORIDE 25 MILLIGRAM(S): 40 TABLET ORAL at 18:31

## 2019-01-01 RX ADMIN — METHADONE HYDROCHLORIDE 20 MILLIGRAM(S): 40 TABLET ORAL at 12:23

## 2019-01-01 RX ADMIN — METHADONE HYDROCHLORIDE 12.5 MILLIGRAM(S): 40 TABLET ORAL at 17:56

## 2019-01-01 RX ADMIN — Medication 1 GRAM(S): at 06:32

## 2019-01-01 RX ADMIN — Medication 1: at 08:42

## 2019-01-01 RX ADMIN — Medication 2 MILLIGRAM(S): at 21:40

## 2019-01-01 RX ADMIN — Medication 50 MILLIGRAM(S): at 05:35

## 2019-01-01 RX ADMIN — Medication 1 GRAM(S): at 02:26

## 2019-01-01 RX ADMIN — ENOXAPARIN SODIUM 40 MILLIGRAM(S): 100 INJECTION SUBCUTANEOUS at 12:44

## 2019-01-01 RX ADMIN — HYDROMORPHONE HYDROCHLORIDE 12 MILLIGRAM(S): 2 INJECTION INTRAMUSCULAR; INTRAVENOUS; SUBCUTANEOUS at 13:20

## 2019-01-01 RX ADMIN — METHADONE HYDROCHLORIDE 45 MILLIGRAM(S): 40 TABLET ORAL at 18:03

## 2019-01-01 RX ADMIN — METHADONE HYDROCHLORIDE 20 MILLIGRAM(S): 40 TABLET ORAL at 17:58

## 2019-01-01 RX ADMIN — HYDROMORPHONE HYDROCHLORIDE 3.2 MILLIGRAM(S): 2 INJECTION INTRAMUSCULAR; INTRAVENOUS; SUBCUTANEOUS at 10:35

## 2019-01-01 RX ADMIN — HYDROMORPHONE HYDROCHLORIDE 6 MG/HR: 2 INJECTION INTRAMUSCULAR; INTRAVENOUS; SUBCUTANEOUS at 03:20

## 2019-01-01 RX ADMIN — HYDROMORPHONE HYDROCHLORIDE 30 MILLILITER(S): 2 INJECTION INTRAMUSCULAR; INTRAVENOUS; SUBCUTANEOUS at 07:24

## 2019-01-01 RX ADMIN — ROBINUL 0.4 MILLIGRAM(S): 0.2 INJECTION INTRAMUSCULAR; INTRAVENOUS at 17:25

## 2019-01-01 RX ADMIN — Medication 0.1 MILLIGRAM(S): at 21:24

## 2019-01-01 RX ADMIN — DULOXETINE HYDROCHLORIDE 60 MILLIGRAM(S): 30 CAPSULE, DELAYED RELEASE ORAL at 06:32

## 2019-01-01 RX ADMIN — Medication 1 GRAM(S): at 12:37

## 2019-01-01 RX ADMIN — SENNA PLUS 2 TABLET(S): 8.6 TABLET ORAL at 22:36

## 2019-01-01 RX ADMIN — HYDROMORPHONE HYDROCHLORIDE 4 MILLIGRAM(S): 2 INJECTION INTRAMUSCULAR; INTRAVENOUS; SUBCUTANEOUS at 08:47

## 2019-01-01 RX ADMIN — MORPHINE SULFATE 4 MILLIGRAM(S): 50 CAPSULE, EXTENDED RELEASE ORAL at 19:41

## 2019-01-01 RX ADMIN — METHADONE HYDROCHLORIDE 20 MILLIGRAM(S): 40 TABLET ORAL at 17:06

## 2019-01-01 RX ADMIN — METHADONE HYDROCHLORIDE 20 MILLIGRAM(S): 40 TABLET ORAL at 14:00

## 2019-01-01 RX ADMIN — Medication 2 MILLIGRAM(S): at 12:47

## 2019-01-01 RX ADMIN — Medication 0.1 MILLIGRAM(S): at 05:01

## 2019-01-01 RX ADMIN — SENNA PLUS 2 TABLET(S): 8.6 TABLET ORAL at 21:35

## 2019-01-01 RX ADMIN — HYDROMORPHONE HYDROCHLORIDE 3.2 MILLIGRAM(S): 2 INJECTION INTRAMUSCULAR; INTRAVENOUS; SUBCUTANEOUS at 22:43

## 2019-01-01 RX ADMIN — HYDROMORPHONE HYDROCHLORIDE 12 MILLIGRAM(S): 2 INJECTION INTRAMUSCULAR; INTRAVENOUS; SUBCUTANEOUS at 21:13

## 2019-01-01 RX ADMIN — MORPHINE SULFATE 30 MILLIGRAM(S): 50 CAPSULE, EXTENDED RELEASE ORAL at 07:45

## 2019-01-01 RX ADMIN — ONDANSETRON 4 MILLIGRAM(S): 8 TABLET, FILM COATED ORAL at 19:41

## 2019-01-01 RX ADMIN — HYDROMORPHONE HYDROCHLORIDE 30 MILLILITER(S): 2 INJECTION INTRAMUSCULAR; INTRAVENOUS; SUBCUTANEOUS at 00:23

## 2019-01-01 RX ADMIN — Medication 0.1 MILLIGRAM(S): at 05:44

## 2019-01-01 RX ADMIN — Medication 50 MILLIGRAM(S): at 06:54

## 2019-01-01 RX ADMIN — DULOXETINE HYDROCHLORIDE 60 MILLIGRAM(S): 30 CAPSULE, DELAYED RELEASE ORAL at 06:25

## 2019-01-01 RX ADMIN — SCOPALAMINE 1 PATCH: 1 PATCH, EXTENDED RELEASE TRANSDERMAL at 19:31

## 2019-01-01 RX ADMIN — METHADONE HYDROCHLORIDE 20 MILLIGRAM(S): 40 TABLET ORAL at 13:05

## 2019-01-01 RX ADMIN — HYDROMORPHONE HYDROCHLORIDE 3 MILLIGRAM(S): 2 INJECTION INTRAMUSCULAR; INTRAVENOUS; SUBCUTANEOUS at 03:53

## 2019-01-01 RX ADMIN — PIPERACILLIN AND TAZOBACTAM 25 GRAM(S): 4; .5 INJECTION, POWDER, LYOPHILIZED, FOR SOLUTION INTRAVENOUS at 08:35

## 2019-01-01 RX ADMIN — HYDROMORPHONE HYDROCHLORIDE 1 MILLIGRAM(S): 2 INJECTION INTRAMUSCULAR; INTRAVENOUS; SUBCUTANEOUS at 14:00

## 2019-01-01 RX ADMIN — MORPHINE SULFATE 2 MILLIGRAM(S): 50 CAPSULE, EXTENDED RELEASE ORAL at 10:06

## 2019-01-01 RX ADMIN — Medication 0.1 MILLIGRAM(S): at 14:40

## 2019-01-01 RX ADMIN — Medication 1 GRAM(S): at 06:54

## 2019-01-01 RX ADMIN — HYDROMORPHONE HYDROCHLORIDE 4 MILLIGRAM(S): 2 INJECTION INTRAMUSCULAR; INTRAVENOUS; SUBCUTANEOUS at 17:52

## 2019-01-01 RX ADMIN — Medication 1 GRAM(S): at 17:17

## 2019-01-01 RX ADMIN — Medication 1 GRAM(S): at 13:12

## 2019-01-01 RX ADMIN — MEROPENEM 100 MILLIGRAM(S): 1 INJECTION INTRAVENOUS at 13:46

## 2019-01-01 RX ADMIN — Medication 1 GRAM(S): at 17:13

## 2019-01-01 RX ADMIN — HYDROMORPHONE HYDROCHLORIDE 16 MILLIGRAM(S): 2 INJECTION INTRAMUSCULAR; INTRAVENOUS; SUBCUTANEOUS at 04:30

## 2019-01-01 RX ADMIN — HYDROMORPHONE HYDROCHLORIDE 4 MILLIGRAM(S): 2 INJECTION INTRAMUSCULAR; INTRAVENOUS; SUBCUTANEOUS at 18:31

## 2019-01-01 RX ADMIN — Medication 5 MILLIGRAM(S): at 21:36

## 2019-01-01 RX ADMIN — HYDROMORPHONE HYDROCHLORIDE 12 MILLIGRAM(S): 2 INJECTION INTRAMUSCULAR; INTRAVENOUS; SUBCUTANEOUS at 02:47

## 2019-01-01 RX ADMIN — MORPHINE SULFATE 2 MILLIGRAM(S): 50 CAPSULE, EXTENDED RELEASE ORAL at 01:20

## 2019-01-01 RX ADMIN — HYDROMORPHONE HYDROCHLORIDE 3 MILLIGRAM(S): 2 INJECTION INTRAMUSCULAR; INTRAVENOUS; SUBCUTANEOUS at 13:16

## 2019-01-01 RX ADMIN — POLYETHYLENE GLYCOL 3350 17 GRAM(S): 17 POWDER, FOR SOLUTION ORAL at 23:00

## 2019-01-01 RX ADMIN — HYDROMORPHONE HYDROCHLORIDE 12 MILLIGRAM(S): 2 INJECTION INTRAMUSCULAR; INTRAVENOUS; SUBCUTANEOUS at 22:13

## 2019-01-01 RX ADMIN — Medication 40 MILLIEQUIVALENT(S): at 09:27

## 2019-01-01 RX ADMIN — Medication 0.1 MILLIGRAM(S): at 22:07

## 2019-01-01 RX ADMIN — SENNA PLUS 2 TABLET(S): 8.6 TABLET ORAL at 22:14

## 2019-01-01 RX ADMIN — ONDANSETRON 4 MILLIGRAM(S): 8 TABLET, FILM COATED ORAL at 06:22

## 2019-01-01 RX ADMIN — MORPHINE SULFATE 90 MILLIGRAM(S): 50 CAPSULE, EXTENDED RELEASE ORAL at 18:05

## 2019-01-01 RX ADMIN — METHADONE HYDROCHLORIDE 20 MILLIGRAM(S): 40 TABLET ORAL at 23:16

## 2019-01-01 RX ADMIN — POLYETHYLENE GLYCOL 3350 17 GRAM(S): 17 POWDER, FOR SOLUTION ORAL at 17:04

## 2019-01-01 RX ADMIN — Medication 1 GRAM(S): at 05:01

## 2019-01-01 RX ADMIN — METHADONE HYDROCHLORIDE 20 MILLIGRAM(S): 40 TABLET ORAL at 07:54

## 2019-01-01 RX ADMIN — SENNA PLUS 2 TABLET(S): 8.6 TABLET ORAL at 21:36

## 2019-01-01 RX ADMIN — HYDROMORPHONE HYDROCHLORIDE 16 MILLIGRAM(S): 2 INJECTION INTRAMUSCULAR; INTRAVENOUS; SUBCUTANEOUS at 08:54

## 2019-01-01 RX ADMIN — Medication 1 TABLET(S): at 05:32

## 2019-01-01 RX ADMIN — FAMOTIDINE 20 MILLIGRAM(S): 10 INJECTION INTRAVENOUS at 13:02

## 2019-01-01 RX ADMIN — HYDROMORPHONE HYDROCHLORIDE 3 MILLIGRAM(S): 2 INJECTION INTRAMUSCULAR; INTRAVENOUS; SUBCUTANEOUS at 13:45

## 2019-01-01 RX ADMIN — POLYETHYLENE GLYCOL 3350 17 GRAM(S): 17 POWDER, FOR SOLUTION ORAL at 05:37

## 2019-01-01 RX ADMIN — HYDROMORPHONE HYDROCHLORIDE 16 MILLIGRAM(S): 2 INJECTION INTRAMUSCULAR; INTRAVENOUS; SUBCUTANEOUS at 00:49

## 2019-01-01 RX ADMIN — Medication 1 TABLET(S): at 13:37

## 2019-01-01 RX ADMIN — Medication 1 GRAM(S): at 23:25

## 2019-01-01 RX ADMIN — METHADONE HYDROCHLORIDE 20 MILLIGRAM(S): 40 TABLET ORAL at 02:48

## 2019-01-01 RX ADMIN — METHADONE HYDROCHLORIDE 20 MILLIGRAM(S): 40 TABLET ORAL at 03:30

## 2019-01-01 RX ADMIN — SCOPALAMINE 1 PATCH: 1 PATCH, EXTENDED RELEASE TRANSDERMAL at 14:36

## 2019-01-01 RX ADMIN — HYDROMORPHONE HYDROCHLORIDE 16 MILLIGRAM(S): 2 INJECTION INTRAMUSCULAR; INTRAVENOUS; SUBCUTANEOUS at 08:24

## 2019-01-01 RX ADMIN — MORPHINE SULFATE 30 MILLIGRAM(S): 50 CAPSULE, EXTENDED RELEASE ORAL at 17:03

## 2019-01-01 RX ADMIN — MORPHINE SULFATE 30 MILLIGRAM(S): 50 CAPSULE, EXTENDED RELEASE ORAL at 02:20

## 2019-01-01 RX ADMIN — METHADONE HYDROCHLORIDE 12.5 MILLIGRAM(S): 40 TABLET ORAL at 18:17

## 2019-01-01 RX ADMIN — Medication 0.1 MILLIGRAM(S): at 23:40

## 2019-01-01 RX ADMIN — HYDROMORPHONE HYDROCHLORIDE 12 MILLIGRAM(S): 2 INJECTION INTRAMUSCULAR; INTRAVENOUS; SUBCUTANEOUS at 04:30

## 2019-01-01 RX ADMIN — HYDROMORPHONE HYDROCHLORIDE 2 MILLIGRAM(S): 2 INJECTION INTRAMUSCULAR; INTRAVENOUS; SUBCUTANEOUS at 17:54

## 2019-01-01 RX ADMIN — MORPHINE SULFATE 1 MILLIGRAM(S): 50 CAPSULE, EXTENDED RELEASE ORAL at 18:25

## 2019-01-01 RX ADMIN — Medication 2 MILLIGRAM(S): at 22:26

## 2019-01-01 RX ADMIN — Medication 1 GRAM(S): at 12:28

## 2019-01-01 RX ADMIN — MORPHINE SULFATE 30 MILLIGRAM(S): 50 CAPSULE, EXTENDED RELEASE ORAL at 22:34

## 2019-01-01 RX ADMIN — ENOXAPARIN SODIUM 40 MILLIGRAM(S): 100 INJECTION SUBCUTANEOUS at 11:56

## 2019-01-01 RX ADMIN — METHADONE HYDROCHLORIDE 12.5 MILLIGRAM(S): 40 TABLET ORAL at 00:13

## 2019-01-01 RX ADMIN — Medication 2 MILLIGRAM(S): at 21:13

## 2019-01-01 RX ADMIN — PIPERACILLIN AND TAZOBACTAM 25 GRAM(S): 4; .5 INJECTION, POWDER, LYOPHILIZED, FOR SOLUTION INTRAVENOUS at 00:49

## 2019-01-01 RX ADMIN — PIPERACILLIN AND TAZOBACTAM 25 GRAM(S): 4; .5 INJECTION, POWDER, LYOPHILIZED, FOR SOLUTION INTRAVENOUS at 22:09

## 2019-01-01 RX ADMIN — DULOXETINE HYDROCHLORIDE 60 MILLIGRAM(S): 30 CAPSULE, DELAYED RELEASE ORAL at 12:09

## 2019-01-01 RX ADMIN — Medication 0.1 MILLIGRAM(S): at 22:02

## 2019-01-01 RX ADMIN — Medication 1: at 12:30

## 2019-01-01 RX ADMIN — HYDROMORPHONE HYDROCHLORIDE 7.5 MILLIGRAM(S): 2 INJECTION INTRAMUSCULAR; INTRAVENOUS; SUBCUTANEOUS at 09:28

## 2019-01-01 RX ADMIN — Medication 0.5 MILLIGRAM(S): at 09:40

## 2019-01-01 RX ADMIN — Medication 40 MILLIEQUIVALENT(S): at 20:06

## 2019-01-01 RX ADMIN — MORPHINE SULFATE 1 MILLIGRAM(S): 50 CAPSULE, EXTENDED RELEASE ORAL at 22:47

## 2019-01-01 RX ADMIN — Medication 0.4 MG/KG/HR: at 11:47

## 2019-01-01 RX ADMIN — Medication 0.4 MG/KG/HR: at 07:30

## 2019-01-01 RX ADMIN — HYDROMORPHONE HYDROCHLORIDE 30 MILLILITER(S): 2 INJECTION INTRAMUSCULAR; INTRAVENOUS; SUBCUTANEOUS at 07:06

## 2019-01-01 RX ADMIN — Medication 3: at 14:21

## 2019-01-01 RX ADMIN — HYDROMORPHONE HYDROCHLORIDE 3 MILLIGRAM(S): 2 INJECTION INTRAMUSCULAR; INTRAVENOUS; SUBCUTANEOUS at 22:30

## 2019-01-01 RX ADMIN — MEROPENEM 100 MILLIGRAM(S): 1 INJECTION INTRAVENOUS at 06:02

## 2019-01-01 RX ADMIN — Medication 0.4 MG/KG/HR: at 18:26

## 2019-01-01 RX ADMIN — Medication 1 TABLET(S): at 21:40

## 2019-01-01 RX ADMIN — Medication 0.5 MILLIGRAM(S): at 05:58

## 2019-01-01 RX ADMIN — HYDROMORPHONE HYDROCHLORIDE 2 MILLIGRAM(S): 2 INJECTION INTRAMUSCULAR; INTRAVENOUS; SUBCUTANEOUS at 13:01

## 2019-01-01 RX ADMIN — Medication 1 MILLIGRAM(S): at 15:19

## 2019-01-01 RX ADMIN — MORPHINE SULFATE 30 MILLIGRAM(S): 50 CAPSULE, EXTENDED RELEASE ORAL at 21:41

## 2019-01-01 RX ADMIN — Medication 2: at 12:01

## 2019-01-01 RX ADMIN — HYDROMORPHONE HYDROCHLORIDE 2.5 MILLIGRAM(S): 2 INJECTION INTRAMUSCULAR; INTRAVENOUS; SUBCUTANEOUS at 19:05

## 2019-01-01 RX ADMIN — Medication 1 GRAM(S): at 05:33

## 2019-01-01 RX ADMIN — POLYETHYLENE GLYCOL 3350 17 GRAM(S): 17 POWDER, FOR SOLUTION ORAL at 17:26

## 2019-01-01 RX ADMIN — MEROPENEM 100 MILLIGRAM(S): 1 INJECTION INTRAVENOUS at 13:53

## 2019-01-01 RX ADMIN — HYDROMORPHONE HYDROCHLORIDE 3 MILLIGRAM(S): 2 INJECTION INTRAMUSCULAR; INTRAVENOUS; SUBCUTANEOUS at 20:40

## 2019-01-01 RX ADMIN — MORPHINE SULFATE 2 MILLIGRAM(S): 50 CAPSULE, EXTENDED RELEASE ORAL at 13:33

## 2019-01-01 RX ADMIN — Medication 2 MILLIGRAM(S): at 21:37

## 2019-01-01 RX ADMIN — HYDROMORPHONE HYDROCHLORIDE 3 MILLIGRAM(S): 2 INJECTION INTRAMUSCULAR; INTRAVENOUS; SUBCUTANEOUS at 06:12

## 2019-01-01 RX ADMIN — ROBINUL 0.4 MILLIGRAM(S): 0.2 INJECTION INTRAMUSCULAR; INTRAVENOUS at 17:55

## 2019-01-01 RX ADMIN — FENTANYL CITRATE 1 PATCH: 50 INJECTION INTRAVENOUS at 08:07

## 2019-01-01 RX ADMIN — ENOXAPARIN SODIUM 40 MILLIGRAM(S): 100 INJECTION SUBCUTANEOUS at 12:47

## 2019-01-01 RX ADMIN — ROBINUL 0.4 MILLIGRAM(S): 0.2 INJECTION INTRAMUSCULAR; INTRAVENOUS at 23:51

## 2019-01-01 RX ADMIN — MORPHINE SULFATE 2 MILLIGRAM(S): 50 CAPSULE, EXTENDED RELEASE ORAL at 18:12

## 2019-01-01 RX ADMIN — POLYETHYLENE GLYCOL 3350 17 GRAM(S): 17 POWDER, FOR SOLUTION ORAL at 10:02

## 2019-01-01 RX ADMIN — MEROPENEM 100 MILLIGRAM(S): 1 INJECTION INTRAVENOUS at 05:31

## 2019-01-01 RX ADMIN — Medication 0.5 MILLIGRAM(S): at 21:56

## 2019-01-01 RX ADMIN — CHLORHEXIDINE GLUCONATE 1 APPLICATION(S): 213 SOLUTION TOPICAL at 13:02

## 2019-01-01 RX ADMIN — Medication 2 MILLIGRAM(S): at 21:18

## 2019-01-01 RX ADMIN — Medication 2: at 17:12

## 2019-01-01 RX ADMIN — METHADONE HYDROCHLORIDE 12.5 MILLIGRAM(S): 40 TABLET ORAL at 14:08

## 2019-01-01 RX ADMIN — POLYETHYLENE GLYCOL 3350 17 GRAM(S): 17 POWDER, FOR SOLUTION ORAL at 17:48

## 2019-01-01 RX ADMIN — HYDROMORPHONE HYDROCHLORIDE 16 MILLIGRAM(S): 2 INJECTION INTRAMUSCULAR; INTRAVENOUS; SUBCUTANEOUS at 08:46

## 2019-01-01 RX ADMIN — Medication 0.4 MG/KG/HR: at 19:26

## 2019-01-01 RX ADMIN — ROBINUL 0.4 MILLIGRAM(S): 0.2 INJECTION INTRAMUSCULAR; INTRAVENOUS at 06:03

## 2019-01-01 RX ADMIN — HYDROMORPHONE HYDROCHLORIDE 30 MILLILITER(S): 2 INJECTION INTRAMUSCULAR; INTRAVENOUS; SUBCUTANEOUS at 07:16

## 2019-01-01 RX ADMIN — DULOXETINE HYDROCHLORIDE 60 MILLIGRAM(S): 30 CAPSULE, DELAYED RELEASE ORAL at 05:32

## 2019-01-01 RX ADMIN — Medication 1: at 22:04

## 2019-01-01 RX ADMIN — FENTANYL CITRATE 30 MICROGRAM(S): 50 INJECTION INTRAVENOUS at 16:30

## 2019-01-01 RX ADMIN — Medication 1 GRAM(S): at 06:08

## 2019-01-01 RX ADMIN — Medication 1 GRAM(S): at 18:30

## 2019-01-01 RX ADMIN — METHADONE HYDROCHLORIDE 12.5 MILLIGRAM(S): 40 TABLET ORAL at 23:50

## 2019-01-01 RX ADMIN — Medication 0.1 MILLIGRAM(S): at 21:02

## 2019-01-01 RX ADMIN — MORPHINE SULFATE 30 MILLIGRAM(S): 50 CAPSULE, EXTENDED RELEASE ORAL at 09:20

## 2019-01-01 RX ADMIN — HYDROMORPHONE HYDROCHLORIDE 12 MILLIGRAM(S): 2 INJECTION INTRAMUSCULAR; INTRAVENOUS; SUBCUTANEOUS at 01:53

## 2019-01-01 RX ADMIN — Medication 20 MILLIEQUIVALENT(S): at 10:31

## 2019-01-01 RX ADMIN — Medication 2 MILLIGRAM(S): at 21:35

## 2019-01-01 RX ADMIN — Medication 1 TABLET(S): at 22:08

## 2019-01-01 RX ADMIN — Medication 50 MILLIGRAM(S): at 06:27

## 2019-01-01 RX ADMIN — HYDROMORPHONE HYDROCHLORIDE 3 MILLIGRAM(S): 2 INJECTION INTRAMUSCULAR; INTRAVENOUS; SUBCUTANEOUS at 20:23

## 2019-01-01 RX ADMIN — Medication 0.1 MILLIGRAM(S): at 06:26

## 2019-01-01 RX ADMIN — Medication 3: at 18:15

## 2019-01-01 RX ADMIN — METHADONE HYDROCHLORIDE 45 MILLIGRAM(S): 40 TABLET ORAL at 18:15

## 2019-01-01 RX ADMIN — Medication 2: at 08:55

## 2019-01-01 RX ADMIN — Medication 1 GRAM(S): at 12:34

## 2019-01-01 RX ADMIN — HYDROMORPHONE HYDROCHLORIDE 12 MILLIGRAM(S): 2 INJECTION INTRAMUSCULAR; INTRAVENOUS; SUBCUTANEOUS at 20:34

## 2019-01-01 RX ADMIN — Medication 1 GRAM(S): at 00:50

## 2019-01-01 RX ADMIN — METHADONE HYDROCHLORIDE 20 MILLIGRAM(S): 40 TABLET ORAL at 22:26

## 2019-01-01 RX ADMIN — HYDROMORPHONE HYDROCHLORIDE 2.5 MILLIGRAM(S): 2 INJECTION INTRAMUSCULAR; INTRAVENOUS; SUBCUTANEOUS at 09:41

## 2019-01-01 RX ADMIN — FAMOTIDINE 20 MILLIGRAM(S): 10 INJECTION INTRAVENOUS at 13:21

## 2019-01-01 RX ADMIN — ENOXAPARIN SODIUM 40 MILLIGRAM(S): 100 INJECTION SUBCUTANEOUS at 21:35

## 2019-01-01 RX ADMIN — HYDROMORPHONE HYDROCHLORIDE 2.5 MILLIGRAM(S): 2 INJECTION INTRAMUSCULAR; INTRAVENOUS; SUBCUTANEOUS at 08:20

## 2019-01-01 RX ADMIN — Medication 0.1 MILLIGRAM(S): at 17:58

## 2019-01-01 RX ADMIN — METHADONE HYDROCHLORIDE 20 MILLIGRAM(S): 40 TABLET ORAL at 17:53

## 2019-01-01 RX ADMIN — Medication 166.67 MILLIGRAM(S): at 05:47

## 2019-01-01 RX ADMIN — PIPERACILLIN AND TAZOBACTAM 25 GRAM(S): 4; .5 INJECTION, POWDER, LYOPHILIZED, FOR SOLUTION INTRAVENOUS at 17:14

## 2019-01-01 RX ADMIN — METHADONE HYDROCHLORIDE 20 MILLIGRAM(S): 40 TABLET ORAL at 12:44

## 2019-01-01 RX ADMIN — HYDROMORPHONE HYDROCHLORIDE 4 MILLIGRAM(S): 2 INJECTION INTRAMUSCULAR; INTRAVENOUS; SUBCUTANEOUS at 06:33

## 2019-01-01 RX ADMIN — Medication 0.1 MILLIGRAM(S): at 21:31

## 2019-01-01 RX ADMIN — HYDROMORPHONE HYDROCHLORIDE 3 MILLIGRAM(S): 2 INJECTION INTRAMUSCULAR; INTRAVENOUS; SUBCUTANEOUS at 08:57

## 2019-01-01 RX ADMIN — METHADONE HYDROCHLORIDE 20 MILLIGRAM(S): 40 TABLET ORAL at 14:37

## 2019-01-01 RX ADMIN — METHADONE HYDROCHLORIDE 45 MILLIGRAM(S): 40 TABLET ORAL at 18:00

## 2019-01-01 RX ADMIN — HYDROMORPHONE HYDROCHLORIDE 16 MILLIGRAM(S): 2 INJECTION INTRAMUSCULAR; INTRAVENOUS; SUBCUTANEOUS at 03:05

## 2019-01-01 RX ADMIN — PIPERACILLIN AND TAZOBACTAM 25 GRAM(S): 4; .5 INJECTION, POWDER, LYOPHILIZED, FOR SOLUTION INTRAVENOUS at 08:24

## 2019-01-01 RX ADMIN — METHADONE HYDROCHLORIDE 20 MILLIGRAM(S): 40 TABLET ORAL at 05:33

## 2019-01-01 RX ADMIN — Medication 20 MILLIEQUIVALENT(S): at 20:42

## 2019-01-01 RX ADMIN — MORPHINE SULFATE 30 MILLIGRAM(S): 50 CAPSULE, EXTENDED RELEASE ORAL at 09:45

## 2019-01-01 RX ADMIN — Medication 10 MILLIGRAM(S): at 00:01

## 2019-01-01 RX ADMIN — PIPERACILLIN AND TAZOBACTAM 25 GRAM(S): 4; .5 INJECTION, POWDER, LYOPHILIZED, FOR SOLUTION INTRAVENOUS at 23:34

## 2019-01-01 RX ADMIN — Medication 1 GRAM(S): at 18:16

## 2019-01-01 RX ADMIN — POLYETHYLENE GLYCOL 3350 17 GRAM(S): 17 POWDER, FOR SOLUTION ORAL at 17:43

## 2019-01-01 RX ADMIN — HYDROMORPHONE HYDROCHLORIDE 2 MILLIGRAM(S): 2 INJECTION INTRAMUSCULAR; INTRAVENOUS; SUBCUTANEOUS at 15:45

## 2019-01-01 RX ADMIN — HYDROMORPHONE HYDROCHLORIDE 30 MILLILITER(S): 2 INJECTION INTRAMUSCULAR; INTRAVENOUS; SUBCUTANEOUS at 19:21

## 2019-01-01 RX ADMIN — Medication 1 GRAM(S): at 05:34

## 2019-01-01 RX ADMIN — HYDROMORPHONE HYDROCHLORIDE 30 MILLILITER(S): 2 INJECTION INTRAMUSCULAR; INTRAVENOUS; SUBCUTANEOUS at 10:47

## 2019-01-01 RX ADMIN — ENOXAPARIN SODIUM 40 MILLIGRAM(S): 100 INJECTION SUBCUTANEOUS at 22:00

## 2019-01-01 RX ADMIN — Medication 1000 MILLIGRAM(S): at 13:05

## 2019-01-01 RX ADMIN — METHADONE HYDROCHLORIDE 20 MILLIGRAM(S): 40 TABLET ORAL at 01:23

## 2019-01-01 RX ADMIN — Medication 50 MILLIGRAM(S): at 06:06

## 2019-01-01 RX ADMIN — Medication 1: at 18:22

## 2019-01-01 RX ADMIN — HYDROMORPHONE HYDROCHLORIDE 7.5 MILLIGRAM(S): 2 INJECTION INTRAMUSCULAR; INTRAVENOUS; SUBCUTANEOUS at 00:32

## 2019-01-01 RX ADMIN — Medication 0.2 MG/KG/HR: at 17:44

## 2019-01-01 RX ADMIN — POLYETHYLENE GLYCOL 3350 17 GRAM(S): 17 POWDER, FOR SOLUTION ORAL at 12:32

## 2019-01-01 RX ADMIN — METHADONE HYDROCHLORIDE 40 MILLIGRAM(S): 40 TABLET ORAL at 18:24

## 2019-01-01 RX ADMIN — Medication 5 MILLIGRAM(S): at 22:57

## 2019-01-01 RX ADMIN — HYDROMORPHONE HYDROCHLORIDE 16 MILLIGRAM(S): 2 INJECTION INTRAMUSCULAR; INTRAVENOUS; SUBCUTANEOUS at 17:40

## 2019-01-01 RX ADMIN — HYDROMORPHONE HYDROCHLORIDE 5 MILLIGRAM(S): 2 INJECTION INTRAMUSCULAR; INTRAVENOUS; SUBCUTANEOUS at 21:09

## 2019-01-01 RX ADMIN — PIPERACILLIN AND TAZOBACTAM 25 GRAM(S): 4; .5 INJECTION, POWDER, LYOPHILIZED, FOR SOLUTION INTRAVENOUS at 00:44

## 2019-01-01 RX ADMIN — MORPHINE SULFATE 60 MILLIGRAM(S): 50 CAPSULE, EXTENDED RELEASE ORAL at 19:31

## 2019-01-01 RX ADMIN — Medication 1 GRAM(S): at 23:04

## 2019-01-01 RX ADMIN — SODIUM CHLORIDE 20 MILLILITER(S): 9 INJECTION INTRAMUSCULAR; INTRAVENOUS; SUBCUTANEOUS at 07:19

## 2019-01-01 RX ADMIN — Medication 1 ENEMA: at 17:19

## 2019-01-01 RX ADMIN — METHADONE HYDROCHLORIDE 45 MILLIGRAM(S): 40 TABLET ORAL at 18:20

## 2019-01-01 RX ADMIN — Medication 2: at 14:14

## 2019-01-01 RX ADMIN — Medication 1000 MILLIGRAM(S): at 22:54

## 2019-01-01 RX ADMIN — POLYETHYLENE GLYCOL 3350 17 GRAM(S): 17 POWDER, FOR SOLUTION ORAL at 05:40

## 2019-01-01 RX ADMIN — PIPERACILLIN AND TAZOBACTAM 25 GRAM(S): 4; .5 INJECTION, POWDER, LYOPHILIZED, FOR SOLUTION INTRAVENOUS at 23:04

## 2019-01-01 RX ADMIN — Medication 0.4 MG/KG/HR: at 17:55

## 2019-01-01 RX ADMIN — SIMETHICONE 80 MILLIGRAM(S): 80 TABLET, CHEWABLE ORAL at 12:59

## 2019-01-01 RX ADMIN — POLYETHYLENE GLYCOL 3350 17 GRAM(S): 17 POWDER, FOR SOLUTION ORAL at 17:46

## 2019-01-01 RX ADMIN — HYDROMORPHONE HYDROCHLORIDE 3.2 MILLIGRAM(S): 2 INJECTION INTRAMUSCULAR; INTRAVENOUS; SUBCUTANEOUS at 06:21

## 2019-01-01 RX ADMIN — SODIUM CHLORIDE 20 MILLILITER(S): 9 INJECTION INTRAMUSCULAR; INTRAVENOUS; SUBCUTANEOUS at 06:03

## 2019-01-01 RX ADMIN — Medication 0.1 MILLIGRAM(S): at 06:54

## 2019-01-01 RX ADMIN — HYDROMORPHONE HYDROCHLORIDE 2.5 MILLIGRAM(S): 2 INJECTION INTRAMUSCULAR; INTRAVENOUS; SUBCUTANEOUS at 12:50

## 2019-01-01 RX ADMIN — HYDROMORPHONE HYDROCHLORIDE 12 MILLIGRAM(S): 2 INJECTION INTRAMUSCULAR; INTRAVENOUS; SUBCUTANEOUS at 22:32

## 2019-01-01 RX ADMIN — DULOXETINE HYDROCHLORIDE 60 MILLIGRAM(S): 30 CAPSULE, DELAYED RELEASE ORAL at 18:09

## 2019-01-01 RX ADMIN — PIPERACILLIN AND TAZOBACTAM 25 GRAM(S): 4; .5 INJECTION, POWDER, LYOPHILIZED, FOR SOLUTION INTRAVENOUS at 05:46

## 2019-01-01 RX ADMIN — HYDROMORPHONE HYDROCHLORIDE 12 MILLIGRAM(S): 2 INJECTION INTRAMUSCULAR; INTRAVENOUS; SUBCUTANEOUS at 00:20

## 2019-01-01 RX ADMIN — HYDROMORPHONE HYDROCHLORIDE 4 MILLIGRAM(S): 2 INJECTION INTRAMUSCULAR; INTRAVENOUS; SUBCUTANEOUS at 19:56

## 2019-01-01 RX ADMIN — HYDROMORPHONE HYDROCHLORIDE 16 MILLIGRAM(S): 2 INJECTION INTRAMUSCULAR; INTRAVENOUS; SUBCUTANEOUS at 11:57

## 2019-01-01 RX ADMIN — PIPERACILLIN AND TAZOBACTAM 25 GRAM(S): 4; .5 INJECTION, POWDER, LYOPHILIZED, FOR SOLUTION INTRAVENOUS at 13:26

## 2019-01-01 RX ADMIN — METHADONE HYDROCHLORIDE 12.5 MILLIGRAM(S): 40 TABLET ORAL at 06:06

## 2019-01-01 RX ADMIN — CHLORHEXIDINE GLUCONATE 1 APPLICATION(S): 213 SOLUTION TOPICAL at 12:10

## 2019-01-01 RX ADMIN — HYDROMORPHONE HYDROCHLORIDE 3 MILLIGRAM(S): 2 INJECTION INTRAMUSCULAR; INTRAVENOUS; SUBCUTANEOUS at 19:42

## 2019-01-01 RX ADMIN — MORPHINE SULFATE 1 MILLIGRAM(S): 50 CAPSULE, EXTENDED RELEASE ORAL at 16:53

## 2019-01-01 RX ADMIN — METHADONE HYDROCHLORIDE 12.5 MILLIGRAM(S): 40 TABLET ORAL at 00:14

## 2019-01-01 RX ADMIN — Medication 0.2 MG/KG/HR: at 07:25

## 2019-01-01 RX ADMIN — SODIUM CHLORIDE 100 MILLILITER(S): 9 INJECTION, SOLUTION INTRAVENOUS at 11:51

## 2019-01-01 RX ADMIN — MORPHINE SULFATE 30 MILLIGRAM(S): 50 CAPSULE, EXTENDED RELEASE ORAL at 00:21

## 2019-01-01 RX ADMIN — POLYETHYLENE GLYCOL 3350 17 GRAM(S): 17 POWDER, FOR SOLUTION ORAL at 13:55

## 2019-01-01 RX ADMIN — HYDROMORPHONE HYDROCHLORIDE 3 MILLIGRAM(S): 2 INJECTION INTRAMUSCULAR; INTRAVENOUS; SUBCUTANEOUS at 19:58

## 2019-01-01 RX ADMIN — METHADONE HYDROCHLORIDE 25 MILLIGRAM(S): 40 TABLET ORAL at 06:34

## 2019-01-01 RX ADMIN — Medication 0.1 MILLIGRAM(S): at 22:38

## 2019-01-01 RX ADMIN — SODIUM CHLORIDE 20 MILLILITER(S): 9 INJECTION INTRAMUSCULAR; INTRAVENOUS; SUBCUTANEOUS at 05:40

## 2019-01-01 RX ADMIN — Medication 650 MILLIGRAM(S): at 10:45

## 2019-01-01 RX ADMIN — ROBINUL 0.4 MILLIGRAM(S): 0.2 INJECTION INTRAMUSCULAR; INTRAVENOUS at 00:13

## 2019-01-01 RX ADMIN — MORPHINE SULFATE 60 MILLIGRAM(S): 50 CAPSULE, EXTENDED RELEASE ORAL at 18:29

## 2019-01-01 RX ADMIN — ENOXAPARIN SODIUM 40 MILLIGRAM(S): 100 INJECTION SUBCUTANEOUS at 11:04

## 2019-01-01 RX ADMIN — Medication 1 GRAM(S): at 18:09

## 2019-01-01 RX ADMIN — CHLORHEXIDINE GLUCONATE 1 APPLICATION(S): 213 SOLUTION TOPICAL at 08:05

## 2019-01-01 RX ADMIN — Medication 2 MILLIGRAM(S): at 06:33

## 2019-01-01 RX ADMIN — HYDROMORPHONE HYDROCHLORIDE 2 MILLIGRAM(S): 2 INJECTION INTRAMUSCULAR; INTRAVENOUS; SUBCUTANEOUS at 23:00

## 2019-01-01 RX ADMIN — Medication 5 MILLIGRAM(S): at 21:37

## 2019-01-01 RX ADMIN — FENTANYL CITRATE 1 PATCH: 50 INJECTION INTRAVENOUS at 06:54

## 2019-01-01 RX ADMIN — METHADONE HYDROCHLORIDE 40 MILLIGRAM(S): 40 TABLET ORAL at 06:26

## 2019-01-01 RX ADMIN — Medication 1 TABLET(S): at 12:45

## 2019-01-01 RX ADMIN — MORPHINE SULFATE 30 MILLIGRAM(S): 50 CAPSULE, EXTENDED RELEASE ORAL at 08:47

## 2019-01-01 RX ADMIN — HYDROMORPHONE HYDROCHLORIDE 16 MILLIGRAM(S): 2 INJECTION INTRAMUSCULAR; INTRAVENOUS; SUBCUTANEOUS at 01:30

## 2019-01-01 RX ADMIN — Medication 2 MILLIGRAM(S): at 14:37

## 2019-01-01 RX ADMIN — HYDROMORPHONE HYDROCHLORIDE 4 MILLIGRAM(S): 2 INJECTION INTRAMUSCULAR; INTRAVENOUS; SUBCUTANEOUS at 08:36

## 2019-01-01 RX ADMIN — ROBINUL 0.4 MILLIGRAM(S): 0.2 INJECTION INTRAMUSCULAR; INTRAVENOUS at 14:08

## 2019-01-01 RX ADMIN — HYDROMORPHONE HYDROCHLORIDE 3.2 MILLIGRAM(S): 2 INJECTION INTRAMUSCULAR; INTRAVENOUS; SUBCUTANEOUS at 10:19

## 2019-01-01 RX ADMIN — HYDROMORPHONE HYDROCHLORIDE 2 MILLIGRAM(S): 2 INJECTION INTRAMUSCULAR; INTRAVENOUS; SUBCUTANEOUS at 18:09

## 2019-01-01 RX ADMIN — MORPHINE SULFATE 1 MILLIGRAM(S): 50 CAPSULE, EXTENDED RELEASE ORAL at 08:52

## 2019-01-01 RX ADMIN — METHADONE HYDROCHLORIDE 25 MILLIGRAM(S): 40 TABLET ORAL at 11:51

## 2019-01-01 RX ADMIN — HYDROMORPHONE HYDROCHLORIDE 2 MILLIGRAM(S): 2 INJECTION INTRAMUSCULAR; INTRAVENOUS; SUBCUTANEOUS at 15:21

## 2019-02-04 PROBLEM — Z98.2 INTRACRANIAL SHUNT: Status: ACTIVE | Noted: 2017-05-30

## 2019-02-04 NOTE — PHYSICAL EXAM
[FreeTextEntry6] : right true radicular abraham hole ar some mild tenderness but no erythema, drainage or fluctuance [Motor Strength] : muscle strength was normal in all four extremities [FreeTextEntry8] : using a cane

## 2019-02-04 NOTE — REASON FOR VISIT
[Follow-Up: _____] : a [unfilled] follow-up visit [Family Member] : family member [FreeTextEntry1] : Complaining of some sensitivity in the area of where his  shunt was removed 2 years ago. He does not describe any fever or drainage.

## 2019-04-15 NOTE — PHYSICAL EXAM
[Well-Healed] : well-healed [Motor Strength] : muscle strength was normal in all four extremities [FreeTextEntry8] : sing a rolling walker

## 2019-04-15 NOTE — REASON FOR VISIT
[Follow-Up: _____] : a [unfilled] follow-up visit [Spouse] : spouse [FreeTextEntry1] : Intermittent pain in the area of the right retroauricular region. Chronic back and leg pain. Feels he is doing better with planned physical therapy rather than aqua therapy.

## 2019-05-21 NOTE — H&P ADULT - NSHPPHYSICALEXAM_GEN_ALL_CORE
Vital Signs Last 24 Hrs  T(C): 37.2 (05-21-19 @ 16:22)  T(F): 99 (05-21-19 @ 16:22), Max: 102 (05-21-19 @ 12:24)  HR: 79 (05-21-19 @ 16:22) (78 - 98)  BP: 112/71 (05-21-19 @ 16:22)  RR: 20 (05-21-19 @ 16:22) (18 - 20)  SpO2: 92% (05-21-19 @ 16:22) (92% - 96%)        GEN: A&O X 3 , NAD , comfortable  HEENT: NCAT, PERRL, MMM, hearing intact  Neck: supple , no JVD  CVS: S1S2 , regular , No M/R/G appreciated  PULM: scattered mild rhonchi   ABD.: soft. non tender, non distended,  bowel sounds present  Extrem: intact pulses , no edema   Derm: No rash , no ecchymoses  PSYCH : normal mood,  no delusion not anxious

## 2019-05-21 NOTE — ED ADULT NURSE NOTE - NSIMPLEMENTINTERV_GEN_ALL_ED
Implemented All Fall Risk Interventions:  [place] to call system. Call bell, personal items and telephone within reach. Instruct patient to call for assistance. Room bathroom lighting operational. Non-slip footwear when patient is off stretcher. Physically safe environment: no spills, clutter or unnecessary equipment. Stretcher in lowest position, wheels locked, appropriate side rails in place. Provide visual cue, wrist band, yellow gown, etc. Monitor gait and stability. Monitor for mental status changes and reorient to person, place, and time. Review medications for side effects contributing to fall risk. Reinforce activity limits and safety measures with patient and family.

## 2019-05-21 NOTE — ED PROVIDER NOTE - CLINICAL SUMMARY MEDICAL DECISION MAKING FREE TEXT BOX
63 Y M HTN, chronic back pain s/p multiple spine surgeries, recurrent PNA 11/2018 found to have MRSA PNA, prior smoker who presents stating that he has been feeling more week for the past 2 weeks, having worsening heart burn, normal PE with mild LE edema. DDx: Anemia, ACS, J CARLOS, PNA, weakness of unclear origin. Dispo pending workup. 63 Y M HTN, chronic back pain s/p multiple spine surgeries, recurrent PNA 11/2018 found to have MRSA PNA, prior smoker who presents stating that he has been feeling more week for the past 2 weeks, having worsening heart burn, normal PE with mild LE edema. DDx: Anemia, ACS, J CARLOS, PNA, weakness of unclear origin. Dispo pending workup. 12:46 informed by nursing that pts temp 102 rectal will start empiric ABX for sepsis. Will give 1500L fluid bolus given LE edema and unclear cardiac fx, not hypotensive will re-evlaute if he becomes so.

## 2019-05-21 NOTE — ED ADULT NURSE NOTE - OBJECTIVE STATEMENT
62 yo m pmhx HTN, spine surgeries, PNA,  presents to the ED with c/o generalized weakness for a couple of weeks but today he feels worse. Pt reports 2 weeks ago he was dx with pna and was taking ABX for it, but lately he's been feeling foggy. PT reports that he also has had "really bad heart burn." Pt endorses nocturnal cough and sleeps with a couple of pillows. Pt denies chest pain, N/V/D, emesis, hematochezia, SOB, POWELL. Pt on cardiac monitor. Wife at bedside. bed to lowest position.

## 2019-05-21 NOTE — H&P ADULT - HISTORY OF PRESENT ILLNESS
Pt is a 64 yo man with PMH of HTN, COPD, hernia, GERD, chronic back pain s/p multiple spine surgeries, h/o MRSA PNA, had another PNA 2 weeks ago was put on antibiotics and felt better  but is still having the nocturnal cough, orthopnea. Denies fever, nausea, vomiting.  he says that he feels very weak today.   He says that 2 weeks ago when they diagnosed him with PNA his symptoms were productive coughing mostly at night.   He states that his heart burn has been really bad lately as well.   He denies any hx of cardiac disease. Pt describes the weakness as whole body, he denies SOB with exertion   no chest pain.   He states that he smoke for many years but stopped 10 years ago.

## 2019-05-21 NOTE — H&P ADULT - NSICDXPASTSURGICALHX_GEN_ALL_CORE_FT
PAST SURGICAL HISTORY:  Ankle Fracture s/p ORIF left ankle 2008    History of lumbar fusion 2008    Insertion of Intrathecal Dilaudid Pump 4/2011    S/P Arthroscopy of Left Knee     S/P insertion of spinal cord stimulator 2013    S/P Knee Replacement left knee 2009    S/P Spinal Surgery corrective lumbar fusion 2012    S/P Tonsillectomy childhood    S/P  shunt

## 2019-05-21 NOTE — H&P ADULT - PROBLEM SELECTOR PLAN 1
recurrent pneumonia with recent treatment with ?levaquin  history notable for MRSA in the past  post abx in Ed: will continue as cefepime / vanco for now  ID consult requested  monitor vitals and labs closely  supportive care

## 2019-05-21 NOTE — ED PROVIDER NOTE - ADDITIONAL RISK FACTOR FREE TEXT BOX
Attending MD Kumar: 62 yo M with PMH HTN, chronic back pain s/p multiple spine surgeries, recent admission 11/1-11/8/18 for MRSA PNA, had another PNA 2 weeks ago was put on antibiotics and felt better, he says that he feels very weak today.  Denies chest pain but complains of "heart burn".  Lungs clear on exam.  DDx: PNA/COPD, electrolyte disturbance, UTI.  Plan: labs, EKG, chest xray, and consider admission.

## 2019-05-21 NOTE — H&P ADULT - PROBLEM SELECTOR PLAN 6
PPI  carafate  weight loss discussed  supportive care  would benefit from GI eval at later time       also would consider manometry and PH monitoring as OP         possibility of aspiration events should be considered given pt on heavy narcotics

## 2019-05-21 NOTE — H&P ADULT - PROBLEM SELECTOR PLAN 5
post multiple surgeries   on muscle relaxants, and long /short acting pain meds  continue home meds for now  OOB as able

## 2019-05-21 NOTE — H&P ADULT - NSICDXPASTMEDICALHX_GEN_ALL_CORE_FT
PAST MEDICAL HISTORY:  Anxiety and depression     Diastolic dysfunction stage I    Empyema lung 11/2015 left lung, s/p VATS, decortication    H/O peptic ulcer over 10 years ago    Herniated Disc S/P work injury 2001    history of renal calculus     Hypertension Dx: 2002    Postlaminectomy Syndrome     Spinal Stenosis

## 2019-05-21 NOTE — H&P ADULT - NSHPREVIEWOFSYSTEMS_GEN_ALL_CORE
GEN: no fever, no chills, no pain  RESP: no SOB, + cough as above little sputum  CVS: no chest pain, no palpitations, no edema  GI: no abdominal pain, no nausea, no vomiting, + chronic constipation  : no dysuria, no frequency, no hematuria  NEURO: no headache, no dizziness  PSYCH: no depression, not anxious  Derm : no itching, no rash

## 2019-05-21 NOTE — ED PROVIDER NOTE - ATTENDING CONTRIBUTION TO CARE
Attending MD Kumar:  I personally have seen and examined this patient.  Resident note reviewed and agree on plan of care and except where noted.  See MDM for details.

## 2019-05-21 NOTE — ED PROVIDER NOTE - OBJECTIVE STATEMENT
64 yo M with PMH HTN, chronic back pain s/p multiple spine surgeries, recent admission 11/1-11/8/18 for MRSA PNA, had another PNA 2 weeks ago was put on antibiotics and felt better, he says that he feels very weak today. Pt states that he has been up and down for the past 2 weeks. He says that 2 weeks ago when they diagnosed him with PNA his symptoms were coughing at night and spitting up. He states that he has chronic heart burn 2/2 to a hernia and says tat his heart burn has been really bad lately. He denies any hx of cardiac disease. Pt describes the weakness as whole body, he denies SOB with exertion states that he walked last night around the park with no problem and had no chest pain. He states that he smoke for many years but stopped 10 years ago. Pt states that he is still having the nocturnal cough, orthopnea. Denies fever, nausea, vomiting. Denies melena.

## 2019-05-21 NOTE — H&P ADULT - PROBLEM SELECTOR PLAN 3
does not appear to be in exacerbation  continue Albuterol  change to duo nebs as needed  pulm consulted to jasbir in AM

## 2019-05-22 NOTE — PROGRESS NOTE ADULT - ASSESSMENT
_________________________________________________________________________________________  ========>>  M E D I C A L   A T T E N D I N G    F O L L O W  U P  N O T E  <<=========  -----------------------------------------------------------------------------------------------------    - Patient seen and examined by me approximately sixty minutes ago.   - In summary,  LEN ALY is a 63y year old man who originally presented with cough  - Patient today overall doing ok, comfortable, eating OK.  breathing better     ==================>> REVIEW OF SYSTEM <<=================    GEN: no fever, no chills, no pain  RESP: no SOB, occasional cough, no sputum  CVS: no chest pain, no palpitations, no edema  GI: no abdominal pain, no nausea, no constipation, no diarrhea  : no dysuria, no frequency, no hematuria  Neuro: no headache, no dizziness  Derm : no itching, no rash    ==================>> PHYSICAL EXAM <<=================    GEN: A&O X 3 , NAD , comfortable  HEENT: NCAT, PERRL, MMM, hearing intact  Neck: supple , no JVD   CVS: S1S2 , regular , No M/R/G appreciated  PULM: overall improved Rhonchi   ABD.: soft. non tender, non distended,  bowel sounds present  Extrem: intact pulses , no edema   PSYCH : normal mood,  not anxious      ==================>> MEDICATIONS <<====================    MEDICATIONS  (STANDING):  cefepime   IVPB 1000 milliGRAM(s) IV Intermittent every 8 hours  cloNIDine 0.1 milliGRAM(s) Oral every 8 hours  dextrose 5%. 1000 milliLiter(s) (50 mL/Hr) IV Continuous <Continuous>  dextrose 50% Injectable 12.5 Gram(s) IV Push once  dextrose 50% Injectable 25 Gram(s) IV Push once  dextrose 50% Injectable 25 Gram(s) IV Push once  diazepam    Tablet 2 milliGRAM(s) Oral three times a day  docusate sodium 100 milliGRAM(s) Oral daily  DULoxetine 60 milliGRAM(s) Oral two times a day  enoxaparin Injectable 40 milliGRAM(s) SubCutaneous daily  hydrochlorothiazide 50 milliGRAM(s) Oral daily  insulin lispro (HumaLOG) corrective regimen sliding scale   SubCutaneous three times a day before meals  insulin lispro (HumaLOG) corrective regimen sliding scale   SubCutaneous at bedtime  methadone    Tablet 20 milliGRAM(s) Oral every 8 hours  multivitamin/minerals 1 Tablet(s) Oral daily  polyethylene glycol 3350 17 Gram(s) Oral two times a day  potassium chloride    Tablet ER 40 milliEquivalent(s) Oral every 4 hours  sucralfate suspension 1 Gram(s) Oral four times a day  vancomycin  IVPB 1000 milliGRAM(s) IV Intermittent every 12 hours    MEDICATIONS  (PRN):  ALBUTerol    90 MICROgram(s) HFA Inhaler 2 Puff(s) Inhalation every 6 hours PRN Shortness of Breath and/or Wheezing  cyclobenzaprine 5 milliGRAM(s) Oral three times a day PRN Muscle Spasm  dextrose 40% Gel 15 Gram(s) Oral once PRN Blood Glucose LESS THAN 70 milliGRAM(s)/deciliter  glucagon  Injectable 1 milliGRAM(s) IntraMuscular once PRN Glucose LESS THAN 70 milligrams/deciliter  morphine  IR 30 milliGRAM(s) Oral every 8 hours PRN Severe Pain (7 - 10)    ==================>> VITAL SIGNS <<==================    T(C): 36.7 (19 @ 04:45), Max: 38.9 (19 @ 12:24)  HR: 71 (19 @ 07:36) (69 - 95)  BP: 135/75 (19 @ 07:36) (105/62 - 141/73)  RR: 18 (19 @ 07:36) (18 - 20)  SpO2: 97% (19 @ 07:36) (91% - 97%)    POCT Blood Glucose.: 179 mg/dL (22 May 2019 09:42)    I&O's Summary    21 May 2019 07:  -  22 May 2019 07:00  --------------------------------------------------------  IN: 0 mL / OUT: 500 mL / NET: -500 mL    22 May 2019 07:01  -  22 May 2019 12:02  --------------------------------------------------------  IN: 0 mL / OUT: 400 mL / NET: -400 mL     ==================>> LAB AND IMAGING <<==================                        10.0   8.07  )-----------( 237      ( 22 May 2019 08:06 )             32.3        138  |  95<L>  |  10  ----------------------------<  182<H>  3.4<L>   |  31  |  0.69    Ca    8.9      22 May 2019 07:03  Mg     1.9         TPro  7.0  /  Alb  3.6  /  TBili  0.4  /  DBili  x   /  AST  11  /  ALT  14  /  AlkPhos  86      PT/INR - ( 21 May 2019 13:20 )   PT: 12.3 sec;   INR: 1.08 ratio         PTT - ( 21 May 2019 13:20 )  PTT:27.9 sec                 Urinalysis Basic - ( 21 May 2019 15:35 )    Color: Light Yellow / Appearance: Clear / S.022 / pH: x  Gluc: x / Ketone: Negative  / Bili: Negative / Urobili: Negative   Blood: x / Protein: Negative / Nitrite: Negative   Leuk Esterase: Negative / RBC: x / WBC x   Sq Epi: x / Non Sq Epi: x / Bacteria: x    TSH:      0.70   (19)           Lipid profile:  (19)     Total: 132     LDL  : 78     HDL  :28     TG   :129     HgA1C: 8.2  (19)            ___________________________________________________________________________________  ===============>>  A S S E S S M E N T   A N D   P L A N <<===============  ------------------------------------------------------------------------------------------    Problem/Plan - 1:  ·  Problem: Sepsis, due to unspecified organism.  Plan: recurrent pneumonia with recent treatment with ?levaquin  history notable for MRSA in the past  will continue as cefepime / vanco for now  ID consult and follow up   monitor vitals and labs closely  supportive care.     Problem/Plan - 2:  ·  Problem: Pneumonia of right lung due to infectious organism, unspecified part of lung.  Plan: as above  pulm consulted  nebs as needed.     Problem/Plan - 3:  ·  Problem: Emphysema   does not appear to be in exacerbation  continue Albuterol  change to duo nebs as needed  pulm consulted to eval     Problem/Plan - 4:  ·  Problem: Essential hypertension.  Plan: Continue Current medications and monitor.  cardio eval as needed.     Problem/Plan - 5:  ·  Problem: Spinal stenosis, unspecified spinal region.  Plan: post multiple surgeries   on muscle relaxants, and long /short acting pain meds  continue home meds for now  OOB as able.     Problem/Plan - 6:  Problem: Gastroesophageal reflux disease with esophagitis. Plan: PPI  carafate  weight loss discussed  supportive care  would benefit from GI eval at later time       also would consider manometry and PH monitoring as OP         possibility of aspiration events should be considered given pt on heavy narcotics.    Problem/Plan - 7:  ·  Problem: Constipation, unspecified constipation type.  Plan: bowel regimen as ordered  OOb as able.     Problem/Plan - 7:  ·  Problem: DM  elevated A1c at 8.2  will discuss /clarify with pt re meds at home and the diagnosis  monitor FS  RISS    -GI/DVT Prophylaxis.    --------------------------------------------  Case discussed with pt, son  Education given on findings and plan of care  ___________________________  HCas Diggs D.O.  Pager: [phone]

## 2019-05-22 NOTE — CONSULT NOTE ADULT - SUBJECTIVE AND OBJECTIVE BOX
PULMONARY CONSULT  Charly Cuellar MD  [phone]    Initial HPI on admission:  HPI:  Pt is a 64 yo man with PMH of HTN, COPD, hernia, GERD, chronic back pain s/p multiple spine surgeries, h/o MRSA PNA, had another PNA 2 weeks ago was put on antibiotics and felt better  but is still having the nocturnal cough, orthopnea. Denies fever, nausea, vomiting.  he says that he feels very weak today.   He says that 2 weeks ago when they diagnosed him with PNA his symptoms were productive coughing mostly at night.   He states that his heart burn has been really bad lately as well.   He denies any hx of cardiac disease. Pt describes the weakness as whole body, he denies SOB with exertion   no chest pain.   He states that he smoke for many years but stopped 10 years ago. (21 May 2019 20:24)      BRIEF HOSPITAL COURSE: ***    PAST MEDICAL & SURGICAL HISTORY:  Anxiety and depression  Diastolic dysfunction: stage I  Empyema lun2015 left lung, s/p VATS, decortication  H/O peptic ulcer: over 10 years ago  history of renal calculus  Herniated Disc: S/P work injury   Hypertension: Dx:   Spinal Stenosis  Postlaminectomy Syndrome  S/P  shunt  S/P insertion of spinal cord stimulator:   History of lumbar fusion:   Insertion of Intrathecal Dilaudid Pump: 2011  S/P Spinal Surgery: corrective lumbar fusion   S/P Knee Replacement: left knee   S/P Arthroscopy of Left Knee  S/P Tonsillectomy: childhood  Ankle Fracture: s/p ORIF left ankle     Allergies    No Known Allergies    Intolerances      FAMILY HISTORY:  No pertinent family history in first degree relatives    Social History (marital status, living situation, occupation, tobacco use, alcohol and drug use, and sexual history): past smoker , quit about 10 years ago	     Tobacco Screening:  · Core Measure Site	No	  · Has the patient used tobacco in the past 30 days?	No	  :     Review of Systems: GEN: no fever, no chills, no pain  RESP: no SOB, + cough as above little sputum  CVS: no chest pain, no palpitations, no edema  GI: no abdominal pain, no nausea, no vomiting, + chronic constipation  : no dysuria, no frequency, no hematuria  NEURO: no headache, no dizziness  PSYCH: no depression, not anxious Derm : no itching, no rash	      Allergies and Intolerances:        Allergies:  	No Known Allergies:       Medications:  MEDICATIONS  (STANDING):  cloNIDine 0.1 milliGRAM(s) Oral every 8 hours  dextrose 5%. 1000 milliLiter(s) (50 mL/Hr) IV Continuous <Continuous>  dextrose 50% Injectable 12.5 Gram(s) IV Push once  dextrose 50% Injectable 25 Gram(s) IV Push once  dextrose 50% Injectable 25 Gram(s) IV Push once  diazepam    Tablet 2 milliGRAM(s) Oral three times a day  docusate sodium 100 milliGRAM(s) Oral daily  DULoxetine 60 milliGRAM(s) Oral two times a day  enoxaparin Injectable 40 milliGRAM(s) SubCutaneous daily  hydrochlorothiazide 50 milliGRAM(s) Oral daily  insulin lispro (HumaLOG) corrective regimen sliding scale   SubCutaneous three times a day before meals  insulin lispro (HumaLOG) corrective regimen sliding scale   SubCutaneous at bedtime  methadone    Tablet 20 milliGRAM(s) Oral every 8 hours  multivitamin/minerals 1 Tablet(s) Oral daily  piperacillin/tazobactam IVPB. 3.375 Gram(s) IV Intermittent every 8 hours  polyethylene glycol 3350 17 Gram(s) Oral two times a day  potassium chloride    Tablet ER 40 milliEquivalent(s) Oral every 4 hours  sucralfate suspension 1 Gram(s) Oral four times a day  vancomycin  IVPB 1000 milliGRAM(s) IV Intermittent every 12 hours    MEDICATIONS  (PRN):  ALBUTerol    90 MICROgram(s) HFA Inhaler 2 Puff(s) Inhalation every 6 hours PRN Shortness of Breath and/or Wheezing  cyclobenzaprine 5 milliGRAM(s) Oral three times a day PRN Muscle Spasm  dextrose 40% Gel 15 Gram(s) Oral once PRN Blood Glucose LESS THAN 70 milliGRAM(s)/deciliter  glucagon  Injectable 1 milliGRAM(s) IntraMuscular once PRN Glucose LESS THAN 70 milligrams/deciliter  morphine  IR 30 milliGRAM(s) Oral every 8 hours PRN Severe Pain (7 - 10)    Vital Signs Last 24 Hrs  T(C): 37.1 (22 May 2019 12:19), Max: 37.2 (21 May 2019 16:22)  T(F): 98.8 (22 May 2019 12:19), Max: 99 (21 May 2019 16:22)  HR: 69 (22 May 2019 12:19) (69 - 79)  BP: 127/84 (22 May 2019 12:19) (112/71 - 135/75)  BP(mean): --  RR: 18 (22 May 2019 12:19) (18 - 20)  SpO2: 99% (22 May 2019 12:19) (91% - 99%)           @ 07:01  -   @ 07:00  --------------------------------------------------------  IN: 0 mL / OUT: 500 mL / NET: -500 mL      LABS:                        10.0   8.07  )-----------( 237      ( 22 May 2019 08:06 )             32.3         138  |  95<L>  |  10  ----------------------------<  182<H>  3.4<L>   |  31  |  0.69    Ca    8.9      22 May 2019 07:03  Mg     1.9         TPro  7.0  /  Alb  3.6  /  TBili  0.4  /  DBili  x   /  AST  11  /  ALT  14  /  AlkPhos  86        PT/INR - ( 21 May 2019 13:20 )   PT: 12.3 sec;   INR: 1.08 ratio         PTT - ( 21 May 2019 13:20 )  PTT:27.9 sec  Urinalysis Basic - ( 21 May 2019 15:35 )    Color: Light Yellow / Appearance: Clear / S.022 / pH: x  Gluc: x / Ketone: Negative  / Bili: Negative / Urobili: Negative   Blood: x / Protein: Negative / Nitrite: Negative   Leuk Esterase: Negative / RBC: x / WBC x   Sq Epi: x / Non Sq Epi: x / Bacteria: x      Physical Examination:    General: No acute distress.      HEENT: Pupils equal, reactive to light.  Symmetric.    PULM: Clear to auscultation bilaterally, no significant sputum production    CVS: Regular rate and rhythm, no murmurs, rubs, or gallops    ABD: Soft, nondistended, nontender, normoactive bowel sounds, no masses    EXT: No edema, nontender    SKIN: Warm and well perfused, no rashes noted.    NEURO: Alert, oriented, interactive, nonfocal    RADIOLOGY REVIEWED PERSONALLY  CXR:    CT chest:    PROCEDURE DATE:  2019        INTERPRETATION:  CLINICAL INFORMATION: CT CHEST WITHOUT CONTRAST    INDICATION:. Status, evidence of pneumonia.     TECHNIQUE: Unenhanced helical images were obtainedof the chest. Coronal   and sagittal images were reconstructed. Maximum intensity projection   images were generated.     COMPARISON: 2018    FINDINGS:     AIRWAYS, LUNGS, PLEURA: Patent central airways.    New/increased patchy and nodular airspace opacities within the right lung   likely representing acute pneumonia.    Underlying reticulations, scarring, architectural distortion and volume   loss within the right lung representing chronic disease again noted.    The left lung is clear.    No pleural effusion or pneumothorax    MEDIASTINUM, LYMPH NODES: Enlarged mediastinal lymph nodes for example   1.5 cm short axis right lower part tracheal lymph node and 1.2 cm short   axis subcarinal lymph node.    The esophagus is diffusely dilated. There is wall thickening of the mid   to lower esophagus. Large hiatal hernia containing gastric fundus and   body.    HEART AND VASCULATURE: Normal heart size without pericardial effusion.   The thoracic aorta and pulmonary artery are normal in course and caliber.    UPPER ABDOMEN: Within normal limits.    BONES AND SOFT TISSUES: No aggressive osseous lesion. Penaloza rods and   screws are present.    LOWER NECK: Within normal limits.    IMPRESSION:     New/increased patchy and nodular airspace opacities within the right lung   likely representing acute pneumonia.    Underlying reticulations, scarring, architectural distortion and volume   loss within the right lung representing chronic disease again noted.    TTE:      Assessment:    Plan: PULMONARY CONSULT  Charly Cuellar MD  [phone]    Initial HPI on admission:  HPI:  Pt is a 62 yo man with PMH of HTN, COPD, hernia, GERD, chronic back pain s/p multiple spine surgeries, h/o MRSA PNA, had another PNA 2 weeks ago was put on antibiotics and felt better  but is still having the nocturnal cough, orthopnea. Denies fever, nausea, vomiting.  he says that he feels very weak today.   He says that 2 weeks ago when they diagnosed him with PNA his symptoms were productive coughing mostly at night.   He states that his heart burn has been really bad lately as well.   He denies any hx of cardiac disease. Pt describes the weakness as whole body, he denies SOB with exertion   no chest pain.   He states that he smoke for many years but stopped 10 years ago.     Patient has history of 1.5 PPD smoking: DC approx 5 years ago. He denies COPD/ASthma. He was treated with 7 days oral antibiotics as outpatient, compeleted 2 weeks ago and dx by PMD on basis of CXR. Over past week has c/o new fever, sweats, chills, and cough productive yellow mucous. He notes chronic swelling in LE which he attributes to knee problems. He denies dysphagia, though occasionally regurgitates food if he eats at night.     PAST MEDICAL & SURGICAL HISTORY:  Anxiety and depression  Diastolic dysfunction: stage I  Empyema lun2015 left lung, s/p VATS, decortication  H/O peptic ulcer: over 10 years ago  history of renal calculus  Herniated Disc: S/P work injury   Hypertension: Dx:   Spinal Stenosis  Postlaminectomy Syndrome  S/P  shunt  S/P insertion of spinal cord stimulator:   History of lumbar fusion:   Insertion of Intrathecal Dilaudid Pump: 2011  S/P Spinal Surgery: corrective lumbar fusion   S/P Knee Replacement: left knee   S/P Arthroscopy of Left Knee  S/P Tonsillectomy: childhood  Ankle Fracture: s/p ORIF left ankle     Allergies    No Known Allergies    Intolerances      FAMILY HISTORY:  No pertinent family history in first degree relatives    Social History (marital status, living situation, occupation, tobacco use, alcohol and drug use, and sexual history): past smoker , quit about 10 years ago	     Tobacco Screening:  · Core Measure Site	No	  · Has the patient used tobacco in the past 30 days?	No	  :     Review of Systems: GEN: no fever, no chills, no pain  RESP: no SOB, + cough as above little sputum  CVS: no chest pain, no palpitations, no edema  GI: no abdominal pain, no nausea, no vomiting, + chronic constipation  : no dysuria, no frequency, no hematuria  NEURO: no headache, no dizziness  PSYCH: no depression, not anxious Derm : no itching, no rash	      Allergies and Intolerances:        Allergies:  	No Known Allergies:       Medications:  MEDICATIONS  (STANDING):  cloNIDine 0.1 milliGRAM(s) Oral every 8 hours  dextrose 5%. 1000 milliLiter(s) (50 mL/Hr) IV Continuous <Continuous>  dextrose 50% Injectable 12.5 Gram(s) IV Push once  dextrose 50% Injectable 25 Gram(s) IV Push once  dextrose 50% Injectable 25 Gram(s) IV Push once  diazepam    Tablet 2 milliGRAM(s) Oral three times a day  docusate sodium 100 milliGRAM(s) Oral daily  DULoxetine 60 milliGRAM(s) Oral two times a day  enoxaparin Injectable 40 milliGRAM(s) SubCutaneous daily  hydrochlorothiazide 50 milliGRAM(s) Oral daily  insulin lispro (HumaLOG) corrective regimen sliding scale   SubCutaneous three times a day before meals  insulin lispro (HumaLOG) corrective regimen sliding scale   SubCutaneous at bedtime  methadone    Tablet 20 milliGRAM(s) Oral every 8 hours  multivitamin/minerals 1 Tablet(s) Oral daily  piperacillin/tazobactam IVPB. 3.375 Gram(s) IV Intermittent every 8 hours  polyethylene glycol 3350 17 Gram(s) Oral two times a day  potassium chloride    Tablet ER 40 milliEquivalent(s) Oral every 4 hours  sucralfate suspension 1 Gram(s) Oral four times a day  vancomycin  IVPB 1000 milliGRAM(s) IV Intermittent every 12 hours    MEDICATIONS  (PRN):  ALBUTerol    90 MICROgram(s) HFA Inhaler 2 Puff(s) Inhalation every 6 hours PRN Shortness of Breath and/or Wheezing  cyclobenzaprine 5 milliGRAM(s) Oral three times a day PRN Muscle Spasm  dextrose 40% Gel 15 Gram(s) Oral once PRN Blood Glucose LESS THAN 70 milliGRAM(s)/deciliter  glucagon  Injectable 1 milliGRAM(s) IntraMuscular once PRN Glucose LESS THAN 70 milligrams/deciliter  morphine  IR 30 milliGRAM(s) Oral every 8 hours PRN Severe Pain (7 - 10)    Vital Signs Last 24 Hrs  T(C): 37.1 (22 May 2019 12:19), Max: 37.2 (21 May 2019 16:22)  T(F): 98.8 (22 May 2019 12:19), Max: 99 (21 May 2019 16:22)  HR: 69 (22 May 2019 12:19) (69 - 79)  BP: 127/84 (22 May 2019 12:19) (112/71 - 135/75)  BP(mean): --  RR: 18 (22 May 2019 12:19) (18 - 20)  SpO2: 99% (22 May 2019 12:19) (91% - 99%)           @ 07:01  -   @ 07:00  --------------------------------------------------------  IN: 0 mL / OUT: 500 mL / NET: -500 mL      LABS:                        10.0   8.07  )-----------( 237      ( 22 May 2019 08:06 )             32.3         138  |  95<L>  |  10  ----------------------------<  182<H>  3.4<L>   |  31  |  0.69    Ca    8.9      22 May 2019 07:03  Mg     1.9         TPro  7.0  /  Alb  3.6  /  TBili  0.4  /  DBili  x   /  AST  11  /  ALT  14  /  AlkPhos  86        PT/INR - ( 21 May 2019 13:20 )   PT: 12.3 sec;   INR: 1.08 ratio         PTT - ( 21 May 2019 13:20 )  PTT:27.9 sec  Urinalysis Basic - ( 21 May 2019 15:35 )    Color: Light Yellow / Appearance: Clear / S.022 / pH: x  Gluc: x / Ketone: Negative  / Bili: Negative / Urobili: Negative   Blood: x / Protein: Negative / Nitrite: Negative   Leuk Esterase: Negative / RBC: x / WBC x   Sq Epi: x / Non Sq Epi: x / Bacteria: x      Physical Examination:    General: No acute distress.      HEENT: Pupils equal, reactive to light.  Symmetric.    PULM: Clear to auscultation bilaterally, no significant sputum production    CVS: Regular rate and rhythm, no murmurs, rubs, or gallops    ABD: Soft, nondistended, nontender, normoactive bowel sounds, no masses    EXT: No edema, nontender    SKIN: Warm and well perfused, no rashes noted.    NEURO: Alert, oriented, interactive, nonfocal    RADIOLOGY REVIEWED PERSONALLY  CXR:    CT chest:    PROCEDURE DATE:  2019        INTERPRETATION:  CLINICAL INFORMATION: CT CHEST WITHOUT CONTRAST    INDICATION:. Status, evidence of pneumonia.     TECHNIQUE: Unenhanced helical images were obtainedof the chest. Coronal   and sagittal images were reconstructed. Maximum intensity projection   images were generated.     COMPARISON: 2018    FINDINGS:     AIRWAYS, LUNGS, PLEURA: Patent central airways.    New/increased patchy and nodular airspace opacities within the right lung   likely representing acute pneumonia.    Underlying reticulations, scarring, architectural distortion and volume   loss within the right lung representing chronic disease again noted.    The left lung is clear.    No pleural effusion or pneumothorax    MEDIASTINUM, LYMPH NODES: Enlarged mediastinal lymph nodes for example   1.5 cm short axis right lower part tracheal lymph node and 1.2 cm short   axis subcarinal lymph node.    The esophagus is diffusely dilated. There is wall thickening of the mid   to lower esophagus. Large hiatal hernia containing gastric fundus and   body.    HEART AND VASCULATURE: Normal heart size without pericardial effusion.   The thoracic aorta and pulmonary artery are normal in course and caliber.    UPPER ABDOMEN: Within normal limits.    BONES AND SOFT TISSUES: No aggressive osseous lesion. Pnealoza rods and   screws are present.    LOWER NECK: Within normal limits.    IMPRESSION:     New/increased patchy and nodular airspace opacities within the right lung   likely representing acute pneumonia.    Underlying reticulations, scarring, architectural distortion and volume   loss within the right lung representing chronic disease again noted.    TTE:

## 2019-05-22 NOTE — CONSULT NOTE ADULT - SUBJECTIVE AND OBJECTIVE BOX
Patient is a 63y old  Male who presents with a chief complaint of PNA (21 May 2019 20:24)      HPI:    Pt is a 64 yo man with PMH of HTN, COPD, hernia, GERD, chronic back pain s/p multiple spine surgeries, h/o MRSA PNA, had another PNA 2 weeks ago was put on antibiotics and felt better  but is still having the nocturnal cough, orthopnea. Denies fever, nausea, vomiting.  he says that he feels very weak today.   He says that 2 weeks ago when they diagnosed him with PNA his symptoms were productive coughing mostly at night.   He states that his heart burn has been really bad lately as well.   He denies any hx of cardiac disease. Pt describes the weakness as whole body, he denies SOB with exertion   no chest pain.   He states that he smoke for many years but stopped 10 years ago. (21 May 2019 20:24)      REVIEW OF SYSTEMS:    CONSTITUTIONAL: No fever, weight loss, or fatigue  EYES: No eye pain, visual disturbances, or discharge  ENMT:  No sore throat  NECK: No pain or stiffness  RESPIRATORY: No cough, wheezing, chills or hemoptysis; No shortness of breath  CARDIOVASCULAR: No chest pain, palpitations, dizziness, or leg swelling  GASTROINTESTINAL: No abdominal or epigastric pain. No nausea, vomiting, or hematemesis; No diarrhea or constipation. No melena or hematochezia.  GENITOURINARY: No dysuria, frequency, hematuria, or incontinence  NEUROLOGICAL: No headaches, memory loss, loss of strength, numbness, or tremors  SKIN: No itching, burning, rashes, or lesions   LYMPH NODES: No enlarged glands  MUSCULOSKELETAL: No joint pain or swelling; No muscle, back, or extremity pain      PAST MEDICAL & SURGICAL HISTORY:  Anxiety and depression  Diastolic dysfunction: stage I  Empyema lun2015 left lung, s/p VATS, decortication  H/O peptic ulcer: over 10 years ago  history of renal calculus  Herniated Disc: S/P work injury   Hypertension: Dx:   Spinal Stenosis  Postlaminectomy Syndrome  S/P  shunt  S/P insertion of spinal cord stimulator:   History of lumbar fusion:   Insertion of Intrathecal Dilaudid Pump: 2011  S/P Spinal Surgery: corrective lumbar fusion   S/P Knee Replacement: left knee   S/P Arthroscopy of Left Knee  S/P Tonsillectomy: childhood  Ankle Fracture: s/p ORIF left ankle 2008      Allergies    No Known Allergies    Intolerances        FAMILY HISTORY:  No pertinent family history in first degree relatives      SOCIAL HISTORY:        MEDICATIONS  (STANDING):  cefepime   IVPB 1000 milliGRAM(s) IV Intermittent every 8 hours  cloNIDine 0.1 milliGRAM(s) Oral every 8 hours  dextrose 5%. 1000 milliLiter(s) (50 mL/Hr) IV Continuous <Continuous>  dextrose 50% Injectable 12.5 Gram(s) IV Push once  dextrose 50% Injectable 25 Gram(s) IV Push once  dextrose 50% Injectable 25 Gram(s) IV Push once  diazepam    Tablet 2 milliGRAM(s) Oral three times a day  docusate sodium 100 milliGRAM(s) Oral daily  DULoxetine 60 milliGRAM(s) Oral two times a day  enoxaparin Injectable 40 milliGRAM(s) SubCutaneous daily  hydrochlorothiazide 50 milliGRAM(s) Oral daily  insulin lispro (HumaLOG) corrective regimen sliding scale   SubCutaneous three times a day before meals  insulin lispro (HumaLOG) corrective regimen sliding scale   SubCutaneous at bedtime  methadone    Tablet 20 milliGRAM(s) Oral every 8 hours  multivitamin/minerals 1 Tablet(s) Oral daily  polyethylene glycol 3350 17 Gram(s) Oral two times a day  potassium chloride    Tablet ER 40 milliEquivalent(s) Oral every 4 hours  sucralfate suspension 1 Gram(s) Oral four times a day  vancomycin  IVPB 1000 milliGRAM(s) IV Intermittent every 12 hours    MEDICATIONS  (PRN):  ALBUTerol    90 MICROgram(s) HFA Inhaler 2 Puff(s) Inhalation every 6 hours PRN Shortness of Breath and/or Wheezing  cyclobenzaprine 5 milliGRAM(s) Oral three times a day PRN Muscle Spasm  dextrose 40% Gel 15 Gram(s) Oral once PRN Blood Glucose LESS THAN 70 milliGRAM(s)/deciliter  glucagon  Injectable 1 milliGRAM(s) IntraMuscular once PRN Glucose LESS THAN 70 milligrams/deciliter  morphine  IR 30 milliGRAM(s) Oral every 8 hours PRN Severe Pain (7 - 10)      Vital Signs Last 24 Hrs  T(C): 36.7 (22 May 2019 04:45), Max: 38.9 (21 May 2019 12:24)  T(F): 98.1 (22 May 2019 04:45), Max: 102 (21 May 2019 12:24)  HR: 71 (22 May 2019 07:36) (69 - 95)  BP: 135/75 (22 May 2019 07:36) (105/62 - 141/73)  BP(mean): --  RR: 18 (22 May 2019 07:36) (18 - 20)  SpO2: 97% (22 May 2019 07:36) (91% - 97%)    PHYSICAL EXAM:    GENERAL: NAD, well-groomed  HEAD:  Atraumatic, Normocephalic  EYES: EOMI, PERRLA, conjunctiva and sclera clear  ENMT: No tonsillar erythema, exudates, or enlargement; Moist mucous membranes  NECK: Supple, No JVD  CHEST/LUNG: Clear to percussion bilaterally; No rales, rhonchi, wheezing, or rubs  HEART: Regular rate and rhythm; No murmurs, rubs, or gallops  ABDOMEN: Soft, Nontender, Nondistended; Bowel sounds present  EXTREMITIES:  2+ Peripheral Pulses, No clubbing, cyanosis, or edema  LYMPH: No lymphadenopathy noted  SKIN: No rashes or lesions    LABS:  CBC Full  -  ( 22 May 2019 08:06 )  WBC Count : 8.07 K/uL  RBC Count : 3.50 M/uL  Hemoglobin : 10.0 g/dL  Hematocrit : 32.3 %  Platelet Count - Automated : 237 K/uL  Mean Cell Volume : 92.3 fl  Mean Cell Hemoglobin : 28.6 pg  Mean Cell Hemoglobin Concentration : 31.0 gm/dL  Auto Neutrophil # : 5.66 K/uL  Auto Lymphocyte # : 1.48 K/uL  Auto Monocyte # : 0.49 K/uL  Auto Eosinophil # : 0.38 K/uL  Auto Basophil # : 0.03 K/uL  Auto Neutrophil % : 70.1 %  Auto Lymphocyte % : 18.3 %  Auto Monocyte % : 6.1 %  Auto Eosinophil % : 4.7 %  Auto Basophil % : 0.4 %          138  |  95<L>  |  10  ----------------------------<  182<H>  3.4<L>   |  31  |  0.69    Ca    8.9      22 May 2019 07:03  Mg     1.9         TPro  7.0  /  Alb  3.6  /  TBili  0.4  /  DBili  x   /  AST  11  /  ALT  14  /  AlkPhos  86  05-22      LIVER FUNCTIONS - ( 22 May 2019 07:03 )  Alb: 3.6 g/dL / Pro: 7.0 g/dL / ALK PHOS: 86 U/L / ALT: 14 U/L / AST: 11 U/L / GGT: x                               MICROBIOLOGY:        Urinalysis Basic - ( 21 May 2019 15:35 )    Color: Light Yellow / Appearance: Clear / S.022 / pH: x  Gluc: x / Ketone: Negative  / Bili: Negative / Urobili: Negative   Blood: x / Protein: Negative / Nitrite: Negative   Leuk Esterase: Negative / RBC: x / WBC x   Sq Epi: x / Non Sq Epi: x / Bacteria: x                RADIOLOGY: Patient is a 63y old  Male who presents with a chief complaint of PNA (21 May 2019 20:24)      HPI:    Pt is a 62 yo man with PMH of HTN, COPD, hernia, GERD, chronic back pain s/p multiple spine surgeries, h/o MRSA PNA, had another PNA 2 weeks ago was put on antibiotics and felt better  but is still having the nocturnal cough, orthopnea. Denies fever, nausea, vomiting.  he says that he feels very weak today.   He says that 2 weeks ago when they diagnosed him with PNA his symptoms were productive coughing mostly at night.   He states that his heart burn has been really bad lately as well.   He denies any hx of cardiac disease. Pt describes the weakness as whole body, he denies SOB with exertion   no chest pain.   He states that he smoke for many years but stopped 10 years ago. (21 May 2019 20:24)    ER vitals:  Tm 102, P 95, /73.  WBC 14.6 --> 8.0.  Lact 3.7 --> 2.0.  UA (-).  RVP (-).  CT chest with new patchy nodular opacities in R lung.  Pt is on cefepime/vanco.      REVIEW OF SYSTEMS:    CONSTITUTIONAL: No fever, weight loss, or fatigue  EYES: No eye pain, visual disturbances, or discharge  ENMT:  No sore throat  NECK: No pain or stiffness  RESPIRATORY: No cough, wheezing, chills or hemoptysis; No shortness of breath  CARDIOVASCULAR: No chest pain, palpitations, dizziness, or leg swelling  GASTROINTESTINAL: No abdominal or epigastric pain. No nausea, vomiting, or hematemesis; No diarrhea or constipation. No melena or hematochezia.  GENITOURINARY: No dysuria, frequency, hematuria, or incontinence  NEUROLOGICAL: No headaches, memory loss, loss of strength, numbness, or tremors  SKIN: No itching, burning, rashes, or lesions   LYMPH NODES: No enlarged glands  MUSCULOSKELETAL: No joint pain or swelling; No muscle, back, or extremity pain      PAST MEDICAL & SURGICAL HISTORY:  Anxiety and depression  Diastolic dysfunction: stage I  Empyema lun2015 left lung, s/p VATS, decortication  H/O peptic ulcer: over 10 years ago  history of renal calculus  Herniated Disc: S/P work injury   Hypertension: Dx:   Spinal Stenosis  Postlaminectomy Syndrome  S/P  shunt  S/P insertion of spinal cord stimulator:   History of lumbar fusion:   Insertion of Intrathecal Dilaudid Pump: 2011  S/P Spinal Surgery: corrective lumbar fusion   S/P Knee Replacement: left knee   S/P Arthroscopy of Left Knee  S/P Tonsillectomy: childhood  Ankle Fracture: s/p ORIF left ankle       Allergies    No Known Allergies    Intolerances        FAMILY HISTORY:  No pertinent family history in first degree relatives      SOCIAL HISTORY:    past smoker , quit about 10 years ago    MEDICATIONS  (STANDING):  cefepime   IVPB 1000 milliGRAM(s) IV Intermittent every 8 hours  cloNIDine 0.1 milliGRAM(s) Oral every 8 hours  dextrose 5%. 1000 milliLiter(s) (50 mL/Hr) IV Continuous <Continuous>  dextrose 50% Injectable 12.5 Gram(s) IV Push once  dextrose 50% Injectable 25 Gram(s) IV Push once  dextrose 50% Injectable 25 Gram(s) IV Push once  diazepam    Tablet 2 milliGRAM(s) Oral three times a day  docusate sodium 100 milliGRAM(s) Oral daily  DULoxetine 60 milliGRAM(s) Oral two times a day  enoxaparin Injectable 40 milliGRAM(s) SubCutaneous daily  hydrochlorothiazide 50 milliGRAM(s) Oral daily  insulin lispro (HumaLOG) corrective regimen sliding scale   SubCutaneous three times a day before meals  insulin lispro (HumaLOG) corrective regimen sliding scale   SubCutaneous at bedtime  methadone    Tablet 20 milliGRAM(s) Oral every 8 hours  multivitamin/minerals 1 Tablet(s) Oral daily  polyethylene glycol 3350 17 Gram(s) Oral two times a day  potassium chloride    Tablet ER 40 milliEquivalent(s) Oral every 4 hours  sucralfate suspension 1 Gram(s) Oral four times a day  vancomycin  IVPB 1000 milliGRAM(s) IV Intermittent every 12 hours    MEDICATIONS  (PRN):  ALBUTerol    90 MICROgram(s) HFA Inhaler 2 Puff(s) Inhalation every 6 hours PRN Shortness of Breath and/or Wheezing  cyclobenzaprine 5 milliGRAM(s) Oral three times a day PRN Muscle Spasm  dextrose 40% Gel 15 Gram(s) Oral once PRN Blood Glucose LESS THAN 70 milliGRAM(s)/deciliter  glucagon  Injectable 1 milliGRAM(s) IntraMuscular once PRN Glucose LESS THAN 70 milligrams/deciliter  morphine  IR 30 milliGRAM(s) Oral every 8 hours PRN Severe Pain (7 - 10)      Vital Signs Last 24 Hrs  T(C): 36.7 (22 May 2019 04:45), Max: 38.9 (21 May 2019 12:24)  T(F): 98.1 (22 May 2019 04:45), Max: 102 (21 May 2019 12:24)  HR: 71 (22 May 2019 07:36) (69 - 95)  BP: 135/75 (22 May 2019 07:36) (105/62 - 141/73)  BP(mean): --  RR: 18 (22 May 2019 07:36) (18 - 20)  SpO2: 97% (22 May 2019 07:36) (91% - 97%)    PHYSICAL EXAM:    GENERAL: NAD, well-groomed  HEAD:  Atraumatic, Normocephalic  EYES: EOMI, PERRLA, conjunctiva and sclera clear  ENMT: No tonsillar erythema, exudates, or enlargement; Moist mucous membranes  NECK: Supple, No JVD  CHEST/LUNG: Clear to percussion bilaterally; No rales, rhonchi, wheezing, or rubs  HEART: Regular rate and rhythm; No murmurs, rubs, or gallops  ABDOMEN: Soft, Nontender, Nondistended; Bowel sounds present  EXTREMITIES:  2+ Peripheral Pulses, No clubbing, cyanosis, or edema  LYMPH: No lymphadenopathy noted  SKIN: No rashes or lesions    LABS:  CBC Full  -  ( 22 May 2019 08:06 )  WBC Count : 8.07 K/uL  RBC Count : 3.50 M/uL  Hemoglobin : 10.0 g/dL  Hematocrit : 32.3 %  Platelet Count - Automated : 237 K/uL  Mean Cell Volume : 92.3 fl  Mean Cell Hemoglobin : 28.6 pg  Mean Cell Hemoglobin Concentration : 31.0 gm/dL  Auto Neutrophil # : 5.66 K/uL  Auto Lymphocyte # : 1.48 K/uL  Auto Monocyte # : 0.49 K/uL  Auto Eosinophil # : 0.38 K/uL  Auto Basophil # : 0.03 K/uL  Auto Neutrophil % : 70.1 %  Auto Lymphocyte % : 18.3 %  Auto Monocyte % : 6.1 %  Auto Eosinophil % : 4.7 %  Auto Basophil % : 0.4 %      -    138  |  95<L>  |  10  ----------------------------<  182<H>  3.4<L>   |  31  |  0.69    Ca    8.9      22 May 2019 07:03  Mg     1.9         TPro  7.0  /  Alb  3.6  /  TBili  0.4  /  DBili  x   /  AST  11  /  ALT  14  /  AlkPhos  86        LIVER FUNCTIONS - ( 22 May 2019 07:03 )  Alb: 3.6 g/dL / Pro: 7.0 g/dL / ALK PHOS: 86 U/L / ALT: 14 U/L / AST: 11 U/L / GGT: x                               MICROBIOLOGY:        Urinalysis Basic - ( 21 May 2019 15:35 )    Color: Light Yellow / Appearance: Clear / S.022 / pH: x  Gluc: x / Ketone: Negative  / Bili: Negative / Urobili: Negative   Blood: x / Protein: Negative / Nitrite: Negative   Leuk Esterase: Negative / RBC: x / WBC x   Sq Epi: x / Non Sq Epi: x / Bacteria: x                RADIOLOGY: Patient is a 63y old  Male who presents with a chief complaint of PNA (21 May 2019 20:24)      HPI:    Pt is a 62 yo man with PMH of HTN, COPD, hernia, GERD, chronic back pain s/p multiple spine surgeries, h/o MRSA PNA, had another PNA 2 weeks ago was put on antibiotics and felt better  but is still having the nocturnal cough, orthopnea. Denies fever, nausea, vomiting.  he says that he feels very weak today.   He says that 2 weeks ago when they diagnosed him with PNA his symptoms were productive coughing mostly at night.   He states that his heart burn has been really bad lately as well.   He denies any hx of cardiac disease. Pt describes the weakness as whole body, he denies SOB with exertion   no chest pain.   He states that he smoke for many years but stopped 10 years ago. (21 May 2019 20:24)    ER vitals:  Tm 102, P 95, /73.  WBC 14.6 --> 8.0.  Lact 3.7 --> 2.0.  UA (-).  RVP (-).  CT chest with new patchy nodular opacities in R lung.  Pt is on cefepime/vanco.  Pt states he tends to get recurrent pneumonias, has been on several outpatient abx courses.  Pt also follows with outpatient GI and planned to have colonoscopy to evaluate hernia.  Pt states he has GERD and oftentimes coughs and spits in the middle of the night, especially when he eats late.  Prior h/o of dilated esophagus and hiatal hernia, s/p EGD in the past.    Pt on vanco/cefepime.  ID consult called for further abx management.  Pt currently denies cough/CP/SOB.  c/o intermittent chills/warmth.      REVIEW OF SYSTEMS:    CONSTITUTIONAL: No fever, weight loss, or fatigue (+) chills  EYES: No eye pain, visual disturbances, or discharge  ENMT:  No sore throat  NECK: No pain or stiffness  RESPIRATORY: No cough, wheezing, chills or hemoptysis; No shortness of breath  CARDIOVASCULAR: No chest pain, palpitations, dizziness, or leg swelling  GASTROINTESTINAL: No abdominal or epigastric pain. No nausea, vomiting, or hematemesis; No diarrhea or constipation. No melena or hematochezia.  GENITOURINARY: No dysuria, frequency, hematuria, or incontinence  NEUROLOGICAL: No headaches, memory loss, loss of strength, numbness, or tremors  SKIN: No itching, burning, rashes, or lesions   LYMPH NODES: No enlarged glands  MUSCULOSKELETAL: No joint pain or swelling; No muscle, back, or extremity pain      PAST MEDICAL & SURGICAL HISTORY:  Anxiety and depression  Diastolic dysfunction: stage I  Empyema lun2015 left lung, s/p VATS, decortication  H/O peptic ulcer: over 10 years ago  history of renal calculus  Herniated Disc: S/P work injury   Hypertension: Dx:   Spinal Stenosis  Postlaminectomy Syndrome  S/P  shunt  S/P insertion of spinal cord stimulator:   History of lumbar fusion:   Insertion of Intrathecal Dilaudid Pump: 2011  S/P Spinal Surgery: corrective lumbar fusion   S/P Knee Replacement: left knee   S/P Arthroscopy of Left Knee  S/P Tonsillectomy: childhood  Ankle Fracture: s/p ORIF left ankle       Allergies    No Known Allergies    Intolerances        FAMILY HISTORY:  No pertinent family history in first degree relatives      SOCIAL HISTORY:    past smoker , quit about 10 years ago    MEDICATIONS  (STANDING):  cefepime   IVPB 1000 milliGRAM(s) IV Intermittent every 8 hours  cloNIDine 0.1 milliGRAM(s) Oral every 8 hours  dextrose 5%. 1000 milliLiter(s) (50 mL/Hr) IV Continuous <Continuous>  dextrose 50% Injectable 12.5 Gram(s) IV Push once  dextrose 50% Injectable 25 Gram(s) IV Push once  dextrose 50% Injectable 25 Gram(s) IV Push once  diazepam    Tablet 2 milliGRAM(s) Oral three times a day  docusate sodium 100 milliGRAM(s) Oral daily  DULoxetine 60 milliGRAM(s) Oral two times a day  enoxaparin Injectable 40 milliGRAM(s) SubCutaneous daily  hydrochlorothiazide 50 milliGRAM(s) Oral daily  insulin lispro (HumaLOG) corrective regimen sliding scale   SubCutaneous three times a day before meals  insulin lispro (HumaLOG) corrective regimen sliding scale   SubCutaneous at bedtime  methadone    Tablet 20 milliGRAM(s) Oral every 8 hours  multivitamin/minerals 1 Tablet(s) Oral daily  polyethylene glycol 3350 17 Gram(s) Oral two times a day  potassium chloride    Tablet ER 40 milliEquivalent(s) Oral every 4 hours  sucralfate suspension 1 Gram(s) Oral four times a day  vancomycin  IVPB 1000 milliGRAM(s) IV Intermittent every 12 hours    MEDICATIONS  (PRN):  ALBUTerol    90 MICROgram(s) HFA Inhaler 2 Puff(s) Inhalation every 6 hours PRN Shortness of Breath and/or Wheezing  cyclobenzaprine 5 milliGRAM(s) Oral three times a day PRN Muscle Spasm  dextrose 40% Gel 15 Gram(s) Oral once PRN Blood Glucose LESS THAN 70 milliGRAM(s)/deciliter  glucagon  Injectable 1 milliGRAM(s) IntraMuscular once PRN Glucose LESS THAN 70 milligrams/deciliter  morphine  IR 30 milliGRAM(s) Oral every 8 hours PRN Severe Pain (7 - 10)      Vital Signs Last 24 Hrs  T(C): 36.7 (22 May 2019 04:45), Max: 38.9 (21 May 2019 12:24)  T(F): 98.1 (22 May 2019 04:45), Max: 102 (21 May 2019 12:24)  HR: 71 (22 May 2019 07:36) (69 - 95)  BP: 135/75 (22 May 2019 07:36) (105/62 - 141/73)  BP(mean): --  RR: 18 (22 May 2019 07:36) (18 - 20)  SpO2: 97% (22 May 2019 07:36) (91% - 97%)    PHYSICAL EXAM:    GENERAL: NAD, well-groomed  HEAD:  Atraumatic, Normocephalic  EYES: EOMI, PERRLA, conjunctiva and sclera clear  ENMT: No tonsillar erythema, exudates, or enlargement; Moist mucous membranes  NECK: Supple, No JVD  CHEST/LUNG: Clear to percussion bilaterally; No rales, rhonchi, wheezing, or rubs  HEART: Regular rate and rhythm; No murmurs, rubs, or gallops  ABDOMEN: Soft, Nontender, Nondistended; Bowel sounds present, no abd masses  EXTREMITIES:  2+ Peripheral Pulses, No clubbing, cyanosis, or edema  LYMPH: No lymphadenopathy noted  SKIN: No rashes or lesions    LABS:  CBC Full  -  ( 22 May 2019 08:06 )  WBC Count : 8.07 K/uL  RBC Count : 3.50 M/uL  Hemoglobin : 10.0 g/dL  Hematocrit : 32.3 %  Platelet Count - Automated : 237 K/uL  Mean Cell Volume : 92.3 fl  Mean Cell Hemoglobin : 28.6 pg  Mean Cell Hemoglobin Concentration : 31.0 gm/dL  Auto Neutrophil # : 5.66 K/uL  Auto Lymphocyte # : 1.48 K/uL  Auto Monocyte # : 0.49 K/uL  Auto Eosinophil # : 0.38 K/uL  Auto Basophil # : 0.03 K/uL  Auto Neutrophil % : 70.1 %  Auto Lymphocyte % : 18.3 %  Auto Monocyte % : 6.1 %  Auto Eosinophil % : 4.7 %  Auto Basophil % : 0.4 %          138  |  95<L>  |  10  ----------------------------<  182<H>  3.4<L>   |  31  |  0.69    Ca    8.9      22 May 2019 07:03  Mg     1.9         TPro  7.0  /  Alb  3.6  /  TBili  0.4  /  DBili  x   /  AST  11  /  ALT  14  /  AlkPhos  86        LIVER FUNCTIONS - ( 22 May 2019 07:03 )  Alb: 3.6 g/dL / Pro: 7.0 g/dL / ALK PHOS: 86 U/L / ALT: 14 U/L / AST: 11 U/L / GGT: x                               MICROBIOLOGY:        Urinalysis Basic - ( 21 May 2019 15:35 )    Color: Light Yellow / Appearance: Clear / S.022 / pH: x  Gluc: x / Ketone: Negative  / Bili: Negative / Urobili: Negative   Blood: x / Protein: Negative / Nitrite: Negative   Leuk Esterase: Negative / RBC: x / WBC x   Sq Epi: x / Non Sq Epi: x / Bacteria: x                RADIOLOGY:        < from: CT Chest No Cont (19 @ 14:43) >  IMPRESSION:     New/increased patchy and nodular airspace opacities within the right lung   likely representing acute pneumonia.    Underlying reticulations, scarring, architectural distortion and volume   loss within the right lung representing chronic disease again noted.    < end of copied text >

## 2019-05-22 NOTE — CONSULT NOTE ADULT - ASSESSMENT
Multi-lobar aspiration pneumonia with upper lobe extensive tree in bud opacities sugg of endronchial aspiration  Dilated fluid filled esophagous with thickening  r/o underlying COPD given heavy smoking history  Asymmetric LE edema - likely chronic and possibly related to L knee surgery, though would r/o DVT  GERD    REC    Agree with Zosyn/Vanco per ID  Sputum cultures  GI evaluation   Aspiration precautions: HOB elevated at 30 degrees
Pt is a 64 yo man with PMH of HTN, COPD, hernia, GERD, chronic back pain s/p multiple spine surgeries, h/o MRSA PNA, had another PNA 2 weeks ago was put on antibiotics and felt better  but is still having the nocturnal cough, orthopnea. Denies fever, nausea, vomiting.  he says that he feels very weak today.   He says that 2 weeks ago when they diagnosed him with PNA his symptoms were productive coughing mostly at night.   He states that his heart burn has been really bad lately as well.   He denies any hx of cardiac disease. Pt describes the weakness as whole body, he denies SOB with exertion   no chest pain.   He states that he smoke for many years but stopped 10 years ago. (21 May 2019 20:24)    ER vitals:  Tm 102, P 95, /73.  WBC 14.6 --> 8.0.  Lact 3.7 --> 2.0.  UA (-).  RVP (-).  CT chest with new patchy nodular opacities in R lung.  Pt is on cefepime/vanco.  Pt states he tends to get recurrent pneumonias, has been on several outpatient abx courses.  Pt also follows with outpatient GI and planned to have colonoscopy to evaluate hernia.  Pt states he has GERD and oftentimes coughs and spits in the middle of the night, especially when he eats late.  Prior h/o of dilated esophagus and hiatal hernia, s/p EGD in the past.    Pt on vanco/cefepime.  ID consult called for further abx management.  Pt currently denies cough/CP/SOB.  c/o intermittent chills/warmth.        Recommend:      Sepsis: (fever, tachycardia, elevated lactate)    - CT chest with new rt sided patchy opacities, no pleural effusions noted.  Pt with diffusely dilated esophagus, also with history of GERD, and large hiatal hernia.  Suspect recurrent aspiration process.  Change from cefepime to zosyn for additional anerobic coverage.  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      -  Agree with GI evaluation and follow up - can be done as outpt.    - f/u blood cultures, monitor temp curve, WBC    - Check vanco trough.    - Pulm eval called by primary team      Will follow,    Sanaz Soliman  [phone]

## 2019-05-23 NOTE — PROVIDER CONTACT NOTE (OTHER) - ASSESSMENT
Pt complains of headache and "warmth" in forehead. Pt A&Ox4, neurologically intact, vital signs stable. No other complaints at this time.

## 2019-05-23 NOTE — PROGRESS NOTE ADULT - ASSESSMENT
Recurrent multi-lobar aspiration pneumonia  Dilated fluid filled esophagus with thickening on CT  Diastolic dysfunction  Hypertension    REC    Continue antibiotics  LE dopplers pending

## 2019-05-23 NOTE — PROGRESS NOTE ADULT - SUBJECTIVE AND OBJECTIVE BOX
Infectious Diseases progress note:    Subjective: NAD, afebrile.  No resp distress/cp/sob    ROS:  CONSTITUTIONAL:  No fever, chills, rigors  CARDIOVASCULAR:  No chest pain or palpitations  RESPIRATORY:   No SOB, cough, dyspnea on exertion.  No wheezing  GASTROINTESTINAL:  No abd pain, N/V, diarrhea/constipation  EXTREMITIES:  No swelling or joint pain  GENITOURINARY:  No burning on urination, increased frequency or urgency.  No flank pain  NEUROLOGIC:  No HA, visual disturbances  SKIN: No rashes    Allergies    No Known Allergies    Intolerances        ANTIBIOTICS/RELEVANT:  antimicrobials  piperacillin/tazobactam IVPB. 3.375 Gram(s) IV Intermittent every 8 hours  vancomycin  IVPB 1250 milliGRAM(s) IV Intermittent every 12 hours    immunologic:    OTHER:  ALBUTerol    90 MICROgram(s) HFA Inhaler 2 Puff(s) Inhalation every 6 hours PRN  cloNIDine 0.1 milliGRAM(s) Oral every 8 hours  cyclobenzaprine 5 milliGRAM(s) Oral three times a day PRN  dextrose 40% Gel 15 Gram(s) Oral once PRN  dextrose 5%. 1000 milliLiter(s) IV Continuous <Continuous>  dextrose 50% Injectable 12.5 Gram(s) IV Push once  dextrose 50% Injectable 25 Gram(s) IV Push once  dextrose 50% Injectable 25 Gram(s) IV Push once  diazepam    Tablet 2 milliGRAM(s) Oral three times a day  docusate sodium 100 milliGRAM(s) Oral daily  DULoxetine 60 milliGRAM(s) Oral two times a day  enoxaparin Injectable 40 milliGRAM(s) SubCutaneous daily  glucagon  Injectable 1 milliGRAM(s) IntraMuscular once PRN  hydrochlorothiazide 50 milliGRAM(s) Oral daily  insulin lispro (HumaLOG) corrective regimen sliding scale   SubCutaneous three times a day before meals  insulin lispro (HumaLOG) corrective regimen sliding scale   SubCutaneous at bedtime  methadone    Tablet 20 milliGRAM(s) Oral every 8 hours  morphine  IR 30 milliGRAM(s) Oral every 8 hours PRN  multivitamin/minerals 1 Tablet(s) Oral daily  polyethylene glycol 3350 17 Gram(s) Oral two times a day  sucralfate suspension 1 Gram(s) Oral four times a day      Objective:  Vital Signs Last 24 Hrs  T(C): 36.3 (24 May 2019 05:08), Max: 36.8 (23 May 2019 11:19)  T(F): 97.3 (24 May 2019 05:08), Max: 98.3 (24 May 2019 00:21)  HR: 62 (24 May 2019 05:08) (54 - 65)  BP: 118/71 (24 May 2019 05:08) (106/67 - 136/82)  BP(mean): --  RR: 19 (24 May 2019 05:08) (17 - 19)  SpO2: 97% (24 May 2019 05:08) (94% - 97%)    PHYSICAL EXAM:  Constitutional:NAD  Eyes:VAHID, EOMI  Ear/Nose/Throat: no thrush, mucositis.  Moist mucous membranes	  Neck:no JVD, no lymphadenopathy, supple  Respiratory: CTA sandie  Cardiovascular: S1S2 RRR, no murmurs  Gastrointestinal:soft, nontender,  nondistended (+) BS  Extremities:no e/e/c  Skin:  no rashes, open wounds or ulcerations        LABS:                        10.5   5.52  )-----------( 230      ( 23 May 2019 08:28 )             34.5     05-24    138  |  99  |  11  ----------------------------<  150<H>  3.8   |  26  |  0.84    Ca    9.6      24 May 2019 06:54                  Vancomycin Level, Trough: 9.8 ug/mL (05-23 @ 13:35)      Rapid RVP Result: [facility]          MICROBIOLOGY:      Culture - Urine (05.21.19 @ 23:17)    Specimen Source: .Urine    Culture Results:   No growth    Culture - Blood (05.21.19 @ 18:27)    Specimen Source: .Blood    Culture Results:   No growth to date.    Culture - Blood (05.21.19 @ 18:27)    Specimen Source: .Blood    Culture Results:   No growth to date.        RADIOLOGY & ADDITIONAL STUDIES:    < from: CT Head No Cont (05.23.19 @ 09:32) >  FINDINGS:    VENTRICLES AND SULCI:  Overall slightly larger when compared with the   prior exam compatible with age-appropriate cerebral volume loss.    INTRA-AXIAL:  Vague linear hypodensity is seen underlying the right   parietal abraham hole likely the sequelae of prior intraventricular shunt   catheter. No mass effect or hemorrhage is seen.    EXTRA-AXIAL:  No mass or collection is seen.    VISUALIZED SINUSES:  Small right maxillary polyp or retention cyst    VISUALIZED MASTOIDS:  Clear.    CALVARIUM:  Normal.    MISCELLANEOUS:  None.      IMPRESSION:    No mass effect, hemorrhage or evidence of acute intracranial pathology.    < end of copied text >

## 2019-05-23 NOTE — PROGRESS NOTE ADULT - ASSESSMENT
Patient seen, examined,  full note to follow. _________________________________________________________________________________________  ========>>  M E D I C A L   A T T E N D I N G    F O L L O W  U P  N O T E  <<=========  -----------------------------------------------------------------------------------------------------    - Patient seen and examined by me earlier today.   - In summary,  LEN ALY is a 63y year old man who originally presented with cough  - Patient today overall doing ok, comfortable, eating OK.  breathing better     ==================>> REVIEW OF SYSTEM <<=================    GEN: no fever, no chills, no pain  RESP: no SOB, occasional cough, no sputum  CVS: no chest pain, no palpitations, no edema  GI: no abdominal pain, no nausea, no constipation, no diarrhea  : no dysuria, no frequency, no hematuria  Neuro: no headache, no dizziness  Derm : no itching, no rash    ==================>> PHYSICAL EXAM <<=================    GEN: A&O X 3 , NAD , comfortable  HEENT: NCAT, PERRL, MMM, hearing intact  Neck: supple , no JVD   CVS: S1S2 , regular , No M/R/G appreciated  PULM: overall improved Rhonchi   ABD.: soft. non tender, non distended,  bowel sounds present  Extrem: intact pulses , no edema   PSYCH : normal mood,  not anxious      ==================>> MEDICATIONS <<====================    cloNIDine 0.1 milliGRAM(s) Oral every 8 hours  dextrose 5%. 1000 milliLiter(s) IV Continuous <Continuous>  dextrose 50% Injectable 12.5 Gram(s) IV Push once  dextrose 50% Injectable 25 Gram(s) IV Push once  dextrose 50% Injectable 25 Gram(s) IV Push once  diazepam    Tablet 2 milliGRAM(s) Oral three times a day  docusate sodium 100 milliGRAM(s) Oral daily  DULoxetine 60 milliGRAM(s) Oral two times a day  enoxaparin Injectable 40 milliGRAM(s) SubCutaneous daily  hydrochlorothiazide 50 milliGRAM(s) Oral daily  insulin lispro (HumaLOG) corrective regimen sliding scale   SubCutaneous three times a day before meals  insulin lispro (HumaLOG) corrective regimen sliding scale   SubCutaneous at bedtime  methadone    Tablet 20 milliGRAM(s) Oral every 8 hours  multivitamin/minerals 1 Tablet(s) Oral daily  piperacillin/tazobactam IVPB. 3.375 Gram(s) IV Intermittent every 8 hours  polyethylene glycol 3350 17 Gram(s) Oral two times a day  sucralfate suspension 1 Gram(s) Oral four times a day  vancomycin  IVPB 1250 milliGRAM(s) IV Intermittent every 12 hours    MEDICATIONS  (PRN):  ALBUTerol    90 MICROgram(s) HFA Inhaler 2 Puff(s) Inhalation every 6 hours PRN Shortness of Breath and/or Wheezing  cyclobenzaprine 5 milliGRAM(s) Oral three times a day PRN Muscle Spasm  dextrose 40% Gel 15 Gram(s) Oral once PRN Blood Glucose LESS THAN 70 milliGRAM(s)/deciliter  glucagon  Injectable 1 milliGRAM(s) IntraMuscular once PRN Glucose LESS THAN 70 milligrams/deciliter  morphine  IR 30 milliGRAM(s) Oral every 8 hours PRN Severe Pain (7 - 10)    ==================>> VITAL SIGNS <<==================    Vital Signs Last 24 Hrs  T(C): 36.5 (05-23-19 @ 19:20)  T(F): 97.7 (05-23-19 @ 19:20), Max: 98.2 (05-23-19 @ 07:02)  HR: 57 (05-23-19 @ 19:20) (54 - 68)  BP: 106/67 (05-23-19 @ 19:20)  RR: 17 (05-23-19 @ 19:20) (17 - 18)  SpO2: 95% (05-23-19 @ 19:20) (94% - 98%)      POCT Blood Glucose.: 132 mg/dL (23 May 2019 17:28)  POCT Blood Glucose.: 236 mg/dL (23 May 2019 12:27)  POCT Blood Glucose.: 174 mg/dL (23 May 2019 08:14)  POCT Blood Glucose.: 132 mg/dL (22 May 2019 22:19)     ==================>> LAB AND IMAGING <<==================                        10.5   5.52  )-----------( 230      ( 23 May 2019 08:28 )             34.5         140  |  98  |  11  ----------------------------<  158<H>  4.0   |  28  |  0.72    Ca    9.3      23 May 2019 06:09  Mg     1.9     05-22    TPro  7.0  /  Alb  3.6  /  TBili  0.4  /  DBili  x   /  AST  11  /  ALT  14  /  AlkPhos  86  05-22    Lipid profile:  (05-22-19)     Total: 132     LDL  : 78     HDL  :28     TG   :129     HgA1C: 8.2  (05-22-19)            ___________________________________________________________________________________  ===============>>  A S S E S S M E N T   A N D   P L A N <<===============  ------------------------------------------------------------------------------------------    Problem/Plan - 1:  ·  Problem: Sepsis, due to recurrent pneumonia   history notable for MRSA in the past  continue abx a per ID  monitor vitals and labs closely  pulm appreciated   nebs as needed.   supportive care.     Problem/Plan - 2:  ·  Problem: new diagnosis of Diabetes  pt reports he did not know /  never diagnosed  continue RISS for now  will likely DC home on metformin     Problem/Plan - 3:  ·  Problem: Emphysema   does not appear to be in exacerbation  continue Albuterol  change to duo nebs as needed  pulm appreciated     Problem/Plan - 4:  ·  Problem: Essential hypertension.  Plan: Continue Current medications and monitor.  cardio eval as needed.     Problem/Plan - 5:  ·  Problem: Spinal stenosis, unspecified spinal region.  Plan: post multiple surgeries   on muscle relaxants, and long /short acting pain meds  continue home meds for now  OOB as able.     Problem/Plan - 6:  Problem: Gastroesophageal reflux disease with esophagitis. Plan: PPI  carafate  weight loss discussed  supportive care  would benefit from GI eval at later time       also would consider manometry and PH monitoring as OP as discussed with pt          possibility of aspiration events should be considered given pt on heavy narcotics.    Problem/Plan - 7:  ·  Problem: Constipation, unspecified constipation type.  Plan: bowel regimen as ordered  OOb as able.       -GI/DVT Prophylaxis.    --------------------------------------------  Case discussed with pt,   Education given on findings and plan of care  ___________________________  SAGE Diggs D.O.  Pager: [phone]

## 2019-05-23 NOTE — PROGRESS NOTE ADULT - ASSESSMENT
Pt is a 64 yo man with PMH of HTN, COPD, hernia, GERD, chronic back pain s/p multiple spine surgeries, h/o MRSA PNA, had another PNA 2 weeks ago was put on antibiotics and felt better  but is still having the nocturnal cough, orthopnea. Denies fever, nausea, vomiting.  he says that he feels very weak today.   He says that 2 weeks ago when they diagnosed him with PNA his symptoms were productive coughing mostly at night.   He states that his heart burn has been really bad lately as well.   He denies any hx of cardiac disease. Pt describes the weakness as whole body, he denies SOB with exertion   no chest pain.   He states that he smoke for many years but stopped 10 years ago. (21 May 2019 20:24)    ER vitals:  Tm 102, P 95, /73.  WBC 14.6 --> 8.0.  Lact 3.7 --> 2.0.  UA (-).  RVP (-).  CT chest with new patchy nodular opacities in R lung.  Pt is on cefepime/vanco.  Pt states he tends to get recurrent pneumonias, has been on several outpatient abx courses.  Pt also follows with outpatient GI and planned to have colonoscopy to evaluate hernia.  Pt states he has GERD and oftentimes coughs and spits in the middle of the night, especially when he eats late.  Prior h/o of dilated esophagus and hiatal hernia, s/p EGD in the past.    Pt on vanco/cefepime.  ID consult called for further abx management.  Pt currently denies cough/CP/SOB.  c/o intermittent chills/warmth.        Recommend:      Sepsis: (fever, tachycardia, elevated lactate)    - CT chest with new rt sided patchy opacities, no pleural effusions noted.  Pt with diffusely dilated esophagus, also with history of GERD, and large hiatal hernia.  Suspect recurrent aspiration process.  Change from cefepime to zosyn for additional anerobic coverage.  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  Vanco increased to 1250mg IV bid.     -  Agree with GI evaluation and follow up    - f/u blood cultures, monitor temp curve, WBC    - sputum cx and MRSA pcr ordered.     - Pulm eval appreciated      Will follow,    Sanaz Soliman  [phone]

## 2019-05-23 NOTE — CHART NOTE - NSCHARTNOTEFT_GEN_A_CORE
MEDICINE NP    LEN ALY  63y Male    Patient is a 63y old  Male who presents with a chief complaint of PNA (22 May 2019 15:07)       > Event Summary:  Notified by RN, Patient with         > Vital Signs Last 24 Hrs  T(C): 36.5 (22 May 2019 23:49), Max: 37.2 (22 May 2019 16:27)  T(F): 97.7 (22 May 2019 23:49), Max: 98.9 (22 May 2019 16:27)  HR: 65 (22 May 2019 23:49) (65 - 74)  BP: 142/86 (22 May 2019 23:49) (118/76 - 142/86)  BP(mean): --  RR: 18 (22 May 2019 23:49) (17 - 18)  SpO2: 97% (22 May 2019 23:49) (91% - 99%)    > Review Of System:   General:	No fevers. No chills.    Neurological: +headache.  No dizziness.   No weakness.   No numbness/tingling.  No B/B incontinence.   Ophthalmologic:  No visual changes. No eye pain  ENMT:  No hearing difficulty. No Rhinorrhea.  No oral lesions or dysphagia.    Musculoskeletal:	 No joint pain.  No limited ROM.  Skin:  No rash. No open lesions.           > Physical Assessment:  General: Awake, No acute distress.   Neurology: A&Ox3,CN II-XII grossly intact.   Head: +Rt. parietal scalp with  device   CV: +S1S2, RRR.  No peripheral edema  Respiratory: Even, unlabored.  CTA B/L.    Abdomen:  +BS.  Soft, NT, ND.  No palpable mass  MSK: LEWIS x4.   Skin: Warm and Dry         > Assessment & Plan:  - HPI:     -Plan:               YIFAN Colunga-BC  Medicine Department MEDICINE NP    LEN ALY  63y Male    Patient is a 63y old  Male who presents with a chief complaint of PNA (22 May 2019 15:07)       > Event Summary:  Notified by RN, Patient with headache and "warmth' to forehead.   Patient seen at bedside with wife present.  AAOX3, reports h/o  shunt w/ drain 2016, out 2017, now with recent chronic intermittent HA circumferential x "months" and now with associated "warmth" sensation.  HA now 3/10.  Reports chronic history of LE numbness tingling 2/2 multiple back injuries unchanged. +UE numbness mild.   Denies feverish, neck rigidity, visual changes, weakness, nausea / vomiting.      > Vital Signs Last 24 Hrs  T(C): 36.5 (22 May 2019 23:49), Max: 37.2 (22 May 2019 16:27)  T(F): 97.7 (22 May 2019 23:49), Max: 98.9 (22 May 2019 16:27)  HR: 65 (22 May 2019 23:49) (65 - 74)  BP: 142/86 (22 May 2019 23:49) (118/76 - 142/86)  RR: 18 (22 May 2019 23:49) (17 - 18)  SpO2: 97% (22 May 2019 23:49) (91% - 99%)    > Review Of System:   General:	No fevers. No chills.    Neurological: +headache.  No dizziness.   No weakness.   +UE numbness/tingling.    No B/B incontinence.   Ophthalmologic:  No visual changes. No eye pain  ENMT:  No hearing difficulty. No Rhinorrhea.  No oral lesions or dysphagia.    Musculoskeletal:	 No joint pain.  No limited ROM.  Skin:  No rash. No open lesions.       > Physical Assessment:  General: Awake, No acute distress.   Neurology: A&Ox3,CN II-XII grossly intact.   Head: +Rt. occipital parietal scalp with  device, no warmth or swelling.    CV: +S1S2, RRR.  No peripheral edema  Respiratory: Even, unlabored.  CTA B/L.    Abdomen:  +BS.  Soft, NT, ND.  No palpable mass  MSK: LEWIS x4.   Skin: Warm and Dry         > Assessment & Plan:  - HPI:     -Plan:               YIFAN Colunga-BC  Medicine Department MEDICINE NP    LEN ALY  63y Male    Patient is a 63y old  Male who presents with a chief complaint of PNA (22 May 2019 15:07)       > Event Summary:  Notified by RN, Patient with headache and "warmth' to forehead.   Patient seen at bedside with wife present.  AAOX3, reports h/o  shunt w/ drain 2016, out 2017, now with recent chronic intermittent HA circumferential x "months" and now with associated "warmth" sensation.  HA now 3/10.  Reports chronic history of LE numbness tingling 2/2 multiple back injuries unchanged. +UE numbness mild.   Denies feverish, neck rigidity, visual changes, weakness, nausea / vomiting.      > Vital Signs Last 24 Hrs  T(C): 36.5 (22 May 2019 23:49), Max: 37.2 (22 May 2019 16:27)  T(F): 97.7 (22 May 2019 23:49), Max: 98.9 (22 May 2019 16:27)  HR: 65 (22 May 2019 23:49) (65 - 74)  BP: 142/86 (22 May 2019 23:49) (118/76 - 142/86)  RR: 18 (22 May 2019 23:49) (17 - 18)  SpO2: 97% (22 May 2019 23:49) (91% - 99%)    > Review Of System:   General:	No fevers. No chills.    Neurological: +headache.  No dizziness.   No weakness.   +UE numbness/tingling.    No B/B incontinence.   Ophthalmologic:  No visual changes. No eye pain  ENMT:  No hearing difficulty. No Rhinorrhea.  No oral lesions or dysphagia.    Musculoskeletal:	 No joint pain.  No limited ROM.  Skin:  No rash. No open lesions.       > Physical Assessment:  General: Awake, No acute distress.   Neurology: A&Ox3,CN II-XII grossly intact.   Head: +Rt. occipital parietal scalp with  catheter device appreciated, no discharge, warmth or swelling noted.     CV: +S1S2, RRR.   +LLE non -pitting edema  Respiratory: Even, unlabored.  CTA B/L.    Abdomen:  +BS.  Soft, NT, ND.  No palpable mass  MSK: LEWIS x4.   Skin: Warm and Dry         > Assessment & Plan:  - HPI: 63 Y M HTN, COPD, Empyema, GERD, Hernia, work related injury in 2001 that resulted in multiple lumbar Herniated Discs c/b chronic Back pain s/p multiple spine surgeries  c/b  shunt 2016 w/ drain removed 2017, recurrent PNA 11/2018 found to have MRSA PNA, prior smoker.  Presents with weakness past 2 weeks, having worsening heart burn.  Admitted with Sepsis / PNA on Vanco- Zosyn IV.  Now c/o Headache.    1) Headache -r/o  shunt device infection vs ICH  -Patient reports symptoms chronic of months, with new onset of "warmth" to sclap, CTH ordered  -Neurochecks q4hs  -Neurology consult as indicated   -C/w Current regimen, iv abx, and monitor closely  -Attending to follow.          Ana Paula Esteban, YIFAN-BC  Medicine Department  #[phone]

## 2019-05-23 NOTE — PROVIDER CONTACT NOTE (OTHER) - ACTION/TREATMENT ORDERED:
NP advised, continue to hold the feeding and hold the heparin drip untill 11pm. apply pressure on the PEG site.

## 2019-05-23 NOTE — PROGRESS NOTE ADULT - SUBJECTIVE AND OBJECTIVE BOX
Follow-up Pulm Progress Note  Charly Cuellar MD  [phone]    AFebrile on Vanco/Zosyn  Feels better today: no dyspnea at rest    Medications:  Vital Signs Last 24 Hrs  T(C): 36.8 (23 May 2019 11:19), Max: 37.2 (22 May 2019 16:27)  T(F): 98.2 (23 May 2019 11:19), Max: 98.9 (22 May 2019 16:27)  HR: 54 (23 May 2019 11:19) (54 - 74)  BP: 136/82 (23 May 2019 11:19) (118/79 - 142/86)  BP(mean): --  RR: 18 (23 May 2019 11:19) (17 - 18)  SpO2: 94% (23 May 2019 11:19) (94% - 99%)      05-22 @ 07:01  -  05-23 @ 07:00  --------------------------------------------------------  IN: 480 mL / OUT: 401 mL / NET: 79 mL        LABS:                        10.5   5.52  )-----------( 230      ( 23 May 2019 08:28 )             34.5     05-23    140  |  98  |  11  ----------------------------<  158<H>  4.0   |  28  |  0.72    Ca    9.3      23 May 2019 06:09  Mg     1.9     05-22    TPro  7.0  /  Alb  3.6  /  TBili  0.4  /  DBili  x   /  AST  11  /  ALT  14  /  AlkPhos  86  05-22      PT/INR - ( 21 May 2019 13:20 )   PT: 12.3 sec;   INR: 1.08 ratio         PTT - ( 21 May 2019 13:20 )  PTT:27.9 sec    CULTURES:  Culture Results:   No growth (05-21 @ 23:17)  Culture Results:   No growth to date. (05-21 @ 18:27)  Culture Results:   No growth to date. (05-21 @ 18:27)    Most recent blood culture -- 05-21 @ 23:17   -- -- .Urine 05-21 @ 23:17  Most recent blood culture -- 05-21 @ 18:27   -- -- .Blood 05-21 @ 18:27      Physical Examination:  PULM:   CVS: Regular rate and rhythm, no murmurs, rubs, or gallops  ABD: Soft, non-tender  EXT:  No clubbing, cyanosis, or edema    RADIOLOGY REVIEWED  CXR:    CT chest:    TTE:

## 2019-05-24 NOTE — DIETITIAN INITIAL EVALUATION ADULT. - ADHERENCE
Pt with new diagnosis of DM. A1c (current admission) 8.2%. Pt denies adhering to therapeutic diet PTA, however reports spouse is a type 2 diabetic, and closely monitors what she purchases and prepares for meals.

## 2019-05-24 NOTE — PROGRESS NOTE ADULT - ASSESSMENT
Recurrent multi-lobar aspiration pneumonia  Dilated fluid filled esophagus with thickening on CT  Diastolic dysfunction  Hypertension    REC    Continue antibiotics per ID  GI evaluation

## 2019-05-24 NOTE — PROGRESS NOTE ADULT - SUBJECTIVE AND OBJECTIVE BOX
Follow-up Pulm Progress Note  Charly Cuellar MD  [phone]    Remains afebrile on Vanco/Zosyn  Feels well: no resp complaints  LE dopplers negative DVT     Vital Signs Last 24 Hrs  T(C): 36.8 (24 May 2019 08:20), Max: 36.8 (24 May 2019 00:21)  T(F): 98.3 (24 May 2019 08:20), Max: 98.3 (24 May 2019 00:21)  HR: 64 (24 May 2019 08:20) (57 - 65)  BP: 144/87 (24 May 2019 08:20) (106/67 - 144/87)  BP(mean): --  RR: 18 (24 May 2019 08:20) (17 - 19)  SpO2: 98% (24 May 2019 08:20) (94% - 98%)                       10.2   3.88  )-----------( 202      ( 24 May 2019 10:01 )             33.0       05-24    138  |  99  |  11  ----------------------------<  150<H>  3.8   |  26  |  0.84    Ca    9.6      24 May 2019 06:54      PT/INR - ( 21 May 2019 13:20 )   PT: 12.3 sec;   INR: 1.08 ratio         PTT - ( 21 May 2019 13:20 )  PTT:27.9 sec    CULTURES:  Culture Results:   No growth (05-21 @ 23:17)  Culture Results:   No growth to date. (05-21 @ 18:27)  Culture Results:   No growth to date. (05-21 @ 18:27)    Most recent blood culture -- 05-21 @ 23:17   -- -- .Urine 05-21 @ 23:17  Most recent blood culture -- 05-21 @ 18:27   -- -- .Blood 05-21 @ 18:27      Physical Examination:  PULM: Few R exp rhonchi  CVS: Regular rate and rhythm, no murmurs, rubs, or gallops  ABD: Soft, non-tender  EXT:  No clubbing, cyanosis, or edema    RADIOLOGY REVIEWED  CXR:    CT chest:    TTE:

## 2019-05-24 NOTE — DIETITIAN INITIAL EVALUATION ADULT. - PERTINENT MEDS FT
Lovenox, Zosyn, Vancomycin, Humalog, Lactobacillus Acidophilus, Proventil, Clonidine, Flexeril, Valium, Cymbalta, HCTZ, Methadone, Multivitamin, Carafate

## 2019-05-24 NOTE — DIETITIAN INITIAL EVALUATION ADULT. - NS AS NUTRI INTERV ED CONTENT
3) Provided diet education regarding T2DM. Education included dietary sources of carbohydrates (i.e. concentrated sweets, starches, dairy, fruit), monitoring portion sizes of carbohydrates, and including good sources of high biological protein & fiber with carbohydrates during meals/snacks. Educated patient on CHO counting (15gm = 1 CHO serving), importance of consistent meal pattern, MyPlate healthy eating model, label reading for CHO content, limiting concentrated sweets in diet, and importance of monitoring fingersticks daily. Provided written materials of T2DM nutrition therapy and label reading for CHO content from [employer] 4) Briefly reviewed wt management/wt loss diet education./Purpose of the nutrition education

## 2019-05-24 NOTE — DIETITIAN INITIAL EVALUATION ADULT. - ORAL INTAKE PTA
Pt reports poor appetite x 2 weeks PTA in context of pneumonia. Admits appetite is slowly improving in-house back to baseline (~2 meals/day, breakfast and lunch). Typically enjoys most foods, with no specific dietary preferences expressed./fair fair/Pt reports poor appetite x 2 weeks PTA in context of pneumonia. Admits appetite is slowly improving in-house back to baseline (~2 meals/day, breakfast and lunch). Typically enjoys most foods, with no specific dietary preferences expressed. Reports typically consuming cereal/oatmeal for breakfast and "whatever my wife makes" (i.e. fish, pork chips, chicken) for dinner.

## 2019-05-24 NOTE — DIETITIAN INITIAL EVALUATION ADULT. - OTHER INFO
Pt seen for nutrition consult in [facility]. Per discussion with pt, reports UBW: 230 lbs. Denies wt loss despite suboptimal PO intake x 2 weeks PTA. Dosing wt: (5/21): 230 lbs. Consistent with reported UBW. Per pt, desire for wt loss to 200-210 lbs. Pt admits to episodes of severe heart burn, which in turn affect swallowing ability and result in coughing/spitting up in middle of night. Per ID, pt with diffusely dilated esophagus and large hiatal hernia; suspect recurrent aspiration. Denies chewing intolerance, denies food allergies. Reports history of diarrhea, in which pt states he will need ? colostomy vs colonoscopy - pt unable to remember which one. Last BM: 5/23 x 1. Pt reports taking Vit B, Vit C, Potassium, and Mg prior to becoming ill. Pt reports significant improvement in appetite at this time.

## 2019-05-24 NOTE — PROGRESS NOTE ADULT - ASSESSMENT
_________________________________________________________________________________________  ========>>  M E D I C A L   A T T E N D I N G    F O L L O W  U P  N O T E  <<=========  -----------------------------------------------------------------------------------------------------    - Patient seen and examined by me earlier today.   - In summary,  LEN ALY is a 63y year old man who originally presented with cough  - Patient today overall doing ok, comfortable, eating OK.  breathing better        pt reports several episodes of diarrhea yesterday, resolved    ==================>> REVIEW OF SYSTEM <<=================    GEN: no fever, no chills, no pain  RESP: no SOB, occasional cough, no sputum  CVS: no chest pain, no palpitations, no edema  GI: no abdominal pain, no nausea, no constipation, no diarrhea  : no dysuria, no frequency, no hematuria  Neuro: no headache, no dizziness  Derm : no itching, no rash    ==================>> PHYSICAL EXAM <<=================    GEN: A&O X 3 , NAD , comfortable  HEENT: NCAT, PERRL, MMM, hearing intact  Neck: supple , no JVD   CVS: S1S2 , regular , No M/R/G appreciated  PULM: overall improved Rhonchi   ABD.: soft. non tender, non distended,  bowel sounds present  Extrem: intact pulses , no edema   PSYCH : normal mood,  not anxious      ==================>> MEDICATIONS <<====================    cloNIDine 0.1 milliGRAM(s) Oral every 8 hours  dextrose 5%. 1000 milliLiter(s) IV Continuous <Continuous>  dextrose 50% Injectable 12.5 Gram(s) IV Push once  dextrose 50% Injectable 25 Gram(s) IV Push once  dextrose 50% Injectable 25 Gram(s) IV Push once  diazepam    Tablet 2 milliGRAM(s) Oral three times a day  DULoxetine 60 milliGRAM(s) Oral two times a day  enoxaparin Injectable 40 milliGRAM(s) SubCutaneous daily  hydrochlorothiazide 50 milliGRAM(s) Oral daily  insulin lispro (HumaLOG) corrective regimen sliding scale   SubCutaneous three times a day before meals  insulin lispro (HumaLOG) corrective regimen sliding scale   SubCutaneous at bedtime  lactobacillus acidophilus 1 Tablet(s) Oral three times a day  methadone    Tablet 20 milliGRAM(s) Oral every 8 hours  multivitamin/minerals 1 Tablet(s) Oral daily  piperacillin/tazobactam IVPB. 3.375 Gram(s) IV Intermittent every 8 hours  sucralfate suspension 1 Gram(s) Oral four times a day  vancomycin  IVPB 1250 milliGRAM(s) IV Intermittent every 12 hours    MEDICATIONS  (PRN):  ALBUTerol    90 MICROgram(s) HFA Inhaler 2 Puff(s) Inhalation every 6 hours PRN Shortness of Breath and/or Wheezing  cyclobenzaprine 5 milliGRAM(s) Oral three times a day PRN Muscle Spasm  dextrose 40% Gel 15 Gram(s) Oral once PRN Blood Glucose LESS THAN 70 milliGRAM(s)/deciliter  glucagon  Injectable 1 milliGRAM(s) IntraMuscular once PRN Glucose LESS THAN 70 milligrams/deciliter  morphine  IR 30 milliGRAM(s) Oral every 8 hours PRN Severe Pain (7 - 10)    ==================>> VITAL SIGNS <<==================    Vital Signs Last 24 Hrs  T(C): 36.5 (05-24-19 @ 16:12)  T(F): 97.7 (05-24-19 @ 16:12), Max: 98.3 (05-24-19 @ 00:21)  HR: 64 (05-24-19 @ 16:12) (57 - 64)  BP: 125/77 (05-24-19 @ 16:12)  RR: 17 (05-24-19 @ 16:12) (17 - 19)  SpO2: 94% (05-24-19 @ 16:12) (94% - 98%)      POCT Blood Glucose.: 119 mg/dL (24 May 2019 17:22)  POCT Blood Glucose.: 211 mg/dL (24 May 2019 12:49)  POCT Blood Glucose.: 166 mg/dL (24 May 2019 08:28)  POCT Blood Glucose.: 148 mg/dL (23 May 2019 21:25)  POCT Blood Glucose.: 132 mg/dL (23 May 2019 17:28)     ==================>> LAB AND IMAGING <<==================                        10.2   3.88  )-----------( 202      ( 24 May 2019 10:01 )             33.0        138  |  99  |  11  ----------------------------<  150<H>  3.8   |  26  |  0.84    Ca    9.6      24 May 2019 06:54    WBC count:   3.88 <<== ,  5.52 <<== ,  8.07 <<== ,  14.6 <<==   Hemoglobin:   10.2 <<==,  10.5 <<==,  10.0 <<==,  12.4 <<==  platelets:  202 <==, 230 <==, 237 <==, 274 <==    Creatinine:  0.84  <<==, 0.72  <<==, 0.69  <<==, 0.79  <<==  Sodium:   138  <==, 140  <==, 138  <==, 135  <==       AST:          11 <== , 17 <==      ALT:        14  <== , 19  <==      AP:        86  <=, 101  <=     Bili:        0.4  <=, 0.3  <=     HgA1C: 8.2  (05-22-19)            ___________________________________________________________________________________  ===============>>  A S S E S S M E N T   A N D   P L A N <<===============  ------------------------------------------------------------------------------------------    Problem/Plan - 1:  ·  Problem: Sepsis, due to recurrent pneumonia , resolved   history notable for MRSA in the past  continue abx a per ID  monitor vitals and labs closely  pulm appreciated   nebs as needed.   supportive care.   further workup  / cultures per ID  hope for change over to Oral abx and DC planing soon    Problem/Plan - 2:  ·  Problem: new diagnosis of Diabetes  continue RISS for now  will likely DC home on metformin   diabetic education, glucometer  nutrition consult     Problem/Plan - 3:  ·  Problem: Emphysema   does not appear to be in exacerbation  continue Albuterol  change to duo nebs as needed  pulm appreciated     Problem/Plan - 4:  ·  Problem: Essential hypertension.  Plan: Continue Current medications and monitor.  cardio eval as needed.     Problem/Plan - 5:  ·  Problem: Spinal stenosis, unspecified spinal region.  Plan: post multiple surgeries   on muscle relaxants, and long /short acting pain meds  continue home meds for now  OOB as able.     Problem/Plan - 6:  Problem: Gastroesophageal reflux disease with esophagitis. Plan: PPI  Carafate  weight loss discussed  supportive care  would benefit from GI eval at later time when pneumonia resolved        also would consider manometry and PH monitoring as OP as discussed with pt  ( these tests are not available in hospital)      Problem/Plan - 7:  ·  Problem: chronic Constipation: now with several episodes of diarrhea  hold bowel regimen for now  bacid  monitor    OOb as able.   -GI/DVT Prophylaxis.    --------------------------------------------  Case discussed with pt,   Education given on findings and plan of care  ___________________________  H. JO Diggs.  Pager: [phone]

## 2019-05-24 NOTE — DIETITIAN INITIAL EVALUATION ADULT. - NS AS NUTRI INTERV MEALS SNACK
1) Continue consistent carbohydrate diet with no snacks as ordered. 2) Encourage PO intake, encourage utilization of daily menus to promote optimal PO intake potential./General/healthful diet

## 2019-05-24 NOTE — DIETITIAN INITIAL EVALUATION ADULT. - ENERGY NEEDS
Pt is a 62 yo M with PMH: HTN, COPD, hernia, GERD, chronic back pain s/p multiple spine surgeries, h/o MRSA pneumonia, heart burn, hiatal hernia. Presented with sepsis due to recurrent pneumonia. Continues on antibiotics as ordered. Noted with new diagnosis of DM, on RISS. Noted with GERD with esophagitis; plan for PPI and wt loss.     Ht: 72"   Wt: 230 lbs  BMI: 31.2 kg/m2   IBW: 178 lbs   (+/-10%)     129% IBW  Edema: 1+ left leg, right leg         Skin: no pressure injuries

## 2019-05-24 NOTE — PROGRESS NOTE ADULT - ASSESSMENT
Pt is a 64 yo man with PMH of HTN, COPD, hernia, GERD, chronic back pain s/p multiple spine surgeries, h/o MRSA PNA, had another PNA 2 weeks ago was put on antibiotics and felt better  but is still having the nocturnal cough, orthopnea. Denies fever, nausea, vomiting.  he says that he feels very weak today.   He says that 2 weeks ago when they diagnosed him with PNA his symptoms were productive coughing mostly at night.   He states that his heart burn has been really bad lately as well.   He denies any hx of cardiac disease. Pt describes the weakness as whole body, he denies SOB with exertion   no chest pain.   He states that he smoke for many years but stopped 10 years ago. (21 May 2019 20:24)    ER vitals:  Tm 102, P 95, /73.  WBC 14.6 --> 8.0.  Lact 3.7 --> 2.0.  UA (-).  RVP (-).  CT chest with new patchy nodular opacities in R lung.  Pt is on cefepime/vanco.  Pt states he tends to get recurrent pneumonias, has been on several outpatient abx courses.  Pt also follows with outpatient GI and planned to have colonoscopy to evaluate hernia.  Pt states he has GERD and oftentimes coughs and spits in the middle of the night, especially when he eats late.  Prior h/o of dilated esophagus and hiatal hernia, s/p EGD in the past.    Pt on vanco/cefepime.  ID consult called for further abx management.  Pt currently denies cough/CP/SOB.  c/o intermittent chills/warmth.        Recommend:      Sepsis: (fever, tachycardia, elevated lactate)    - CT chest with new rt sided patchy opacities, no pleural effusions noted.  Pt with diffusely dilated esophagus, also with history of GERD, and large hiatal hernia.  Suspect recurrent aspiration process.  Continue zosyn for aspiration coverage.  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  Vanco increased to 1250mg IV bid.     -  Agree with GI evaluation and follow up    - f/u blood cultures, monitor temp curve, WBC    - sputum cx and MRSA pcr ordered.     - Pulm eval appreciated      Will follow,    Sanaz Soliman  [phone]

## 2019-05-24 NOTE — PROGRESS NOTE ADULT - SUBJECTIVE AND OBJECTIVE BOX
Infectious Diseases progress note:    Subjective: Cough improved, no sob.  No fever/chills/rigors.  States feeling better overall    ROS:  CONSTITUTIONAL:  No fever, chills, rigors  CARDIOVASCULAR:  No chest pain or palpitations  RESPIRATORY:   No SOB, cough, dyspnea on exertion.  No wheezing  GASTROINTESTINAL:  No abd pain, N/V, diarrhea/constipation  EXTREMITIES:  No swelling or joint pain  GENITOURINARY:  No burning on urination, increased frequency or urgency.  No flank pain  NEUROLOGIC:  No HA, visual disturbances  SKIN: No rashes    Allergies    No Known Allergies    Intolerances        ANTIBIOTICS/RELEVANT:  antimicrobials  piperacillin/tazobactam IVPB. 3.375 Gram(s) IV Intermittent every 8 hours  vancomycin  IVPB 1250 milliGRAM(s) IV Intermittent every 12 hours    immunologic:    OTHER:  ALBUTerol    90 MICROgram(s) HFA Inhaler 2 Puff(s) Inhalation every 6 hours PRN  cloNIDine 0.1 milliGRAM(s) Oral every 8 hours  cyclobenzaprine 5 milliGRAM(s) Oral three times a day PRN  dextrose 40% Gel 15 Gram(s) Oral once PRN  dextrose 5%. 1000 milliLiter(s) IV Continuous <Continuous>  dextrose 50% Injectable 12.5 Gram(s) IV Push once  dextrose 50% Injectable 25 Gram(s) IV Push once  dextrose 50% Injectable 25 Gram(s) IV Push once  diazepam    Tablet 2 milliGRAM(s) Oral three times a day  DULoxetine 60 milliGRAM(s) Oral two times a day  enoxaparin Injectable 40 milliGRAM(s) SubCutaneous daily  glucagon  Injectable 1 milliGRAM(s) IntraMuscular once PRN  hydrochlorothiazide 50 milliGRAM(s) Oral daily  insulin lispro (HumaLOG) corrective regimen sliding scale   SubCutaneous three times a day before meals  insulin lispro (HumaLOG) corrective regimen sliding scale   SubCutaneous at bedtime  lactobacillus acidophilus 1 Tablet(s) Oral three times a day  methadone    Tablet 20 milliGRAM(s) Oral every 8 hours  morphine  IR 30 milliGRAM(s) Oral every 8 hours PRN  multivitamin/minerals 1 Tablet(s) Oral daily  sucralfate suspension 1 Gram(s) Oral four times a day      Objective:  Vital Signs Last 24 Hrs  T(C): 36.8 (24 May 2019 08:20), Max: 36.8 (24 May 2019 00:21)  T(F): 98.3 (24 May 2019 08:20), Max: 98.3 (24 May 2019 00:21)  HR: 64 (24 May 2019 08:20) (57 - 65)  BP: 144/87 (24 May 2019 08:20) (106/67 - 144/87)  BP(mean): --  RR: 18 (24 May 2019 08:20) (17 - 19)  SpO2: 98% (24 May 2019 08:20) (94% - 98%)    PHYSICAL EXAM:  Constitutional:NAD  Eyes:VAHID, EOMI  Ear/Nose/Throat: no thrush, mucositis.  Moist mucous membranes	  Neck:no JVD, no lymphadenopathy, supple  Respiratory: CTA sandie  Cardiovascular: S1S2 RRR, no murmurs  Gastrointestinal:soft, nontender,  nondistended (+) BS  Extremities:no e/e/c  Skin:  no rashes, open wounds or ulcerations        LABS:                        10.2   3.88  )-----------( 202      ( 24 May 2019 10:01 )             33.0     05-24    138  |  99  |  11  ----------------------------<  150<H>  3.8   |  26  |  0.84    Ca    9.6      24 May 2019 06:54                  Vancomycin Level, Trough: 9.8 ug/mL (05-23 @ 13:35)      Rapid RVP Result: [facility]          MICROBIOLOGY:      Culture - Urine (05.21.19 @ 23:17)    Specimen Source: .Urine    Culture Results:   No growth      Culture - Blood (05.21.19 @ 18:27)    Specimen Source: .Blood    Culture Results:   No growth to date.      Culture - Blood (05.21.19 @ 18:27)    Specimen Source: .Blood    Culture Results:   No growth to date.        RADIOLOGY & ADDITIONAL STUDIES:    < from: [facility] Duplex Lower Ext Vein Scan, Bilat (05.23.19 @ 18:03) >  FINDINGS:    There is normal compressibility of the bilateral common femoral, femoral   and popliteal veins.     Doppler examination shows normal spontaneous and phasic flow.    No calf vein thrombosis is detected.    IMPRESSION:     No evidence of bilateral lower extremity deep venous thrombosis.    < end of copied text >

## 2019-05-25 NOTE — SWALLOW BEDSIDE ASSESSMENT ADULT - CONSISTENCIES ADMINISTERED
puree thin/hard solid thin liquid ~4 tsp puree; ~1 cookie/puree thin/hard solid thin liquid/~ 4oz thin liquids

## 2019-05-25 NOTE — SWALLOW BEDSIDE ASSESSMENT ADULT - COMMENTS
CXR 5/21: Recurrent right lower lobe pneumonia versus less likely scarring. CT Chest 5/21: New/increased patchy and nodular airspace opacities within the right lung likely representing acute pneumonia. Underlying reticulations, scarring, architectural distortion and volume loss within the right lung representing chronic disease again noted. Per H&P: Sepsis, due to unspecified organism. Recurrent pneumonia with recent treatment with ?levaquin. Plan:continue as cefepime/vanco, nebs as needed; Empyema lung, continue albuterol; Spinal stenosis: post multiple surgeries-->on muscle relaxants, and long /short acting pain meds; GERD with esophagitis. Plan: PPI, carafate, +weight loss noted. "possibility of aspiration events should be considered given pt on heavy narcotics" ID following: "Pt states he has GERD and oftentimes coughs and spits in the middle of the night, especially when he eats late.  Prior h/o of dilated esophagus and hiatal hernia, s/p EGD in the past." Pulmonary 5/22: "Multi-lobar aspiration pneumonia with upper lobe extensive tree in bud opacities sugg of endronchial aspiration. Dilated fluid filled esophagous with thickening. r/o underlying COPD given heavy smoking history. 5/24 Dietitcian Note: "Pt admits to episodes of severe heart burn, which in turn affect swallowing ability and result in coughing/spitting up in middle of night. Per ID, pt with diffusely dilated esophagus and large hiatal hernia; suspect recurrent aspiration." "Pt reports poor appetite x 2 weeks PTA in context of pneumonia. Admits appetite is slowly improving in-house back to baseline (~2 meals/day, breakfast and lunch)" +New dx of DM (this admission). Internal Medicine 5/24: "now with several episodes of diarrhea-->hold bowel regimen for now; bacid" CXR 5/21: Recurrent right lower lobe pneumonia versus less likely scarring. CT Chest 5/21: New/increased patchy and nodular airspace opacities within the right lung likely representing acute pneumonia. Underlying reticulations, scarring, architectural distortion and volume loss within the right lung representing chronic disease again noted. CT Head 5/23 (h/o  Shunt x2yrs ago): No mass effect, hemorrhage or evidence of acute intracranial pathology.  Per H&P: Sepsis, due to unspecified organism. Recurrent pneumonia with recent treatment with ?levaquin. Plan:continue as cefepime/vanco, nebs as needed; Empyema lung, continue albuterol; Spinal stenosis: post multiple surgeries-->on muscle relaxants, and long /short acting pain meds; GERD with esophagitis. Plan: PPI, carafate, +weight loss noted. "possibility of aspiration events should be considered given pt on heavy narcotics" ID following: "Pt states he has GERD and oftentimes coughs and spits in the middle of the night, especially when he eats late.  Prior h/o of dilated esophagus and hiatal hernia, s/p EGD in the past." Pulmonary 5/22: "Multi-lobar aspiration pneumonia with upper lobe extensive tree in bud opacities sugg of endronchial aspiration. Dilated fluid filled esophagous with thickening. r/o underlying COPD given heavy smoking history. 5/24 Dietitcian Note: "Pt admits to episodes of severe heart burn, which in turn affect swallowing ability and result in coughing/spitting up in middle of night. Per ID, pt with diffusely dilated esophagus and large hiatal hernia; suspect recurrent aspiration." "Pt reports poor appetite x 2 weeks PTA in context of pneumonia. Admits appetite is slowly improving in-house back to baseline (~2 meals/day, breakfast and lunch)" +New dx of DM (this admission). Internal Medicine 5/24: "now with several episodes of diarrhea-->hold bowel regimen for now; bacid"

## 2019-05-25 NOTE — SWALLOW BEDSIDE ASSESSMENT ADULT - SWALLOW EVAL: DIAGNOSIS
From an oropharyngeal standpoint, Pt presents with a fairly functional swallow mechanism and tolerated purees, soft and hard solids with adequate manipulation and AP transport, timely swallow initiation with fair laryngeal elevation upon palpation. No overt signs/symptoms of penetration or aspiration across consistencies. Pt does present with consistent eructation post all trials, suggestive of reflux.  Cannot r/o silent aspiration at bedside. Discussed with Pt and team- considering recurrent PNAs, would suggest instrumental testing to further assess swallow.

## 2019-05-25 NOTE — SWALLOW BEDSIDE ASSESSMENT ADULT - ASR SWALLOW ASPIRATION MONITOR
throat clearing/change of breathing pattern/cough/gurgly voice/pneumonia/fever/upper respiratory infection

## 2019-05-25 NOTE — SWALLOW BEDSIDE ASSESSMENT ADULT - ADDITIONAL RECOMMENDATIONS
Reflux precautions:  remain upright for all meals and 3hrs post meal, with known h/o "hiatal hernia & dilated esophagus" as per chart  Suggest Reflux diet free of gastric irritants

## 2019-05-25 NOTE — SWALLOW BEDSIDE ASSESSMENT ADULT - SLP GENERAL OBSERVATIONS
Pt encountered OOB in chair, AA&Ox4, able to follow mx-step directives and answer yes/no questions. Pt able to provide personal history adequately. Pt denies pain or globus sensation at this time. Pt endorses occasional "burning in throat" as well as coughing at night and in the morning.  Symptoms consistent with known reflux.  Pt c/o fluctuating feelings of warmth, RN Ana notified. Education provided re: plan to perform MBS to further assess swallow function given recurrent PNAs. Pt and family amenable. If Pt d/c prior to 5/28, discussed rec to f/u as an outpatient for SFV and with GI

## 2019-05-25 NOTE — SWALLOW BEDSIDE ASSESSMENT ADULT - SLP PERTINENT HISTORY OF CURRENT PROBLEM
Pt is a 64 yo man with PMH of HTN, COPD, hernia, GERD, chronic back pain s/p multiple spine surgeries, (lumbar fusion 2008) S/P insertion of spinal cord stimulator 2013, h/o  shunt, h/o MRSA PNA, had another PNA 2 weeks ago was put on antibiotics and felt better but is still having the nocturnal cough, orthopnea

## 2019-05-25 NOTE — PROGRESS NOTE ADULT - ASSESSMENT
_________________________________________________________________________________________  ========>>  M E D I C A L   A T T E N D I N G    F O L L O W  U P  N O T E  <<=========  -----------------------------------------------------------------------------------------------------    - Patient seen and examined by me earlier today.   - In summary,  LEN ALY is a 63y year old man who originally presented with cough  - Patient today overall doing ok, comfortable, eating OK.  breathing better     ==================>> REVIEW OF SYSTEM <<=================    GEN: no fever, no chills, no pain  RESP: no SOB, occasional cough, no sputum  CVS: no chest pain, no palpitations, no edema  GI: no abdominal pain, no nausea, no constipation, no diarrhea  : no dysuria, no frequency, no hematuria  Neuro: no headache, no dizziness  Derm : no itching, no rash    ==================>> PHYSICAL EXAM <<=================    GEN: A&O X 3 , NAD , comfortable  HEENT: NCAT, PERRL, MMM, hearing intact  Neck: supple , no JVD   CVS: S1S2 , regular , No M/R/G appreciated  PULM: overall improved Rhonchi   ABD.: soft. non tender, non distended,  bowel sounds present  Extrem: intact pulses , no edema   PSYCH : normal mood,  not anxious      ==================>> MEDICATIONS <<====================    cloNIDine 0.1 milliGRAM(s) Oral every 8 hours  dextrose 5%. 1000 milliLiter(s) IV Continuous <Continuous>  dextrose 50% Injectable 12.5 Gram(s) IV Push once  dextrose 50% Injectable 25 Gram(s) IV Push once  dextrose 50% Injectable 25 Gram(s) IV Push once  diazepam    Tablet 2 milliGRAM(s) Oral three times a day  DULoxetine 60 milliGRAM(s) Oral two times a day  enoxaparin Injectable 40 milliGRAM(s) SubCutaneous daily  hydrochlorothiazide 50 milliGRAM(s) Oral daily  insulin lispro (HumaLOG) corrective regimen sliding scale   SubCutaneous three times a day before meals  insulin lispro (HumaLOG) corrective regimen sliding scale   SubCutaneous at bedtime  lactobacillus acidophilus 1 Tablet(s) Oral three times a day  methadone    Tablet 20 milliGRAM(s) Oral every 8 hours  multivitamin/minerals 1 Tablet(s) Oral daily  piperacillin/tazobactam IVPB. 3.375 Gram(s) IV Intermittent every 8 hours  sucralfate suspension 1 Gram(s) Oral four times a day  vancomycin  IVPB 1250 milliGRAM(s) IV Intermittent every 12 hours    MEDICATIONS  (PRN):  ALBUTerol    90 MICROgram(s) HFA Inhaler 2 Puff(s) Inhalation every 6 hours PRN Shortness of Breath and/or Wheezing  cyclobenzaprine 5 milliGRAM(s) Oral three times a day PRN Muscle Spasm  dextrose 40% Gel 15 Gram(s) Oral once PRN Blood Glucose LESS THAN 70 milliGRAM(s)/deciliter  glucagon  Injectable 1 milliGRAM(s) IntraMuscular once PRN Glucose LESS THAN 70 milligrams/deciliter  morphine  IR 30 milliGRAM(s) Oral every 8 hours PRN Severe Pain (7 - 10)    ==================>> VITAL SIGNS <<==================    Vital Signs Last 24 Hrs  T(C): 36.8 (05-25-19 @ 08:02)  T(F): 98.2 (05-25-19 @ 08:02), Max: 98.4 (05-24-19 @ 23:30)  HR: 52 (05-25-19 @ 08:02) (50 - 68)  BP: 130/83 (05-25-19 @ 08:02)  RR: 198 (05-25-19 @ 08:02) (17 - 198)  SpO2: 96% (05-25-19 @ 08:02) (94% - 99%)      POCT Blood Glucose.: 141 mg/dL (25 May 2019 07:54)  POCT Blood Glucose.: 124 mg/dL (24 May 2019 21:34)  POCT Blood Glucose.: 119 mg/dL (24 May 2019 17:22)  POCT Blood Glucose.: 211 mg/dL (24 May 2019 12:49)     ==================>> LAB AND IMAGING <<==================                        10.2   3.88  )-----------( 202      ( 24 May 2019 10:01 )             33.0     139  |  98  |  12  ----------------------------<  147<H>  3.5   |  27  |  0.92    Ca    9.8      25 May 2019 06:34    WBC count:   3.88 <<== ,  5.52 <<== ,  8.07 <<== ,  14.6 <<==   Hemoglobin:   10.2 <<==,  10.5 <<==,  10.0 <<==,  12.4 <<==  platelets:  202 <==, 230 <==, 237 <==, 274 <==    Creatinine:  0.92  <<==, 0.84  <<==, 0.72  <<==, 0.69  <<==, 0.79  <<==  Sodium:   139  <==, 138  <==, 140  <==, 138  <==, 135  <==       AST:          11 <== , 17 <==      ALT:        14  <== , 19  <==      AP:        86  <=, 101  <=     Bili:        0.4  <=, 0.3  <=    _______________________  C U L T U R E S :    Culture - Sputum (collected 24 May 2019 13:31)  Source: .Sputum CUP  Gram Stain (24 May 2019 22:52):    Rare polymorphonuclear leukocytes per low power field    No Squamous epithelial cells per low power field    No organisms seen per oil power field    Culture - Urine (collected 21 May 2019 23:17)  Source: .Urine  Final Report (22 May 2019 17:11):    No growth    Culture - Blood (collected 21 May 2019 18:27)  Source: .Blood  Preliminary Report (22 May 2019 19:01):    No growth to date.    Culture - Blood (collected 21 May 2019 18:27)  Source: .Blood  Preliminary Report (22 May 2019 19:01):    No growth to date.    MRSA/MSSA PCR (05.24.19 @ 13:18)    MRSA PCR Result.: NotDetec  ___________________________________________________________________________________  ===============>>  A S S E S S M E N T   A N D   P L A N <<===============  ------------------------------------------------------------------------------------------    Problem/Plan - 1:  ·  Problem: Sepsis, due to recurrent pneumonia , resolved   history notable for MRSA in the past> now negative   continue abx a per ID  monitor vitals and labs closely  pulm appreciated   nebs as needed.   supportive care.   further workup  / cultures per ID  hope for change over to Oral abx and DC planing     Problem/Plan - 2:  ·  Problem: new diagnosis of Diabetes  continue RISS for now  will DC home on metformin   diabetic education, glucometer already done   nutrition consult done   all questions answered     Problem/Plan - 3:  ·  Problem: Emphysema   does not appear to be in exacerbation  continue Albuterol  change to duo nebs as needed  pulm appreciated     Problem/Plan - 4:  ·  Problem: Essential hypertension.  Plan: Continue Current medications and monitor.  cardio eval as needed.     Problem/Plan - 5:  ·  Problem: Spinal stenosis, unspecified spinal region.  Plan: post multiple surgeries   on muscle relaxants, and long /short acting pain meds  continue home meds for now  OOB as able.     Problem/Plan - 6:  Problem: Gastroesophageal reflux disease with esophagitis. Plan: PPI  Carafate  weight loss discussed  supportive care  would benefit from GI eval at later time when pneumonia resolved        also would consider manometry and PH monitoring as OP as discussed with pt and family  ( these tests are not available in hospital)      Problem/Plan - 7:  ·  Problem: chronic Constipation: now with several episodes of diarrhea  hold bowel regimen for now  bacid  monitor    OOb as able.   -GI/DVT Prophylaxis.    --------------------------------------------  Case discussed with pt, family  Education given on findings and plan of care  ___________________________  HCas Diggs D.O.  Pager: [phone]

## 2019-05-26 NOTE — DISCHARGE NOTE PROVIDER - PROVIDER TOKENS
FREE:[LAST:[Pradeep],FIRST:[Adama],PHONE:[(   )    -],FAX:[(   )    -],ADDRESS:[Primary care doctor]]

## 2019-05-26 NOTE — DISCHARGE NOTE PROVIDER - HOSPITAL COURSE
Sepsis, due to recurrent pneumonia , resolved     history notable for MRSA in the past> now negative     dc on Augmentin for 7d    monitor vitals and labs closely    pulm appreciated     nebs as needed.             new diagnosis of Diabetes. HgbA1c- 8.2    continue RISS for now    will DC home on metformin     diabetic education, glucometer already done     nutrition consult done     all questions answered         Emphysema     does not appear to be in exacerbation            Essential hypertension.  Continue Current medications and monitor.        Spinal stenosis, unspecified spinal region.  post multiple surgeries    on muscle relaxants, and long /short acting pain meds    continue home meds for now    OOB as able.         Gastroesophageal reflux disease with esophagitis. PPI, Carafate    weight loss discussed    supportive care    Consider manometry and PH monitoring as OP as discussed with pt and family  ( these tests are not available in hospital)              chronic Constipation: now with several episodes of diarrhea    bowel regimen held. c/w bacid Sepsis, due to recurrent pneumonia , resolved     dc on Augmentin for 7d    monitor vitals and labs closely    pulm appreciated             new diagnosis of Diabetes. HgbA1c- 8.2    DC home on metformin     diabetic education, glucometer already done     nutrition consult done     all questions answered         Emphysema     does not appear to be in exacerbation    pulmonary follow up        Essential hypertension.  Continue Current medications and monitor.        Spinal stenosis, unspecified spinal region.  post multiple surgeries    on muscle relaxants, and long /short acting pain meds    continue home meds for now    OOB as able.         Gastroesophageal reflux disease with esophagitis. PPI, Carafate    weight loss discussed    supportive care    Consider manometry and PH monitoring as OP as discussed with pt and family  ( these tests are not available in hospital)              chronic Constipation: now with several episodes of diarrhea    bowel regimen held. c/w bacid

## 2019-05-26 NOTE — DISCHARGE NOTE PROVIDER - NSDCCAREPROVSEEN_GEN_ALL_CORE_FT
Garrett Diggs Gamal Diggs  [facility]  Hoorbod Delshadfar Hoorbod Delshadfar [place] Nathaniel Saintus Meahlyn Dumaplin Seema Rai Greg Ruskin Cynthia Moise Lincy Jojan Tiffany Ally Brian Lovejoy Fatima Traore Diana Sooknandan Dionne Massiah

## 2019-05-26 NOTE — DISCHARGE NOTE PROVIDER - NSDCCPCAREPLAN_GEN_ALL_CORE_FT
PRINCIPAL DISCHARGE DIAGNOSIS  Diagnosis: Sepsis due to pneumonia  Assessment and Plan of Treatment: Continue with Augmentin for 7 days      SECONDARY DISCHARGE DIAGNOSES  Diagnosis: New onset type 2 diabetes mellitus  Assessment and Plan of Treatment: HgA1C this admission - 8.2  Make sure you get your HgA1c checked every three months.  If you take oral diabetes medications, check your blood glucose two times a day.  If you take insulin, check your blood glucose before meals and at bedtime.  It's important not to skip any meals.  Keep a log of your blood glucose results and always take it with you to your doctor appointments.  Keep a list of your current medications including injectables and over the counter medications and bring this medication list with you to all your doctor appointments.  If you have not seen your ophthalmologist this year call for appointment.  Check your feet daily for redness, sores, or openings. Do not self treat. If no improvement in two days call your primary care physician for an appointment.  Low blood sugar (hypoglycemia) is a blood sugar below 70mg/dl. Check your blood sugar if you feel signs/symptoms of hypoglycemia. If your blood sugar is below 70 take 15 grams of carbohydrates (ex 4 oz of apple juice, 3-4 glucose tablets, or 4-6 oz of regular soda) wait 15 minutes and repeat blood sugar to make sure it comes up above 70.  If your blood sugar is above 70 and you are due for a meal, have a meal.  If you are not due for a meal have a snack.  This snack helps keeps your blood sugar at a safe range.    Diagnosis: Gastroesophageal reflux disease with esophagitis  Assessment and Plan of Treatment: Continue with Carafate and Omeprazole    Diagnosis: Constipation, unspecified constipation type  Assessment and Plan of Treatment: Condition resolved PRINCIPAL DISCHARGE DIAGNOSIS  Diagnosis: Sepsis due to pneumonia  Assessment and Plan of Treatment: Continue with Augmentin for 7 days      SECONDARY DISCHARGE DIAGNOSES  Diagnosis: New onset type 2 diabetes mellitus  Assessment and Plan of Treatment: HgA1C this admission - 8.2  Make sure you get your HgA1c checked every three months.  Continue with Metformin, check your blood glucose two times a day.  If you take insulin, check your blood glucose before meals and at bedtime.  It's important not to skip any meals.  Keep a log of your blood glucose results and always take it with you to your doctor appointments.  Keep a list of your current medications and over the counter medications and bring this medication list with you to all your doctor appointments.  If you have not seen your ophthalmologist this year call for appointment.  Check your feet daily for redness, sores, or openings. Do not self treat. If no improvement in two days call your primary care physician for an appointment.  Low blood sugar (hypoglycemia) is a blood sugar below 70mg/dl. Check your blood sugar if you feel signs/symptoms of hypoglycemia. If your blood sugar is below 70 take 15 grams of carbohydrates (ex 4 oz of apple juice, 3-4 glucose tablets, or 4-6 oz of regular soda) wait 15 minutes and repeat blood sugar to make sure it comes up above 70.  If your blood sugar is above 70 and you are due for a meal, have a meal.  If you are not due for a meal have a snack.  This snack helps keeps your blood sugar at a safe range.    Diagnosis: Gastroesophageal reflux disease with esophagitis  Assessment and Plan of Treatment: Continue with Carafate and Omeprazole    Diagnosis: Constipation, unspecified constipation type  Assessment and Plan of Treatment: Condition resolved

## 2019-05-26 NOTE — DISCHARGE NOTE NURSING/CASE MANAGEMENT/SOCIAL WORK - NSDCDPATPORTLINK_GEN_ALL_CORE
You can access the [facility] Patient Portal, offered by [facility], by registering with the following website: [email or web address]

## 2019-05-26 NOTE — CHART NOTE - NSCHARTNOTEFT_GEN_A_CORE
Chart reviewed.  MRSA PCR (-), sputum cx with nl respiratory jason.  Will d/c vancomycin.  Pt on day # 5 of zosyn.  No new fevers, pt s/p swallow evaluation on 5/25.      Can switch to PO Augmentin 875mg bid to complete total 7 day course (through 5/28)      Sanaz Soliman  [phone]

## 2019-05-26 NOTE — PROGRESS NOTE ADULT - ASSESSMENT
M E D I C A L   A T T E N D I N G    F O L L O W    U P   N O T E                                     ------------------------------------------------------------------------------------------------    patient evaluated by me, case discussed with team, chart, medications, and physical exam reviewed, labs / tests  and vitals reviewed by me, as bellow.   Patient is stable for discharge today. appreciated ID note  Patient to follow up with  PMD, GI, Pain mgmt   See discharge document for full note.      ==================>> MEDICATIONS <<====================    cloNIDine 0.1 milliGRAM(s) Oral every 8 hours  dextrose 5%. 1000 milliLiter(s) IV Continuous <Continuous>  dextrose 50% Injectable 12.5 Gram(s) IV Push once  dextrose 50% Injectable 25 Gram(s) IV Push once  dextrose 50% Injectable 25 Gram(s) IV Push once  diazepam    Tablet 2 milliGRAM(s) Oral three times a day  DULoxetine 60 milliGRAM(s) Oral two times a day  enoxaparin Injectable 40 milliGRAM(s) SubCutaneous daily  hydrochlorothiazide 50 milliGRAM(s) Oral daily  insulin lispro (HumaLOG) corrective regimen sliding scale   SubCutaneous three times a day before meals  insulin lispro (HumaLOG) corrective regimen sliding scale   SubCutaneous at bedtime  lactobacillus acidophilus 1 Tablet(s) Oral three times a day  methadone    Tablet 20 milliGRAM(s) Oral every 8 hours  multivitamin/minerals 1 Tablet(s) Oral daily  piperacillin/tazobactam IVPB. 3.375 Gram(s) IV Intermittent every 8 hours  sucralfate suspension 1 Gram(s) Oral four times a day  vancomycin  IVPB 1250 milliGRAM(s) IV Intermittent every 12 hours    MEDICATIONS  (PRN):  ALBUTerol    90 MICROgram(s) HFA Inhaler 2 Puff(s) Inhalation every 6 hours PRN Shortness of Breath and/or Wheezing  cyclobenzaprine 5 milliGRAM(s) Oral three times a day PRN Muscle Spasm  dextrose 40% Gel 15 Gram(s) Oral once PRN Blood Glucose LESS THAN 70 milliGRAM(s)/deciliter  glucagon  Injectable 1 milliGRAM(s) IntraMuscular once PRN Glucose LESS THAN 70 milligrams/deciliter  morphine  IR 30 milliGRAM(s) Oral every 8 hours PRN Severe Pain (7 - 10)    ==================>> VITAL SIGNS <<==================    T(C): 36.8 (05-26-19 @ 14:08), Max: 36.8 (05-26-19 @ 14:08)  HR: 80 (05-26-19 @ 15:26) (60 - 80)  BP: 114/74 (05-26-19 @ 15:26) (114/74 - 145/77)  RR: 18 (05-26-19 @ 15:26) (17 - 18)  SpO2: 97% (05-26-19 @ 15:26) (93% - 97%)    POCT Blood Glucose.: 153 mg/dL (26 May 2019 12:46)  POCT Blood Glucose.: 150 mg/dL (26 May 2019 08:32)  POCT Blood Glucose.: 177 mg/dL (25 May 2019 21:21)  POCT Blood Glucose.: 162 mg/dL (25 May 2019 17:20)    I&O's Summary    25 May 2019 07:01  -  26 May 2019 07:00  --------------------------------------------------------  IN: 240 mL / OUT: 460 mL / NET: -220 mL    26 May 2019 07:01  -  26 May 2019 16:39  --------------------------------------------------------  IN: 460 mL / OUT: 0 mL / NET: 460 mL       ==================>> LAB AND IMAGING <<==================                        10.8   3.49  )-----------( 209      ( 25 May 2019 10:36 )             34.8        05-25    139  |  98  |  12  ----------------------------<  147<H>  3.5   |  27  |  0.92    Ca    9.8      25 May 2019 06:34     TSH:      0.70   (05-21-19)           Lipid profile:  (05-22-19)     Total: 132     LDL  : 78     HDL  :28     TG   :129     HgA1C: 8.2  (05-22-19)          WBC count:   3.49 <<== ,  3.88 <<== ,  5.52 <<== ,  8.07 <<==   Hemoglobin:   10.8 <<==,  10.2 <<==,  10.5 <<==,  10.0 <<==  platelets:  209 <==, 202 <==, 230 <==, 237 <==, 274 <==    Creatinine:  0.92  <<==, 0.84  <<==, 0.72  <<==, 0.69  <<==, 0.79  <<==  Sodium:   139  <==, 138  <==, 140  <==, 138  <==, 135  <==

## 2019-08-12 NOTE — ASSESSMENT
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 Authorization is requested for a CAT scan of the lumbar spine.

## 2019-08-12 NOTE — PHYSICAL EXAM
[No Drainage] : without drainage [Erythema] : not erythematous [FreeTextEntry6] : tenderness just medial to the spinal cord stimulator battery which would correlate to lower end of the left hardware [FreeTextEntry8] : using a cane

## 2019-08-12 NOTE — REASON FOR VISIT
[Follow-Up: _____] : a [unfilled] follow-up visit [Spouse] : spouse [FreeTextEntry1] : history of spinal lumbar disease and hydrocephalus. Multiple surgeries consisting of: \par OR on 5/16/2017 Right wound dehiscence and removal of VPS.\par OR on 9/12/2016 Spinal removal of hardware L2 L3 L4 L5 and S 1. Patient is having difficulty maintaining his posture.

## 2019-09-09 PROBLEM — M48.00 SPINAL STENOSIS: Status: ACTIVE | Noted: 2017-05-30

## 2019-09-09 NOTE — REASON FOR VISIT
[Follow-Up: _____] : a [unfilled] follow-up visit [FreeTextEntry1] : This patient continues to have back and leg pain. A recent CT scan of the lumbar spine does not show any problems with the hardware does show appears to be a solid arthrodesis to the sacrum however there was no imaging of the thoracic spine which was requested.

## 2019-09-09 NOTE — PHYSICAL EXAM
[Motor Strength] : muscle strength was normal in all four extremities [FreeTextEntry8] : in a wheelchair

## 2019-09-09 NOTE — ASSESSMENT
[FreeTextEntry1] : We once again try to obtain a CT scan of the thoracic spine and also standing scoliosis 36 inch x-rays.

## 2019-09-17 NOTE — ED PROVIDER NOTE - PROGRESS NOTE DETAILS
spoke with pts pmd Dr. Fernández, no affiliation to [facility]. -Deja Dickey PA-C CT with multiple liver lesions, intrahepatic dilation. Concern for metastatic disease. Pain improved but still present. Will admit for further work-up spoke with pts pmd Dr. Fernández, no affiliation to [facility]. -Deja Dickey PA-C

## 2019-09-17 NOTE — ED ADULT NURSE NOTE - OBJECTIVE STATEMENT
64 yo male from home c/o lower left quadrant abd pn with constipation since yesterday, st increased ;laxative use per PMD without success. c/o nausea, no vomiting. denies diarrheaa/dizziness. denies chx pn/SOB. abd tender on palpation, soft/ rounded. VS stable, IV access obtained, labs drawn and sent, pt medicated for pn CT pending.

## 2019-09-17 NOTE — ED PROVIDER NOTE - NS ED ROS FT
Constitutional: No fever or chills  Eyes: No visual changes, eye pain or redness  HEENT: No throat pain, ear pain, nasal pain. No nose bleeding.  CV: No chest pain or lower extremity edema  Resp: No SOB no cough  GI: see hpi  : No dysuria, hematuria.   MSK: No musculoskeletal pain  Skin: No rash  Neuro: No headache. No numbness or tingling. No weakness. Constitutional: No fever or chills  Eyes: No visual changes, eye pain or redness  HEENT: No throat pain, ear pain, nasal pain. No nose bleeding.  CV: No chest pain or lower extremity edema  Resp: No SOB no cough  GI: see hpi  : No dysuria, hematuria.   MSK: No musculoskeletal pain  Skin: No rash  Neuro: No headache. No numbness or tingling. No weakness.    All other systems negative

## 2019-09-17 NOTE — ED PROVIDER NOTE - CARE PLAN
Principal Discharge DX:	Elevated bilirubin Principal Discharge DX:	Elevated bilirubin  Secondary Diagnosis:	Elevated LFTs  Secondary Diagnosis:	Hepatic lesion

## 2019-09-17 NOTE — ED ADULT TRIAGE NOTE - CHIEF COMPLAINT QUOTE
constipation.  Pt has hernia.  Also c/o concentrated, orange urine along with abdominal pain.  Now has diarrhea that is dark, from laxative use

## 2019-09-17 NOTE — ED PROVIDER NOTE - OBJECTIVE STATEMENT
64 yo male with pmh of chronic back pain,  shunt (was removed) HTN, constipation, presenting with left upper quadrant pain x6 days. pain is cramping/sharp, nonradiating, intermittent now constant. He states home morphine has not helped with pain, no change with PO intake but has had decreased appetite overall, no nausea, vomiting, chest pain, shortness of breath, difficulty breathing. Pt went to see pain mgmt dr husain who reccomended he come to the ED.

## 2019-09-17 NOTE — ED PROVIDER NOTE - CLINICAL SUMMARY MEDICAL DECISION MAKING FREE TEXT BOX
Lacie Lanier MD - Attending Physician: Pt here with multiple medical problems, on chronic pain meds, c/o abdominal pain. Noted pallor and scleral icterus, abd tender but not peritoneal. Labs, CT, pain control

## 2019-09-17 NOTE — ED PROVIDER NOTE - PHYSICAL EXAMINATION
A&Ox3, NAD. pale, + sclera icteric, NCAT. PERRL, EOMI. Neck supple, no LAD. Lungs CTAB. +S1S2, RRR, No m/r/g. Abd soft, +RUQ tenderness, ND, +BS, no rebound or guarding. Extremities: cap refill <2, pulses in distal extremities 4+, no edema. Skin without rash. CN II-XII intact. Strength 5/5 UE/LE. Sensations intact throughout. Gait steady. A&Ox3, NAD. pale, + sclera icteric, NCAT. PERRL, EOMI. Neck supple, no LAD. Lungs CTAB. +S1S2, RRR, No m/r/g. Abd soft, +RUQ tenderness, ND, +BS, no rebound or guarding. Extremities: cap refill <2, pulses in distal extremities 4+, no edema. Skin without rash. CN II-XII intact. Strength 5/5 UE/LE. Sensations intact throughout. Gait steady with walker (uses at home).

## 2019-09-17 NOTE — ED PROVIDER NOTE - ATTENDING CONTRIBUTION TO CARE
Lacie Lanier MD - Attending Physician: I have personally seen and examined this patient. I have discussed the case with the ACP. I have reviewed all pertinent clinical information, including history, physical exam, plan and the ACP’s note and agree except as noted. See MDM

## 2019-09-18 NOTE — DISCHARGE NOTE PROVIDER - NSDCCPCAREPLAN_GEN_ALL_CORE_FT
PRINCIPAL DISCHARGE DIAGNOSIS  Diagnosis: Pancreatic cancer metastasized to liver  Assessment and Plan of Treatment: You were admitted to the hospital for abdominal pain, dark stools, weight loss and found to have pancreatic cancer/adenocarcinoma. Furthwe workup showed new lesions on the liver, which were biopsied and found to be metastasis.      SECONDARY DISCHARGE DIAGNOSES  Diagnosis: Hepatic lesion  Assessment and Plan of Treatment:     Diagnosis: Elevated LFTs  Assessment and Plan of Treatment: PRINCIPAL DISCHARGE DIAGNOSIS  Diagnosis: Pancreatic cancer metastasized to liver  Assessment and Plan of Treatment: You were admitted to the hospital for abdominal pain, dark stools, weight loss and found to have pancreatic cancer/adenocarcinoma. Furthwe workup showed new lesions on the liver, which were biopsied and found to be metastasis. Due to this, surgical oncology is not able to offer further intervention, but oncology can help with chemotherapy. Please go to your scheduled appt at [facility] on 10/3 at 10 am.  During hospital course, you required IV dilaudid which was transitioned to by mouth with the help of palliative care team. Please follow up with palliative care within 1-2 weeks of discharge for further management.      SECONDARY DISCHARGE DIAGNOSES  Diagnosis: Hepatic lesion  Assessment and Plan of Treatment:     Diagnosis: Elevated LFTs  Assessment and Plan of Treatment: PRINCIPAL DISCHARGE DIAGNOSIS  Diagnosis: Pancreatic cancer metastasized to liver  Assessment and Plan of Treatment: You were admitted to the hospital for abdominal pain, dark stools, weight loss and found to have pancreatic cancer/adenocarcinoma. Furthwe workup showed new lesions on the liver, which were biopsied and found to be metastasis. Due to this, surgical oncology is not able to offer further intervention, but oncology can help with chemotherapy. Please go to your scheduled appt at [facility] on 10/3 at 10 am.  During hospital course, you required IV dilaudid which was transitioned to by mouth with the help of palliative care team. Please follow up with palliative care within 1-2 weeks of discharge for further management.      SECONDARY DISCHARGE DIAGNOSES  Diagnosis: Chronic abdominal pain  Assessment and Plan of Treatment: You also had increasing abdominal pain because of your cancer, which required an increased requirement of pain medications. Please see Dr. Lilly on October 2nd at 12pm for further management of your pain.    Diagnosis: Hepatic lesion  Assessment and Plan of Treatment:     Diagnosis: Elevated LFTs  Assessment and Plan of Treatment:

## 2019-09-18 NOTE — CONSULT NOTE ADULT - ASSESSMENT
Impression:  64 yo h/o chronic pain 2/2 traumatic work injury (on daily opiates), GERD, HTN, chronic constipation who presents with acute on chronig left lower quadrant pain x 6days.  Found to have elevated liver tests and abnormal CT    #Obstructive jaundice- CT unclear due to streak artifact but concern for pancreatic lesion, vs ampullary vs intraductal or extrinsic compression (less likely)  # Chronic pain on daily opioid therapy  # Chronic constipation likely opioid induced    Recs:  - please trend CBC, CMP, INR  - check CEA and CA 19-9   - will tenatively plan for EUS/ERCP tomorrow for further evaluation as well as stenting for obstructive jaundice  - NPO after midnight   - replete lytes  - antibiotics if signs of sepsis develop  - pain management  - further care per primary team

## 2019-09-18 NOTE — DISCHARGE NOTE PROVIDER - HOSPITAL COURSE
HPI: 64 yo h/o chronic pain 2/2 traumatic work injury (on daily opiates), GERD, HTN, chronic constipation who presents with acute on chronig left lower quadrant pain x 6days. The pain initially started 1 month ago, but is now sharp/cramping, rated 8/10, nonradiating, constant. He states home morphine has not helped with pain.  Pt states he has last 25 lbs over past 2 months.  Although pt states he has been trying to loose weight he feels the 25 lbs is significantly more than he would have lost based on dietary changes.  He denies night sweats.  Pt states colonoscopy 5 yeasr ago for routine screening was clean and told to return in 10 yrs. Denies hx of alcohol misues.  States that he has been following with outpt GI doctor for constipation which he describes as not having BM for 3 days at a time which has been attributed to his chronic opioid use.  For the past week he has noted that on the days on which he does have BMs they are very dark, almost black.  Of note pt status post left hernia repair 1 year ago.  5 day PTA pt noted that his urine looked much darker and orange than usual.  2 days PTA pt's wife noticed that he appeared more juaniced than before she left for a business trip.  Pt denies fevers/chills/chest pain/ palpitations/ n/v/d.          EDVS: Afebrile, HR 60-70s, BP 130s-140s/80s, SpO2 100% RA    ED gave: morphine 4 mg and zofran as well as 1 LB            Hospital Course:    Patient was admitted to the unit 9/18/2019 endorsing LLQ and epigastric pain, 8/10, sharp and constant, non-radiating, better with BMs and slightly better with morphine/methadone, last BM was 3 days prior (9/15), guarding on palpation of LLQ and tender to palpation diffusely, distended. CT Abd found multiple indeterminant hepatic lesions, new since prior exam 3/25/2018, with metastatic disease as a possibility, and CXR showing moderate to large hiatal hernia, with moderate to large colonic fecal load. Alk phos, AST, ALT, bili total and direct were elevated, suggesting liver injury with obstruction of the biliary system. GI was consulted for ERCP (MRI contraindicated due to implant), recommended _______________. Pt was given miralax for constipation.        U/S abd with doppler showed ______________.        Hep panel was negative, CEA _________,  ________, alpha fetoprotein _________, HPI: 64 yo h/o chronic pain 2/2 traumatic work injury (on daily opiates), GERD, HTN, chronic constipation who presents with acute on chronic left lower quadrant pain x 6days, initially started 1 month ago, but became sharp/cramping, rated 8/10, nonradiating, constant. morphine has not helped with pain.  lost 25 lbs over past 2 months.  States that he has been following with outpt GI doctor for constipation which he describes as not having BM for 3 days at a time which has been attributed to his chronic opioid use.  For the past week he has noted that on the days on which he does have BMs they are very dark, almost black.  Of note pt status post left hernia repair 1 year ago.  5 day PTA pt noted that his urine looked much darker and orange than usual.  2 days PTA pt's wife noticed that he appeared more juandiced than before she left for a business trip.              Hospital Course:    Patient was admitted to the unit 9/18/2019 endorsing LLQ and epigastric pain, 8/10, sharp and constant, non-radiating, better with BMs and slightly better with morphine/methadone, last BM was 3 days prior (9/15), guarding on palpation of LLQ and tender to palpation diffusely, distended. CT Abd found multiple indeterminant hepatic lesions, new since prior exam 3/25/2018, which were biopied by IR and found to be pancreatic adenocarcinoma. FNA of pancreas also revealed adenocarcinoma. Pt followed by surgical oncology, pt is not surgical candidate due to stage 4. Pt course complicated by requiring increased doses of IV pain medications, palliative consulted to help with recommendations to transition to PO. Pt to follow up with oncology at [facility] within 1 week of discharge. Pt hemodynamically stable and transitioned off IV pain meds. HPI: 62 yo h/o chronic pain 2/2 traumatic work injury (on daily opiates), GERD, HTN, chronic constipation who presents with acute on chronic left lower quadrant pain x 6days, initially started 1 month ago, but became sharp/cramping, rated 8/10, nonradiating, constant. morphine has not helped with pain.  lost 25 lbs over past 2 months.  States that he has been following with outpt GI doctor for constipation which he describes as not having BM for 3 days at a time which has been attributed to his chronic opioid use.  For the past week he has noted that on the days on which he does have BMs they are very dark, almost black.  Of note pt status post left hernia repair 1 year ago.  5 day PTA pt noted that his urine looked much darker and orange than usual.  2 days PTA pt's wife noticed that he appeared more juandiced than before she left for a business trip.              Hospital Course:    Patient was admitted to the unit 9/18/2019 endorsing LLQ and epigastric pain, 8/10, sharp and constant, non-radiating, better with BMs and slightly better with morphine/methadone, last BM was 3 days prior (9/15), guarding on palpation of LLQ and tender to palpation diffusely, distended. CT Abd found multiple indeterminant hepatic lesions, new since prior exam 3/25/2018, which were biopied by IR and found to be pancreatic adenocarcinoma. FNA of pancreas also revealed adenocarcinoma. Pt followed by surgical oncology, pt is not surgical candidate due to stage 4. Pt course complicated by requiring increased doses of IV pain medications, palliative consulted to help with recommendations to transition to PO. Pt to follow up with oncology at [facility] within 1 week of discharge. Pt hemodynamically stable and transitioned off IV pain meds. He has an appointment with his outpatient pain specialist, Dr. Lilly on 10/2 (day after discharge)

## 2019-09-18 NOTE — H&P ADULT - PROBLEM SELECTOR PLAN 2
Likely 2/2 obstruction as above. History does not favor toxin, ischemia or autoimmune.    F/u hepatitis pannel   F/u acetimophen level Likely 2/2 obstruction as above. History does not favor toxin, ischemia or autoimmune.    F/u hepatitis pannel   F/u acetimophen level  check US abdomen with doppler

## 2019-09-18 NOTE — PROGRESS NOTE ADULT - PROBLEM SELECTOR PLAN 5
C/w home methadone and morphine for pain  check EKG for QT C/w home methadone and morphine for pain  -check EKG for QT - C/w home methadone and morphine for pain  - check EKG for QT

## 2019-09-18 NOTE — PHYSICAL THERAPY INITIAL EVALUATION ADULT - MANUAL MUSCLE TESTING RESULTS, REHAB EVAL
BUEs 4+/5, R hip flexion 4+/5, L hip flexion 3+/5, R knee extension 4+/5, L knee extension 4/5, B ankle PF/DF 4+/5

## 2019-09-18 NOTE — H&P ADULT - NSHPREVIEWOFSYSTEMS_GEN_ALL_CORE
CONSTITUTIONAL: No weakness, fevers or chills  EYES/ENT: No visual changes;  No dysphagia  NECK: No pain or stiffness  RESPIRATORY: No cough, wheezing, hemoptysis; No shortness of breath  CARDIOVASCULAR: No chest pain or palpitations; No lower extremity edema  EXTREMITIES: no le edema, cyanosis, clubbing  MUSCULOSKELETAL: no joint pain, swelling  GASTROINTESTINAL: + abdominal no epigastric pain. No nausea, vomiting, or hematemesis; +constipation  BACK: +back pain  GENITOURINARY: No dysuria, frequency or hematuria  NEUROLOGICAL: No numbness or weakness  SKIN: No itching, burning, rashes, or lesions   PSYCH: +depression  All other review of systems is negative unless indicated above.

## 2019-09-18 NOTE — PROGRESS NOTE ADULT - PROBLEM SELECTOR PLAN 2
Likely 2/2 obstruction as above. History does not favor toxin, ischemia or autoimmune.    F/u hepatitis pannel   F/u acetimophen level  check US abdomen with doppler Likely 2/2 obstruction as above. History does not favor toxin, ischemia or autoimmune.    -F/u hepatitis pannel   -F/u acetimophen level  -f/u US abdomen with doppler Likely 2/2 obstruction as above. History does not favor toxin, ischemia or autoimmune.    -hep panel and acetaminophen level unremarkable  -f/u US abdomen with doppler Likely 2/2 obstruction as above. History does not favor toxin, ischemia or autoimmune.    -hep panel and acetaminophen level unremarkable

## 2019-09-18 NOTE — H&P ADULT - NSHPSOCIALHISTORY_GEN_ALL_CORE
Retired after work injury   lives with wife  Tobacco: quit 20 yrs ago  ETOH: denies problem use  Recreational drugs: denies iVDH

## 2019-09-18 NOTE — CONSULT NOTE ADULT - ATTENDING COMMENTS
As above.    Pt seen/examined on 9/19/19 around 1645    Impression:    #1.  Subacute abdominal pain (lower abdomen) in setting of chronic constipation/chronic opiate use for musculoskeletal pain    #2.  New jaundice with moderate intrahepatic biliary dilation on CT scan    #3.  Several liver lesions up to 2 cm in size on CT scan    #4.  Indeterminant pancreatic lesion    #5.  Metal hardware / unable to have MRI    #6.  Complex-appearing hiatal hernia on CT scan.    #7.  COPD / emphysema per past medical history on chart.    Recommendations:    #1.  Follow CBC/LFTs    #2.  NPO after MN for possible EUS/ERCP on 9/19/19, depending on schedule  If unable to perform on 9/19/19, consider contacting ultrasound department for consideration for ultrasound guided biopsy of liver lesion

## 2019-09-18 NOTE — H&P ADULT - PROBLEM SELECTOR PLAN 4
C/w home methadone and morphine for pain Unclear etiology, likely constipation vs. abnormalities found as above  will give miralax x 1 now and reevaluate

## 2019-09-18 NOTE — DISCHARGE NOTE PROVIDER - CARE PROVIDERS DIRECT ADDRESSES
[email or web address]

## 2019-09-18 NOTE — PHYSICAL THERAPY INITIAL EVALUATION ADULT - PERTINENT HX OF CURRENT PROBLEM, REHAB EVAL
Pt is a 64 y/o male with PMHx of chronic pain 2/2 traumatic work injury (on daily opiates), GERD, HTN, chronic constipation who presents with acute on chronic left lower quadrant pain x 6 days. The pain initially started 1 month ago, but is now sharp/cramping, rated 8/10, nonradiating, constant.

## 2019-09-18 NOTE — H&P ADULT - NSHPPHYSICALEXAM_GEN_ALL_CORE
ICU Vital Signs Last 24 Hrs  T(C): 36.4 (18 Sep 2019 05:31), Max: 36.8 (17 Sep 2019 23:10)  T(F): 97.5 (18 Sep 2019 05:31), Max: 98.3 (17 Sep 2019 23:10)  HR: 78 (18 Sep 2019 05:31) (59 - 78)  BP: 122/89 (18 Sep 2019 05:31) (120/78 - 149/81)  RR: 18 (18 Sep 2019 05:31) (17 - 18)  SpO2: 92% (18 Sep 2019 05:31) (92% - 98%)    GENERAL: NAD  HEENT: EOMI, PERRL, scleral icterus, MMM, no oropharyngeal lesions or erythema appreciated  Pulm: normal work of breathing, CTABL  CV: RRR, S1&S2+, no m/r/g appreciated  ABDOMEN: +BS, soft, +diffusely tender with guarding, no rebound tenderness, non-distendend, no hepatosplenomegaly, right lower quadrant mass corresponding to prior stimulator battery.   MSK: nl ROM  BACK: Right lower quadrant mass corresponding to current stimulator battery  EXTREMITIES:  no appreciable edema in b/l LE  Neuro: A&Ox3, no focal deficits  SKIN: warm and dry, no visible rash,

## 2019-09-18 NOTE — DISCHARGE NOTE PROVIDER - NSFOLLOWUPCLINICS_GEN_ALL_ED_FT
[facility]  Hematology/Oncology  [address] [id number]  Phone: [phone]  Fax:   Follow Up Time: [facility]  Hematology/Oncology  [address] [id number]  Phone: [phone]  Fax:   Follow Up Time:

## 2019-09-18 NOTE — CHART NOTE - NSCHARTNOTEFT_GEN_A_CORE
Reference #: [id number]  Patient Name:	Lucian Ortiz	YOB: 1955  Address:	[address] [place] [address] [address] [place] [address]	Sex:	Male  Rx Written	Rx Dispensed	Drug	Quantity	Days Supply	Prescriber Name  09/03/2019	09/03/2019	methadone hcl 10 mg tablet	120	15	Johnson, Tram PA  09/03/2019	09/03/2019	morphine sulfate ir 30 mg tab	75	15	Johnson, Tram PA  09/03/2019	09/03/2019	diazepam 2 mg tablet	45	15	Johnson, Tram PA  08/19/2019	08/19/2019	morphine sulfate ir 30 mg tab	75	15	Johnson, Tram PA  08/19/2019	08/19/2019	methadone hcl 10 mg tablet	120	15	Johnson, Tram PA  08/19/2019	08/19/2019	diazepam 2 mg tablet	45	15	Johnson, Tram PA  08/05/2019	08/05/2019	methadone hcl 10 mg tablet	120	15	Johnson, Tram PA  08/05/2019	08/05/2019	diazepam 2 mg tablet	45	15	Johnson, Tram PA  08/05/2019	08/05/2019	morphine sulfate ir 30 mg tab	75	15	Johnson, Tram PA  07/23/2019	07/24/2019	methadone hcl 10 mg tablet	120	15	Johnson, Tram PA  07/23/2019	07/24/2019	diazepam 2 mg tablet	45	15	Johnson, Tram PA  07/23/2019	07/24/2019	morphine sulfate ir 30 mg tab	75	15	Johnson, Tram PA  07/09/2019	07/10/2019	methadone hcl 10 mg tablet	120	15	Johnson, Tram PA  07/09/2019	07/10/2019	diazepam 2 mg tablet	45	15	Johnson, Tram PA  07/09/2019	07/10/2019	morphine sulfate ir 30 mg tab	75	15	Johnson, Tram PA  06/25/2019	06/25/2019	diazepam 2 mg tablet	45	15	Hadi, Abdussami  06/25/2019	06/25/2019	morphine sulfate ir 30 mg tab	75	15	Hadi, Abdussami  06/25/2019	06/25/2019	methadone hcl 10 mg tablet	120	15	Hadi, Abdussami  06/10/2019	06/11/2019	diazepam 2 mg tablet	45	15	Johnson, Tram PA  06/10/2019	06/11/2019	methadone hcl 10 mg tablet	120	15	Johnson, Tram PA  06/10/2019	06/11/2019	morphine sulfate ir 30 mg tab	75	15	Johnson, Tram PA  05/28/2019	05/28/2019	morphine sulfate ir 30 mg tab	75	15	Johnson, Tram PA  05/28/2019	05/28/2019	methadone hcl 10 mg tablet	120	15	Johnson, Tram PA  05/28/2019	05/28/2019	diazepam 2 mg tablet	45	15	Johnson, Tram PA  05/09/2019	05/10/2019	morphine sulfate ir 30 mg tab	75	15	Johnson, Tram PA  05/09/2019	05/10/2019	diazepam 2 mg tablet	45	15	Johnson, Tram PA  05/09/2019	05/10/2019	methadone hcl 10 mg tablet	80	10	Johnson, Tram PA  05/01/2019	05/01/2019	methadone hcl 5 mg tablet	112	7	Johnson, Tram PA  04/25/2019	04/25/2019	morphine sulfate ir 30 mg tab	75	15	[place], [facility]  04/25/2019	04/25/2019	diazepam 2 mg tablet	45	15	[place], [facility]  04/25/2019	04/25/2019	methadone hcl 5 mg tablet	112	7	[facility], [facility]  04/11/2019	04/11/2019	methadone hcl 10 mg tablet	120	15	[place], [facility]  04/11/2019	04/11/2019	morphine sulfate ir 30 mg tab	75	[facility]  04/11/2019	04/11/2019	diazepam 2 mg tablet	45	15	[facility]  03/28/2019	04/02/2019	methadone hcl 10 mg tablet	120	15	Johnson, Tram PA  03/28/2019	03/29/2019	morphine sulfate ir 30 mg tab	75	15	Johnson, Tram PA  03/28/2019	03/29/2019	diazepam 2 mg tablet	45	15	Johnson, Tram PA  03/12/2019	03/15/2019	diazepam 2 mg tablet	45	15	Johnson, Tram PA  03/12/2019	03/15/2019	morphine sulfate ir 30 mg tab	75	15	Johnson, Tram PA  02/28/2019	03/05/2019	methadone hcl 10 mg tablet	120	15	Johnson, Tram PA  02/27/2019	02/28/2019	diazepam 2 mg tablet	45	15	Johnson, Tram PA  02/28/2019	02/28/2019	morphine sulfate ir 30 mg tab	75	15	Johnson, Tram PA  02/11/2019	02/13/2019	methadone hcl 10 mg tablet	120	15	Johnson, Tram PA  02/11/2019	02/13/2019	diazepam 2 mg tablet	45	15	Johnson, Tram PA  02/11/2019	02/13/2019	morphine sulfate ir 30 mg tab	75	15	Johnson, Tram PA  01/29/2019	02/11/2019	methadone hcl 10 mg tablet	120	15	Johnson, Tram PA  01/29/2019	01/31/2019	diazepam 2 mg tablet	45	15	Johnson, Tram PA  01/29/2019	01/31/2019	morphine sulfate ir 30 mg tab	75	15	Johnson, Tram PA  01/31/2019	01/31/2019	methadone hcl 5 mg tablet	80	5	Kory Austin  01/15/2019	01/17/2019	diazepam 2 mg tablet	45	15	Johnson, Tram PA  01/15/2019	01/17/2019	morphine sulfate ir 30 mg tab	75	15	Johnson, Tram PA  01/17/2019	01/17/2019	methadone hcl 5 mg tablet	240	15	Johnson, Tram PA  01/02/2019	01/03/2019	diazepam 2 mg tablet	45	15	Johnson, Tram PA  01/02/2019	01/03/2019	methadone hcl 10 mg tablet	120	15	Johnson, Tram PA  01/02/2019	01/03/2019	morphine sulfate ir 30 mg tab	75	15	Johnson, Tram PA  12/17/2018	12/18/2018	morphine sulfate ir 15 mg tab	150	15	Johnson, Tram PA  12/17/2018	12/18/2018	methadone hcl 10 mg tablet	120	15	Johnson, Tram PA  12/17/2018	12/18/2018	diazepam 2 mg tablet	45	15	Johnson, Tram PA  12/11/2018	12/11/2018	morphine sulfate ir 15 mg tab	62	7	Johnson, Tram PA  12/08/2018	12/08/2018	morphine sulfate ir 30 mg tab	14	2	Johnson, Tram PA  11/21/2018	12/03/2018	methadone hcl 10 mg tablet	120	15	Johnson, Tram PA  11/21/2018	11/24/2018	diazepam 2 mg tablet	45	15	Johnson, Tram PA  11/21/2018	11/24/2018	morphine sulfate ir 30 mg tab	75	15	Johnson, Tram PA  10/22/2018	10/24/2018	methadone hcl 10 mg tablet	120	15	Johnson, Tram PA  10/22/2018	10/24/2018	morphine sulfate ir 30 mg tab	75	15	Johnson, Tram PA  10/22/2018	10/24/2018	diazepam 2 mg tablet	45	15	Johnson, Tram PA  10/08/2018	10/09/2018	diazepam 2 mg tablet	45	15	Johnson, Tram PA  10/08/2018	10/09/2018	methadone hcl 10 mg tablet	120	15	Johnson, Tram PA  10/08/2018	10/09/2018	morphine sulfate ir 30 mg tab	75	15	Johnson, Tram PA  09/24/2018	09/25/2018	diazepam 2 mg tablet	45	15	Johnson, Tram PA  09/24/2018	09/25/2018	methadone hcl 10 mg tablet	120	15	Johnson, Tram PA  09/24/2018	09/25/2018	morphine sulfate ir 30 mg tab	75	15	Johnson, Tram PA  Patient Name:	Lucian Ortiz	YOB: 1955  Address:	[address] [place] [address] [address] [place] [address]	Sex:	Male  Rx Written	Rx Dispensed	Drug	Quantity	Days Supply	Prescriber Name  03/14/2019	03/15/2019	methadone hcl 10 mg tablet	120	15	Johnson, Tram PA  11/10/2018	11/19/2018	methadone hcl 10 mg tablet	120	15	Johnson, Tram PA  11/10/2018	11/10/2018	diazepam 2 mg tablet	45	15	Johnson, Tram PA  11/10/2018	11/10/2018	morphine sulfate ir 30 mg tab	75	15	Johnson, Tram PA

## 2019-09-18 NOTE — PROGRESS NOTE ADULT - ATTENDING COMMENTS
obstructive jaundice, awaiting ERCP tomorrow for further eval  abdominal pain with significant stool burden - start bowel regimen as above

## 2019-09-18 NOTE — H&P ADULT - NSHPLABSRESULTS_GEN_ALL_CORE
11.5   5.1   )-----------( 176      ( 17 Sep 2019 19:52 )             34.8           137  |  95<L>  |  12  ----------------------------<  149<H>  3.5   |  29  |  0.96    Ca    9.7      17 Sep 2019 19:52    TPro  7.6  /  Alb  3.8  /  TBili  5.6<H>  /  DBili  x   /  AST  230<H>  /  ALT  415<H>  /  AlkPhos  250<H>                Urinalysis Basic - ( 17 Sep 2019 22:11 )    Color: Yellow / Appearance: Clear / S.032 / pH: x  Gluc: x / Ketone: Negative  / Bili: Small / Urobili: Negative   Blood: x / Protein: Negative / Nitrite: Negative   Leuk Esterase: Negative / RBC: 0 /hpf / WBC 1 /HPF   Sq Epi: x / Non Sq Epi: 0 / Bacteria: Negative        PT/INR - ( 17 Sep 2019 19:52 )   PT: 13.5 sec;   INR: 1.18 ratio         PTT - ( 17 Sep 2019 19:52 )  PTT:29.7 sec    Lactate Trend    CAPILLARY BLOOD GLUCOSE    Cultures:    Radiology:    EKG: 11.5   5.1   )-----------( 176      ( 17 Sep 2019 19:52 )             34.8           137  |  95<L>  |  12  ----------------------------<  149<H>  3.5   |  29  |  0.96    Ca    9.7      17 Sep 2019 19:52    TPro  7.6  /  Alb  3.8  /  TBili  5.6<H>  /  DBili  x   /  AST  230<H>  /  ALT  415<H>  /  AlkPhos  250<H>                Urinalysis Basic - ( 17 Sep 2019 22:11 )    Color: Yellow / Appearance: Clear / S.032 / pH: x  Gluc: x / Ketone: Negative  / Bili: Small / Urobili: Negative   Blood: x / Protein: Negative / Nitrite: Negative   Leuk Esterase: Negative / RBC: 0 /hpf / WBC 1 /HPF   Sq Epi: x / Non Sq Epi: 0 / Bacteria: Negative        PT/INR - ( 17 Sep 2019 19:52 )   PT: 13.5 sec;   INR: 1.18 ratio         PTT - ( 17 Sep 2019 19:52 )  PTT:29.7 sec    Lactate Trend    CAPILLARY BLOOD GLUCOSE    Cultures:    Radiology:  CT Abd:   Mild intrahepatic biliary ductal dilatation as well as multiple   indeterminant hepatic lesions, new since prior examination of 3/25/2018.   Metastatic disease is a differential diagnostic consideration.    Limited evaluation of the pancreatic parenchyma secondary to streak   artifact. Question 1.8 cm hypodense pancreatic tail lesion. Hypodensity   within the pancreatic neck, likely artifactual. No definite pancreatic   ductal dilatation.    CXR: Bilateral reticular opacities likely, likely chronic  recommended, if no contraindications exist.  Moderate to large colonic fecal load.  Moderate to large hiatal hernia.      EKG: Labs reviewed   11.5   5.1   )-----------( 176      ( 17 Sep 2019 19:52 )             34.8           137  |  95<L>  |  12  ----------------------------<  149<H>  3.5   |  29  |  0.96    Ca    9.7      17 Sep 2019 19:52    TPro  7.6  /  Alb  3.8  /  TBili  5.6<H>  /  DBili  x   /  AST  230<H>  /  ALT  415<H>  /  AlkPhos  250<H>                Urinalysis Basic - ( 17 Sep 2019 22:11 )    Color: Yellow / Appearance: Clear / S.032 / pH: x  Gluc: x / Ketone: Negative  / Bili: Small / Urobili: Negative   Blood: x / Protein: Negative / Nitrite: Negative   Leuk Esterase: Negative / RBC: 0 /hpf / WBC 1 /HPF   Sq Epi: x / Non Sq Epi: 0 / Bacteria: Negative        PT/INR - ( 17 Sep 2019 19:52 )   PT: 13.5 sec;   INR: 1.18 ratio         PTT - ( 17 Sep 2019 19:52 )  PTT:29.7 sec    Lactate Trend    CAPILLARY BLOOD GLUCOSE    Cultures:    Radiology imaging reviewed  CT Abd:   Mild intrahepatic biliary ductal dilatation as well as multiple   indeterminant hepatic lesions, new since prior examination of 3/25/2018.   Metastatic disease is a differential diagnostic consideration.    Limited evaluation of the pancreatic parenchyma secondary to streak   artifact. Question 1.8 cm hypodense pancreatic tail lesion. Hypodensity   within the pancreatic neck, likely artifactual. No definite pancreatic   ductal dilatation.    CXR: Bilateral reticular opacities likely, likely chronic  recommended, if no contraindications exist.  Moderate to large colonic fecal load.  Moderate to large hiatal hernia.      EKG done in ED could not be found; will reorder;

## 2019-09-18 NOTE — H&P ADULT - ASSESSMENT
62 yo h/o chronic pain 2/2 traumatic work injury (on daily opiates), GERD, HTN, chronic constipation who presents with acute on chronig left lower quadrant pain x 6days.

## 2019-09-18 NOTE — PROGRESS NOTE ADULT - SUBJECTIVE AND OBJECTIVE BOX
Jose Antonio Venegas MD  [phone]    S:  Pt was admitted overnight. Pt endorses 8/10 abdominal pain in the epigastric and LLQ regions ++++++ Jose Antonio Venegas MD  [phone]    S:  Pt was admitted overnight. Pt endorses 8/10 abdominal pain in the epigastric and LLQ regions, sharp, constant, non-radiating, made better with bowel movements and slightly better with morphine/methadone. Was 10/10 at 5:30 this AM, down to 8/10 after meds.     Reports history in  of traumatic back and L shoulder injury when working as , was holding pan of water and handle broke crashing pt into wall. He received back surgery for it, and has been on opiates since , with chronic constipation.      O:  MEDICATIONS  (STANDING):  cloNIDine 0.1 milliGRAM(s) Oral every 8 hours  dextrose 5%. 1000 milliLiter(s) (50 mL/Hr) IV Continuous <Continuous>  dextrose 50% Injectable 12.5 Gram(s) IV Push once  dextrose 50% Injectable 25 Gram(s) IV Push once  dextrose 50% Injectable 25 Gram(s) IV Push once  diazepam    Tablet 2 milliGRAM(s) Oral three times a day  DULoxetine 60 milliGRAM(s) Oral two times a day  heparin  Injectable 5000 Unit(s) SubCutaneous every 8 hours  hydrochlorothiazide 50 milliGRAM(s) Oral daily  insulin lispro (HumaLOG) corrective regimen sliding scale   SubCutaneous three times a day before meals  insulin lispro (HumaLOG) corrective regimen sliding scale   SubCutaneous at bedtime  potassium chloride    Tablet ER 20 milliEquivalent(s) Oral once    MEDICATIONS  (PRN):  dextrose 40% Gel 15 Gram(s) Oral once PRN Blood Glucose LESS THAN 70 milliGRAM(s)/deciliter  glucagon  Injectable 1 milliGRAM(s) IntraMuscular once PRN Glucose LESS THAN 70 milligrams/deciliter  methadone    Tablet 20 milliGRAM(s) Oral four times a day PRN Severe Pain (7 - 10)  morphine  IR 30 milliGRAM(s) Oral every 6 hours PRN Moderate Pain (4 - 6)          Vital Signs Last 24 Hrs  T(C): 36.4 (18 Sep 2019 05:31), Max: 36.8 (17 Sep 2019 23:10)  T(F): 97.5 (18 Sep 2019 05:31), Max: 98.3 (17 Sep 2019 23:10)  HR: 78 (18 Sep 2019 05:31) (59 - 78)  BP: 122/89 (18 Sep 2019 05:31) (120/78 - 149/81)  BP(mean): --  RR: 18 (18 Sep 2019 05:31) (17 - 18)  SpO2: 92% (18 Sep 2019 05:31) (92% - 98%)      PE:  GENERAL: appears stated age, disheveled  HEENT: EOMI, PERRL, +scleral icterus, MMM, no oropharyngeal lesions or erythema appreciated  Pulm: normal work of breathing, +diffuse rhonci b/l  CV: RRR, S1&S2+, no m/r/g appreciated  ABDOMEN: +BS, +tender with guarding over superior portion of LLQ, also tender to palpation diffusely with rebound pain, upper abdomen mildly distended otherwise soft, tympanic left side of abdomen, no hepatosplenomegaly, right lower quadrant mass corresponding to prior stimulator battery.   MSK: nl ROM  BACK: Right lower quadrant mass corresponding to current stimulator battery, pain on palpation over lumbar spine with wincing, mild pain over paraspinal region w/o wincing  EXTREMITIES:  no appreciable edema in b/l LE  Neuro: A&Ox3, no focal deficits  SKIN: warm and dry, no visible rash      LABS  CBC Full  -  ( 18 Sep 2019 07:21 )  WBC Count : 4.7 K/uL  RBC Count : 3.86 M/uL  Hemoglobin : 11.3 g/dL  Hematocrit : 35.6 %  Platelet Count - Automated : 158 K/uL  Mean Cell Volume : 92.0 fl  Mean Cell Hemoglobin : 29.2 pg  Mean Cell Hemoglobin Concentration : 31.7 gm/dL  Auto Neutrophil # : 2.5 K/uL  Auto Lymphocyte # : 1.3 K/uL  Auto Monocyte # : 0.4 K/uL  Auto Eosinophil # : 0.4 K/uL  Auto Basophil # : 0.0 K/uL  Auto Neutrophil % : 54.0 %  Auto Lymphocyte % : 28.3 %  Auto Monocyte % : 9.1 %  Auto Eosinophil % : 8.1 %  Auto Basophil % : 0.6 %        139  |  97  |  9   ----------------------------<  129<H>  3.4<L>   |  30  |  0.90    Ca    9.4      18 Sep 2019 07:21  Phos  3.4       Mg     1.8         TPro  7.2  /  Alb  3.8  /  TBili  5.8<H>  /  DBili  4.4<H>  /  AST  244<H>  /  ALT  403<H>  /  AlkPhos  242<H>      LIVER FUNCTIONS - ( 18 Sep 2019 07:21 )  Alb: 3.8 g/dL / Pro: 7.2 g/dL / ALK PHOS: 242 U/L / ALT: 403 U/L / AST: 244 U/L / GGT: x           Urinalysis Basic - ( 17 Sep 2019 22:11 )    Color: Yellow / Appearance: Clear / S.032 / pH: x  Gluc: x / Ketone: Negative  / Bili: Small / Urobili: Negative   Blood: x / Protein: Negative / Nitrite: Negative   Leuk Esterase: Negative / RBC: 0 /hpf / WBC 1 /HPF   Sq Epi: x / Non Sq Epi: 0 / Bacteria: Negative      IMAGING:  < from: Xray Chest 2 Views PA/Lat (19 @ 22:37) >    IMPRESSION:  Bilateral reticular opacities likely, likely chronic.    < end of copied text >    < from: CT Abdomen and Pelvis w/ Oral Cont and w/ IV Cont (19 @ 20:46) >    IMPRESSION:     Mild intrahepatic biliary ductal dilatation as well as multiple   indeterminant hepatic lesions, new since prior examination of 3/25/2018.   Metastatic disease is a differential diagnostic consideration.    Limited evaluation of the pancreatic parenchyma secondary to streak   artifact. Question 1.8 cm hypodense pancreatic tail lesion. Hypodensity   within the pancreatic neck, likely artifactual. No definite pancreatic   ductal dilatation.    More sensitive evaluationwith MRI and/or endoscopic ultrasound is   recommended, if no contraindications exist.    Moderate to large colonic fecal load.    Moderate to large hiatal hernia.    < end of copied text > Jose Antonio Venegas MD  [phone]    S:  Pt was admitted overnight. Pt endorses 8/10 abdominal pain in the epigastric and LLQ regions, sharp, constant, non-radiating, made better with bowel movements and slightly better with morphine/methadone. Was 10/10 at 5:30 this AM, down to 8/10 after meds.     Reports history in  of traumatic back and L shoulder injury when working as , was holding pan of water and handle broke crashing pt into wall. He received back surgery for it, and has been on opiates since , with chronic constipation.      O:  MEDICATIONS  (STANDING):  cloNIDine 0.1 milliGRAM(s) Oral every 8 hours  dextrose 5%. 1000 milliLiter(s) (50 mL/Hr) IV Continuous <Continuous>  dextrose 50% Injectable 12.5 Gram(s) IV Push once  dextrose 50% Injectable 25 Gram(s) IV Push once  dextrose 50% Injectable 25 Gram(s) IV Push once  diazepam    Tablet 2 milliGRAM(s) Oral three times a day  DULoxetine 60 milliGRAM(s) Oral two times a day  heparin  Injectable 5000 Unit(s) SubCutaneous every 8 hours  hydrochlorothiazide 50 milliGRAM(s) Oral daily  insulin lispro (HumaLOG) corrective regimen sliding scale   SubCutaneous three times a day before meals  insulin lispro (HumaLOG) corrective regimen sliding scale   SubCutaneous at bedtime  potassium chloride    Tablet ER 20 milliEquivalent(s) Oral once    MEDICATIONS  (PRN):  dextrose 40% Gel 15 Gram(s) Oral once PRN Blood Glucose LESS THAN 70 milliGRAM(s)/deciliter  glucagon  Injectable 1 milliGRAM(s) IntraMuscular once PRN Glucose LESS THAN 70 milligrams/deciliter  methadone    Tablet 20 milliGRAM(s) Oral four times a day PRN Severe Pain (7 - 10)  morphine  IR 30 milliGRAM(s) Oral every 6 hours PRN Moderate Pain (4 - 6)          Vital Signs Last 24 Hrs  T(C): 36.4 (18 Sep 2019 05:31), Max: 36.8 (17 Sep 2019 23:10)  T(F): 97.5 (18 Sep 2019 05:31), Max: 98.3 (17 Sep 2019 23:10)  HR: 78 (18 Sep 2019 05:31) (59 - 78)  BP: 122/89 (18 Sep 2019 05:31) (120/78 - 149/81)  BP(mean): --  RR: 18 (18 Sep 2019 05:31) (17 - 18)  SpO2: 92% (18 Sep 2019 05:31) (92% - 98%)      PE:  GENERAL: appears stated age, disheveled  HEENT: EOMI, PERRL, +scleral icterus, MMM, no oropharyngeal lesions or erythema appreciated  Pulm: normal work of breathing, +diffuse rhonci b/l  CV: RRR, S1&S2+, no m/r/g appreciated  ABDOMEN: +BS, +tender with guarding over superior portion of LLQ, also tender to palpation diffusely with rebound pain, upper abdomen mildly distended otherwise soft, tympanic left side of abdomen, no hepatosplenomegaly, right lower quadrant mass corresponding to prior stimulator battery.   MSK: nl ROM  BACK: Right lower quadrant mass corresponding to current stimulator battery, pain on palpation over lumbar spine with wincing, mild pain over paraspinal region w/o wincing  EXTREMITIES:  no appreciable edema in b/l LE  Neuro: A&Ox3, no focal deficits  SKIN: warm and dry, no visible rash      LABS  hep panel negative  acetaminophen level unremarkable    CBC Full  -  ( 18 Sep 2019 07:21 )  WBC Count : 4.7 K/uL  RBC Count : 3.86 M/uL  Hemoglobin : 11.3 g/dL  Hematocrit : 35.6 %  Platelet Count - Automated : 158 K/uL  Mean Cell Volume : 92.0 fl  Mean Cell Hemoglobin : 29.2 pg  Mean Cell Hemoglobin Concentration : 31.7 gm/dL  Auto Neutrophil # : 2.5 K/uL  Auto Lymphocyte # : 1.3 K/uL  Auto Monocyte # : 0.4 K/uL  Auto Eosinophil # : 0.4 K/uL  Auto Basophil # : 0.0 K/uL  Auto Neutrophil % : 54.0 %  Auto Lymphocyte % : 28.3 %  Auto Monocyte % : 9.1 %  Auto Eosinophil % : 8.1 %  Auto Basophil % : 0.6 %        139  |  97  |  9   ----------------------------<  129<H>  3.4<L>   |  30  |  0.90    Ca    9.4      18 Sep 2019 07:21  Phos  3.4       Mg     1.8         TPro  7.2  /  Alb  3.8  /  TBili  5.8<H>  /  DBili  4.4<H>  /  AST  244<H>  /  ALT  403<H>  /  AlkPhos  242<H>      LIVER FUNCTIONS - ( 18 Sep 2019 07:21 )  Alb: 3.8 g/dL / Pro: 7.2 g/dL / ALK PHOS: 242 U/L / ALT: 403 U/L / AST: 244 U/L / GGT: x           Urinalysis Basic - ( 17 Sep 2019 22:11 )    Color: Yellow / Appearance: Clear / S.032 / pH: x  Gluc: x / Ketone: Negative  / Bili: Small / Urobili: Negative   Blood: x / Protein: Negative / Nitrite: Negative   Leuk Esterase: Negative / RBC: 0 /hpf / WBC 1 /HPF   Sq Epi: x / Non Sq Epi: 0 / Bacteria: Negative      IMAGING:  < from: Xray Chest 2 Views PA/Lat (19 @ 22:37) >    IMPRESSION:  Bilateral reticular opacities likely, likely chronic.    < end of copied text >    < from: CT Abdomen and Pelvis w/ Oral Cont and w/ IV Cont (19 @ 20:46) >    IMPRESSION:     Mild intrahepatic biliary ductal dilatation as well as multiple   indeterminant hepatic lesions, new since prior examination of 3/25/2018.   Metastatic disease is a differential diagnostic consideration.    Limited evaluation of the pancreatic parenchyma secondary to streak   artifact. Question 1.8 cm hypodense pancreatic tail lesion. Hypodensity   within the pancreatic neck, likely artifactual. No definite pancreatic   ductal dilatation.    More sensitive evaluationwith MRI and/or endoscopic ultrasound is   recommended, if no contraindications exist.    Moderate to large colonic fecal load.    Moderate to large hiatal hernia.    < end of copied text > Jose Antonio Venegas MD  [phone]    S:  Pt was admitted overnight. Pt endorses 8/10 abdominal pain in the epigastric and LLQ regions, sharp, constant, non-radiating, made better with bowel movements and slightly better with morphine/methadone. Was 10/10 at 5:30 this AM, down to 8/10 after meds.     Reports history in  of traumatic back and L shoulder injury when working as , was holding pan of water and handle broke crashing pt into wall. He received back surgery for it, and has been on opiates since , with chronic constipation.    no n/v/f/chills, cp, sob.      O:  MEDICATIONS  (STANDING):  cloNIDine 0.1 milliGRAM(s) Oral every 8 hours  dextrose 5%. 1000 milliLiter(s) (50 mL/Hr) IV Continuous <Continuous>  dextrose 50% Injectable 12.5 Gram(s) IV Push once  dextrose 50% Injectable 25 Gram(s) IV Push once  dextrose 50% Injectable 25 Gram(s) IV Push once  diazepam    Tablet 2 milliGRAM(s) Oral three times a day  DULoxetine 60 milliGRAM(s) Oral two times a day  heparin  Injectable 5000 Unit(s) SubCutaneous every 8 hours  hydrochlorothiazide 50 milliGRAM(s) Oral daily  insulin lispro (HumaLOG) corrective regimen sliding scale   SubCutaneous three times a day before meals  insulin lispro (HumaLOG) corrective regimen sliding scale   SubCutaneous at bedtime  potassium chloride    Tablet ER 20 milliEquivalent(s) Oral once    MEDICATIONS  (PRN):  dextrose 40% Gel 15 Gram(s) Oral once PRN Blood Glucose LESS THAN 70 milliGRAM(s)/deciliter  glucagon  Injectable 1 milliGRAM(s) IntraMuscular once PRN Glucose LESS THAN 70 milligrams/deciliter  methadone    Tablet 20 milliGRAM(s) Oral four times a day PRN Severe Pain (7 - 10)  morphine  IR 30 milliGRAM(s) Oral every 6 hours PRN Moderate Pain (4 - 6)      Vital Signs Last 24 Hrs  T(C): 36.4 (18 Sep 2019 05:31), Max: 36.8 (17 Sep 2019 23:10)  T(F): 97.5 (18 Sep 2019 05:31), Max: 98.3 (17 Sep 2019 23:10)  HR: 78 (18 Sep 2019 05:31) (59 - 78)  BP: 122/89 (18 Sep 2019 05:31) (120/78 - 149/81)  BP(mean): --  RR: 18 (18 Sep 2019 05:31) (17 - 18)  SpO2: 92% (18 Sep 2019 05:31) (92% - 98%)      PE:  GENERAL: appears stated age, disheveled  HEENT: EOMI, PERRL, +scleral icterus, MMM, no oropharyngeal lesions or erythema appreciated  Pulm: normal work of breathing, +diffuse rhonci b/l  CV: RRR, S1&S2+, no m/r/g appreciated  ABDOMEN: +BS, +tender  over superior portion of LLQ (no guarding), also tender to palpation diffusely with rebound pain, upper abdomen mildly distended otherwise soft, tympanic left side of abdomen, no hepatosplenomegaly, right lower quadrant mass corresponding to prior stimulator battery.   MSK: nl ROM  BACK: Right lower quadrant mass corresponding to current stimulator battery, pain on palpation over lumbar spine with wincing, mild pain over paraspinal region w/o wincing  EXTREMITIES:  no appreciable edema in b/l LE  Neuro: A&Ox3, no focal deficits  SKIN: warm and dry, no visible rash      LABS  hep panel negative  acetaminophen level unremarkable    CBC Full  -  ( 18 Sep 2019 07:21 )  WBC Count : 4.7 K/uL  RBC Count : 3.86 M/uL  Hemoglobin : 11.3 g/dL  Hematocrit : 35.6 %  Platelet Count - Automated : 158 K/uL  Mean Cell Volume : 92.0 fl  Mean Cell Hemoglobin : 29.2 pg  Mean Cell Hemoglobin Concentration : 31.7 gm/dL  Auto Neutrophil # : 2.5 K/uL  Auto Lymphocyte # : 1.3 K/uL  Auto Monocyte # : 0.4 K/uL  Auto Eosinophil # : 0.4 K/uL  Auto Basophil # : 0.0 K/uL  Auto Neutrophil % : 54.0 %  Auto Lymphocyte % : 28.3 %  Auto Monocyte % : 9.1 %  Auto Eosinophil % : 8.1 %  Auto Basophil % : 0.6 %        139  |  97  |  9   ----------------------------<  129<H>  3.4<L>   |  30  |  0.90    Ca    9.4      18 Sep 2019 07:21  Phos  3.4       Mg     1.8         TPro  7.2  /  Alb  3.8  /  TBili  5.8<H>  /  DBili  4.4<H>  /  AST  244<H>  /  ALT  403<H>  /  AlkPhos  242<H>      LIVER FUNCTIONS - ( 18 Sep 2019 07:21 )  Alb: 3.8 g/dL / Pro: 7.2 g/dL / ALK PHOS: 242 U/L / ALT: 403 U/L / AST: 244 U/L / GGT: x           Urinalysis Basic - ( 17 Sep 2019 22:11 )    Color: Yellow / Appearance: Clear / S.032 / pH: x  Gluc: x / Ketone: Negative  / Bili: Small / Urobili: Negative   Blood: x / Protein: Negative / Nitrite: Negative   Leuk Esterase: Negative / RBC: 0 /hpf / WBC 1 /HPF   Sq Epi: x / Non Sq Epi: 0 / Bacteria: Negative      IMAGING:  < from: Xray Chest 2 Views PA/Lat (19 @ 22:37) >    IMPRESSION:  Bilateral reticular opacities likely, likely chronic.    < end of copied text >    < from: CT Abdomen and Pelvis w/ Oral Cont and w/ IV Cont (19 @ 20:46) >    IMPRESSION:     Mild intrahepatic biliary ductal dilatation as well as multiple   indeterminant hepatic lesions, new since prior examination of 3/25/2018.   Metastatic disease is a differential diagnostic consideration.    Limited evaluation of the pancreatic parenchyma secondary to streak   artifact. Question 1.8 cm hypodense pancreatic tail lesion. Hypodensity   within the pancreatic neck, likely artifactual. No definite pancreatic   ductal dilatation.    More sensitive evaluationwith MRI and/or endoscopic ultrasound is   recommended, if no contraindications exist.    Moderate to large colonic fecal load.    Moderate to large hiatal hernia.    < end of copied text >      MEDICATIONS  (STANDING):  cloNIDine 0.1 milliGRAM(s) Oral every 8 hours  dextrose 5%. 1000 milliLiter(s) (50 mL/Hr) IV Continuous <Continuous>  dextrose 50% Injectable 12.5 Gram(s) IV Push once  dextrose 50% Injectable 25 Gram(s) IV Push once  dextrose 50% Injectable 25 Gram(s) IV Push once  diazepam    Tablet 2 milliGRAM(s) Oral three times a day  DULoxetine 60 milliGRAM(s) Oral two times a day  heparin  Injectable 5000 Unit(s) SubCutaneous every 8 hours  hydrochlorothiazide 50 milliGRAM(s) Oral daily  polyethylene glycol 3350 17 Gram(s) Oral two times a day  senna 2 Tablet(s) Oral at bedtime    MEDICATIONS  (PRN):  dextrose 40% Gel 15 Gram(s) Oral once PRN Blood Glucose LESS THAN 70 milliGRAM(s)/deciliter  glucagon  Injectable 1 milliGRAM(s) IntraMuscular once PRN Glucose LESS THAN 70 milligrams/deciliter  methadone    Tablet 20 milliGRAM(s) Oral four times a day PRN Severe Pain (7 - 10)  morphine  IR 30 milliGRAM(s) Oral every 6 hours PRN Moderate Pain (4 - 6)

## 2019-09-18 NOTE — PHYSICAL THERAPY INITIAL EVALUATION ADULT - PRECAUTIONS/LIMITATIONS, REHAB EVAL
He states home morphine has not helped with pain.  Pt states he has last 25 lbs over past 2 months.  Although pt states he has been trying to loose weight he feels the 25 lbs is significantly more than he would have lost based on dietary changes. CT abdomen/pelvis 9/17: Mild intrahepatic biliary ductal dilatation as well as multiple indeterminant hepatic lesions, new since prior examination of 3/25/2018.  Metastatic disease is a differential diagnostic consideration. Moderate to large hiatal hernia.

## 2019-09-18 NOTE — PROGRESS NOTE ADULT - PROBLEM SELECTOR PLAN 7
Diet: NPO pending GI c/s re: ERCP  DVT: subq heparin Diet: NPO pending GI c/s re: ERCP  DVT: subq heparin  Electrolytes: replete prn - repleted K today Diet: reg diet, NPO MN  DVT: lovenox  Electrolytes: replete prn - repleted K today  Dispo: ERCP tomorrow

## 2019-09-18 NOTE — DISCHARGE NOTE PROVIDER - CARE PROVIDER_API CALL
Mai Mcdonough)  [facility]; Internal Medicine  [address] [place] [address]  Phone: [phone]  Fax: [phone]  Follow Up Time: 1 week    margarito freeman  Phone: (   )    -  Fax: (   )    -  Follow Up Time: 1 week

## 2019-09-18 NOTE — H&P ADULT - PROBLEM SELECTOR PLAN 1
Likely 2/2 biliary obstruction given CT findings of ductal dilatation w/ concern for malignancy given hepatic and pancreatic lesions and unintended weight loss   F/u bilirubin differential on AM labs  F/u CEA  GI c/s for potential ERCP.  PT w/ metal implants that preclude MRCP  IR c/s for biopsy of liver/pancreatic lesions Likely 2/2 biliary obstruction given CT findings of ductal dilatation w/ concern for malignancy given hepatic and pancreatic lesions and unintended weight loss   F/u bilirubin differential on AM labs  F/u CEA  GI c/s for potential ERCP.  PT w/ metal implants that preclude MRCP  IR c/s for biopsy of liver/pancreatic lesions  check US abdomen with doppler

## 2019-09-18 NOTE — DISCHARGE NOTE PROVIDER - PROVIDER TOKENS
PROVIDER:[TOKEN:[[id number]],FOLLOWUP:[1 week]],FREE:[LAST:[pete],FIRST:[margarito],PHONE:[(   )    -],FAX:[(   )    -],FOLLOWUP:[1 week]]

## 2019-09-18 NOTE — PROGRESS NOTE ADULT - PROBLEM SELECTOR PLAN 4
Unclear etiology, likely constipation vs. abnormalities found as above  will give miralax x 1 now and reevaluate Unclear etiology, likely constipation vs. abnormalities found as above  -appreciate GI recs Unclear etiology, likely constipation (CT with significant stool burden) vs. abnormalities found as above  - appreciate GI recs  - start miralax bid and senna bedtime

## 2019-09-18 NOTE — H&P ADULT - PROBLEM SELECTOR PLAN 5
C/w home hctz C/w home methadone and morphine for pain C/w home methadone and morphine for pain  check EKG for QT

## 2019-09-18 NOTE — PHYSICAL THERAPY INITIAL EVALUATION ADULT - ADDITIONAL COMMENTS
Pt lives with his wife in an apartment with 4 steps to enter. PTA pt required assistance with some ADLs (donning/doffing shoes/socks, bathing, shopping, cooking) and was able to amb independently with a rollator. + (short distances). Pt owns rollator, RW, cane, shower chair, grab bars in shower. Pt states he has attending [facility] PT many times to help manage his chronic back pain.

## 2019-09-18 NOTE — H&P ADULT - HISTORY OF PRESENT ILLNESS
64 yo h/o chronic pain 2/2 traumatic work injury (on daily opiates), GERD, HTN, chronic constipation who presents with acute on chronig left lower quadrant pain x 6days. The pain initially started 1 month ago, but is now sharp/cramping, rated 8/10, nonradiating, constant. He states home morphine has not helped with pain.  Pt states he has last 25 lbs over past 2 months.  Although pt states he has been trying to loose weight he feels the 25 lbs is significantly more than he would have lost based on dietary changes.  He denies night sweats.  Pt states colonoscopy 5 yeasr ago for routine screening was clean and told to return in 10 yrs. Denies hx of alcohol misues.  States that he has been following with outpt GI doctor for constipation which he describes as not having BM for 3 days at a time which has been attributed to his chronic opioid use.  For the past week he has noted that on the days on which he does have BMs they are very dark, almost black.  Of note pt status post left hernia repair 1 year ago.  5 day PTA pt noted that his urine looked much darker and orange than usual.  2 days PTA pt's wife noticed that he appeared more juaniced than before she left for a business trip.  Pt denies fevers/chills/chest pain/ palpitations/ n/v/d.      EDVS: Afebrile, HR 60-70s, BP 130s-140s/80s, SpO2 100% RA  ED gave: morphine 4 mg and zofran as well as 1 LB

## 2019-09-18 NOTE — CONSULT NOTE ADULT - SUBJECTIVE AND OBJECTIVE BOX
Chief Complaint:  Patient is a 63y old  Male who presents with a chief complaint of abdominal pain, hyperbilirubinemia (18 Sep 2019 13:47)      HPI:  64 yo h/o chronic pain 2/2 traumatic work injury (on daily opiates), GERD, HTN, chronic constipation who presents with acute on chronig left lower quadrant pain x 6days. The pain initially started 1 month ago, but is now sharp/cramping, rated 8/10, nonradiating, constant. He states home morphine has not helped with pain.  Pt states he has last 25 lbs over past 2 months.  Although pt states he has been trying to loose weight he feels the 25 lbs is significantly more than he would have lost based on dietary changes.  He denies night sweats.  Pt states colonoscopy 5 yeasr ago for routine screening was clean and told to return in 10 yrs. Denies hx of alcohol misues.  States that he has been following with outpt GI doctor for constipation which he describes as not having BM for 3 days at a time which has been attributed to his chronic opioid use.  For the past week he has noted that on the days on which he does have BMs they are very dark, almost black.  Of note pt status post left hernia repair 1 year ago.  5 day PTA pt noted that his urine looked much darker and orange than usual.  2 days PTA pt's wife noticed that he appeared more juaniced than before she left for a business trip.  Pt denies fevers/chills/chest pain/ palpitations/ n/v/d.   (18 Sep 2019 05:46)    Advanced GI consulted for abnormal liver enzymes as well as abnormal CT scan.  The patient is unable to get an MRI due to metal implants.     Allergies:  No Known Allergies      Home Medications:  cloNIDine 0.1 mg oral tablet: 1 tab(s) orally every 8 hours (26 May 2019 15:30)  diazePAM 2 mg oral tablet: 1 tab(s) orally 3 times a day (26 May 2019 15:30)  DULoxetine 60 mg oral delayed release capsule: 1 cap(s) orally 2 times a day (26 May 2019 15:30)  hydroCHLOROthiazide 50 mg oral tablet: 1 tab(s) orally once a day (26 May 2019 15:30)  methadone 10 mg oral tablet: 2 tab(s) orally 4 times a day, As Needed (22 May 2019 09:24)  morphine 30 mg oral tablet: 1 tab(s) orally every 6 hours, As Needed (18 Sep 2019 05:48)      Hospital Medications:  bisacodyl Suppository 10 milliGRAM(s) Rectal once  cloNIDine 0.1 milliGRAM(s) Oral every 8 hours  dextrose 40% Gel 15 Gram(s) Oral once PRN  dextrose 5%. 1000 milliLiter(s) IV Continuous <Continuous>  dextrose 50% Injectable 12.5 Gram(s) IV Push once  dextrose 50% Injectable 25 Gram(s) IV Push once  dextrose 50% Injectable 25 Gram(s) IV Push once  diazepam    Tablet 2 milliGRAM(s) Oral three times a day  DULoxetine 60 milliGRAM(s) Oral two times a day  glucagon  Injectable 1 milliGRAM(s) IntraMuscular once PRN  heparin  Injectable 5000 Unit(s) SubCutaneous every 8 hours  hydrochlorothiazide 50 milliGRAM(s) Oral daily  insulin lispro (HumaLOG) corrective regimen sliding scale   SubCutaneous three times a day before meals  insulin lispro (HumaLOG) corrective regimen sliding scale   SubCutaneous at bedtime  methadone    Tablet 20 milliGRAM(s) Oral four times a day PRN  morphine  IR 30 milliGRAM(s) Oral every 6 hours PRN  polyethylene glycol 3350 17 Gram(s) Oral two times a day  senna 2 Tablet(s) Oral at bedtime      PMHX/PSHX:  Anxiety and depression  Diastolic dysfunction  Empyema lung  H/O peptic ulcer  history of renal calculus  Renal calculus  Current smoker  Herniated Disc  Hypertension  Depression  Spinal Stenosis  Peripheral Neuropathy  Postlaminectomy Syndrome  S/P  shunt  S/P insertion of spinal cord stimulator  History of lumbar fusion  Insertion of Intrathecal Dilaudid Pump  S/P Spinal Surgery  S/P Knee Replacement  S/P Arthroscopy of Left Knee  S/P Tonsillectomy  Ankle Fracture      Family history:  No pertinent family history in first degree relatives      Social History: no tob/etoh/drugs    ROS:     General:  No wt loss, fevers, chills, night sweats, fatigue,   Eyes:  Good vision, no reported pain  ENT:  No sore throat, pain, runny nose, dysphagia  CV:  No pain, palpitations, hypo/hypertension  Resp:  No dyspnea, cough, tachypnea, wheezing  GI:  See HPI  :  No pain, bleeding, incontinence, nocturia  Muscle:  No pain, weakness  Neuro:  No weakness, tingling, memory problems  Psych:  No fatigue, insomnia, mood problems, depression  Endocrine:  No polyuria, polydipsia, cold/heat intolerance  Heme:  No petechiae, ecchymosis, easy bruisability  Skin:  No rash, edema      PHYSICAL EXAM:     GENERAL: NAD  HEENT:  NC/AT  CHEST:  Full & symmetric excursion, no increased effort  ABDOMEN:  Soft, non-tender, non-distended, +BS  EXTREMITIES:  no edema  SKIN:  No rash  NEURO:  Alert      Vital Signs:  Vital Signs Last 24 Hrs  T(C): 36.6 (18 Sep 2019 13:30), Max: 36.8 (17 Sep 2019 23:10)  T(F): 97.8 (18 Sep 2019 13:30), Max: 98.3 (17 Sep 2019 23:10)  HR: 60 (18 Sep 2019 13:30) (59 - 78)  BP: 146/90 (18 Sep 2019 13:30) (120/78 - 149/81)  BP(mean): --  RR: 19 (18 Sep 2019 13:30) (17 - 19)  SpO2: 94% (18 Sep 2019 13:30) (92% - 98%)  Daily Height in cm: 180.34 (18 Sep 2019 01:57)    Daily     LABS:                        11.3   4.7   )-----------( 158      ( 18 Sep 2019 07:21 )             35.6         139  |  97  |  9   ----------------------------<  129<H>  3.4<L>   |  30  |  0.90    Ca    9.4      18 Sep 2019 07:21  Phos  3.4       Mg     1.8         TPro  7.2  /  Alb  3.8  /  TBili  5.8<H>  /  DBili  4.4<H>  /  AST  244<H>  /  ALT  403<H>  /  AlkPhos  242<H>      LIVER FUNCTIONS - ( 18 Sep 2019 07:21 )  Alb: 3.8 g/dL / Pro: 7.2 g/dL / ALK PHOS: 242 U/L / ALT: 403 U/L / AST: 244 U/L / GGT: x           PT/INR - ( 17 Sep 2019 19:52 )   PT: 13.5 sec;   INR: 1.18 ratio         PTT - ( 17 Sep 2019 19:52 )  PTT:29.7 sec  Urinalysis Basic - ( 17 Sep 2019 22:11 )    Color: Yellow / Appearance: Clear / S.032 / pH: x  Gluc: x / Ketone: Negative  / Bili: Small / Urobili: Negative   Blood: x / Protein: Negative / Nitrite: Negative   Leuk Esterase: Negative / RBC: 0 /hpf / WBC 1 /HPF   Sq Epi: x / Non Sq Epi: 0 / Bacteria: Negative      Amylase Serum--      Lipase serum58       Ammonia--      Imaging:  < from: CT Abdomen and Pelvis w/ Oral Cont and w/ IV Cont (19 @ 20:46) >    EXAM:  CT ABDOMEN AND PELVIS OC IC                            PROCEDURE DATE:  2019            INTERPRETATION:  CLINICAL INFORMATION: Left upper quadrant pain. History   of hernia.    COMPARISON: And chest CT from 2019. CT abdomen pelvisfrom   2018.    PROCEDURE:   CT of the Abdomen and Pelvis was performed with intravenous contrast.   Intravenous contrast: 90 ml Omnipaque 350. 10 ml discarded.  Oral contrast: positive contrast was administered.  Sagittal and coronal reformatswere performed.    FINDINGS:    LOWER CHEST: Redemonstration of right lower lobe linear and groundglass   airspace opacities, which may reflect atelectasis and/or scarring    LIVER: Multiple indeterminant heterogeneously hypodense hepatic lesions.   For reference 1.8 cm segment 4 (3; 21) is, 2.2 cm segment 5 (3; 27), and   1.0 cm segment 5 (3; 41) lesions.  BILE DUCTS: New mild intrahepatic biliary ductal dilatation.  GALLBLADDER: Within normal limits.  SPLEEN: Within normal limits.  PANCREAS: Limited evaluation of the pancreatic parenchyma secondary to   streak artifact. Focal hypodensity within the pancreatic neck, likely   artifactual. Question 1.8 cm hypodense lesion within the pancreatic tail.  ADRENALS: 9 mm right adrenal gland nodule, grossly stable.  KIDNEYS/URETERS: 3 mm nonobstructing left renal calculus. No   hydronephrosis. Symmetric parenchymal enhancement.    BLADDER: Within normal limits.  REPRODUCTIVE ORGANS: Prostate is mildly enlarged.    BOWEL: Moderate to large hiatal hernia. Stool-filled colon. No bowel   obstruction. Appendix is normal.  PERITONEUM: No ascites.  VESSELS: Atherosclerosis of the abdominal aorta and its branch vessels.  RETROPERITONEUM/LYMPH NODES: No lymphadenopathy.    ABDOMINAL WALL: Redemonstrated spinal stimulator within the subcutaneous   tissues of the left lower back.. Small fat-containing umbilical hernia.  BONES: Redemonstration of extensive thoracolumbar spinal fusion hardware.   Orthopedic hardware is grossly intact. No periprosthetic lucency.     IMPRESSION:     Mild intrahepatic biliary ductal dilatation as well as multiple   indeterminant hepatic lesions, new since prior examination of 3/25/2018.   Metastatic disease is a differential diagnostic consideration.    Limited evaluation of the pancreatic parenchyma secondary to streak   artifact. Question 1.8 cm hypodense pancreatic tail lesion. Hypodensity   within the pancreatic neck, likely artifactual. No definite pancreatic   ductal dilatation.    More sensitive evaluationwith MRI and/or endoscopic ultrasound is   recommended, if no contraindications exist.    Moderate to large colonic fecal load.    Moderate to large hiatal hernia.    WALKER ARRIOLA M.D., RADIOLOGY RESIDENT  This document has been electronically signed.  CHAPO IBRAHIM M.D., ATTENDING RADIOLOGIST  This document has been electronically signed. Sep 17 2019  9:48PM                < end of copied text > Chief Complaint:  Patient is a 63y old  Male who presents with a chief complaint of abdominal pain, hyperbilirubinemia (18 Sep 2019 13:47)      HPI:  64 yo h/o chronic pain 2/2 traumatic work injury (on daily opiates), GERD, HTN, chronic constipation who presents with acute on chronig left lower quadrant pain x 6days. The pain initially started 1 month ago, but is now sharp/cramping, rated 8/10, nonradiating, constant. He states home morphine has not helped with pain.  Pt states he has last 25 lbs over past 2 months.  Although pt states he has been trying to lose weight he feels the 25 lbs is significantly more than he would have lost based on dietary changes.  He denies night sweats.  Pt states colonoscopy 5 yeasr ago for routine screening was clean and told to return in 10 yrs. Denies hx of alcohol misuse.  States that he has been following with outpt GI doctor for constipation which he describes as not having BM for 3 days at a time which has been attributed to his chronic opioid use.  For the past week he has noted that on the days on which he does have BMs they are very dark, almost black.  Of note pt status post left hernia repair 1 year ago.  5 day PTA pt noted that his urine looked much darker and orange than usual.  2 days PTA pt's wife noticed that he appeared more juaniced than before she left for a business trip.  Pt denies fevers/chills/chest pain/ palpitations/ n/v/d.   (18 Sep 2019 05:46)    Advanced GI consulted for abnormal liver enzymes as well as abnormal CT scan.  The patient is unable to get an MRI due to metal implants.     Allergies:  No Known Allergies      Home Medications:  cloNIDine 0.1 mg oral tablet: 1 tab(s) orally every 8 hours (26 May 2019 15:30)  diazePAM 2 mg oral tablet: 1 tab(s) orally 3 times a day (26 May 2019 15:30)  DULoxetine 60 mg oral delayed release capsule: 1 cap(s) orally 2 times a day (26 May 2019 15:30)  hydroCHLOROthiazide 50 mg oral tablet: 1 tab(s) orally once a day (26 May 2019 15:30)  methadone 10 mg oral tablet: 2 tab(s) orally 4 times a day, As Needed (22 May 2019 09:24)  morphine 30 mg oral tablet: 1 tab(s) orally every 6 hours, As Needed (18 Sep 2019 05:48)      Hospital Medications:  bisacodyl Suppository 10 milliGRAM(s) Rectal once  cloNIDine 0.1 milliGRAM(s) Oral every 8 hours  dextrose 40% Gel 15 Gram(s) Oral once PRN  dextrose 5%. 1000 milliLiter(s) IV Continuous <Continuous>  dextrose 50% Injectable 12.5 Gram(s) IV Push once  dextrose 50% Injectable 25 Gram(s) IV Push once  dextrose 50% Injectable 25 Gram(s) IV Push once  diazepam    Tablet 2 milliGRAM(s) Oral three times a day  DULoxetine 60 milliGRAM(s) Oral two times a day  glucagon  Injectable 1 milliGRAM(s) IntraMuscular once PRN  heparin  Injectable 5000 Unit(s) SubCutaneous every 8 hours  hydrochlorothiazide 50 milliGRAM(s) Oral daily  insulin lispro (HumaLOG) corrective regimen sliding scale   SubCutaneous three times a day before meals  insulin lispro (HumaLOG) corrective regimen sliding scale   SubCutaneous at bedtime  methadone    Tablet 20 milliGRAM(s) Oral four times a day PRN  morphine  IR 30 milliGRAM(s) Oral every 6 hours PRN  polyethylene glycol 3350 17 Gram(s) Oral two times a day  senna 2 Tablet(s) Oral at bedtime      PMHX/PSHX:  Anxiety and depression  Diastolic dysfunction  Empyema lung  H/O peptic ulcer  history of renal calculus  Renal calculus  Current smoker  Herniated Disc  Hypertension  Depression  Spinal Stenosis  Peripheral Neuropathy  Postlaminectomy Syndrome  S/P  shunt  S/P insertion of spinal cord stimulator  History of lumbar fusion  Insertion of Intrathecal Dilaudid Pump  S/P Spinal Surgery  S/P Knee Replacement  S/P Arthroscopy of Left Knee  S/P Tonsillectomy  Ankle Fracture      Family history:  No pertinent family history in first degree relatives      Social History: no tob/etoh/drugs    ROS:     General:  No wt loss, fevers, chills, night sweats, fatigue,   Eyes:  Good vision, no reported pain  ENT:  No sore throat, pain, runny nose, dysphagia  CV:  No pain, palpitations, hypo/hypertension  Resp:  No dyspnea, cough, tachypnea, wheezing  GI:  See HPI  :  No pain, bleeding, incontinence, nocturia  Muscle:  No pain, weakness  Neuro:  No weakness, tingling, memory problems  Psych:  No fatigue, insomnia, mood problems, depression  Endocrine:  No polyuria, polydipsia, cold/heat intolerance  Heme:  No petechiae, ecchymosis, easy bruisability  Skin:  No rash, edema      PHYSICAL EXAM:     GENERAL: NAD  HEENT:  NC/AT  CHEST:  Full & symmetric excursion, no increased effort  ABDOMEN:  Soft, non-tender, non-distended, +BS  EXTREMITIES:  no edema  SKIN:  No rash  NEURO:  Alert      Vital Signs:  Vital Signs Last 24 Hrs  T(C): 36.6 (18 Sep 2019 13:30), Max: 36.8 (17 Sep 2019 23:10)  T(F): 97.8 (18 Sep 2019 13:30), Max: 98.3 (17 Sep 2019 23:10)  HR: 60 (18 Sep 2019 13:30) (59 - 78)  BP: 146/90 (18 Sep 2019 13:30) (120/78 - 149/81)  BP(mean): --  RR: 19 (18 Sep 2019 13:30) (17 - 19)  SpO2: 94% (18 Sep 2019 13:30) (92% - 98%)  Daily Height in cm: 180.34 (18 Sep 2019 01:57)    Daily     LABS:                        11.3   4.7   )-----------( 158      ( 18 Sep 2019 07:21 )             35.6         139  |  97  |  9   ----------------------------<  129<H>  3.4<L>   |  30  |  0.90    Ca    9.4      18 Sep 2019 07:21  Phos  3.4       Mg     1.8         TPro  7.2  /  Alb  3.8  /  TBili  5.8<H>  /  DBili  4.4<H>  /  AST  244<H>  /  ALT  403<H>  /  AlkPhos  242<H>      LIVER FUNCTIONS - ( 18 Sep 2019 07:21 )  Alb: 3.8 g/dL / Pro: 7.2 g/dL / ALK PHOS: 242 U/L / ALT: 403 U/L / AST: 244 U/L / GGT: x           PT/INR - ( 17 Sep 2019 19:52 )   PT: 13.5 sec;   INR: 1.18 ratio         PTT - ( 17 Sep 2019 19:52 )  PTT:29.7 sec  Urinalysis Basic - ( 17 Sep 2019 22:11 )    Color: Yellow / Appearance: Clear / S.032 / pH: x  Gluc: x / Ketone: Negative  / Bili: Small / Urobili: Negative   Blood: x / Protein: Negative / Nitrite: Negative   Leuk Esterase: Negative / RBC: 0 /hpf / WBC 1 /HPF   Sq Epi: x / Non Sq Epi: 0 / Bacteria: Negative      Amylase Serum--      Lipase serum58       Ammonia--      Imaging:  < from: CT Abdomen and Pelvis w/ Oral Cont and w/ IV Cont (19 @ 20:46) >    EXAM:  CT ABDOMEN AND PELVIS OC IC                            PROCEDURE DATE:  2019            INTERPRETATION:  CLINICAL INFORMATION: Left upper quadrant pain. History   of hernia.    COMPARISON: And chest CT from 2019. CT abdomen pelvisfrom   2018.    PROCEDURE:   CT of the Abdomen and Pelvis was performed with intravenous contrast.   Intravenous contrast: 90 ml Omnipaque 350. 10 ml discarded.  Oral contrast: positive contrast was administered.  Sagittal and coronal reformatswere performed.    FINDINGS:    LOWER CHEST: Redemonstration of right lower lobe linear and groundglass   airspace opacities, which may reflect atelectasis and/or scarring    LIVER: Multiple indeterminant heterogeneously hypodense hepatic lesions.   For reference 1.8 cm segment 4 (3; 21) is, 2.2 cm segment 5 (3; 27), and   1.0 cm segment 5 (3; 41) lesions.  BILE DUCTS: New mild intrahepatic biliary ductal dilatation.  GALLBLADDER: Within normal limits.  SPLEEN: Within normal limits.  PANCREAS: Limited evaluation of the pancreatic parenchyma secondary to   streak artifact. Focal hypodensity within the pancreatic neck, likely   artifactual. Question 1.8 cm hypodense lesion within the pancreatic tail.  ADRENALS: 9 mm right adrenal gland nodule, grossly stable.  KIDNEYS/URETERS: 3 mm nonobstructing left renal calculus. No   hydronephrosis. Symmetric parenchymal enhancement.    BLADDER: Within normal limits.  REPRODUCTIVE ORGANS: Prostate is mildly enlarged.    BOWEL: Moderate to large hiatal hernia. Stool-filled colon. No bowel   obstruction. Appendix is normal.  PERITONEUM: No ascites.  VESSELS: Atherosclerosis of the abdominal aorta and its branch vessels.  RETROPERITONEUM/LYMPH NODES: No lymphadenopathy.    ABDOMINAL WALL: Redemonstrated spinal stimulator within the subcutaneous   tissues of the left lower back.. Small fat-containing umbilical hernia.  BONES: Redemonstration of extensive thoracolumbar spinal fusion hardware.   Orthopedic hardware is grossly intact. No periprosthetic lucency.     IMPRESSION:     Mild intrahepatic biliary ductal dilatation as well as multiple   indeterminant hepatic lesions, new since prior examination of 3/25/2018.   Metastatic disease is a differential diagnostic consideration.    Limited evaluation of the pancreatic parenchyma secondary to streak   artifact. Question 1.8 cm hypodense pancreatic tail lesion. Hypodensity   within the pancreatic neck, likely artifactual. No definite pancreatic   ductal dilatation.    More sensitive evaluationwith MRI and/or endoscopic ultrasound is   recommended, if no contraindications exist.    Moderate to large colonic fecal load.    Moderate to large hiatal hernia.    WALKER ARRIOLA M.D., RADIOLOGY RESIDENT  This document has been electronically signed.  CHAPO IBRAHIM M.D., ATTENDING RADIOLOGIST  This document has been electronically signed. Sep 17 2019  9:48PM                < end of copied text >

## 2019-09-18 NOTE — PROGRESS NOTE ADULT - PROBLEM SELECTOR PLAN 1
Likely 2/2 biliary obstruction given CT findings of ductal dilatation w/ concern for malignancy given hepatic and pancreatic lesions and unintended weight loss   F/u bilirubin differential on AM labs  F/u CEA  GI c/s for potential ERCP.  PT w/ metal implants that preclude MRCP  IR c/s for biopsy of liver/pancreatic lesions  check US abdomen with doppler Likely 2/2 biliary obstruction given CT findings of ductal dilatation w/ concern for malignancy given hepatic and pancreatic lesions and unintended weight loss. -Elevated total/direct bili, alk phos, LFTs  -F/u CEA  -GI c/s for potential ERCP.  PT w/ metal implants that preclude MRCP  -IR c/s for biopsy of liver/pancreatic lesions  -f/u US abdomen with doppler Likely 2/2 biliary obstruction given CT findings of ductal dilatation w/ concern for malignancy given hepatic and pancreatic lesions and unintended weight loss. -Elevated total/direct bili, alk phos, LFTs  -F/u CEA, CA 19-9, AFP  -GI recs appreciated - for ERCP tomorrow. NPO [place].  PT w/ metal implants that preclude MRCP  -trend lfts

## 2019-09-18 NOTE — DISCHARGE NOTE PROVIDER - NSDCFUADDAPPT_GEN_ALL_CORE_FT
Dr. Lilly, Chronic Pain Specialist  [address], [place] [address]  12 pm on October 2nd, 2019

## 2019-09-18 NOTE — PROGRESS NOTE ADULT - ASSESSMENT
62 yo h/o chronic pain 2/2 traumatic work injury (on daily opiates), GERD, HTN, chronic constipation who presents with acute on chronig left lower quadrant pain x 6days. 62 yo h/o chronic pain 2/2 traumatic work injury (on daily opiates), GERD, HTN, chronic constipation who presents with acute on chronic left lower quadrant pain x 6days. CT imaging revealed lesions on liver concerning for metastatic cancer, elevated LFTs, bili/alk phos indicative of blockage of biliary system. To be valuated further on ultrasound abd with doppler today, appreciate GI recs for ERCP, IR recs for potential liver biopsy. 64 yo h/o chronic pain 2/2 traumatic work injury (on daily opiates), GERD, HTN, chronic constipation who presents with acute on chronic left lower quadrant pain x 6days. CT imaging revealed lesions on liver concerning for metastatic cancer, elevated LFTs, bili/alk phos indicative of blockage of biliary system. Awaiting ERCP tomorrow.

## 2019-09-19 NOTE — PROGRESS NOTE ADULT - SUBJECTIVE AND OBJECTIVE BOX
Jose Antonio Venegas MD  [phone]    S:  Overnight pt received tap water enema that was productive of minimal grey stool. Seen by PT, rec for outpatient PT. Went for ERCP this AM.    On interview this morning pt endorsed 9/10 diffuse abd pain. Discussed ERCP, discussed elevated .      O:    MEDICATIONS  (STANDING):  cloNIDine 0.1 milliGRAM(s) Oral every 8 hours  dextrose 5%. 1000 milliLiter(s) (50 mL/Hr) IV Continuous <Continuous>  dextrose 50% Injectable 12.5 Gram(s) IV Push once  dextrose 50% Injectable 25 Gram(s) IV Push once  dextrose 50% Injectable 25 Gram(s) IV Push once  diazepam    Tablet 2 milliGRAM(s) Oral three times a day  DULoxetine 60 milliGRAM(s) Oral two times a day  enoxaparin Injectable 40 milliGRAM(s) SubCutaneous daily  hydrochlorothiazide 50 milliGRAM(s) Oral daily  polyethylene glycol 3350 17 Gram(s) Oral two times a day  senna 2 Tablet(s) Oral at bedtime    MEDICATIONS  (PRN):  dextrose 40% Gel 15 Gram(s) Oral once PRN Blood Glucose LESS THAN 70 milliGRAM(s)/deciliter  glucagon  Injectable 1 milliGRAM(s) IntraMuscular once PRN Glucose LESS THAN 70 milligrams/deciliter  methadone    Tablet 20 milliGRAM(s) Oral four times a day PRN Severe Pain (7 - 10)  morphine  IR 30 milliGRAM(s) Oral every 6 hours PRN Moderate Pain (4 - 6)      Vital Signs Last 24 Hrs  T(C): 36.7 (19 Sep 2019 05:11), Max: 36.7 (19 Sep 2019 05:11)  T(F): 98 (19 Sep 2019 05:11), Max: 98 (19 Sep 2019 05:11)  HR: 62 (19 Sep 2019 05:11) (60 - 64)  BP: 138/84 (19 Sep 2019 05:11) (127/78 - 164/98)  BP(mean): --  RR: 18 (19 Sep 2019 05:11) (18 - 19)  SpO2: 94% (19 Sep 2019 05:11) (94% - 97%)    PE:  GENERAL: appears stated age, disheveled  HEENT: EOMI, PERRL, +scleral icterus, MMM, no oropharyngeal lesions or erythema appreciated  Pulm: CTAB  CV: RRR, S1&S2+, no m/r/g appreciated  ABDOMEN: +BS, +tender  over superior portion of LLQ (no guarding), also tender to palpation diffusely with rebound pain, +distended, +hepatomegaly with irregular borders, right lower quadrant mass corresponding to prior stimulator battery.   MSK: nl ROM  BACK: Right lower quadrant mass corresponding to current stimulator battery, pain on palpation over lumbar spine with wincing, mild pain over paraspinal region w/o wincing  EXTREMITIES:  no appreciable edema in b/l LE  Neuro: A&Ox3, no focal deficits  SKIN: warm and dry, no visible rash      LABS   1832  CEA wnl  AFP wnl    hep panel negative  acetaminophen level unremarkable  Ucx negative  coags wnl    CBC Full  -  ( 19 Sep 2019 06:54 )  WBC Count : 4.2 K/uL  RBC Count : 3.90 M/uL  Hemoglobin : 11.4 g/dL  Hematocrit : 36.0 %  Platelet Count - Automated : 164 K/uL  Mean Cell Volume : 92.1 fl  Mean Cell Hemoglobin : 29.3 pg  Mean Cell Hemoglobin Concentration : 31.8 gm/dL  Auto Neutrophil # : 2.6 K/uL  Auto Lymphocyte # : 1.0 K/uL  Auto Monocyte # : 0.3 K/uL  Auto Eosinophil # : 0.2 K/uL  Auto Basophil # : 0.0 K/uL  Auto Neutrophil % : 61.5 %  Auto Lymphocyte % : 24.8 %  Auto Monocyte % : 7.7 %  Auto Eosinophil % : 5.3 %  Auto Basophil % : 0.8 %        140  |  97  |  8   ----------------------------<  129<H>  4.1   |  32<H>  |  0.95    Ca    9.9      19 Sep 2019 06:56  Phos  3.4       Mg     2.0         TPro  7.4  /  Alb  3.8  /  TBili  6.6<H>  /  DBili  x   /  AST  282<H>  /  ALT  438<H>  /  AlkPhos  262<H>      LIVER FUNCTIONS - ( 19 Sep 2019 06:56 )  Alb: 3.8 g/dL / Pro: 7.4 g/dL / ALK PHOS: 262 U/L / ALT: 438 U/L / AST: 282 U/L / GGT: x           Urinalysis Basic - ( 17 Sep 2019 22:11 )    Color: Yellow / Appearance: Clear / S.032 / pH: x  Gluc: x / Ketone: Negative  / Bili: Small / Urobili: Negative   Blood: x / Protein: Negative / Nitrite: Negative   Leuk Esterase: Negative / RBC: 0 /hpf / WBC 1 /HPF   Sq Epi: x / Non Sq Epi: 0 / Bacteria: Negative              IMAGIN/18: US with doppler liver wnl, patent veins.    < from: Xray Chest 2 Views PA/Lat (19 @ 22:37) >    IMPRESSION:  Bilateral reticular opacities likely, likely chronic.    < end of copied text >    < from: CT Abdomen and Pelvis w/ Oral Cont and w/ IV Cont (19 @ 20:46) >    IMPRESSION:     Mild intrahepatic biliary ductal dilatation as well as multiple   indeterminant hepatic lesions, new since prior examination of 3/25/2018.   Metastatic disease is a differential diagnostic consideration.    Limited evaluation of the pancreatic parenchyma secondary to streak   artifact. Question 1.8 cm hypodense pancreatic tail lesion. Hypodensity   within the pancreatic neck, likely artifactual. No definite pancreatic   ductal dilatation.    More sensitive evaluationwith MRI and/or endoscopic ultrasound is   recommended, if no contraindications exist.    Moderate to large colonic fecal load.    Moderate to large hiatal hernia.    < end of copied text >      MEDICATIONS  (STANDING):  cloNIDine 0.1 milliGRAM(s) Oral every 8 hours  dextrose 5%. 1000 milliLiter(s) (50 mL/Hr) IV Continuous <Continuous>  dextrose 50% Injectable 12.5 Gram(s) IV Push once  dextrose 50% Injectable 25 Gram(s) IV Push once  dextrose 50% Injectable 25 Gram(s) IV Push once  diazepam    Tablet 2 milliGRAM(s) Oral three times a day  DULoxetine 60 milliGRAM(s) Oral two times a day  heparin  Injectable 5000 Unit(s) SubCutaneous every 8 hours  hydrochlorothiazide 50 milliGRAM(s) Oral daily  polyethylene glycol 3350 17 Gram(s) Oral two times a day  senna 2 Tablet(s) Oral at bedtime    MEDICATIONS  (PRN):  dextrose 40% Gel 15 Gram(s) Oral once PRN Blood Glucose LESS THAN 70 milliGRAM(s)/deciliter  glucagon  Injectable 1 milliGRAM(s) IntraMuscular once PRN Glucose LESS THAN 70 milligrams/deciliter  methadone    Tablet 20 milliGRAM(s) Oral four times a day PRN Severe Pain (7 - 10)  morphine  IR 30 milliGRAM(s) Oral every 6 hours PRN Moderate Pain (4 - 6) Jose Antonio Venegas MD  [phone]    S:  Overnight pt received tap water enema that was productive of minimal grey stool. Seen by PT, rec for outpatient PT. Went for EUS/ERCP this AM.    On interview this morning pt endorsed 9/10 diffuse abd pain. Discussed ERCP, discussed elevated .      O:    MEDICATIONS  (STANDING):  cloNIDine 0.1 milliGRAM(s) Oral every 8 hours  dextrose 5%. 1000 milliLiter(s) (50 mL/Hr) IV Continuous <Continuous>  dextrose 50% Injectable 12.5 Gram(s) IV Push once  dextrose 50% Injectable 25 Gram(s) IV Push once  dextrose 50% Injectable 25 Gram(s) IV Push once  diazepam    Tablet 2 milliGRAM(s) Oral three times a day  DULoxetine 60 milliGRAM(s) Oral two times a day  enoxaparin Injectable 40 milliGRAM(s) SubCutaneous daily  hydrochlorothiazide 50 milliGRAM(s) Oral daily  polyethylene glycol 3350 17 Gram(s) Oral two times a day  senna 2 Tablet(s) Oral at bedtime    MEDICATIONS  (PRN):  dextrose 40% Gel 15 Gram(s) Oral once PRN Blood Glucose LESS THAN 70 milliGRAM(s)/deciliter  glucagon  Injectable 1 milliGRAM(s) IntraMuscular once PRN Glucose LESS THAN 70 milligrams/deciliter  methadone    Tablet 20 milliGRAM(s) Oral four times a day PRN Severe Pain (7 - 10)  morphine  IR 30 milliGRAM(s) Oral every 6 hours PRN Moderate Pain (4 - 6)      Vital Signs Last 24 Hrs  T(C): 36.7 (19 Sep 2019 05:11), Max: 36.7 (19 Sep 2019 05:11)  T(F): 98 (19 Sep 2019 05:11), Max: 98 (19 Sep 2019 05:11)  HR: 62 (19 Sep 2019 05:11) (60 - 64)  BP: 138/84 (19 Sep 2019 05:11) (127/78 - 164/98)  BP(mean): --  RR: 18 (19 Sep 2019 05:11) (18 - 19)  SpO2: 94% (19 Sep 2019 05:11) (94% - 97%)    PE:  GENERAL: appears stated age, disheveled  HEENT: EOMI, PERRL, +scleral icterus, MMM, no oropharyngeal lesions or erythema appreciated  Pulm: CTAB  CV: RRR, S1&S2+, no m/r/g appreciated  ABDOMEN: +BS, +tender  over superior portion of LLQ (no guarding), also tender to palpation diffusely with rebound pain, +distended, +hepatomegaly with irregular borders, right lower quadrant mass corresponding to prior stimulator battery.   MSK: nl ROM  BACK: Right lower quadrant mass corresponding to current stimulator battery, pain on palpation over lumbar spine with wincing, mild pain over paraspinal region w/o wincing  EXTREMITIES:  no appreciable edema in b/l LE  Neuro: A&Ox3, no focal deficits  SKIN: warm and dry, no visible rash      LABS   1832  CEA wnl  AFP wnl    hep panel negative  acetaminophen level unremarkable  Ucx negative  coags wnl    CBC Full  -  ( 19 Sep 2019 06:54 )  WBC Count : 4.2 K/uL  RBC Count : 3.90 M/uL  Hemoglobin : 11.4 g/dL  Hematocrit : 36.0 %  Platelet Count - Automated : 164 K/uL  Mean Cell Volume : 92.1 fl  Mean Cell Hemoglobin : 29.3 pg  Mean Cell Hemoglobin Concentration : 31.8 gm/dL  Auto Neutrophil # : 2.6 K/uL  Auto Lymphocyte # : 1.0 K/uL  Auto Monocyte # : 0.3 K/uL  Auto Eosinophil # : 0.2 K/uL  Auto Basophil # : 0.0 K/uL  Auto Neutrophil % : 61.5 %  Auto Lymphocyte % : 24.8 %  Auto Monocyte % : 7.7 %  Auto Eosinophil % : 5.3 %  Auto Basophil % : 0.8 %        140  |  97  |  8   ----------------------------<  129<H>  4.1   |  32<H>  |  0.95    Ca    9.9      19 Sep 2019 06:56  Phos  3.4       Mg     2.0         TPro  7.4  /  Alb  3.8  /  TBili  6.6<H>  /  DBili  x   /  AST  282<H>  /  ALT  438<H>  /  AlkPhos  262<H>      LIVER FUNCTIONS - ( 19 Sep 2019 06:56 )  Alb: 3.8 g/dL / Pro: 7.4 g/dL / ALK PHOS: 262 U/L / ALT: 438 U/L / AST: 282 U/L / GGT: x           Urinalysis Basic - ( 17 Sep 2019 22:11 )    Color: Yellow / Appearance: Clear / S.032 / pH: x  Gluc: x / Ketone: Negative  / Bili: Small / Urobili: Negative   Blood: x / Protein: Negative / Nitrite: Negative   Leuk Esterase: Negative / RBC: 0 /hpf / WBC 1 /HPF   Sq Epi: x / Non Sq Epi: 0 / Bacteria: Negative              IMAGIN/18: US with doppler liver wnl, patent veins.    < from: Xray Chest 2 Views PA/Lat (19 @ 22:37) >    IMPRESSION:  Bilateral reticular opacities likely, likely chronic.    < end of copied text >    < from: CT Abdomen and Pelvis w/ Oral Cont and w/ IV Cont (19 @ 20:46) >    IMPRESSION:     Mild intrahepatic biliary ductal dilatation as well as multiple   indeterminant hepatic lesions, new since prior examination of 3/25/2018.   Metastatic disease is a differential diagnostic consideration.    Limited evaluation of the pancreatic parenchyma secondary to streak   artifact. Question 1.8 cm hypodense pancreatic tail lesion. Hypodensity   within the pancreatic neck, likely artifactual. No definite pancreatic   ductal dilatation.    More sensitive evaluationwith MRI and/or endoscopic ultrasound is   recommended, if no contraindications exist.    Moderate to large colonic fecal load.    Moderate to large hiatal hernia.    < end of copied text > Jose Antonio Venegas MD  [phone]    S:  Overnight pt received tap water enema that was productive of minimal grey stool. Seen by PT, rec for outpatient PT. Went for EUS/ERCP this AM.    On interview this morning pt endorsed 9/10 diffuse abd pain. Discussed ERCP, discussed elevated .  no cp, sob, f/chills.      O:      Vital Signs Last 24 Hrs  T(C): 36.7 (19 Sep 2019 05:11), Max: 36.7 (19 Sep 2019 05:11)  T(F): 98 (19 Sep 2019 05:11), Max: 98 (19 Sep 2019 05:11)  HR: 62 (19 Sep 2019 05:11) (60 - 64)  BP: 138/84 (19 Sep 2019 05:11) (127/78 - 164/98)  BP(mean): --  RR: 18 (19 Sep 2019 05:11) (18 - 19)  SpO2: 94% (19 Sep 2019 05:11) (94% - 97%)    PE:  GENERAL: appears stated age, disheveled  HEENT: EOMI, PERRL, +scleral icterus, MMM, no oropharyngeal lesions or erythema appreciated  Pulm: CTAB  CV: RRR, S1&S2+, no m/r/g appreciated  ABDOMEN: +BS, +tender  over superior portion of LLQ (no guarding), also tender to palpation diffusely with rebound pain, +distended, +hepatomegaly with irregular borders, right lower quadrant mass corresponding to prior stimulator battery.   MSK: nl ROM  BACK: Right lower quadrant mass corresponding to current stimulator battery, pain on palpation over lumbar spine with wincing, mild pain over paraspinal region w/o wincing  EXTREMITIES:  no appreciable edema in b/l LE  Neuro: A&Ox3, no focal deficits  SKIN: warm and dry, no visible rash      LABS   1832  CEA wnl  AFP wnl    hep panel negative  acetaminophen level unremarkable  Ucx negative  coags wnl    CBC Full  -  ( 19 Sep 2019 06:54 )  WBC Count : 4.2 K/uL  RBC Count : 3.90 M/uL  Hemoglobin : 11.4 g/dL  Hematocrit : 36.0 %  Platelet Count - Automated : 164 K/uL  Mean Cell Volume : 92.1 fl  Mean Cell Hemoglobin : 29.3 pg  Mean Cell Hemoglobin Concentration : 31.8 gm/dL  Auto Neutrophil # : 2.6 K/uL  Auto Lymphocyte # : 1.0 K/uL  Auto Monocyte # : 0.3 K/uL  Auto Eosinophil # : 0.2 K/uL  Auto Basophil # : 0.0 K/uL  Auto Neutrophil % : 61.5 %  Auto Lymphocyte % : 24.8 %  Auto Monocyte % : 7.7 %  Auto Eosinophil % : 5.3 %  Auto Basophil % : 0.8 %        140  |  97  |  8   ----------------------------<  129<H>  4.1   |  32<H>  |  0.95    Ca    9.9      19 Sep 2019 06:56  Phos  3.4       Mg     2.0         TPro  7.4  /  Alb  3.8  /  TBili  6.6<H>  /  DBili  x   /  AST  282<H>  /  ALT  438<H>  /  AlkPhos  262<H>      LIVER FUNCTIONS - ( 19 Sep 2019 06:56 )  Alb: 3.8 g/dL / Pro: 7.4 g/dL / ALK PHOS: 262 U/L / ALT: 438 U/L / AST: 282 U/L / GGT: x           Urinalysis Basic - ( 17 Sep 2019 22:11 )    Color: Yellow / Appearance: Clear / S.032 / pH: x  Gluc: x / Ketone: Negative  / Bili: Small / Urobili: Negative   Blood: x / Protein: Negative / Nitrite: Negative   Leuk Esterase: Negative / RBC: 0 /hpf / WBC 1 /HPF   Sq Epi: x / Non Sq Epi: 0 / Bacteria: Negative              IMAGIN/18: US with doppler liver wnl, patent veins.    < from: Xray Chest 2 Views PA/Lat (19 @ 22:37) >    IMPRESSION:  Bilateral reticular opacities likely, likely chronic.    < end of copied text >    < from: CT Abdomen and Pelvis w/ Oral Cont and w/ IV Cont (19 @ 20:46) >    IMPRESSION:     Mild intrahepatic biliary ductal dilatation as well as multiple   indeterminant hepatic lesions, new since prior examination of 3/25/2018.   Metastatic disease is a differential diagnostic consideration.    Limited evaluation of the pancreatic parenchyma secondary to streak   artifact. Question 1.8 cm hypodense pancreatic tail lesion. Hypodensity   within the pancreatic neck, likely artifactual. No definite pancreatic   ductal dilatation.    More sensitive evaluationwith MRI and/or endoscopic ultrasound is   recommended, if no contraindications exist.    Moderate to large colonic fecal load.    Moderate to large hiatal hernia.    < end of copied text >    MEDICATIONS  (STANDING):  cloNIDine 0.1 milliGRAM(s) Oral every 8 hours  dextrose 5%. 1000 milliLiter(s) (50 mL/Hr) IV Continuous <Continuous>  dextrose 50% Injectable 12.5 Gram(s) IV Push once  dextrose 50% Injectable 25 Gram(s) IV Push once  dextrose 50% Injectable 25 Gram(s) IV Push once  diazepam    Tablet 2 milliGRAM(s) Oral every 8 hours  DULoxetine 60 milliGRAM(s) Oral two times a day  enoxaparin Injectable 40 milliGRAM(s) SubCutaneous daily  hydrochlorothiazide 50 milliGRAM(s) Oral daily  polyethylene glycol 3350 17 Gram(s) Oral <User Schedule>  senna 2 Tablet(s) Oral at bedtime  sorbitol 70%/mineral oil/magnesium hydroxide/glycerin Enema 120 milliLiter(s) Rectal once    MEDICATIONS  (PRN):  dextrose 40% Gel 15 Gram(s) Oral once PRN Blood Glucose LESS THAN 70 milliGRAM(s)/deciliter  glucagon  Injectable 1 milliGRAM(s) IntraMuscular once PRN Glucose LESS THAN 70 milligrams/deciliter  methadone    Tablet 20 milliGRAM(s) Oral four times a day PRN Severe Pain (7 - 10)  morphine  IR 30 milliGRAM(s) Oral every 6 hours PRN Moderate Pain (4 - 6)

## 2019-09-19 NOTE — PROGRESS NOTE ADULT - PROBLEM SELECTOR PROBLEM 6
Hypertension Type 2 diabetes mellitus without complication, unspecified whether long term insulin use

## 2019-09-19 NOTE — PROGRESS NOTE ADULT - PROBLEM SELECTOR PLAN 4
Unclear etiology, likely constipation (CT with significant stool burden) vs. abnormalities found as above  - appreciate GI recs  - start miralax bid and senna bedtime Unclear etiology, likely constipation (CT with significant stool burden) vs. abnormalities found as above  - appreciate GI recs  - miralax TID, SMOG enema, appreciate GI recs - C/w home methadone and morphine for pain  - check EKG for QT

## 2019-09-19 NOTE — CONSULT NOTE ADULT - ASSESSMENT
63M h/o chronic pain 2/2 traumatic work injury (on daily opiates, s/p spinal stimulator), GERD, HTN, chronic constipation who was admitted on 9/17 with acute on chronic left lower quadrant pain x 6days, found to have obstructive jaundice w/ possible pancreatic mass and liver lesions.     -f/u heme/onc  -agree w/ IR consult for biopsy of liver lesions to determine if metastatic disease  -CT chest noncontrast for staging  -will continue to follow  -care per primary team  -discussed w/ Dr. Pavon    [facility]  [phone]

## 2019-09-19 NOTE — CONSULT NOTE ADULT - ASSESSMENT
62 yo h/o chronic pain 2/2 traumatic work injury (on daily opiates), GERD, HTN, chronic constipation who presented with LLQ pain, weight loss, jaundice.  Oncology consulted for concern for pancreatic cancer.    #Suspicions for pancreatic cancer  - s/p ERCP and stent placement, bx taken and path is pending  - patient needs CT Chest to complete staging  - bili elevated 5.6 on admission, 6.6 today  - CA 19-9 elevated 1832  - CT A/P also showing suspicions for lesions in liver.  Will likely need MRI abdomen to further characterize and may need bx confirmation to assess for metastatic disease  - if this is not metastatic disease, then patient may be surgical candidate and would need surg onc evaluation.    Will discuss with attending.    Sangeeta Mccall DO  Hematology/Oncology Fellow, PGY5  Pager: [phone] 62 yo h/o chronic pain 2/2 traumatic work injury (on daily opiates), GERD, HTN, chronic constipation who presented with LLQ pain, weight loss, jaundice.  Oncology consulted for concern for pancreatic cancer.    #Suspicions for pancreatic cancer  - s/p ERCP and stent placement, bx taken and path is pending  - patient needs CT Chest to complete staging  - bili elevated 5.6 on admission, 6.6 today  - CA 19-9 elevated 1832  - CT A/P also showing suspicions for lesions in liver.  Would consult IR to perform biopsy to confirm if this is metastatic disease.    Sangeeta Mccall DO  Hematology/Oncology Fellow, PGY5  Pager: [phone]

## 2019-09-19 NOTE — PROGRESS NOTE ADULT - ASSESSMENT
62 yo h/o chronic pain 2/2 traumatic work injury (on daily opiates), GERD, HTN, chronic constipation who presents with acute on chronic left lower quadrant pain x 6days. CT imaging revealed lesions on liver concerning for metastatic cancer, elevated LFTs, bili/alk phos indicative of blockage of biliary system. Awaiting ERCP tomorrow. 62 yo h/o chronic pain 2/2 traumatic work injury (on daily opiates), GERD, HTN, chronic constipation who presents with acute on chronic left lower quadrant pain x 6days. CT imaging revealed lesions on liver concerning for metastatic cancer, elevated LFTs, bili/alk phos indicative of blockage of biliary system. Went for EUS/ERCP this AM. 62 yo h/o chronic pain 2/2 traumatic work injury (on daily opiates), GERD, HTN, chronic constipation who presents with acute on chronic left lower quadrant pain x 6days, found to have biliary obstruction, s/p ERCP with pancreatic mass s/p FNA/spincterotomy/biliary stent placement.

## 2019-09-19 NOTE — CONSULT NOTE ADULT - SUBJECTIVE AND OBJECTIVE BOX
SURGICAL ONCOLOGY CONSULT NOTE    HPI:  63M h/o chronic pain 2/2 traumatic work injury (on daily opiates, s/p spinal stimulator), GERD, HTN, chronic constipation who was admitted on  with acute on chronic left lower quadrant pain x 6days. The pain is described as a cramping pain, and has been associated with a 25 lb weight loss. On initial work up, he was found to have abnormal LFTs, and imaging showing intrahepatic biliary ductal dilation, extensive liver lesions, and a pancreatic tail lesion. He denies night sweats.  Pt states colonoscopy 5 years ago for routine screening was clean and told to return in 10 yrs. States that he has been following with outpt GI doctor for constipation which he describes as not having BM for 3 days at a time which has been attributed to his chronic opioid use. Of note pt status post left hernia repair 1 year ago.  5 day PTA pt noted that his urine looked much darker and orange than usual. Has been noted by family to look jaundiced. Pt denies fevers/chills/chest pain/ palpitations/ n/v/d.        PMHx: Anxiety and depression  Diastolic dysfunction  Empyema lung  H/O peptic ulcer  history of renal calculus  Renal calculus  Current smoker  Herniated Disc  Hypertension  Depression  Spinal Stenosis  Peripheral Neuropathy  Postlaminectomy Syndrome    PSHx: S/P  shunt  S/P insertion of spinal cord stimulator  History of lumbar fusion  Insertion of Intrathecal Dilaudid Pump  S/P Spinal Surgery  S/P Knee Replacement  S/P Arthroscopy of Left Knee  S/P Tonsillectomy  Ankle Fracture    Allergies: No Known Allergies  (Intolerances: )  Social Hx:   Family Hx: No pertinent family history in first degree relatives      Physical Exam  T(C): 36.5  HR: 66 (60 - 66)  BP: 155/80 (138/84 - 164/98)  RR: 18 (18 - 18)  SpO2: 97% (94% - 97%)  Tmax: T(C): , Max: 36.7 (19 @ 05:11)    19  -  19  --------------------------------------------------------  IN:    Oral Fluid: 240 mL  Total IN: 240 mL    OUT:  Total OUT: 0 mL    Total NET: 240 mL          Labs:                        11.4   4.2   )-----------( 164      ( 19 Sep 2019 06:54 )             36.0     PT/INR - ( 19 Sep 2019 06:57 )   PT: 12.1 sec;   INR: 1.05 ratio         PTT - ( 19 Sep 2019 06:57 )  PTT:32.2 sec      140  |  97  |  8   ----------------------------<  129<H>  4.1   |  32<H>  |  0.95    Ca    9.9      19 Sep 2019 06:56  Phos  3.4       Mg     2.0         TPro  7.4  /  Alb  3.8  /  TBili  6.6<H>  /  DBili  x   /  AST  282<H>  /  ALT  438<H>  /  AlkPhos  262<H>      Urinalysis Basic - ( 17 Sep 2019 22:11 )    Color: Yellow / Appearance: Clear / S.032 / pH: x  Gluc: x / Ketone: Negative  / Bili: Small / Urobili: Negative   Blood: x / Protein: Negative / Nitrite: Negative   Leuk Esterase: Negative / RBC: 0 /hpf / WBC 1 /HPF   Sq Epi: x / Non Sq Epi: 0 / Bacteria: Negative      Imaging and other studies:    < from: CT Abdomen and Pelvis w/ Oral Cont and w/ IV Cont (19 @ 20:46) >    FINDINGS:    LOWER CHEST: Redemonstration of right lower lobe linear and groundglass   airspace opacities, which may reflect atelectasis and/or scarring    LIVER: Multiple indeterminant heterogeneously hypodense hepatic lesions.   For reference 1.8 cm segment 4 (3; 21) is, 2.2 cm segment 5 (3; 27), and   1.0 cm segment 5 (3; 41) lesions.  BILE DUCTS: New mild intrahepatic biliary ductal dilatation.  GALLBLADDER: Within normal limits.  SPLEEN: Within normal limits.  PANCREAS: Limited evaluation of the pancreatic parenchyma secondary to   streak artifact. Focal hypodensity within the pancreatic neck, likely   artifactual. Question 1.8 cm hypodense lesion within the pancreatic tail.  ADRENALS: 9 mm right adrenal gland nodule, grossly stable.  KIDNEYS/URETERS: 3 mm nonobstructing left renal calculus. No   hydronephrosis. Symmetric parenchymal enhancement.    BLADDER: Within normal limits.  REPRODUCTIVE ORGANS: Prostate is mildly enlarged.    BOWEL: Moderate to large hiatal hernia. Stool-filled colon. No bowel   obstruction. Appendix is normal.  PERITONEUM: No ascites.  VESSELS: Atherosclerosis of the abdominal aorta and its branch vessels.  RETROPERITONEUM/LYMPH NODES: No lymphadenopathy.    ABDOMINAL WALL: Redemonstrated spinal stimulator within the subcutaneous   tissues of the left lower back.. Small fat-containing umbilical hernia.  BONES: Redemonstration of extensive thoracolumbar spinal fusion hardware.   Orthopedic hardware is grossly intact. No periprosthetic lucency.     IMPRESSION:     Mild intrahepatic biliary ductal dilatation as well as multiple   indeterminant hepatic lesions, new since prior examination of 3/25/2018.   Metastatic disease is a differential diagnostic consideration.    Limited evaluation of the pancreatic parenchyma secondary to streak   artifact. Question 1.8 cm hypodense pancreatic tail lesion. Hypodensity   within the pancreatic neck, likely artifactual. No definite pancreatic   ductal dilatation.    More sensitive evaluationwith MRI and/or endoscopic ultrasound is   recommended, if no contraindications exist.    Moderate to large colonic fecal load.    Moderate to large hiatal hernia. SURGICAL ONCOLOGY CONSULT NOTE    HPI:  63M h/o chronic pain 2/2 traumatic work injury (on daily opiates, s/p spinal stimulator), GERD, HTN, chronic constipation who was admitted on  with acute on chronic left lower quadrant pain x 6days. The pain is described as a cramping pain, and has been associated with a 25 lb weight loss. On initial work up, he was found to have abnormal LFTs, and imaging showing intrahepatic biliary ductal dilation, extensive liver lesions, and a pancreatic tail lesion. He denies night sweats.  Pt states colonoscopy 5 years ago for routine screening was clean and told to return in 10 yrs. States that he has been following with outpt GI doctor for constipation which he describes as not having BM for 3 days at a time which has been attributed to his chronic opioid use. Of note pt status post left hernia repair 1 year ago.  5 day PTA pt noted that his urine looked much darker and orange than usual. Has been noted by family to look jaundiced. Pt denies fevers/chills/chest pain/ palpitations/ n/v/d.    Today pt is s/p ERCP w/ biopsy and stent placement, pt tolerated procedure well.       PMHx: Anxiety and depression  Diastolic dysfunction  Empyema lung  H/O peptic ulcer  history of renal calculus  Renal calculus  Current smoker  Herniated Disc  Hypertension  Depression  Spinal Stenosis  Peripheral Neuropathy  Postlaminectomy Syndrome    PSHx: S/P  shunt  S/P insertion of spinal cord stimulator  History of lumbar fusion  Insertion of Intrathecal Dilaudid Pump  S/P Spinal Surgery  S/P Knee Replacement  S/P Arthroscopy of Left Knee  S/P Tonsillectomy  Ankle Fracture    Allergies: No Known Allergies  (Intolerances: )  Social Hx:   Family Hx: No pertinent family history in first degree relatives      Physical Exam  T(C): 36.5  HR: 66 (60 - 66)  BP: 155/80 (138/84 - 164/98)  RR: 18 (18 - 18)  SpO2: 97% (94% - 97%)  Tmax: T(C): , Max: 36.7 (19 @ 05:11)    19  -  19  --------------------------------------------------------  IN:    Oral Fluid: 240 mL  Total IN: 240 mL    OUT:  Total OUT: 0 mL    Total NET: 240 mL    General: NAD, awake and alert  Neuro: No focal neurologic deficits  HEENT: jaundice  CV: RRR  Respiratory: Respirations nonlabored  Abdominal: Abdomen soft, nontender, mildly distended, No guarding or rebound tenderness, device palpated in RLQ (per pt pain pump)  Ext: wwp, moving spontaneously        Labs:                        11.4   4.2   )-----------( 164      ( 19 Sep 2019 06:54 )             36.0     PT/INR - ( 19 Sep 2019 06:57 )   PT: 12.1 sec;   INR: 1.05 ratio         PTT - ( 19 Sep 2019 06:57 )  PTT:32.2 sec      140  |  97  |  8   ----------------------------<  129<H>  4.1   |  32<H>  |  0.95    Ca    9.9      19 Sep 2019 06:56  Phos  3.4       Mg     2.0         TPro  7.4  /  Alb  3.8  /  TBili  6.6<H>  /  DBili  x   /  AST  282<H>  /  ALT  438<H>  /  AlkPhos  262<H>      Urinalysis Basic - ( 17 Sep 2019 22:11 )    Color: Yellow / Appearance: Clear / S.032 / pH: x  Gluc: x / Ketone: Negative  / Bili: Small / Urobili: Negative   Blood: x / Protein: Negative / Nitrite: Negative   Leuk Esterase: Negative / RBC: 0 /hpf / WBC 1 /HPF   Sq Epi: x / Non Sq Epi: 0 / Bacteria: Negative      Imaging and other studies:    < from: CT Abdomen and Pelvis w/ Oral Cont and w/ IV Cont (19 @ 20:46) >    FINDINGS:    LOWER CHEST: Redemonstration of right lower lobe linear and groundglass   airspace opacities, which may reflect atelectasis and/or scarring    LIVER: Multiple indeterminant heterogeneously hypodense hepatic lesions.   For reference 1.8 cm segment 4 (3; 21) is, 2.2 cm segment 5 (3; 27), and   1.0 cm segment 5 (3; 41) lesions.  BILE DUCTS: New mild intrahepatic biliary ductal dilatation.  GALLBLADDER: Within normal limits.  SPLEEN: Within normal limits.  PANCREAS: Limited evaluation of the pancreatic parenchyma secondary to   streak artifact. Focal hypodensity within the pancreatic neck, likely   artifactual. Question 1.8 cm hypodense lesion within the pancreatic tail.  ADRENALS: 9 mm right adrenal gland nodule, grossly stable.  KIDNEYS/URETERS: 3 mm nonobstructing left renal calculus. No   hydronephrosis. Symmetric parenchymal enhancement.    BLADDER: Within normal limits.  REPRODUCTIVE ORGANS: Prostate is mildly enlarged.    BOWEL: Moderate to large hiatal hernia. Stool-filled colon. No bowel   obstruction. Appendix is normal.  PERITONEUM: No ascites.  VESSELS: Atherosclerosis of the abdominal aorta and its branch vessels.  RETROPERITONEUM/LYMPH NODES: No lymphadenopathy.    ABDOMINAL WALL: Redemonstrated spinal stimulator within the subcutaneous   tissues of the left lower back.. Small fat-containing umbilical hernia.  BONES: Redemonstration of extensive thoracolumbar spinal fusion hardware.   Orthopedic hardware is grossly intact. No periprosthetic lucency.     IMPRESSION:     Mild intrahepatic biliary ductal dilatation as well as multiple   indeterminant hepatic lesions, new since prior examination of 3/25/2018.   Metastatic disease is a differential diagnostic consideration.    Limited evaluation of the pancreatic parenchyma secondary to streak   artifact. Question 1.8 cm hypodense pancreatic tail lesion. Hypodensity   within the pancreatic neck, likely artifactual. No definite pancreatic   ductal dilatation.    More sensitive evaluationwith MRI and/or endoscopic ultrasound is   recommended, if no contraindications exist.    Moderate to large colonic fecal load.    Moderate to large hiatal hernia.

## 2019-09-19 NOTE — PROGRESS NOTE ADULT - PROBLEM SELECTOR PLAN 3
Concern for malignancy?  - f/u GI  - f/u AFP Concern for malignancy  - f/u GI, ERCP  -elevated Ca19-9 Unclear etiology, likely constipation (CT with significant stool burden) vs. abnormalities found as above  - appreciate GI recs  - miralax TID, SMOG enema, appreciate GI recs

## 2019-09-19 NOTE — PROGRESS NOTE ADULT - SUBJECTIVE AND OBJECTIVE BOX
Pre-Endoscopy Evaluation      Referring Physician: dr. olga bourne                                   Procedure: eus/ercp    Indication for Procedure: obstructive jaundice, abnormal imaging    Pertinent History: 63y male with PMH below including chronic pain 2/2 traumatic work injury (on daily opiates), GERD, HTN, chronic constipation who presents with acute on chronic left lower quadrant pain.  Found to have elevated liver tests and abnormal CT      PAST MEDICAL & SURGICAL HISTORY:  Anxiety and depression  Diastolic dysfunction: stage I  Empyema lun2015 left lung, s/p VATS, decortication  H/O peptic ulcer: over 10 years ago  history of renal calculus  Herniated Disc: S/P work injury   Hypertension: Dx:   Spinal Stenosis  Postlaminectomy Syndrome  S/P  shunt  S/P insertion of spinal cord stimulator:   History of lumbar fusion:   Insertion of Intrathecal Dilaudid Pump: 2011  S/P Spinal Surgery: corrective lumbar fusion   S/P Knee Replacement: left knee   S/P Arthroscopy of Left Knee  S/P Tonsillectomy: childhood  Ankle Fracture: s/p ORIF left ankle       PMH of Gastroparesis [ ]  Gastric Surgery [ ]  Gastric Outlet Obstruction [ ]    Allergies:    No Known Allergies    Intolerances:    Latex allergy: [ ] yes [X] no    Medications:MEDICATIONS  (STANDING):  cloNIDine 0.1 milliGRAM(s) Oral every 8 hours  dextrose 5%. 1000 milliLiter(s) (50 mL/Hr) IV Continuous <Continuous>  dextrose 50% Injectable 12.5 Gram(s) IV Push once  dextrose 50% Injectable 25 Gram(s) IV Push once  dextrose 50% Injectable 25 Gram(s) IV Push once  diazepam    Tablet 2 milliGRAM(s) Oral three times a day  DULoxetine 60 milliGRAM(s) Oral two times a day  enoxaparin Injectable 40 milliGRAM(s) SubCutaneous daily  hydrochlorothiazide 50 milliGRAM(s) Oral daily  polyethylene glycol 3350 17 Gram(s) Oral two times a day  senna 2 Tablet(s) Oral at bedtime    MEDICATIONS  (PRN):  dextrose 40% Gel 15 Gram(s) Oral once PRN Blood Glucose LESS THAN 70 milliGRAM(s)/deciliter  glucagon  Injectable 1 milliGRAM(s) IntraMuscular once PRN Glucose LESS THAN 70 milligrams/deciliter  methadone    Tablet 20 milliGRAM(s) Oral four times a day PRN Severe Pain (7 - 10)  morphine  IR 30 milliGRAM(s) Oral every 6 hours PRN Moderate Pain (4 - 6)      Smoking: [ ] yes  [X] no    AICD/PPM: [ ] yes   [X] no    Pertinent lab data:                        11.4   4.2   )-----------( 164      ( 19 Sep 2019 06:54 )             36.0         140  |  97  |  8   ----------------------------<  129<H>  4.1   |  32<H>  |  0.95    Ca    9.9      19 Sep 2019 06:56  Phos  3.4       Mg     2.0         TPro  7.4  /  Alb  3.8  /  TBili  6.6<H>  /  DBili  x   /  AST  282<H>  /  ALT  438<H>  /  AlkPhos  262<H>      PT/INR - ( 19 Sep 2019 06:57 )   PT: 12.1 sec;   INR: 1.05 ratio    PTT - ( 19 Sep 2019 06:57 )  PTT:32.2 sec        < from: CT Abdomen and Pelvis w/ Oral Cont and w/ IV Cont (19 @ 20:46) >    Limited evaluation of the pancreatic parenchyma secondary to streak   artifact. Question 1.8 cm hypodense pancreatic tail lesion. Hypodensity   within the pancreatic neck, likely artifactual. No definite pancreatic   ductal dilatation.    More sensitive evaluation with MRI and/or endoscopic ultrasound is   recommended, if no contraindications exist.    Moderate to large colonic fecal load.    Moderate to large hiatal hernia.  -----------------------------------------------------        < from: Transthoracic Echocardiogram (11.04.15 @ 10:11) >    Fractional short: 50 %  EF (Jorge Luis): 81 %  ------------------------------------------------------------------------  Observations:  Mitral Valve: Thickened mitral valve.  Aortic Valve/Aorta: Thickened aortic valve. No aortic valve  regurgitation seen.  Aortic Root: 3.2 cm.  Left Atrium: Moderately dilated left atrium.  LA volume  index = 39 cc/m2.  Left Ventricle: Normal left ventricular systolic function.  No segmental wall motion abnormalities. Normal left  ventricular internal dimensions and wall thicknesses. Mild  diastolic dysfunction (Stage I).  Right Heart: Normal right atrium. Normal right ventricular  size and function. Normal tricuspid valve. Normal pulmonic  valve.  Pericardium/Pleura: Normal pericardium with no pericardial  effusion.  Hemodynamic: Estimated right atrial pressure is 8 mm Hg.  ------------------------------------------------------------------------  Conclusions:  1. Normal left ventricular systolic function. No segmental  wall motion abnormalities.  2. Mild diastolic dysfunction (Stage I).  --------------------------------------------        Physical Examination:  Daily Height in cm: 180.34 (19 Sep 2019 09:17)    Daily   Vital Signs Last 24 Hrs  T(C): 36.7 (19 Sep 2019 05:11), Max: 36.7 (19 Sep 2019 05:11)  T(F): 98 (19 Sep 2019 05:11), Max: 98 (19 Sep 2019 05:11)  HR: 62 (19 Sep 2019 05:11) (60 - 64)  BP: 138/84 (19 Sep 2019 05:11) (127/78 - 164/98)  BP(mean): --  RR: 18 (19 Sep 2019 05:11) (18 - 19)  SpO2: 94% (19 Sep 2019 05:11) (94% - 97%)    Drug Dosing Weight  Height (cm): 180.34 (19 Sep 2019 09:17)  Weight (kg): 98.6 (19 Sep 2019 09:17)  BMI (kg/m2): 30.3 (19 Sep 2019 09:17)  BSA (m2): 2.18 (19 Sep 2019 09:17)    Constitutional: NAD,       Neck:  No JVD    Respiratory: CTAB/L    Cardiovascular: S1 and S2    Gastrointestinal: BS+, soft, NT/ND    Extremities: No peripheral edema    Neurological: A/O x 3    : No Heredia    Skin: No rashes    Comments:      The patient is a suitable candidate for the planned procedure unless box checked [ ]  No, explain:

## 2019-09-19 NOTE — PROGRESS NOTE ADULT - PROBLEM SELECTOR PLAN 7
Diet: reg diet, NPO MN  DVT: lovenox  Electrolytes: replete prn - repleted K today  Dispo: ERCP tomorrow Diet: reg diet, NPO MN  DVT: lovenox  Electrolytes: replete prn  Dispo: ERCP, GI recs DVT: lovenox  Electrolytes: replete prn  Dispo: f/u onc, surg onc, GI

## 2019-09-19 NOTE — PROGRESS NOTE ADULT - ATTENDING COMMENTS
pancreatic lesion suspicious for malignancy seen on ERCP s/p FNA and biliary stent  f/u CT chest eval for mets  f/u onc, consult surg onc as well  optimize bowel regimen as above  new onset dm2, will need diabetic nurse education

## 2019-09-19 NOTE — PROGRESS NOTE ADULT - PROBLEM SELECTOR PLAN 1
Likely 2/2 biliary obstruction given CT findings of ductal dilatation w/ concern for malignancy given hepatic and pancreatic lesions and unintended weight loss. -Elevated total/direct bili, alk phos, LFTs  -F/u CEA, CA 19-9, AFP  -GI recs appreciated - for ERCP tomorrow. NPO MN.  PT w/ metal implants that preclude MRCP  -trend lfts Likely 2/2 biliary obstruction given CT findings of ductal dilatation w/ concern for malignancy given hepatic and pancreatic lesions and unintended weight loss. -Elevated total/direct bili, alk phos, LFTs  -elevated Ca19-9  -f/u ERCP, appreciate GI recs  -trend lfts with biliary obstruction given CT findings of ductal dilatation w/ concern for malignancy given hepatic and pancreatic lesions and unintended weight loss. -Elevated total/direct bili, alk phos, LFTs  s/p ERCP showing 25x24 mm pancreatic mass s/p FNA/spincterotomy/biliary stent placement  -elevated Ca19-9  -onc consulted, will consult surg onc  -f/u FNA  -CT chest w/ iv contrast to eval for mets  -f/u GI recs  -trend lfts

## 2019-09-19 NOTE — CONSULT NOTE ADULT - SUBJECTIVE AND OBJECTIVE BOX
HPI:  62 yo h/o chronic pain 2/2 traumatic work injury (on daily opiates), GERD, HTN, chronic constipation who presented with LLQ pain, weight loss, jaundice.  Oncology consulted for concern for pancreatic cancer.    Patient reports increased pain LLQ, had bene having dark stools as well.  25lb weight loss over the past few months.  Admitted to [facility].  CT here showed: mild intrahepatic biliary ductal dilation, multiple hepatic lesions new since 3/2018.  Hypodense lesion in pancreatic tail.  He underwent ERCP with stenting today, bx pathology is pending.       CA 19-9 1832, CEA and AFP normal.    EDVS: Afebrile, HR 60-70s, BP 130s-140s/80s, SpO2 100% RA  ED gave: morphine 4 mg and zofran as well as 1 LB (18 Sep 2019 05:46)    PAST MEDICAL & SURGICAL HISTORY:  Anxiety and depression  Diastolic dysfunction: stage I  Empyema lun2015 left lung, s/p VATS, decortication  H/O peptic ulcer: over 10 years ago  history of renal calculus  Herniated Disc: S/P work injury   Hypertension: Dx:   Spinal Stenosis  Postlaminectomy Syndrome  S/P  shunt  S/P insertion of spinal cord stimulator:   History of lumbar fusion:   Insertion of Intrathecal Dilaudid Pump: 2011  S/P Spinal Surgery: corrective lumbar fusion   S/P Knee Replacement: left knee   S/P Arthroscopy of Left Knee  S/P Tonsillectomy: childhood  Ankle Fracture: s/p ORIF left ankle         cloNIDine 0.1 milliGRAM(s) Oral every 8 hours  dextrose 5%. 1000 milliLiter(s) (50 mL/Hr) IV Continuous <Continuous>  dextrose 50% Injectable 12.5 Gram(s) IV Push once  dextrose 50% Injectable 25 Gram(s) IV Push once  dextrose 50% Injectable 25 Gram(s) IV Push once  diazepam    Tablet 2 milliGRAM(s) Oral every 8 hours  DULoxetine 60 milliGRAM(s) Oral two times a day  enoxaparin Injectable 40 milliGRAM(s) SubCutaneous daily  hydrochlorothiazide 50 milliGRAM(s) Oral daily  polyethylene glycol 3350 17 Gram(s) Oral <User Schedule>  senna 2 Tablet(s) Oral at bedtime    MEDICATIONS  (PRN):  dextrose 40% Gel 15 Gram(s) Oral once PRN Blood Glucose LESS THAN 70 milliGRAM(s)/deciliter  glucagon  Injectable 1 milliGRAM(s) IntraMuscular once PRN Glucose LESS THAN 70 milligrams/deciliter  methadone    Tablet 20 milliGRAM(s) Oral four times a day PRN Severe Pain (7 - 10)  morphine  IR 30 milliGRAM(s) Oral every 6 hours PRN Moderate Pain (4 - 6)      Allergies    No Known Allergies    Intolerances        SOCIAL HISTORY:    FAMILY HISTORY:  No pertinent family history in first degree relatives      Vital Signs Last 24 Hrs  T(C): 36.5 (19 Sep 2019 13:23), Max: 36.7 (19 Sep 2019 05:11)  T(F): 97.7 (19 Sep 2019 13:23), Max: 98 (19 Sep 2019 05:11)  HR: 66 (19 Sep 2019 13:23) (60 - 66)  BP: 155/80 (19 Sep 2019 13:23) (138/84 - 164/98)  BP(mean): --  RR: 18 (19 Sep 2019 13:23) (18 - 18)  SpO2: 97% (19 Sep 2019 13:23) (94% - 97%)    PHYSICAL EXAM:    GENERAL: NAD, AAOx3   HEAD:  NC/AT  EYES: EOMI, PERRLA, no scleral icterus  HEENT: Moist mucous membranes  LUNG: Clear to auscultation bilaterally; No rales, rhonchi, wheezing, or rubs  HEART: RRR; No murmurs, rubs, or gallops  ABDOMEN: +BS, ST/ND/NT  EXTREMITIES:  2+ Peripheral Pulses, No clubbing, cyanosis, or edema  LAD: no palpable adenopathy    LABS:                        11.4   4.2   )-----------( 164      ( 19 Sep 2019 06:54 )             36.0         140  |  97  |  8   ----------------------------<  129<H>  4.1   |  32<H>  |  0.95    Ca    9.9      19 Sep 2019 06:56  Phos  3.4       Mg     2.0         TPro  7.4  /  Alb  3.8  /  TBili  6.6<H>  /  DBili  x   /  AST  282<H>  /  ALT  438<H>  /  AlkPhos  262<H>      PT/INR - ( 19 Sep 2019 06:57 )   PT: 12.1 sec;   INR: 1.05 ratio         PTT - ( 19 Sep 2019 06:57 )  PTT:32.2 sec  Urinalysis Basic - ( 17 Sep 2019 22:11 )    Color: Yellow / Appearance: Clear / S.032 / pH: x  Gluc: x / Ketone: Negative  / Bili: Small / Urobili: Negative   Blood: x / Protein: Negative / Nitrite: Negative   Leuk Esterase: Negative / RBC: 0 /hpf / WBC 1 /HPF   Sq Epi: x / Non Sq Epi: 0 / Bacteria: Negative            RADIOLOGY & ADDITIONAL STUDIES:

## 2019-09-19 NOTE — PROGRESS NOTE ADULT - PROBLEM SELECTOR PLAN 2
Likely 2/2 obstruction as above. History does not favor toxin, ischemia or autoimmune.    -hep panel and acetaminophen level unremarkable Concern for malignancy given ERCP findings of pancreatic lesion. not seen on ERCp  -f/u onc

## 2019-09-20 NOTE — PROGRESS NOTE ADULT - ASSESSMENT
Impression:  62 yo h/o chronic pain 2/2 traumatic work injury (on daily opiates), GERD, HTN, chronic constipation who presents with acute on chronig left lower quadrant pain x 6days.  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

## 2019-09-20 NOTE — PROGRESS NOTE ADULT - SUBJECTIVE AND OBJECTIVE BOX
Interval Events: No acute overnight events.     REVIEW OF SYSTEMS:  [ ] All other systems negative  [ ] Unable to assess ROS because ________    OBJECTIVE:  ICU Vital Signs Last 24 Hrs  T(C): 36.8 (20 Sep 2019 06:09), Max: 36.8 (20 Sep 2019 06:09)  T(F): 98.2 (20 Sep 2019 06:09), Max: 98.2 (20 Sep 2019 06:09)  HR: 63 (20 Sep 2019 06:09) (59 - 66)  BP: 161/92 (20 Sep 2019 06:09) (142/83 - 177/84)  BP(mean): --  ABP: --  ABP(mean): --  RR: 18 (20 Sep 2019 06:09) (18 - 18)  SpO2: 96% (20 Sep 2019 06:09) (96% - 98%)        09-19 @ 07:01  -  09-20 @ 07:00  --------------------------------------------------------  IN: 360 mL / OUT: 0 mL / NET: 360 mL      CAPILLARY BLOOD GLUCOSE      POCT Blood Glucose.: 279 mg/dL (19 Sep 2019 21:37)      PHYSICAL EXAM:  General:  HEENT:   Lymph Nodes:   Neck:   Respiratory:   Cardiovascular:   Abdomen:  Extremities:  Skin:   Neurological:   Psychiatry:     HOSPITAL MEDICATIONS:  enoxaparin Injectable 40 milliGRAM(s) SubCutaneous daily      cloNIDine 0.1 milliGRAM(s) Oral every 8 hours  hydrochlorothiazide 50 milliGRAM(s) Oral daily    dextrose 40% Gel 15 Gram(s) Oral once PRN  dextrose 50% Injectable 12.5 Gram(s) IV Push once  dextrose 50% Injectable 25 Gram(s) IV Push once  dextrose 50% Injectable 25 Gram(s) IV Push once  glucagon  Injectable 1 milliGRAM(s) IntraMuscular once PRN  insulin lispro (HumaLOG) corrective regimen sliding scale   SubCutaneous three times a day before meals  insulin lispro (HumaLOG) corrective regimen sliding scale   SubCutaneous at bedtime      diazepam    Tablet 2 milliGRAM(s) Oral every 8 hours  DULoxetine 60 milliGRAM(s) Oral two times a day  methadone    Tablet 20 milliGRAM(s) Oral four times a day PRN  morphine  IR 30 milliGRAM(s) Oral every 6 hours PRN    polyethylene glycol 3350 17 Gram(s) Oral <User Schedule>  senna 2 Tablet(s) Oral at bedtime        dextrose 5%. 1000 milliLiter(s) IV Continuous <Continuous>            LABS:                        11.4   4.2   )-----------( 164      ( 19 Sep 2019 06:54 )             36.0     Hgb Trend: 11.4<--, 11.3<--, 11.5<--  09-19    140  |  97  |  8   ----------------------------<  129<H>  4.1   |  32<H>  |  0.95    Ca    9.9      19 Sep 2019 06:56  Phos  3.4     09-19  Mg     2.0     09-19    TPro  7.4  /  Alb  3.8  /  TBili  6.6<H>  /  DBili  x   /  AST  282<H>  /  ALT  438<H>  /  AlkPhos  262<H>  09-19    Creatinine Trend: 0.95<--, 0.90<--, 0.96<--  PT/INR - ( 19 Sep 2019 06:57 )   PT: 12.1 sec;   INR: 1.05 ratio         PTT - ( 19 Sep 2019 06:57 )  PTT:32.2 sec          MICROBIOLOGY:     RADIOLOGY:  [ ] Reviewed and interpreted by me    ASSESSMENT AND RECOMMENDATION: Interval Events: No acute overnight events.     REVIEW OF SYSTEMS:  [ ] All other systems negative  [ ] Unable to assess ROS because ________    OBJECTIVE:  ICU Vital Signs Last 24 Hrs  T(C): 36.8 (20 Sep 2019 06:09), Max: 36.8 (20 Sep 2019 06:09)  T(F): 98.2 (20 Sep 2019 06:09), Max: 98.2 (20 Sep 2019 06:09)  HR: 63 (20 Sep 2019 06:09) (59 - 66)  BP: 161/92 (20 Sep 2019 06:09) (142/83 - 177/84)  BP(mean): --  ABP: --  ABP(mean): --  RR: 18 (20 Sep 2019 06:09) (18 - 18)  SpO2: 96% (20 Sep 2019 06:09) (96% - 98%)        09-19 @ 07:01  -  09-20 @ 07:00  --------------------------------------------------------  IN: 360 mL / OUT: 0 mL / NET: 360 mL      CAPILLARY BLOOD GLUCOSE      POCT Blood Glucose.: 279 mg/dL (19 Sep 2019 21:37)      PHYSICAL EXAM:  GENERAL: appears stated age, disheveled  HEENT: EOMI, PERRL, +scleral icterus, MMM, no oropharyngeal lesions or erythema appreciated  Pulm: CTAB  CV: RRR, S1&S2+, no m/r/g appreciated  ABDOMEN: +BS, +tender  over superior portion of LLQ (no guarding), also tender to palpation diffusely with rebound pain, +distended, +hepatomegaly with irregular borders, right lower quadrant mass corresponding to prior stimulator battery.   MSK: nl ROM  BACK: Right lower quadrant mass corresponding to current stimulator battery, pain on palpation over lumbar spine with wincing, mild pain over paraspinal region w/o wincing  EXTREMITIES:  no appreciable edema in b/l LE  Neuro: A&Ox3, no focal deficits  SKIN: warm and dry, no visible rash      HOSPITAL MEDICATIONS:  enoxaparin Injectable 40 milliGRAM(s) SubCutaneous daily      cloNIDine 0.1 milliGRAM(s) Oral every 8 hours  hydrochlorothiazide 50 milliGRAM(s) Oral daily    dextrose 40% Gel 15 Gram(s) Oral once PRN  dextrose 50% Injectable 12.5 Gram(s) IV Push once  dextrose 50% Injectable 25 Gram(s) IV Push once  dextrose 50% Injectable 25 Gram(s) IV Push once  glucagon  Injectable 1 milliGRAM(s) IntraMuscular once PRN  insulin lispro (HumaLOG) corrective regimen sliding scale   SubCutaneous three times a day before meals  insulin lispro (HumaLOG) corrective regimen sliding scale   SubCutaneous at bedtime      diazepam    Tablet 2 milliGRAM(s) Oral every 8 hours  DULoxetine 60 milliGRAM(s) Oral two times a day  methadone    Tablet 20 milliGRAM(s) Oral four times a day PRN  morphine  IR 30 milliGRAM(s) Oral every 6 hours PRN    polyethylene glycol 3350 17 Gram(s) Oral <User Schedule>  senna 2 Tablet(s) Oral at bedtime        dextrose 5%. 1000 milliLiter(s) IV Continuous <Continuous>            LABS:                        11.4   4.2   )-----------( 164      ( 19 Sep 2019 06:54 )             36.0     Hgb Trend: 11.4<--, 11.3<--, 11.5<--  09-19    140  |  97  |  8   ----------------------------<  129<H>  4.1   |  32<H>  |  0.95    Ca    9.9      19 Sep 2019 06:56  Phos  3.4     09-19  Mg     2.0     09-19    TPro  7.4  /  Alb  3.8  /  TBili  6.6<H>  /  DBili  x   /  AST  282<H>  /  ALT  438<H>  /  AlkPhos  262<H>  09-19    Creatinine Trend: 0.95<--, 0.90<--, 0.96<--  PT/INR - ( 19 Sep 2019 06:57 )   PT: 12.1 sec;   INR: 1.05 ratio         PTT - ( 19 Sep 2019 06:57 )  PTT:32.2 sec          MICROBIOLOGY:     RADIOLOGY:  [ ] Reviewed and interpreted by me    ASSESSMENT AND RECOMMENDATION: Interval Events: No acute overnight events. Pt complains of constant 8/10 abdominal pain, going for CT chest this AM. Denies CP and SOB.      OBJECTIVE:  ICU Vital Signs Last 24 Hrs  T(C): 36.8 (20 Sep 2019 06:09), Max: 36.8 (20 Sep 2019 06:09)  T(F): 98.2 (20 Sep 2019 06:09), Max: 98.2 (20 Sep 2019 06:09)  HR: 63 (20 Sep 2019 06:09) (59 - 66)  BP: 161/92 (20 Sep 2019 06:09) (142/83 - 177/84)  BP(mean): --  ABP: --  ABP(mean): --  RR: 18 (20 Sep 2019 06:09) (18 - 18)  SpO2: 96% (20 Sep 2019 06:09) (96% - 98%)        09-19 @ 07:01  -  09-20 @ 07:00  --------------------------------------------------------  IN: 360 mL / OUT: 0 mL / NET: 360 mL      CAPILLARY BLOOD GLUCOSE      POCT Blood Glucose.: 279 mg/dL (19 Sep 2019 21:37)      PHYSICAL EXAM:  GENERAL: appears stated age, disheveled  HEENT: EOMI, PERRL, +scleral icterus, MMM, no oropharyngeal lesions or erythema appreciated  Pulm: CTAB  CV: RRR, S1&S2+, no m/r/g appreciated  ABDOMEN: +BS, +tender  over superior portion of LLQ (no guarding), also tender to palpation diffusely with rebound pain, +distended, +hepatomegaly with irregular borders, right lower quadrant mass corresponding to prior stimulator battery.   MSK: nl ROM  BACK: Right lower quadrant mass corresponding to current stimulator battery, pain on palpation over lumbar spine with wincing, mild pain over paraspinal region w/o wincing  EXTREMITIES:  no appreciable edema in b/l LE  Neuro: A&Ox3, no focal deficits  SKIN: warm and dry, no visible rash      HOSPITAL MEDICATIONS:  enoxaparin Injectable 40 milliGRAM(s) SubCutaneous daily  cloNIDine 0.1 milliGRAM(s) Oral every 8 hours  hydrochlorothiazide 50 milliGRAM(s) Oral daily  dextrose 40% Gel 15 Gram(s) Oral once PRN  dextrose 50% Injectable 12.5 Gram(s) IV Push once  dextrose 50% Injectable 25 Gram(s) IV Push once  dextrose 50% Injectable 25 Gram(s) IV Push once  glucagon  Injectable 1 milliGRAM(s) IntraMuscular once PRN  insulin lispro (HumaLOG) corrective regimen sliding scale   SubCutaneous three times a day before meals  insulin lispro (HumaLOG) corrective regimen sliding scale   SubCutaneous at bedtime  diazepam    Tablet 2 milliGRAM(s) Oral every 8 hours  DULoxetine 60 milliGRAM(s) Oral two times a day  methadone    Tablet 20 milliGRAM(s) Oral four times a day PRN  morphine  IR 30 milliGRAM(s) Oral every 6 hours PRN  polyethylene glycol 3350 17 Gram(s) Oral <User Schedule>  senna 2 Tablet(s) Oral at bedtime  dextrose 5%. 1000 milliLiter(s) IV Continuous <Continuous>            LABS:                        11.4   4.2   )-----------( 164      ( 19 Sep 2019 06:54 )             36.0     Hgb Trend: 11.4<--, 11.3<--, 11.5<--  09-19    140  |  97  |  8   ----------------------------<  129<H>  4.1   |  32<H>  |  0.95    Ca    9.9      19 Sep 2019 06:56  Phos  3.4     09-19  Mg     2.0     09-19    TPro  7.4  /  Alb  3.8  /  TBili  6.6<H>  /  DBili  x   /  AST  282<H>  /  ALT  438<H>  /  AlkPhos  262<H>  09-19    Creatinine Trend: 0.95<--, 0.90<--, 0.96<--  PT/INR - ( 19 Sep 2019 06:57 )   PT: 12.1 sec;   INR: 1.05 ratio         PTT - ( 19 Sep 2019 06:57 )  PTT:32.2 sec Interval Events: No acute overnight events. Pt complains of constant 8/10 abdominal pain, which is same pain as what he had come in with. Denies CP and SOB. No n/v  seen w son Donald at bedside.      OBJECTIVE:  ICU Vital Signs Last 24 Hrs  T(C): 36.8 (20 Sep 2019 06:09), Max: 36.8 (20 Sep 2019 06:09)  T(F): 98.2 (20 Sep 2019 06:09), Max: 98.2 (20 Sep 2019 06:09)  HR: 63 (20 Sep 2019 06:09) (59 - 66)  BP: 161/92 (20 Sep 2019 06:09) (142/83 - 177/84)  BP(mean): --  ABP: --  ABP(mean): --  RR: 18 (20 Sep 2019 06:09) (18 - 18)  SpO2: 96% (20 Sep 2019 06:09) (96% - 98%)        09-19 @ 07:01  -  09-20 @ 07:00  --------------------------------------------------------  IN: 360 mL / OUT: 0 mL / NET: 360 mL      CAPILLARY BLOOD GLUCOSE      POCT Blood Glucose.: 279 mg/dL (19 Sep 2019 21:37)      PHYSICAL EXAM:  GENERAL: appears stated age, disheveled  HEENT: EOMI, PERRL, +scleral icterus, MMM, no oropharyngeal lesions or erythema appreciated  Pulm: CTAB  CV: RRR, S1&S2+, no m/r/g appreciated  ABDOMEN: +BS, +tender  over superior portion of LLQ (no guarding), +distended, +hepatomegaly with irregular borders, right lower quadrant mass corresponding to prior stimulator battery.   MSK: nl ROM  BACK: Right lower quadrant mass corresponding to current stimulator battery, pain on palpation over lumbar spine with wincing, mild pain over paraspinal region w/o wincing  EXTREMITIES:  no appreciable edema in b/l LE  Neuro: A&Ox3, no focal deficits  SKIN: warm and dry, no visible rash        LABS:                        11.4   4.2   )-----------( 164      ( 19 Sep 2019 06:54 )             36.0     Hgb Trend: 11.4<--, 11.3<--, 11.5<--  09-19    140  |  97  |  8   ----------------------------<  129<H>  4.1   |  32<H>  |  0.95    Ca    9.9      19 Sep 2019 06:56  Phos  3.4     09-19  Mg     2.0     09-19    TPro  7.4  /  Alb  3.8  /  TBili  6.6<H>  /  DBili  x   /  AST  282<H>  /  ALT  438<H>  /  AlkPhos  262<H>  09-19    Creatinine Trend: 0.95<--, 0.90<--, 0.96<--  PT/INR - ( 19 Sep 2019 06:57 )   PT: 12.1 sec;   INR: 1.05 ratio         PTT - ( 19 Sep 2019 06:57 )  PTT:32.2 sec    MEDICATIONS  (STANDING):  cloNIDine 0.1 milliGRAM(s) Oral every 8 hours  dextrose 5%. 1000 milliLiter(s) (50 mL/Hr) IV Continuous <Continuous>  dextrose 50% Injectable 12.5 Gram(s) IV Push once  dextrose 50% Injectable 25 Gram(s) IV Push once  dextrose 50% Injectable 25 Gram(s) IV Push once  DULoxetine 60 milliGRAM(s) Oral two times a day  enoxaparin Injectable 40 milliGRAM(s) SubCutaneous daily  hydrochlorothiazide 50 milliGRAM(s) Oral daily  insulin lispro (HumaLOG) corrective regimen sliding scale   SubCutaneous three times a day before meals  insulin lispro (HumaLOG) corrective regimen sliding scale   SubCutaneous at bedtime  polyethylene glycol 3350 17 Gram(s) Oral <User Schedule>  senna 2 Tablet(s) Oral at bedtime  sucralfate suspension 1 Gram(s) Oral four times a day    MEDICATIONS  (PRN):  dextrose 40% Gel 15 Gram(s) Oral once PRN Blood Glucose LESS THAN 70 milliGRAM(s)/deciliter  diazepam    Tablet 2 milliGRAM(s) Oral every 8 hours PRN anxiety  glucagon  Injectable 1 milliGRAM(s) IntraMuscular once PRN Glucose LESS THAN 70 milligrams/deciliter  methadone    Tablet 20 milliGRAM(s) Oral four times a day PRN Severe Pain (7 - 10)  morphine  IR 30 milliGRAM(s) Oral every 6 hours PRN Moderate Pain (4 - 6)

## 2019-09-20 NOTE — DIETITIAN INITIAL EVALUATION ADULT. - PROBLEM SELECTOR PLAN 2
Likely 2/2 obstruction as above. History does not favor toxin, ischemia or autoimmune.    F/u hepatitis pannel   F/u acetimophen level  check US abdomen with doppler

## 2019-09-20 NOTE — PROGRESS NOTE ADULT - ATTENDING COMMENTS
f/u FNA from ERCP, IR consult today for liver lesion biopsy, f/u onc and surg onc  Check lipase today given abd pain post ercp r/o pancreatitis

## 2019-09-20 NOTE — DIETITIAN INITIAL EVALUATION ADULT. - OTHER INFO
Pt reported decreased/fair appetite x 2 months PTA due to overall "not feeling well". Reported 15# wt loss, .5 kg/230# x 2 months ago, admit wt 98.6 kg/217#, 13#/5.6% wt loss x 2 months. Nutrition focused physical exam performed with pt consent. Noted to have mild muscle mass wasting in the temples and clavicle, no notable fat mass depletion in triceps or legs, good  strength. Pt newly diagnosed DM2, A1C 6.8% (09-19), Provided education on DM diet. Educated pt on carb counting, importance of pairing carbs + prot, and whole grains vs processed carbs. Pt was receptive, engaged, and appreciated education. Handouts provided. Pt stated wife is DM and is familiar with diet. Reportedly followed a Regular diet PTA, eating 3 meals per day. Stated he drank an Ensure daily due to decreased po intake over the past couple of months, offered Glucerna, pt accepted. Denied any food allergies, chewing or swallowing difficulties. Denied any nausea, vomiting, constipation, or diarrhea. No edema or no pressure injuries noted at this time per flow sheet.

## 2019-09-20 NOTE — PROGRESS NOTE ADULT - PROBLEM SELECTOR PLAN 4
- C/w home methadone and morphine for pain  - check EKG for QT - C/w home methadone and morphine for pain - C/w home methadone and morphine for pain - dose to be taken together, which is pt's home regimen

## 2019-09-20 NOTE — PROGRESS NOTE ADULT - PROBLEM SELECTOR PLAN 1
with biliary obstruction given CT findings of ductal dilatation w/ concern for malignancy given hepatic and pancreatic lesions and unintended weight loss. -Elevated total/direct bili, alk phos, LFTs  s/p ERCP showing 25x24 mm pancreatic mass s/p FNA/spincterotomy/biliary stent placement  -elevated Ca19-9  -onc consulted, will consult surg onc  -f/u FNA  -CT chest w/ iv contrast to eval for mets  -trend lfts with biliary obstruction given CT findings of ductal dilatation w/ concern for malignancy given hepatic and pancreatic lesions and unintended weight loss. -Elevated total/direct bili, alk phos, LFTs  s/p ERCP showing 25x24 mm pancreatic mass s/p FNA/spincterotomy/biliary stent placement  -elevated Ca19-9  -onc consulted, will consult surg onc  -f/u FNA  -pending CT chest w/ iv contrast to eval for mets  -LFTs downtrending with biliary obstruction given CT findings of ductal dilatation w/ concern for malignancy given hepatic and pancreatic lesions and unintended weight loss. -Elevated total/direct bili, alk phos, LFTs  s/p ERCP showing 25x24 mm pancreatic mass s/p FNA/spincterotomy/biliary stent placement  -elevated Ca19-9  -med onc and surg onc following  -f/u FNA  -pending CT chest w/ iv contrast to eval for mets with biliary obstruction given CT findings of ductal dilatation w/ concern for malignancy given hepatic and pancreatic lesions and unintended weight loss. -Elevated total/direct bili, alk phos, LFTs  s/p ERCP showing 25x24 mm pancreatic mass s/p FNA/spincterotomy/biliary stent placement. CT chest without lesions.  -elevated Ca19-9  -med onc and surg onc following  -f/u FNA

## 2019-09-20 NOTE — PROGRESS NOTE ADULT - PROBLEM SELECTOR PLAN 7
DVT: lovenox  Electrolytes: replete prn  Dispo: f/u onc, surg onc, GI DVT: lovenox  Electrolytes: replete prn  Dispo: pending CT chest for staging, IR liver lesion bx DVT: lovenox  Electrolytes: replete prn  Dispo: pending IR liver lesion bx DVT: lovenox  Electrolytes: replete prn  Dispo: pending IR liver lesion bx next week

## 2019-09-20 NOTE — DIETITIAN INITIAL EVALUATION ADULT. - PERTINENT MEDS FT
MEDICATIONS  (STANDING):  cloNIDine 0.1 milliGRAM(s) Oral every 8 hours  dextrose 5%. 1000 milliLiter(s) (50 mL/Hr) IV Continuous <Continuous>  dextrose 50% Injectable 12.5 Gram(s) IV Push once  dextrose 50% Injectable 25 Gram(s) IV Push once  dextrose 50% Injectable 25 Gram(s) IV Push once  DULoxetine 60 milliGRAM(s) Oral two times a day  enoxaparin Injectable 40 milliGRAM(s) SubCutaneous daily  hydrochlorothiazide 50 milliGRAM(s) Oral daily  insulin lispro (HumaLOG) corrective regimen sliding scale   SubCutaneous three times a day before meals  insulin lispro (HumaLOG) corrective regimen sliding scale   SubCutaneous at bedtime  polyethylene glycol 3350 17 Gram(s) Oral <User Schedule>  senna 2 Tablet(s) Oral at bedtime    MEDICATIONS  (PRN):  dextrose 40% Gel 15 Gram(s) Oral once PRN Blood Glucose LESS THAN 70 milliGRAM(s)/deciliter  diazepam    Tablet 2 milliGRAM(s) Oral every 8 hours PRN anxiety  glucagon  Injectable 1 milliGRAM(s) IntraMuscular once PRN Glucose LESS THAN 70 milligrams/deciliter  methadone    Tablet 20 milliGRAM(s) Oral four times a day PRN Severe Pain (7 - 10)  morphine  IR 30 milliGRAM(s) Oral every 6 hours PRN Moderate Pain (4 - 6)

## 2019-09-20 NOTE — DIETITIAN INITIAL EVALUATION ADULT. - PROBLEM SELECTOR PLAN 4
Unclear etiology, likely constipation vs. abnormalities found as above  will give miralax x 1 now and reevaluate

## 2019-09-20 NOTE — DIETITIAN INITIAL EVALUATION ADULT. - PERTINENT LABORATORY DATA
CAPILLARY BLOOD GLUCOSE      POCT Blood Glucose.: 176 mg/dL (20 Sep 2019 10:05)  POCT Blood Glucose.: 279 mg/dL (19 Sep 2019 21:37)  POCT Blood Glucose.: 252 mg/dL (19 Sep 2019 19:26)

## 2019-09-20 NOTE — PROGRESS NOTE ADULT - SUBJECTIVE AND OBJECTIVE BOX
Morning Surgical Progress Note  Patient is a 63y old  Male who presents with a chief complaint of abdominal pain, hyperbilirubinemia (19 Sep 2019 16:33)      SUBJECTIVE: Patient seen and examined at bedside with surgical team, patient complains of abdominal pain in the RUQ and epigastric region.     Vital Signs Last 24 Hrs  T(C): 36.8 (20 Sep 2019 06:09), Max: 36.8 (20 Sep 2019 06:09)  T(F): 98.2 (20 Sep 2019 06:09), Max: 98.2 (20 Sep 2019 06:09)  HR: 63 (20 Sep 2019 06:09) (59 - 66)  BP: 161/92 (20 Sep 2019 06:09) (142/83 - 177/84)  BP(mean): --  RR: 18 (20 Sep 2019 06:09) (18 - 18)  SpO2: 96% (20 Sep 2019 06:09) (96% - 98%)I&O's Detail    19 Sep 2019 07:01  -  20 Sep 2019 07:00  --------------------------------------------------------  IN:    Oral Fluid: 360 mL  Total IN: 360 mL    OUT:  Total OUT: 0 mL    Total NET: 360 mL      MEDICATIONS  (STANDING):  cloNIDine 0.1 milliGRAM(s) Oral every 8 hours  dextrose 5%. 1000 milliLiter(s) (50 mL/Hr) IV Continuous <Continuous>  dextrose 50% Injectable 12.5 Gram(s) IV Push once  dextrose 50% Injectable 25 Gram(s) IV Push once  dextrose 50% Injectable 25 Gram(s) IV Push once  diazepam    Tablet 2 milliGRAM(s) Oral every 8 hours  DULoxetine 60 milliGRAM(s) Oral two times a day  enoxaparin Injectable 40 milliGRAM(s) SubCutaneous daily  hydrochlorothiazide 50 milliGRAM(s) Oral daily  insulin lispro (HumaLOG) corrective regimen sliding scale   SubCutaneous three times a day before meals  insulin lispro (HumaLOG) corrective regimen sliding scale   SubCutaneous at bedtime  polyethylene glycol 3350 17 Gram(s) Oral <User Schedule>  senna 2 Tablet(s) Oral at bedtime    MEDICATIONS  (PRN):  dextrose 40% Gel 15 Gram(s) Oral once PRN Blood Glucose LESS THAN 70 milliGRAM(s)/deciliter  glucagon  Injectable 1 milliGRAM(s) IntraMuscular once PRN Glucose LESS THAN 70 milligrams/deciliter  methadone    Tablet 20 milliGRAM(s) Oral four times a day PRN Severe Pain (7 - 10)  morphine  IR 30 milliGRAM(s) Oral every 6 hours PRN Moderate Pain (4 - 6)      Physical Exam  Constitutional: A&Ox3, NAD  Respiratory: equal chest excursion bilaterally  Gastrointestinal: Abdomen soft, distended, tender to palpation at RUQ and Epigastric regions.     LABS:                        11.4   4.2   )-----------( 164      ( 19 Sep 2019 06:54 )             36.0     09-19    140  |  97  |  8   ----------------------------<  129<H>  4.1   |  32<H>  |  0.95    Ca    9.9      19 Sep 2019 06:56  Phos  3.4     09-19  Mg     2.0     09-19    TPro  7.4  /  Alb  3.8  /  TBili  6.6<H>  /  DBili  x   /  AST  282<H>  /  ALT  438<H>  /  AlkPhos  262<H>  09-19    PT/INR - ( 19 Sep 2019 06:57 )   PT: 12.1 sec;   INR: 1.05 ratio         PTT - ( 19 Sep 2019 06:57 )  PTT:32.2 sec  LIVER FUNCTIONS - ( 19 Sep 2019 06:56 )  Alb: 3.8 g/dL / Pro: 7.4 g/dL / ALK PHOS: 262 U/L / ALT: 438 U/L / AST: 282 U/L / GGT: x

## 2019-09-20 NOTE — PROGRESS NOTE ADULT - PROBLEM SELECTOR PLAN 6
a1c 6.8, new dx  -diabetic nurse education  -monitor fs in labs a1c 6.8, new dx  -consistent carb diet  -ISS  -diabetic education consulted

## 2019-09-20 NOTE — DIETITIAN INITIAL EVALUATION ADULT. - PROBLEM SELECTOR PLAN 1
Likely 2/2 biliary obstruction given CT findings of ductal dilatation w/ concern for malignancy given hepatic and pancreatic lesions and unintended weight loss   F/u bilirubin differential on AM labs  F/u CEA  GI c/s for potential ERCP.  PT w/ metal implants that preclude MRCP  IR c/s for biopsy of liver/pancreatic lesions  check US abdomen with doppler

## 2019-09-20 NOTE — CHART NOTE - NSCHARTNOTEFT_GEN_A_CORE
Upon Nutritional Assessment by the Registered Dietitian your patient was determined to meet criteria / has evidence of the following diagnosis/diagnoses:          [X]  Mild Protein Calorie Malnutrition        [ ]  Moderate Protein Calorie Malnutrition        [ ] Severe Protein Calorie Malnutrition        [ ] Unspecified Protein Calorie Malnutrition        [ ] Underweight / BMI <19        [ ] Morbid Obesity / BMI > 40      Findings as based on:  [X] Comprehensive nutrition assessment   [X] Nutrition Focused Physical Exam  [ ] Other: Mild malnutrition related to decreased appetite and po intake As evidenced by 5.6% wt loss x 2 months, mild muscle mass depletion.       Nutrition Plan/Recommendations:    1) continue Consistent Carbohydrate diet + Glucerna   2) Monitor po intake, weights, skin, and labs      PROVIDER Section:     By signing this assessment you are acknowledging and agree with the diagnosis/diagnoses assigned by the Registered Dietitian    Comments:

## 2019-09-20 NOTE — PROGRESS NOTE ADULT - ASSESSMENT
62 yo M who presented to hospital with abdominal pain and jaundice. Patient also had a 25lb weight loss. On initial work up, he was found to have abnormal LFTs, and imaging showing intrahepatic biliary ductal dilation, extensive liver lesions, and a pancreatic tail lesion (2cm x2cm. He is s/p ERCP with biopsy and stent placement.     Plan:  CT of the chest  Consider IR biopsy  Will continue to follow      [facility]  [phone]

## 2019-09-20 NOTE — PROGRESS NOTE ADULT - PROBLEM SELECTOR PLAN 2
Concern for malignancy given ERCP findings of pancreatic lesion. not seen on ERCp  -appreciate surg/onc recs to consult IR for liver lesion biopsy r/o malignancy Concern for malignancy given ERCP findings of pancreatic lesion. not seen on ERCP  -appreciate surg/onc recs - consult IR for liver lesion biopsy r/o malignancy

## 2019-09-20 NOTE — PROGRESS NOTE ADULT - SUBJECTIVE AND OBJECTIVE BOX
Chief Complaint:  Patient is a 63y old  Male who presents with a chief complaint of abdominal pain, hyperbilirubinemia (20 Sep 2019 07:12)      Interval Events:  No adverse events overnight, patient underwent ERCP and EUS yesterday.  Has abdominal pain this morning.    Allergies:  No Known Allergies      Hospital Medications:  cloNIDine 0.1 milliGRAM(s) Oral every 8 hours  dextrose 40% Gel 15 Gram(s) Oral once PRN  dextrose 5%. 1000 milliLiter(s) IV Continuous <Continuous>  dextrose 50% Injectable 12.5 Gram(s) IV Push once  dextrose 50% Injectable 25 Gram(s) IV Push once  dextrose 50% Injectable 25 Gram(s) IV Push once  diazepam    Tablet 2 milliGRAM(s) Oral every 8 hours  DULoxetine 60 milliGRAM(s) Oral two times a day  enoxaparin Injectable 40 milliGRAM(s) SubCutaneous daily  glucagon  Injectable 1 milliGRAM(s) IntraMuscular once PRN  hydrochlorothiazide 50 milliGRAM(s) Oral daily  insulin lispro (HumaLOG) corrective regimen sliding scale   SubCutaneous three times a day before meals  insulin lispro (HumaLOG) corrective regimen sliding scale   SubCutaneous at bedtime  methadone    Tablet 20 milliGRAM(s) Oral four times a day PRN  morphine  IR 30 milliGRAM(s) Oral every 6 hours PRN  polyethylene glycol 3350 17 Gram(s) Oral <User Schedule>  senna 2 Tablet(s) Oral at bedtime      PMHX/PSHX:  Anxiety and depression  Diastolic dysfunction  Empyema lung  H/O peptic ulcer  history of renal calculus  Renal calculus  Current smoker  Herniated Disc  Hypertension  Depression  Spinal Stenosis  Peripheral Neuropathy  Postlaminectomy Syndrome  S/P  shunt  S/P insertion of spinal cord stimulator  History of lumbar fusion  Insertion of Intrathecal Dilaudid Pump  S/P Spinal Surgery  S/P Knee Replacement  S/P Arthroscopy of Left Knee  S/P Tonsillectomy  Ankle Fracture      Family history:  No pertinent family history in first degree relatives      ROS:     General:  No wt loss, fevers, chills, night sweats, fatigue,   Eyes:  Good vision, no reported pain  ENT:  No sore throat, pain, runny nose, dysphagia  CV:  No pain, palpitations, hypo/hypertension  Resp:  No dyspnea, cough, tachypnea, wheezing  GI:  See HPI  :  No pain, bleeding, incontinence, nocturia  Muscle:  No pain, weakness  Neuro:  No weakness, tingling, memory problems  Psych:  No fatigue, insomnia, mood problems, depression  Endocrine:  No polyuria, polydipsia, cold/heat intolerance  Heme:  No petechiae, ecchymosis, easy bruisability  Skin:  No rash, edema      PHYSICAL EXAM:     GENERAL: NAD  HEENT:  NC/AT  CHEST:  Full & symmetric excursion, no increased effort  ABDOMEN:  Soft, non-tender, non-distended, +BS  EXTREMITIES:  no edema  SKIN:  No rash  NEURO:  Alert         Vital Signs:  Vital Signs Last 24 Hrs  T(C): 36.8 (20 Sep 2019 06:09), Max: 36.8 (20 Sep 2019 06:09)  T(F): 98.2 (20 Sep 2019 06:09), Max: 98.2 (20 Sep 2019 06:09)  HR: 63 (20 Sep 2019 06:09) (59 - 66)  BP: 161/92 (20 Sep 2019 06:09) (142/83 - 177/84)  BP(mean): --  RR: 18 (20 Sep 2019 06:09) (18 - 18)  SpO2: 96% (20 Sep 2019 06:09) (96% - 98%)  Daily Height in cm: 180.34 (19 Sep 2019 09:17)    Daily     LABS:                        11.7   6.0   )-----------( 178      ( 20 Sep 2019 07:12 )             37.4     09-20    138  |  94<L>  |  7   ----------------------------<  145<H>  3.4<L>   |  30  |  0.87    Ca    10.5      20 Sep 2019 07:06  Phos  3.6     09-20  Mg     1.9     09-20    TPro  7.8  /  Alb  4.0  /  TBili  6.7<H>  /  DBili  x   /  AST  217<H>  /  ALT  399<H>  /  AlkPhos  280<H>  09-20    LIVER FUNCTIONS - ( 20 Sep 2019 07:06 )  Alb: 4.0 g/dL / Pro: 7.8 g/dL / ALK PHOS: 280 U/L / ALT: 399 U/L / AST: 217 U/L / GGT: x           PT/INR - ( 19 Sep 2019 06:57 )   PT: 12.1 sec;   INR: 1.05 ratio         PTT - ( 19 Sep 2019 06:57 )  PTT:32.2 sec        Imaging:  < from: ERCP (09.19.19 @ 08:48) >    [facility]  ____________________________________________________________________________________________________  Patient Name: Lucian Ortiz                   MRN: [id number]  Account Number: [id number]                     YOB: 1955  Room: Endoscopy Room 2                           Gender: Male  Attending MD: Norris Corona MD                Procedure Date No Time: 9/19/2019  ____________________________________________________________________________________________________     Procedure:           ERCP  Indications:         63 year old man with obstructive jaundice, biliary ductal dilatation and                        pancreatic/hepatic lesions on CTAP, here for EUS/biopsy and ERCP/stent.  Providers:           Norris Corona MD, Edwardo Raman (Fellow), Mattie Sorensen (Fellow)  Medicines:           General Anesthesia; Levaquin 500 mg IV; Fluoro: 1.7 min  Complications:       No immediate complications.  ____________________________________________________________________________________________________  Procedure:           Pre-Anesthesia Assessment:                       - Prior to the procedure, a History and Physical was performed, and patient                        medications and allergies were reviewed. The risks and benefits of the                        procedure and the sedation options and risks were discussed with the patient.                        All questions were answered and informed consent was obtained. Patient                   identification and proposed procedure were verified. After reviewing the                        risks and benefits, the patient was deemed in satisfactory condition to                        undergo the procedure. The anesthesia plan was to use general anesthesia.                        Immediately prior to administration of medications, the patient was                        re-assessed for adequacy to receive sedatives. The heart rate, respiratory                        rate, oxygen saturations, blood pressure, adequacy of pulmonary ventilation,                        and response to care were monitored throughout the procedure. The physical                        status of the patient was re-assessed after the procedure.                     After obtaining informed consent, the scope was passed under direct vision.                        Throughout the procedure, the patient's blood pressure, pulse, and oxygen                        saturations were monitored continuously. The Duodenoscope was introduced                        through the mouth, and advanced to the duodenum and used to inject contrast                        into the bile duct and ventral pancreatic duct. The was introduced through         the mouth, and advanced to the duodenum and used to locate the major papilla.                        The ERCP was accomplished without difficulty. The patient tolerated the                        procedure well.                                                                        Findings:       EUS:       A linear echoendoscope was used for this examination.       An ill-defined, heterogeneous, hypoechoic mass measuring 25 mm x 24 mm was found in the    pancreatic head/neck. Involvement of the portal vein confluence was identified. The bile duct        was dilated to 9 mm and contained hypoechoic material likely representing sludge vs tumor.        The pancreatic duct was dilated. No hepatic lesions were identified in the visualized portion        of the left hepatic lobe.       Fine needle aspiration of the pancreatic mass was performed. Color Doppler imaging was        utilized prior to needle puncture to confirm a lack of significant vascular structures within        the needle path. Five passes were made with the 25 gauge needle using a transduodenal        approach. Bedside cytologist was present and confirmed adequate tissue acquisition.       ERCP:       A  film of the abdomen was obtained, and surgical hardware was noted. The esophagus was        successfully intubated under direct vision. The scope was advanced to the major papilla in        the descending duodenum without detailed examination of the pharynx, larynx and associated        structures, and upper GI tract. The upper GI tract was grossly normal. The papilla was small.        Using the iconDialtome RX39 with preloaded 0.025 in by 260 cm guidewire, the pancreatic duct was        cannulated. The bile duct was then cannulated using double-wire technique. Contrast was        injected into the bile duct. The bile duct contained a stricture involving the mid-proximal        bile duct. A small biliary sphincterotomy was made with the sphincterotome using ERBE   electrocautery. There was no post-sphincterotomy bleeding. One 10 mm by 6 cm uncovered metal        biliary stent was placed into the common bile duct. Clear bile flowed through the stent. The        stent was in good position.                                                                                  Impression:          EUS:                       - An ill-defined, heterogeneous mass measuring 25 mm x 24 mm was found in the                        pancreatic head/neck,with portal confluence involvement, status post fine                        needle aspiration.                       ERCP:                       - Biliary stricture status post ERCP, sphincterotomy and 10 mm by 6 cm                        uncovered metal biliary stent placement.  Recommendation:      - [facility].                       - Await pathology results.                       - Monitor LFTs.                       - Oncology consult.                                                                                      Attending Participation:       I was present and participated during the entire procedure, including non-key portions.                                                           __________________  Norris Corona MD  9/19/2019 1:02:45 PM  Number of Addenda: 0    Note Initiated On: 9/19/2019 8:48 AM    < end of copied text >    < from: CT Abdomen and Pelvis w/ Oral Cont and w/ IV Cont (09.17.19 @ 20:46) >    EXAM:  CT ABDOMEN AND PELVIS OC IC                            PROCEDURE DATE:  09/17/2019            INTERPRETATION:  CLINICAL INFORMATION: Left upper quadrant pain. History   of hernia.    COMPARISON: And chest CT from 5/20/2019. CT abdomen pelvisfrom   11/13/2018.    PROCEDURE:   CT of the Abdomen and Pelvis was performed with intravenous contrast.   Intravenous contrast: 90 ml Omnipaque 350. 10 ml discarded.  Oral contrast: positive contrast was administered.  Sagittal and coronal reformatswere performed.    FINDINGS:    LOWER CHEST: Redemonstration of right lower lobe linear and groundglass   airspace opacities, which may reflect atelectasis and/or scarring    LIVER: Multiple indeterminant heterogeneously hypodense hepatic lesions.   For reference 1.8 cm segment 4 (3; 21) is, 2.2 cm segment 5 (3; 27), and   1.0 cm segment 5 (3; 41) lesions.  BILE DUCTS: New mild intrahepatic biliary ductal dilatation.  GALLBLADDER: Within normal limits.  SPLEEN: Within normal limits.  PANCREAS: Limited evaluation of the pancreatic parenchyma secondary to   streak artifact. Focal hypodensity within the pancreatic neck, likely   artifactual. Question 1.8 cm hypodense lesion within the pancreatic tail.  ADRENALS: 9 mm right adrenal gland nodule, grossly stable.  KIDNEYS/URETERS: 3 mm nonobstructing left renal calculus. No   hydronephrosis. Symmetric parenchymal enhancement.    BLADDER: Within normal limits.  REPRODUCTIVE ORGANS: Prostate is mildly enlarged.    BOWEL: Moderate to large hiatal hernia. Stool-filled colon. No bowel   obstruction. Appendix is normal.  PERITONEUM: No ascites.  VESSELS: Atherosclerosis of the abdominal aorta and its branch vessels.  RETROPERITONEUM/LYMPH NODES: No lymphadenopathy.    ABDOMINAL WALL: Redemonstrated spinal stimulator within the subcutaneous   tissues of the left lower back.. Small fat-containing umbilical hernia.  BONES: Redemonstration of extensive thoracolumbar spinal fusion hardware.   Orthopedic hardware is grossly intact. No periprosthetic lucency.     IMPRESSION:     Mild intrahepatic biliary ductal dilatation as well as multiple   indeterminant hepatic lesions, new since prior examination of 3/25/2018.   Metastatic disease is a differential diagnostic consideration.    Limited evaluation of the pancreatic parenchyma secondary to streak   artifact. Question 1.8 cm hypodense pancreatic tail lesion. Hypodensity   within the pancreatic neck, likely artifactual. No definite pancreatic   ductal dilatation.    More sensitive evaluationwith MRI and/or endoscopic ultrasound is   recommended, if no contraindications exist.    Moderate to large colonic fecal load.    Moderate to large hiatal hernia.    WALKER ARRIOLA M.D., RADIOLOGY RESIDENT  This document has been electronically signed.  CHAPO IBRAHIM M.D., ATTENDING RADIOLOGIST  This document has been electronically signed. Sep 17 2019  9:48PM                < end of copied text >

## 2019-09-20 NOTE — PROGRESS NOTE ADULT - ASSESSMENT
64 yo h/o chronic pain 2/2 traumatic work injury (on daily opiates), GERD, HTN, chronic constipation who presents with acute on chronic left lower quadrant pain x 6days, found to have biliary obstruction, s/p ERCP with pancreatic mass s/p FNA/spincterotomy/biliary stent placement. 64 yo h/o chronic pain 2/2 traumatic work injury (on daily opiates), GERD, HTN, chronic constipation who presents with acute on chronic left lower quadrant pain x 6days, found to have biliary obstruction, s/p ERCP with pancreatic mass s/p FNA/spincterotomy/biliary stent placement, now awaiting IR eval for hepatic lesions.

## 2019-09-20 NOTE — PROGRESS NOTE ADULT - PROBLEM SELECTOR PLAN 3
Unclear etiology, likely constipation (CT with significant stool burden) vs. abnormalities found as above  - appreciate GI recs  - miralax TID, SMOG enema, appreciate GI recs Unclear etiology, likely constipation (CT with significant stool burden) vs. abnormalities found as above  - miralax TID, SMOG enema, appreciate GI recs Unclear etiology, likely constipation (CT with significant stool burden) vs. abnormalities found as above  - miralax TID  - tap water enema today

## 2019-09-21 NOTE — PROGRESS NOTE ADULT - SUBJECTIVE AND OBJECTIVE BOX
Interval Events: No acute overnight events. Pt continues with 8/10 abdominal pain. Pt awaiting abdominal U/S and biopsy of liver lesions by IR next week. Denies CP, SOB, n/v.      OBJECTIVE:  ICU Vital Signs Last 24 Hrs  T(C): 36.6 (21 Sep 2019 04:56), Max: 36.7 (20 Sep 2019 20:46)  T(F): 97.8 (21 Sep 2019 04:56), Max: 98.1 (20 Sep 2019 20:46)  HR: 64 (21 Sep 2019 04:56) (64 - 67)  BP: 165/96 (21 Sep 2019 04:56) (108/67 - 165/96)  BP(mean): --  ABP: --  ABP(mean): --  RR: 18 (21 Sep 2019 04:56) (18 - 18)  SpO2: 94% (21 Sep 2019 04:56) (94% - 97%)        CAPILLARY BLOOD GLUCOSE      POCT Blood Glucose.: 203 mg/dL (20 Sep 2019 22:19)      PHYSICAL EXAM:  GENERAL: appears stated age, disheveled  HEENT: EOMI, PERRL, +scleral icterus, MMM, no oropharyngeal lesions or erythema appreciated  Pulm: CTAB  CV: RRR, S1&S2+, no m/r/g appreciated  ABDOMEN: +BS, +tender  over superior portion of LLQ (no guarding), +distended, +hepatomegaly with irregular borders, right lower quadrant mass corresponding to prior stimulator battery.   MSK: nl ROM  BACK: Right lower quadrant mass corresponding to current stimulator battery, pain on palpation over lumbar spine with wincing, mild pain over paraspinal region w/o wincing  EXTREMITIES:  no appreciable edema in b/l LE  Neuro: A&Ox3, no focal deficits  SKIN: warm and dry, no visible rash      HOSPITAL MEDICATIONS:  enoxaparin Injectable 40 milliGRAM(s) SubCutaneous daily  cloNIDine 0.1 milliGRAM(s) Oral every 8 hours  hydrochlorothiazide 50 milliGRAM(s) Oral daily  dextrose 40% Gel 15 Gram(s) Oral once PRN  dextrose 50% Injectable 12.5 Gram(s) IV Push once  dextrose 50% Injectable 25 Gram(s) IV Push once  dextrose 50% Injectable 25 Gram(s) IV Push once  glucagon  Injectable 1 milliGRAM(s) IntraMuscular once PRN  insulin lispro (HumaLOG) corrective regimen sliding scale   SubCutaneous three times a day before meals  insulin lispro (HumaLOG) corrective regimen sliding scale   SubCutaneous at bedtime  diazepam    Tablet 2 milliGRAM(s) Oral every 8 hours PRN  DULoxetine 60 milliGRAM(s) Oral two times a day  methadone    Tablet 20 milliGRAM(s) Oral four times a day PRN  morphine  IR 30 milliGRAM(s) Oral every 6 hours PRN  polyethylene glycol 3350 17 Gram(s) Oral <User Schedule>  senna 2 Tablet(s) Oral at bedtime  sucralfate suspension 1 Gram(s) Oral four times a day  dextrose 5%. 1000 milliLiter(s) IV Continuous <Continuous>            LABS:                        11.7   6.0   )-----------( 178      ( 20 Sep 2019 07:12 )             37.4     Hgb Trend: 11.7<--, 11.4<--, 11.3<--, 11.5<--  09-20    138  |  94<L>  |  7   ----------------------------<  145<H>  3.4<L>   |  30  |  0.87    Ca    10.5      20 Sep 2019 07:06  Phos  3.6     09-20  Mg     1.9     09-20    TPro  7.8  /  Alb  4.0  /  TBili  6.7<H>  /  DBili  x   /  AST  217<H>  /  ALT  399<H>  /  AlkPhos  280<H>  09-20    Creatinine Trend: 0.87<--, 0.95<--, 0.90<--, 0.96<-- Interval Events: No acute overnight events. Pt continues with 8/10 abdominal pain says he is tolerating diet, had BM x1 after enema yesterday. Pt awaiting abdominal U/S and biopsy of liver lesions by IR next week. Denies CP, SOB, n/v.      OBJECTIVE:  ICU Vital Signs Last 24 Hrs  T(C): 36.6 (21 Sep 2019 04:56), Max: 36.7 (20 Sep 2019 20:46)  T(F): 97.8 (21 Sep 2019 04:56), Max: 98.1 (20 Sep 2019 20:46)  HR: 64 (21 Sep 2019 04:56) (64 - 67)  BP: 165/96 (21 Sep 2019 04:56) (108/67 - 165/96)  BP(mean): --  ABP: --  ABP(mean): --  RR: 18 (21 Sep 2019 04:56) (18 - 18)  SpO2: 94% (21 Sep 2019 04:56) (94% - 97%)        CAPILLARY BLOOD GLUCOSE      POCT Blood Glucose.: 203 mg/dL (20 Sep 2019 22:19)      PHYSICAL EXAM:  GENERAL: appears stated age, disheveled  HEENT: EOMI, PERRL, +scleral icterus, MMM, no oropharyngeal lesions or erythema appreciated  Pulm: CTAB  CV: RRR, S1&S2+, no m/r/g appreciated  ABDOMEN: +BS, +tender  over superior portion of LLQ (no guarding), +distended, +hepatomegaly with irregular borders, right lower quadrant mass corresponding to prior stimulator battery.   MSK: nl ROM  BACK: Right lower quadrant mass corresponding to current stimulator battery, pain on palpation over lumbar spine with wincing, mild pain over paraspinal region w/o wincing  EXTREMITIES:  no appreciable edema in b/l LE  Neuro: A&Ox3, no focal deficits  SKIN: warm and dry, no visible rash      HOSPITAL MEDICATIONS:  enoxaparin Injectable 40 milliGRAM(s) SubCutaneous daily  cloNIDine 0.1 milliGRAM(s) Oral every 8 hours  hydrochlorothiazide 50 milliGRAM(s) Oral daily  dextrose 40% Gel 15 Gram(s) Oral once PRN  dextrose 50% Injectable 12.5 Gram(s) IV Push once  dextrose 50% Injectable 25 Gram(s) IV Push once  dextrose 50% Injectable 25 Gram(s) IV Push once  glucagon  Injectable 1 milliGRAM(s) IntraMuscular once PRN  insulin lispro (HumaLOG) corrective regimen sliding scale   SubCutaneous three times a day before meals  insulin lispro (HumaLOG) corrective regimen sliding scale   SubCutaneous at bedtime  diazepam    Tablet 2 milliGRAM(s) Oral every 8 hours PRN  DULoxetine 60 milliGRAM(s) Oral two times a day  methadone    Tablet 20 milliGRAM(s) Oral four times a day PRN  morphine  IR 30 milliGRAM(s) Oral every 6 hours PRN  polyethylene glycol 3350 17 Gram(s) Oral <User Schedule>  senna 2 Tablet(s) Oral at bedtime  sucralfate suspension 1 Gram(s) Oral four times a day  dextrose 5%. 1000 milliLiter(s) IV Continuous <Continuous>            LABS:                        11.7   6.0   )-----------( 178      ( 20 Sep 2019 07:12 )             37.4     Hgb Trend: 11.7<--, 11.4<--, 11.3<--, 11.5<--  09-20    138  |  94<L>  |  7   ----------------------------<  145<H>  3.4<L>   |  30  |  0.87    Ca    10.5      20 Sep 2019 07:06  Phos  3.6     09-20  Mg     1.9     09-20    TPro  7.8  /  Alb  4.0  /  TBili  6.7<H>  /  DBili  x   /  AST  217<H>  /  ALT  399<H>  /  AlkPhos  280<H>  09-20    Creatinine Trend: 0.87<--, 0.95<--, 0.90<--, 0.96<-- Interval Events: No acute overnight events. Pt continues with 8/10 abdominal pain says he is tolerating diet, had BM x1 after enema yesterday. Pt awaiting abdominal U/S and biopsy of liver lesions by IR next week. Denies CP, SOB, n/v.      OBJECTIVE:  ICU Vital Signs Last 24 Hrs  T(C): 36.6 (21 Sep 2019 04:56), Max: 36.7 (20 Sep 2019 20:46)  T(F): 97.8 (21 Sep 2019 04:56), Max: 98.1 (20 Sep 2019 20:46)  HR: 64 (21 Sep 2019 04:56) (64 - 67)  BP: 165/96 (21 Sep 2019 04:56) (108/67 - 165/96)  BP(mean): --  ABP: --  ABP(mean): --  RR: 18 (21 Sep 2019 04:56) (18 - 18)  SpO2: 94% (21 Sep 2019 04:56) (94% - 97%)        CAPILLARY BLOOD GLUCOSE      POCT Blood Glucose.: 203 mg/dL (20 Sep 2019 22:19)      PHYSICAL EXAM:  GENERAL: appears stated age, disheveled  HEENT: EOMI, PERRL, +scleral icterus, MMM, no oropharyngeal lesions or erythema appreciated  Pulm: CTAB  CV: RRR, S1&S2+, no m/r/g appreciated  ABDOMEN: +BS, +tender  over superior portion of LLQ (no guarding), +distended, +hepatomegaly with irregular borders, right lower quadrant mass corresponding to prior stimulator battery.   MSK: nl ROM  BACK: Right lower quadrant mass corresponding to current stimulator battery, pain on palpation over lumbar spine with wincing, mild pain over paraspinal region w/o wincing  EXTREMITIES:  no appreciable edema in b/l LE  Neuro: A&Ox3, no focal deficits  SKIN: warm and dry, no visible rash      HOSPITAL MEDICATIONS:  enoxaparin Injectable 40 milliGRAM(s) SubCutaneous daily  cloNIDine 0.1 milliGRAM(s) Oral every 8 hours  hydrochlorothiazide 50 milliGRAM(s) Oral daily  dextrose 40% Gel 15 Gram(s) Oral once PRN  dextrose 50% Injectable 12.5 Gram(s) IV Push once  dextrose 50% Injectable 25 Gram(s) IV Push once  dextrose 50% Injectable 25 Gram(s) IV Push once  glucagon  Injectable 1 milliGRAM(s) IntraMuscular once PRN  insulin lispro (HumaLOG) corrective regimen sliding scale   SubCutaneous three times a day before meals  insulin lispro (HumaLOG) corrective regimen sliding scale   SubCutaneous at bedtime  diazepam    Tablet 2 milliGRAM(s) Oral every 8 hours PRN  DULoxetine 60 milliGRAM(s) Oral two times a day  methadone    Tablet 20 milliGRAM(s) Oral four times a day PRN  morphine  IR 30 milliGRAM(s) Oral every 6 hours PRN  polyethylene glycol 3350 17 Gram(s) Oral <User Schedule>  senna 2 Tablet(s) Oral at bedtime  sucralfate suspension 1 Gram(s) Oral four times a day  dextrose 5%. 1000 milliLiter(s) IV Continuous <Continuous>            LABS:                        11.7   6.0   )-----------( 178      ( 20 Sep 2019 07:12 )             37.4     Hgb Trend: 11.7<--, 11.4<--, 11.3<--, 11.5<--  09-20    138  |  94<L>  |  7   ----------------------------<  145<H>  3.4<L>   |  30  |  0.87    Ca    10.5      20 Sep 2019 07:06  Phos  3.6     09-20  Mg     1.9     09-20    TPro  7.8  /  Alb  4.0  /  TBili  6.7<H>  /  DBili  x   /  AST  217<H>  /  ALT  399<H>  /  AlkPhos  280<H>  09-20    Creatinine Trend: 0.87<--, 0.95<--, 0.90<--, 0.96<--    Pathology:  Cytopathology - Non Gyn Report:   ACCESSION No:  [id number]    LEN ALY              1        Cytopathology Report            Final Diagnosis  PANCREAS, HEAD/NECK, EUS-GUIDED FNA  POSITIVE FOR MALIGNANT CELLS.  Adenocarcinoma  Cytology slides and cell block reveala hypercellular specimen  composed of crowded 3-dimensional groups and single-lying  malignant cells with enlarged nuclei containing prominent  nucleoli and vacuolated cytoplasm. Foci of  inflammation and necrosis are identified.    Screened by: Vi PATEL,(ASCP)  Verified by: Shara Quiroz MD  (Electronic Signature)  Reported on: 09/20/19 16:42 EDT, [address], [place]  [id number]  Cytology technical processing performed at [address], [place] [address]  _________________________________________________________________      Specimen(s) Submitted  PANCREAS, HEAD/NECK, EUS-GUIDED FNA      Statement of Adequacy  Immediate cytologic study for adequacy of specimen using a Diff-  Quik stain was performed. FNA acceptablefor further evaluation  by AMANDA Stevens(ASCP).      Clinical Information  Patient presents with pancreatic head/ neck mass, and multiple  liver lseions. R/O cancer      Comment  Dr Corona was notified 09/20/19.      Gross Description  Received: Needle rinses  in formalin. 20 ml of tinted fluid.  4 Smear received ( 2 air dried and 2 fixed in  alcohol)  Prepared:  1 cell block (09.19.19 @ 11:09) Interval Events: No acute overnight events. Pt continues with 8/10 abdominal pain says he is tolerating diet, had BM x1 after enema yesterday. Pt awaiting abdominal U/S and biopsy of liver lesions by IR next week. Denies CP, SOB, n/v.      OBJECTIVE:  ICU Vital Signs Last 24 Hrs  T(C): 36.6 (21 Sep 2019 04:56), Max: 36.7 (20 Sep 2019 20:46)  T(F): 97.8 (21 Sep 2019 04:56), Max: 98.1 (20 Sep 2019 20:46)  HR: 64 (21 Sep 2019 04:56) (64 - 67)  BP: 165/96 (21 Sep 2019 04:56) (108/67 - 165/96)  BP(mean): --  ABP: --  ABP(mean): --  RR: 18 (21 Sep 2019 04:56) (18 - 18)  SpO2: 94% (21 Sep 2019 04:56) (94% - 97%)        CAPILLARY BLOOD GLUCOSE      POCT Blood Glucose.: 203 mg/dL (20 Sep 2019 22:19)      PHYSICAL EXAM:  GENERAL: appears stated age, disheveled  HEENT: EOMI, PERRL, +scleral icterus, MMM, no oropharyngeal lesions or erythema appreciated  Pulm: CTAB  CV: RRR, S1&S2+, no m/r/g appreciated  ABDOMEN: +BS, +tender over superior portion of LLQ (no guarding), +distended, +hepatomegaly with irregular borders, right lower quadrant mass corresponding to prior stimulator battery.   MSK: nl ROM  BACK: Right lower quadrant mass corresponding to current stimulator battery, pain on palpation over lumbar spine with wincing, mild pain over paraspinal region w/o wincing  EXTREMITIES:  no appreciable edema in b/l LE  Neuro: A&Ox3, no focal deficits  SKIN: warm and dry, no visible rash      LABS:                        11.7   6.0   )-----------( 178      ( 20 Sep 2019 07:12 )             37.4     Hgb Trend: 11.7<--, 11.4<--, 11.3<--, 11.5<--  09-20    138  |  94<L>  |  7   ----------------------------<  145<H>  3.4<L>   |  30  |  0.87    Ca    10.5      20 Sep 2019 07:06  Phos  3.6     09-20  Mg     1.9     09-20    TPro  7.8  /  Alb  4.0  /  TBili  6.7<H>  /  DBili  x   /  AST  217<H>  /  ALT  399<H>  /  AlkPhos  280<H>  09-20    Creatinine Trend: 0.87<--, 0.95<--, 0.90<--, 0.96<--    Pathology:  Cytopathology - Non Gyn Report:   ACCESSION No:  [id number]    LEN ALY              1        Cytopathology Report            Final Diagnosis  PANCREAS, HEAD/NECK, EUS-GUIDED FNA  POSITIVE FOR MALIGNANT CELLS.  Adenocarcinoma  Cytology slides and cell block reveala hypercellular specimen  composed of crowded 3-dimensional groups and single-lying  malignant cells with enlarged nuclei containing prominent  nucleoli and vacuolated cytoplasm. Foci of  inflammation and necrosis are identified.    Screened by: Vi PATEL,(ASCP)  Verified by: Shara Quiroz MD  (Electronic Signature)  Reported on: 09/20/19 16:42 EDT, [address] [place] [address]  [id number]  Cytology technical processing performed at [address] [place] [address]  _________________________________________________________________      Specimen(s) Submitted  PANCREAS, HEAD/NECK, EUS-GUIDED FNA      Statement of Adequacy  Immediate cytologic study for adequacy of specimen using a Diff-  Quik stain was performed. FNA acceptablefor further evaluation  by AMANDA Stevens([facility]).      Clinical Information  Patient presents with pancreatic head/ neck mass, and multiple  liver lseions. R/O cancer      Comment  Dr Corona was notified 09/20/19.      Gross Description  Received: Needle rinses  in formalin. 20 ml of tinted fluid.  4 Smear received ( 2 air dried and 2 fixed in  alcohol)  Prepared:  1 cell block (09.19.19 @ 11:09)    MEDICATIONS  (STANDING):  cloNIDine 0.1 milliGRAM(s) Oral every 8 hours  dextrose 5%. 1000 milliLiter(s) (50 mL/Hr) IV Continuous <Continuous>  dextrose 50% Injectable 12.5 Gram(s) IV Push once  dextrose 50% Injectable 25 Gram(s) IV Push once  dextrose 50% Injectable 25 Gram(s) IV Push once  DULoxetine 60 milliGRAM(s) Oral two times a day  enoxaparin Injectable 40 milliGRAM(s) SubCutaneous daily  hydrochlorothiazide 50 milliGRAM(s) Oral daily  insulin lispro (HumaLOG) corrective regimen sliding scale   SubCutaneous three times a day before meals  insulin lispro (HumaLOG) corrective regimen sliding scale   SubCutaneous at bedtime  methadone    Tablet 20 milliGRAM(s) Oral four times a day  polyethylene glycol 3350 17 Gram(s) Oral <User Schedule>  potassium chloride    Tablet ER 40 milliEquivalent(s) Oral every 4 hours  senna 2 Tablet(s) Oral at bedtime  sucralfate suspension 1 Gram(s) Oral four times a day    MEDICATIONS  (PRN):  dextrose 40% Gel 15 Gram(s) Oral once PRN Blood Glucose LESS THAN 70 milliGRAM(s)/deciliter  diazepam    Tablet 2 milliGRAM(s) Oral every 8 hours PRN anxiety  glucagon  Injectable 1 milliGRAM(s) IntraMuscular once PRN Glucose LESS THAN 70 milligrams/deciliter  morphine  - Injectable 1 milliGRAM(s) IV Push every 4 hours PRN Severe Pain (7 - 10)  morphine  IR 30 milliGRAM(s) Oral every 6 hours PRN Mild Pain (1 - 3)

## 2019-09-21 NOTE — PROGRESS NOTE ADULT - PROBLEM SELECTOR PLAN 2
Concern for malignancy given ERCP findings of pancreatic lesion. not seen on ERCP  -appreciate surg/onc recs - consult IR for liver lesion biopsy r/o malignancy Concern for malignancy given ERCP findings of pancreatic lesion. not seen on ERCP  -appreciate surg/onc recs - consult IR for liver lesion biopsy r/o malignancy  -will need abd u/s prior to liver bx

## 2019-09-21 NOTE — PROGRESS NOTE ADULT - ASSESSMENT
64 yo h/o chronic pain 2/2 traumatic work injury (on daily opiates), GERD, HTN, chronic constipation who presents with acute on chronic left lower quadrant pain x 6days, found to have biliary obstruction, s/p ERCP with pancreatic mass s/p FNA/spincterotomy/biliary stent placement, now awaiting IR eval for hepatic lesions. 62 yo h/o chronic pain 2/2 traumatic work injury (on daily opiates), GERD, HTN, chronic constipation who presents with acute on chronic left lower quadrant pain x 6days, found to have biliary obstruction, s/p ERCP with pancreatic mass s/p FNA/spincterotomy/biliary stent placement w/ path showing adenocarcinoma, now awaiting IR eval for hepatic lesions.

## 2019-09-21 NOTE — PROGRESS NOTE ADULT - PROBLEM SELECTOR PLAN 4
- C/w home methadone and morphine for pain - dose to be taken together, which is pt's home regimen - c/w home methadone and morphine 30 mg IR prn moderate pain (may be taken together, pt's home regimen)  - start morphine 1 mg IV q4H prn severe pain

## 2019-09-21 NOTE — PROGRESS NOTE ADULT - PROBLEM SELECTOR PLAN 1
with biliary obstruction given CT findings of ductal dilatation w/ concern for malignancy given hepatic and pancreatic lesions and unintended weight loss. -Elevated total/direct bili, alk phos, LFTs  s/p ERCP showing 25x24 mm pancreatic mass s/p FNA/spincterotomy/biliary stent placement. CT chest without lesions.  -elevated Ca19-9  -med onc and surg onc following  -f/u FNA with biliary obstruction given CT findings of ductal dilatation w/ concern for malignancy given hepatic and pancreatic lesions and unintended weight loss. -Elevated total/direct bili, alk phos, LFTs  s/p ERCP showing 25x24 mm pancreatic mass s/p FNA/spincterotomy/biliary stent placement. CT chest without lesions.  -elevated Ca19-9  -med onc and surg onc following  -FNA results with pancreatic adenocarcinoma with biliary obstruction given CT findings of ductal dilatation w/ concern for malignancy given hepatic and pancreatic lesions and unintended weight loss. -Elevated total/direct bili, alk phos, LFTs  s/p ERCP showing 25x24 mm pancreatic mass s/p FNA/spincterotomy/biliary stent placement. CT chest without lesions.  -elevated Ca19-9  -med onc and surg onc following  -FNA results with pancreatic adenocarcinoma  -will need to d/w patient

## 2019-09-21 NOTE — PROGRESS NOTE ADULT - PROBLEM SELECTOR PLAN 7
DVT: lovenox  Electrolytes: replete prn  Dispo: pending IR liver lesion bx DVT: lovenox  Electrolytes: replete prn  Dispo: pending abdominal US for IR liver lesion bx

## 2019-09-21 NOTE — PROGRESS NOTE ADULT - PROBLEM SELECTOR PLAN 3
Unclear etiology, likely constipation (CT with significant stool burden) vs. abnormalities found as above  - miralax TID  - tap water enema today Unclear etiology, likely constipation (CT with significant stool burden) vs. abnormalities found as above  - miralax TID Unclear etiology, likely constipation (CT with significant stool burden) vs. abnormalities found as above  - miralax TID  - lipase mildly elevated, low concern for post ercp pancreatitis as same pain is prior to ercp  - monitor abx exam  - pain control as below

## 2019-09-22 NOTE — PROGRESS NOTE ADULT - PROBLEM SELECTOR PLAN 3
Unclear etiology, likely constipation (CT with significant stool burden) vs. abnormalities found as above  - miralax TID  - lipase mildly elevated, low concern for post ercp pancreatitis as same pain is prior to ercp  - monitor abx exam  - pain control as below Unclear etiology, likely constipation (CT with significant stool burden) vs. abnormalities found as above  - miralax TID  - lipase mildly elevated,; possible concern for post ercp pancreatitis as same pain is prior to ercp; will check lipase stat now  - monitor abx exam  - pain control as below Unclear etiology, likely constipation (CT with significant stool burden) vs. abnormalities found as above  less likely post ERCP pancreatitis, lipase trending down and symptoms c/w prior abd pain  - miralax TID  - monitor abx exam  - pain control as below

## 2019-09-22 NOTE — PROGRESS NOTE ADULT - PROBLEM SELECTOR PLAN 4
- c/w home methadone and morphine 30 mg IR prn moderate pain (may be taken together, pt's home regimen)  - start morphine 1 mg IV q4H prn severe pain - c/w home methadone and morphine 30 mg IR prn for mild pain and moderate pain (may be taken together, pt's home regimen) respectively   - increased morphine to 2mg IV q4H prn severe pain

## 2019-09-22 NOTE — PROGRESS NOTE ADULT - ATTENDING COMMENTS
discussed findings of pancreatic adenocarcinoma confirmed on FNA w/ patient and family  now awaiting abd u/s for eval of liver lesions (?mets) and then IR guided biopsy, place NPO MN and f/u in AM re: timing of biopsy

## 2019-09-22 NOTE — PROGRESS NOTE ADULT - SUBJECTIVE AND OBJECTIVE BOX
Internal Medicine Team Progress Note  Rodriguez Mcmahan, PGY 2  [phone]    LEN ALY  Patient is a 63y old  Male who presents with a chief complaint of abdominal pain, hyperbilirubinemia (21 Sep 2019 07:13)      INTERVAL HPI / SUBJECTIVE:          MEDICATIONS:   MEDICATIONS  (STANDING):  cloNIDine 0.1 milliGRAM(s) Oral every 8 hours  dextrose 5%. 1000 milliLiter(s) (50 mL/Hr) IV Continuous <Continuous>  dextrose 50% Injectable 12.5 Gram(s) IV Push once  dextrose 50% Injectable 25 Gram(s) IV Push once  dextrose 50% Injectable 25 Gram(s) IV Push once  DULoxetine 60 milliGRAM(s) Oral two times a day  enoxaparin Injectable 40 milliGRAM(s) SubCutaneous daily  hydrochlorothiazide 50 milliGRAM(s) Oral daily  insulin lispro (HumaLOG) corrective regimen sliding scale   SubCutaneous three times a day before meals  insulin lispro (HumaLOG) corrective regimen sliding scale   SubCutaneous at bedtime  methadone    Tablet 20 milliGRAM(s) Oral four times a day  polyethylene glycol 3350 17 Gram(s) Oral <User Schedule>  senna 2 Tablet(s) Oral at bedtime  sucralfate suspension 1 Gram(s) Oral four times a day    MEDICATIONS  (PRN):  dextrose 40% Gel 15 Gram(s) Oral once PRN Blood Glucose LESS THAN 70 milliGRAM(s)/deciliter  diazepam    Tablet 2 milliGRAM(s) Oral every 8 hours PRN anxiety  glucagon  Injectable 1 milliGRAM(s) IntraMuscular once PRN Glucose LESS THAN 70 milligrams/deciliter  morphine  - Injectable 2 milliGRAM(s) IV Push every 4 hours PRN Severe Pain (7 - 10)  morphine  IR 30 milliGRAM(s) Oral every 6 hours PRN Mild Pain (1 - 3)      ALLERGIES:   Allergies    No Known Allergies    Intolerances        OBJECTIVE:  Vital Signs Last 24 Hrs  T(C): 36.5 (22 Sep 2019 04:55), Max: 36.5 (22 Sep 2019 04:55)  T(F): 97.7 (22 Sep 2019 04:55), Max: 97.7 (22 Sep 2019 04:55)  HR: 66 (22 Sep 2019 04:55) (66 - 78)  BP: 153/91 (22 Sep 2019 04:55) (138/91 - 153/91)  BP(mean): --  RR: 17 (22 Sep 2019 04:55) (16 - 17)  SpO2: 95% (22 Sep 2019 04:55) (95% - 97%)    I&O's Summary      PHYSICAL EXAM:  General: Well-appearing, NAD  HEENT:  EOMI, PERRL, conjunctiva and sclera clear, normal oropharynx  Neck:  Supple, no JVD, normal thyroid  Chest/Lungs: CTA B/L, no rales, rhonchi, rub or wheezing  Heart: RRR, normal S1, S2, no murmurs or gallops  Abdomen: Soft, nondistended, NTTP, normoactive bowel sounds  Extremities: Peripheral pulses 2+ B/L, no edema, cyanosis or clubbing  Skin: Warm, well-perfused, no rashes or lesions  Neurological: A&Ox3, no focal deficits      LABS:      IMAGING:       Labs and imaging personally reviewed and interpreted [  ]  Consult notes reviewed [  ]  Case discussed with consultants [  ] Internal Medicine Team Progress Note  Rodriguez Mcmahan, PGY 2  [phone]    LEN ALY  Patient is a 63y old  Male who presents with a chief complaint of abdominal pain, hyperbilirubinemia (21 Sep 2019 07:13)      INTERVAL HPI / SUBJECTIVE:    No acute events overnight.  Patient continues to complain of diffuse abdominal pain.  Patient had received morphine 1mg a few minutes before and that did not help with the pain.  Denies any nausea, vomiting.  Had bowel movement yesterday. Tolerating appetite.  Denies any fevers, chills, night sweats.         MEDICATIONS:   MEDICATIONS  (STANDING):  cloNIDine 0.1 milliGRAM(s) Oral every 8 hours  dextrose 5%. 1000 milliLiter(s) (50 mL/Hr) IV Continuous <Continuous>  dextrose 50% Injectable 12.5 Gram(s) IV Push once  dextrose 50% Injectable 25 Gram(s) IV Push once  dextrose 50% Injectable 25 Gram(s) IV Push once  DULoxetine 60 milliGRAM(s) Oral two times a day  enoxaparin Injectable 40 milliGRAM(s) SubCutaneous daily  hydrochlorothiazide 50 milliGRAM(s) Oral daily  insulin lispro (HumaLOG) corrective regimen sliding scale   SubCutaneous three times a day before meals  insulin lispro (HumaLOG) corrective regimen sliding scale   SubCutaneous at bedtime  methadone    Tablet 20 milliGRAM(s) Oral four times a day  polyethylene glycol 3350 17 Gram(s) Oral <User Schedule>  senna 2 Tablet(s) Oral at bedtime  sucralfate suspension 1 Gram(s) Oral four times a day    MEDICATIONS  (PRN):  dextrose 40% Gel 15 Gram(s) Oral once PRN Blood Glucose LESS THAN 70 milliGRAM(s)/deciliter  diazepam    Tablet 2 milliGRAM(s) Oral every 8 hours PRN anxiety  glucagon  Injectable 1 milliGRAM(s) IntraMuscular once PRN Glucose LESS THAN 70 milligrams/deciliter  morphine  - Injectable 2 milliGRAM(s) IV Push every 4 hours PRN Severe Pain (7 - 10)  morphine  IR 30 milliGRAM(s) Oral every 6 hours PRN Mild Pain (1 - 3)      ALLERGIES:   Allergies    No Known Allergies    Intolerances        OBJECTIVE:  Vital Signs Last 24 Hrs  T(C): 36.5 (22 Sep 2019 04:55), Max: 36.5 (22 Sep 2019 04:55)  T(F): 97.7 (22 Sep 2019 04:55), Max: 97.7 (22 Sep 2019 04:55)  HR: 66 (22 Sep 2019 04:55) (66 - 78)  BP: 153/91 (22 Sep 2019 04:55) (138/91 - 153/91)  BP(mean): --  RR: 17 (22 Sep 2019 04:55) (16 - 17)  SpO2: 95% (22 Sep 2019 04:55) (95% - 97%)    I&O's Summary      PHYSICAL EXAM:  GENERAL: appears stated age, disheveled  HEENT: EOMI, PERRL, +scleral icterus, MMM, no oropharyngeal lesions or erythema appreciated  Pulm: CTAB  CV: RRR, S1&S2+, no m/r/g appreciated  ABDOMEN: +BS, +tender over superior portion of LLQ (no guarding), +distended, +hepatomegaly with irregular borders, right lower quadrant mass corresponding to prior stimulator battery.   MSK: nl ROM  BACK: Right lower quadrant mass corresponding to current stimulator battery, pain on palpation over lumbar spine with wincing, mild pain over paraspinal region w/o wincing  EXTREMITIES:  no appreciable edema in b/l LE  Neuro: A&Ox3, no focal deficits  SKIN: warm and dry, no visible rash      LABS:    CBC Full  -  ( 22 Sep 2019 09:25 )  WBC Count : 6.1 K/uL  RBC Count : 4.20 M/uL  Hemoglobin : 11.8 g/dL  Hematocrit : 38.0 %  Platelet Count - Automated : 199 K/uL  Mean Cell Volume : 90.5 fl  Mean Cell Hemoglobin : 28.1 pg  Mean Cell Hemoglobin Concentration : 31.1 gm/dL  Auto Neutrophil # : x  Auto Lymphocyte # : x  Auto Monocyte # : x  Auto Eosinophil # : x  Auto Basophil # : x  Auto Neutrophil % : x  Auto Lymphocyte % : x  Auto Monocyte % : x  Auto Eosinophil % : x  Auto Basophil % : x      09-22    133<L>  |  94<L>  |  10  ----------------------------<  162<H>  4.1   |  27  |  0.77  09-21    136  |  92<L>  |  6<L>  ----------------------------<  133<H>  3.4<L>   |  29  |  0.81  09-20    138  |  94<L>  |  7   ----------------------------<  145<H>  3.4<L>   |  30  |  0.87    Ca    9.8      22 Sep 2019 09:25  Ca    9.7      21 Sep 2019 07:03  Ca    10.5      20 Sep 2019 07:06    TPro  7.7  /  Alb  3.6  /  TBili  6.2<H>  /  DBili  x   /  AST  277<H>  /  ALT  433<H>  /  AlkPhos  285<H>  09-21  TPro  7.8  /  Alb  4.0  /  TBili  6.7<H>  /  DBili  x   /  AST  217<H>  /  ALT  399<H>  /  AlkPhos  280<H>  09-20      LIVER FUNCTIONS - ( 21 Sep 2019 07:03 )  Alb: 3.6 g/dL / Pro: 7.7 g/dL / ALK PHOS: 285 U/L / ALT: 433 U/L / AST: 277 U/L / GGT: x             PT/INR - ( 21 Sep 2019 07:03 )   PT: 12.6 sec;   INR: 1.09 ratio         PTT - ( 21 Sep 2019 07:03 )  PTT:32.1 sec                  IMAGING: No new imaging.       Labs and imaging personally reviewed and interpreted [ x ]  Consult notes reviewed [x  ]  Case discussed with consultants [ x ] Internal Medicine Team Progress Note  Rodriguez Mcmahan, PGY 2  [phone]    LEN ALY  Patient is a 63y old  Male who presents with a chief complaint of abdominal pain, hyperbilirubinemia (21 Sep 2019 07:13)      INTERVAL HPI / SUBJECTIVE:    No acute events overnight.  Patient continues to complain of diffuse abdominal pain.  Patient had received morphine 1mg a few minutes before and that did not help with the pain.  Denies any nausea, vomiting.  Had bowel movement yesterday. Tolerating appetite.  Denies any fevers, chills, night sweats.     ALLERGIES:   Allergies    No Known Allergies    Intolerances        OBJECTIVE:  Vital Signs Last 24 Hrs  T(C): 36.5 (22 Sep 2019 04:55), Max: 36.5 (22 Sep 2019 04:55)  T(F): 97.7 (22 Sep 2019 04:55), Max: 97.7 (22 Sep 2019 04:55)  HR: 66 (22 Sep 2019 04:55) (66 - 78)  BP: 153/91 (22 Sep 2019 04:55) (138/91 - 153/91)  BP(mean): --  RR: 17 (22 Sep 2019 04:55) (16 - 17)  SpO2: 95% (22 Sep 2019 04:55) (95% - 97%)    I&O's Summary      PHYSICAL EXAM:  GENERAL: appears stated age, disheveled  HEENT: EOMI, PERRL, +scleral icterus, MMM, no oropharyngeal lesions or erythema appreciated  Pulm: CTAB  CV: RRR, S1&S2+, no m/r/g appreciated  ABDOMEN: +BS, +tender over superior portion of LLQ (no guarding), +distended, +hepatomegaly with irregular borders, right lower quadrant mass corresponding to prior stimulator battery.   MSK: nl ROM  BACK: Right lower quadrant mass corresponding to current stimulator battery, pain on palpation over lumbar spine with wincing, mild pain over paraspinal region w/o wincing  EXTREMITIES:  no appreciable edema in b/l LE  Neuro: A&Ox3, no focal deficits  SKIN: warm and dry, no visible rash      LABS:    CBC Full  -  ( 22 Sep 2019 09:25 )  WBC Count : 6.1 K/uL  RBC Count : 4.20 M/uL  Hemoglobin : 11.8 g/dL  Hematocrit : 38.0 %  Platelet Count - Automated : 199 K/uL  Mean Cell Volume : 90.5 fl  Mean Cell Hemoglobin : 28.1 pg  Mean Cell Hemoglobin Concentration : 31.1 gm/dL  Auto Neutrophil # : x  Auto Lymphocyte # : x  Auto Monocyte # : x  Auto Eosinophil # : x  Auto Basophil # : x  Auto Neutrophil % : x  Auto Lymphocyte % : x  Auto Monocyte % : x  Auto Eosinophil % : x  Auto Basophil % : x      09-22    133<L>  |  94<L>  |  10  ----------------------------<  162<H>  4.1   |  27  |  0.77  09-21    136  |  92<L>  |  6<L>  ----------------------------<  133<H>  3.4<L>   |  29  |  0.81  09-20    138  |  94<L>  |  7   ----------------------------<  145<H>  3.4<L>   |  30  |  0.87    Ca    9.8      22 Sep 2019 09:25  Ca    9.7      21 Sep 2019 07:03  Ca    10.5      20 Sep 2019 07:06    TPro  7.7  /  Alb  3.6  /  TBili  6.2<H>  /  DBili  x   /  AST  277<H>  /  ALT  433<H>  /  AlkPhos  285<H>  09-21  TPro  7.8  /  Alb  4.0  /  TBili  6.7<H>  /  DBili  x   /  AST  217<H>  /  ALT  399<H>  /  AlkPhos  280<H>  09-20      LIVER FUNCTIONS - ( 21 Sep 2019 07:03 )  Alb: 3.6 g/dL / Pro: 7.7 g/dL / ALK PHOS: 285 U/L / ALT: 433 U/L / AST: 277 U/L / GGT: x             PT/INR - ( 21 Sep 2019 07:03 )   PT: 12.6 sec;   INR: 1.09 ratio         PTT - ( 21 Sep 2019 07:03 )  PTT:32.1 sec                  IMAGING: No new imaging.       Labs and imaging personally reviewed and interpreted [ x ]  Consult notes reviewed [x  ]  Case discussed with consultants [ x ]    MEDICATIONS  (STANDING):  cloNIDine 0.1 milliGRAM(s) Oral every 8 hours  dextrose 5%. 1000 milliLiter(s) (50 mL/Hr) IV Continuous <Continuous>  dextrose 50% Injectable 12.5 Gram(s) IV Push once  dextrose 50% Injectable 25 Gram(s) IV Push once  dextrose 50% Injectable 25 Gram(s) IV Push once  DULoxetine 60 milliGRAM(s) Oral two times a day  enoxaparin Injectable 40 milliGRAM(s) SubCutaneous daily  hydrochlorothiazide 50 milliGRAM(s) Oral daily  insulin lispro (HumaLOG) corrective regimen sliding scale   SubCutaneous three times a day before meals  insulin lispro (HumaLOG) corrective regimen sliding scale   SubCutaneous at bedtime  methadone    Tablet 20 milliGRAM(s) Oral four times a day  polyethylene glycol 3350 17 Gram(s) Oral <User Schedule>  senna 2 Tablet(s) Oral at bedtime  sucralfate suspension 1 Gram(s) Oral four times a day    MEDICATIONS  (PRN):  dextrose 40% Gel 15 Gram(s) Oral once PRN Blood Glucose LESS THAN 70 milliGRAM(s)/deciliter  diazepam    Tablet 2 milliGRAM(s) Oral every 8 hours PRN anxiety  glucagon  Injectable 1 milliGRAM(s) IntraMuscular once PRN Glucose LESS THAN 70 milligrams/deciliter  morphine  - Injectable 2 milliGRAM(s) IV Push every 4 hours PRN Severe Pain (7 - 10)  morphine  IR 30 milliGRAM(s) Oral every 6 hours PRN Mild Pain (1 - 3)

## 2019-09-22 NOTE — PROGRESS NOTE ADULT - PROBLEM SELECTOR PLAN 1
with biliary obstruction given CT findings of ductal dilatation w/ concern for malignancy given hepatic and pancreatic lesions and unintended weight loss. -Elevated total/direct bili, alk phos, LFTs  s/p ERCP showing 25x24 mm pancreatic mass s/p FNA/spincterotomy/biliary stent placement. CT chest without lesions.  -elevated Ca19-9  -med onc and surg onc following  -FNA results with pancreatic adenocarcinoma  -will need to d/w patient with biliary obstruction given CT findings of ductal dilatation w/ concern for malignancy given hepatic and pancreatic lesions and unintended weight loss. -Elevated total/direct bili, alk phos, LFTs  s/p ERCP showing 25x24 mm pancreatic mass s/p FNA/spincterotomy/biliary stent placement. CT chest without lesions.  -elevated Ca19-9  -med onc and surg onc following  -FNA results with pancreatic adenocarcinoma, d/w patient and family

## 2019-09-22 NOTE — PROGRESS NOTE ADULT - ASSESSMENT
62 yo h/o chronic pain 2/2 traumatic work injury (on daily opiates), GERD, HTN, chronic constipation who presents with acute on chronic left lower quadrant pain x 6days, found to have biliary obstruction, s/p ERCP with pancreatic mass s/p FNA/spincterotomy/biliary stent placement w/ path showing adenocarcinoma, now awaiting IR eval for hepatic lesions.

## 2019-09-22 NOTE — PROGRESS NOTE ADULT - PROBLEM SELECTOR PLAN 2
Concern for malignancy given ERCP findings of pancreatic lesion. not seen on ERCP  -appreciate surg/onc recs - consult IR for liver lesion biopsy r/o malignancy  -will need abd u/s prior to liver bx Concern for malignancy given ERCP findings of pancreatic lesion. not seen on ERCP  -appreciate surg/onc recs - consult IR for liver lesion biopsy r/o malignancy  -will need abd u/s prior to liver bx  -will call in AM to confirm scheduling Concern for malignancy given ERCP findings of pancreatic lesion. not seen on ERCP  -appreciate surg/onc recs - consulted IR for liver lesion biopsy r/o malignancy  -f/u abd u/s rodered prior to liver bx  -will call IR in AM to confirm scheduling

## 2019-09-22 NOTE — PROGRESS NOTE ADULT - PROBLEM SELECTOR PLAN 7
DVT: lovenox  Electrolytes: replete prn  Dispo: pending abdominal US for IR liver lesion bx DVT: lovenox  Electrolytes: replete prn  Dispo: pending abdominal US for IR liver lesion bx, NPO MN possibly liver bx

## 2019-09-23 NOTE — PROGRESS NOTE ADULT - ASSESSMENT
63M h/o chronic pain 2/2 traumatic work injury (on daily opiates, s/p spinal stimulator), GERD, HTN, chronic constipation who was admitted on 9/17 with acute on chronic left lower quadrant pain x 6days, found to have obstructive jaundice w/ possible pancreatic mass and liver lesions.     -f/u heme/onc  -agree w/ IR consult for biopsy of liver lesions to determine if metastatic disease  -CT chest noncontrast for staging  -will continue to follow  -care per primary team  -discussed w/ Dr. Pavon    [facility]  [id number]

## 2019-09-23 NOTE — PROGRESS NOTE ADULT - PROBLEM SELECTOR PLAN 1
with biliary obstruction given CT findings of ductal dilatation w/ concern for malignancy given hepatic and pancreatic lesions and unintended weight loss. -Elevated total/direct bili, alk phos, LFTs  s/p ERCP showing 25x24 mm pancreatic mass s/p FNA/spincterotomy/biliary stent placement. CT chest without lesions.  -elevated Ca19-9  -med onc and surg onc following  -FNA results with pancreatic adenocarcinoma, d/w patient and family

## 2019-09-23 NOTE — PROGRESS NOTE ADULT - SUBJECTIVE AND OBJECTIVE BOX
Chief Complaint:  Patient is a 63y old  Male who presents with a chief complaint of abdominal pain, hyperbilirubinemia (22 Sep 2019 12:45)      Interval Events: No adverse events over the weekend.  Path from EUS back, adenocarcinoma    Allergies:  No Known Allergies      Hospital Medications:  cloNIDine 0.1 milliGRAM(s) Oral every 8 hours  dextrose 40% Gel 15 Gram(s) Oral once PRN  dextrose 5%. 1000 milliLiter(s) IV Continuous <Continuous>  dextrose 50% Injectable 12.5 Gram(s) IV Push once  dextrose 50% Injectable 25 Gram(s) IV Push once  dextrose 50% Injectable 25 Gram(s) IV Push once  diazepam    Tablet 2 milliGRAM(s) Oral every 8 hours PRN  DULoxetine 60 milliGRAM(s) Oral two times a day  enoxaparin Injectable 40 milliGRAM(s) SubCutaneous daily  glucagon  Injectable 1 milliGRAM(s) IntraMuscular once PRN  hydrochlorothiazide 50 milliGRAM(s) Oral daily  insulin lispro (HumaLOG) corrective regimen sliding scale   SubCutaneous three times a day before meals  insulin lispro (HumaLOG) corrective regimen sliding scale   SubCutaneous at bedtime  methadone    Tablet 20 milliGRAM(s) Oral four times a day  morphine  - Injectable 2 milliGRAM(s) IV Push every 4 hours PRN  morphine  IR 30 milliGRAM(s) Oral every 6 hours PRN  polyethylene glycol 3350 17 Gram(s) Oral <User Schedule>  senna 2 Tablet(s) Oral at bedtime  sucralfate suspension 1 Gram(s) Oral four times a day      PMHX/PSHX:  Anxiety and depression  Diastolic dysfunction  Empyema lung  H/O peptic ulcer  history of renal calculus  Renal calculus  Current smoker  Herniated Disc  Hypertension  Depression  Spinal Stenosis  Peripheral Neuropathy  Postlaminectomy Syndrome  S/P  shunt  S/P insertion of spinal cord stimulator  History of lumbar fusion  Insertion of Intrathecal Dilaudid Pump  S/P Spinal Surgery  S/P Knee Replacement  S/P Arthroscopy of Left Knee  S/P Tonsillectomy  Ankle Fracture      Family history:  No pertinent family history in first degree relatives      ROS:     General:  No wt loss, fevers, chills, night sweats, fatigue,   Eyes:  Good vision, no reported pain  ENT:  No sore throat, pain, runny nose, dysphagia  CV:  No pain, palpitations, hypo/hypertension  Resp:  No dyspnea, cough, tachypnea, wheezing  GI:  See HPI  :  No pain, bleeding, incontinence, nocturia  Muscle:  No pain, weakness  Neuro:  No weakness, tingling, memory problems  Psych:  No fatigue, insomnia, mood problems, depression  Endocrine:  No polyuria, polydipsia, cold/heat intolerance  Heme:  No petechiae, ecchymosis, easy bruisability  Skin:  No rash, edema      PHYSICAL EXAM:     GENERAL: NAD  HEENT:  NC/AT  CHEST:  Full & symmetric excursion, no increased effort  ABDOMEN:  Soft, non-tender, non-distended, +BS  EXTREMITIES:  no edema  SKIN:  No rash  NEURO:  Alert      Vital Signs:  Vital Signs Last 24 Hrs  T(C): 36.5 (23 Sep 2019 04:59), Max: 36.6 (22 Sep 2019 14:24)  T(F): 97.7 (23 Sep 2019 04:59), Max: 97.8 (22 Sep 2019 14:24)  HR: 61 (23 Sep 2019 04:59) (61 - 66)  BP: 144/88 (23 Sep 2019 04:59) (144/88 - 162/108)  BP(mean): --  RR: 18 (23 Sep 2019 04:59) (16 - 18)  SpO2: 95% (23 Sep 2019 04:59) (94% - 95%)  Daily     Daily     LABS:                        11.4   6.0   )-----------( 186      ( 23 Sep 2019 07:43 )             35.8     09-22    133<L>  |  94<L>  |  10  ----------------------------<  162<H>  4.1   |  27  |  0.77    Ca    9.8      22 Sep 2019 09:25        PT/INR - ( 23 Sep 2019 07:43 )   PT: 12.4 sec;   INR: 1.08 ratio         PTT - ( 23 Sep 2019 07:43 )  PTT:33.0 sec    Amylase Serum--      Lipase serum92       Ammonia--      Imaging:

## 2019-09-23 NOTE — PROGRESS NOTE ADULT - PROBLEM SELECTOR PLAN 4
- c/w home methadone and morphine 30 mg IR prn for mild pain and moderate pain (may be taken together, pt's home regimen) respectively   - increased morphine to 2mg IV q4H prn severe pain - c/w home methadone and morphine 30 mg IR prn for mild pain and moderate pain (may be taken together, pt's home regimen) respectively   - start dilaudid 1 mg IV q4H prn severe pain - c/w home methadone and morphine 30 mg IR prn for mild pain and moderate pain (may be taken together, pt's home regimen) respectively   - start dilaudid 2 mg IV q4H prn severe pain

## 2019-09-23 NOTE — PROGRESS NOTE ADULT - SUBJECTIVE AND OBJECTIVE BOX
SURGICAL ONCOLOGY CONSULT NOTE    HPI:  63M h/o chronic pain 2/2 traumatic work injury (on daily opiates, s/p spinal stimulator), GERD, HTN, chronic constipation who was admitted on  with acute on chronic left lower quadrant pain x 6days. The pain is described as a cramping pain, and has been associated with a 25 lb weight loss. On initial work up, he was found to have abnormal LFTs, and imaging showing intrahepatic biliary ductal dilation, extensive liver lesions, and a pancreatic tail lesion. He denies night sweats.  Pt states colonoscopy 5 years ago for routine screening was clean and told to return in 10 yrs. States that he has been following with outpt GI doctor for constipation which he describes as not having BM for 3 days at a time which has been attributed to his chronic opioid use. Of note pt status post left hernia repair 1 year ago.  5 day PTA pt noted that his urine looked much darker and orange than usual. Has been noted by family to look jaundiced. Pt denies fevers/chills/chest pain/ palpitations/ n/v/d.    Today pt is s/p ERCP w/ biopsy and stent placement, pt tolerated procedure well.       PMHx: Anxiety and depression  Diastolic dysfunction  Empyema lung  H/O peptic ulcer  history of renal calculus  Renal calculus  Current smoker  Herniated Disc  Hypertension  Depression  Spinal Stenosis  Peripheral Neuropathy  Postlaminectomy Syndrome    PSHx: S/P  shunt  S/P insertion of spinal cord stimulator  History of lumbar fusion  Insertion of Intrathecal Dilaudid Pump  S/P Spinal Surgery  S/P Knee Replacement  S/P Arthroscopy of Left Knee  S/P Tonsillectomy  Ankle Fracture    Allergies: No Known Allergies  (Intolerances: )  Social Hx:   Family Hx: No pertinent family history in first degree relatives      Physical Exam  T(C): 36.5  HR: 66 (60 - 66)  BP: 155/80 (138/84 - 164/98)  RR: 18 (18 - 18)  SpO2: 97% (94% - 97%)  Tmax: T(C): , Max: 36.7 (19 @ 05:11)    19  -  19  --------------------------------------------------------  IN:    Oral Fluid: 240 mL  Total IN: 240 mL    OUT:  Total OUT: 0 mL    Total NET: 240 mL    General: NAD, awake and alert  Neuro: No focal neurologic deficits  HEENT: jaundice  CV: RRR  Respiratory: Respirations nonlabored  Abdominal: Abdomen soft, nontender, mildly distended, No guarding or rebound tenderness, device palpated in RLQ (per pt pain pump)  Ext: wwp, moving spontaneously        Labs:                        11.4   4.2   )-----------( 164      ( 19 Sep 2019 06:54 )             36.0     PT/INR - ( 19 Sep 2019 06:57 )   PT: 12.1 sec;   INR: 1.05 ratio         PTT - ( 19 Sep 2019 06:57 )  PTT:32.2 sec      140  |  97  |  8   ----------------------------<  129<H>  4.1   |  32<H>  |  0.95    Ca    9.9      19 Sep 2019 06:56  Phos  3.4       Mg     2.0         TPro  7.4  /  Alb  3.8  /  TBili  6.6<H>  /  DBili  x   /  AST  282<H>  /  ALT  438<H>  /  AlkPhos  262<H>      Urinalysis Basic - ( 17 Sep 2019 22:11 )    Color: Yellow / Appearance: Clear / S.032 / pH: x  Gluc: x / Ketone: Negative  / Bili: Small / Urobili: Negative   Blood: x / Protein: Negative / Nitrite: Negative   Leuk Esterase: Negative / RBC: 0 /hpf / WBC 1 /HPF   Sq Epi: x / Non Sq Epi: 0 / Bacteria: Negative      Imaging and other studies:    < from: CT Abdomen and Pelvis w/ Oral Cont and w/ IV Cont (19 @ 20:46) >    FINDINGS:    LOWER CHEST: Redemonstration of right lower lobe linear and groundglass   airspace opacities, which may reflect atelectasis and/or scarring    LIVER: Multiple indeterminant heterogeneously hypodense hepatic lesions.   For reference 1.8 cm segment 4 (3; 21) is, 2.2 cm segment 5 (3; 27), and   1.0 cm segment 5 (3; 41) lesions.  BILE DUCTS: New mild intrahepatic biliary ductal dilatation.  GALLBLADDER: Within normal limits.  SPLEEN: Within normal limits.  PANCREAS: Limited evaluation of the pancreatic parenchyma secondary to   streak artifact. Focal hypodensity within the pancreatic neck, likely   artifactual. Question 1.8 cm hypodense lesion within the pancreatic tail.  ADRENALS: 9 mm right adrenal gland nodule, grossly stable.  KIDNEYS/URETERS: 3 mm nonobstructing left renal calculus. No   hydronephrosis. Symmetric parenchymal enhancement.    BLADDER: Within normal limits.  REPRODUCTIVE ORGANS: Prostate is mildly enlarged.    BOWEL: Moderate to large hiatal hernia. Stool-filled colon. No bowel   obstruction. Appendix is normal.  PERITONEUM: No ascites.  VESSELS: Atherosclerosis of the abdominal aorta and its branch vessels.  RETROPERITONEUM/LYMPH NODES: No lymphadenopathy.    ABDOMINAL WALL: Redemonstrated spinal stimulator within the subcutaneous   tissues of the left lower back.. Small fat-containing umbilical hernia.  BONES: Redemonstration of extensive thoracolumbar spinal fusion hardware.   Orthopedic hardware is grossly intact. No periprosthetic lucency.     IMPRESSION:     Mild intrahepatic biliary ductal dilatation as well as multiple   indeterminant hepatic lesions, new since prior examination of 3/25/2018.   Metastatic disease is a differential diagnostic consideration.    Limited evaluation of the pancreatic parenchyma secondary to streak   artifact. Question 1.8 cm hypodense pancreatic tail lesion. Hypodensity   within the pancreatic neck, likely artifactual. No definite pancreatic   ductal dilatation.    More sensitive evaluationwith MRI and/or endoscopic ultrasound is   recommended, if no contraindications exist.    Moderate to large colonic fecal load.    Moderate to large hiatal hernia.

## 2019-09-23 NOTE — CONSULT NOTE ADULT - ASSESSMENT
63M with PMH of chronic pain (2/2 traumatic work injury), GERD, HTN, and chronic constipation here with biliary obstruction, and foudn to have pancreatic mass with biopsy showing adenocarcinoma. Hepatic lesion also noted, with plan for IR biopsy to r/o malignant disease. Patient has been on methadone 20mg Q6 ATC and morphine IR PRN, and dilaudid PRN 1mg IV Q4 for severe pain. Palliative called to help with pain management. Used 2mg IV x 5 in 24 hours, along with morphine IR 15mg x 1 and morphine 1mg IV x 1.

## 2019-09-23 NOTE — PROGRESS NOTE ADULT - ASSESSMENT
64 yo h/o chronic pain 2/2 traumatic work injury (on daily opiates), GERD, HTN, chronic constipation who presents with acute on chronic left lower quadrant pain x 6days, found to have biliary obstruction, s/p ERCP with pancreatic mass s/p FNA/spincterotomy/biliary stent placement w/ path showing adenocarcinoma, now awaiting IR eval for hepatic lesions.

## 2019-09-23 NOTE — PROGRESS NOTE ADULT - ASSESSMENT
Impression:  62 yo h/o chronic pain 2/2 traumatic work injury (on daily opiates), GERD, HTN, chronic constipation who presents with acute on chronig left lower quadrant pain x 6days.  Found to have elevated liver tests and abnormal CT    #Obstructive jaundice secondary to pancreatic mass that involves portal confluence s/p EUS with FNA and ERCP with bare metal stent placement.  Path back, now consistent with adenocarcinoma.  Concern for metastatic disease given liver lesions, pending IR biopsy.  # Post ERCP abdominal pain - could be pancreatitis vs pain from metal stent itself  # Chronic pain on daily opioid therapy  # Chronic constipation likely opioid induced    Recs:  - would repeat liver tests  - IR guided liver met biopsy  - medical oncology consultation given path is back  - diet as tolerated from GI perspective  - further care per primary team Impression:  64 yo h/o chronic pain 2/2 traumatic work injury (on daily opiates), GERD, HTN, chronic constipation who presents with acute on chronig left lower quadrant pain x 6days.  Found to have elevated liver tests and abnormal CT    #Obstructive jaundice secondary to pancreatic mass that involves portal confluence s/p EUS with FNA and ERCP with bare metal stent placement.  Path back, now consistent with adenocarcinoma.  Concern for metastatic disease given liver lesions, pending IR biopsy.  # Post ERCP abdominal pain - could be pancreatitis vs pain from metal stent itself  # Chronic pain on daily opioid therapy  # Chronic constipation likely opioid induced    Recs:  - would repeat liver tests  - IR guided liver lesion biopsy  - medical oncology consultation given path is back  - diet as tolerated from GI perspective  - further care per primary team

## 2019-09-23 NOTE — CONSULT NOTE ADULT - PROBLEM SELECTOR RECOMMENDATION 2
- follows with Dr. Abdifatah Lilly in [place] [phone]  - would keep Dr. Lilly aware of any medication changes prior to discharge

## 2019-09-23 NOTE — PROGRESS NOTE ADULT - ATTENDING COMMENTS
Patient seen and examined this morning. Sitting with wife. Patient with diffuse vague abdominal/back pain. Awaiting IR biopsy of liver lesions today. Suspect metastatic pancreatic cancer. If biopsy yields metastatic disease recommend palliative consult for pain management, med-onc follow up of pathology to discuss possible systemic therapy if patient interested. Patient has metal stent in bile duct which should provide good long-term palliation. Patient tolerating a diet at this time without evidence of GOO so no surgical/endoscopic intervention needed at this time from alimentary standpoint.

## 2019-09-23 NOTE — CONSULT NOTE ADULT - PROBLEM SELECTOR RECOMMENDATION 9
- acute abdominal pain superimposed on chronic pain  - c/w 80mg methadone daily, but will change to his reported home regimen of 40mg BID  - states taking 60mg MSIR in AM, and 90mg MSIR at night; will discuss restarting this regimen with patient tomorrow  - c/w dilaudid 2mg IV PRN, will use for dose-finding for his long-acting regimen

## 2019-09-23 NOTE — CHART NOTE - NSCHARTNOTEFT_GEN_A_CORE
Nutrition Follow Up Note    Patient seen for malnutrition follow up     Pt reported fair appetite, consuming 50% of meals since Friday. Decreased appetite and po intake secondary to pain. Pt has been drinking the Glucerna, offered additional Glucerna, pt declined but is amendable to Mighty Shakes; will add to his meals. Tolerating diet. Pt made aware RD is available for questions regarding Consistent Carbohydrate diet. Pt will be NPO for IR liver lesion biopsy tomorrow. Pt is s/p ERCP w/ biopsy and stent placement today.      Diet: Consistent Carbohydrate diet with PM snack + Glucerna 1x/d      Daily Weight: 97 kg (09-20)      Pertinent Medications: MEDICATIONS  (STANDING):  cloNIDine 0.1 milliGRAM(s) Oral every 8 hours  dextrose 5%. 1000 milliLiter(s) (50 mL/Hr) IV Continuous <Continuous>  dextrose 50% Injectable 12.5 Gram(s) IV Push once  dextrose 50% Injectable 25 Gram(s) IV Push once  dextrose 50% Injectable 25 Gram(s) IV Push once  DULoxetine 60 milliGRAM(s) Oral two times a day  hydrochlorothiazide 50 milliGRAM(s) Oral daily  insulin lispro (HumaLOG) corrective regimen sliding scale   SubCutaneous three times a day before meals  insulin lispro (HumaLOG) corrective regimen sliding scale   SubCutaneous at bedtime  methadone    Tablet 40 milliGRAM(s) Oral two times a day  polyethylene glycol 3350 17 Gram(s) Oral <User Schedule>  senna 2 Tablet(s) Oral at bedtime  sucralfate suspension 1 Gram(s) Oral four times a day    MEDICATIONS  (PRN):  dextrose 40% Gel 15 Gram(s) Oral once PRN Blood Glucose LESS THAN 70 milliGRAM(s)/deciliter  diazepam    Tablet 2 milliGRAM(s) Oral every 8 hours PRN anxiety  glucagon  Injectable 1 milliGRAM(s) IntraMuscular once PRN Glucose LESS THAN 70 milligrams/deciliter  HYDROmorphone  Injectable 2 milliGRAM(s) IV Push every 4 hours PRN Severe Pain (7 - 10)  morphine  IR 30 milliGRAM(s) Oral every 6 hours PRN Mild Pain (1 - 3)    Pertinent Labs: 09-23 @ 07:43: Na 133<L>, BUN 10, Cr 0.79, BG 96, K+ 3.5, Phos 3.4, Mg 1.9, Alk Phos --, ALT/SGPT --, AST/SGOT --, HbA1c --    Finger Sticks:  POCT Blood Glucose.: 138 mg/dL (09-23 @ 12:22)  POCT Blood Glucose.: 137 mg/dL (09-23 @ 08:55)  POCT Blood Glucose.: 147 mg/dL (09-22 @ 22:39)  POCT Blood Glucose.: 193 mg/dL (09-22 @ 18:39)      Skin: no pressure ulcers noted      Edema: none noted     Estimated Needs:   [X] no change since previous assessment  [ ] recalculated:     Previous Nutrition Diagnosis: mild malnutrition  Nutrition Diagnosis is: on-going, addressed with po diet + oral supplements     New Nutrition Diagnosis: n/a       Recommend  1) continue Consistent Carbohydrate diet + Glucerna 1x/d  2) will add diet Mighty Shakes to diet     Monitoring and Evaluation:     Continue to monitor Nutritional intake, Tolerance to diet prescription, weights, labs, skin integrity    RD remains available upon request and will follow up per protocol

## 2019-09-23 NOTE — PROGRESS NOTE ADULT - PROBLEM SELECTOR PLAN 2
Concern for malignancy given ERCP findings of pancreatic lesion. not seen on ERCP  -appreciate surg/onc recs - consulted IR for liver lesion biopsy r/o malignancy  -f/u abd u/s ordered prior to liver bx  -will call IR in AM to confirm scheduling

## 2019-09-23 NOTE — PROGRESS NOTE ADULT - PROBLEM SELECTOR PLAN 7
DVT: lovenox  Electrolytes: replete prn  Dispo: pending abdominal US for IR liver lesion bx, NPO MN possibly liver bx DVT: lovenox  Electrolytes: replete prn  Dispo: NPO MN on IR schedule for liver bx tomorrow DVT: holding lovenox for liver bx tomorrow  Electrolytes: replete prn  Dispo: NPO MN on IR schedule for liver bx tomorrow

## 2019-09-23 NOTE — PROGRESS NOTE ADULT - SUBJECTIVE AND OBJECTIVE BOX
Interval Events: No acute overnight events. Pt continues to c/o abdominal pain, tolerating diet. Pt pending abdominal US and biopsy of liver lesions by IR. Richardies CP, SOB, n/v.       OBJECTIVE:  ICU Vital Signs Last 24 Hrs  T(C): 36.5 (23 Sep 2019 04:59), Max: 36.6 (22 Sep 2019 14:24)  T(F): 97.7 (23 Sep 2019 04:59), Max: 97.8 (22 Sep 2019 14:24)  HR: 61 (23 Sep 2019 04:59) (61 - 66)  BP: 144/88 (23 Sep 2019 04:59) (144/88 - 162/108)  BP(mean): --  ABP: --  ABP(mean): --  RR: 18 (23 Sep 2019 04:59) (16 - 18)  SpO2: 95% (23 Sep 2019 04:59) (94% - 95%)        CAPILLARY BLOOD GLUCOSE      POCT Blood Glucose.: 147 mg/dL (22 Sep 2019 22:39)      PHYSICAL EXAM:  GENERAL: appears stated age, disheveled  HEENT: EOMI, PERRL, +scleral icterus, MMM, no oropharyngeal lesions or erythema appreciated  Pulm: CTAB  CV: RRR, S1&S2+, no m/r/g appreciated  ABDOMEN: +BS, +tender over superior portion of LLQ (no guarding), +distended, +hepatomegaly with irregular borders, right lower quadrant mass corresponding to prior stimulator battery.   MSK: nl ROM  BACK: Right lower quadrant mass corresponding to current stimulator battery, pain on palpation over lumbar spine with wincing, mild pain over paraspinal region w/o wincing  EXTREMITIES:  no appreciable edema in b/l LE  Neuro: A&Ox3, no focal deficits  SKIN: warm and dry, no visible rash    HOSPITAL MEDICATIONS:  enoxaparin Injectable 40 milliGRAM(s) SubCutaneous daily      cloNIDine 0.1 milliGRAM(s) Oral every 8 hours  hydrochlorothiazide 50 milliGRAM(s) Oral daily    dextrose 40% Gel 15 Gram(s) Oral once PRN  dextrose 50% Injectable 12.5 Gram(s) IV Push once  dextrose 50% Injectable 25 Gram(s) IV Push once  dextrose 50% Injectable 25 Gram(s) IV Push once  glucagon  Injectable 1 milliGRAM(s) IntraMuscular once PRN  insulin lispro (HumaLOG) corrective regimen sliding scale   SubCutaneous three times a day before meals  insulin lispro (HumaLOG) corrective regimen sliding scale   SubCutaneous at bedtime      diazepam    Tablet 2 milliGRAM(s) Oral every 8 hours PRN  DULoxetine 60 milliGRAM(s) Oral two times a day  methadone    Tablet 20 milliGRAM(s) Oral four times a day  morphine  - Injectable 2 milliGRAM(s) IV Push every 4 hours PRN  morphine  IR 30 milliGRAM(s) Oral every 6 hours PRN    polyethylene glycol 3350 17 Gram(s) Oral <User Schedule>  senna 2 Tablet(s) Oral at bedtime  sucralfate suspension 1 Gram(s) Oral four times a day        dextrose 5%. 1000 milliLiter(s) IV Continuous <Continuous>            LABS:                        11.4   6.0   )-----------( 186      ( 23 Sep 2019 07:43 )             35.8     Hgb Trend: 11.4<--, 11.8<--, 12.2<--, 11.7<--, 11.4<--  09-22    133<L>  |  94<L>  |  10  ----------------------------<  162<H>  4.1   |  27  |  0.77    Ca    9.8      22 Sep 2019 09:25      Creatinine Trend: 0.77<--, 0.81<--, 0.87<--, 0.95<--, 0.90<--, 0.96<--  PT/INR - ( 23 Sep 2019 07:43 )   PT: 12.4 sec;   INR: 1.08 ratio         PTT - ( 23 Sep 2019 07:43 )  PTT:33.0 sec Interval Events: No acute overnight events. Pt continues to c/o abdominal pain, tolerating diet. Pt pending abdominal US and biopsy of liver lesions by IR. Richardies CP, SOB, n/v.       OBJECTIVE:  ICU Vital Signs Last 24 Hrs  T(C): 36.5 (23 Sep 2019 04:59), Max: 36.6 (22 Sep 2019 14:24)  T(F): 97.7 (23 Sep 2019 04:59), Max: 97.8 (22 Sep 2019 14:24)  HR: 61 (23 Sep 2019 04:59) (61 - 66)  BP: 144/88 (23 Sep 2019 04:59) (144/88 - 162/108)  BP(mean): --  ABP: --  ABP(mean): --  RR: 18 (23 Sep 2019 04:59) (16 - 18)  SpO2: 95% (23 Sep 2019 04:59) (94% - 95%)        CAPILLARY BLOOD GLUCOSE      POCT Blood Glucose.: 147 mg/dL (22 Sep 2019 22:39)      PHYSICAL EXAM:  GENERAL: appears stated age, disheveled  HEENT: EOMI, PERRL, +scleral icterus, MMM, no oropharyngeal lesions or erythema appreciated  Pulm: CTAB  CV: RRR, S1&S2+, no m/r/g appreciated  ABDOMEN: +BS, +tender over superior portion of LLQ (no guarding), +distended, +hepatomegaly with irregular borders, right lower quadrant mass corresponding to prior stimulator battery.   MSK: nl ROM  BACK: Right lower quadrant mass corresponding to current stimulator battery, pain on palpation over lumbar spine with wincing, mild pain over paraspinal region w/o wincing  EXTREMITIES:  no appreciable edema in b/l LE  Neuro: A&Ox3, no focal deficits  SKIN: warm and dry, no visible rash    HOSPITAL MEDICATIONS:  enoxaparin Injectable 40 milliGRAM(s) SubCutaneous daily      cloNIDine 0.1 milliGRAM(s) Oral every 8 hours  hydrochlorothiazide 50 milliGRAM(s) Oral daily    dextrose 40% Gel 15 Gram(s) Oral once PRN  dextrose 50% Injectable 12.5 Gram(s) IV Push once  dextrose 50% Injectable 25 Gram(s) IV Push once  dextrose 50% Injectable 25 Gram(s) IV Push once  glucagon  Injectable 1 milliGRAM(s) IntraMuscular once PRN  insulin lispro (HumaLOG) corrective regimen sliding scale   SubCutaneous three times a day before meals  insulin lispro (HumaLOG) corrective regimen sliding scale   SubCutaneous at bedtime      diazepam    Tablet 2 milliGRAM(s) Oral every 8 hours PRN  DULoxetine 60 milliGRAM(s) Oral two times a day  methadone    Tablet 20 milliGRAM(s) Oral four times a day  morphine  - Injectable 2 milliGRAM(s) IV Push every 4 hours PRN  morphine  IR 30 milliGRAM(s) Oral every 6 hours PRN    polyethylene glycol 3350 17 Gram(s) Oral <User Schedule>  senna 2 Tablet(s) Oral at bedtime  sucralfate suspension 1 Gram(s) Oral four times a day        dextrose 5%. 1000 milliLiter(s) IV Continuous <Continuous>            LABS:                        11.4   6.0   )-----------( 186      ( 23 Sep 2019 07:43 )             35.8     Hgb Trend: 11.4<--, 11.8<--, 12.2<--, 11.7<--, 11.4<--  09-22    133<L>  |  94<L>  |  10  ----------------------------<  162<H>  4.1   |  27  |  0.77    Ca    9.8      22 Sep 2019 09:25      Creatinine Trend: 0.77<--, 0.81<--, 0.87<--, 0.95<--, 0.90<--, 0.96<--  PT/INR - ( 23 Sep 2019 07:43 )   PT: 12.4 sec;   INR: 1.08 ratio         PTT - ( 23 Sep 2019 07:43 )  PTT:33.0 sec    < from: US Abdomen Complete (09.23.19 @ 10:05) >  IMPRESSION:     Multiple right lobe liver lesion suspicious for metastases. These appear   amenable to ultrasound-guided needle biopsy.    Pneumobilia, indirect evidence of biliary stent patency.    Cholelithiasis.    Splenomegaly.                      DUYEN WALKER M.D., ATTENDING RADIOLOGIST  This document has been electronically signed. Sep 23 2019 11:16AM    < end of copied text >

## 2019-09-23 NOTE — CONSULT NOTE ADULT - REASON FOR ADMISSION
abdominal pain, hyperbilirubinemia

## 2019-09-23 NOTE — CONSULT NOTE ADULT - SUBJECTIVE AND OBJECTIVE BOX
HPI: 63M with PMH of chronic pain (2/2 traumatic work injury), GERD, HTN, and chronic constipation here with biliary obstruction, and foudn to have pancreatic mass with biopsy showing adenocarcinoma. Hepatic lesion also noted, with plan for IR biopsy to r/o malignant disease. Patient has been on methadone 20mg Q6 ATC and morphine IR PRN, and dilaudid PRN 1mg IV Q4 for severe pain. Palliative called to help with pain management. Used 2mg IV x 5 in 24 hours, along with morphine IR 15mg x 1 and morphine 1mg IV x 1.     PERTINENT PM/SXH:   Anxiety and depression  Diastolic dysfunction  Empyema lung  H/O peptic ulcer  history of renal calculus  Renal calculus  Current smoker  Herniated Disc  Hypertension  Depression  Spinal Stenosis  Peripheral Neuropathy  Postlaminectomy Syndrome    S/P  shunt  S/P insertion of spinal cord stimulator  History of lumbar fusion  Insertion of Intrathecal Dilaudid Pump  S/P Spinal Surgery  S/P Knee Replacement  S/P Arthroscopy of Left Knee  S/P Tonsillectomy  Ankle Fracture    FAMILY HISTORY:  No pertinent family history in first degree relatives    ITEMS NOT CHECKED ARE NOT PRESENT    SOCIAL HISTORY:   Significant other/partner:  [X ]    Children:  [X ]    Sikhism/Spirituality: Church  Substance hx:  [ ]   Tobacco hx:  [ ]   Alcohol hx: [ ] Drug use hx: [ ]  Home Opioid hx:  [ ] (I-Stop Reference No: [id number])    Rx Written	Rx Dispensed	Drug	Quantity	Days Supply	Prescriber Name  09/17/2019	09/17/2019	methadone hcl 10 mg tablet	120	15	Johnson, Tram PA  09/17/2019	09/17/2019	morphine sulfate ir 30 mg tab	75	15	Johnson, Tram PA  09/17/2019	09/17/2019	diazepam 2 mg tablet	45	15	Johnson, Tram PA  09/03/2019	09/03/2019	methadone hcl 10 mg tablet	120	15	Johnson, Tram PA  09/03/2019	09/03/2019	morphine sulfate ir 30 mg tab	75	15	Johnson, Tram PA  09/03/2019	09/03/2019	diazepam 2 mg tablet	45	15	Johnson, Tram PA    Living Situation: [ ]Home  [ ]Long term care  [ ]Rehab [ ]Other  Occupation:    ADVANCE DIRECTIVES:    DNR [ ]  MOLST  [ ]    Living Will  [ ]   DECISION MAKER(s):  [ ] Health Care Proxy(s)  [ ] Surrogate(s)  [ ] Guardian           Name(s) and Contact(s): spouse Leia Ortiz     BASELINE (I)ADL(s) (prior to admission):  [place]: [ ]Total  [ ] Moderate [ ]Dependent    Allergies    No Known Allergies    Intolerances    MEDICATIONS  (STANDING):  cloNIDine 0.1 milliGRAM(s) Oral every 8 hours  dextrose 5%. 1000 milliLiter(s) (50 mL/Hr) IV Continuous <Continuous>  dextrose 50% Injectable 12.5 Gram(s) IV Push once  dextrose 50% Injectable 25 Gram(s) IV Push once  dextrose 50% Injectable 25 Gram(s) IV Push once  DULoxetine 60 milliGRAM(s) Oral two times a day  hydrochlorothiazide 50 milliGRAM(s) Oral daily  insulin lispro (HumaLOG) corrective regimen sliding scale   SubCutaneous three times a day before meals  insulin lispro (HumaLOG) corrective regimen sliding scale   SubCutaneous at bedtime  methadone    Tablet 20 milliGRAM(s) Oral four times a day  polyethylene glycol 3350 17 Gram(s) Oral <User Schedule>  senna 2 Tablet(s) Oral at bedtime  sucralfate suspension 1 Gram(s) Oral four times a day    MEDICATIONS  (PRN):  dextrose 40% Gel 15 Gram(s) Oral once PRN Blood Glucose LESS THAN 70 milliGRAM(s)/deciliter  diazepam    Tablet 2 milliGRAM(s) Oral every 8 hours PRN anxiety  glucagon  Injectable 1 milliGRAM(s) IntraMuscular once PRN Glucose LESS THAN 70 milligrams/deciliter  HYDROmorphone  Injectable 2 milliGRAM(s) IV Push every 4 hours PRN Severe Pain (7 - 10)  morphine  IR 30 milliGRAM(s) Oral every 6 hours PRN Mild Pain (1 - 3)    PRESENT SYMPTOMS:   Source if other than patient:  [ ]Family   [ ]Team     Pain (Impact on QOL):    Location -         Minimal acceptable level (0-10 scale):                    Aggravating factors -  Quality -  Radiation -  Severity (0-10 scale) -    Timing -    PAIN AD Score:     [email or web address] (press ctrl +  left click to view)    Dyspnea:                           [ ]Mild [ ]Moderate [ ]Severe  Anxiety:                             [ ]Mild [ ]Moderate [ ]Severe  Fatigue:                             [ ]Mild [ ]Moderate [ ]Severe  Nausea:                             [ ]Mild [ ]Moderate [ ]Severe  Loss of appetite:              [ ]Mild [ ]Moderate [ ]Severe  Constipation:                    [ ]Mild [ ]Moderate [ ]Severe    Other Symptoms:  [ ]All other review of systems negative   [ ]Unable to obtain due to poor mentation     Karnofsky Performance Score/Palliative Performance Status Version 2:         %    PHYSICAL EXAM:  Vital Signs Last 24 Hrs  T(C): 36.4 (23 Sep 2019 13:10), Max: 36.5 (23 Sep 2019 04:59)  T(F): 97.5 (23 Sep 2019 13:10), Max: 97.7 (23 Sep 2019 04:59)  HR: 65 (23 Sep 2019 13:10) (61 - 65)  BP: 123/80 (23 Sep 2019 13:10) (123/80 - 159/96)  BP(mean): --  RR: 17 (23 Sep 2019 13:10) (17 - 18)  SpO2: 95% (23 Sep 2019 13:10) (94% - 95%) I&O's Summary      GENERAL:  [ ]Alert  [ ]Oriented x   [ ]Lethargic  [ ]Cachexia  [ ]Unarousable  [ ]Verbal  [ ]Non-Verbal    Behavioral:   [ ] Anxiety  [ ] Delirium [ ] Agitation [ ] Other    HEENT:  [ ]Normal   [ ]Dry mouth   [ ]ET Tube/Trach  [ ]Oral lesions    PULMONARY:   [ ]Clear [ ]Tachypnea  [ ]Audible excessive secretions   [ ]Rhonchi        [ ]Right [ ]Left [ ]Bilateral  [ ]Crackles        [ ]Right [ ]Left [ ]Bilateral  [ ]Wheezing     [ ]Right [ ]Left [ ]Bilateral    CARDIOVASCULAR:    [ ]Regular [ ]Irregular [ ]Tachy  [ ]Jean [ ]Murmur [ ]Other    GASTROINTESTINAL:  [ ]Soft  [ ]Distended   [ ]+BS  [ ]Non tender [ ]Tender  [ ]PEG [ ]OGT/ NGT  Last BM:     GENITOURINARY:  [ ]Normal [ ] Incontinent   [ ]Oliguria/Anuria   [ ]Heredia    MUSCULOSKELETAL:   [ ]Normal   [ ]Weakness  [ ]Bed/Wheelchair bound [ ]Edema    NEUROLOGIC:   [ ]No focal deficits  [ ] Cognitive impairment  [ ] Dysphagia [ ]Dysarthria [ ] Paresis [ ]Other     SKIN:   [ ]Normal   [ ]Pressure ulcer(s)  [ ]Rash    CRITICAL CARE:  [ ] Shock Present  [ ]Septic [ ]Cardiogenic [ ]Neurologic [ ]Hypovolemic  [ ]  Vasopressors [ ]  Inotropes   [ ] Respiratory failure present  [ ] Acute  [ ] Chronic [ ] Hypoxic  [ ] Hypercarbic [ ] Other  [ ] Other organ failure     LABS: reviewed                        11.4   6.0   )-----------( 186      ( 23 Sep 2019 07:43 )             35.8   09-23    133<L>  |  94<L>  |  10  ----------------------------<  96  3.5   |  26  |  0.79    Ca    9.6      23 Sep 2019 07:43  Phos  3.4     09-23  Mg     1.9     09-23    PT/INR - ( 23 Sep 2019 07:43 )   PT: 12.4 sec;   INR: 1.08 ratio         PTT - ( 23 Sep 2019 07:43 )  PTT:33.0 sec      RADIOLOGY & ADDITIONAL STUDIES:  US abdomen 9/23/19  Multiple right lobe liver lesion suspicious for metastases. These appear   amenable to ultrasound-guided needle biopsy.    Pneumobilia, indirect evidence of biliary stent patency.    Cholelithiasis.    Splenomegaly.    PROTEIN CALORIE MALNUTRITION:   [ ] PPSV2 < or = to 30% [ ] significant weight loss  [ ] poor nutritional intake [ ] catabolic state [ ] anasarca     Albumin, Serum: 3.6 g/dL (09-21-19 @ 07:03)  Artificial Nutrition [ ]     REFERRALS:   [ ]Chaplaincy  [ ] Hospice  [ ]Child Life  [ ]Social Work  [ ]Case management [ ]Holistic Therapy     Goals of Care Discussion Document:     ........ ....... ...... ..... .... ... .. .  Luis Manuel Medeiros MD  Palliative Medicine  office: [phone] | [phone]  pager: [phone] HPI: 63M with PMH of chronic pain (2/2 traumatic work injury), GERD, HTN, and chronic constipation here with biliary obstruction, and foudn to have pancreatic mass with biopsy showing adenocarcinoma. Hepatic lesion also noted, with plan for IR biopsy to r/o malignant disease. Patient has been on methadone 20mg Q6 ATC and morphine IR PRN, and dilaudid PRN 1mg IV Q4 for severe pain. Palliative called to help with pain management. Used 2mg IV x 5 in 24 hours, along with morphine IR 15mg x 1 and morphine 1mg IV x 1.     PERTINENT PM/SXH:   Anxiety and depression  Diastolic dysfunction  Empyema lung  H/O peptic ulcer  history of renal calculus  Renal calculus  Current smoker  Herniated Disc  Hypertension  Depression  Spinal Stenosis  Peripheral Neuropathy  Postlaminectomy Syndrome    S/P  shunt  S/P insertion of spinal cord stimulator  History of lumbar fusion  Insertion of Intrathecal Dilaudid Pump  S/P Spinal Surgery  S/P Knee Replacement  S/P Arthroscopy of Left Knee  S/P Tonsillectomy  Ankle Fracture    FAMILY HISTORY:  No pertinent family history in first degree relatives    ITEMS NOT CHECKED ARE NOT PRESENT    SOCIAL HISTORY:   Significant other/partner:  [X ]    Children:  [X ]    Sikh/Spirituality: Episcopal  Substance hx:  [ ]   Tobacco hx:  [ ]   Alcohol hx: [ ] Drug use hx: [ ]  Home Opioid hx:  [ ] (I-Stop Reference No: [id number])    Rx Written	Rx Dispensed	Drug	Quantity	Days Supply	Prescriber Name  09/17/2019	09/17/2019	methadone hcl 10 mg tablet	120	15	Johnson, Tram PA  09/17/2019	09/17/2019	morphine sulfate ir 30 mg tab	75	15	Johnson, Tram PA  09/17/2019	09/17/2019	diazepam 2 mg tablet	45	15	Johnson, Tram PA  09/03/2019	09/03/2019	methadone hcl 10 mg tablet	120	15	Johnson, Tram PA  09/03/2019	09/03/2019	morphine sulfate ir 30 mg tab	75	15	Johnson, Tram PA  09/03/2019	09/03/2019	diazepam 2 mg tablet	45	15	Johnson, Tram PA    Living Situation: [ ]Home  [ ]Long term care  [ ]Rehab [ ]Other  Occupation:    ADVANCE DIRECTIVES:    DNR [ ]  MOLST  [ ]    Living Will  [ ]   DECISION MAKER(s):  [ ] Health Care Proxy(s)  [ ] Surrogate(s)  [ ] Guardian           Name(s) and Contact(s): spouse Leia Ortiz     BASELINE (I)ADL(s) (prior to admission):  [place]: [ ]Total  [ ] Moderate [ ]Dependent    Allergies    No Known Allergies    Intolerances    MEDICATIONS  (STANDING):  cloNIDine 0.1 milliGRAM(s) Oral every 8 hours  dextrose 5%. 1000 milliLiter(s) (50 mL/Hr) IV Continuous <Continuous>  dextrose 50% Injectable 12.5 Gram(s) IV Push once  dextrose 50% Injectable 25 Gram(s) IV Push once  dextrose 50% Injectable 25 Gram(s) IV Push once  DULoxetine 60 milliGRAM(s) Oral two times a day  hydrochlorothiazide 50 milliGRAM(s) Oral daily  insulin lispro (HumaLOG) corrective regimen sliding scale   SubCutaneous three times a day before meals  insulin lispro (HumaLOG) corrective regimen sliding scale   SubCutaneous at bedtime  methadone    Tablet 20 milliGRAM(s) Oral four times a day  polyethylene glycol 3350 17 Gram(s) Oral <User Schedule>  senna 2 Tablet(s) Oral at bedtime  sucralfate suspension 1 Gram(s) Oral four times a day    MEDICATIONS  (PRN):  dextrose 40% Gel 15 Gram(s) Oral once PRN Blood Glucose LESS THAN 70 milliGRAM(s)/deciliter  diazepam    Tablet 2 milliGRAM(s) Oral every 8 hours PRN anxiety  glucagon  Injectable 1 milliGRAM(s) IntraMuscular once PRN Glucose LESS THAN 70 milligrams/deciliter  HYDROmorphone  Injectable 2 milliGRAM(s) IV Push every 4 hours PRN Severe Pain (7 - 10)  morphine  IR 30 milliGRAM(s) Oral every 6 hours PRN Mild Pain (1 - 3)    PRESENT SYMPTOMS:   Source if other than patient:  [ ]Family   [ ]Team     Pain (Impact on QOL):    Location -       back and abdomen  Minimal acceptable level (0-10 scale): tolerable is 6-7/10                   Aggravating factors -  Quality - aching (back), sharp (abdomen)  Radiation -  Severity (0-10 scale) -  up to 8/10   Timing - constant    PAIN AD Score:     [email or web address] (press ctrl +  left click to view)    Dyspnea:                           [ ]Mild [ ]Moderate [ ]Severe  Anxiety:                             [ ]Mild [ ]Moderate [ ]Severe  Fatigue:                             [ ]Mild [ ]Moderate [ ]Severe  Nausea:                             [ ]Mild [ ]Moderate [ ]Severe  Loss of appetite:              [ ]Mild [ ]Moderate [ ]Severe  Constipation:                    [ ]Mild [ ]Moderate [ ]Severe    Other Symptoms:  [X ]All other review of systems negative   [ ]Unable to obtain due to poor mentation     Karnofsky Performance Score/Palliative Performance Status Version 2:         %    PHYSICAL EXAM:  Vital Signs Last 24 Hrs  T(C): 36.4 (23 Sep 2019 13:10), Max: 36.5 (23 Sep 2019 04:59)  T(F): 97.5 (23 Sep 2019 13:10), Max: 97.7 (23 Sep 2019 04:59)  HR: 65 (23 Sep 2019 13:10) (61 - 65)  BP: 123/80 (23 Sep 2019 13:10) (123/80 - 159/96)  BP(mean): --  RR: 17 (23 Sep 2019 13:10) (17 - 18)  SpO2: 95% (23 Sep 2019 13:10) (94% - 95%) I&O's Summary      GENERAL:  [X ]Alert  [ ]Oriented x   [ ]Lethargic  [ ]Cachexia  [ ]Unarousable  [ X]Verbal  [ ]Non-Verbal    Behavioral:   [ ] Anxiety  [ ] Delirium [ ] Agitation [ ] Other    HEENT:  [X ]Normal   [ ]Dry mouth   [ ]ET Tube/Trach  [ ]Oral lesions    PULMONARY:   [X ]Clear [ ]Tachypnea  [ ]Audible excessive secretions   [ ]Rhonchi        [ ]Right [ ]Left [ ]Bilateral  [ ]Crackles        [ ]Right [ ]Left [ ]Bilateral  [ ]Wheezing     [ ]Right [ ]Left [ ]Bilateral    CARDIOVASCULAR:    [X ]Regular [ ]Irregular [ ]Tachy  [ ]Jean [ ]Murmur [ ]Other    GASTROINTESTINAL:  [X ]Soft  [ ]Distended   [X ]+BS  [X ]Non tender [ ]Tender  [ ]PEG [ ]OGT/ NGT  Last BM:     GENITOURINARY:  [ ]Normal [ ] Incontinent   [ ]Oliguria/Anuria   [ ]Heredia    MUSCULOSKELETAL:   [ ]Normal   [ ]Weakness  [ ]Bed/Wheelchair bound [ ]Edema    NEUROLOGIC:   [X ]No focal deficits  [ ] Cognitive impairment  [ ] Dysphagia [ ]Dysarthria [ ] Paresis [ ]Other     SKIN:   [ ]Normal   [ ]Pressure ulcer(s)  [ ]Rash    CRITICAL CARE:  [ ] Shock Present  [ ]Septic [ ]Cardiogenic [ ]Neurologic [ ]Hypovolemic  [ ]  Vasopressors [ ]  Inotropes   [ ] Respiratory failure present  [ ] Acute  [ ] Chronic [ ] Hypoxic  [ ] Hypercarbic [ ] Other  [ ] Other organ failure     LABS: reviewed                        11.4   6.0   )-----------( 186      ( 23 Sep 2019 07:43 )             35.8   09-23    133<L>  |  94<L>  |  10  ----------------------------<  96  3.5   |  26  |  0.79    Ca    9.6      23 Sep 2019 07:43  Phos  3.4     09-23  Mg     1.9     09-23    PT/INR - ( 23 Sep 2019 07:43 )   PT: 12.4 sec;   INR: 1.08 ratio         PTT - ( 23 Sep 2019 07:43 )  PTT:33.0 sec      RADIOLOGY & ADDITIONAL STUDIES:  US abdomen 9/23/19  Multiple right lobe liver lesion suspicious for metastases. These appear   amenable to ultrasound-guided needle biopsy.    Pneumobilia, indirect evidence of biliary stent patency.    Cholelithiasis.    Splenomegaly.    PROTEIN CALORIE MALNUTRITION:   [ ] PPSV2 < or = to 30% [ ] significant weight loss  [ ] poor nutritional intake [ ] catabolic state [ ] anasarca     Albumin, Serum: 3.6 g/dL (09-21-19 @ 07:03)  Artificial Nutrition [ ]     REFERRALS:   [ ]Chaplaincy  [ ] Hospice  [ ]Child Life  [ ]Social Work  [ ]Case management [ ]Holistic Therapy     Goals of Care Discussion Document:     ........ ....... ...... ..... .... ... .. .  Luis Manuel Medeiros MD  Palliative Medicine  office: [phone] | [phone]  pager: [phone]

## 2019-09-23 NOTE — PROGRESS NOTE ADULT - PROBLEM SELECTOR PLAN 3
Unclear etiology, likely constipation (CT with significant stool burden) vs. abnormalities found as above  less likely post ERCP pancreatitis, lipase trending down and symptoms c/w prior abd pain  - miralax TID  - monitor abx exam  - pain control as below

## 2019-09-23 NOTE — PROGRESS NOTE ADULT - SUBJECTIVE AND OBJECTIVE BOX
[facility] Progress Note     SUBJECTIVE / 24H EVENTS  Patient seen and examined on morning rounds. No acute events overnight. He endorses abdominal pain that is slightly improved since admission. He denies fevers, chills, nausea, or vomiting.     OBJECTIVE:    VITAL SIGNS:  T(C): 36.5 (09-23-19 @ 04:59), Max: 36.6 (09-22-19 @ 14:24)  HR: 61 (09-23-19 @ 04:59) (61 - 66)  BP: 144/88 (09-23-19 @ 04:59) (144/88 - 162/108)  RR: 18 (09-23-19 @ 04:59) (16 - 18)  SpO2: 95% (09-23-19 @ 04:59) (94% - 95%)    Daily     Daily     POCT Blood Glucose.: 137 mg/dL (09-23-19 @ 08:55)  POCT Blood Glucose.: 147 mg/dL (09-22-19 @ 22:39)  POCT Blood Glucose.: 193 mg/dL (09-22-19 @ 18:39)      PHYSICAL EXAM:  Gen: NAD, sleeping in bed  LS: Respirations unlabored. CTA b/l  Card: RRR. No m/r/g  GI: Soft. tender to LLQ on palpation. Nondistended.   Ext: Warm, well perfused        LAB VALUES:  09-23    133<L>  |  94<L>  |  10  ----------------------------<  96  3.5   |  26  |  0.79    Ca    9.6      23 Sep 2019 07:43  Phos  3.4     09-23  Mg     1.9     09-23                                 11.4   6.0   )-----------( 186      ( 23 Sep 2019 07:43 )             35.8       PT/INR - ( 23 Sep 2019 07:43 )   PT: 12.4 sec;   INR: 1.08 ratio         PTT - ( 23 Sep 2019 07:43 )  PTT:33.0 sec        MICROBIOLOGY:    Culture - Urine (collected 18 Sep 2019 01:07)  Source: .Urine  Final Report (18 Sep 2019 21:46):    <10,000 CFU/mL Normal Urogenital Veronika    No new microbiology data for review.     RADIOLOGY:  No new radiographic images for review.    PATHOLOGY:    ACCESSION No:  [id number]    LEN ALY              1        Cytopathology Report            Final Diagnosis  PANCREAS, HEAD/NECK, EUS-GUIDED FNA  POSITIVE FOR MALIGNANT CELLS.  Adenocarcinoma  Cytology slides and cell block reveal a hypercellular specimen  composed of crowded 3-dimensional groups and single-lying  malignant cells with enlarged nuclei containing prominent  nucleoli and vacuolated cytoplasm. Foci of  inflammation and necrosis are identified.    Screened by: Vi PATEL,(ASCP)  Verified by: Shara Quiroz MD  (Electronic Signature)  Reported on: 09/20/19 16:42 EDT, [address] [place]  [address]  Cytology technical processing performed at [address] [place] [id number]  _________________________________________________________________      Specimen(s) Submitted  PANCREAS, HEAD/NECK, EUS-GUIDED FNA      Statement of Adequacy  Immediate cytologic study for adequacy of specimen using a Diff-  Quik stain was performed. FNA acceptable for further evaluation  by AMANDA Stevens(ASCP).      Clinical Information  Patient presents with pancreatic head/ neck mass, and multiple  liver lseions. R/O cancer      Comment  Dr Corona was notified 09/20/19.      Gross Description  Received: Needle rinses  in formalin. 20 ml of tinted fluid.  4 Smear received ( 2 air dried and 2 fixed in  alcohol)  Prepared:  1 cell block          MEDICATIONS  (STANDING):  cloNIDine 0.1 milliGRAM(s) Oral every 8 hours  dextrose 5%. 1000 milliLiter(s) (50 mL/Hr) IV Continuous <Continuous>  dextrose 50% Injectable 12.5 Gram(s) IV Push once  dextrose 50% Injectable 25 Gram(s) IV Push once  dextrose 50% Injectable 25 Gram(s) IV Push once  DULoxetine 60 milliGRAM(s) Oral two times a day  hydrochlorothiazide 50 milliGRAM(s) Oral daily  insulin lispro (HumaLOG) corrective regimen sliding scale   SubCutaneous three times a day before meals  insulin lispro (HumaLOG) corrective regimen sliding scale   SubCutaneous at bedtime  methadone    Tablet 20 milliGRAM(s) Oral four times a day  polyethylene glycol 3350 17 Gram(s) Oral <User Schedule>  senna 2 Tablet(s) Oral at bedtime  sucralfate suspension 1 Gram(s) Oral four times a day    MEDICATIONS  (PRN):  dextrose 40% Gel 15 Gram(s) Oral once PRN Blood Glucose LESS THAN 70 milliGRAM(s)/deciliter  diazepam    Tablet 2 milliGRAM(s) Oral every 8 hours PRN anxiety  glucagon  Injectable 1 milliGRAM(s) IntraMuscular once PRN Glucose LESS THAN 70 milligrams/deciliter  morphine  - Injectable 2 milliGRAM(s) IV Push every 4 hours PRN Severe Pain (7 - 10)  morphine  IR 30 milliGRAM(s) Oral every 6 hours PRN Mild Pain (1 - 3)

## 2019-09-23 NOTE — PROGRESS NOTE ADULT - ASSESSMENT
63M h/o chronic pain 2/2 traumatic work injury (on daily opiates, s/p spinal stimulator), GERD, HTN, chronic constipation who was admitted on 9/17 with acute on chronic left lower quadrant pain x 6days, found to have obstructive jaundice w/ pancreatic adenocarcinoma and liver lesions.     -f/u heme/onc  -IR biopsy of liver lesions scheduled for 9/24  -CT chest noncontrast for staging  -will continue to follow  -care per primary team    [facility]  [id number]

## 2019-09-24 NOTE — CHART NOTE - NSCHARTNOTEFT_GEN_A_CORE
Vascular & Interventional Radiology Post-Procedure Note    Pre-Procedure Diagnosis: Liver masses  Post-Procedure Diagnosis: Same as pre.  Indications for Procedure: mets workup    : Kory  Assistant(s): n/a    Procedure Details/Findings: Liver mass needle biopsy  Access (if applicable): n/a    Complications: none  Estimated Blood Loss: Minimal  Specimen: 3 cores  Contrast: none  Sedation: anesthesia  Patient Condition/Disposition: stable, 2 hour PACU bedrest, 1 hour floor bedrest    Plan: f/u path

## 2019-09-24 NOTE — PROGRESS NOTE ADULT - SUBJECTIVE AND OBJECTIVE BOX
Interval Events: No acute overnight events. Pt states pain is improved with adjusted regimen, to go to liver biopsy with IR today. Denies n/v, CP, SOB.    OBJECTIVE:  ICU Vital Signs Last 24 Hrs  T(C): 36.6 (24 Sep 2019 05:39), Max: 36.7 (23 Sep 2019 21:32)  T(F): 97.9 (24 Sep 2019 05:39), Max: 98.1 (23 Sep 2019 21:32)  HR: 62 (24 Sep 2019 05:39) (62 - 72)  BP: 153/89 (24 Sep 2019 05:39) (123/80 - 153/89)  BP(mean): --  ABP: --  ABP(mean): --  RR: 18 (24 Sep 2019 05:39) (17 - 18)  SpO2: 94% (24 Sep 2019 05:39) (94% - 95%)        09-23 @ 07:01  -  09-24 @ 07:00  --------------------------------------------------------  IN: 120 mL / OUT: 0 mL / NET: 120 mL      CAPILLARY BLOOD GLUCOSE      POCT Blood Glucose.: 138 mg/dL (24 Sep 2019 06:51)      PHYSICAL EXAM:  GENERAL: appears stated age, disheveled  HEENT: EOMI, PERRL, +scleral icterus, MMM, no oropharyngeal lesions or erythema appreciated  Pulm: CTAB  CV: RRR, S1&S2+, no m/r/g appreciated  ABDOMEN: +BS, +tender over superior portion of LLQ (no guarding), +distended, +hepatomegaly with irregular borders, right lower quadrant mass corresponding to prior stimulator battery.   MSK: nl ROM  BACK: Right lower quadrant mass corresponding to current stimulator battery, pain on palpatio    HOSPITAL MEDICATIONS:  cloNIDine 0.1 milliGRAM(s) Oral every 8 hours  hydrochlorothiazide 50 milliGRAM(s) Oral daily  dextrose 40% Gel 15 Gram(s) Oral once PRN  dextrose 50% Injectable 12.5 Gram(s) IV Push once  dextrose 50% Injectable 25 Gram(s) IV Push once  dextrose 50% Injectable 25 Gram(s) IV Push once  glucagon  Injectable 1 milliGRAM(s) IntraMuscular once PRN  insulin lispro (HumaLOG) corrective regimen sliding scale   SubCutaneous every 6 hours  diazepam    Tablet 2 milliGRAM(s) Oral every 8 hours PRN  DULoxetine 60 milliGRAM(s) Oral two times a day  HYDROmorphone  Injectable 2 milliGRAM(s) IV Push every 4 hours PRN  methadone    Tablet 40 milliGRAM(s) Oral two times a day  morphine  IR 30 milliGRAM(s) Oral every 6 hours PRN  polyethylene glycol 3350 17 Gram(s) Oral <User Schedule>  senna 2 Tablet(s) Oral at bedtime  sucralfate suspension 1 Gram(s) Oral four times a day  dextrose 5%. 1000 milliLiter(s) IV Continuous <Continuous>  lactated ringers. 1000 milliLiter(s) IV Continuous <Continuous>            LABS:                        11.4   6.0   )-----------( 186      ( 23 Sep 2019 07:43 )             35.8     Hgb Trend: 11.4<--, 11.8<--, 12.2<--, 11.7<--, 11.4<--  09-23    133<L>  |  94<L>  |  10  ----------------------------<  96  3.5   |  26  |  0.79    Ca    9.6      23 Sep 2019 07:43  Phos  3.4     09-23  Mg     1.9     09-23      Creatinine Trend: 0.79<--, 0.77<--, 0.81<--, 0.87<--, 0.95<--, 0.90<--  PT/INR - ( 23 Sep 2019 07:43 )   PT: 12.4 sec;   INR: 1.08 ratio         PTT - ( 23 Sep 2019 07:43 )  PTT:33.0 sec Interval Events: No acute overnight events. Pt states pain is improved with adjusted regimen, last BM 2 days ago, to go to liver biopsy with IR today. Denies n/v, CP, SOB.    OBJECTIVE:  ICU Vital Signs Last 24 Hrs  T(C): 36.6 (24 Sep 2019 05:39), Max: 36.7 (23 Sep 2019 21:32)  T(F): 97.9 (24 Sep 2019 05:39), Max: 98.1 (23 Sep 2019 21:32)  HR: 62 (24 Sep 2019 05:39) (62 - 72)  BP: 153/89 (24 Sep 2019 05:39) (123/80 - 153/89)  BP(mean): --  ABP: --  ABP(mean): --  RR: 18 (24 Sep 2019 05:39) (17 - 18)  SpO2: 94% (24 Sep 2019 05:39) (94% - 95%)        09-23 @ 07:01  -  09-24 @ 07:00  --------------------------------------------------------  IN: 120 mL / OUT: 0 mL / NET: 120 mL      CAPILLARY BLOOD GLUCOSE      POCT Blood Glucose.: 138 mg/dL (24 Sep 2019 06:51)      PHYSICAL EXAM:  GENERAL: appears stated age, disheveled  HEENT: EOMI, PERRL, +scleral icterus, MMM, no oropharyngeal lesions or erythema appreciated  Pulm: CTAB  CV: RRR, S1&S2+, no m/r/g appreciated  ABDOMEN: +BS, +tender over superior portion of LLQ (no guarding), +distended, +hepatomegaly with irregular borders, right lower quadrant mass corresponding to prior stimulator battery.   MSK: nl ROM  BACK: Right lower quadrant mass corresponding to current stimulator battery, pain on palpation    HOSPITAL MEDICATIONS:  cloNIDine 0.1 milliGRAM(s) Oral every 8 hours  hydrochlorothiazide 50 milliGRAM(s) Oral daily  dextrose 40% Gel 15 Gram(s) Oral once PRN  dextrose 50% Injectable 12.5 Gram(s) IV Push once  dextrose 50% Injectable 25 Gram(s) IV Push once  dextrose 50% Injectable 25 Gram(s) IV Push once  glucagon  Injectable 1 milliGRAM(s) IntraMuscular once PRN  insulin lispro (HumaLOG) corrective regimen sliding scale   SubCutaneous every 6 hours  diazepam    Tablet 2 milliGRAM(s) Oral every 8 hours PRN  DULoxetine 60 milliGRAM(s) Oral two times a day  HYDROmorphone  Injectable 2 milliGRAM(s) IV Push every 4 hours PRN  methadone    Tablet 40 milliGRAM(s) Oral two times a day  morphine  IR 30 milliGRAM(s) Oral every 6 hours PRN  polyethylene glycol 3350 17 Gram(s) Oral <User Schedule>  senna 2 Tablet(s) Oral at bedtime  sucralfate suspension 1 Gram(s) Oral four times a day  dextrose 5%. 1000 milliLiter(s) IV Continuous <Continuous>  lactated ringers. 1000 milliLiter(s) IV Continuous <Continuous>            LABS:                        11.4   6.0   )-----------( 186      ( 23 Sep 2019 07:43 )             35.8     Hgb Trend: 11.4<--, 11.8<--, 12.2<--, 11.7<--, 11.4<--  09-23    133<L>  |  94<L>  |  10  ----------------------------<  96  3.5   |  26  |  0.79    Ca    9.6      23 Sep 2019 07:43  Phos  3.4     09-23  Mg     1.9     09-23      Creatinine Trend: 0.79<--, 0.77<--, 0.81<--, 0.87<--, 0.95<--, 0.90<--  PT/INR - ( 23 Sep 2019 07:43 )   PT: 12.4 sec;   INR: 1.08 ratio         PTT - ( 23 Sep 2019 07:43 )  PTT:33.0 sec

## 2019-09-24 NOTE — PROGRESS NOTE ADULT - PROBLEM SELECTOR PLAN 3
Unclear etiology, likely constipation (CT with significant stool burden) vs. abnormalities found as above  less likely post ERCP pancreatitis, lipase trending down and symptoms c/w prior abd pain  - miralax TID  - monitor abx exam  - pain control as below Unclear etiology, likely constipation (CT with significant stool burden) vs. abnormalities found as above  less likely post ERCP pancreatitis, lipase trending down and symptoms c/w prior abd pain  - miralax TID  - Mineral oil once today  - monitor abx exam  - pain control as below

## 2019-09-24 NOTE — PROGRESS NOTE ADULT - PROBLEM SELECTOR PLAN 2
Concern for malignancy given ERCP findings of pancreatic lesion. not seen on ERCP  -appreciate surg/onc recs - IR for liver lesion biopsy r/o malignancy today

## 2019-09-24 NOTE — PROGRESS NOTE ADULT - ASSESSMENT
63M h/o chronic pain 2/2 traumatic work injury (on daily opiates, s/p spinal stimulator), GERD, HTN, chronic constipation who was admitted on 9/17 with acute on chronic left lower quadrant pain x 6days, found to have obstructive jaundice w/ pancreatic adenocarcinoma and liver lesions.     -f/u heme/onc  -IR biopsy of liver lesions scheduled for 9/24  -CT chest noncontrast for staging  -will continue to follow  -care per primary team    [facility]  [id number]

## 2019-09-24 NOTE — PROGRESS NOTE ADULT - SUBJECTIVE AND OBJECTIVE BOX
SUBJECTIVE AND OBJECTIVE: Pt seen at bedside. Pt reports his pain is still present, at 9/10. He reports that the pain is responsive to the Dilaudid going down to a 5/10, however only lasts for an hour or so, and wants more dilaudid. He said he can tolerate 5/10 pain. In past 24 hrs, pt has received Dilaudid 2mg x5; Dilaudid 1mgx1; methadone 40mgx2; morphine IR 30mgx3.    INTERVAL HPI/OVERNIGHT EVENTS: No acute events overnight.    DNR on chart:   Allergies    No Known Allergies    Intolerances    MEDICATIONS  (STANDING):  cloNIDine 0.1 milliGRAM(s) Oral every 8 hours  dextrose 5%. 1000 milliLiter(s) (50 mL/Hr) IV Continuous <Continuous>  dextrose 50% Injectable 12.5 Gram(s) IV Push once  dextrose 50% Injectable 25 Gram(s) IV Push once  dextrose 50% Injectable 25 Gram(s) IV Push once  DULoxetine 60 milliGRAM(s) Oral two times a day  hydrochlorothiazide 50 milliGRAM(s) Oral daily  insulin lispro (HumaLOG) corrective regimen sliding scale   SubCutaneous every 6 hours  lactated ringers. 1000 milliLiter(s) (100 mL/Hr) IV Continuous <Continuous>  methadone    Tablet 40 milliGRAM(s) Oral two times a day  mineral oil 30 milliLiter(s) Oral once  polyethylene glycol 3350 17 Gram(s) Oral <User Schedule>  senna 2 Tablet(s) Oral at bedtime  sucralfate suspension 1 Gram(s) Oral four times a day    MEDICATIONS  (PRN):  dextrose 40% Gel 15 Gram(s) Oral once PRN Blood Glucose LESS THAN 70 milliGRAM(s)/deciliter  diazepam    Tablet 2 milliGRAM(s) Oral every 8 hours PRN anxiety  glucagon  Injectable 1 milliGRAM(s) IntraMuscular once PRN Glucose LESS THAN 70 milligrams/deciliter  HYDROmorphone  Injectable 2 milliGRAM(s) IV Push every 4 hours PRN Severe Pain (7 - 10)  morphine  IR 30 milliGRAM(s) Oral every 6 hours PRN Mild Pain (1 - 3)      ITEMS UNCHECKED ARE NOT PRESENT    PRESENT SYMPTOMS: [ ]Unable to obtain due to poor mentation   Source if other than patient:  [ ]Family   [ ]Team     Pain (Impact on QOL):    Location -       back and abdomen  Minimal acceptable level (0-10 scale): tolerable is 5-6/10                   Aggravating factors -  Quality - aching (back), sharp (abdomen)  Radiation -  Severity (0-10 scale) -  up to 9/10   Timing - constant      Dyspnea:                           [ ]Mild [ ]Moderate [ ]Severe  Anxiety:                             [ ]Mild [ ]Moderate [ ]Severe  Fatigue:                             [ ]Mild [ ]Moderate [ ]Severe  Nausea:                             [ ]Mild [ ]Moderate [ ]Severe  Loss of appetite:              [ ]Mild [ ]Moderate [ ]Severe  Constipation:                    [ ]Mild [ ]Moderate [ ]Severe    PAIN AD Score:	  [email or web address]/painad.pdf (Ctrl + left click to view)    Other Symptoms:  [ ]All other review of systems negative     Karnofsky Performance Score/Palliative Performance Status Version 2:        70 %    http://palliative.info/resource_material/[email or web address].pdf    PHYSICAL EXAM:  Vital Signs Last 24 Hrs  T(C): 36.6 (24 Sep 2019 05:39), Max: 36.7 (23 Sep 2019 21:32)  T(F): 97.9 (24 Sep 2019 05:39), Max: 98.1 (23 Sep 2019 21:32)  HR: 62 (24 Sep 2019 05:39) (62 - 72)  BP: 153/89 (24 Sep 2019 05:39) (123/80 - 153/89)  BP(mean): --  RR: 18 (24 Sep 2019 05:39) (17 - 18)  SpO2: 94% (24 Sep 2019 05:39) (94% - 95%) I&O's Summary    23 Sep 2019 07:01  -  24 Sep 2019 07:00  --------------------------------------------------------  IN: 120 mL / OUT: 0 mL / NET: 120 mL     GENERAL:  [x ]Alert  [x ]Oriented x3   [ ]Lethargic  [ ]Cachexia  [ ]Unarousable  [ ]Verbal  [ ]Non-Verbal  Behavioral:   [ ] Anxiety  [ ] Delirium [ ] Agitation [ ] Other  HEENT:  [x ]Normal   [ ]Dry mouth   [ ]ET Tube/Trach  [ ]Oral lesions  PULMONARY:   [x ]Clear [ ]Tachypnea  [ ]Audible excessive secretions   [ ]Rhonchi        [ ]Right [ ]Left [ ]Bilateral  [ ]Crackles        [ ]Right [ ]Left [ ]Bilateral  [ ]Wheezing     [ ]Right [ ]Left [ ]Bilateral  CARDIOVASCULAR:    [ x]Regular [ ]Irregular [ ]Tachy  [ ]Jean [ ]Murmur [ ]Other  GASTROINTESTINAL:  [ ]Soft  [ ]Distended   [ ]+BS  [ ]Non tender [x ]Tender  [ ]PEG [ ]OGT/ NGT   Last BM: uncertain   GENITOURINARY:  [x ]Normal [ ] Incontinent   [ ]Oliguria/Anuria   [ ]Heredia  MUSCULOSKELETAL:   [x ]Normal   [ ]Weakness  [ ]Bed/Wheelchair bound [ ]Edema  NEUROLOGIC:   [x ]No focal deficits  [ ] Cognitive impairment  [ ] Dysphagia [ ]Dysarthria [ ] Paresis [ ]Other   SKIN:   [x ]Normal   [ ]Pressure ulcer(s)  [ ]Rash    CRITICAL CARE:  [ ] Shock Present  [ ]Septic [ ]Cardiogenic [ ]Neurologic [ ]Hypovolemic  [ ]  Vasopressors [ ]  Inotropes   [ ] Respiratory failure present  [ ] Acute  [ ] Chronic [ ] Hypoxic  [ ] Hypercarbic [ ] Other  [ ] Other organ failure     LABS:                        11.6   5.7   )-----------( 178      ( 24 Sep 2019 07:33 )             36.7   09-24    134<L>  |  92<L>  |  11  ----------------------------<  139<H>  3.5   |  27  |  0.81    Ca    9.9      24 Sep 2019 07:32  Phos  3.4     09-23  Mg     1.9     09-23    TPro  7.5  /  Alb  3.7  /  TBili  4.9<H>  /  DBili  x   /  AST  405<H>  /  ALT  602<H>  /  AlkPhos  254<H>  09-24  PT/INR - ( 24 Sep 2019 07:30 )   PT: 12.2 sec;   INR: 1.07 ratio         PTT - ( 23 Sep 2019 07:43 )  PTT:33.0 sec      RADIOLOGY & ADDITIONAL STUDIES:    < from: US Abdomen Complete (09.23.19 @ 10:05) >  IMPRESSION:     Multiple right lobe liver lesion suspicious for metastases. These appear   amenable to ultrasound-guided needle biopsy.    Pneumobilia, indirect evidence of biliary stent patency.    Cholelithiasis.    Splenomegaly.    < end of copied text >    Protein Calorie Malnutrition Present: [ ] yes [x ] no  [ ] PPSV2 < or = 30%  [ ] significant weight loss [ ] poor nutritional intake [ ] anasarca [ ] catabolic state Albumin, Serum: 3.7 g/dL (09-24-19 @ 07:32)  Artificial Nutrition [ ]     REFERRALS:   [ ]Chaplaincy  [ ] Hospice  [ ]Child Life  [ ]Social Work  [ ]Case management [ ]Holistic Therapy   Goals of Care Document: SUBJECTIVE AND OBJECTIVE: Pt seen at bedside. Pt reports his pain is still present, at 9/10. He reports that the pain is responsive to the Dilaudid going down to a 5/10, however only lasts for an hour or so, and wants more dilaudid. He said he can tolerate 5/10 pain. In past 24 hrs, pt has received Dilaudid 2mg x5; Dilaudid 1mgx1; methadone 40mgx2; morphine IR 30mgx3.    INTERVAL HPI/OVERNIGHT EVENTS:   9/24: no acute events overnight, but states dilaudid is not lasting long enough at Q4 dosing    DNR on chart:   Allergies    No Known Allergies    Intolerances    MEDICATIONS  (STANDING):  cloNIDine 0.1 milliGRAM(s) Oral every 8 hours  dextrose 5%. 1000 milliLiter(s) (50 mL/Hr) IV Continuous <Continuous>  dextrose 50% Injectable 12.5 Gram(s) IV Push once  dextrose 50% Injectable 25 Gram(s) IV Push once  dextrose 50% Injectable 25 Gram(s) IV Push once  DULoxetine 60 milliGRAM(s) Oral two times a day  hydrochlorothiazide 50 milliGRAM(s) Oral daily  insulin lispro (HumaLOG) corrective regimen sliding scale   SubCutaneous every 6 hours  lactated ringers. 1000 milliLiter(s) (100 mL/Hr) IV Continuous <Continuous>  methadone    Tablet 40 milliGRAM(s) Oral two times a day  morphine  IR 60 milliGRAM(s) Oral <User Schedule>  morphine  IR 90 milliGRAM(s) Oral <User Schedule>  polyethylene glycol 3350 17 Gram(s) Oral <User Schedule>  senna 2 Tablet(s) Oral at bedtime  sucralfate suspension 1 Gram(s) Oral four times a day    MEDICATIONS  (PRN):  dextrose 40% Gel 15 Gram(s) Oral once PRN Blood Glucose LESS THAN 70 milliGRAM(s)/deciliter  diazepam    Tablet 2 milliGRAM(s) Oral every 8 hours PRN anxiety  glucagon  Injectable 1 milliGRAM(s) IntraMuscular once PRN Glucose LESS THAN 70 milligrams/deciliter  HYDROmorphone  Injectable 2 milliGRAM(s) IV Push every 2 hours PRN moderate-severe pain      ITEMS UNCHECKED ARE NOT PRESENT    PRESENT SYMPTOMS: [ ]Unable to obtain due to poor mentation   Source if other than patient:  [ ]Family   [ ]Team     Pain (Impact on QOL):    Location -       back and abdomen  Minimal acceptable level (0-10 scale): tolerable is 5-6/10                   Aggravating factors -  Quality - aching (back), sharp (abdomen)  Radiation -  Severity (0-10 scale) -  up to 9/10   Timing - constant      Dyspnea:                           [ ]Mild [ ]Moderate [ ]Severe  Anxiety:                             [ ]Mild [ ]Moderate [ ]Severe  Fatigue:                             [ ]Mild [ ]Moderate [ ]Severe  Nausea:                             [ ]Mild [ ]Moderate [ ]Severe  Loss of appetite:              [ ]Mild [ ]Moderate [ ]Severe  Constipation:                    [ ]Mild [ ]Moderate [ ]Severe    PAIN AD Score:	  [email or web address] (Ctrl + left click to view)    Other Symptoms:  [ ]All other review of systems negative     Karnofsky Performance Score/Palliative Performance Status Version 2:        70 %    http://palliative.info/resource_material/[email or web address].pdf    PHYSICAL EXAM:  Vital Signs Last 24 Hrs  T(C): 36.6 (24 Sep 2019 05:39), Max: 36.7 (23 Sep 2019 21:32)  T(F): 97.9 (24 Sep 2019 05:39), Max: 98.1 (23 Sep 2019 21:32)  HR: 62 (24 Sep 2019 05:39) (62 - 72)  BP: 153/89 (24 Sep 2019 05:39) (123/80 - 153/89)  BP(mean): --  RR: 18 (24 Sep 2019 05:39) (17 - 18)  SpO2: 94% (24 Sep 2019 05:39) (94% - 95%) I&O's Summary    23 Sep 2019 07:01  -  24 Sep 2019 07:00  --------------------------------------------------------  IN: 120 mL / OUT: 0 mL / NET: 120 mL     GENERAL:  [x ]Alert  [x ]Oriented x3   [ ]Lethargic  [ ]Cachexia  [ ]Unarousable  [ ]Verbal  [ ]Non-Verbal  Behavioral:   [ ] Anxiety  [ ] Delirium [ ] Agitation [ ] Other  HEENT:  [x ]Normal   [ ]Dry mouth   [ ]ET Tube/Trach  [ ]Oral lesions  PULMONARY:   [x ]Clear [ ]Tachypnea  [ ]Audible excessive secretions   [ ]Rhonchi        [ ]Right [ ]Left [ ]Bilateral  [ ]Crackles        [ ]Right [ ]Left [ ]Bilateral  [ ]Wheezing     [ ]Right [ ]Left [ ]Bilateral  CARDIOVASCULAR:    [ x]Regular [ ]Irregular [ ]Tachy  [ ]Jean [ ]Murmur [ ]Other  GASTROINTESTINAL:  [ ]Soft  [ ]Distended   [ ]+BS  [ ]Non tender [x ]Tender  [ ]PEG [ ]OGT/ NGT   Last BM:    GENITOURINARY:  [x ]Normal [ ] Incontinent   [ ]Oliguria/Anuria   [ ]Heredia  MUSCULOSKELETAL:   [x ]Normal   [ ]Weakness  [ ]Bed/Wheelchair bound [ ]Edema  NEUROLOGIC:   [x ]No focal deficits  [ ] Cognitive impairment  [ ] Dysphagia [ ]Dysarthria [ ] Paresis [ ]Other   SKIN:   [x ]Normal   [ ]Pressure ulcer(s)  [ ]Rash    CRITICAL CARE:  [ ] Shock Present  [ ]Septic [ ]Cardiogenic [ ]Neurologic [ ]Hypovolemic  [ ]  Vasopressors [ ]  Inotropes   [ ] Respiratory failure present  [ ] Acute  [ ] Chronic [ ] Hypoxic  [ ] Hypercarbic [ ] Other  [ ] Other organ failure     LABS: reviewed                        11.6   5.7   )-----------( 178      ( 24 Sep 2019 07:33 )             36.7   09-24    134<L>  |  92<L>  |  11  ----------------------------<  139<H>  3.5   |  27  |  0.81    Ca    9.9      24 Sep 2019 07:32  Phos  3.4     09-23  Mg     1.9     09-23    TPro  7.5  /  Alb  3.7  /  TBili  4.9<H>  /  DBili  x   /  AST  405<H>  /  ALT  602<H>  /  AlkPhos  254<H>  09-24  PT/INR - ( 24 Sep 2019 07:30 )   PT: 12.2 sec;   INR: 1.07 ratio         PTT - ( 23 Sep 2019 07:43 )  PTT:33.0 sec      RADIOLOGY & ADDITIONAL STUDIES:    < from: US Abdomen Complete (09.23.19 @ 10:05) >  IMPRESSION:     Multiple right lobe liver lesion suspicious for metastases. These appear   amenable to ultrasound-guided needle biopsy.    Pneumobilia, indirect evidence of biliary stent patency.    Cholelithiasis.    Splenomegaly.    < end of copied text >    Protein Calorie Malnutrition Present: [ ] yes [x ] no  [ ] PPSV2 < or = 30%  [ ] significant weight loss [ ] poor nutritional intake [ ] anasarca [ ] catabolic state Albumin, Serum: 3.7 g/dL (09-24-19 @ 07:32)  Artificial Nutrition [ ]     REFERRALS:   [ ]Chaplaincy  [ ] Hospice  [ ]Child Life  [ ]Social Work  [ ]Case management [ ]Holistic Therapy   Goals of Care Document:

## 2019-09-24 NOTE — PROGRESS NOTE ADULT - PROBLEM SELECTOR PLAN 6
a1c 6.8, new dx  -consistent carb diet  -ISS  -diabetic education consulted a1c 6.8, new dx  -consistent carb diet  -ISS  - will start metformin  mg qd at discharge  -diabetic education consulted a1c 6.8, new dx  -consistent carb diet  - blood glucose is acceptable, c/w ISS  - will start metformin  mg qd at discharge  -diabetic education consulted

## 2019-09-24 NOTE — PROGRESS NOTE ADULT - PROBLEM SELECTOR PLAN 7
DVT: holding lovenox for liver bx today  Electrolytes: replete prn  Dispo: IR schedule for liver bx today DVT: holding lovenox for liver bx today  Electrolytes: replete prn  Dispo: IR schedule for liver bx today, likely to home this week with surg/onc follow-up

## 2019-09-24 NOTE — PROGRESS NOTE ADULT - SUBJECTIVE AND OBJECTIVE BOX
Interventional Radiology Pre-Procedure Note    This is a 63y Male with abdominal pain and hyperbilirubinemia here for liver mass biopsy.    Procedure: CT guided liver mass biopsy.    Diagnosis/Indication: Patient is a 63y old  Male who presents with a chief complaint of abdominal pain, hyperbilirubinemia (24 Sep 2019 10:56)      PAST MEDICAL & SURGICAL HISTORY:  Anxiety and depression  Diastolic dysfunction: stage I  Empyema lun2015 left lung, s/p VATS, decortication  H/O peptic ulcer: over 10 years ago  history of renal calculus  Herniated Disc: S/P work injury   Hypertension: Dx:   Spinal Stenosis  Postlaminectomy Syndrome  S/P  shunt  S/P insertion of spinal cord stimulator:   History of lumbar fusion:   Insertion of Intrathecal Dilaudid Pump: 2011  S/P Spinal Surgery: corrective lumbar fusion   S/P Knee Replacement: left knee   S/P Arthroscopy of Left Knee  S/P Tonsillectomy: childhood  Ankle Fracture: s/p ORIF left ankle        CBC Full  -  ( 24 Sep 2019 07:33 )  WBC Count : 5.7 K/uL  RBC Count : 4.02 M/uL  Hemoglobin : 11.6 g/dL  Hematocrit : 36.7 %  Platelet Count - Automated : 178 K/uL  Mean Cell Volume : 91.2 fl  Mean Cell Hemoglobin : 28.9 pg  Mean Cell Hemoglobin Concentration : 31.8 gm/dL  Auto Neutrophil # : x  Auto Lymphocyte # : x  Auto Monocyte # : x  Auto Eosinophil # : x  Auto Basophil # : x  Auto Neutrophil % : x  Auto Lymphocyte % : x  Auto Monocyte % : x  Auto Eosinophil % : x  Auto Basophil % : x        134<L>  |  92<L>  |  11  ----------------------------<  139<H>  3.5   |  27  |  0.81    Ca    9.9      24 Sep 2019 07:32  Phos  3.4       Mg     1.9         TPro  7.5  /  Alb  3.7  /  TBili  4.9<H>  /  DBili  x   /  AST  405<H>  /  ALT  602<H>  /  AlkPhos  254<H>      PT/INR - ( 24 Sep 2019 07:30 )   PT: 12.2 sec;   INR: 1.07 ratio         PTT - ( 23 Sep 2019 07:43 )  PTT:33.0 sec    Procedure/ risks/ benefits were explained to patient who verbalized understanding. Informed consent was obtained.

## 2019-09-24 NOTE — PROGRESS NOTE ADULT - PROBLEM SELECTOR PLAN 2
follows with Dr. Abdifatah Lilly in [place] [phone]  - would keep Dr. Lilly aware of any medication changes prior to discharge.

## 2019-09-24 NOTE — PROGRESS NOTE ADULT - PROBLEM SELECTOR PLAN 4
- c/w home methadone and morphine 30 mg IR prn for mild pain and moderate pain (may be taken together, pt's home regimen) respectively   - start dilaudid 2 mg IV q4H prn severe pain  - appreciate palliative recs - c/w home methadone and morphine 30 mg IR prn for mild pain and moderate pain (may be taken together, pt's home regimen) respectively   - continue dilaudid 2 mg IV q4H prn severe pain  - appreciate palliative recs - methadone 40 mg BID (home regimen   - morphine 60 mg IR qAM and morphine 90 mg qPM   - continue dilaudid 2 mg IV q4H prn severe pain  - dilaudid 2 mg q2h prn severe pain  - appreciate palliative recs - methadone 40 mg BID (home regimen   - morphine 60 mg IR qAM and morphine 90 mg qPM   - change dilaudid 2 mg from q4hr to q2h prn severe pain  - appreciate palliative recs

## 2019-09-24 NOTE — PROGRESS NOTE ADULT - ASSESSMENT
63M with PMH of chronic pain (2/2 traumatic work injury), GERD, HTN, and chronic constipation here with biliary obstruction, and foudn to have pancreatic mass with biopsy showing adenocarcinoma. Hepatic lesion also noted, with plan for IR biopsy to r/o malignant disease. Patient has been on Dilaudid 2mg q4h, Methadone 40mg bid, Morphine IR 30mg q6h. Palliative called to help with pain management. In 24 hrs, Dilaudid 2mg IV x 5, Dilaudid 1mg x1, along with morphine IR 30mg x 3.

## 2019-09-24 NOTE — PROGRESS NOTE ADULT - PROBLEM SELECTOR PLAN 1
acute abdominal pain superimposed on chronic pain  - c/w 40mg BID methadone  - states taking 60mg MSIR in AM, and 90mg MSIR at night; will discuss restarting this regimen with patient tomorrow  - c/w dilaudid 2mg IV PRN, will use for dose-finding for his long-acting regimen. (used x5 in 24 hrs) acute abdominal pain superimposed on chronic pain  - c/w 40mg BID methadone  - states taking 60mg MSIR in AM, and 90mg MSIR at night; will restart today with this timing  - c/w dilaudid 2mg IV PRN, used 5 in 24 hours and 1 dose of 1mg   - will check EKG, if QTc acceptable (< 500), will use 11mg of IV dilaudid to convert to methadone (10mg accounting for incomplete cross-tolerance) - 45mg BID

## 2019-09-24 NOTE — PROGRESS NOTE ADULT - SUBJECTIVE AND OBJECTIVE BOX
Post Procedure/ Progress Note      Subjective:  Patient is s/p IR guided liver mass biopsy . Recovering appropriately. Was Hypertensive to 170's in the PACU, now 140's- 150's. Pain is controlled, denies n/v, tolerating regular diet.     Vital Signs Last 24 Hrs  T(C): 36.8 (24 Sep 2019 22:33), Max: 36.8 (24 Sep 2019 22:33)  T(F): 98.3 (24 Sep 2019 22:33), Max: 98.3 (24 Sep 2019 22:33)  HR: 67 (24 Sep 2019 22:33) (61 - 79)  BP: 159/98 (24 Sep 2019 22:33) (124/73 - 179/84)  BP(mean): 122 (24 Sep 2019 21:30) (93 - 124)  RR: 18 (24 Sep 2019 22:33) (14 - 20)  SpO2: 98% (24 Sep 2019 22:33) (94% - 100%)  I&O's Detail    23 Sep 2019 07:  -  24 Sep 2019 07:00  --------------------------------------------------------  IN:    Oral Fluid: 120 mL  Total IN: 120 mL    OUT:  Total OUT: 0 mL    Total NET: 120 mL      24 Sep 2019 07:  -  24 Sep 2019 23:06  --------------------------------------------------------  IN:    lactated ringers.: 1400 mL  Total IN: 1400 mL    OUT:  Total OUT: 0 mL    Total NET: 1400 mL        cloNIDine 0.1  hydrochlorothiazide 50    PAST MEDICAL & SURGICAL HISTORY:  Anxiety and depression  Diastolic dysfunction: stage I  Empyema lun2015 left lung, s/p VATS, decortication  H/O peptic ulcer: over 10 years ago  history of renal calculus  Herniated Disc: S/P work injury   Hypertension: Dx:   Spinal Stenosis  Postlaminectomy Syndrome  S/P  shunt  S/P insertion of spinal cord stimulator:   History of lumbar fusion:   Insertion of Intrathecal Dilaudid Pump: 2011  S/P Spinal Surgery: corrective lumbar fusion   S/P Knee Replacement: left knee   S/P Arthroscopy of Left Knee  S/P Tonsillectomy: childhood  Ankle Fracture: s/p ORIF left ankle         Physical Exam:  General: NAD, resting comfortably in bed  Pulmonary: Nonlabored breathing, no respiratory distress  Cardiovascular: NSR  Abdominal: soft, mildly distended non tender  Extremities: WWP      LABS:                        11.6   5.7   )-----------( 178      ( 24 Sep 2019 07:33 )             36.7         134<L>  |  92<L>  |  11  ----------------------------<  139<H>  3.5   |  27  |  0.81    Ca    9.9      24 Sep 2019 07:32  Phos  3.4       Mg     1.9         TPro  7.5  /  Alb  3.7  /  TBili  4.9<H>  /  DBili  x   /  AST  405<H>  /  ALT  602<H>  /  AlkPhos  254<H>      PT/INR - ( 24 Sep 2019 07:30 )   PT: 12.2 sec;   INR: 1.07 ratio         PTT - ( 23 Sep 2019 07:43 )  PTT:33.0 sec  CAPILLARY BLOOD GLUCOSE      POCT Blood Glucose.: 118 mg/dL (24 Sep 2019 21:16)  POCT Blood Glucose.: 184 mg/dL (24 Sep 2019 13:03)  POCT Blood Glucose.: 138 mg/dL (24 Sep 2019 06:51)      Radiology and Additional Studies:    Assessment:  The patient is a 63y Male who is now several hours post procedure from a IR guided liver mass biopsy for mets workup    Plan:  - Pain control as needed  - Reg diet  - DVT ppx  - OOB and ambulating as tolerated  - F/u AM labs    [facility]  [id number]

## 2019-09-24 NOTE — PROGRESS NOTE ADULT - ATTENDING COMMENTS
awaiting liver lesion biopsy to eval for metastatic pancreatic adenocarcinoma to the liver. awaiting liver lesion biopsy to eval for metastatic pancreatic adenocarcinoma to the liver.  pt with acute on chronic pain - his acute abd pain is presumed to be malignancy related pain. pt restarted on his home pain regimen but in addition will keep Dilaudid 2mg IV Q2 hr prn for severe pain.   constipation --> c/w bowel regimen: miralax TID, will give a dose of mineral oil today

## 2019-09-25 NOTE — PROGRESS NOTE ADULT - PROBLEM SELECTOR PLAN 6
a1c 6.8, new dx  -consistent carb diet  - blood glucose is acceptable, c/w ISS  - will start metformin  mg qd at discharge  -diabetic education consulted

## 2019-09-25 NOTE — PROGRESS NOTE ADULT - ASSESSMENT
A/P:  63M h/o chronic pain 2/2 traumatic work injury (on daily opiates, s/p spinal stimulator), GERD, HTN, chronic constipation who was admitted on 9/17 with acute on chronic left lower quadrant pain x 6days, found to have obstructive jaundice w/ pancreatic adenocarcinoma and liver lesions s/p  IR guided liver mass biopsy for mets workup (9/24)    -f/u heme/onc  -F/u IR biopsy results  -CT chest noncontrast for staging  -will continue to follow  -care per primary team    [facility]  [id number] A/P:  63M h/o chronic pain 2/2 traumatic work injury (on daily opiates, s/p spinal stimulator), GERD, HTN, chronic constipation who was admitted on 9/17 with acute on chronic left lower quadrant pain x 6days, found to have obstructive jaundice w/ pancreatic adenocarcinoma and liver lesions s/p  IR guided liver mass biopsy for mets workup (9/24)    -f/u heme/onc  -F/u IR biopsy results  -CT chest noncontrast for staging  -will continue to follow  -No objection to dc   -care per primary team    [facility]  [id number]

## 2019-09-25 NOTE — PROGRESS NOTE ADULT - SUBJECTIVE AND OBJECTIVE BOX
Interventional Radiology Follow- Up Note      63y Male with hx obstructive jaundice w/ pancreatic adenocarcinoma and liver lesions s/p  IR guided liver mass biopsy for mets workup (9/24) in Interventional Radiology with Dr. Danile Durham. Denies worsening abdominal pain,n/v.       Vitals: T(F): 97.7 (09-25-19 @ 05:11), Max: 98.3 (09-24-19 @ 22:33)  HR: 65 (09-25-19 @ 05:11) (61 - 80)  BP: 127/78 (09-25-19 @ 05:11) (124/73 - 179/84)  RR: 18 (09-25-19 @ 05:11) (14 - 20)  SpO2: 94% (09-25-19 @ 05:11) (94% - 100%)  Wt(kg): --    LABS:                        11.6   6.6   )-----------( 200      ( 25 Sep 2019 06:52 )             36.8     09-25    136  |  92<L>  |  11  ----------------------------<  198<H>  3.3<L>   |  29  |  0.80    Ca    9.4      25 Sep 2019 06:52    TPro  7.5  /  Alb  3.7  /  TBili  4.9<H>  /  DBili  x   /  AST  405<H>  /  ALT  602<H>  /  AlkPhos  254<H>  09-24    PT/INR - ( 24 Sep 2019 07:30 )   PT: 12.2 sec;   INR: 1.07 ratio        PHYSICAL EXAM:  General: Nontoxic, in NAD  Abdomen: soft, NTND.     Impression: 63y Male with hx obstructive jaundice w/ pancreatic adenocarcinoma and liver lesions s/p  IR guided liver mass biopsy for mets workup (9/24).    Plan:  - Follow up pathology results.  -will discuss with IR attending.     Please call IR at extension [phone] or [phone] with any questions, concerns, or issues regarding above.

## 2019-09-25 NOTE — PROGRESS NOTE ADULT - ATTENDING COMMENTS
acute on chronic pain, malignancy related pain - patient with worsening abd pain this afternoon upon my evaluation. I discussed this pain related issue with palliative and we will increase methadone to 45mg BID and will increase IV Dilaudid to 2.5mg q2 prn.  pancreatic cancer - awaiting liver biopsy for staging purposes with the potential for surgical resection.

## 2019-09-25 NOTE — PROGRESS NOTE ADULT - PROBLEM SELECTOR PLAN 2
Concern for malignancy given ERCP findings of pancreatic lesion. not seen on ERCP  -surg/onc following  -s/p liver lesion bx by IR 9/25, f/u path results

## 2019-09-25 NOTE — PROGRESS NOTE ADULT - PROBLEM SELECTOR PLAN 1
acute abdominal pain superimposed on chronic pain  - will increase to 45mg BID methadone (EKG QTc <500)  - states taking 60mg MSIR in AM, and 90mg MSIR at night; will restart today with this timing  - c/w dilaudid 2mg IV PRN q2h, used 7 in 24 hours acute abdominal pain superimposed on chronic pain  - will increase to 45mg BID methadone (EKG QTc <500)  - c/w 60mg MSIR in AM, and 90mg MSIR at night  - c/w dilaudid 2mg IV PRN q2h, used 7 in 24 hours acute abdominal pain superimposed on chronic pain  - will increase to 45mg BID methadone (EKG QTc 480)  - c/w 60mg MSIR in AM, and 90mg MSIR at night  - agree with increase to dilaudid 2.5mg IV PRN q2h, (used 7 x 2mg in 24 hours)

## 2019-09-25 NOTE — PROGRESS NOTE ADULT - SUBJECTIVE AND OBJECTIVE BOX
[facility] Daily Progress Note      Subjective:  Patient is s/p IR guided liver mass biopsy yesterday.  Pain is controlled, denies n/v, tolerating regular diet.     Objective:  Vital Signs Last 24 Hrs  T(C): 36.5 (25 Sep 2019 05:11), Max: 36.8 (24 Sep 2019 22:33)  T(F): 97.7 (25 Sep 2019 05:11), Max: 98.3 (24 Sep 2019 22:33)  HR: 65 (25 Sep 2019 05:11) (61 - 80)  BP: 127/78 (25 Sep 2019 05:11) (124/73 - 179/84)  BP(mean): 122 (24 Sep 2019 21:30) (93 - 124)  RR: 18 (25 Sep 2019 05:11) (14 - 20)  SpO2: 94% (25 Sep 2019 05:11) (94% - 100%)    I&O's Detail    24 Sep 2019 07:01  -  25 Sep 2019 07:00  --------------------------------------------------------  IN:    lactated ringers.: 1400 mL    Oral Fluid: 120 mL  Total IN: 1520 mL    OUT:  Total OUT: 0 mL    Total NET: 1520 mL        PAST MEDICAL & SURGICAL HISTORY:  Anxiety and depression  Diastolic dysfunction: stage I  Empyema lun2015 left lung, s/p VATS, decortication  H/O peptic ulcer: over 10 years ago  history of renal calculus  Herniated Disc: S/P work injury   Hypertension: Dx:   Spinal Stenosis  Postlaminectomy Syndrome  S/P  shunt  S/P insertion of spinal cord stimulator:   History of lumbar fusion:   Insertion of Intrathecal Dilaudid Pump: 2011  S/P Spinal Surgery: corrective lumbar fusion   S/P Knee Replacement: left knee   S/P Arthroscopy of Left Knee  S/P Tonsillectomy: childhood  Ankle Fracture: s/p ORIF left ankle         Physical Exam:  General: NAD, resting comfortably in bed  Pulmonary: Nonlabored breathing, no respiratory distress  Cardiovascular: NSR  Abdominal: soft, mildly distended non tender  Extremities: WWP      LABS:                        11.6   5.7   )-----------( 178      ( 24 Sep 2019 07:33 )             36.7         134<L>  |  92<L>  |  11  ----------------------------<  139<H>  3.5   |  27  |  0.81    Ca    9.9      24 Sep 2019 07:32  Phos  3.4       Mg     1.9         TPro  7.5  /  Alb  3.7  /  TBili  4.9<H>  /  DBili  x   /  AST  405<H>  /  ALT  602<H>  /  AlkPhos  254<H>      PT/INR - ( 24 Sep 2019 07:30 )   PT: 12.2 sec;   INR: 1.07 ratio         PTT - ( 23 Sep 2019 07:43 )  PTT:33.0 sec  CAPILLARY BLOOD GLUCOSE      POCT Blood Glucose.: 118 mg/dL (24 Sep 2019 21:16)  POCT Blood Glucose.: 184 mg/dL (24 Sep 2019 13:03)  POCT Blood Glucose.: 138 mg/dL (24 Sep 2019 06:51)      Radiology and Additional Studies:

## 2019-09-25 NOTE — PROGRESS NOTE ADULT - PROBLEM SELECTOR PLAN 7
DVT: lovenox  Electrolytes: replete prn  Dispo: likely to home this week with surg/onc follow-up DVT: lovenox  Electrolytes: replete prn  Dispo: likely to home this week with surg/onc follow-up once off IV pain meds

## 2019-09-25 NOTE — PROGRESS NOTE ADULT - SUBJECTIVE AND OBJECTIVE BOX
Interval Events: No acute overnight events. Pt s/p liver lesion bx by IR. Pt states pain is well controlled, has no complaints this morning. Denies CP, SOB, n/v.    OBJECTIVE:  ICU Vital Signs Last 24 Hrs  T(C): 36.5 (25 Sep 2019 05:11), Max: 36.8 (24 Sep 2019 22:33)  T(F): 97.7 (25 Sep 2019 05:11), Max: 98.3 (24 Sep 2019 22:33)  HR: 65 (25 Sep 2019 05:11) (61 - 80)  BP: 127/78 (25 Sep 2019 05:11) (124/73 - 179/84)  BP(mean): 122 (24 Sep 2019 21:30) (93 - 124)  ABP: --  ABP(mean): --  RR: 18 (25 Sep 2019 05:11) (14 - 20)  SpO2: 94% (25 Sep 2019 05:11) (94% - 100%)        09-24 @ 07:01  -  09-25 @ 07:00  --------------------------------------------------------  IN: 1520 mL / OUT: 0 mL / NET: 1520 mL      CAPILLARY BLOOD GLUCOSE      POCT Blood Glucose.: 118 mg/dL (24 Sep 2019 21:16)      PHYSICAL EXAM:  GENERAL: appears stated age, disheveled  HEENT: EOMI, PERRL, +scleral icterus, MMM, no oropharyngeal lesions or erythema appreciated  Pulm: CTAB  CV: RRR, S1&S2+, no m/r/g appreciated  ABDOMEN: +BS, +tender over superior portion of LLQ (no guarding), +distended, +hepatomegaly with irregular borders, right lower quadrant mass corresponding to prior stimulator battery.   MSK: nl ROM  BACK: Right lower quadrant mass corresponding to current stimulator battery, pain on palpation      HOSPITAL MEDICATIONS:  enoxaparin Injectable 40 milliGRAM(s) SubCutaneous daily      cloNIDine 0.1 milliGRAM(s) Oral every 8 hours  hydrochlorothiazide 50 milliGRAM(s) Oral daily    dextrose 40% Gel 15 Gram(s) Oral once PRN  dextrose 50% Injectable 12.5 Gram(s) IV Push once  dextrose 50% Injectable 25 Gram(s) IV Push once  dextrose 50% Injectable 25 Gram(s) IV Push once  glucagon  Injectable 1 milliGRAM(s) IntraMuscular once PRN  insulin lispro (HumaLOG) corrective regimen sliding scale   SubCutaneous three times a day before meals  insulin lispro (HumaLOG) corrective regimen sliding scale   SubCutaneous at bedtime      diazepam    Tablet 2 milliGRAM(s) Oral every 8 hours PRN  DULoxetine 60 milliGRAM(s) Oral two times a day  HYDROmorphone  Injectable 1 milliGRAM(s) IV Push once  HYDROmorphone  Injectable 2 milliGRAM(s) IV Push every 2 hours PRN  methadone    Tablet 40 milliGRAM(s) Oral two times a day  morphine  IR 60 milliGRAM(s) Oral <User Schedule>  morphine  IR 90 milliGRAM(s) Oral <User Schedule>    polyethylene glycol 3350 17 Gram(s) Oral <User Schedule>  senna 2 Tablet(s) Oral at bedtime  sucralfate suspension 1 Gram(s) Oral four times a day        dextrose 5%. 1000 milliLiter(s) IV Continuous <Continuous>            LABS:                        11.6   6.6   )-----------( 200      ( 25 Sep 2019 06:52 )             36.8     Hgb Trend: 11.6<--, 11.6<--, 11.4<--, 11.8<--, 12.2<--  09-25    136  |  92<L>  |  11  ----------------------------<  198<H>  3.3<L>   |  29  |  0.80    Ca    9.4      25 Sep 2019 06:52  Phos  3.4     09-23  Mg     1.9     09-23    TPro  7.5  /  Alb  3.7  /  TBili  4.9<H>  /  DBili  x   /  AST  405<H>  /  ALT  602<H>  /  AlkPhos  254<H>  09-24    Creatinine Trend: 0.80<--, 0.81<--, 0.79<--, 0.77<--, 0.81<--, 0.87<--  PT/INR - ( 24 Sep 2019 07:30 )   PT: 12.2 sec;   INR: 1.07 ratio         PTT - ( 23 Sep 2019 07:43 )  PTT:33.0 sec Interval Events: No acute overnight events. Pt s/p liver lesion bx by IR. Pt states pain is well controlled, has no complaints this morning. BMx1 yesterday. Denies CP, SOB, n/v, abdominal pain.    OBJECTIVE:  ICU Vital Signs Last 24 Hrs  T(C): 36.5 (25 Sep 2019 05:11), Max: 36.8 (24 Sep 2019 22:33)  T(F): 97.7 (25 Sep 2019 05:11), Max: 98.3 (24 Sep 2019 22:33)  HR: 65 (25 Sep 2019 05:11) (61 - 80)  BP: 127/78 (25 Sep 2019 05:11) (124/73 - 179/84)  BP(mean): 122 (24 Sep 2019 21:30) (93 - 124)  ABP: --  ABP(mean): --  RR: 18 (25 Sep 2019 05:11) (14 - 20)  SpO2: 94% (25 Sep 2019 05:11) (94% - 100%)        09-24 @ 07:01  -  09-25 @ 07:00  --------------------------------------------------------  IN: 1520 mL / OUT: 0 mL / NET: 1520 mL      CAPILLARY BLOOD GLUCOSE      POCT Blood Glucose.: 118 mg/dL (24 Sep 2019 21:16)      PHYSICAL EXAM:  GENERAL: appears stated age, disheveled  HEENT: EOMI, PERRL, +scleral icterus, MMM, no oropharyngeal lesions or erythema appreciated  Pulm: CTAB  CV: RRR, S1&S2+, no m/r/g appreciated  ABDOMEN: +BS, +distended, +hepatomegaly with irregular borders, right lower quadrant mass corresponding to prior stimulator battery.   MSK: nl ROM  BACK: Right lower quadrant mass corresponding to current stimulator battery, pain on palpation      HOSPITAL MEDICATIONS:  enoxaparin Injectable 40 milliGRAM(s) SubCutaneous daily      cloNIDine 0.1 milliGRAM(s) Oral every 8 hours  hydrochlorothiazide 50 milliGRAM(s) Oral daily    dextrose 40% Gel 15 Gram(s) Oral once PRN  dextrose 50% Injectable 12.5 Gram(s) IV Push once  dextrose 50% Injectable 25 Gram(s) IV Push once  dextrose 50% Injectable 25 Gram(s) IV Push once  glucagon  Injectable 1 milliGRAM(s) IntraMuscular once PRN  insulin lispro (HumaLOG) corrective regimen sliding scale   SubCutaneous three times a day before meals  insulin lispro (HumaLOG) corrective regimen sliding scale   SubCutaneous at bedtime      diazepam    Tablet 2 milliGRAM(s) Oral every 8 hours PRN  DULoxetine 60 milliGRAM(s) Oral two times a day  HYDROmorphone  Injectable 1 milliGRAM(s) IV Push once  HYDROmorphone  Injectable 2 milliGRAM(s) IV Push every 2 hours PRN  methadone    Tablet 40 milliGRAM(s) Oral two times a day  morphine  IR 60 milliGRAM(s) Oral <User Schedule>  morphine  IR 90 milliGRAM(s) Oral <User Schedule>    polyethylene glycol 3350 17 Gram(s) Oral <User Schedule>  senna 2 Tablet(s) Oral at bedtime  sucralfate suspension 1 Gram(s) Oral four times a day        dextrose 5%. 1000 milliLiter(s) IV Continuous <Continuous>            LABS:                          11.6   6.6   )-----------( 200      ( 25 Sep 2019 06:52 )             36.8     Hgb Trend: 11.6<--, 11.6<--, 11.4<--, 11.8<--, 12.2<--  09-25 09-25        136  |  92<L>  |  11  ----------------------------<  198<H>  3.3<L>   |  29  |  0.80    Ca    9.4      25 Sep 2019 06:52  Phos  3.4     09-23  Mg     1.9     09-23    TPro  7.5  /  Alb  3.7  /  TBili  4.9<H>  /  DBili  x   /  AST  405<H>  /  ALT  602<H>  /  AlkPhos  254<H>  09-24    Creatinine Trend: 0.80<--, 0.81<--, 0.79<--, 0.77<--, 0.81<--, 0.87<--  PT/INR - ( 24 Sep 2019 07:30 )   PT: 12.2 sec;   INR: 1.07 ratio         PTT - ( 23 Sep 2019 07:43 )  PTT:33.0 sec

## 2019-09-25 NOTE — PROGRESS NOTE ADULT - PROBLEM SELECTOR PLAN 4
- methadone 40 mg BID (home regimen   - morphine 60 mg IR qAM and morphine 90 mg qPM   - dilaudid 2mg q2h prn severe pain  - appreciate palliative recs - methadone 40 mg BID (home regimen   - morphine 60 mg IR qAM and morphine 90 mg qPM   - dilaudid 2mg q2h prn severe pain. used 7 in 24h.  - appreciate palliative recs

## 2019-09-25 NOTE — PROGRESS NOTE ADULT - SUBJECTIVE AND OBJECTIVE BOX
SUBJECTIVE AND OBJECTIVE: Pt reports pain is better controlled with Dilaudid q2h, however still reports 9-10/10 pain 2 hours after administration. EKG obtained, will uptitrate methadone.    INTERVAL HPI/OVERNIGHT EVENTS: no acute events overnight    DNR on chart:   Allergies    No Known Allergies    Intolerances    MEDICATIONS  (STANDING):  cloNIDine 0.1 milliGRAM(s) Oral every 8 hours  dextrose 5%. 1000 milliLiter(s) (50 mL/Hr) IV Continuous <Continuous>  dextrose 50% Injectable 12.5 Gram(s) IV Push once  dextrose 50% Injectable 25 Gram(s) IV Push once  dextrose 50% Injectable 25 Gram(s) IV Push once  DULoxetine 60 milliGRAM(s) Oral two times a day  enoxaparin Injectable 40 milliGRAM(s) SubCutaneous daily  hydrochlorothiazide 50 milliGRAM(s) Oral daily  insulin lispro (HumaLOG) corrective regimen sliding scale   SubCutaneous three times a day before meals  insulin lispro (HumaLOG) corrective regimen sliding scale   SubCutaneous at bedtime  methadone    Tablet 40 milliGRAM(s) Oral two times a day  morphine  IR 60 milliGRAM(s) Oral <User Schedule>  morphine  IR 90 milliGRAM(s) Oral <User Schedule>  polyethylene glycol 3350 17 Gram(s) Oral <User Schedule>  senna 2 Tablet(s) Oral at bedtime  sucralfate suspension 1 Gram(s) Oral four times a day    MEDICATIONS  (PRN):  dextrose 40% Gel 15 Gram(s) Oral once PRN Blood Glucose LESS THAN 70 milliGRAM(s)/deciliter  diazepam    Tablet 2 milliGRAM(s) Oral every 8 hours PRN anxiety  glucagon  Injectable 1 milliGRAM(s) IntraMuscular once PRN Glucose LESS THAN 70 milligrams/deciliter  HYDROmorphone  Injectable 2 milliGRAM(s) IV Push every 2 hours PRN moderate-severe pain      ITEMS UNCHECKED ARE NOT PRESENT    PRESENT SYMPTOMS: [ ]Unable to obtain due to poor mentation   Source if other than patient:  [ ]Family   [ ]Team     Pain (Impact on QOL):    Location -       back and abdomen  Minimal acceptable level (0-10 scale): tolerable is 5-6/10                   Aggravating factors -  Quality - aching (back), sharp (abdomen)  Radiation -  Severity (0-10 scale) -  up to 9/10   Timing - constant      Dyspnea:                           [ ]Mild [ ]Moderate [ ]Severe  Anxiety:                             [ ]Mild [ ]Moderate [ ]Severe  Fatigue:                             [ ]Mild [ ]Moderate [ ]Severe  Nausea:                             [ ]Mild [ ]Moderate [ ]Severe  Loss of appetite:              [ ]Mild [ ]Moderate [ ]Severe  Constipation:                    [ ]Mild [ ]Moderate [ ]Severe    PAIN AD Score:	  [email or web address]/[email or web address] (Ctrl + left click to view)    Other Symptoms:  [ ]All other review of systems negative     Karnofsky Performance Score/Palliative Performance Status Version 2:        70 %    http://palliative.info/resource_material/[email or web address].pdf    PHYSICAL EXAM:  Vital Signs Last 24 Hrs  T(C): 36.5 (25 Sep 2019 05:11), Max: 36.8 (24 Sep 2019 22:33)  T(F): 97.7 (25 Sep 2019 05:11), Max: 98.3 (24 Sep 2019 22:33)  HR: 65 (25 Sep 2019 05:11) (61 - 80)  BP: 127/78 (25 Sep 2019 05:11) (124/73 - 179/84)  BP(mean): 122 (24 Sep 2019 21:30) (93 - 124)  RR: 18 (25 Sep 2019 05:11) (14 - 20)  SpO2: 94% (25 Sep 2019 05:11) (94% - 100%) I&O's Summary    24 Sep 2019 07:01  -  25 Sep 2019 07:00  --------------------------------------------------------  IN: 1520 mL / OUT: 0 mL / NET: 1520 mL     GENERAL:  [x ]Alert  [x ]Oriented x3   [ ]Lethargic  [ ]Cachexia  [ ]Unarousable  [ ]Verbal  [ ]Non-Verbal  Behavioral:   [ ] Anxiety  [ ] Delirium [ ] Agitation [ ] Other  HEENT:  [x ]Normal   [ ]Dry mouth   [ ]ET Tube/Trach  [ ]Oral lesions  PULMONARY:   [x ]Clear [ ]Tachypnea  [ ]Audible excessive secretions   [ ]Rhonchi        [ ]Right [ ]Left [ ]Bilateral  [ ]Crackles        [ ]Right [ ]Left [ ]Bilateral  [ ]Wheezing     [ ]Right [ ]Left [ ]Bilateral  CARDIOVASCULAR:    [ x]Regular [ ]Irregular [ ]Tachy  [ ]Jean [ ]Murmur [ ]Other  GASTROINTESTINAL:  [ ]Soft  [ ]Distended   [ ]+BS  [ ]Non tender [x ]Tender  [ ]PEG [ ]OGT/ NGT   Last BM:    GENITOURINARY:  [x ]Normal [ ] Incontinent   [ ]Oliguria/Anuria   [ ]Heredia  MUSCULOSKELETAL:   [x ]Normal   [ ]Weakness  [ ]Bed/Wheelchair bound [ ]Edema  NEUROLOGIC:   [x ]No focal deficits  [ ] Cognitive impairment  [ ] Dysphagia [ ]Dysarthria [ ] Paresis [ ]Other   SKIN:   [x ]Normal   [ ]Pressure ulcer(s)  [ ]Rash    CRITICAL CARE:  [ ] Shock Present  [ ]Septic [ ]Cardiogenic [ ]Neurologic [ ]Hypovolemic  [ ]  Vasopressors [ ]  Inotropes   [ ] Respiratory failure present  [ ] Acute  [ ] Chronic [ ] Hypoxic  [ ] Hypercarbic [ ] Other  [ ] Other organ failure     LABS:                        11.6   6.6   )-----------( 200      ( 25 Sep 2019 06:52 )             36.8   09-25    136  |  92<L>  |  11  ----------------------------<  198<H>  3.3<L>   |  29  |  0.80    Ca    9.4      25 Sep 2019 06:52    TPro  7.5  /  Alb  3.7  /  TBili  4.9<H>  /  DBili  x   /  AST  405<H>  /  ALT  602<H>  /  AlkPhos  254<H>  09-24  PT/INR - ( 24 Sep 2019 07:30 )   PT: 12.2 sec;   INR: 1.07 ratio               RADIOLOGY & ADDITIONAL STUDIES:    < from: US Abdomen Complete (09.23.19 @ 10:05) >  IMPRESSION:     Multiple right lobe liver lesion suspicious for metastases. These appear   amenable to ultrasound-guided needle biopsy.    Pneumobilia, indirect evidence of biliary stent patency.    Cholelithiasis.    Splenomegaly.    < end of copied text >    Protein Calorie Malnutrition Present: [ ] yes [x ] no  [ ] PPSV2 < or = 30%  [ ] significant weight loss [ ] poor nutritional intake [ ] anasarca [ ] catabolic state Albumin, Serum: 3.7 g/dL (09-24-19 @ 07:32)  Artificial Nutrition [ ]     REFERRALS:   [ ]Chaplaincy  [ ] Hospice  [ ]Child Life  [ ]Social Work  [ ]Case management [ ]Holistic Therapy   Goals of Care Document: SUBJECTIVE AND OBJECTIVE: Pt reports pain is better controlled with Dilaudid q2h, however still reports 9-10/10 pain 2 hours after administration. EKG obtained, will uptitrate methadone.    INTERVAL HPI/OVERNIGHT EVENTS: no acute events overnight, states pain is better managed with dilaudid 2mg Q2, but later this afternoon had more pain    DNR on chart:   Allergies    No Known Allergies    Intolerances    MEDICATIONS  (STANDING):  cloNIDine 0.1 milliGRAM(s) Oral every 8 hours  dextrose 5%. 1000 milliLiter(s) (50 mL/Hr) IV Continuous <Continuous>  dextrose 50% Injectable 12.5 Gram(s) IV Push once  dextrose 50% Injectable 25 Gram(s) IV Push once  dextrose 50% Injectable 25 Gram(s) IV Push once  DULoxetine 60 milliGRAM(s) Oral two times a day  enoxaparin Injectable 40 milliGRAM(s) SubCutaneous daily  hydrochlorothiazide 50 milliGRAM(s) Oral daily  insulin lispro (HumaLOG) corrective regimen sliding scale   SubCutaneous three times a day before meals  insulin lispro (HumaLOG) corrective regimen sliding scale   SubCutaneous at bedtime  methadone    Tablet 45 milliGRAM(s) Oral two times a day  morphine  IR 60 milliGRAM(s) Oral <User Schedule>  morphine  IR 90 milliGRAM(s) Oral <User Schedule>  polyethylene glycol 3350 17 Gram(s) Oral <User Schedule>  senna 2 Tablet(s) Oral at bedtime  sucralfate suspension 1 Gram(s) Oral four times a day    MEDICATIONS  (PRN):  dextrose 40% Gel 15 Gram(s) Oral once PRN Blood Glucose LESS THAN 70 milliGRAM(s)/deciliter  diazepam    Tablet 2 milliGRAM(s) Oral every 8 hours PRN anxiety  glucagon  Injectable 1 milliGRAM(s) IntraMuscular once PRN Glucose LESS THAN 70 milligrams/deciliter  HYDROmorphone  Injectable 2.5 milliGRAM(s) IV Push every 2 hours PRN moderate-severe pain        ITEMS UNCHECKED ARE NOT PRESENT    PRESENT SYMPTOMS: [ ]Unable to obtain due to poor mentation   Source if other than patient:  [ ]Family   [ ]Team     Pain (Impact on QOL):    Location -       back and abdomen  Minimal acceptable level (0-10 scale): tolerable is 5-6/10                   Aggravating factors -  Quality - aching (back), sharp (abdomen)  Radiation -  Severity (0-10 scale) -  up to 9/10   Timing - constant      Dyspnea:                           [ ]Mild [ ]Moderate [ ]Severe  Anxiety:                             [ ]Mild [ ]Moderate [ ]Severe  Fatigue:                             [ ]Mild [ ]Moderate [ ]Severe  Nausea:                             [ ]Mild [ ]Moderate [ ]Severe  Loss of appetite:              [ ]Mild [ ]Moderate [ ]Severe  Constipation:                    [ ]Mild [ ]Moderate [ ]Severe    PAIN AD Score:	  [email or web address] (Ctrl + left click to view)    Other Symptoms:  [ ]All other review of systems negative     Karnofsky Performance Score/Palliative Performance Status Version 2:        70 %    http://palliative.info/resource_material/[email or web address].pdf    PHYSICAL EXAM:  Vital Signs Last 24 Hrs  T(C): 36.6 (25 Sep 2019 14:36), Max: 36.8 (24 Sep 2019 22:33)  T(F): 97.9 (25 Sep 2019 14:36), Max: 98.3 (24 Sep 2019 22:33)  HR: 74 (25 Sep 2019 14:36) (61 - 80)  BP: 137/78 (25 Sep 2019 14:36) (124/73 - 179/84)  BP(mean): 122 (24 Sep 2019 21:30) (93 - 124)  RR: 18 (25 Sep 2019 14:36) (14 - 20)  SpO2: 98% (25 Sep 2019 14:36) (94% - 100%)     GENERAL:  [x ]Alert  [x ]Oriented x3   [ ]Lethargic  [ ]Cachexia  [ ]Unarousable  [ ]Verbal  [ ]Non-Verbal  Behavioral:   [ ] Anxiety  [ ] Delirium [ ] Agitation [ ] Other  HEENT:  [x ]Normal   [ ]Dry mouth   [ ]ET Tube/Trach  [ ]Oral lesions  PULMONARY:   [x ]Clear [ ]Tachypnea  [ ]Audible excessive secretions   [ ]Rhonchi        [ ]Right [ ]Left [ ]Bilateral  [ ]Crackles        [ ]Right [ ]Left [ ]Bilateral  [ ]Wheezing     [ ]Right [ ]Left [ ]Bilateral  CARDIOVASCULAR:    [ x]Regular [ ]Irregular [ ]Tachy  [ ]Jean [ ]Murmur [ ]Other  GASTROINTESTINAL:  [ ]Soft  [ ]Distended   [ ]+BS  [ ]Non tender [x ]Tender  [ ]PEG [ ]OGT/ NGT   Last BM:    GENITOURINARY:  [x ]Normal [ ] Incontinent   [ ]Oliguria/Anuria   [ ]Heredia  MUSCULOSKELETAL:   [x ]Normal   [ ]Weakness  [ ]Bed/Wheelchair bound [ ]Edema  NEUROLOGIC:   [x ]No focal deficits  [ ] Cognitive impairment  [ ] Dysphagia [ ]Dysarthria [ ] Paresis [ ]Other   SKIN:   [x ]Normal   [ ]Pressure ulcer(s)  [ ]Rash    CRITICAL CARE:  [ ] Shock Present  [ ]Septic [ ]Cardiogenic [ ]Neurologic [ ]Hypovolemic  [ ]  Vasopressors [ ]  Inotropes   [ ] Respiratory failure present  [ ] Acute  [ ] Chronic [ ] Hypoxic  [ ] Hypercarbic [ ] Other  [ ] Other organ failure     LABS:                          11.6   6.6   )-----------( 200      ( 25 Sep 2019 06:52 )             36.8     09-25    136  |  92<L>  |  11  ----------------------------<  198<H>  3.3<L>   |  29  |  0.80    Ca    9.4      25 Sep 2019 06:52        RADIOLOGY & ADDITIONAL STUDIES:    < from: US Abdomen Complete (09.23.19 @ 10:05) >  IMPRESSION:     Multiple right lobe liver lesion suspicious for metastases. These appear   amenable to ultrasound-guided needle biopsy.    Pneumobilia, indirect evidence of biliary stent patency.    Cholelithiasis.    Splenomegaly.    < end of copied text >    Protein Calorie Malnutrition Present: [ ] yes [x ] no  [ ] PPSV2 < or = 30%  [ ] significant weight loss [ ] poor nutritional intake [ ] anasarca [ ] catabolic state Albumin, Serum: 3.7 g/dL (09-24-19 @ 07:32)  Artificial Nutrition [ ]     REFERRALS:   [ ]Chaplaincy  [ ] Hospice  [ ]Child Life  [ ]Social Work  [ ]Case management [ ]Holistic Therapy   Goals of Care Document:

## 2019-09-25 NOTE — PROGRESS NOTE ADULT - PROBLEM SELECTOR PLAN 3
- awaiting IR biopsy of liver lesion today. - IR biopsy of liver lesion yesterday - IR biopsy of liver lesion 9/24/19

## 2019-09-25 NOTE — PROGRESS NOTE ADULT - ASSESSMENT
63M with PMH of chronic pain (2/2 traumatic work injury), GERD, HTN, and chronic constipation here with biliary obstruction, and foudn to have pancreatic mass with biopsy showing adenocarcinoma. Hepatic lesion also noted, with plan for IR biopsy to r/o malignant disease. Patient has been on Dilaudid 2mg q4h, Methadone 40mg bid, Morphine IR 30mg q6h. Palliative called to help with pain management. In 24 hrs, Dilaudid 2mg IV x 7, along with morphine IR 30mg x 5.

## 2019-09-25 NOTE — PROGRESS NOTE ADULT - ASSESSMENT
62 yo h/o chronic pain 2/2 traumatic work injury (on daily opiates), GERD, HTN, chronic constipation who presents with acute on chronic left lower quadrant pain x 6days, found to have biliary obstruction, s/p ERCP with pancreatic mass s/p FNA/spincterotomy/biliary stent placement w/ path showing adenocarcinoma, now awaiting IR eval for hepatic lesions. 62 yo h/o chronic pain 2/2 traumatic work injury (on daily opiates), GERD, HTN, chronic constipation who presents with acute on chronic left lower quadrant pain x 6days, found to have biliary obstruction, s/p ERCP with pancreatic mass s/p FNA/spincterotomy/biliary stent placement w/ path showing adenocarcinoma, s/p IR biopsy of hepatic lesions.

## 2019-09-25 NOTE — PROGRESS NOTE ADULT - PROBLEM SELECTOR PLAN 2
follows with Dr. Abdifatah Lilly in [place] [phone]  - would keep Dr. Lilly aware of any medication changes prior to discharge.

## 2019-09-26 NOTE — PROGRESS NOTE ADULT - PROBLEM SELECTOR PLAN 4
- methadone 45 mg BID (home regimen   - morphine 60 mg IR qAM and morphine 90 mg qPM   - dilaudid 2.5 mg q2h prn severe pain.  - appreciate palliative recs

## 2019-09-26 NOTE — PROGRESS NOTE ADULT - ASSESSMENT
62 yo h/o chronic pain 2/2 traumatic work injury (on daily opiates), GERD, HTN, chronic constipation who presents with acute on chronic left lower quadrant pain x 6days, found to have biliary obstruction, s/p ERCP with pancreatic mass s/p FNA/spincterotomy/biliary stent placement w/ path showing adenocarcinoma, s/p IR biopsy of hepatic lesions. 64 yo h/o chronic pain 2/2 traumatic work injury (on daily opiates), GERD, HTN, chronic constipation who presents with acute on chronic left lower quadrant pain x 6days, found to have biliary obstruction, s/p ERCP with pancreatic mass s/p FNA/spincterotomy/biliary stent placement w/ path showing adenocarcinoma, s/p IR biopsy of hepatic lesions which revealed pancreatic adenocarcinoma mets.

## 2019-09-26 NOTE — PROGRESS NOTE ADULT - PROBLEM SELECTOR PLAN 4
-Received Dilaudid 2.5 mg ivP q 2hours PRN x 5/24 hours  - will continue to follow for pain - will continue to follow for pain  - d/w team

## 2019-09-26 NOTE — PROGRESS NOTE ADULT - ATTENDING COMMENTS
liver biopsy confirmed metastatic pancreatic adenocarcinoma.--> therefore pancreatic mass is not amenable to surgical resection. pt with need med onco follow up and likely continued palliative care.  pt with significant pain. -->c/w pain control, palliative care assisting with pain regimen   once pain is tolerable with a PO regimen the will plan for discharge.

## 2019-09-26 NOTE — PROGRESS NOTE ADULT - PROBLEM SELECTOR PLAN 3
- S/P IR /CT-guided right liver mass biopsy on 9/24/19 - S/P IR /CT-guided right liver mass biopsy on 9/24/19, showing metastatic lesion

## 2019-09-26 NOTE — PROGRESS NOTE ADULT - SUBJECTIVE AND OBJECTIVE BOX
Interval Events: No acute overnight events. Pt states pain is well controlled, has no complaints this morning. Denies CP, SOB, n/v.     OBJECTIVE:  ICU Vital Signs Last 24 Hrs  T(C): 36.5 (26 Sep 2019 04:37), Max: 36.6 (25 Sep 2019 14:36)  T(F): 97.7 (26 Sep 2019 04:37), Max: 97.9 (25 Sep 2019 14:36)  HR: 61 (26 Sep 2019 04:37) (61 - 79)  BP: 145/90 (26 Sep 2019 04:37) (137/78 - 150/93)  BP(mean): --  ABP: --  ABP(mean): --  RR: 18 (26 Sep 2019 04:37) (18 - 18)  SpO2: 95% (26 Sep 2019 04:37) (95% - 98%)        09-25 @ 07:01  -  09-26 @ 07:00  --------------------------------------------------------  IN: 960 mL / OUT: 0 mL / NET: 960 mL      CAPILLARY BLOOD GLUCOSE      POCT Blood Glucose.: 211 mg/dL (25 Sep 2019 23:20)      PHYSICAL EXAM:  GENERAL: appears stated age, disheveled  HEENT: EOMI, PERRL, +scleral icterus, MMM, no oropharyngeal lesions or erythema appreciated  Pulm: CTAB  CV: RRR, S1&S2+, no m/r/g appreciated  ABDOMEN: +BS, +distended, +hepatomegaly with irregular borders, right lower quadrant mass corresponding to prior stimulator battery.   MSK: nl ROM  BACK: Right lower quadrant mass corresponding to current stimulator battery, pain on palpation      HOSPITAL MEDICATIONS:  enoxaparin Injectable 40 milliGRAM(s) SubCutaneous daily  cloNIDine 0.1 milliGRAM(s) Oral every 8 hours  hydrochlorothiazide 50 milliGRAM(s) Oral daily  dextrose 40% Gel 15 Gram(s) Oral once PRN  dextrose 50% Injectable 12.5 Gram(s) IV Push once  dextrose 50% Injectable 25 Gram(s) IV Push once  dextrose 50% Injectable 25 Gram(s) IV Push once  glucagon  Injectable 1 milliGRAM(s) IntraMuscular once PRN  insulin lispro (HumaLOG) corrective regimen sliding scale   SubCutaneous three times a day before meals  insulin lispro (HumaLOG) corrective regimen sliding scale   SubCutaneous at bedtime  diazepam    Tablet 2 milliGRAM(s) Oral every 8 hours PRN  DULoxetine 60 milliGRAM(s) Oral two times a day  HYDROmorphone  Injectable 2.5 milliGRAM(s) IV Push every 2 hours PRN  methadone    Tablet 45 milliGRAM(s) Oral two times a day  morphine  IR 60 milliGRAM(s) Oral <User Schedule>  morphine  IR 90 milliGRAM(s) Oral <User Schedule>  polyethylene glycol 3350 17 Gram(s) Oral <User Schedule>  senna 2 Tablet(s) Oral at bedtime  sucralfate suspension 1 Gram(s) Oral four times a day  dextrose 5%. 1000 milliLiter(s) IV Continuous <Continuous>            LABS:                        11.6   6.6   )-----------( 200      ( 25 Sep 2019 06:52 )             36.8     Hgb Trend: 11.6<--, 11.6<--, 11.4<--, 11.8<--, 12.2<--  09-25    136  |  92<L>  |  11  ----------------------------<  198<H>  3.3<L>   |  29  |  0.80    Ca    9.4      25 Sep 2019 06:52      Creatinine Trend: 0.80<--, 0.81<--, 0.79<--, 0.77<--, 0.81<--, 0.87<-- Interval Events: No acute overnight events. Pt states pain is well controlled, has no complaints this morning. Denies CP, SOB, n/v.     OBJECTIVE:  ICU Vital Signs Last 24 Hrs  T(C): 36.5 (26 Sep 2019 04:37), Max: 36.6 (25 Sep 2019 14:36)  T(F): 97.7 (26 Sep 2019 04:37), Max: 97.9 (25 Sep 2019 14:36)  HR: 61 (26 Sep 2019 04:37) (61 - 79)  BP: 145/90 (26 Sep 2019 04:37) (137/78 - 150/93)  BP(mean): --  ABP: --  ABP(mean): --  RR: 18 (26 Sep 2019 04:37) (18 - 18)  SpO2: 95% (26 Sep 2019 04:37) (95% - 98%)        09-25 @ 07:01  -  09-26 @ 07:00  --------------------------------------------------------  IN: 960 mL / OUT: 0 mL / NET: 960 mL      CAPILLARY BLOOD GLUCOSE      POCT Blood Glucose.: 211 mg/dL (25 Sep 2019 23:20)      PHYSICAL EXAM:  GENERAL: appears stated age, disheveled  HEENT: EOMI, PERRL, +scleral icterus, MMM, no oropharyngeal lesions or erythema appreciated  Pulm: CTAB  CV: RRR, S1&S2+, no m/r/g appreciated  ABDOMEN: +BS, +distended, +hepatomegaly with irregular borders, right lower quadrant mass corresponding to prior stimulator battery.   MSK: nl ROM  BACK: Right lower quadrant mass corresponding to current stimulator battery, pain on palpation      HOSPITAL MEDICATIONS:  enoxaparin Injectable 40 milliGRAM(s) SubCutaneous daily  cloNIDine 0.1 milliGRAM(s) Oral every 8 hours  hydrochlorothiazide 50 milliGRAM(s) Oral daily  dextrose 40% Gel 15 Gram(s) Oral once PRN  dextrose 50% Injectable 12.5 Gram(s) IV Push once  dextrose 50% Injectable 25 Gram(s) IV Push once  dextrose 50% Injectable 25 Gram(s) IV Push once  glucagon  Injectable 1 milliGRAM(s) IntraMuscular once PRN  insulin lispro (HumaLOG) corrective regimen sliding scale   SubCutaneous three times a day before meals  insulin lispro (HumaLOG) corrective regimen sliding scale   SubCutaneous at bedtime  diazepam    Tablet 2 milliGRAM(s) Oral every 8 hours PRN  DULoxetine 60 milliGRAM(s) Oral two times a day  HYDROmorphone  Injectable 2.5 milliGRAM(s) IV Push every 2 hours PRN  methadone    Tablet 45 milliGRAM(s) Oral two times a day  morphine  IR 60 milliGRAM(s) Oral <User Schedule>  morphine  IR 90 milliGRAM(s) Oral <User Schedule>  polyethylene glycol 3350 17 Gram(s) Oral <User Schedule>  senna 2 Tablet(s) Oral at bedtime  sucralfate suspension 1 Gram(s) Oral four times a day  dextrose 5%. 1000 milliLiter(s) IV Continuous <Continuous>            LABS:                        11.6   6.6   )-----------( 200      ( 25 Sep 2019 06:52 )             36.8     Hgb Trend: 11.6<--, 11.6<--, 11.4<--, 11.8<--, 12.2<--  09-25    136  |  92<L>  |  11  ----------------------------<  198<H>  3.3<L>   |  29  |  0.80    Ca    9.4      25 Sep 2019 06:52      Creatinine Trend: 0.80<--, 0.81<--, 0.79<--, 0.77<--, 0.81<--, 0.87<--    Cytopathology - Non Gyn Report:   ACCESSION No:  [id number]    LEN ALY              2        Cytopathology Report            Final Diagnosis  LIVER, RIGHT LOBE, CT GUIDED CORE BIOPSY AND FNA  POSITIVE FOR MALIGNANT CELLS.  Adenocarcinoma.See Note    Touch Prep slides  consist of crowded 3-dimensional groups and  single lying malignant cells with enlarged, pleomorphic nuclei,  prominent nucleoli and vacuolated cytoplasm.  Core biopsy: Cores of hepatic parenchyma involved by  adenocarcinoma.  The FNA smears smears show rare tumor cells and blood. Cell block  consists of blood only.  History of newly diagnosed pancreatic cancer is noted.  Note  Immunostains for better characterization of the neoplastic cells  are in progress and results will be reported in an addendum.  Copy of report is faxed to clinician's office 09/26/19  Dr Chauhan was notified via encrypted email 09/26/19 13:22    Screened by: Kaycee PATEL(ASC)  Verified by: Shara Quiroz MD  (Electronic Signature)  Reported on: 09/26/1913:25 EDT, [address], [place]  [address]  Cytology technical processing performed at [address] [place] [id number]  _________________________________________________________________      Specimen(s) Submitted  LIVER, RIGHT LOBE, CT GUIDED CORE BIOPSY AND FNA      Statement of Adequacy  Immediate cytologic study for adequacy of specimen using a Diff-  Quik stain was performed. FNA acceptable for further evaluation  by AMANDA Bergeron(ASCP).      Clinical Information  ICD 10: R17  HTN, liver lesions, jaundice. Rule out cancer.                LEN ALY              2        Cytopathology Report            Gross Description  Core Biopsy:  Tissue collected: Specimen container has been inspected to  confirm patient’s name and date of birth, contains 3  tan cores  measuring 2, 1, 1 cm in length (and multiple fragments). Entire  specimen submitted in 2 cassette(s) labeled 1B and 1C.    3 Touch prep slides ( 3 air dried )    FNA:  Received: 1 air dried and 1 fixed slides  Needle rinses in 30 ml formalin  Submitted: cell block in one cassette labeled 1A    Prepared:  3 Touch Prep, 1 Diff Quick,  1 Pap Stained slides  1 cell block  labeled 1A  1 core biopsy labeled 1B  1 core biopsy labeled 1C  8 Total slides (09.24.19 @ 18:57)

## 2019-09-26 NOTE — PROGRESS NOTE ADULT - PROBLEM SELECTOR PLAN 2
Concern for malignancy given ERCP findings of pancreatic lesion. not seen on ERCP  -surg/onc following  -s/p liver lesion bx by IR 9/25, f/u path results Concern for malignancy given ERCP findings of pancreatic lesion. not seen on ERCP  -surg/onc following  -s/p liver lesion bx with pancreatic adenocarcinoma mets

## 2019-09-26 NOTE — PROGRESS NOTE ADULT - PROBLEM SELECTOR PLAN 2
follows with Dr. Abdifatah Lilly in [place] [phone]  - would keep Dr. Lilly aware of any medication changes prior to discharge.

## 2019-09-26 NOTE — PROGRESS NOTE ADULT - PROBLEM SELECTOR PLAN 7
DVT: lovenox  Electrolytes: replete prn  Dispo: likely to home this week with surg/onc follow-up once off IV pain meds

## 2019-09-26 NOTE — PROGRESS NOTE ADULT - PROBLEM SELECTOR PLAN 1
acute abdominal pain superimposed on chronic pain  - will increase to 45mg BID methadone (EKG QTc 480)  - c/w 60mg MSIR in AM, and 90mg MSIR at night  - agree with increase to dilaudid 2.5mg IV PRN q2h, (used 7 x 2mg in 24 hours) acute abdominal pain superimposed on chronic pain  - increased methadone to 45mg BID on 9/26 (EKG QTc 480)  - c/w 60mg MSIR in AM, and 90mg MSIR at night  - on dilaudid 2.5mg IV PRN q2h, (used 6 x 2mg in 24 hours); in anticipation of discharge, will change to dilaudid 16mg (increase after conversion due to persistent severe pain) Q3 PRN mod-severe pain, patient agreeable

## 2019-09-26 NOTE — PROGRESS NOTE ADULT - SUBJECTIVE AND OBJECTIVE BOX
SUBJECTIVE AND OBJECTIVE:  INTERVAL HPI/OVERNIGHT EVENTS  Patient reports pain is improving but still present, reports a 9/10 pain which decreases to a 5/10 after medication administration within 1-2 hours  Received Dilaudid 2.5 mg ivP q 2hours PRN x 5/24 hours    DNR on chart:   Allergies    No Known Allergies    Intolerances    MEDICATIONS  (STANDING):  cloNIDine 0.1 milliGRAM(s) Oral every 8 hours  dextrose 5%. 1000 milliLiter(s) (50 mL/Hr) IV Continuous <Continuous>  dextrose 50% Injectable 12.5 Gram(s) IV Push once  dextrose 50% Injectable 25 Gram(s) IV Push once  dextrose 50% Injectable 25 Gram(s) IV Push once  DULoxetine 60 milliGRAM(s) Oral two times a day  enoxaparin Injectable 40 milliGRAM(s) SubCutaneous daily  hydrochlorothiazide 50 milliGRAM(s) Oral daily  insulin lispro (HumaLOG) corrective regimen sliding scale   SubCutaneous three times a day before meals  insulin lispro (HumaLOG) corrective regimen sliding scale   SubCutaneous at bedtime  methadone    Tablet 45 milliGRAM(s) Oral two times a day  morphine  IR 60 milliGRAM(s) Oral <User Schedule>  morphine  IR 90 milliGRAM(s) Oral <User Schedule>  polyethylene glycol 3350 17 Gram(s) Oral <User Schedule>  senna 2 Tablet(s) Oral at bedtime  sucralfate suspension 1 Gram(s) Oral four times a day    MEDICATIONS  (PRN):  dextrose 40% Gel 15 Gram(s) Oral once PRN Blood Glucose LESS THAN 70 milliGRAM(s)/deciliter  diazepam    Tablet 2 milliGRAM(s) Oral every 8 hours PRN anxiety  glucagon  Injectable 1 milliGRAM(s) IntraMuscular once PRN Glucose LESS THAN 70 milligrams/deciliter  HYDROmorphone  Injectable 2.5 milliGRAM(s) IV Push every 2 hours PRN moderate-severe pain      ITEMS UNCHECKED ARE NOT PRESENT    PRESENT SYMPTOMS: [ ]Unable to obtain due to poor mentation   Source if other than patient:  [ ]Family   [ ]Team     Pain (Impact on QOL): Yes  Location: abdomen and Back  Minimal acceptable level (0-10 scale):  5                Aggravating factors:-  Quality: sharp  Radiation: -  Severity (0-10 scale): 9   Timing: -    Dyspnea:                           [ ]Mild [ ]Moderate [ ]Severe  Anxiety:                             [ ]Mild [ ]Moderate [ ]Severe  Fatigue:                             [ ]Mild [ ]Moderate [ ]Severe  Nausea:                             [ ]Mild [ ]Moderate [ ]Severe  Loss of appetite:              [ ]Mild [ ]Moderate [ ]Severe  Constipation:                    [ ]Mild [ ]Moderate [ ]Severe    PAIN AD Score:	  http://geriatrictoolkit.[email or web address]/cog/painad.pdf (Ctrl + left click to view)    Other Symptoms:  [x ]All other review of systems negative     Karnofsky Performance Score/Palliative Performance Status Version 2:        70 %    http://palliative.info/resource_material/PPSv2.pdf  PHYSICAL EXAM:  Vital Signs Last 24 Hrs  T(C): 36.5 (26 Sep 2019 07:40), Max: 36.6 (25 Sep 2019 14:36)  T(F): 97.7 (26 Sep 2019 07:40), Max: 97.9 (25 Sep 2019 14:36)  HR: 64 (26 Sep 2019 07:40) (61 - 79)  BP: 138/92 (26 Sep 2019 07:40) (137/78 - 150/93)  BP(mean): --  RR: 18 (26 Sep 2019 07:40) (18 - 18)  SpO2: 96% (26 Sep 2019 07:40) (95% - 98%) I&O's Summary    25 Sep 2019 07:01  -  26 Sep 2019 07:00  --------------------------------------------------------  IN: 960 mL / OUT: 0 mL / NET: 960 mL     GENERAL:  [x ]Alert  [x ]Oriented x3   [ ]Lethargic  [ ]Cachexia  [ ]Unarousable  [ x]Verbal  [ ]Non-Verbal  Behavioral:   [ ] Anxiety  [ ] Delirium [ ] Agitation [ ] Other  HEENT:  [x ]Normal   [ ]Dry mouth   [ ]ET Tube/Trach  [ ]Oral lesions  PULMONARY:   [ ]Clear [ ]Tachypnea  [ ]Audible excessive secretions   [ ]Rhonchi        [ ]Right [ ]Left [ ]Bilateral  [ ]Crackles        [ ]Right [ ]Left [ ]Bilateral  [ ]Wheezing     [ ]Right [ ]Left [ ]Bilateral  CARDIOVASCULAR:    [x ]Regular [ ]Irregular [ ]Tachy  [ ]Jean [ ]Murmur [ ]Other  GASTROINTESTINAL:  [x ]Soft  [ ]Distended   [ ]+BS  [ ]Non tender [ ]Tender  [ ]PEG [ ]OGT/ NGT   Last BM: 9/25    GENITOURINARY:  [x ]Normal [ ] Incontinent   [ ]Oliguria/Anuria   [ ]Heredia  MUSCULOSKELETAL:   [ ]Normal   [x ]Weakness  [ ]Bed/Wheelchair bound [ ]Edema  NEUROLOGIC:   [x ]No focal deficits  [ ] Cognitive impairment  [ ] Dysphagia [ ]Dysarthria [ ] Paresis [ ]Other   SKIN:   [x ]Normal   [ ]Pressure ulcer(s)  [ ]Rash    CRITICAL CARE:  [ ] Shock Present  [ ]Septic [ ]Cardiogenic [ ]Neurologic [ ]Hypovolemic  [ ]  Vasopressors [ ]  Inotropes   [ ] Respiratory failure present  [ ] Acute  [ ] Chronic [ ] Hypoxic  [ ] Hypercarbic [ ] Other  [ ] Other organ failure     LABS:                        11.6   6.6   )-----------( 200      ( 25 Sep 2019 06:52 )             36.8   09-26    138  |  96  |  10  ----------------------------<  166<H>  3.7   |  29  |  0.77    Ca    9.2      26 Sep 2019 06:14          RADIOLOGY & ADDITIONAL STUDIES:  EXAM:  IR PROCEDURE NON PICC                          PROCEDURE DATE:  09/24/2019      INTERPRETATION:  Procedure: CT-guided right liver mass biopsy. Images   saved to PACS.    Clinical Information: Right liver mass    Technique: Informed consent obtained. The patient was placed supine on   the CT table. The right upper quadrant was prepped and draped in the   usual sterile fashion. Timeout performed. 1% lidocaine used for local   anesthesia.    Under intermittent axial CT guidance, a 17-gauge needle was advanced   percutaneously into the right hepatic lobe. Several 18-gauge core   biopsies were obtained. The needles were removed and hemostasis was   achieved utilizing D-Stat. A dry sterile gauze was applied. No   complications.    Sedation: Provided by the anesthesiology service.    Impression: Successful CT-guided biopsy of right hepatic lobe mass.      TERESSA PARKER M.D., ATTENDING RADIOLOGIST  This document has been electronically signed. Sep 26 2019  9:33AM                    Protein Calorie Malnutrition Present: [ ] yes [ ] no  [ ] PPSV2 < or = 30%  [ ] significant weight loss [ ] poor nutritional intake [ ] anasarca [ ] catabolic state Albumin, Serum: 3.7 g/dL (09-24-19 @ 07:32)  Artificial Nutrition [ ]     REFERRALS:   [ ]Chaplaincy  [ ] Hospice  [ ]Child Life  [ ]Social Work  [ ]Case management [ ]Holistic Therapy     Goals of Care Document: SUBJECTIVE AND OBJECTIVE:  INTERVAL HPI/OVERNIGHT EVENTS  Patient reports pain is improving but still present, reports a 9/10 pain which decreases to a 5/10 after medication administration within 1-2 hours  Received Dilaudid 2.5 mg ivP q 2hours PRN x 6/24 hours    DNR on chart:   Allergies    No Known Allergies    Intolerances    MEDICATIONS  (STANDING):  cloNIDine 0.1 milliGRAM(s) Oral every 8 hours  dextrose 5%. 1000 milliLiter(s) (50 mL/Hr) IV Continuous <Continuous>  dextrose 50% Injectable 12.5 Gram(s) IV Push once  dextrose 50% Injectable 25 Gram(s) IV Push once  dextrose 50% Injectable 25 Gram(s) IV Push once  DULoxetine 60 milliGRAM(s) Oral two times a day  enoxaparin Injectable 40 milliGRAM(s) SubCutaneous daily  hydrochlorothiazide 50 milliGRAM(s) Oral daily  insulin lispro (HumaLOG) corrective regimen sliding scale   SubCutaneous three times a day before meals  insulin lispro (HumaLOG) corrective regimen sliding scale   SubCutaneous at bedtime  methadone    Tablet 45 milliGRAM(s) Oral two times a day  morphine  IR 60 milliGRAM(s) Oral <User Schedule>  morphine  IR 90 milliGRAM(s) Oral <User Schedule>  polyethylene glycol 3350 17 Gram(s) Oral <User Schedule>  senna 2 Tablet(s) Oral at bedtime  sucralfate suspension 1 Gram(s) Oral four times a day    MEDICATIONS  (PRN):  dextrose 40% Gel 15 Gram(s) Oral once PRN Blood Glucose LESS THAN 70 milliGRAM(s)/deciliter  diazepam    Tablet 2 milliGRAM(s) Oral every 8 hours PRN anxiety  glucagon  Injectable 1 milliGRAM(s) IntraMuscular once PRN Glucose LESS THAN 70 milligrams/deciliter  HYDROmorphone  Injectable 2.5 milliGRAM(s) IV Push every 2 hours PRN moderate-severe pain      ITEMS UNCHECKED ARE NOT PRESENT    PRESENT SYMPTOMS: [ ]Unable to obtain due to poor mentation   Source if other than patient:  [ ]Family   [ ]Team     Pain (Impact on QOL): Yes  Location: abdomen and Back  Minimal acceptable level (0-10 scale):  5                Aggravating factors:-  Quality: sharp  Radiation: -  Severity (0-10 scale): 9   Timing: -    Dyspnea:                           [ ]Mild [ ]Moderate [ ]Severe  Anxiety:                             [ ]Mild [ ]Moderate [ ]Severe  Fatigue:                             [ ]Mild [ ]Moderate [ ]Severe  Nausea:                             [ ]Mild [ ]Moderate [ ]Severe  Loss of appetite:              [ ]Mild [ ]Moderate [ ]Severe  Constipation:                    [ ]Mild [ ]Moderate [ ]Severe    PAIN AD Score:	  [email or web address]/painad.pdf (Ctrl + left click to view)    Other Symptoms:  [x ]All other review of systems negative     Karnofsky Performance Score/Palliative Performance Status Version 2:        70 %    http://palliative.info/resource_material/PPSv2.pdf  PHYSICAL EXAM:  Vital Signs Last 24 Hrs  T(C): 36.5 (26 Sep 2019 07:40), Max: 36.6 (25 Sep 2019 14:36)  T(F): 97.7 (26 Sep 2019 07:40), Max: 97.9 (25 Sep 2019 14:36)  HR: 64 (26 Sep 2019 07:40) (61 - 79)  BP: 138/92 (26 Sep 2019 07:40) (137/78 - 150/93)  BP(mean): --  RR: 18 (26 Sep 2019 07:40) (18 - 18)  SpO2: 96% (26 Sep 2019 07:40) (95% - 98%) I&O's Summary    25 Sep 2019 07:01  -  26 Sep 2019 07:00  --------------------------------------------------------  IN: 960 mL / OUT: 0 mL / NET: 960 mL     GENERAL:  [x ]Alert  [x ]Oriented x3   [ ]Lethargic  [ ]Cachexia  [ ]Unarousable  [ x]Verbal  [ ]Non-Verbal  Behavioral:   [ ] Anxiety  [ ] Delirium [ ] Agitation [ ] Other  HEENT:  [x ]Normal   [ ]Dry mouth   [ ]ET Tube/Trach  [ ]Oral lesions  PULMONARY:   [ ]Clear [ ]Tachypnea  [ ]Audible excessive secretions   [ ]Rhonchi        [ ]Right [ ]Left [ ]Bilateral  [ ]Crackles        [ ]Right [ ]Left [ ]Bilateral  [ ]Wheezing     [ ]Right [ ]Left [ ]Bilateral  CARDIOVASCULAR:    [x ]Regular [ ]Irregular [ ]Tachy  [ ]Jean [ ]Murmur [ ]Other  GASTROINTESTINAL:  [x ]Soft  [ ]Distended   [ ]+BS  [ ]Non tender [ ]Tender  [ ]PEG [ ]OGT/ NGT   Last BM: 9/25    GENITOURINARY:  [x ]Normal [ ] Incontinent   [ ]Oliguria/Anuria   [ ]Heredia  MUSCULOSKELETAL:   [ ]Normal   [x ]Weakness  [ ]Bed/Wheelchair bound [ ]Edema  NEUROLOGIC:   [x ]No focal deficits  [ ] Cognitive impairment  [ ] Dysphagia [ ]Dysarthria [ ] Paresis [ ]Other   SKIN:   [x ]Normal   [ ]Pressure ulcer(s)  [ ]Rash    CRITICAL CARE:  [ ] Shock Present  [ ]Septic [ ]Cardiogenic [ ]Neurologic [ ]Hypovolemic  [ ]  Vasopressors [ ]  Inotropes   [ ] Respiratory failure present  [ ] Acute  [ ] Chronic [ ] Hypoxic  [ ] Hypercarbic [ ] Other  [ ] Other organ failure     LABS: reviewed                       09-26    138  |  96  |  10  ----------------------------<  166<H>  3.7   |  29  |  0.77    Ca    9.2      26 Sep 2019 06:14          RADIOLOGY & ADDITIONAL STUDIES:  EXAM:  IR PROCEDURE NON PICC                          PROCEDURE DATE:  09/24/2019      INTERPRETATION:  Procedure: CT-guided right liver mass biopsy. Images   saved to PACS.    Clinical Information: Right liver mass    Technique: Informed consent obtained. The patient was placed supine on   the CT table. The right upper quadrant was prepped and draped in the   usual sterile fashion. Timeout performed. 1% lidocaine used for local   anesthesia.    Under intermittent axial CT guidance, a 17-gauge needle was advanced   percutaneously into the right hepatic lobe. Several 18-gauge core   biopsies were obtained. The needles were removed and hemostasis was   achieved utilizing D-Stat. A dry sterile gauze was applied. No   complications.    Sedation: Provided by the anesthesiology service.    Impression: Successful CT-guided biopsy of right hepatic lobe mass.      TERESSA PARKER M.D., ATTENDING RADIOLOGIST  This document has been electronically signed. Sep 26 2019  9:33AM                    Protein Calorie Malnutrition Present: [ ] yes [ ] no  [ ] PPSV2 < or = 30%  [ ] significant weight loss [ ] poor nutritional intake [ ] anasarca [ ] catabolic state Albumin, Serum: 3.7 g/dL (09-24-19 @ 07:32)  Artificial Nutrition [ ]     REFERRALS:   [ ]Chaplaincy  [ ] Hospice  [ ]Child Life  [ ]Social Work  [ ]Case management [ ]Holistic Therapy     Goals of Care Document:

## 2019-09-26 NOTE — CHART NOTE - NSCHARTNOTEFT_GEN_A_CORE
Surgical Oncology Note    Pathology from IR biopsy (9/24) of hepatic lesion reviewed:    Final Diagnosis  LIVER, RIGHT LOBE, CT GUIDED CORE BIOPSY AND FNA  POSITIVE FOR MALIGNANT CELLS.  Adenocarcinoma.    Touch Prep slides  consist of crowded 3-dimensional groups and  single lying malignant cells with enlarged, pleomorphic nuclei,  prominent nucleoli and vacuolated cytoplasm.  Core biopsy: Cores of hepatic parenchyma involved by  adenocarcinoma.  The FNA smears smears show rare tumor cells and blood. Cell block  consists of blood only.  History of newly diagnosed pancreatic cancer is noted.  --------------------------------------------------------------------------------------------------    Based on pathology and imaging, picture is consistent with metastatic pancreatic adenocarcinoma - which is not amenable to operative intervention  No interventions planned from Surgical Oncology  Care per primary team, palliative, and Heme-onc teams  Discussed with attending Dr. Librado Damon PGY3  Blue surgery  [id number]

## 2019-09-26 NOTE — PROGRESS NOTE ADULT - ASSESSMENT
63M with PMH of chronic pain (2/2 traumatic work injury), GERD, HTN, and chronic constipation here with biliary obstruction, and foudn to have pancreatic mass with biopsy showing adenocarcinoma. Hepatic lesion also noted, with plan for IR biopsy to r/o malignant disease. Patient has been on Dilaudid 2mg q4h, Methadone 40mg bid, Morphine IR 30mg q6h. Palliative called to help with pain management.

## 2019-09-27 NOTE — PROGRESS NOTE ADULT - SUBJECTIVE AND OBJECTIVE BOX
INTERVAL HPI/OVERNIGHT EVENTS:  Patient S&E at bedside. Unfortunately, His liver biopsy returned positive for metastatic pancreatic adenocarcinoma.     VITAL SIGNS:  T(F): 98 (09-27-19 @ 05:09)  HR: 72 (09-27-19 @ 10:02)  BP: 145/88 (09-27-19 @ 10:02)  RR: 18 (09-27-19 @ 10:02)  SpO2: 98% (09-27-19 @ 10:02)  Wt(kg): --    PHYSICAL EXAM:    Constitutional: NAD  Eyes: EOMI, sclera icteric  Neck: supple, no masses, no JVD  Respiratory: CTA b/l, good air entry b/l  Cardiovascular: RRR, no M/R/G  Gastrointestinal: soft, NTND, no masses palpable  Extremities: no c/c/e  Neurological: AAOx3      MEDICATIONS  (STANDING):  cloNIDine 0.1 milliGRAM(s) Oral every 8 hours  dextrose 5%. 1000 milliLiter(s) (50 mL/Hr) IV Continuous <Continuous>  dextrose 50% Injectable 12.5 Gram(s) IV Push once  dextrose 50% Injectable 25 Gram(s) IV Push once  dextrose 50% Injectable 25 Gram(s) IV Push once  DULoxetine 60 milliGRAM(s) Oral two times a day  enoxaparin Injectable 40 milliGRAM(s) SubCutaneous daily  hydrochlorothiazide 50 milliGRAM(s) Oral daily  insulin lispro (HumaLOG) corrective regimen sliding scale   SubCutaneous three times a day before meals  insulin lispro (HumaLOG) corrective regimen sliding scale   SubCutaneous at bedtime  methadone    Tablet 45 milliGRAM(s) Oral two times a day  morphine  IR 60 milliGRAM(s) Oral <User Schedule>  morphine  IR 90 milliGRAM(s) Oral <User Schedule>  polyethylene glycol 3350 17 Gram(s) Oral <User Schedule>  senna 2 Tablet(s) Oral at bedtime  sucralfate suspension 1 Gram(s) Oral four times a day    MEDICATIONS  (PRN):  dextrose 40% Gel 15 Gram(s) Oral once PRN Blood Glucose LESS THAN 70 milliGRAM(s)/deciliter  diazepam    Tablet 2 milliGRAM(s) Oral every 8 hours PRN anxiety  glucagon  Injectable 1 milliGRAM(s) IntraMuscular once PRN Glucose LESS THAN 70 milligrams/deciliter  HYDROmorphone   Tablet 16 milliGRAM(s) Oral every 3 hours PRN moderate-severe pain  mineral oil 30 milliLiter(s) Oral daily PRN constipation      Allergies    No Known Allergies    Intolerances        LABS:    09-26    138  |  96  |  10  ----------------------------<  166<H>  3.7   |  29  |  0.77    Ca    9.2      26 Sep 2019 06:14            RADIOLOGY & ADDITIONAL TESTS:  Studies reviewed.    ASSESSMENT & PLAN: INTERVAL HPI/OVERNIGHT EVENTS:  Patient S&E at bedside. Unfortunately, His liver biopsy returned positive for metastatic pancreatic adenocarcinoma.     VITAL SIGNS:  T(F): 98 (09-27-19 @ 05:09)  HR: 72 (09-27-19 @ 10:02)  BP: 145/88 (09-27-19 @ 10:02)  RR: 18 (09-27-19 @ 10:02)  SpO2: 98% (09-27-19 @ 10:02)  Wt(kg): --    PHYSICAL EXAM:    Constitutional: NAD, + jaundice  Eyes: EOMI, sclera icteric  Neck: supple, no masses  Respiratory: CTA b/l, good air entry b/l  Cardiovascular: RRR, no M/R/G  Gastrointestinal: soft, NTND  Extremities: no c/c/e  Neurological: AAOx3      MEDICATIONS  (STANDING):  cloNIDine 0.1 milliGRAM(s) Oral every 8 hours  dextrose 5%. 1000 milliLiter(s) (50 mL/Hr) IV Continuous <Continuous>  dextrose 50% Injectable 12.5 Gram(s) IV Push once  dextrose 50% Injectable 25 Gram(s) IV Push once  dextrose 50% Injectable 25 Gram(s) IV Push once  DULoxetine 60 milliGRAM(s) Oral two times a day  enoxaparin Injectable 40 milliGRAM(s) SubCutaneous daily  hydrochlorothiazide 50 milliGRAM(s) Oral daily  insulin lispro (HumaLOG) corrective regimen sliding scale   SubCutaneous three times a day before meals  insulin lispro (HumaLOG) corrective regimen sliding scale   SubCutaneous at bedtime  methadone    Tablet 45 milliGRAM(s) Oral two times a day  morphine  IR 60 milliGRAM(s) Oral <User Schedule>  morphine  IR 90 milliGRAM(s) Oral <User Schedule>  polyethylene glycol 3350 17 Gram(s) Oral <User Schedule>  senna 2 Tablet(s) Oral at bedtime  sucralfate suspension 1 Gram(s) Oral four times a day    MEDICATIONS  (PRN):  dextrose 40% Gel 15 Gram(s) Oral once PRN Blood Glucose LESS THAN 70 milliGRAM(s)/deciliter  diazepam    Tablet 2 milliGRAM(s) Oral every 8 hours PRN anxiety  glucagon  Injectable 1 milliGRAM(s) IntraMuscular once PRN Glucose LESS THAN 70 milligrams/deciliter  HYDROmorphone   Tablet 16 milliGRAM(s) Oral every 3 hours PRN moderate-severe pain  mineral oil 30 milliLiter(s) Oral daily PRN constipation      Allergies    No Known Allergies    Intolerances        LABS:    09-26    138  |  96  |  10  ----------------------------<  166<H>  3.7   |  29  |  0.77    Ca    9.2      26 Sep 2019 06:14            RADIOLOGY & ADDITIONAL TESTS:  Studies reviewed.    ASSESSMENT & PLAN:

## 2019-09-27 NOTE — PROGRESS NOTE ADULT - SUBJECTIVE AND OBJECTIVE BOX
SUBJECTIVE AND OBJECTIVE: Pt reports pain is better controlled. Dilaudid 16mg PO x3 used in 24 hrs.    INTERVAL HPI/OVERNIGHT EVENTS: No acute events overnight.    DNR on chart:   Allergies    No Known Allergies    Intolerances    MEDICATIONS  (STANDING):  cloNIDine 0.1 milliGRAM(s) Oral every 8 hours  dextrose 5%. 1000 milliLiter(s) (50 mL/Hr) IV Continuous <Continuous>  dextrose 50% Injectable 12.5 Gram(s) IV Push once  dextrose 50% Injectable 25 Gram(s) IV Push once  dextrose 50% Injectable 25 Gram(s) IV Push once  DULoxetine 60 milliGRAM(s) Oral two times a day  enoxaparin Injectable 40 milliGRAM(s) SubCutaneous daily  hydrochlorothiazide 50 milliGRAM(s) Oral daily  insulin lispro (HumaLOG) corrective regimen sliding scale   SubCutaneous three times a day before meals  insulin lispro (HumaLOG) corrective regimen sliding scale   SubCutaneous at bedtime  methadone    Tablet 45 milliGRAM(s) Oral two times a day  morphine  IR 60 milliGRAM(s) Oral <User Schedule>  morphine  IR 90 milliGRAM(s) Oral <User Schedule>  polyethylene glycol 3350 17 Gram(s) Oral <User Schedule>  senna 2 Tablet(s) Oral at bedtime  sucralfate suspension 1 Gram(s) Oral four times a day    MEDICATIONS  (PRN):  dextrose 40% Gel 15 Gram(s) Oral once PRN Blood Glucose LESS THAN 70 milliGRAM(s)/deciliter  diazepam    Tablet 2 milliGRAM(s) Oral every 8 hours PRN anxiety  glucagon  Injectable 1 milliGRAM(s) IntraMuscular once PRN Glucose LESS THAN 70 milligrams/deciliter  HYDROmorphone   Tablet 16 milliGRAM(s) Oral every 3 hours PRN moderate-severe pain  mineral oil 30 milliLiter(s) Oral daily PRN constipation      ITEMS UNCHECKED ARE NOT PRESENT    PRESENT SYMPTOMS: [ ]Unable to obtain due to poor mentation   Source if other than patient:  [ ]Family   [ ]Team     Pain (Impact on QOL): Yes  Location: abdomen and Back  Minimal acceptable level (0-10 scale):  5                Aggravating factors:-  Quality: sharp  Radiation: -  Severity (0-10 scale): 9   Timing: -    Dyspnea:                           [ ]Mild [ ]Moderate [ ]Severe  Anxiety:                             [ ]Mild [ ]Moderate [ ]Severe  Fatigue:                             [ ]Mild [ ]Moderate [ ]Severe  Nausea:                             [ ]Mild [ ]Moderate [ ]Severe  Loss of appetite:              [ ]Mild [ ]Moderate [ ]Severe  Constipation:                    [ ]Mild [ ]Moderate [ ]Severe    PAIN AD Score:	  [email or web address].pdf (Ctrl + left click to view)    Other Symptoms:  [x ]All other review of systems negative     Karnofsky Performance Score/Palliative Performance Status Version 2:        70 %    http://palliative.info/resource_material/[email or web address].pdf  PHYSICAL EXAM:  Vital Signs Last 24 Hrs  T(C): 36.7 (27 Sep 2019 05:09), Max: 36.7 (27 Sep 2019 05:09)  T(F): 98 (27 Sep 2019 05:09), Max: 98 (27 Sep 2019 05:09)  HR: 72 (27 Sep 2019 10:02) (69 - 76)  BP: 145/88 (27 Sep 2019 10:02) (130/80 - 145/88)  BP(mean): --  RR: 18 (27 Sep 2019 10:02) (18 - 18)  SpO2: 98% (27 Sep 2019 10:02) (96% - 99%)       GENERAL:  [x ]Alert  [x ]Oriented x3   [ ]Lethargic  [ ]Cachexia  [ ]Unarousable  [ x]Verbal  [ ]Non-Verbal  Behavioral:   [ ] Anxiety  [ ] Delirium [ ] Agitation [ ] Other  HEENT:  [x ]Normal   [ ]Dry mouth   [ ]ET Tube/Trach  [ ]Oral lesions  PULMONARY:   [ ]Clear [ ]Tachypnea  [ ]Audible excessive secretions   [ ]Rhonchi        [ ]Right [ ]Left [ ]Bilateral  [ ]Crackles        [ ]Right [ ]Left [ ]Bilateral  [ ]Wheezing     [ ]Right [ ]Left [ ]Bilateral  CARDIOVASCULAR:    [x ]Regular [ ]Irregular [ ]Tachy  [ ]Jean [ ]Murmur [ ]Other  GASTROINTESTINAL:  [x ]Soft  [ ]Distended   [ ]+BS  [ ]Non tender [ ]Tender  [ ]PEG [ ]OGT/ NGT   Last BM: 9/25    GENITOURINARY:  [x ]Normal [ ] Incontinent   [ ]Oliguria/Anuria   [ ]Heredia  MUSCULOSKELETAL:   [ ]Normal   [x ]Weakness  [ ]Bed/Wheelchair bound [ ]Edema  NEUROLOGIC:   [x ]No focal deficits  [ ] Cognitive impairment  [ ] Dysphagia [ ]Dysarthria [ ] Paresis [ ]Other   SKIN:   [x ]Normal   [ ]Pressure ulcer(s)  [ ]Rash    CRITICAL CARE:  [ ] Shock Present  [ ]Septic [ ]Cardiogenic [ ]Neurologic [ ]Hypovolemic  [ ]  Vasopressors [ ]  Inotropes   [ ] Respiratory failure present  [ ] Acute  [ ] Chronic [ ] Hypoxic  [ ] Hypercarbic [ ] Other  [ ] Other organ failure     LABS: reviewed                       09-26    138  |  96  |  10  ----------------------------<  166<H>  3.7   |  29  |  0.77    Ca    9.2      26 Sep 2019 06:14            RADIOLOGY & ADDITIONAL STUDIES:  EXAM:  IR PROCEDURE NON PICC                          PROCEDURE DATE:  09/24/2019      INTERPRETATION:  Procedure: CT-guided right liver mass biopsy. Images   saved to PACS.    Clinical Information: Right liver mass    Technique: Informed consent obtained. The patient was placed supine on   the CT table. The right upper quadrant was prepped and draped in the   usual sterile fashion. Timeout performed. 1% lidocaine used for local   anesthesia.    Under intermittent axial CT guidance, a 17-gauge needle was advanced   percutaneously into the right hepatic lobe. Several 18-gauge core   biopsies were obtained. The needles were removed and hemostasis was   achieved utilizing D-Stat. A dry sterile gauze was applied. No   complications.    Sedation: Provided by the anesthesiology service.    Impression: Successful CT-guided biopsy of right hepatic lobe mass.      TERESSA PARKER M.D., ATTENDING RADIOLOGIST  This document has been electronically signed. Sep 26 2019  9:33AM        Protein Calorie Malnutrition Present: [ ] yes [ ] no  [ ] PPSV2 < or = 30%  [ ] significant weight loss [ ] poor nutritional intake [ ] anasarca [ ] catabolic state Albumin, Serum: 3.7 g/dL (09-24-19 @ 07:32)  Artificial Nutrition [ ]     REFERRALS:   [ ]Chaplaincy  [ ] Hospice  [ ]Child Life  [ ]Social Work  [ ]Case management [ ]Holistic Therapy     Goals of Care Document: SUBJECTIVE AND OBJECTIVE: Pt reports pain was poorly controlled yesterday with oral medications. Dilaudid 16mg PO x3 used in 24 hrs. Patient received IV dilaudid 2mg today at 9AM -- Pain better as a result.    INTERVAL HPI/OVERNIGHT EVENTS: No acute events overnight.    DNR on chart:   Allergies    No Known Allergies    Intolerances    MEDICATIONS  (STANDING):  cloNIDine 0.1 milliGRAM(s) Oral every 8 hours  dextrose 5%. 1000 milliLiter(s) (50 mL/Hr) IV Continuous <Continuous>  dextrose 50% Injectable 12.5 Gram(s) IV Push once  dextrose 50% Injectable 25 Gram(s) IV Push once  dextrose 50% Injectable 25 Gram(s) IV Push once  DULoxetine 60 milliGRAM(s) Oral two times a day  enoxaparin Injectable 40 milliGRAM(s) SubCutaneous daily  hydrochlorothiazide 50 milliGRAM(s) Oral daily  insulin lispro (HumaLOG) corrective regimen sliding scale   SubCutaneous three times a day before meals  insulin lispro (HumaLOG) corrective regimen sliding scale   SubCutaneous at bedtime  methadone    Tablet 45 milliGRAM(s) Oral two times a day  morphine  IR 60 milliGRAM(s) Oral <User Schedule>  morphine  IR 90 milliGRAM(s) Oral <User Schedule>  polyethylene glycol 3350 17 Gram(s) Oral <User Schedule>  senna 2 Tablet(s) Oral at bedtime  sucralfate suspension 1 Gram(s) Oral four times a day    MEDICATIONS  (PRN):  dextrose 40% Gel 15 Gram(s) Oral once PRN Blood Glucose LESS THAN 70 milliGRAM(s)/deciliter  diazepam    Tablet 2 milliGRAM(s) Oral every 8 hours PRN anxiety  glucagon  Injectable 1 milliGRAM(s) IntraMuscular once PRN Glucose LESS THAN 70 milligrams/deciliter  HYDROmorphone   Tablet 16 milliGRAM(s) Oral every 3 hours PRN moderate-severe pain  mineral oil 30 milliLiter(s) Oral daily PRN constipation      ITEMS UNCHECKED ARE NOT PRESENT    PRESENT SYMPTOMS: [ ]Unable to obtain due to poor mentation   Source if other than patient:  [ ]Family   [ ]Team     Pain (Impact on QOL): Yes  Location: abdomen and Back  Minimal acceptable level (0-10 scale):  5                Aggravating factors:-  Quality: sharp  Radiation: -  Severity (0-10 scale): 9   Timing: -    Dyspnea:                           [ ]Mild [ ]Moderate [ ]Severe  Anxiety:                             [ ]Mild [ ]Moderate [ ]Severe  Fatigue:                             [ ]Mild [ ]Moderate [ ]Severe  Nausea:                             [ ]Mild [ ]Moderate [ ]Severe  Loss of appetite:              [ ]Mild [ ]Moderate [ ]Severe  Constipation:                    [ ]Mild [ ]Moderate [ ]Severe    PAIN AD Score:	  [email or web address]/[email or web address].pdf (Ctrl + left click to view)    Other Symptoms:  [x ]All other review of systems negative     Karnofsky Performance Score/Palliative Performance Status Version 2:        70 %    http://palliative.info/resource_material/[email or web address].pdf  PHYSICAL EXAM:  Vital Signs Last 24 Hrs  T(C): 36.7 (27 Sep 2019 05:09), Max: 36.7 (27 Sep 2019 05:09)  T(F): 98 (27 Sep 2019 05:09), Max: 98 (27 Sep 2019 05:09)  HR: 72 (27 Sep 2019 10:02) (69 - 76)  BP: 145/88 (27 Sep 2019 10:02) (130/80 - 145/88)  BP(mean): --  RR: 18 (27 Sep 2019 10:02) (18 - 18)  SpO2: 98% (27 Sep 2019 10:02) (96% - 99%)       GENERAL:  [x ]Alert  [x ]Oriented x3   [ ]Lethargic  [ ]Cachexia  [ ]Unarousable  [ x]Verbal  [ ]Non-Verbal  Behavioral:   [ ] Anxiety  [ ] Delirium [ ] Agitation [ ] Other  HEENT:  [x ]Normal   [ ]Dry mouth   [ ]ET Tube/Trach  [ ]Oral lesions  PULMONARY:   [ ]Clear [ ]Tachypnea  [ ]Audible excessive secretions   [ ]Rhonchi        [ ]Right [ ]Left [ ]Bilateral  [ ]Crackles        [ ]Right [ ]Left [ ]Bilateral  [ ]Wheezing     [ ]Right [ ]Left [ ]Bilateral  CARDIOVASCULAR:    [x ]Regular [ ]Irregular [ ]Tachy  [ ]Jean [ ]Murmur [ ]Other  GASTROINTESTINAL:  [x ]Soft  [ ]Distended   [ ]+BS  [ ]Non tender [ ]Tender  [ ]PEG [ ]OGT/ NGT   Last BM: 9/25    GENITOURINARY:  [x ]Normal [ ] Incontinent   [ ]Oliguria/Anuria   [ ]Heredia  MUSCULOSKELETAL:   [ ]Normal   [x ]Weakness  [ ]Bed/Wheelchair bound [ ]Edema  NEUROLOGIC:   [x ]No focal deficits  [ ] Cognitive impairment  [ ] Dysphagia [ ]Dysarthria [ ] Paresis [ ]Other   SKIN:   [x ]Normal   [ ]Pressure ulcer(s)  [ ]Rash    CRITICAL CARE:  [ ] Shock Present  [ ]Septic [ ]Cardiogenic [ ]Neurologic [ ]Hypovolemic  [ ]  Vasopressors [ ]  Inotropes   [ ] Respiratory failure present  [ ] Acute  [ ] Chronic [ ] Hypoxic  [ ] Hypercarbic [ ] Other  [ ] Other organ failure     LABS: reviewed                       09-26    138  |  96  |  10  ----------------------------<  166<H>  3.7   |  29  |  0.77    Ca    9.2      26 Sep 2019 06:14            RADIOLOGY & ADDITIONAL STUDIES:  EXAM:  IR PROCEDURE NON PICC                          PROCEDURE DATE:  09/24/2019      INTERPRETATION:  Procedure: CT-guided right liver mass biopsy. Images   saved to PACS.    Clinical Information: Right liver mass    Technique: Informed consent obtained. The patient was placed supine on   the CT table. The right upper quadrant was prepped and draped in the   usual sterile fashion. Timeout performed. 1% lidocaine used for local   anesthesia.    Under intermittent axial CT guidance, a 17-gauge needle was advanced   percutaneously into the right hepatic lobe. Several 18-gauge core   biopsies were obtained. The needles were removed and hemostasis was   achieved utilizing D-Stat. A dry sterile gauze was applied. No   complications.    Sedation: Provided by the anesthesiology service.    Impression: Successful CT-guided biopsy of right hepatic lobe mass.      TERESSA PARKER M.D., ATTENDING RADIOLOGIST  This document has been electronically signed. Sep 26 2019  9:33AM        Protein Calorie Malnutrition Present: [ ] yes [ ] no  [ ] PPSV2 < or = 30%  [ ] significant weight loss [ ] poor nutritional intake [ ] anasarca [ ] catabolic state Albumin, Serum: 3.7 g/dL (09-24-19 @ 07:32)  Artificial Nutrition [ ]     REFERRALS:   [ ]Chaplaincy  [ ] Hospice  [ ]Child Life  [ ]Social Work  [ ]Case management [ ]Holistic Therapy     Goals of Care Document: SUBJECTIVE AND OBJECTIVE: Pt reports pain was poorly controlled yesterday with oral medications. Dilaudid 16mg PO x3 used in 24 hrs. Patient received IV dilaudid 2mg today at 9AM -- Pain better as a result.    INTERVAL HPI/OVERNIGHT EVENTS: No acute events overnight.    DNR on chart:   Allergies    No Known Allergies    Intolerances    MEDICATIONS  (STANDING):  cloNIDine 0.1 milliGRAM(s) Oral every 8 hours  dextrose 5%. 1000 milliLiter(s) (50 mL/Hr) IV Continuous <Continuous>  dextrose 50% Injectable 12.5 Gram(s) IV Push once  dextrose 50% Injectable 25 Gram(s) IV Push once  dextrose 50% Injectable 25 Gram(s) IV Push once  DULoxetine 60 milliGRAM(s) Oral two times a day  enoxaparin Injectable 40 milliGRAM(s) SubCutaneous daily  hydrochlorothiazide 50 milliGRAM(s) Oral daily  insulin lispro (HumaLOG) corrective regimen sliding scale   SubCutaneous three times a day before meals  insulin lispro (HumaLOG) corrective regimen sliding scale   SubCutaneous at bedtime  methadone    Tablet 45 milliGRAM(s) Oral two times a day  morphine  IR 60 milliGRAM(s) Oral <User Schedule>  morphine  IR 90 milliGRAM(s) Oral <User Schedule>  polyethylene glycol 3350 17 Gram(s) Oral <User Schedule>  senna 2 Tablet(s) Oral at bedtime  sucralfate suspension 1 Gram(s) Oral four times a day    MEDICATIONS  (PRN):  dextrose 40% Gel 15 Gram(s) Oral once PRN Blood Glucose LESS THAN 70 milliGRAM(s)/deciliter  diazepam    Tablet 2 milliGRAM(s) Oral every 8 hours PRN anxiety  glucagon  Injectable 1 milliGRAM(s) IntraMuscular once PRN Glucose LESS THAN 70 milligrams/deciliter  HYDROmorphone   Tablet 16 milliGRAM(s) Oral every 3 hours PRN moderate-severe pain  mineral oil 30 milliLiter(s) Oral daily PRN constipation      ITEMS UNCHECKED ARE NOT PRESENT    PRESENT SYMPTOMS: [ ]Unable to obtain due to poor mentation   Source if other than patient:  [ ]Family   [ ]Team     Pain (Impact on QOL): Yes  Location: abdomen and Back  Minimal acceptable level (0-10 scale):  5                Aggravating factors:-  Quality: sharp  Radiation: -  Severity (0-10 scale): 9   Timing: -    Dyspnea:                           [ ]Mild [ ]Moderate [ ]Severe  Anxiety:                             [ ]Mild [ ]Moderate [ ]Severe  Fatigue:                             [ ]Mild [ ]Moderate [ ]Severe  Nausea:                             [ ]Mild [ ]Moderate [ ]Severe  Loss of appetite:              [ ]Mild [ ]Moderate [ ]Severe  Constipation:                    [ ]Mild [ ]Moderate [ ]Severe    PAIN AD Score:	  [email or web address] (Ctrl + left click to view)    Other Symptoms:  [x ]All other review of systems negative     Karnofsky Performance Score/Palliative Performance Status Version 2:        70 %    http://palliative.info/resource_material/[email or web address].pdf  PHYSICAL EXAM:  Vital Signs Last 24 Hrs  T(C): 36.7 (27 Sep 2019 05:09), Max: 36.7 (27 Sep 2019 05:09)  T(F): 98 (27 Sep 2019 05:09), Max: 98 (27 Sep 2019 05:09)  HR: 72 (27 Sep 2019 10:02) (69 - 76)  BP: 145/88 (27 Sep 2019 10:02) (130/80 - 145/88)  BP(mean): --  RR: 18 (27 Sep 2019 10:02) (18 - 18)  SpO2: 98% (27 Sep 2019 10:02) (96% - 99%)       GENERAL:  [x ]Alert  [x ]Oriented x3   [ ]Lethargic  [ ]Cachexia  [ ]Unarousable  [ x]Verbal  [ ]Non-Verbal  Behavioral:   [ ] Anxiety  [ ] Delirium [ ] Agitation [ ] Other  HEENT:  [x ]Normal   [ ]Dry mouth   [ ]ET Tube/Trach  [ ]Oral lesions  PULMONARY:   [ ]Clear [ ]Tachypnea  [ ]Audible excessive secretions   [ ]Rhonchi        [ ]Right [ ]Left [ ]Bilateral  [ ]Crackles        [ ]Right [ ]Left [ ]Bilateral  [ ]Wheezing     [ ]Right [ ]Left [ ]Bilateral  CARDIOVASCULAR:    [x ]Regular [ ]Irregular [ ]Tachy  [ ]Jean [ ]Murmur [ ]Other  GASTROINTESTINAL:  [x ]Soft  [ ]Distended   [ ]+BS  [ ]Non tender [ ]Tender  [ ]PEG [ ]OGT/ NGT   Last BM: 9/25    GENITOURINARY:  [x ]Normal [ ] Incontinent   [ ]Oliguria/Anuria   [ ]Heredia  MUSCULOSKELETAL:   [ ]Normal   [x ]Weakness  [ ]Bed/Wheelchair bound [ ]Edema  NEUROLOGIC:   [x ]No focal deficits  [ ] Cognitive impairment  [ ] Dysphagia [ ]Dysarthria [ ] Paresis [ ]Other   SKIN:   [x ]Normal   [ ]Pressure ulcer(s)  [ ]Rash    CRITICAL CARE:  [ ] Shock Present  [ ]Septic [ ]Cardiogenic [ ]Neurologic [ ]Hypovolemic  [ ]  Vasopressors [ ]  Inotropes   [ ] Respiratory failure present  [ ] Acute  [ ] Chronic [ ] Hypoxic  [ ] Hypercarbic [ ] Other  [ ] Other organ failure     LABS: no labs today for review    RADIOLOGY & ADDITIONAL STUDIES:  EXAM:  IR PROCEDURE NON PICC                          PROCEDURE DATE:  09/24/2019      INTERPRETATION:  Procedure: CT-guided right liver mass biopsy. Images   saved to PACS.    Clinical Information: Right liver mass    Technique: Informed consent obtained. The patient was placed supine on   the CT table. The right upper quadrant was prepped and draped in the   usual sterile fashion. Timeout performed. 1% lidocaine used for local   anesthesia.    Under intermittent axial CT guidance, a 17-gauge needle was advanced   percutaneously into the right hepatic lobe. Several 18-gauge core   biopsies were obtained. The needles were removed and hemostasis was   achieved utilizing D-Stat. A dry sterile gauze was applied. No   complications.    Sedation: Provided by the anesthesiology service.    Impression: Successful CT-guided biopsy of right hepatic lobe mass.      TERESSA PARKER M.D., ATTENDING RADIOLOGIST  This document has been electronically signed. Sep 26 2019  9:33AM        Protein Calorie Malnutrition Present: [ ] yes [ ] no  [ ] PPSV2 < or = 30%  [ ] significant weight loss [ ] poor nutritional intake [ ] anasarca [ ] catabolic state Albumin, Serum: 3.7 g/dL (09-24-19 @ 07:32)  Artificial Nutrition [ ]     REFERRALS:   [ ]Chaplaincy  [ ] Hospice  [ ]Child Life  [ ]Social Work  [ ]Case management [ ]Holistic Therapy     Goals of Care Document:

## 2019-09-27 NOTE — PROGRESS NOTE ADULT - PROBLEM SELECTOR PLAN 2
Unclear etiology, likely constipation (CT with significant stool burden) vs. abnormalities found as above  - miralax TID  - pain control as below presumed to be malignancy related pain.   - pt still having a significant amount of pain requiring IV dilaudid 2.5mg q2hrs  - Miralax TID  - pain control as below

## 2019-09-27 NOTE — PROGRESS NOTE ADULT - PROBLEM SELECTOR PLAN 1
acute abdominal pain superimposed on chronic pain  - increased methadone to 45mg BID on 9/26 (EKG QTc 480)  - c/w 60mg MSIR in AM, and 90mg MSIR at night  - changed to dilaudid 16mg PO (increase after conversion due to persistent severe pain) Q3 PRN mod-severe pain, patient agreeable (x3 used in 24 hrs) acute abdominal pain superimposed on chronic pain  - increased methadone to 45mg BID on 9/26 (EKG QTc 480)  - c/w 60mg MSIR in AM, and 90mg MSIR at night  - changed to dilaudid 16mg PO (increase after conversion due to persistent severe pain) Q3 PRN mod-severe pain, patient agreeable (x3 used in 24 hrs); ineffective, received 2mg IV this AM acute abdominal pain superimposed on chronic pain  - increased methadone to 45mg BID on 9/26 (EKG QTc 480)  - given continuous pain, will change from MS IR to MS Contin 80mg BID, equivalent to his 150mg overall morphine TDD with slight increase given consistent pain  - will revert back to 2.5mg IV dilaudid Q2 PRN for now, and transition back to oral once overall pain better managed

## 2019-09-27 NOTE — PROGRESS NOTE ADULT - PROBLEM SELECTOR PLAN 3
- S/P IR /CT-guided right liver mass biopsy on 9/24/19, showing metastatic lesion - S/P IR /CT-guided right liver mass biopsy on 9/24/19, showing metastatic lesion, patient aware  - further plan per heme/onc

## 2019-09-27 NOTE — PROGRESS NOTE ADULT - ASSESSMENT
62 yo h/o chronic pain 2/2 traumatic work injury (on daily opiates), GERD, HTN, chronic constipation who presents with acute on chronic left lower quadrant pain x 6days, found to have biliary obstruction, s/p ERCP with pancreatic mass s/p FNA/spincterotomy/biliary stent placement w/ path showing adenocarcinoma, s/p IR biopsy of hepatic lesions which revealed pancreatic adenocarcinoma mets.

## 2019-09-27 NOTE — PROGRESS NOTE ADULT - PROBLEM SELECTOR PLAN 2
follows with Dr. Abdifatah Llily in [place] [phone]  - would keep Dr. Lilly aware of any medication changes prior to discharge.

## 2019-09-27 NOTE — PROGRESS NOTE ADULT - ASSESSMENT
62 yo h/o chronic pain 2/2 traumatic work injury (on daily opiates), GERD, HTN, chronic constipation who presented with LLQ pain, weight loss, jaundice.      # Metastatic pancreatic adenocarcinoma  - CT a/p: Mild intrahepatic biliary ductal dilatation as well as multiple indeterminant hepatic lesions, new since prior examination of 3/25/2018. Metastatic disease is a differential diagnostic consideration.Limited evaluation of the pancreatic parenchyma secondary to streak artifact. Question 1.8 cm hypodense pancreatic tail lesion. Hypodensity   within the pancreatic neck, likely artifactual. No definite pancreatic ductal dilatation.  - CA 19-9 elevated 1832  - s/p ERCP and stent placement with biopsy confirming pancreatic cancer  - CT chest negative for malignancy  - IR guided biopsy of liver positive for metastatic adenocarcinoma  - Will refer to [facility] for follow up    Coral Delgado  Hematology-Oncology PGY-5  [phone]

## 2019-09-27 NOTE — PROGRESS NOTE ADULT - SUBJECTIVE AND OBJECTIVE BOX
Internal Medicine Team Progress Note  Rodriguez Mcmahan, PGY 2  [phone]    LEN ALY  Patient is a 63y old  Male who presents with a chief complaint of abdominal pain, hyperbilirubinemia (27 Sep 2019 12:07)      INTERVAL HPI / SUBJECTIVE:    No acute events overnight.  Patient continues to have abdominal pain and back pain.  Did not have a bowel movement yesterday.  Denies any fevers, chills, night sweats.         MEDICATIONS:   MEDICATIONS  (STANDING):  cloNIDine 0.1 milliGRAM(s) Oral every 8 hours  dextrose 5%. 1000 milliLiter(s) (50 mL/Hr) IV Continuous <Continuous>  dextrose 50% Injectable 12.5 Gram(s) IV Push once  dextrose 50% Injectable 25 Gram(s) IV Push once  dextrose 50% Injectable 25 Gram(s) IV Push once  DULoxetine 60 milliGRAM(s) Oral two times a day  enoxaparin Injectable 40 milliGRAM(s) SubCutaneous daily  hydrochlorothiazide 50 milliGRAM(s) Oral daily  insulin lispro (HumaLOG) corrective regimen sliding scale   SubCutaneous three times a day before meals  insulin lispro (HumaLOG) corrective regimen sliding scale   SubCutaneous at bedtime  methadone    Tablet 45 milliGRAM(s) Oral two times a day  morphine  IR 60 milliGRAM(s) Oral <User Schedule>  morphine  IR 90 milliGRAM(s) Oral <User Schedule>  polyethylene glycol 3350 17 Gram(s) Oral <User Schedule>  senna 2 Tablet(s) Oral at bedtime  sucralfate suspension 1 Gram(s) Oral four times a day    MEDICATIONS  (PRN):  dextrose 40% Gel 15 Gram(s) Oral once PRN Blood Glucose LESS THAN 70 milliGRAM(s)/deciliter  diazepam    Tablet 2 milliGRAM(s) Oral every 8 hours PRN anxiety  glucagon  Injectable 1 milliGRAM(s) IntraMuscular once PRN Glucose LESS THAN 70 milligrams/deciliter  HYDROmorphone   Tablet 16 milliGRAM(s) Oral every 2 hours PRN moderate-severe pain  mineral oil 30 milliLiter(s) Oral daily PRN constipation      ALLERGIES:   Allergies    No Known Allergies    Intolerances        OBJECTIVE:  Vital Signs Last 24 Hrs  T(C): 36.5 (27 Sep 2019 14:18), Max: 36.7 (27 Sep 2019 05:09)  T(F): 97.7 (27 Sep 2019 14:18), Max: 98 (27 Sep 2019 05:09)  HR: 74 (27 Sep 2019 14:18) (69 - 76)  BP: 137/76 (27 Sep 2019 14:18) (130/80 - 145/88)  BP(mean): --  RR: 18 (27 Sep 2019 14:18) (18 - 18)  SpO2: 98% (27 Sep 2019 14:18) (96% - 99%)    I&O's Summary    26 Sep 2019 07:01  -  27 Sep 2019 07:00  --------------------------------------------------------  IN: 430 mL / OUT: 0 mL / NET: 430 mL    27 Sep 2019 07:01  -  27 Sep 2019 16:40  --------------------------------------------------------  IN: 720 mL / OUT: 150 mL / NET: 570 mL        PHYSICAL EXAM:  GENERAL: appears stated age, disheveled  HEENT: EOMI, PERRL, +scleral icterus, MMM, no oropharyngeal lesions or erythema appreciated  Pulm: CTAB  CV: RRR, S1&S2+, no m/r/g appreciated  ABDOMEN: +BS, +distended, +hepatomegaly with irregular borders, right lower quadrant mass corresponding to prior stimulator battery.   MSK: nl ROM  BACK: Right lower quadrant mass corresponding to current stimulator battery, pain on palpation    LABS: No AM labs        IMAGING:     Cytopathology - Non Gyn Report:   ACCESSION No:  [id number]    LEN ALY              3        Cytopathology Addendum Report          Addendum  Addendum issued to report results of immunostains  RESULTS  CDX2 and CK 20 are focally positive. CK 7 is strongly positive.  These immunophenotypic findings are in support of a  pancreaticobiliary or GI tract adenocarcinoma.  Patient's pancreatic biopsy showing adenocarcinoma ([id number]) is also noted.    Please correlated with clinical presntation and imaging studies.    Slide(s) with built in immunohistochemical study control(s)  associated and negative control with this case has/have been  verified by the sign out pathologist.  For slide(s) without built in controls positive control slides  has/have been reviewed and approved by immunohistochemistry lab  These immunohistochemical/ in-situ hybridization tests have been  developed and their performance characteristics determined by  [facility], Department of  Pathology, Division of Immunopathology, [address] [place] [address].  It has not been cleared or approved by the  [place] Food and Drug Administration.  The FDA has determined that  such clearance or approval is not necessary.  This test is used  for clinical purposes.  The laboratory is certified under the  CLIA-88 as qualified to perform high  complexity clinical testing.    Verified by: Shara Quiroz MD  (Electronic Signature)  Reported on: 09/27/19 12:51 EDT, [address] [place]  [address]  _________________________________________________________________      Cytopathology Report            Final Diagnosis  LIVER, RIGHT LOBE, CT GUIDED CORE BIOPSY AND FNA  POSITIVE FOR MALIGNANT CELLS.  Adenocarcinoma.See Note                LEN ALY              3        Cytopathology Report          Touch Prep slides  consist of crowded 3-dimensional groups and  single lying malignant cells with enlarged, pleomorphic nuclei,  prominent nucleoli and vacuolated cytoplasm.  Corebiopsy: Cores of hepatic parenchyma involved by  adenocarcinoma.  The FNA smears smears show rare tumor cells and blood. Cell block  consists of blood only.  History of newly diagnosed pancreatic cancer is noted.  Note  Immunostains for better characterization of the neoplastic cells  are in progress and results will be reported in an addendum.  Copy of report is faxed to clinician's office 09/26/19  Dr Chauhan was notified via encrypted email 09/26/19 13:22    Screened by: Kaycee PATEL(ASCP)  Verified by: Shara Quiroz MD  (Electronic Signature)  Reported on: 09/26/19 13:25 EDT, [address] [place]  [address]  Cytology technical processing performed at [address] [place] [id number]  _________________________________________________________________    Labs and imaging personally reviewed and interpreted [ x ]  Consult notes reviewed [ x ]  Case discussed with consultants [ x ] Internal Medicine Team Progress Note  Rodriguez Mcmahan, PGY 2  [phone]    LEN ALY  Patient is a 63y old  Male who presents with a chief complaint of abdominal pain, hyperbilirubinemia (27 Sep 2019 12:07)      INTERVAL HPI / SUBJECTIVE:    No acute events overnight.  Patient continues to have abdominal pain and back pain.  Did not have a bowel movement yesterday.  Denies any fevers, chills, night sweats.         MEDICATIONS:   MEDICATIONS  (STANDING):  cloNIDine 0.1 milliGRAM(s) Oral every 8 hours  dextrose 5%. 1000 milliLiter(s) (50 mL/Hr) IV Continuous <Continuous>  dextrose 50% Injectable 12.5 Gram(s) IV Push once  dextrose 50% Injectable 25 Gram(s) IV Push once  dextrose 50% Injectable 25 Gram(s) IV Push once  DULoxetine 60 milliGRAM(s) Oral two times a day  enoxaparin Injectable 40 milliGRAM(s) SubCutaneous daily  hydrochlorothiazide 50 milliGRAM(s) Oral daily  insulin lispro (HumaLOG) corrective regimen sliding scale   SubCutaneous three times a day before meals  insulin lispro (HumaLOG) corrective regimen sliding scale   SubCutaneous at bedtime  methadone    Tablet 45 milliGRAM(s) Oral two times a day  morphine  IR 60 milliGRAM(s) Oral <User Schedule>  morphine  IR 90 milliGRAM(s) Oral <User Schedule>  polyethylene glycol 3350 17 Gram(s) Oral <User Schedule>  senna 2 Tablet(s) Oral at bedtime  sucralfate suspension 1 Gram(s) Oral four times a day    MEDICATIONS  (PRN):  dextrose 40% Gel 15 Gram(s) Oral once PRN Blood Glucose LESS THAN 70 milliGRAM(s)/deciliter  diazepam    Tablet 2 milliGRAM(s) Oral every 8 hours PRN anxiety  glucagon  Injectable 1 milliGRAM(s) IntraMuscular once PRN Glucose LESS THAN 70 milligrams/deciliter  HYDROmorphone   Tablet 16 milliGRAM(s) Oral every 2 hours PRN moderate-severe pain  mineral oil 30 milliLiter(s) Oral daily PRN constipation      ALLERGIES:   Allergies    No Known Allergies    Intolerances        OBJECTIVE:  Vital Signs Last 24 Hrs  T(C): 36.5 (27 Sep 2019 14:18), Max: 36.7 (27 Sep 2019 05:09)  T(F): 97.7 (27 Sep 2019 14:18), Max: 98 (27 Sep 2019 05:09)  HR: 74 (27 Sep 2019 14:18) (69 - 76)  BP: 137/76 (27 Sep 2019 14:18) (130/80 - 145/88)  BP(mean): --  RR: 18 (27 Sep 2019 14:18) (18 - 18)  SpO2: 98% (27 Sep 2019 14:18) (96% - 99%)    I&O's Summary    26 Sep 2019 07:01  -  27 Sep 2019 07:00  --------------------------------------------------------  IN: 430 mL / OUT: 0 mL / NET: 430 mL    27 Sep 2019 07:01  -  27 Sep 2019 16:40  --------------------------------------------------------  IN: 720 mL / OUT: 150 mL / NET: 570 mL        PHYSICAL EXAM:  GENERAL: appears stated age, disheveled  HEENT: EOMI, PERRL, +scleral icterus, MMM, no oropharyngeal lesions or erythema appreciated  Pulm: CTAB  CV: RRR, S1&S2+, no m/r/g appreciated  ABDOMEN: +BS, +distended, +hepatomegaly with irregular borders, right lower quadrant mass corresponding to prior stimulator battery.   MSK: nl ROM  BACK: Right lower quadrant mass corresponding to current stimulator battery, pain on palpation    LABS: No AM labs        IMAGING:       Final Diagnosis  LIVER, RIGHT LOBE, CT GUIDED CORE BIOPSY AND FNA  POSITIVE FOR MALIGNANT CELLS.  Adenocarcinoma.See Note                _________________________________________________________________    Labs and imaging personally reviewed and interpreted [ x ]  Consult notes reviewed [ x ]  Case discussed with consultants [ x ]

## 2019-09-27 NOTE — PROGRESS NOTE ADULT - PROBLEM SELECTOR PLAN 5
a1c 6.8, new dx  - blood glucose is acceptable, c/w ISS  - will start metformin  mg qd at discharge  -diabetic education consulted a1c 6.8, new dx  - blood glucose is acceptable, c/w ISS  - given that patient's diagnosis of stage 4 pancreatic CA will hold off on discharging patient on any hypoglycemic agents.  - diabetic education consulted

## 2019-09-27 NOTE — PROGRESS NOTE ADULT - PROBLEM SELECTOR PLAN 3
- methadone 45 mg BID  - morphine 60 mg IR qAM and morphine 90 mg qPM   - pt transitioned from IV to PO pain meds; currently receiving dilaudid 16mg q2hrs  - appreciate palliative recs - methadone 45 mg BID  - morphine 60 mg IR qAM and morphine 90 mg qPM   - pt transitioned from IV to PO pain meds; currently receiving Dilaudid 16mg q2hrs  - appreciate palliative recs

## 2019-09-28 NOTE — PROGRESS NOTE ADULT - PROBLEM SELECTOR PLAN 2
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   - pt still having a significant amount of pain requiring IV dilaudid 2.5mg q2hrs (4 in 12 hours)  - Miralax TID  - pain control as below presumed to be malignancy related pain.   - pt still having pain requiring IV dilaudid 2.5mg q2hrs (4 in 12 hours). Increase to 3 mg q2H.  - Miralax TID  - pain control as below

## 2019-09-28 NOTE — PROGRESS NOTE ADULT - SUBJECTIVE AND OBJECTIVE BOX
SUBJECTIVE AND OBJECTIVE:  Susy used 6 doses of 2.5mg IV Hydromorphone since yesterday evening.    INTERVAL HPI/OVERNIGHT EVENTS: No acute events overnight.    DNR on chart:   Allergies    No Known Allergies    Intolerances    MEDICATIONS  (STANDING):  cloNIDine 0.1 milliGRAM(s) Oral every 8 hours  dextrose 5%. 1000 milliLiter(s) (50 mL/Hr) IV Continuous <Continuous>  dextrose 50% Injectable 12.5 Gram(s) IV Push once  dextrose 50% Injectable 25 Gram(s) IV Push once  dextrose 50% Injectable 25 Gram(s) IV Push once  DULoxetine 60 milliGRAM(s) Oral two times a day  enoxaparin Injectable 40 milliGRAM(s) SubCutaneous daily  hydrochlorothiazide 50 milliGRAM(s) Oral daily  insulin lispro (HumaLOG) corrective regimen sliding scale   SubCutaneous three times a day before meals  insulin lispro (HumaLOG) corrective regimen sliding scale   SubCutaneous at bedtime  methadone    Tablet 45 milliGRAM(s) Oral two times a day  morphine  IR 60 milliGRAM(s) Oral <User Schedule>  morphine  IR 90 milliGRAM(s) Oral <User Schedule>  polyethylene glycol 3350 17 Gram(s) Oral <User Schedule>  senna 2 Tablet(s) Oral at bedtime  sucralfate suspension 1 Gram(s) Oral four times a day    MEDICATIONS  (PRN):  dextrose 40% Gel 15 Gram(s) Oral once PRN Blood Glucose LESS THAN 70 milliGRAM(s)/deciliter  diazepam    Tablet 2 milliGRAM(s) Oral every 8 hours PRN anxiety  glucagon  Injectable 1 milliGRAM(s) IntraMuscular once PRN Glucose LESS THAN 70 milligrams/deciliter  HYDROmorphone   Tablet 16 milliGRAM(s) Oral every 3 hours PRN moderate-severe pain  mineral oil 30 milliLiter(s) Oral daily PRN constipation      ITEMS UNCHECKED ARE NOT PRESENT    PRESENT SYMPTOMS: [ ]Unable to obtain due to poor mentation   Source if other than patient:  [ ]Family   [ ]Team     Pain (Impact on QOL): Yes  Location: abdomen and Back  Minimal acceptable level (0-10 scale):  5                Aggravating factors:-  Quality: sharp  Radiation: -  Severity (0-10 scale): 9   Timing: -    Dyspnea:                           [ ]Mild [ ]Moderate [ ]Severe  Anxiety:                             [ ]Mild [ ]Moderate [ ]Severe  Fatigue:                             [ ]Mild [ ]Moderate [ ]Severe  Nausea:                             [ ]Mild [ ]Moderate [ ]Severe  Loss of appetite:              [ ]Mild [ ]Moderate [ ]Severe  Constipation:                    [ ]Mild [ ]Moderate [ ]Severe    PAIN AD Score:	  http://geriatrictoolkit.[place].[facility]/cog/painad.pdf (Ctrl + left click to view)    Other Symptoms:  [x ]All other review of systems negative     Karnofsky Performance Score/Palliative Performance Status Version 2:        70 %    http://palliative.info/resource_material/PPSv2.pdf  PHYSICAL EXAM:  Vital Signs Last 24 Hrs  T(C): 36.7 (27 Sep 2019 05:09), Max: 36.7 (27 Sep 2019 05:09)  T(F): 98 (27 Sep 2019 05:09), Max: 98 (27 Sep 2019 05:09)  HR: 72 (27 Sep 2019 10:02) (69 - 76)  BP: 145/88 (27 Sep 2019 10:02) (130/80 - 145/88)  BP(mean): --  RR: 18 (27 Sep 2019 10:02) (18 - 18)  SpO2: 98% (27 Sep 2019 10:02) (96% - 99%)       GENERAL:  [x ]Alert  [x ]Oriented x3   [ ]Lethargic  [ ]Cachexia  [ ]Unarousable  [ x]Verbal  [ ]Non-Verbal  Behavioral:   [ ] Anxiety  [ ] Delirium [ ] Agitation [ ] Other  HEENT:  [x ]Normal   [ ]Dry mouth   [ ]ET Tube/Trach  [ ]Oral lesions  PULMONARY:   [ ]Clear [ ]Tachypnea  [ ]Audible excessive secretions   [ ]Rhonchi        [ ]Right [ ]Left [ ]Bilateral  [ ]Crackles        [ ]Right [ ]Left [ ]Bilateral  [ ]Wheezing     [ ]Right [ ]Left [ ]Bilateral  CARDIOVASCULAR:    [x ]Regular [ ]Irregular [ ]Tachy  [ ]Jean [ ]Murmur [ ]Other  GASTROINTESTINAL:  [x ]Soft  [ ]Distended   [ ]+BS  [ ]Non tender [ ]Tender  [ ]PEG [ ]OGT/ NGT   Last BM: 9/25    GENITOURINARY:  [x ]Normal [ ] Incontinent   [ ]Oliguria/Anuria   [ ]Heredia  MUSCULOSKELETAL:   [ ]Normal   [x ]Weakness  [ ]Bed/Wheelchair bound [ ]Edema  NEUROLOGIC:   [x ]No focal deficits  [ ] Cognitive impairment  [ ] Dysphagia [ ]Dysarthria [ ] Paresis [ ]Other   SKIN:   [x ]Normal   [ ]Pressure ulcer(s)  [ ]Rash    CRITICAL CARE:  [ ] Shock Present  [ ]Septic [ ]Cardiogenic [ ]Neurologic [ ]Hypovolemic  [ ]  Vasopressors [ ]  Inotropes   [ ] Respiratory failure present  [ ] Acute  [ ] Chronic [ ] Hypoxic  [ ] Hypercarbic [ ] Other  [ ] Other organ failure     LABS: no labs today for review    RADIOLOGY & ADDITIONAL STUDIES:  EXAM:  IR PROCEDURE NON PICC                          PROCEDURE DATE:  09/24/2019      INTERPRETATION:  Procedure: CT-guided right liver mass biopsy. Images   saved to PACS.    Clinical Information: Right liver mass    Technique: Informed consent obtained. The patient was placed supine on   the CT table. The right upper quadrant was prepped and draped in the   usual sterile fashion. Timeout performed. 1% lidocaine used for local   anesthesia.    Under intermittent axial CT guidance, a 17-gauge needle was advanced   percutaneously into the right hepatic lobe. Several 18-gauge core   biopsies were obtained. The needles were removed and hemostasis was   achieved utilizing D-Stat. A dry sterile gauze was applied. No   complications.    Sedation: Provided by the anesthesiology service.    Impression: Successful CT-guided biopsy of right hepatic lobe mass.      TERESSA PARKER M.D., ATTENDING RADIOLOGIST  This document has been electronically signed. Sep 26 2019  9:33AM        Protein Calorie Malnutrition Present: [ ] yes [ ] no  [ ] PPSV2 < or = 30%  [ ] significant weight loss [ ] poor nutritional intake [ ] anasarca [ ] catabolic state Albumin, Serum: 3.7 g/dL (09-24-19 @ 07:32)  Artificial Nutrition [ ]     REFERRALS:   [ ]Chaplaincy  [ ] Hospice  [ ]Child Life  [ ]Social Work  [ ]Case management [ ]Holistic Therapy     Goals of Care Document: SUBJECTIVE AND OBJECTIVE:  Patient used 6 doses of 2.5mg IV Hydromorphone since yesterday evening. Says that this helps pain go from 10/10 to 8/10.     INTERVAL HPI/OVERNIGHT EVENTS: No acute events overnight.    DNR on chart:   Allergies    No Known Allergies    Intolerances    MEDICATIONS  (STANDING):  cloNIDine 0.1 milliGRAM(s) Oral every 8 hours  dextrose 5%. 1000 milliLiter(s) (50 mL/Hr) IV Continuous <Continuous>  dextrose 50% Injectable 12.5 Gram(s) IV Push once  dextrose 50% Injectable 25 Gram(s) IV Push once  dextrose 50% Injectable 25 Gram(s) IV Push once  DULoxetine 60 milliGRAM(s) Oral two times a day  enoxaparin Injectable 40 milliGRAM(s) SubCutaneous daily  hydrochlorothiazide 50 milliGRAM(s) Oral daily  insulin lispro (HumaLOG) corrective regimen sliding scale   SubCutaneous three times a day before meals  insulin lispro (HumaLOG) corrective regimen sliding scale   SubCutaneous at bedtime  methadone    Tablet 45 milliGRAM(s) Oral two times a day  morphine ER Tablet 60 milliGRAM(s) Oral two times a day  polyethylene glycol 3350 17 Gram(s) Oral <User Schedule>  senna 2 Tablet(s) Oral at bedtime  sucralfate suspension 1 Gram(s) Oral four times a day    MEDICATIONS  (PRN):  dextrose 40% Gel 15 Gram(s) Oral once PRN Blood Glucose LESS THAN 70 milliGRAM(s)/deciliter  diazepam    Tablet 2 milliGRAM(s) Oral every 8 hours PRN anxiety  glucagon  Injectable 1 milliGRAM(s) IntraMuscular once PRN Glucose LESS THAN 70 milligrams/deciliter  HYDROmorphone  Injectable 3 milliGRAM(s) IV Push every 2 hours PRN Severe Pain (7 - 10)  mineral oil 30 milliLiter(s) Oral daily PRN constipation      ITEMS UNCHECKED ARE NOT PRESENT    PRESENT SYMPTOMS: [ ]Unable to obtain due to poor mentation   Source if other than patient:  [ ]Family   [ ]Team     Pain (Impact on QOL): Yes  Location: abdomen and Back  Minimal acceptable level (0-10 scale):  5                Aggravating factors:-  Quality: sharp  Radiation: -  Severity (0-10 scale): 9   Timing: -    Dyspnea:                           [ ]Mild [ ]Moderate [ ]Severe  Anxiety:                             [ ]Mild [ ]Moderate [ ]Severe  Fatigue:                             [ ]Mild [ ]Moderate [ ]Severe  Nausea:                             [ ]Mild [ ]Moderate [ ]Severe  Loss of appetite:              [ ]Mild [ ]Moderate [ ]Severe  Constipation:                    [ ]Mild [ ]Moderate [ ]Severe      Other Symptoms:  [x ]All other review of systems negative     Karnofsky Performance Score/Palliative Performance Status Version 2:        70 %        PHYSICAL EXAM:  Vital Signs Last 24 Hrs  T(C): 36.2 (28 Sep 2019 09:22), Max: 36.6 (27 Sep 2019 16:58)  T(F): 97.2 (28 Sep 2019 09:22), Max: 97.8 (27 Sep 2019 16:58)  HR: 70 (28 Sep 2019 11:41) (65 - 77)  BP: 149/90 (28 Sep 2019 11:41) (121/58 - 154/84)  BP(mean): --  RR: 18 (28 Sep 2019 11:41) (18 - 18)  SpO2: 94% (28 Sep 2019 11:41) (94% - 98%)       GENERAL:  [x ]Alert  [x ]Oriented x3   [ ]Lethargic  [ ]Cachexia  [ ]Unarousable  [ x]Verbal  [ ]Non-Verbal  Behavioral:   [ ] Anxiety  [ ] Delirium [ ] Agitation [ ] Other  HEENT:  [x ]Normal   [ ]Dry mouth   [ ]ET Tube/Trach  [ ]Oral lesions  PULMONARY:   [X]Clear [ ]Tachypnea  [ ]Audible excessive secretions   [ ]Rhonchi        [ ]Right [ ]Left [ ]Bilateral  [ ]Crackles        [ ]Right [ ]Left [ ]Bilateral  [ ]Wheezing     [ ]Right [ ]Left [ ]Bilateral  CARDIOVASCULAR:    [x ]Regular [ ]Irregular [ ]Tachy  [ ]Jean [ ]Murmur [ ]Other  GASTROINTESTINAL:  [x ]Soft  [ ]Distended   [X ]+BS  [ ]Non tender [ ]Tender  [ ]PEG [ ]OGT/ NGT   Last BM: 9/28   GENITOURINARY:  [x ]Normal [ ] Incontinent   [ ]Oliguria/Anuria   [ ]Heredia  MUSCULOSKELETAL:   [ ]Normal   [x ]Weakness  [ ]Bed/Wheelchair bound [ ]Edema  NEUROLOGIC:   [x ]No focal deficits  [ ] Cognitive impairment  [ ] Dysphagia [ ]Dysarthria [ ] Paresis [ ]Other   SKIN:   [x ]Normal   [ ]Pressure ulcer(s)  [ ]Rash    CRITICAL CARE:  [ ] Shock Present  [ ]Septic [ ]Cardiogenic [ ]Neurologic [ ]Hypovolemic  [ ]  Vasopressors [ ]  Inotropes   [ ] Respiratory failure present  [ ] Acute  [ ] Chronic [ ] Hypoxic  [ ] Hypercarbic [ ] Other  [ ] Other organ failure     LABS: no labs today for review    RADIOLOGY & ADDITIONAL STUDIES:      Protein Calorie Malnutrition Present: [ ] yes [ ] no  [ ] PPSV2 < or = 30%  [ ] significant weight loss [ ] poor nutritional intake [ ] anasarca [ ] catabolic state Albumin, Serum: 3.7 g/dL (09-24-19 @ 07:32)  Artificial Nutrition [ ]     REFERRALS:   [ ]Chaplaincy  [ ] Hospice  [ ]Child Life  [ ]Social Work  [ ]Case management [ ]Holistic Therapy     Goals of Care Document:

## 2019-09-28 NOTE — PROGRESS NOTE ADULT - PROBLEM SELECTOR PLAN 3
- methadone 45 mg BID  - morphine ER 60 mg BID   - pt still having a significant amount of pain requiring IV dilaudid 2.5mg prn q2hrs (4 in 12 hours)  - appreciate palliative recs

## 2019-09-28 NOTE — PROGRESS NOTE ADULT - SUBJECTIVE AND OBJECTIVE BOX
Interval Events: No acute overnight events. Pt     REVIEW OF SYSTEMS:  [ ] All other systems negative  [ ] Unable to assess ROS because ________    OBJECTIVE:  ICU Vital Signs Last 24 Hrs  T(C): 36.5 (28 Sep 2019 04:44), Max: 36.6 (27 Sep 2019 16:58)  T(F): 97.7 (28 Sep 2019 04:44), Max: 97.8 (27 Sep 2019 16:58)  HR: 77 (28 Sep 2019 04:44) (65 - 77)  BP: 152/76 (28 Sep 2019 04:44) (124/79 - 154/84)  BP(mean): --  ABP: --  ABP(mean): --  RR: 18 (28 Sep 2019 04:44) (18 - 18)  SpO2: 94% (28 Sep 2019 04:44) (94% - 98%)        09-27 @ 07:01  -  09-28 @ 07:00  --------------------------------------------------------  IN: 900 mL / OUT: 150 mL / NET: 750 mL      CAPILLARY BLOOD GLUCOSE      POCT Blood Glucose.: 228 mg/dL (27 Sep 2019 22:02)      PHYSICAL EXAM:  GENERAL: appears stated age, disheveled  HEENT: EOMI, PERRL, +scleral icterus, MMM, no oropharyngeal lesions or erythema appreciated  Pulm: CTAB  CV: RRR, S1&S2+, no m/r/g appreciated  ABDOMEN: +BS, +distended, +hepatomegaly with irregular borders, right lower quadrant mass corresponding to prior stimulator battery.   MSK: nl ROM  BACK: Right lower quadrant mass corresponding to current stimulator battery, pain on palpation    HOSPITAL MEDICATIONS:  enoxaparin Injectable 40 milliGRAM(s) SubCutaneous daily      cloNIDine 0.1 milliGRAM(s) Oral every 8 hours  hydrochlorothiazide 50 milliGRAM(s) Oral daily    dextrose 40% Gel 15 Gram(s) Oral once PRN  dextrose 50% Injectable 12.5 Gram(s) IV Push once  dextrose 50% Injectable 25 Gram(s) IV Push once  dextrose 50% Injectable 25 Gram(s) IV Push once  glucagon  Injectable 1 milliGRAM(s) IntraMuscular once PRN  insulin lispro (HumaLOG) corrective regimen sliding scale   SubCutaneous three times a day before meals  insulin lispro (HumaLOG) corrective regimen sliding scale   SubCutaneous at bedtime      diazepam    Tablet 2 milliGRAM(s) Oral every 8 hours PRN  DULoxetine 60 milliGRAM(s) Oral two times a day  HYDROmorphone  Injectable 2.5 milliGRAM(s) IV Push every 2 hours PRN  methadone    Tablet 45 milliGRAM(s) Oral two times a day  morphine ER Tablet 60 milliGRAM(s) Oral two times a day    mineral oil 30 milliLiter(s) Oral daily PRN  polyethylene glycol 3350 17 Gram(s) Oral <User Schedule>  senna 2 Tablet(s) Oral at bedtime  sucralfate suspension 1 Gram(s) Oral four times a day        dextrose 5%. 1000 milliLiter(s) IV Continuous <Continuous>            LABS:    Hgb Trend: 11.6<--, 11.6<--, 11.4<--, 11.8<--        Creatinine Trend: 0.77<--, 0.80<--, 0.81<--, 0.79<--, 0.77<--, 0.81<--            MICROBIOLOGY:     RADIOLOGY:  [ ] Reviewed and interpreted by me    ASSESSMENT AND RECOMMENDATION: Interval Events: No acute overnight events. Pt states pain is well controlled, has used 4 IV dilaudid prn in 12 hours. Pt has no complaints this morning, last BM yesterday. Denies CP, SOB, n/v.      OBJECTIVE:  ICU Vital Signs Last 24 Hrs  T(C): 36.5 (28 Sep 2019 04:44), Max: 36.6 (27 Sep 2019 16:58)  T(F): 97.7 (28 Sep 2019 04:44), Max: 97.8 (27 Sep 2019 16:58)  HR: 77 (28 Sep 2019 04:44) (65 - 77)  BP: 152/76 (28 Sep 2019 04:44) (124/79 - 154/84)  BP(mean): --  ABP: --  ABP(mean): --  RR: 18 (28 Sep 2019 04:44) (18 - 18)  SpO2: 94% (28 Sep 2019 04:44) (94% - 98%)        09-27 @ 07:01  -  09-28 @ 07:00  --------------------------------------------------------  IN: 900 mL / OUT: 150 mL / NET: 750 mL      CAPILLARY BLOOD GLUCOSE      POCT Blood Glucose.: 228 mg/dL (27 Sep 2019 22:02)      PHYSICAL EXAM:  GENERAL: appears stated age, disheveled  HEENT: EOMI, PERRL, +scleral icterus, MMM, no oropharyngeal lesions or erythema appreciated  Pulm: CTAB  CV: RRR, S1&S2+, no m/r/g appreciated  ABDOMEN: +BS, +distended, +hepatomegaly with irregular borders, right lower quadrant mass corresponding to prior stimulator battery.   MSK: nl ROM  BACK: Right lower quadrant mass corresponding to current stimulator battery, pain on palpation    HOSPITAL MEDICATIONS:  enoxaparin Injectable 40 milliGRAM(s) SubCutaneous daily      cloNIDine 0.1 milliGRAM(s) Oral every 8 hours  hydrochlorothiazide 50 milliGRAM(s) Oral daily    dextrose 40% Gel 15 Gram(s) Oral once PRN  dextrose 50% Injectable 12.5 Gram(s) IV Push once  dextrose 50% Injectable 25 Gram(s) IV Push once  dextrose 50% Injectable 25 Gram(s) IV Push once  glucagon  Injectable 1 milliGRAM(s) IntraMuscular once PRN  insulin lispro (HumaLOG) corrective regimen sliding scale   SubCutaneous three times a day before meals  insulin lispro (HumaLOG) corrective regimen sliding scale   SubCutaneous at bedtime      diazepam    Tablet 2 milliGRAM(s) Oral every 8 hours PRN  DULoxetine 60 milliGRAM(s) Oral two times a day  HYDROmorphone  Injectable 2.5 milliGRAM(s) IV Push every 2 hours PRN  methadone    Tablet 45 milliGRAM(s) Oral two times a day  morphine ER Tablet 60 milliGRAM(s) Oral two times a day    mineral oil 30 milliLiter(s) Oral daily PRN  polyethylene glycol 3350 17 Gram(s) Oral <User Schedule>  senna 2 Tablet(s) Oral at bedtime  sucralfate suspension 1 Gram(s) Oral four times a day        dextrose 5%. 1000 milliLiter(s) IV Continuous <Continuous>            LABS:    Hgb Trend: 11.6<--, 11.6<--, 11.4<--, 11.8<--        Creatinine Trend: 0.77<--, 0.80<--, 0.81<--, 0.79<--, 0.77<--, 0.81<-- Interval Events: No acute overnight events. Pt states pain is controlled, has used 4 IV dilaudid prn in 12 hours. Pt has no complaints this morning, last BM yesterday. Denies CP, SOB, n/v.      OBJECTIVE:  ICU Vital Signs Last 24 Hrs  T(C): 36.5 (28 Sep 2019 04:44), Max: 36.6 (27 Sep 2019 16:58)  T(F): 97.7 (28 Sep 2019 04:44), Max: 97.8 (27 Sep 2019 16:58)  HR: 77 (28 Sep 2019 04:44) (65 - 77)  BP: 152/76 (28 Sep 2019 04:44) (124/79 - 154/84)  BP(mean): --  ABP: --  ABP(mean): --  RR: 18 (28 Sep 2019 04:44) (18 - 18)  SpO2: 94% (28 Sep 2019 04:44) (94% - 98%)        09-27 @ 07:01  -  09-28 @ 07:00  --------------------------------------------------------  IN: 900 mL / OUT: 150 mL / NET: 750 mL      CAPILLARY BLOOD GLUCOSE      POCT Blood Glucose.: 228 mg/dL (27 Sep 2019 22:02)      PHYSICAL EXAM:  GENERAL: appears stated age, disheveled  HEENT: EOMI, PERRL, +scleral icterus, MMM, no oropharyngeal lesions or erythema appreciated  Pulm: CTAB  CV: RRR, S1&S2+, no m/r/g appreciated  ABDOMEN: +BS, +distended, +hepatomegaly with irregular borders, right lower quadrant mass corresponding to prior stimulator battery.   MSK: nl ROM  BACK: Right lower quadrant mass corresponding to current stimulator battery, pain on palpation    HOSPITAL MEDICATIONS:  enoxaparin Injectable 40 milliGRAM(s) SubCutaneous daily      cloNIDine 0.1 milliGRAM(s) Oral every 8 hours  hydrochlorothiazide 50 milliGRAM(s) Oral daily    dextrose 40% Gel 15 Gram(s) Oral once PRN  dextrose 50% Injectable 12.5 Gram(s) IV Push once  dextrose 50% Injectable 25 Gram(s) IV Push once  dextrose 50% Injectable 25 Gram(s) IV Push once  glucagon  Injectable 1 milliGRAM(s) IntraMuscular once PRN  insulin lispro (HumaLOG) corrective regimen sliding scale   SubCutaneous three times a day before meals  insulin lispro (HumaLOG) corrective regimen sliding scale   SubCutaneous at bedtime      diazepam    Tablet 2 milliGRAM(s) Oral every 8 hours PRN  DULoxetine 60 milliGRAM(s) Oral two times a day  HYDROmorphone  Injectable 2.5 milliGRAM(s) IV Push every 2 hours PRN  methadone    Tablet 45 milliGRAM(s) Oral two times a day  morphine ER Tablet 60 milliGRAM(s) Oral two times a day    mineral oil 30 milliLiter(s) Oral daily PRN  polyethylene glycol 3350 17 Gram(s) Oral <User Schedule>  senna 2 Tablet(s) Oral at bedtime  sucralfate suspension 1 Gram(s) Oral four times a day        dextrose 5%. 1000 milliLiter(s) IV Continuous <Continuous>            LABS:    Hgb Trend: 11.6<--, 11.6<--, 11.4<--, 11.8<--        Creatinine Trend: 0.77<--, 0.80<--, 0.81<--, 0.79<--, 0.77<--, 0.81<--

## 2019-09-28 NOTE — PROGRESS NOTE ADULT - PROBLEM SELECTOR PLAN 5
a1c 6.8, new dx  - blood glucose is acceptable, c/w ISS  - given that patient's diagnosis of stage 4 pancreatic CA will hold off on discharging patient on any hypoglycemic agents.  - diabetic education consulted

## 2019-09-28 NOTE — PROGRESS NOTE ADULT - PROBLEM SELECTOR PLAN 2
follows with Dr. Abdifatah Lilly in [place] [phone]  - would keep Dr. Lilly aware of any medication changes prior to discharge. follows with Dr. Abdifatah Lilly in [place] [phone]  - important to keep Dr. Lilly aware of any medication changes prior to discharge. Pt may benefit from restarting his pain pump as outpatient

## 2019-09-28 NOTE — PROGRESS NOTE ADULT - PROBLEM SELECTOR PLAN 3
- S/P IR /CT-guided right liver mass biopsy on 9/24/19, showing metastatic lesion, patient aware  - further plan per heme/onc

## 2019-09-28 NOTE — PROGRESS NOTE ADULT - PROBLEM SELECTOR PLAN 1
acute abdominal pain superimposed on chronic pain  - increased methadone to 45mg BID on 9/26 (EKG QTc 480)  - given continuous pain, will change from MS IR to MS Contin 80mg BID, equivalent to his 150mg overall morphine TDD with slight increase given consistent pain  - will revert back to 2.5mg IV dilaudid Q2 PRN for now, and transition back to oral once overall pain better managed acute abdominal pain superimposed on chronic pain  - C/w methadone 45mg BID (increased on 9/26; EKG QTc 480)  - given continuous pain, will change from MS IR to MS Contin 80mg BID, equivalent to his 150mg overall morphine TDD with slight increase given consistent pain  - increase PRN hydromorphone to 3mg IV Q2h  -c/w aggressive bowel regimen, patient had loose BM today

## 2019-09-28 NOTE — PROGRESS NOTE ADULT - ASSESSMENT
64 yo h/o chronic pain 2/2 traumatic work injury (on daily opiates), GERD, HTN, chronic constipation who presents with acute on chronic left lower quadrant pain x 6days, found to have biliary obstruction, s/p ERCP with pancreatic mass s/p FNA/spincterotomy/biliary stent placement w/ path showing adenocarcinoma, s/p IR biopsy of hepatic lesions which revealed pancreatic adenocarcinoma mets.

## 2019-09-28 NOTE — PROGRESS NOTE ADULT - ASSESSMENT
63M with PMH of chronic pain (2/2 traumatic work injury), GERD, HTN, and chronic constipation here with biliary obstruction, and foudn to have pancreatic mass with biopsy showing adenocarcinoma. Hepatic lesion also noted, with plan for IR biopsy to r/o malignant disease. Patient has been on Dilaudid 2mg q4h, Methadone 40mg bid, Morphine IR 30mg q6h. Palliative called to help with pain management. 63M with PMH of chronic pain (2/2 traumatic work injury), GERD, HTN, and chronic constipation here with biliary obstruction, now found to have pancreatic mass with biopsy showing adenocarcinoma.

## 2019-09-29 NOTE — PROGRESS NOTE ADULT - PROBLEM SELECTOR PLAN 3
- methadone 45 mg BID  - morphine ER 60 mg BID   - pt still having a significant amount of pain requiring IV dilaudid 3 mg prn q2hrs  - appreciate palliative recs  - pt will require outpatient follow up with palliative - methadone 45 mg BID  - morphine ER 60 mg BID   - pt transitioned to dilaudid 12 mg po prn q3hrs  - appreciate palliative recs  - pt will require outpatient follow up with palliative

## 2019-09-29 NOTE — PROGRESS NOTE ADULT - PROBLEM SELECTOR PLAN 3
- S/P IR /CT-guided right liver mass biopsy on 9/24/19, showing metastatic lesion, patient aware  - further plan per heme/onc -Adenocarcinoma per histology. Outpatient appointment with Oncology scheduled for 10/2 at 10am

## 2019-09-29 NOTE — PROGRESS NOTE ADULT - SUBJECTIVE AND OBJECTIVE BOX
Patient is a 63y old  Male who presents with a chief complaint of abdominal pain, hyperbilirubinemia (29 Sep 2019 07:26)      INTERVAL HPI/OVERNIGHT EVENTS:      Allergies    No Known Allergies    Intolerances        ADVANCE DIRECTIVES:    DNR: [ ] YES [ ] NO           PRESENT SYMPTOMS:   SOURCE:  [ ] Patient   [ ] Family   [ ] Team     Pain:     Dyspnea:  [ ] YES [ ] NO  Anxiety:  [ ] YES [ ] NO  Fatigue: [ ] YES [ ] NO  Nausea: [ ] YES [ ] NO  Loss of Appetite: [ ] YES [ ] NO  Constipation [ ] YES   [ ] No     OTHER SYMPTOMS:  [ ] All other ROS negative     [ ] Unable to obtain due to poor mentation    MEDICATIONS  (STANDING):  cloNIDine 0.1 milliGRAM(s) Oral every 8 hours  dextrose 5%. 1000 milliLiter(s) (50 mL/Hr) IV Continuous <Continuous>  dextrose 50% Injectable 12.5 Gram(s) IV Push once  dextrose 50% Injectable 25 Gram(s) IV Push once  dextrose 50% Injectable 25 Gram(s) IV Push once  DULoxetine 60 milliGRAM(s) Oral two times a day  enoxaparin Injectable 40 milliGRAM(s) SubCutaneous daily  hydrochlorothiazide 50 milliGRAM(s) Oral daily  insulin lispro (HumaLOG) corrective regimen sliding scale   SubCutaneous three times a day before meals  insulin lispro (HumaLOG) corrective regimen sliding scale   SubCutaneous at bedtime  methadone    Tablet 45 milliGRAM(s) Oral two times a day  morphine ER Tablet 60 milliGRAM(s) Oral two times a day  polyethylene glycol 3350 17 Gram(s) Oral <User Schedule>  senna 2 Tablet(s) Oral at bedtime  sucralfate suspension 1 Gram(s) Oral four times a day    MEDICATIONS  (PRN):  dextrose 40% Gel 15 Gram(s) Oral once PRN Blood Glucose LESS THAN 70 milliGRAM(s)/deciliter  diazepam    Tablet 2 milliGRAM(s) Oral every 8 hours PRN anxiety  glucagon  Injectable 1 milliGRAM(s) IntraMuscular once PRN Glucose LESS THAN 70 milligrams/deciliter  HYDROmorphone  Injectable 3 milliGRAM(s) IV Push every 2 hours PRN Severe Pain (7 - 10)  mineral oil 30 milliLiter(s) Oral daily PRN constipation      Karnofsky Performance Score/Palliative Performance Status Version 2:         %  Protein Calorie Malnutrition:  [ ] Mild   [ ] Moderate   [ ] Severe     Physical Exam:    General: [ ] Alert,  A&O x     [ ] lethargic   [ ] Agitated   [ ] Cachexia   HEENT: [ ] Normal   [ ] Dry mouth   [ ] ET Tube    [ ] Trach   Lungs: [ ] Clear [ ] Rhonchi  [ ] Crackles [ ] Wheezing [ ] Tachypnea  [ ] Audible excessive secretions   Cardiovascular:  [ ] Regular rate and rhythm  [ ] Irregular [ ] Tachycardia   [ ] Bradycardia   Abdomen: [ ] Soft  [ ] Distended  [X] +BS  [ ] Non tender [ ] Tender  [ ]PEG   [ ] NGT   Last BM:     Genitourinary:  [ ] Normal [ ] Incontinent   [ ] Oliguria/Anuria   [ ] Heredia  Musculoskeletal:  [ ] Normal   [ ] Generalized weakness  [ ] Bedbound   Neurological: [ ] No focal deficits  [ ] Cognitive impairment     Skin: [ ] Normal   [ ] Pressure ulcers     Vital Signs Last 24 Hrs  T(C): 36.4 (29 Sep 2019 04:29), Max: 37.1 (28 Sep 2019 17:15)  T(F): 97.5 (29 Sep 2019 04:29), Max: 98.7 (28 Sep 2019 17:15)  HR: 67 (29 Sep 2019 04:29) (60 - 74)  BP: 134/78 (29 Sep 2019 04:29) (133/86 - 144/91)  BP(mean): --  RR: 18 (29 Sep 2019 04:29) (18 - 18)  SpO2: 94% (29 Sep 2019 04:29) (94% - 100%)    LABS:              I&O's Summary    28 Sep 2019 07:01  -  29 Sep 2019 07:00  --------------------------------------------------------  IN: 800 mL / OUT: 0 mL / NET: 800 mL        RADIOLOGY & ADDITIONAL STUDIES: Patient is a 63y old  Male who presents with a chief complaint of abdominal pain, hyperbilirubinemia (29 Sep 2019 07:26)    INTERVAL HPI/OVERNIGHT EVENTS:  Patient used eight doses of 3mg IV Hydromorphone that helps for both his back pain and abdominal pain; says he gets relief for about 1.5 hours with it.    +Loose BMs daily    Allergies    No Known Allergies    Intolerances        ADVANCE DIRECTIVES:    DNR: [ ] YES [ ] NO           PRESENT SYMPTOMS:   SOURCE:  [X] Patient   [ ] Family   [ ] Team     Pain:     Dyspnea:  [ ] YES [X ] NO  Anxiety:  [ ] YES [X ] NO  Fatigue: [X ] YES [ ] NO  Nausea: [ ] YES [X ] NO  Loss of Appetite: [ ] YES [X ] NO  Constipation [ ] YES   [X ] No     OTHER SYMPTOMS:  [ ] All other ROS negative     [ ] Unable to obtain due to poor mentation    MEDICATIONS  (STANDING):  cloNIDine 0.1 milliGRAM(s) Oral every 8 hours  dextrose 5%. 1000 milliLiter(s) (50 mL/Hr) IV Continuous <Continuous>  dextrose 50% Injectable 12.5 Gram(s) IV Push once  dextrose 50% Injectable 25 Gram(s) IV Push once  dextrose 50% Injectable 25 Gram(s) IV Push once  DULoxetine 60 milliGRAM(s) Oral two times a day  enoxaparin Injectable 40 milliGRAM(s) SubCutaneous daily  hydrochlorothiazide 50 milliGRAM(s) Oral daily  insulin lispro (HumaLOG) corrective regimen sliding scale   SubCutaneous three times a day before meals  insulin lispro (HumaLOG) corrective regimen sliding scale   SubCutaneous at bedtime  methadone    Tablet 45 milliGRAM(s) Oral two times a day  morphine ER Tablet 60 milliGRAM(s) Oral two times a day  polyethylene glycol 3350 17 Gram(s) Oral <User Schedule>  senna 2 Tablet(s) Oral at bedtime  sucralfate suspension 1 Gram(s) Oral four times a day    MEDICATIONS  (PRN):  dextrose 40% Gel 15 Gram(s) Oral once PRN Blood Glucose LESS THAN 70 milliGRAM(s)/deciliter  diazepam    Tablet 2 milliGRAM(s) Oral every 8 hours PRN anxiety  glucagon  Injectable 1 milliGRAM(s) IntraMuscular once PRN Glucose LESS THAN 70 milligrams/deciliter  HYDROmorphone  Injectable 3 milliGRAM(s) IV Push every 2 hours PRN Severe Pain (7 - 10)  mineral oil 30 milliLiter(s) Oral daily PRN constipation      Karnofsky Performance Score/Palliative Performance Status Version 2:         %  Protein Calorie Malnutrition:  [ ] Mild   [ ] Moderate   [ ] Severe     Physical Exam:    General: [X ] Alert,  A&O x     [ ] lethargic   [ ] Agitated   [ ] Cachexia   HEENT: [X ] Normal   [ ] Dry mouth   [ ] ET Tube    [ ] Trach   Lungs: [X ] Clear [ ] Rhonchi  [ ] Crackles [ ] Wheezing [ ] Tachypnea  [ ] Audible excessive secretions   Cardiovascular:  [X ] Regular rate and rhythm  [ ] Irregular [ ] Tachycardia   [ ] Bradycardia   Abdomen: [X ] Soft  [X ] Distended  [X] +BS  [ ] Non tender [X] Tender  [ ]PEG   [ ] NGT   Last BM:   9/29  Genitourinary:  [X ] Normal [ ] Incontinent   [ ] Oliguria/Anuria   [ ] Heredia  Musculoskeletal:  [X ] Normal   [ ] Generalized weakness  [ ] Bedbound   Neurological: [X ] No focal deficits  [ ] Cognitive impairment     Skin: [ ] Normal   [ ] Pressure ulcers     Vital Signs Last 24 Hrs  T(C): 36.4 (29 Sep 2019 04:29), Max: 37.1 (28 Sep 2019 17:15)  T(F): 97.5 (29 Sep 2019 04:29), Max: 98.7 (28 Sep 2019 17:15)  HR: 67 (29 Sep 2019 04:29) (60 - 74)  BP: 134/78 (29 Sep 2019 04:29) (133/86 - 144/91)  BP(mean): --  RR: 18 (29 Sep 2019 04:29) (18 - 18)  SpO2: 94% (29 Sep 2019 04:29) (94% - 100%)    LABS:              I&O's Summary    28 Sep 2019 07:01  -  29 Sep 2019 07:00  --------------------------------------------------------  IN: 800 mL / OUT: 0 mL / NET: 800 mL        RADIOLOGY & ADDITIONAL STUDIES:

## 2019-09-29 NOTE — PROGRESS NOTE ADULT - PROBLEM SELECTOR PLAN 1
acute abdominal pain superimposed on chronic pain  - C/w methadone 45mg BID (increased on 9/26; EKG QTc 480)  - given continuous pain, will change from MS IR to MS Contin 80mg BID, equivalent to his 150mg overall morphine TDD with slight increase given consistent pain  -8 doses of 3mg IV hydromorphone administered over last 24 hours. Given that patient would like to prepare for discharge in the next 1-2 days recommend transitioning to PO Hydromorphone 12mg Q3h PRN. acute abdominal pain superimposed on chronic pain  - C/w methadone 45mg BID (increased on 9/26; EKG QTc 480)  - given continuous pain, will change from MS IR to MS Contin 80mg BID, equivalent to his 150mg overall morphine TDD with slight increase given consistent pain  -8 doses of 3mg IV hydromorphone administered over last 24 hours. Given that patient would like to prepare for discharge in the next 1-2 days recommend transitioning to PO Hydromorphone 12mg Q3h PRN.  -Patient will need outpatient pain management follow up. Can see Dr. Turner at [facility] who can help manage patient alongside his chronic pain specialist.

## 2019-09-29 NOTE — PROGRESS NOTE ADULT - ASSESSMENT
63M with PMH of chronic pain (2/2 traumatic work injury), GERD, HTN, and chronic constipation here with biliary obstruction, now found to have pancreatic mass with biopsy showing adenocarcinoma.

## 2019-09-29 NOTE — PROGRESS NOTE ADULT - PROBLEM SELECTOR PLAN 6
DVT: lovenox  Electrolytes: replete prn  Dispo: home once pain is better controlled and transitioned to PO DVT: lovenox  Electrolytes: replete prn  Dispo: home once pain is better controlled and transitioned to PO. to f/u with oncology and palliative

## 2019-09-29 NOTE — PROGRESS NOTE ADULT - PROBLEM SELECTOR PLAN 1
Confirmed with Pancreatic biopsy and IR liver biopsy--> making this stage 4 metastatic pancreatic cancer.  -surg onc not offering any treatments; explained to patient and family that given stage 4 disease treatment would be geared towards palliation and non-curative management.   -Heme/Onc recs appreciated: appt on Oct 3rd at 10am at [facility]

## 2019-09-29 NOTE — PROGRESS NOTE ADULT - PROBLEM SELECTOR PLAN 2
follows with Dr. Abdifatah Lilly in [place] [phone]  - important to keep Dr. Lilly aware of any medication changes prior to discharge. Pt may benefit from restarting his pain pump as outpatient

## 2019-09-29 NOTE — PROGRESS NOTE ADULT - SUBJECTIVE AND OBJECTIVE BOX
Interval Events: No acute overnight events. Pain improved to 5/10 from 7/10 with increased 3mg IV prn dilaudid dose.      OBJECTIVE:  ICU Vital Signs Last 24 Hrs  T(C): 36.4 (29 Sep 2019 04:29), Max: 37.1 (28 Sep 2019 17:15)  T(F): 97.5 (29 Sep 2019 04:29), Max: 98.7 (28 Sep 2019 17:15)  HR: 67 (29 Sep 2019 04:29) (60 - 76)  BP: 134/78 (29 Sep 2019 04:29) (121/58 - 149/90)  BP(mean): --  ABP: --  ABP(mean): --  RR: 18 (29 Sep 2019 04:29) (18 - 18)  SpO2: 94% (29 Sep 2019 04:29) (94% - 100%)        09-28 @ 07:01  -  09-29 @ 07:00  --------------------------------------------------------  IN: 800 mL / OUT: 0 mL / NET: 800 mL      CAPILLARY BLOOD GLUCOSE      POCT Blood Glucose.: 195 mg/dL (28 Sep 2019 21:34)      PHYSICAL EXAM:  GENERAL: appears stated age, disheveled  HEENT: EOMI, PERRL, +scleral icterus, MMM, no oropharyngeal lesions or erythema appreciated  Pulm: CTAB  CV: RRR, S1&S2+, no m/r/g appreciated  ABDOMEN: +BS, +distended, +hepatomegaly with irregular borders, diffuse mild abdominal tenderness, right lower quadrant mass corresponding to prior stimulator battery.   MSK: nl ROM  BACK: Right lower quadrant mass corresponding to current stimulator battery, pain on palpation      HOSPITAL MEDICATIONS:  enoxaparin Injectable 40 milliGRAM(s) SubCutaneous daily  cloNIDine 0.1 milliGRAM(s) Oral every 8 hours  hydrochlorothiazide 50 milliGRAM(s) Oral daily  dextrose 40% Gel 15 Gram(s) Oral once PRN  dextrose 50% Injectable 12.5 Gram(s) IV Push once  dextrose 50% Injectable 25 Gram(s) IV Push once  dextrose 50% Injectable 25 Gram(s) IV Push once  glucagon  Injectable 1 milliGRAM(s) IntraMuscular once PRN  insulin lispro (HumaLOG) corrective regimen sliding scale   SubCutaneous three times a day before meals  insulin lispro (HumaLOG) corrective regimen sliding scale   SubCutaneous at bedtime  diazepam    Tablet 2 milliGRAM(s) Oral every 8 hours PRN  DULoxetine 60 milliGRAM(s) Oral two times a day  HYDROmorphone  Injectable 3 milliGRAM(s) IV Push every 2 hours PRN  methadone    Tablet 45 milliGRAM(s) Oral two times a day  morphine ER Tablet 60 milliGRAM(s) Oral two times a day  mineral oil 30 milliLiter(s) Oral daily PRN  polyethylene glycol 3350 17 Gram(s) Oral <User Schedule>  senna 2 Tablet(s) Oral at bedtime  sucralfate suspension 1 Gram(s) Oral four times a day  dextrose 5%. 1000 milliLiter(s) IV Continuous <Continuous>            LABS:    Hgb Trend: 11.6<--, 11.6<--, 11.4<--, 11.8<--        Creatinine Trend: 0.77<--, 0.80<--, 0.81<--, 0.79<--, 0.77<--, 0.81<-- Interval Events: No acute overnight events. Pain improved to 5/10 from 7/10 with increased 3mg IV prn dilaudid dose. Pt open to trying PO dilaudid transition today.      OBJECTIVE:  ICU Vital Signs Last 24 Hrs  T(C): 36.4 (29 Sep 2019 04:29), Max: 37.1 (28 Sep 2019 17:15)  T(F): 97.5 (29 Sep 2019 04:29), Max: 98.7 (28 Sep 2019 17:15)  HR: 67 (29 Sep 2019 04:29) (60 - 76)  BP: 134/78 (29 Sep 2019 04:29) (121/58 - 149/90)  BP(mean): --  ABP: --  ABP(mean): --  RR: 18 (29 Sep 2019 04:29) (18 - 18)  SpO2: 94% (29 Sep 2019 04:29) (94% - 100%)        09-28 @ 07:01  -  09-29 @ 07:00  --------------------------------------------------------  IN: 800 mL / OUT: 0 mL / NET: 800 mL      CAPILLARY BLOOD GLUCOSE      POCT Blood Glucose.: 195 mg/dL (28 Sep 2019 21:34)      PHYSICAL EXAM:  GENERAL: appears stated age, disheveled  HEENT: EOMI, PERRL, +scleral icterus, MMM, no oropharyngeal lesions or erythema appreciated  Pulm: CTAB  CV: RRR, S1&S2+, no m/r/g appreciated  ABDOMEN: +BS, +distended, +hepatomegaly with irregular borders, diffuse mild abdominal tenderness, right lower quadrant mass corresponding to prior stimulator battery.   MSK: nl ROM  BACK: Right lower quadrant mass corresponding to current stimulator battery, pain on palpation      HOSPITAL MEDICATIONS:  enoxaparin Injectable 40 milliGRAM(s) SubCutaneous daily  cloNIDine 0.1 milliGRAM(s) Oral every 8 hours  hydrochlorothiazide 50 milliGRAM(s) Oral daily  dextrose 40% Gel 15 Gram(s) Oral once PRN  dextrose 50% Injectable 12.5 Gram(s) IV Push once  dextrose 50% Injectable 25 Gram(s) IV Push once  dextrose 50% Injectable 25 Gram(s) IV Push once  glucagon  Injectable 1 milliGRAM(s) IntraMuscular once PRN  insulin lispro (HumaLOG) corrective regimen sliding scale   SubCutaneous three times a day before meals  insulin lispro (HumaLOG) corrective regimen sliding scale   SubCutaneous at bedtime  diazepam    Tablet 2 milliGRAM(s) Oral every 8 hours PRN  DULoxetine 60 milliGRAM(s) Oral two times a day  HYDROmorphone  Injectable 3 milliGRAM(s) IV Push every 2 hours PRN  methadone    Tablet 45 milliGRAM(s) Oral two times a day  morphine ER Tablet 60 milliGRAM(s) Oral two times a day  mineral oil 30 milliLiter(s) Oral daily PRN  polyethylene glycol 3350 17 Gram(s) Oral <User Schedule>  senna 2 Tablet(s) Oral at bedtime  sucralfate suspension 1 Gram(s) Oral four times a day  dextrose 5%. 1000 milliLiter(s) IV Continuous <Continuous>            LABS:    Hgb Trend: 11.6<--, 11.6<--, 11.4<--, 11.8<--        Creatinine Trend: 0.77<--, 0.80<--, 0.81<--, 0.79<--, 0.77<--, 0.81<--

## 2019-09-29 NOTE — PROGRESS NOTE ADULT - PROBLEM SELECTOR PLAN 2
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   - pt still having pain requiring IV dilaudid 3 mg q2hrs  - Miralax TID  - pain control as below presumed to be malignancy related pain.   - pt transitioned to po dilaudid prn severe pain 12 mg q3H  - Miralax TID  - pain control as below

## 2019-09-30 NOTE — PROGRESS NOTE ADULT - PROBLEM SELECTOR PLAN 2
follows with Dr. Abdifatah Lilly in [place] [phone]  - important to keep Dr. Lilly aware of any medication changes prior to discharge. Pt may benefit from restarting his pain pump as outpatient - follows with Dr. Abdifatah Lilly in [place] [phone]  - important to keep Dr. Lilly aware of any medication changes prior to discharge. - pt may benefit from restarting his pain pump as outpatient

## 2019-09-30 NOTE — PROGRESS NOTE ADULT - PROBLEM SELECTOR PLAN 1
acute abdominal pain superimposed on chronic pain  - C/w methadone 45mg BID (increased on 9/26; EKG QTc 480)  - given continuous pain, will change from MS IR to MS Contin 80mg BID, equivalent to his 150mg overall morphine TDD with slight increase given consistent pain  -8 doses of 3mg IV hydromorphone administered over last 24 hours. Given that patient would like to prepare for discharge in the next 1-2 days recommend transitioning to PO Hydromorphone 12mg Q3h PRN.  -Patient will need outpatient pain management follow up. [place] [facility] [place] alongside his chronic pain specialist. acute abdominal pain superimposed on chronic pain  - C/w methadone 45mg BID (increased on 9/26; EKG QTc 480)  - given continuous pain, will change from MS IR to MS Contin 60mg BID (would suggest increasing to 80mg bid), equivalent to his 150mg overall morphine TDD with slight increase given consistent pain  -7 doses of 3mg IV hydromorphone administered over last 24 hours. Given that patient would like to prepare for discharge in the next 1-2 days recommend transitioning to PO Hydromorphone 12mg Q3h PRN.  -Patient will need outpatient pain management follow up. Can see Dr. Turner at [facility] [place] [place] who can help manage patient alongside his chronic pain specialist. acute abdominal pain superimposed on chronic pain  - C/w methadone 45mg BID (increased on 9/26; EKG QTc 480)  - given PRN usage, will change MS Contin from 60mg to 80mg BID  - c/w PO Hydromorphone 12mg Q3h PRN.  -Patient will need outpatient pain management follow up. Can see Dr. Turner at [facility] [place] who can help manage patient alongside his chronic pain specialist.

## 2019-09-30 NOTE — PROGRESS NOTE ADULT - PROBLEM SELECTOR PLAN 3
- methadone 45 mg BID  - morphine ER 60 mg BID   - pt transitioned to dilaudid 12 mg po prn q3hrs  - appreciate palliative recs  - pt will require outpatient follow up with palliative

## 2019-09-30 NOTE — PROGRESS NOTE ADULT - ATTENDING COMMENTS
Patient states pain increased to 8/10, he lives in 6-7 range usually. Will f/u w/ palliative for any changes, plan for discharge in the AM if pain controlled.

## 2019-09-30 NOTE — DISCHARGE NOTE NURSING/CASE MANAGEMENT/SOCIAL WORK - PATIENT PORTAL LINK FT
You can access the FollowMyHealth Patient Portal offered by [facility] by registering at the following website: [email or web address]. By joining [facility]’s FollowMyHealth portal, you will also be able to view your health information using other applications (apps) compatible with our system.

## 2019-09-30 NOTE — PROGRESS NOTE ADULT - PROBLEM SELECTOR PLAN 3
-Adenocarcinoma per histology. Outpatient appointment with Oncology scheduled for 10/2 at 10am -adenocarcinoma per histology. Outpatient appointment with oncology scheduled for 10/2 at 10am

## 2019-09-30 NOTE — PROGRESS NOTE ADULT - PROBLEM SELECTOR PLAN 1
Confirmed with Pancreatic biopsy and IR liver biopsy--> making this stage 4 metastatic pancreatic cancer.  -surg onc not offering any treatments; explained to patient and family that given stage 4 disease treatment would be geared towards palliation and non-curative management.   -Heme/Onc recs appreciated: appt on Oct 3rd at 10am at [facility]

## 2019-09-30 NOTE — CHART NOTE - NSCHARTNOTEFT_GEN_A_CORE
Nutrition Follow Up Note    Patient seen for malnutrition follow up     Pt was sleeping, spoke to wife at bedside. Wife reported pt's appetite and po intake remain the same, consuming 50% of meals and drinking 1 Glucerna a day. Offered additional Glucerna, wife declined at the time but accepted Health Shakes.     Diet :     Patient reports:     PO intake :     Source for PO intake:     Enteral /Parenteral Nutrition:       Daily Weight in k (09-25)  % Weight Change    Pertinent Medications: MEDICATIONS  (STANDING):  cloNIDine 0.1 milliGRAM(s) Oral every 8 hours  dextrose 5%. 1000 milliLiter(s) (50 mL/Hr) IV Continuous <Continuous>  dextrose 50% Injectable 12.5 Gram(s) IV Push once  dextrose 50% Injectable 25 Gram(s) IV Push once  dextrose 50% Injectable 25 Gram(s) IV Push once  DULoxetine 60 milliGRAM(s) Oral two times a day  enoxaparin Injectable 40 milliGRAM(s) SubCutaneous daily  hydrochlorothiazide 50 milliGRAM(s) Oral daily  insulin lispro (HumaLOG) corrective regimen sliding scale   SubCutaneous three times a day before meals  insulin lispro (HumaLOG) corrective regimen sliding scale   SubCutaneous at bedtime  methadone    Tablet 45 milliGRAM(s) Oral two times a day  morphine ER Tablet 60 milliGRAM(s) Oral two times a day  polyethylene glycol 3350 17 Gram(s) Oral <User Schedule>  senna 2 Tablet(s) Oral at bedtime  sucralfate suspension 1 Gram(s) Oral four times a day    MEDICATIONS  (PRN):  dextrose 40% Gel 15 Gram(s) Oral once PRN Blood Glucose LESS THAN 70 milliGRAM(s)/deciliter  diazepam    Tablet 2 milliGRAM(s) Oral every 8 hours PRN anxiety  glucagon  Injectable 1 milliGRAM(s) IntraMuscular once PRN Glucose LESS THAN 70 milligrams/deciliter  HYDROmorphone   Tablet 12 milliGRAM(s) Oral every 3 hours PRN Severe Pain (7 - 10)  mineral oil 30 milliLiter(s) Oral daily PRN constipation  simethicone 80 milliGRAM(s) Chew every 8 hours PRN Gas    Pertinent Labs:   Finger Sticks:  POCT Blood Glucose.: 197 mg/dL ( @ 11:57)  POCT Blood Glucose.: 153 mg/dL ( @ 08:59)  POCT Blood Glucose.: 146 mg/dL ( @ 21:48)  POCT Blood Glucose.: 186 mg/dL ( @ 17:10)      Skin per nursing documentation:   Edema:    Estimated Needs:   [ ] no change since previous assessment  [ ] recalculated:     Previous Nutrition Diagnosis:   Nutrition Diagnosis is:    New Nutrition Diagnosis:  Related to:    As evidenced by:      Interventions:     Recommend  1)    Monitoring and Evaluation:     Continue to monitor Nutritional intake, Tolerance to diet prescription, weights, labs, skin integrity    RD remains available upon request and will follow up per protocol Nutrition Follow Up Note    Patient seen for malnutrition follow up     Pt was sleeping, spoke to wife at bedside. Wife reported pt's appetite and po intake remain the same, consuming 50% of meals and drinking 1 Glucerna a day. Offered additional Glucerna, wife declined at the time but accepted Health Shakes. Denied any nausea, vomiting, constipation, or diarrhea.     Diet : Consistent Carbohydrate diet + PM snack + Glucerna 1x/d       Daily Weight: 93 kg(09-25), 97 kg (09-20), 98.3 kg (09-19)  Weight Change: ? accuracy of bed scales    Pertinent Medications: MEDICATIONS  (STANDING):  cloNIDine 0.1 milliGRAM(s) Oral every 8 hours  dextrose 5%. 1000 milliLiter(s) (50 mL/Hr) IV Continuous <Continuous>  dextrose 50% Injectable 12.5 Gram(s) IV Push once  dextrose 50% Injectable 25 Gram(s) IV Push once  dextrose 50% Injectable 25 Gram(s) IV Push once  DULoxetine 60 milliGRAM(s) Oral two times a day  enoxaparin Injectable 40 milliGRAM(s) SubCutaneous daily  hydrochlorothiazide 50 milliGRAM(s) Oral daily  insulin lispro (HumaLOG) corrective regimen sliding scale   SubCutaneous three times a day before meals  insulin lispro (HumaLOG) corrective regimen sliding scale   SubCutaneous at bedtime  methadone    Tablet 45 milliGRAM(s) Oral two times a day  morphine ER Tablet 60 milliGRAM(s) Oral two times a day  polyethylene glycol 3350 17 Gram(s) Oral <User Schedule>  senna 2 Tablet(s) Oral at bedtime  sucralfate suspension 1 Gram(s) Oral four times a day    MEDICATIONS  (PRN):  dextrose 40% Gel 15 Gram(s) Oral once PRN Blood Glucose LESS THAN 70 milliGRAM(s)/deciliter  diazepam    Tablet 2 milliGRAM(s) Oral every 8 hours PRN anxiety  glucagon  Injectable 1 milliGRAM(s) IntraMuscular once PRN Glucose LESS THAN 70 milligrams/deciliter  HYDROmorphone   Tablet 12 milliGRAM(s) Oral every 3 hours PRN Severe Pain (7 - 10)  mineral oil 30 milliLiter(s) Oral daily PRN constipation  simethicone 80 milliGRAM(s) Chew every 8 hours PRN Gas    Pertinent Labs:   Finger Sticks:  POCT Blood Glucose.: 197 mg/dL (09-30 @ 11:57)  POCT Blood Glucose.: 153 mg/dL (09-30 @ 08:59)  POCT Blood Glucose.: 146 mg/dL (09-29 @ 21:48)  POCT Blood Glucose.: 186 mg/dL (09-29 @ 17:10)      Skin: no pressure ulcers noted      Edema: 1+ generalized    Estimated Needs:   [ ] no change since previous assessment  [X] recalculated: based on current wt 93 kg with adj BMI 28.7  3785-3090 kcal (22-25 kcal/kg)  70-93 gm (.75-1.0 gm/kg)    Previous Nutrition Diagnosis: mild malnutrition  Nutrition Diagnosis is: on-going, addressed with po diet + oral supplements     New Nutrition Diagnosis:    Recommend  1) continue Consistent Carbohydrate diet + PM snack + Glucerna 1x/d   2) obtained food preferences, will honor as able     Monitoring and Evaluation:     Continue to monitor PO intake, tolerance to diet, weights, labs, skin integrity    RD remains available upon request and will follow up per protocol

## 2019-09-30 NOTE — PROGRESS NOTE ADULT - PROBLEM SELECTOR PLAN 6
DVT: lovenox  Electrolytes: replete prn  Dispo: home once pain is better controlled and transitioned to PO. to f/u with oncology and palliative

## 2019-09-30 NOTE — PROGRESS NOTE ADULT - SUBJECTIVE AND OBJECTIVE BOX
Darian Shine MD (PGY 1, Internal Medicine)  Pager: [phone]                  ____________________  SUBJ:  Transitioned to PO dilaudid; states that his abdominal pain  is 3/10 in lower quadrants. improving.    MEDICATIONS  (STANDING):  cloNIDine 0.1 milliGRAM(s) Oral every 8 hours  dextrose 5%. 1000 milliLiter(s) (50 mL/Hr) IV Continuous <Continuous>  dextrose 50% Injectable 12.5 Gram(s) IV Push once  dextrose 50% Injectable 25 Gram(s) IV Push once  dextrose 50% Injectable 25 Gram(s) IV Push once  DULoxetine 60 milliGRAM(s) Oral two times a day  enoxaparin Injectable 40 milliGRAM(s) SubCutaneous daily  hydrochlorothiazide 50 milliGRAM(s) Oral daily  insulin lispro (HumaLOG) corrective regimen sliding scale   SubCutaneous three times a day before meals  insulin lispro (HumaLOG) corrective regimen sliding scale   SubCutaneous at bedtime  methadone    Tablet 45 milliGRAM(s) Oral two times a day  morphine ER Tablet 60 milliGRAM(s) Oral two times a day  polyethylene glycol 3350 17 Gram(s) Oral <User Schedule>  senna 2 Tablet(s) Oral at bedtime  sucralfate suspension 1 Gram(s) Oral four times a day    MEDICATIONS  (PRN):  dextrose 40% Gel 15 Gram(s) Oral once PRN Blood Glucose LESS THAN 70 milliGRAM(s)/deciliter  diazepam    Tablet 2 milliGRAM(s) Oral every 8 hours PRN anxiety  glucagon  Injectable 1 milliGRAM(s) IntraMuscular once PRN Glucose LESS THAN 70 milligrams/deciliter  HYDROmorphone   Tablet 12 milliGRAM(s) Oral every 3 hours PRN Severe Pain (7 - 10)  mineral oil 30 milliLiter(s) Oral daily PRN constipation  simethicone 80 milliGRAM(s) Chew every 8 hours PRN Gas    ____________________  VITALS:  Vital Signs Last 24 Hrs  T(C): 36.7 (30 Sep 2019 04:23), Max: 36.8 (29 Sep 2019 21:14)  T(F): 98.1 (30 Sep 2019 04:23), Max: 98.2 (29 Sep 2019 21:14)  HR: 70 (30 Sep 2019 04:23) (70 - 79)  BP: 156/99 (30 Sep 2019 04:23) (137/84 - 156/99)  BP(mean): --  RR: 18 (30 Sep 2019 04:23) (18 - 18)  SpO2: 97% (30 Sep 2019 04:23) (96% - 97%)    ____________________  PHYSICAL EXAM:  GENERAL: appears stated age, disheveled  HEENT: EOMI, PERRL, +scleral icterus, MMM, no oropharyngeal lesions or erythema appreciated  Pulm: CTAB  CV: RRR, S1&S2+, no m/r/g appreciated  ABDOMEN: +BS, +distended, +hepatomegaly with irregular borders,NTTP, right lower quadrant mass corresponding to prior stimulator battery.   MSK: nl ROM     ____________________  LABS:    Creatinine Trend: 0.77<--, 0.80<--, 0.81<--, 0.79<--, 0.77<--, 0.81<--  I&O's Summary    29 Sep 2019 07:01  -  30 Sep 2019 07:00  --------------------------------------------------------  IN: 720 mL / OUT: 0 mL / NET: 720 mL

## 2019-09-30 NOTE — PROGRESS NOTE ADULT - SUBJECTIVE AND OBJECTIVE BOX
Patient is a 63y old  Male who presents with a chief complaint of abdominal pain, hyperbilirubinemia (29 Sep 2019 07:26)    INTERVAL HPI/OVERNIGHT EVENTS:  Patient used 7 doses of 3mg IV Hydromorphone & 3mg IV that helps for both his back pain and abdominal pain; says pain is better controlled with oral medications now.      Allergies    No Known Allergies    Intolerances        ADVANCE DIRECTIVES:    DNR: [ ] YES [ ] NO           PRESENT SYMPTOMS:   SOURCE:  [X] Patient   [ ] Family   [ ] Team     Pain:     Dyspnea:  [ ] YES [X ] NO  Anxiety:  [ ] YES [X ] NO  Fatigue: [X ] YES [ ] NO  Nausea: [ ] YES [X ] NO  Loss of Appetite: [ ] YES [X ] NO  Constipation [ ] YES   [X ] No     OTHER SYMPTOMS:  [ ] All other ROS negative     [ ] Unable to obtain due to poor mentation    MEDICATIONS  (STANDING):  cloNIDine 0.1 milliGRAM(s) Oral every 8 hours  dextrose 5%. 1000 milliLiter(s) (50 mL/Hr) IV Continuous <Continuous>  dextrose 50% Injectable 12.5 Gram(s) IV Push once  dextrose 50% Injectable 25 Gram(s) IV Push once  dextrose 50% Injectable 25 Gram(s) IV Push once  DULoxetine 60 milliGRAM(s) Oral two times a day  enoxaparin Injectable 40 milliGRAM(s) SubCutaneous daily  hydrochlorothiazide 50 milliGRAM(s) Oral daily  insulin lispro (HumaLOG) corrective regimen sliding scale   SubCutaneous three times a day before meals  insulin lispro (HumaLOG) corrective regimen sliding scale   SubCutaneous at bedtime  methadone    Tablet 45 milliGRAM(s) Oral two times a day  morphine ER Tablet 60 milliGRAM(s) Oral two times a day  polyethylene glycol 3350 17 Gram(s) Oral <User Schedule>  senna 2 Tablet(s) Oral at bedtime  sucralfate suspension 1 Gram(s) Oral four times a day    MEDICATIONS  (PRN):  dextrose 40% Gel 15 Gram(s) Oral once PRN Blood Glucose LESS THAN 70 milliGRAM(s)/deciliter  diazepam    Tablet 2 milliGRAM(s) Oral every 8 hours PRN anxiety  glucagon  Injectable 1 milliGRAM(s) IntraMuscular once PRN Glucose LESS THAN 70 milligrams/deciliter  HYDROmorphone  Injectable 3 milliGRAM(s) IV Push every 2 hours PRN Severe Pain (7 - 10)  mineral oil 30 milliLiter(s) Oral daily PRN constipation      Karnofsky Performance Score/Palliative Performance Status Version 2:         %  Protein Calorie Malnutrition:  [ ] Mild   [ ] Moderate   [ ] Severe     Physical Exam:    General: [X ] Alert,  A&O x     [ ] lethargic   [ ] Agitated   [ ] Cachexia   HEENT: [X ] Normal   [ ] Dry mouth   [ ] ET Tube    [ ] Trach   Lungs: [X ] Clear [ ] Rhonchi  [ ] Crackles [ ] Wheezing [ ] Tachypnea  [ ] Audible excessive secretions   Cardiovascular:  [X ] Regular rate and rhythm  [ ] Irregular [ ] Tachycardia   [ ] Bradycardia   Abdomen: [X ] Soft  [X ] Distended  [X] +BS  [ ] Non tender [X] Tender  [ ]PEG   [ ] NGT   Last BM:   9/29  Genitourinary:  [X ] Normal [ ] Incontinent   [ ] Oliguria/Anuria   [ ] Heredia  Musculoskeletal:  [X ] Normal   [ ] Generalized weakness  [ ] Bedbound   Neurological: [X ] No focal deficits  [ ] Cognitive impairment     Skin: [ ] Normal   [ ] Pressure ulcers     Vital Signs Last 24 Hrs  T(C): 36.4 (29 Sep 2019 04:29), Max: 37.1 (28 Sep 2019 17:15)  T(F): 97.5 (29 Sep 2019 04:29), Max: 98.7 (28 Sep 2019 17:15)  HR: 67 (29 Sep 2019 04:29) (60 - 74)  BP: 134/78 (29 Sep 2019 04:29) (133/86 - 144/91)  BP(mean): --  RR: 18 (29 Sep 2019 04:29) (18 - 18)  SpO2: 94% (29 Sep 2019 04:29) (94% - 100%)    LABS:              I&O's Summary    28 Sep 2019 07:01  -  29 Sep 2019 07:00  --------------------------------------------------------  IN: 800 mL / OUT: 0 mL / NET: 800 mL        RADIOLOGY & ADDITIONAL STUDIES: Patient is a 63y old  Male who presents with a chief complaint of abdominal pain, hyperbilirubinemia (29 Sep 2019 07:26)    INTERVAL HPI/OVERNIGHT EVENTS:  Patient used 7 doses of 3mg IV Hydromorphone & 3mg IV that helps for both his back pain and abdominal pain; says pain is better controlled with oral medications now.      Allergies    No Known Allergies    Intolerances        ADVANCE DIRECTIVES:    DNR: [ ] YES [ ] NO           PRESENT SYMPTOMS:   SOURCE:  [X] Patient   [ ] Family   [ ] Team     Pain:     Dyspnea:  [ ] YES [X ] NO  Anxiety:  [ ] YES [X ] NO  Fatigue: [X ] YES [ ] NO  Nausea: [ ] YES [X ] NO  Loss of Appetite: [ ] YES [X ] NO  Constipation [ ] YES   [X ] No     OTHER SYMPTOMS:  [ ] All other ROS negative     [ ] Unable to obtain due to poor mentation    MEDICATIONS  (STANDING):  cloNIDine 0.1 milliGRAM(s) Oral every 8 hours  dextrose 5%. 1000 milliLiter(s) (50 mL/Hr) IV Continuous <Continuous>  dextrose 50% Injectable 12.5 Gram(s) IV Push once  dextrose 50% Injectable 25 Gram(s) IV Push once  dextrose 50% Injectable 25 Gram(s) IV Push once  DULoxetine 60 milliGRAM(s) Oral two times a day  enoxaparin Injectable 40 milliGRAM(s) SubCutaneous daily  hydrochlorothiazide 50 milliGRAM(s) Oral daily  insulin lispro (HumaLOG) corrective regimen sliding scale   SubCutaneous three times a day before meals  insulin lispro (HumaLOG) corrective regimen sliding scale   SubCutaneous at bedtime  methadone    Tablet 45 milliGRAM(s) Oral two times a day  morphine ER Tablet 60 milliGRAM(s) Oral two times a day  polyethylene glycol 3350 17 Gram(s) Oral <User Schedule>  senna 2 Tablet(s) Oral at bedtime  sucralfate suspension 1 Gram(s) Oral four times a day    MEDICATIONS  (PRN):  dextrose 40% Gel 15 Gram(s) Oral once PRN Blood Glucose LESS THAN 70 milliGRAM(s)/deciliter  diazepam    Tablet 2 milliGRAM(s) Oral every 8 hours PRN anxiety  glucagon  Injectable 1 milliGRAM(s) IntraMuscular once PRN Glucose LESS THAN 70 milligrams/deciliter  HYDROmorphone   Tablet 12 milliGRAM(s) Oral every 3 hours PRN Severe Pain (7 - 10)  mineral oil 30 milliLiter(s) Oral daily PRN constipation  simethicone 80 milliGRAM(s) Chew every 8 hours PRN Gas      Karnofsky Performance Score/Palliative Performance Status Version 2:         %  Protein Calorie Malnutrition:  [ ] Mild   [ ] Moderate   [ ] Severe     Physical Exam:    General: [X ] Alert,  A&O x     [ ] lethargic   [ ] Agitated   [ ] Cachexia   HEENT: [X ] Normal   [ ] Dry mouth   [ ] ET Tube    [ ] Trach   Lungs: [X ] Clear [ ] Rhonchi  [ ] Crackles [ ] Wheezing [ ] Tachypnea  [ ] Audible excessive secretions   Cardiovascular:  [X ] Regular rate and rhythm  [ ] Irregular [ ] Tachycardia   [ ] Bradycardia   Abdomen: [X ] Soft  [X ] Distended  [X] +BS  [ ] Non tender [X] Tender  [ ]PEG   [ ] NGT   Last BM:   9/29  Genitourinary:  [X ] Normal [ ] Incontinent   [ ] Oliguria/Anuria   [ ] Heredia  Musculoskeletal:  [X ] Normal   [ ] Generalized weakness  [ ] Bedbound   Neurological: [X ] No focal deficits  [ ] Cognitive impairment     Skin: [ ] Normal   [ ] Pressure ulcers     Vital Signs Last 24 Hrs  T(C): 36.7 (30 Sep 2019 04:23), Max: 36.8 (29 Sep 2019 21:14)  T(F): 98.1 (30 Sep 2019 04:23), Max: 98.2 (29 Sep 2019 21:14)  HR: 70 (30 Sep 2019 04:23) (70 - 79)  BP: 156/99 (30 Sep 2019 04:23) (137/84 - 156/99)  BP(mean): --  RR: 18 (30 Sep 2019 04:23) (18 - 18)  SpO2: 97% (30 Sep 2019 04:23) (96% - 97%)    LABS:              I&O's Summary    28 Sep 2019 07:01  -  29 Sep 2019 07:00  --------------------------------------------------------  IN: 800 mL / OUT: 0 mL / NET: 800 mL        RADIOLOGY & ADDITIONAL STUDIES: Patient is a 63y old  Male who presents with a chief complaint of abdominal pain, hyperbilirubinemia (29 Sep 2019 07:26)    INTERVAL HPI/OVERNIGHT EVENTS:  Patient used 7 doses of 12mg PO Hydromorphone & 2 doses of 3mg IV hydromorphone that helps for both his back pain and abdominal pain; says pain is better controlled with oral medications now.      Allergies    No Known Allergies    Intolerances        ADVANCE DIRECTIVES:    DNR: [ ] YES [ ] NO           PRESENT SYMPTOMS:   SOURCE:  [X] Patient   [ ] Family   [ ] Team     Pain:     Dyspnea:  [ ] YES [X ] NO  Anxiety:  [ ] YES [X ] NO  Fatigue: [X ] YES [ ] NO  Nausea: [ ] YES [X ] NO  Loss of Appetite: [ ] YES [X ] NO  Constipation [ ] YES   [X ] No     OTHER SYMPTOMS:  [ ] All other ROS negative     [ ] Unable to obtain due to poor mentation    MEDICATIONS  (STANDING):  cloNIDine 0.1 milliGRAM(s) Oral every 8 hours  dextrose 5%. 1000 milliLiter(s) (50 mL/Hr) IV Continuous <Continuous>  dextrose 50% Injectable 12.5 Gram(s) IV Push once  dextrose 50% Injectable 25 Gram(s) IV Push once  dextrose 50% Injectable 25 Gram(s) IV Push once  DULoxetine 60 milliGRAM(s) Oral two times a day  enoxaparin Injectable 40 milliGRAM(s) SubCutaneous daily  hydrochlorothiazide 50 milliGRAM(s) Oral daily  insulin lispro (HumaLOG) corrective regimen sliding scale   SubCutaneous three times a day before meals  insulin lispro (HumaLOG) corrective regimen sliding scale   SubCutaneous at bedtime  methadone    Tablet 45 milliGRAM(s) Oral two times a day  morphine ER Tablet 60 milliGRAM(s) Oral two times a day  polyethylene glycol 3350 17 Gram(s) Oral <User Schedule>  senna 2 Tablet(s) Oral at bedtime  sucralfate suspension 1 Gram(s) Oral four times a day    MEDICATIONS  (PRN):  dextrose 40% Gel 15 Gram(s) Oral once PRN Blood Glucose LESS THAN 70 milliGRAM(s)/deciliter  diazepam    Tablet 2 milliGRAM(s) Oral every 8 hours PRN anxiety  glucagon  Injectable 1 milliGRAM(s) IntraMuscular once PRN Glucose LESS THAN 70 milligrams/deciliter  HYDROmorphone   Tablet 12 milliGRAM(s) Oral every 3 hours PRN Severe Pain (7 - 10)  mineral oil 30 milliLiter(s) Oral daily PRN constipation  simethicone 80 milliGRAM(s) Chew every 8 hours PRN Gas      Karnofsky Performance Score/Palliative Performance Status Version 2:         %  Protein Calorie Malnutrition:  [ ] Mild   [ ] Moderate   [ ] Severe     Physical Exam:    General: [X ] Alert,  A&O x     [ ] lethargic   [ ] Agitated   [ ] Cachexia   HEENT: [X ] Normal   [ ] Dry mouth   [ ] ET Tube    [ ] Trach   Lungs: [X ] Clear [ ] Rhonchi  [ ] Crackles [ ] Wheezing [ ] Tachypnea  [ ] Audible excessive secretions   Cardiovascular:  [X ] Regular rate and rhythm  [ ] Irregular [ ] Tachycardia   [ ] Bradycardia   Abdomen: [X ] Soft  [X ] Distended  [X] +BS  [ ] Non tender [X] Tender  [ ]PEG   [ ] NGT   Last BM:   9/29  Genitourinary:  [X ] Normal [ ] Incontinent   [ ] Oliguria/Anuria   [ ] Heredia  Musculoskeletal:  [X ] Normal   [ ] Generalized weakness  [ ] Bedbound   Neurological: [X ] No focal deficits  [ ] Cognitive impairment     Skin: [ ] Normal   [ ] Pressure ulcers     Vital Signs Last 24 Hrs  T(C): 36.7 (30 Sep 2019 04:23), Max: 36.8 (29 Sep 2019 21:14)  T(F): 98.1 (30 Sep 2019 04:23), Max: 98.2 (29 Sep 2019 21:14)  HR: 70 (30 Sep 2019 04:23) (70 - 79)  BP: 156/99 (30 Sep 2019 04:23) (137/84 - 156/99)  BP(mean): --  RR: 18 (30 Sep 2019 04:23) (18 - 18)  SpO2: 97% (30 Sep 2019 04:23) (96% - 97%)    LABS:              I&O's Summary    28 Sep 2019 07:01  -  29 Sep 2019 07:00  --------------------------------------------------------  IN: 800 mL / OUT: 0 mL / NET: 800 mL        RADIOLOGY & ADDITIONAL STUDIES: Patient is a 63y old  Male who presents with a chief complaint of abdominal pain, hyperbilirubinemia (29 Sep 2019 07:26)    INTERVAL HPI/OVERNIGHT EVENTS:  Patient used 7 doses of 12mg PO Hydromorphone & 2 doses of 3mg IV hydromorphone that helps for both his back pain and abdominal pain; says pain is better controlled with oral medications now.      Allergies    No Known Allergies    Intolerances        ADVANCE DIRECTIVES:    DNR: [ ] YES [ ] NO           PRESENT SYMPTOMS:   SOURCE:  [X] Patient   [ ] Family   [ ] Team     Pain:     Dyspnea:  [ ] YES [X ] NO  Anxiety:  [ ] YES [X ] NO  Fatigue: [X ] YES [ ] NO  Nausea: [ ] YES [X ] NO  Loss of Appetite: [ ] YES [X ] NO  Constipation [ ] YES   [X ] No     OTHER SYMPTOMS:  [ ] All other ROS negative     [ ] Unable to obtain due to poor mentation    MEDICATIONS  (STANDING):  cloNIDine 0.1 milliGRAM(s) Oral every 8 hours  dextrose 5%. 1000 milliLiter(s) (50 mL/Hr) IV Continuous <Continuous>  dextrose 50% Injectable 12.5 Gram(s) IV Push once  dextrose 50% Injectable 25 Gram(s) IV Push once  dextrose 50% Injectable 25 Gram(s) IV Push once  DULoxetine 60 milliGRAM(s) Oral two times a day  enoxaparin Injectable 40 milliGRAM(s) SubCutaneous daily  hydrochlorothiazide 50 milliGRAM(s) Oral daily  HYDROmorphone   Tablet 4 milliGRAM(s) Oral once  insulin lispro (HumaLOG) corrective regimen sliding scale   SubCutaneous three times a day before meals  insulin lispro (HumaLOG) corrective regimen sliding scale   SubCutaneous at bedtime  methadone    Tablet 45 milliGRAM(s) Oral two times a day  morphine ER Tablet 80 milliGRAM(s) Oral two times a day  polyethylene glycol 3350 17 Gram(s) Oral <User Schedule>  senna 2 Tablet(s) Oral at bedtime  sucralfate suspension 1 Gram(s) Oral four times a day    MEDICATIONS  (PRN):  dextrose 40% Gel 15 Gram(s) Oral once PRN Blood Glucose LESS THAN 70 milliGRAM(s)/deciliter  diazepam    Tablet 2 milliGRAM(s) Oral every 8 hours PRN anxiety  glucagon  Injectable 1 milliGRAM(s) IntraMuscular once PRN Glucose LESS THAN 70 milligrams/deciliter  HYDROmorphone   Tablet 12 milliGRAM(s) Oral every 3 hours PRN Severe Pain (7 - 10)  mineral oil 30 milliLiter(s) Oral daily PRN constipation  simethicone 80 milliGRAM(s) Chew every 8 hours PRN Gas        Karnofsky Performance Score/Palliative Performance Status Version 2:         %  Protein Calorie Malnutrition:  [ ] Mild   [ ] Moderate   [ ] Severe     Physical Exam:    General: [X ] Alert,  A&O x     [ ] lethargic   [ ] Agitated   [ ] Cachexia   HEENT: [X ] Normal   [ ] Dry mouth   [ ] ET Tube    [ ] Trach   Lungs: [X ] Clear [ ] Rhonchi  [ ] Crackles [ ] Wheezing [ ] Tachypnea  [ ] Audible excessive secretions   Cardiovascular:  [X ] Regular rate and rhythm  [ ] Irregular [ ] Tachycardia   [ ] Bradycardia   Abdomen: [X ] Soft  [X ] Distended  [X] +BS  [ ] Non tender [X] Tender  [ ]PEG   [ ] NGT   Last BM:   9/29  Genitourinary:  [X ] Normal [ ] Incontinent   [ ] Oliguria/Anuria   [ ] Heredia  Musculoskeletal:  [X ] Normal   [ ] Generalized weakness  [ ] Bedbound   Neurological: [X ] No focal deficits  [ ] Cognitive impairment     Skin: [ ] Normal   [ ] Pressure ulcers     Vital Signs Last 24 Hrs  T(C): 36.7 (30 Sep 2019 14:48), Max: 36.8 (29 Sep 2019 21:14)  T(F): 98 (30 Sep 2019 14:48), Max: 98.2 (29 Sep 2019 21:14)  HR: 86 (30 Sep 2019 14:48) (70 - 86)  BP: 149/93 (30 Sep 2019 14:48) (137/84 - 156/99)  BP(mean): --  RR: 18 (30 Sep 2019 14:48) (18 - 18)  SpO2: 97% (30 Sep 2019 14:48) (96% - 97%)    LABS:    no AM labs for review    RADIOLOGY & ADDITIONAL STUDIES:

## 2019-09-30 NOTE — PROGRESS NOTE ADULT - PROBLEM SELECTOR PLAN 2
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   - pt transitioned to po dilaudid prn severe pain 12 mg q3H  - Miralax TID  - pain control as below

## 2019-10-01 NOTE — PROGRESS NOTE ADULT - PROBLEM SELECTOR PLAN 1
-Acute abdominal pain superimposed on chronic pain  - C/w methadone 45mg BID (increased on 9/26; EKG QTc 480)  - C/W MS contin 75 mg po Bid  - c/w PO Hydromorphone 12mg Q3h PRN.  -Patient will need outpatient pain management follow up. Can see Dr. Turner at [facility] who can help manage patient alongside his chronic pain specialist. -Acute abdominal pain superimposed on chronic pain  - C/w methadone 45mg BID (increased on 9/26; EKG QTc 480)  - C/W MS contin 75 mg po Bid  - increase PO Hydromorphone 12mg Q3h PRN to 16mg  -Patient will need outpatient pain management follow up. Can see Dr. Turner at [facility] who can help manage patient alongside his chronic pain specialist.

## 2019-10-01 NOTE — PROGRESS NOTE ADULT - REASON FOR ADMISSION
abdominal pain, hyperbilirubinemia

## 2019-10-01 NOTE — PROGRESS NOTE ADULT - SUBJECTIVE AND OBJECTIVE BOX
SUBJECTIVE AND OBJECTIVE:  INTERVAL HPI/OVERNIGHT EVENTS: patient reports that pain is improving, but reports of about a 1-2 hour gap that pain medicine was not given last night due to change of shift and that brought the pain level back up to about a 9/10.     DNR on chart:   Allergies    No Known Allergies    Intolerances    MEDICATIONS  (STANDING):  cloNIDine 0.1 milliGRAM(s) Oral every 8 hours  dextrose 5%. 1000 milliLiter(s) (50 mL/Hr) IV Continuous <Continuous>  dextrose 50% Injectable 12.5 Gram(s) IV Push once  dextrose 50% Injectable 25 Gram(s) IV Push once  dextrose 50% Injectable 25 Gram(s) IV Push once  DULoxetine 60 milliGRAM(s) Oral two times a day  enoxaparin Injectable 40 milliGRAM(s) SubCutaneous daily  hydrochlorothiazide 50 milliGRAM(s) Oral daily  HYDROmorphone   Tablet 4 milliGRAM(s) Oral once  insulin lispro (HumaLOG) corrective regimen sliding scale   SubCutaneous three times a day before meals  insulin lispro (HumaLOG) corrective regimen sliding scale   SubCutaneous at bedtime  methadone    Tablet 45 milliGRAM(s) Oral two times a day  morphine ER Tablet 75 milliGRAM(s) Oral two times a day  polyethylene glycol 3350 17 Gram(s) Oral <User Schedule>  senna 2 Tablet(s) Oral at bedtime  sucralfate suspension 1 Gram(s) Oral four times a day    MEDICATIONS  (PRN):  dextrose 40% Gel 15 Gram(s) Oral once PRN Blood Glucose LESS THAN 70 milliGRAM(s)/deciliter  diazepam    Tablet 2 milliGRAM(s) Oral every 8 hours PRN anxiety  glucagon  Injectable 1 milliGRAM(s) IntraMuscular once PRN Glucose LESS THAN 70 milligrams/deciliter  HYDROmorphone   Tablet 12 milliGRAM(s) Oral every 3 hours PRN Severe Pain (7 - 10)  mineral oil 30 milliLiter(s) Oral daily PRN constipation  simethicone 80 milliGRAM(s) Chew every 8 hours PRN Gas      ITEMS UNCHECKED ARE NOT PRESENT    PRESENT SYMPTOMS: [ ]Unable to obtain due to poor mentation   Source if other than patient:  [ ]Family   [ ]Team     Pain (Impact on QOL): yes  Location: Abdomen and back  Minimal acceptable level (0-10 scale): 5                   Aggravating factors: Skipping scheduled pain meds  Quality: sharp  Radiation:-  Severity (0-10 scale):  9  Timing:-    Dyspnea:                           [ ]Mild [ ]Moderate [ ]Severe  Anxiety:                             [ ]Mild [ ]Moderate [ ]Severe  Fatigue:                             [ ]Mild [ ]Moderate [ ]Severe  Nausea:                             [ ]Mild [ ]Moderate [ ]Severe  Loss of appetite:              [ ]Mild [ ]Moderate [ ]Severe  Constipation:                    [ ]Mild [ ]Moderate [ ]Severe    PAIN AD Score:	  http://[email or web address]/painad.pdf (Ctrl + left click to view)    Other Symptoms:  [x ]All other review of systems negative     Karnofsky Performance Score/Palliative Performance Status Version 2:        70 %    http://palliative.info/resource_material/[email or web address].pdf  PHYSICAL EXAM:  Vital Signs Last 24 Hrs  T(C): 36.9 (01 Oct 2019 04:15), Max: 36.9 (01 Oct 2019 04:15)  T(F): 98.4 (01 Oct 2019 04:15), Max: 98.4 (01 Oct 2019 04:15)  HR: 71 (01 Oct 2019 06:34) (70 - 86)  BP: 151/99 (01 Oct 2019 06:34) (130/84 - 151/99)  BP(mean): --  RR: 18 (01 Oct 2019 04:15) (18 - 18)  SpO2: 95% (01 Oct 2019 04:15) (95% - 97%) I&O's Summary   GENERAL:  [x ]Alert  [ x]Oriented x3   [ ]Lethargic  [ ]Cachexia  [ ]Unarousable  [ ]Verbal  [ ]Non-Verbal  Behavioral:   [ ] Anxiety  [ ] Delirium [ ] Agitation [ ] Other  HEENT:  [x ]Normal   [ ]Dry mouth   [ ]ET Tube/Trach  [ ]Oral lesions  PULMONARY:   [x ]Clear [ ]Tachypnea  [ ]Audible excessive secretions   [ ]Rhonchi        [ ]Right [ ]Left [ ]Bilateral  [ ]Crackles        [ ]Right [ ]Left [ ]Bilateral  [ ]Wheezing     [ ]Right [ ]Left [ ]Bilateral  CARDIOVASCULAR:    [x ]Regular [ ]Irregular [ ]Tachy  [ ]Jean [ ]Murmur [ ]Other  GASTROINTESTINAL:  [ ]Soft  [ ]Distended   [ ]+BS  [ ]Non tender [ ]Tender  [ ]PEG [ ]OGT/ NGT   Last BM: 9/29   GENITOURINARY:  [x ]Normal [ ] Incontinent   [ ]Oliguria/Anuria   [ ]Heredia  MUSCULOSKELETAL:   [ ]Normal   [x ]Weakness  [ ]Bed/Wheelchair bound [ ]Edema  NEUROLOGIC:   [ x]No focal deficits  [ ] Cognitive impairment  [ ] Dysphagia [ ]Dysarthria [ ] Paresis [ ]Other   SKIN:   [x ]Normal   [ ]Pressure ulcer(s)  [ ]Rash    CRITICAL CARE:  [ ] Shock Present  [ ]Septic [ ]Cardiogenic [ ]Neurologic [ ]Hypovolemic  [ ]  Vasopressors [ ]  Inotropes   [ ] Respiratory failure present  [ ] Acute  [ ] Chronic [ ] Hypoxic  [ ] Hypercarbic [ ] Other  [ ] Other organ failure     LABS:            RADIOLOGY & ADDITIONAL STUDIES:    Protein Calorie Malnutrition Present: [ ] yes [ ] no  [ ] PPSV2 < or = 30%  [ ] significant weight loss [ ] poor nutritional intake [ ] anasarca [ ] catabolic state Albumin, Serum: 3.7 g/dL (09-24-19 @ 07:32)  Artificial Nutrition [ ]     REFERRALS:   [ ]Chaplaincy  [ ] Hospice  [ ]Child Life  [ ]Social Work  [ ]Case management [ ]Holistic Therapy     Goals of Care Document: SUBJECTIVE AND OBJECTIVE:  INTERVAL HPI/OVERNIGHT EVENTS: patient reports that pain is improving, but reports of about a 1-2 hour gap that pain medicine was not given last night due to change of shift and that brought the pain level back up to about a 9/10.     DNR on chart:   Allergies    No Known Allergies    Intolerances    MEDICATIONS  (STANDING):  cloNIDine 0.1 milliGRAM(s) Oral every 8 hours  dextrose 5%. 1000 milliLiter(s) (50 mL/Hr) IV Continuous <Continuous>  dextrose 50% Injectable 12.5 Gram(s) IV Push once  dextrose 50% Injectable 25 Gram(s) IV Push once  dextrose 50% Injectable 25 Gram(s) IV Push once  DULoxetine 60 milliGRAM(s) Oral two times a day  enoxaparin Injectable 40 milliGRAM(s) SubCutaneous daily  hydrochlorothiazide 50 milliGRAM(s) Oral daily  insulin lispro (HumaLOG) corrective regimen sliding scale   SubCutaneous three times a day before meals  insulin lispro (HumaLOG) corrective regimen sliding scale   SubCutaneous at bedtime  methadone    Tablet 45 milliGRAM(s) Oral two times a day  morphine ER Tablet 75 milliGRAM(s) Oral two times a day  polyethylene glycol 3350 17 Gram(s) Oral <User Schedule>  senna 2 Tablet(s) Oral at bedtime  sucralfate suspension 1 Gram(s) Oral four times a day    MEDICATIONS  (PRN):  dextrose 40% Gel 15 Gram(s) Oral once PRN Blood Glucose LESS THAN 70 milliGRAM(s)/deciliter  diazepam    Tablet 2 milliGRAM(s) Oral every 8 hours PRN anxiety  glucagon  Injectable 1 milliGRAM(s) IntraMuscular once PRN Glucose LESS THAN 70 milligrams/deciliter  HYDROmorphone   Tablet 12 milliGRAM(s) Oral every 3 hours PRN Severe Pain (7 - 10)  mineral oil 30 milliLiter(s) Oral daily PRN constipation  simethicone 80 milliGRAM(s) Chew every 8 hours PRN Gas        ITEMS UNCHECKED ARE NOT PRESENT    PRESENT SYMPTOMS: [ ]Unable to obtain due to poor mentation   Source if other than patient:  [ ]Family   [ ]Team     Pain (Impact on QOL): yes  Location: Abdomen and back  Minimal acceptable level (0-10 scale): 5                   Aggravating factors: Skipping scheduled pain meds  Quality: sharp  Radiation:-  Severity (0-10 scale):  9  Timing:-    Dyspnea:                           [ ]Mild [ ]Moderate [ ]Severe  Anxiety:                             [ ]Mild [ ]Moderate [ ]Severe  Fatigue:                             [ ]Mild [ ]Moderate [ ]Severe  Nausea:                             [ ]Mild [ ]Moderate [ ]Severe  Loss of appetite:              [ ]Mild [ ]Moderate [ ]Severe  Constipation:                    [ ]Mild [ ]Moderate [ ]Severe    PAIN AD Score:	  [email or web address]/[email or web address].pdf (Ctrl + left click to view)    Other Symptoms:  [x ]All other review of systems negative     Karnofsky Performance Score/Palliative Performance Status Version 2:        70 %    http://palliative.info/resource_material/[email or web address].pdf    PHYSICAL EXAM:  Vital Signs Last 24 Hrs  T(C): 36.9 (01 Oct 2019 04:15), Max: 36.9 (01 Oct 2019 04:15)  T(F): 98.4 (01 Oct 2019 04:15), Max: 98.4 (01 Oct 2019 04:15)  HR: 71 (01 Oct 2019 06:34) (70 - 86)  BP: 151/99 (01 Oct 2019 06:34) (130/84 - 151/99)  BP(mean): --  RR: 18 (01 Oct 2019 04:15) (18 - 18)  SpO2: 95% (01 Oct 2019 04:15) (95% - 97%) I&O's Summary     GENERAL:  [x ]Alert  [ x]Oriented x3   [ ]Lethargic  [ ]Cachexia  [ ]Unarousable  [ ]Verbal  [ ]Non-Verbal  Behavioral:   [ ] Anxiety  [ ] Delirium [ ] Agitation [ ] Other  HEENT:  [x ]Normal   [ ]Dry mouth   [ ]ET Tube/Trach  [ ]Oral lesions  PULMONARY:   [x ]Clear [ ]Tachypnea  [ ]Audible excessive secretions   [ ]Rhonchi        [ ]Right [ ]Left [ ]Bilateral  [ ]Crackles        [ ]Right [ ]Left [ ]Bilateral  [ ]Wheezing     [ ]Right [ ]Left [ ]Bilateral  CARDIOVASCULAR:    [x ]Regular [ ]Irregular [ ]Tachy  [ ]Jean [ ]Murmur [ ]Other  GASTROINTESTINAL:  [ ]Soft  [ ]Distended   [ ]+BS  [ ]Non tender [ ]Tender  [ ]PEG [ ]OGT/ NGT   Last BM: 9/29   GENITOURINARY:  [x ]Normal [ ] Incontinent   [ ]Oliguria/Anuria   [ ]Heredia  MUSCULOSKELETAL:   [ ]Normal   [x ]Weakness  [ ]Bed/Wheelchair bound [ ]Edema  NEUROLOGIC:   [ x]No focal deficits  [ ] Cognitive impairment  [ ] Dysphagia [ ]Dysarthria [ ] Paresis [ ]Other   SKIN:   [x ]Normal   [ ]Pressure ulcer(s)  [ ]Rash    CRITICAL CARE:  [ ] Shock Present  [ ]Septic [ ]Cardiogenic [ ]Neurologic [ ]Hypovolemic  [ ]  Vasopressors [ ]  Inotropes   [ ] Respiratory failure present  [ ] Acute  [ ] Chronic [ ] Hypoxic  [ ] Hypercarbic [ ] Other  [ ] Other organ failure     LABS:    10-01    128<L>  |  86<L>  |  12  ----------------------------<  243<H>  4.1   |  33<H>  |  0.82    Ca    9.7      01 Oct 2019 13:35    RADIOLOGY & ADDITIONAL STUDIES:    Protein Calorie Malnutrition Present: [ ] yes [ ] no  [ ] PPSV2 < or = 30%  [ ] significant weight loss [ ] poor nutritional intake [ ] anasarca [ ] catabolic state Albumin, Serum: 3.7 g/dL (09-24-19 @ 07:32)  Artificial Nutrition [ ]     REFERRALS:   [ ]Chaplaincy  [ ] Hospice  [ ]Child Life  [ ]Social Work  [ ]Case management [ ]Holistic Therapy     Goals of Care Document:

## 2019-10-01 NOTE — PROGRESS NOTE ADULT - PROBLEM SELECTOR PLAN 1
Confirmed with Pancreatic biopsy and IR liver biopsy--> making this stage 4 metastatic pancreatic cancer.  -surg onc not offering any treatments; explained to patient and family that given stage 4 disease treatment would be geared towards palliation and non-curative management.   -Heme/Onc recs appreciated: appt on Oct 3rd at 10am at [facility]

## 2019-10-01 NOTE — PROGRESS NOTE ADULT - SUBJECTIVE AND OBJECTIVE BOX
Darian Shine MD (PGY 1, Internal Medicine)  Pager: [phone]                  ____________________  SUBJ:  7/10 in lower quadrants.     MEDICATIONS  (STANDING):  cloNIDine 0.1 milliGRAM(s) Oral every 8 hours  dextrose 5%. 1000 milliLiter(s) (50 mL/Hr) IV Continuous <Continuous>  dextrose 50% Injectable 12.5 Gram(s) IV Push once  dextrose 50% Injectable 25 Gram(s) IV Push once  dextrose 50% Injectable 25 Gram(s) IV Push once  DULoxetine 60 milliGRAM(s) Oral two times a day  enoxaparin Injectable 40 milliGRAM(s) SubCutaneous daily  hydrochlorothiazide 50 milliGRAM(s) Oral daily  HYDROmorphone   Tablet 4 milliGRAM(s) Oral once  insulin lispro (HumaLOG) corrective regimen sliding scale   SubCutaneous three times a day before meals  insulin lispro (HumaLOG) corrective regimen sliding scale   SubCutaneous at bedtime  methadone    Tablet 45 milliGRAM(s) Oral two times a day  morphine ER Tablet 75 milliGRAM(s) Oral two times a day  polyethylene glycol 3350 17 Gram(s) Oral <User Schedule>  senna 2 Tablet(s) Oral at bedtime  sucralfate suspension 1 Gram(s) Oral four times a day    MEDICATIONS  (PRN):  dextrose 40% Gel 15 Gram(s) Oral once PRN Blood Glucose LESS THAN 70 milliGRAM(s)/deciliter  diazepam    Tablet 2 milliGRAM(s) Oral every 8 hours PRN anxiety  glucagon  Injectable 1 milliGRAM(s) IntraMuscular once PRN Glucose LESS THAN 70 milligrams/deciliter  HYDROmorphone   Tablet 12 milliGRAM(s) Oral every 3 hours PRN Severe Pain (7 - 10)  mineral oil 30 milliLiter(s) Oral daily PRN constipation  simethicone 80 milliGRAM(s) Chew every 8 hours PRN Gas    ____________________  VITALS:  Vital Signs Last 24 Hrs  T(C): 36.9 (01 Oct 2019 04:15), Max: 36.9 (01 Oct 2019 04:15)  T(F): 98.4 (01 Oct 2019 04:15), Max: 98.4 (01 Oct 2019 04:15)  HR: 71 (01 Oct 2019 06:34) (70 - 86)  BP: 151/99 (01 Oct 2019 06:34) (130/84 - 151/99)  BP(mean): --  RR: 18 (01 Oct 2019 04:15) (18 - 18)  SpO2: 95% (01 Oct 2019 04:15) (95% - 97%)    ____________________  PHYSICAL EXAM:  GENERAL: appears stated age, disheveled  HEENT: EOMI, PERRL, +scleral icterus, MMM, no oropharyngeal lesions or erythema appreciated  Pulm: CTAB  CV: RRR, S1&S2+, no m/r/g appreciated  ABDOMEN: +BS, +distended, +hepatomegaly with irregular borders,NTTP, right lower quadrant mass corresponding to prior stimulator battery.   MSK: nl ROM  ____________________  LABS:

## 2019-10-01 NOTE — PROGRESS NOTE ADULT - PROVIDER SPECIALTY LIST ADULT
Gastroenterology
Heme/Onc
Internal Medicine
Intervent Radiology
Intervent Radiology
Palliative Care
Surgery
Internal Medicine
Internal Medicine
Palliative Care
Internal Medicine

## 2019-10-01 NOTE — PROGRESS NOTE ADULT - PROBLEM SELECTOR PLAN 2
- follows with Dr. Abdifatah Lilly in [place] [phone]  - important to keep Dr. Lilly aware of any medication changes prior to discharge. - pt may benefit from restarting his pain pump as outpatient - follows with Dr. Abdifatah Lilly in [place] [phone]  - important to keep Dr. Lilly aware of any medication changes prior to discharge. - pt may benefit from restarting his pain pump as outpatient  - reached out to Dr. Lilly today and discussed the above pain regimen, he stated he will continue to manage his pain  - pain management appointment on 10/3 with Dr. Lilly

## 2019-10-01 NOTE — PROGRESS NOTE ADULT - PROBLEM SELECTOR PLAN 3
- methadone 45 mg BID  - morphine ER 75mg BID   - pt transitioned to dilaudid 12 mg po prn q3hrs  - appreciate palliative recs  - pt will require outpatient follow up with palliative  - Appt with Dr. Lilly (chronic pain) 10/2 @ noon

## 2019-10-03 PROBLEM — Z87.891 FORMER SMOKER: Status: ACTIVE | Noted: 2018-11-19

## 2019-10-03 PROBLEM — Z86.39 HISTORY OF TYPE 2 DIABETES MELLITUS: Status: RESOLVED | Noted: 2019-01-01 | Resolved: 2019-01-01

## 2019-10-03 PROBLEM — K44.9 HIATAL HERNIA: Status: ACTIVE | Noted: 2018-11-19

## 2019-10-03 PROBLEM — I10 BENIGN ESSENTIAL HYPERTENSION: Status: ACTIVE | Noted: 2019-01-01

## 2019-10-03 PROBLEM — M48.05 SPINAL STENOSIS OF THORACOLUMBAR REGION: Status: ACTIVE | Noted: 2019-01-01

## 2019-10-03 PROBLEM — F41.9 ANXIETY AND DEPRESSION: Status: ACTIVE | Noted: 2019-01-01

## 2019-10-03 NOTE — REVIEW OF SYSTEMS
[Abdominal Pain] : abdominal pain [Constipation] : constipation [Diarrhea] : diarrhea [Anxiety] : anxiety [Depression] : depression [Negative] : Allergic/Immunologic [FreeTextEntry2] : uses rolling walker chronically, has chronic low back pain and intermittently severe abdominal pain. [FreeTextEntry9] : back pain due to mechanical issues

## 2019-10-03 NOTE — PHYSICAL EXAM
[Ambulatory and capable of all self care but unable to carry out any work activities] : Status 2- Ambulatory and capable of all self care but unable to carry out any work activities. Up and about more than 50% of waking hours [Normal] : grossly intact [de-identified] : liver edge is palpable ~ 2 fbs on deep inspiration [de-identified] : hardware in place throughout spine [de-identified] : anxious

## 2019-10-03 NOTE — HISTORY OF PRESENT ILLNESS
[Disease: _____________________] : Disease: [unfilled] [N: ___] : N[unfilled] [T: ___] : T[unfilled] [M: ___] : M[unfilled] [AJCC Stage: ____] : AJCC Stage: [unfilled] [de-identified] : Mr. Ortiz is a 63 year old male with past medical history of HTN, GERD, chronic pain secondary traumatic work injury presenting to the office for an initial consultation of pancreas CA.\par \par On 9/17/19 patient presented to [facility] for left lower quadrant abdominal pain x 6 days.  Pain initially started x 1 month, but now is sharp/cramping and non-radiating.  Accompanied with abdominal pain is weight loss, approximately 25lbs x 3 weeks. and jaundice.\par Prior to that patient was seen by his GI, Dr. Edison Bernardo for an EGD.  He was given Carafate and MiraLAX with mild relief.\par CT abdomen on 9/17/2019: Mild intrahepatic biliary ductal dilatation as well as multiple indeterminant hepatic lesions, new since prior examination of 3/25/2018. Metastatic disease is a differential diagnostic consideration.Limited evaluation of the pancreatic parenchyma secondary to streak artifact. Question 1.8 cm hypodense pancreatic tail lesion. Hypodensity within the pancreatic neck, likely artifactual. No definite pancreatic ductal dilatation.Moderate to large colonic fecal load. Moderate to large hiatal hernia.\par EUS/ERCP on 9/19/2019 by Dr. Deepak Day demonstrated positive for malignant cells, adenocarcinoma.  Metal stent was placed.\par CT chest 9/20/2019: Bilateral lower lobe linear atelectasis. No masses are identified.Unchanged heterogeneously hypodense hepatic lesions and pancreatic \par lesions as noted on the prior CT abdomen pelvis.Moderate hiatal hernia with ascites.\par Patient underwent right lobe liver CT guided biopsy which demonstrated positive for malignant cells, adenocarcinoma.\par CA 19-9 elevated 1832\par Patient was discharge on 10/1/2019.\par \par As of today he has 7/10 pain and had been 10/10 this AM. He is using Dilaudid 8 mg every 3 hours right now. Back pain remains at 10/10 despite dilaudid. He also takes Methadone 45 mg q12 and Morphine ER 75 mg q12. He saw pain management Dr. Lilly yesterday.  He has chronic back pain due to fall at work in 2001. He is on workman's compensation and disability. He has a epidural pain pump in right lower quadrant and it is not in use. He also has a spinal cord stimulator with implanted battery. \par \par He has digestive problems with eating such as fatty meals that causes diarrhea. This also leads to fear of eating.\par \par He has a long history of depression and anxiety that has been exacerbated by the current diagnosis. [de-identified] : adenocarcinoma [de-identified] : Neurosurgery: Adama Reyes [phone]\par GI: Deepak Day [phone]; Edison Bernardo [phone]\par Internist: Adama Fernández  [phone]\par Pain Management: Abdifatah Lilly; [place]\par \par Adela (Daughter): [phone]\par Dayday (Son): [phone]\par Seferino (Son): [phone]\par Cyndi (Spouse)\par Lucian: [phone]

## 2019-10-03 NOTE — CONSULT LETTER
[Dear  ___] : Dear  [unfilled], [Consult Letter:] : I had the pleasure of evaluating your patient, [unfilled]. [Please see my note below.] : Please see my note below. [Consult Closing:] : Thank you very much for allowing me to participate in the care of this patient.  If you have any questions, please do not hesitate to contact me. [Sincerely,] : Sincerely, [DrCas  ___] : Dr. CONTRERAS [DrCas ___] : Dr. CONTRERAS

## 2019-10-03 NOTE — REASON FOR VISIT
[Initial Consultation] : an initial consultation [Spouse] : spouse [Family Member] : family member [FreeTextEntry2] : Pancreas CA

## 2019-10-14 NOTE — CONSULT LETTER
[Consult Letter:] : I had the pleasure of evaluating your patient, [unfilled]. [Dear  ___] : Dear  [unfilled], [Please see my note below.] : Please see my note below. [Consult Closing:] : Thank you very much for allowing me to participate in the care of this patient.  If you have any questions, please do not hesitate to contact me. [Sincerely,] : Sincerely, [DrCas  ___] : Dr. CONTRERAS [DrCas ___] : Dr. CONRTERAS

## 2019-10-15 PROBLEM — K59.00 CONSTIPATION: Status: ACTIVE | Noted: 2019-01-01

## 2019-10-16 PROBLEM — Z86.39 PERSONAL HISTORY OF OTHER ENDOCRINE, NUTRITIONAL AND METABOLIC DISEASE: Chronic | Status: ACTIVE | Noted: 2019-01-01

## 2019-10-17 PROBLEM — G89.3 CANCER RELATED PAIN: Status: ACTIVE | Noted: 2019-01-01

## 2019-10-17 PROBLEM — R20.8 DYSESTHESIA: Status: ACTIVE | Noted: 2019-01-01

## 2019-10-17 PROBLEM — M54.16 LUMBAR RADICULOPATHY: Status: ACTIVE | Noted: 2018-03-22

## 2019-10-17 PROBLEM — Z51.5 PALLIATIVE CARE BY SPECIALIST: Status: ACTIVE | Noted: 2019-01-01

## 2019-10-18 NOTE — HISTORY OF PRESENT ILLNESS
[de-identified] : Chart reviewed , case d/w Dr Ward.63 year old man w recently diagnosed pancreatic cancer, with a h/o chronic back pain, is being evaluated due to intractable pain s/s.He c/o severe cramping abd pain as well as radiational pain to back, many times simultaneously.It is very severe at times, and always worse in am than PM..He states he has a pain management Dr in [place] Dr Lilly..[phone]..Has been on 80 mg methadone for years bec of a failed back.Currently receiving Methadone 40 mg BID, and dilaudid 16 mg q 3 h, yesterday he was  rotated to [facility] 75mcg this am by his pain Dr,, placed appx 90 min ago.Back issues suffered on the job in 2001, had spinal reconstruction,had complications in 08.Back pain, had stim and pain pump.in 2016, cut the line and IDDS no longer functions, tho still implanted..Stim is working now;vibrates remote control..Sharp pain across  lower abdomen independent of cramping/constipation.Currently has back pain like always, Big difference after having a BM which relieved his constipation related cramping pain..Dilaudid will wear off in 3-4h .Took it at 1pm, pain went from a 10 to a 7, currently.

## 2019-10-18 NOTE — ASSESSMENT
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 in setting of  chronic illness, chronic failed back and visceral pain from metastatic pancreatic cancer .His predominant symptom complex relates to combined pains in lower abdomen and back, when they occur together his pain is very severe/debilitating.He has a paroxysmal as well as non-paroxysmal spinal dysesthesia, with additional newer elements of visceral nociception.His current MSE , factoring in his 128 mg dilaudid per day appx 450 mg MSE, , 80 mg methadone, appx 400 mg MSE, and his new TDF at 150 mg MSE, is at least 1000mg, with a pain score of 7 on average, never below this.He is likely to be at least partly opioid resistant and I am concerned about the possibility of OIN if he dose escalates to comfort.I would recommend considering IVPCA for him at this juncture, with a basal rate of 2.5-3 mg dilaudid per hour ( 1000 mg MSE is appx 300 mg MSE parenterally, = 45- 60 mg dilaudid per 24 h converted) For breakthrough and to assess amt over threshold to assess true basal rate, would give 4-6 mg IV bolus q 30 min. ( 10-20 percent of total 24 h dose).Will d/w his pain Dr as well as Dr Ward.Will also add gabapentin for a variety of reasons, principally for the pelvic pain and paroxysmal components.

## 2019-10-18 NOTE — PHYSICAL EXAM
[Capable of only limited self care, confined to bed or chair more than 50% of waking hours] : Status 3- Capable of only limited self care, confined to bed or chair more than 50% of waking hours [Normal] : affect appropriate [de-identified] : limited mobility and ROM

## 2019-10-31 NOTE — REVIEW OF SYSTEMS
[Abdominal Pain] : abdominal pain [Constipation] : constipation [Anxiety] : anxiety [Diarrhea] : diarrhea [Depression] : depression [Negative] : Allergic/Immunologic [FreeTextEntry2] : uses rolling walker chronically, has chronic low back pain and intermittently severe abdominal pain. [FreeTextEntry9] : back pain due to mechanical issues

## 2019-10-31 NOTE — HISTORY OF PRESENT ILLNESS
[Disease: _____________________] : Disease: [unfilled] [T: ___] : T[unfilled] [N: ___] : N[unfilled] [AJCC Stage: ____] : AJCC Stage: [unfilled] [M: ___] : M[unfilled] [9 - Distress Level] : Distress Level: 9 [Date: ____________] : Patient's last distress assessment performed on [unfilled]. [Patient given social work contact information and resource sheet] : Patient was given social work contact information and resource sheet [de-identified] : Mr. Ortiz is a 63 year old male with past medical history of HTN, GERD, chronic pain secondary traumatic work injury presenting to the office for an initial consultation of pancreas CA.\par \par On 9/17/19 patient presented to [facility] for left lower quadrant abdominal pain x 6 days.  Pain initially started x 1 month, but now is sharp/cramping and non-radiating.  Accompanied with abdominal pain is weight loss, approximately 25lbs x 3 weeks. and jaundice.\par Prior to that patient was seen by his GI, Dr. Edison Bernardo for an EGD.  He was given Carafate and MiraLAX with mild relief.\par CT abdomen on 9/17/2019: Mild intrahepatic biliary ductal dilatation as well as multiple indeterminant hepatic lesions, new since prior examination of 3/25/2018. Metastatic disease is a differential diagnostic consideration.Limited evaluation of the pancreatic parenchyma secondary to streak artifact. Question 1.8 cm hypodense pancreatic tail lesion. Hypodensity within the pancreatic neck, likely artifactual. No definite pancreatic ductal dilatation.Moderate to large colonic fecal load. Moderate to large hiatal hernia.\par EUS/ERCP on 9/19/2019 by Dr. Deepak Day demonstrated positive for malignant cells, adenocarcinoma.  Metal stent was placed.\par CT chest 9/20/2019: Bilateral lower lobe linear atelectasis. No masses are identified.Unchanged heterogeneously hypodense hepatic lesions and pancreatic \par lesions as noted on the prior CT abdomen pelvis.Moderate hiatal hernia with ascites.\par Patient underwent right lobe liver CT guided biopsy which demonstrated positive for malignant cells, adenocarcinoma.\par CA 19-9 elevated 1832\par Patient was discharge on 10/1/2019.\par \par As of today he has 7/10 pain and had been 10/10 this AM. He is using Dilaudid 8 mg every 3 hours right now. Back pain remains at 10/10 despite dilaudid. He also takes Methadone 45 mg q12 and Morphine ER 75 mg q12. He saw pain management Dr. Lilly yesterday.  He has chronic back pain due to fall at work in 2001. He is on workman's compensation and disability. He has a epidural pain pump in right lower quadrant and it is not in use. He also has a spinal cord stimulator with implanted battery. \par \par He has digestive problems with eating such as fatty meals that causes diarrhea. This also leads to fear of eating.\par \par He has a long history of depression and anxiety that has been exacerbated by the current diagnosis.\par \par 10/15/19:\par 1) Pain in low back - chronic, surgery in past, uses rolling walker since that time. He never recovered with fully recover and stand upright after surgery in 2008. Then 2016, he was then able to stand upright. However, he continued to have chronic pain in low back and radiation to legs and leg weakness. Spinal stimulator placed 2010. Pain pump placed in abdomen in ~ 2012.\par 2) Pain in mid back - This started June 2019. This occurred during physical therapy with an injury. He noted that there was a snapping sound during physical therapy and pain in the low and mid back. He was advised to have CAT scans by Dr. Lopez. \par 3) Pain in abdomen - middle and lower abdomen. He has significant constipation. Had a BM after enema. This started around 5/2019. It has gotten worse over the next few months. He is using Colace 100 mg q8, Senna 2 pills at night. Not using Miralax.He used Magnesium Citrate 1/2 bottle with some benefit. He has not used lactulose.\par 4) Pancreas cancer - Patient and son here today to discuss initiation of chemotherapy. His screening test for the Astellas trial was negative; that is Claudin 18.2 IHC was negative. Regardless, we will start him on standard of care gemcitabine + Abraxane. We reviewed the medications, side effects, purpose, and he agreed to sign a treatment consent. Will schedule to start on 10/17/19 C1D1. Will give as 3 weeks on then 1 week off schedule. [de-identified] : adenocarcinoma [de-identified] : Neurosurgery: Adama Reyes [phone]\par GI: Deepak Day [phone]; Edison Bernardo [phone]\par Internist: Adama Fernández  [phone]\par Pain Management: Abdifatah Lilly; [place]\par \par Adela (Daughter): [phone]\par Dayday (Son): [phone]\par Seferino (Son): [phone]\par Cyndi (Spouse)\par Lucian: [phone]

## 2019-10-31 NOTE — PHYSICAL EXAM
[Ambulatory and capable of all self care but unable to carry out any work activities] : Status 2- Ambulatory and capable of all self care but unable to carry out any work activities. Up and about more than 50% of waking hours [Normal] : grossly intact [de-identified] : liver edge is palpable ~ 2 fbs on deep inspiration [de-identified] : hardware in place throughout spine [de-identified] : anxious

## 2019-11-05 NOTE — ED ADULT TRIAGE NOTE - CCCP TRG CHIEF CMPLNT
Refill request received from [facility] for fluoxetine 40 MG cap.    Last filled:  10/7/19    Last visit:  9/27/19    Ok to send refill request    Thank you   abdominal pain

## 2019-11-07 PROBLEM — C25.9 ADENOCARCINOMA OF PANCREAS: Status: ACTIVE | Noted: 2019-01-01

## 2019-11-07 NOTE — PHYSICAL EXAM
[Ambulatory and capable of all self care but unable to carry out any work activities] : Status 2- Ambulatory and capable of all self care but unable to carry out any work activities. Up and about more than 50% of waking hours [Normal] : grossly intact [de-identified] : liver edge is palpable ~ 2 fbs on deep inspiration [de-identified] : hardware in place throughout spine [de-identified] : anxious

## 2019-11-07 NOTE — HISTORY OF PRESENT ILLNESS
[Disease: _____________________] : Disease: [unfilled] [T: ___] : T[unfilled] [N: ___] : N[unfilled] [M: ___] : M[unfilled] [AJCC Stage: ____] : AJCC Stage: [unfilled] [Date: ____________] : Patient's last distress assessment performed on [unfilled]. [9 - Distress Level] : Distress Level: 9 [Patient given social work contact information and resource sheet] : Patient was given social work contact information and resource sheet [de-identified] : Mr. Ortiz is a 63 year old male with past medical history of HTN, GERD, chronic pain secondary traumatic work injury presenting to the office for an initial consultation of pancreas CA.\par \par On 9/17/19 patient presented to [facility] for left lower quadrant abdominal pain x 6 days.  Pain initially started x 1 month, but now is sharp/cramping and non-radiating.  Accompanied with abdominal pain is weight loss, approximately 25lbs x 3 weeks. and jaundice.\par Prior to that patient was seen by his GI, Dr. Edison Bernardo for an EGD.  He was given Carafate and MiraLAX with mild relief.\par CT abdomen on 9/17/2019: Mild intrahepatic biliary ductal dilatation as well as multiple indeterminant hepatic lesions, new since prior examination of 3/25/2018. Metastatic disease is a differential diagnostic consideration.Limited evaluation of the pancreatic parenchyma secondary to streak artifact. Question 1.8 cm hypodense pancreatic tail lesion. Hypodensity within the pancreatic neck, likely artifactual. No definite pancreatic ductal dilatation.Moderate to large colonic fecal load. Moderate to large hiatal hernia.\par EUS/ERCP on 9/19/2019 by Dr. Deepak Day demonstrated positive for malignant cells, adenocarcinoma.  Metal stent was placed.\par CT chest 9/20/2019: Bilateral lower lobe linear atelectasis. No masses are identified.Unchanged heterogeneously hypodense hepatic lesions and pancreatic \par lesions as noted on the prior CT abdomen pelvis.Moderate hiatal hernia with ascites.\par Patient underwent right lobe liver CT guided biopsy which demonstrated positive for malignant cells, adenocarcinoma.\par CA 19-9 elevated 1832\par Patient was discharge on 10/1/2019.\par \par As of today he has 7/10 pain and had been 10/10 this AM. He is using Dilaudid 8 mg every 3 hours right now. Back pain remains at 10/10 despite dilaudid. He also takes Methadone 45 mg q12 and Morphine ER 75 mg q12. He saw pain management Dr. Lilly yesterday.  He has chronic back pain due to fall at work in 2001. He is on workman's compensation and disability. He has a epidural pain pump in right lower quadrant and it is not in use. He also has a spinal cord stimulator with implanted battery. \par \par He has digestive problems with eating such as fatty meals that causes diarrhea. This also leads to fear of eating.\par \par He has a long history of depression and anxiety that has been exacerbated by the current diagnosis.\par \par 10/15/19:\par 1) Pain in low back - chronic, surgery in past, uses rolling walker since that time. He never recovered with fully recover and stand upright after surgery in 2008. Then 2016, he was then able to stand upright. However, he continued to have chronic pain in low back and radiation to legs and leg weakness. Spinal stimulator placed 2010. Pain pump placed in abdomen in ~ 2012.\par 2) Pain in mid back - This started June 2019. This occurred during physical therapy with an injury. He noted that there was a snapping sound during physical therapy and pain in the low and mid back. He was advised to have CAT scans by Dr. Lopez. \par 3) Pain in abdomen - middle and lower abdomen. He has significant constipation. Had a BM after enema. This started around 5/2019. It has gotten worse over the next few months. He is using Colace 100 mg q8, Senna 2 pills at night. Not using Miralax.He used Magnesium Citrate 1/2 bottle with some benefit. He has not used lactulose.\par 4) Pancreas cancer - Patient and son here today to discuss initiation of chemotherapy. His screening test for the Astellas trial was negative; that is Claudin 18.2 IHC was negative. Regardless, we will start him on standard of care gemcitabine + Abraxane. We reviewed the medications, side effects, purpose, and he agreed to sign a treatment consent. Will schedule to start on 10/17/19 C1D1. Will give as 3 weeks on then 1 week off schedule.\par \par 11/7/2019:\par 1)Pancreas CA: Patient is here for follow up accompanied with spouse and children.  He is s/p one cycle of gemcitabine/abraxane.  Due to thrombocytopenia and multiple adverse events, i.e fatigue, nausea and vomiting, C1D15 was held.  Today he presents to the office follow up and is feeling "better".  He received Decadron 8mg IV and IVF on 10/30/2019 which helped.  The fatigue has improved.  He is able to perform his routine/instrumental ADLs without any restrictions.  \par 2) Diarrhea: Patient is having intermittent episodes of diarrhea.  On 11/6/2019 had 3 episodes of "semi-formed" bowel movements however today had no bowel movements.\par 3)Abdominal pain: Patient is scheduled for PCA pump installation on Friday, 11/8/2019.  He continues to have moderate-severe abdominal pain.  He currently sees Dr. Rico and Dr. Lilly for pain control.  [de-identified] : adenocarcinoma [de-identified] : Neurosurgery: Adama Reyes [phone]\par GI: Deepak Day [phone]; Edison Bernardo [phone]\par Internist: Adama Fernández  [phone]\par Pain Management: Abdifatah Lilly; [place]\par \par Adela (Daughter): [phone]\par Dayday (Son): [phone]\par Seferino (Son): [phone]\par Cyndi (Spouse)\par Lucian: [phone]

## 2019-11-08 NOTE — ED PROVIDER NOTE - ATTENDING CONTRIBUTION TO CARE
Agree with above, Francesco Eric MD, FACEP   Francesco Eric MD, FACEP: In this physician's medical judgement based on clinical history and physical exam, patient with pancreatic cancer on heavy narcotic dosing interval, here for leukocytosis, elevated bili, and concern for cholangiopathy secondary to infection by his oncology team.  Will obtain IV x 2, cbc, cmp, +/-ce, VBG with lactate at time 0, fluid bolus @ 30cc/kg initiated upon departure from the room on initial assessment within 30 minutes of arrival, urine analysis and blood cultures x 2 are obtained prior to antibiotics =>prior to administration of antibiotics goal within 3 hours of arrival, and will repeat lactate with VBG if the original is elevated and do so within 6 hours of initial lactate.   Francesco Eric MD FACEP note of transfer at the usual time of sign out: Receiving team will follow up on labs, analgesia, any clinical imaging, reassess and disposition as clinically indicated.  Details of patient and plan conveyed to receiving physician and conveyed back for understanding.  There were no questions at this time about the patient's status, disposition, and plan. Patient's care to be taken over by receiving physician at this time, all decisions regarding the progression of care will be made at their discretion.

## 2019-11-08 NOTE — ED ADULT NURSE NOTE - PMH
Anxiety and depression    Diastolic dysfunction  stage I  Empyema lung  11/2015 left lung, s/p VATS, decortication  H/O peptic ulcer  over 10 years ago  Herniated Disc  S/P work injury 2001  History of diabetes mellitus, type II    history of renal calculus    Hypertension  Dx: 2002  Postlaminectomy Syndrome    Spinal Stenosis

## 2019-11-08 NOTE — ED ADULT NURSE NOTE - OBJECTIVE STATEMENT
63 YOM A&OX3 with pmh of HTN presents to ED for abnormal labs. pt states was instructed by MD to come in for infection of blood. pt states has abdominal pain rated 8/10 across the abdomen but has had it for months. pt denies history of blood transfusion. pt denies sob, chest pain, n/v/d, headaches, dizziness, blurry vision. safety maintained. 63 YOM A&OX3 with pmh of HTN, GERD, chronic pain (work injury), chronic constipation, pancreatic adenocarcinoma s/p biliary stent presents to ED for abnormal labs. pt states was instructed by MD to come in for infection of blood with leukocytosis greater than 20. pt states has abdominal pain rated 8/10 across the abdomen but has had it for months as well as generalized weakness. pt denies history of blood transfusion. pt denies sob, chest pain, n/v/d, headaches, dizziness, blurry vision. safety maintained.

## 2019-11-08 NOTE — ED PROVIDER NOTE - CLINICAL SUMMARY MEDICAL DECISION MAKING FREE TEXT BOX
64 yo h/o chronic pain 2/2 traumatic work injury (on daily opiates), GERD, HTN, chronic constipation, recently diangosed with pancreatic adenocarcinoma s/p biliary stent placement 2m ago sent in from [facility] for concern for sepsis. chronically ill appearing, abd pain on exam. concern for infectious etiology given leukocytosis, cancer patient. pt with abd pain however has had the same pain x2m since his diagnosis with pancreatic cancer, no worsening pain. will defer ct at this time given chronicity of abd pain. no localizing source of infection at this time. will give antibiotics, evaluate for source of infection, admit

## 2019-11-08 NOTE — ED PROVIDER NOTE - LAB INTERPRETATION
**ATTENDING ADDENDUM (Dr. Rupesh Wahl): Labs reviewed. Pertinent findings include: leukocytosis, lactate 3, POSITIVE electrolyte derangements (mild hyponatremia, elevated creatinine, etc.)

## 2019-11-08 NOTE — ED PROVIDER NOTE - PHYSICAL EXAMINATION
Vitals: tachy, remainder wnl  Gen: chronically ill appearing, pale  Head: NCAT  ENT: sclerae white, anicterus, dry mucous membranes. No exudates.   CV: RRR. Audible S1 and S2. No murmurs, rubs, gallops, S3, nor S4  Pulm: Clear to auscultation bilaterally. No wheezes, rales, or rhonchi  Abd: soft, normoactive BS x4, +generalized ttp, no rebound, no guarding, no rashes  Musculoskeletal:  No peripheral edema  Skin: no lesions or scars noted  Neurologic: AAOx3  : no CVA tenderness  Psych: normal affect Vitals: tachy, remainder wnl  Gen: chronically ill appearing, pale, moderate distress secondary to pain  Head: NCAT  ENT: sclerae white, anicteric, dry mucous membranes. No exudates.   CV: RRR. Audible S1 and S2. No murmurs, rubs, gallops, S3, nor S4  Pulm: Clear to auscultation bilaterally. No wheezes, rales, or rhonchi  Abd: soft, normoactive BS x4, +generalized ttp, no rebound, no guarding, no rashes  Musculoskeletal:  No peripheral edema  Skin: no lesions or scars noted  Neurologic: AAOx3  : no CVA tenderness  Psych: normal affect

## 2019-11-08 NOTE — ED PROVIDER NOTE - NS ED ROS FT
Gen:  + decreased appetite, +generalized weakness, No fever,   Eyes: No eye irritation or discharge  ENT: No earpain, congestion, sore throat  Resp: No cough or trouble breathing  Cardiovascular: No chest pain or palpitation  Gastroenteric: + nausea, no vomiting, diarrhea, constipation  : No dysuria  MSK: + back pain  Skin: No rashes  Neuro: No headache  Remainder negative, except as per the HPI

## 2019-11-08 NOTE — ED PROVIDER NOTE - OBJECTIVE STATEMENT
62 yo h/o chronic pain 2/2 traumatic work injury (on daily opiates), GERD, HTN, chronic constipation, recently diangosed with pancreatic adenocarcinoma s/p biliary stent placement 2m ago sent in from [facility] for concern for sepsis. Pt reports he had routine chekcup yesterday from [facility] where labs were drawn and had leukocytosis > 20. Reports today with severe generalized weakness where he could not get up. Has had generalized abd pain [id number] when he dx with pancreatic adenocarcinoma. pain has not worsened. Denies f/c, cp, sob, cough, melena, hematochezia. complaining of LBP which is chronic per pt. 64 yo h/o chronic pain 2/2 traumatic work injury (on daily opiates), GERD, HTN, chronic constipation, recently diagnosed with pancreatic adenocarcinoma s/p biliary stent placement 2m ago sent in from [facility] for concern for sepsis. Pt reports he had routine checkup yesterday from [facility] where labs were drawn and had leukocytosis > 20. Reports today with severe generalized weakness where he could not get up. Has had generalized abd pain [id number] when he dx with pancreatic adenocarcinoma. pain has not worsened. Denies f/c, cp, sob, cough, melena, hematochezia. complaining of LBP which is chronic per pt.

## 2019-11-08 NOTE — ED PROVIDER NOTE - SHIFT CHANGE DETAILS
***ATTENDING ADDENDUM (Dr. Rupesh Wahl): I have received handoff from [facility]; followup serial lactate and response to therapeutics. Antibiotics given. Admission for sepsis. Will continue to observe and monitor closely.

## 2019-11-08 NOTE — ED ADULT TRIAGE NOTE - CHIEF COMPLAINT QUOTE
sent by MD due to abnormal lab results "infection in the blood". Abdominal pain. Generalized weakness. On Chemo.

## 2019-11-08 NOTE — CHART NOTE - NSCHARTNOTEFT_GEN_A_CORE
Notified by on-call GI attending that patient sent in by Oncologist due to concern for infection given elevated WBC, and with prior biliary stent placement in 9/2019.   Notified ED triage that GI consult service is aware of patient's arrival to the ED.    Outpatient labs from 11/8/19 listed below:    CBC (results available in [place])    11/8/19, 11:10AM  Lactate: 4.4     11/8/19, 5:16PM  CMP:    Na: 130  K: 4.2  Cl: 82  CO2: 26  Glucose: 245  BUN: 54  Cr: 2.03  Ca: 9.2    Total protein: 6.8  Albumin: 3.2  AST: 25  ALT: 28  Alk Phos: 181  T. Bili: 0.9

## 2019-11-08 NOTE — ED CLERICAL - NS ED CLERK NOTE PRE-ARRIVAL INFORMATION; ADDITIONAL PRE-ARRIVAL INFORMATION
CC/Reason For referral: Biliary Sepsis, IV Antibiotics  Preferred Consultant(if applicable): Oncology  Who admits for you (if needed): Hospitalist  Do you have documents you would like to fax over? No  Would you still like to speak to an ED attending? Yes, call Dr Ward [phone] after patient is seen

## 2019-11-09 NOTE — H&P ADULT - HISTORY OF PRESENT ILLNESS
64 yo h/o chronic pain 2/2 traumatic work injury (on daily opiates), GERD, HTN, chronic constipation, recently diangosed with pancreatic adenocarcinoma s/p biliary stent placement 2m ago sent in from [facility] for concern for biliary sepsis on 11/8. Pt reports he had routine check-up yesterday from [facility] where labs were drawn and had leukocytosis > 20. Reports today with severe generalized weakness where he could not get up. Has had generalized abd pain [id number] when he dx with pancreatic adenocarcinoma. pain has not worsened. Denies f/c, cp, sob, cough, melena, hematochezia. complaining of LBP which is chronic per pt. 62 yo h/o chronic pain 2/2 traumatic work injury (on daily opiates), GERD, HTN, chronic constipation, recently diangosed with pancreatic adenocarcinoma s/p biliary stent placement 2m ago sent in from [facility] for concern for biliary sepsis on 11/8. Pt reports he had routine check-up yesterday from [facility] where labs were drawn and had leukocytosis > 20. Reports today with severe generalized weakness where he could not get up. Has had generalized abd pain [id number] when he dx with pancreatic adenocarcinoma. pain has not worsened. Denies f/c, cp, sob, cough, melena, hematochezia. complaining of LBP which is chronic per pt.     63 year old male with past medical history of HTN, GERD, chronic pain secondary traumatic work injury presenting to the office for an initial consultation of pancreas CA.    On 9/17/19 patient presented to [facility] for left lower quadrant abdominal pain x 6 days. Pain initially started x 1 month, but now is sharp/cramping and non-radiating. Accompanied with abdominal pain is weight loss, approximately 25lbs x 3 weeks. and jaundice.  Prior to that patient was seen by his GI, Dr. Edison Bernardo for an EGD. He was given Carafate and MiraLAX with mild relief.  CT abdomen on 9/17/2019: Mild intrahepatic biliary ductal dilatation as well as multiple indeterminant hepatic lesions, new since prior examination of 3/25/2018. Metastatic disease is a differential diagnostic consideration.Limited evaluation of the pancreatic parenchyma secondary to streak artifact. Question 1.8 cm hypodense pancreatic tail lesion. Hypodensity within the pancreatic neck, likely artifactual. No definite pancreatic ductal dilatation.Moderate to large colonic fecal load. Moderate to large hiatal hernia.  EUS/ERCP on 9/19/2019 by Dr. Deepak Day demonstrated positive for malignant cells, adenocarcinoma. Metal stent was placed.  CT chest 9/20/2019: Bilateral lower lobe linear atelectasis. No masses are identified.Unchanged heterogeneously hypodense hepatic lesions and pancreatic   lesions as noted on the prior CT abdomen pelvis.Moderate hiatal hernia with ascites.  Patient underwent right lobe liver CT guided biopsy which demonstrated positive for malignant cells, adenocarcinoma.  CA 19-9 elevated 1832  Patient was discharge on 10/1/2019.    As of today he has 7/10 pain and had been 10/10 this AM. He is using Dilaudid 8 mg every 3 hours right now. Back pain remains at 10/10 despite dilaudid. He also takes Methadone 45 mg q12 and Morphine ER 75 mg q12. He saw pain management Dr. Lilly yesterday. He has chronic back pain due to fall at work in 2001. He is on workman's compensation and disability. He has a epidural pain pump in right lower quadrant and it is not in use. He also has a spinal cord stimulator with implanted battery.     He has digestive problems with eating such as fatty meals that causes diarrhea. This also leads to fear of eating.    He has a long history of depression and anxiety that has been exacerbated by the current diagnosis.    10/15/19:  1) Pain in low back - chronic, surgery in past, uses rolling walker since that time. He never recovered with fully recover and stand upright after surgery in 2008. Then 2016, he was then able to stand upright. However, he continued to have chronic pain in low back and radiation to legs and leg weakness. Spinal stimulator placed 2010. Pain pump placed in abdomen in ~ 2012.  2) Pain in mid back - This started June 2019. This occurred during physical therapy with an injury. He noted that there was a snapping sound during physical therapy and pain in the low and mid back. He was advised to have CAT scans by Dr. Lopez.   3) Pain in abdomen - middle and lower abdomen. He has significant constipation. Had a BM after enema. This started around 5/2019. It has gotten worse over the next few months. He is using Colace 100 mg q8, Senna 2 pills at night. Not using Miralax.He used Magnesium Citrate 1/2 bottle with some benefit. He has not used lactulose.  4) Pancreas cancer - Patient and son here today to discuss initiation of chemotherapy. His screening test for the [employer] trial was negative; that is Claudin 18.2 IHC was negative. Regardless, we will start him on standard of care gemcitabine + Abraxane. We reviewed the medications, side effects, purpose, and he agreed to sign a treatment consent. Will schedule to start on 10/17/19 C1D1. Will give as 3 weeks on then 1 week off schedule.    11/7/2019:  1)Pancreas CA: Patient is here for follow up accompanied with spouse and children. He is s/p one cycle of gemcitabine/abraxane. Due to thrombocytopenia and multiple adverse events, i.e fatigue, nausea and vomiting, C1D15 was held. Today he presents to the office follow up and is feeling "better". He received Decadron 8mg IV and IVF on 10/30/2019 which helped. The fatigue has improved. He is able to perform his routine/instrumental ADLs without any restrictions.   2) Diarrhea: Patient is having intermittent episodes of diarrhea. On 11/6/2019 had 3 episodes of "semi-formed" bowel movements however today had no bowel movements.  3)Abdominal pain: Patient is scheduled for PCA pump installation on Friday, 11/8/2019. He continues to have moderate-severe abdominal pain. He currently sees Dr. Rico and Dr. Lilly for pain control.     Disease: pancreas   Pathology: adenocarcinoma   TNM stage: T4, N0, M1   AJCC Stage: 4     diagnosed metastatic pancreatic adenocarcinoma. He was found to      have jaundice due to biliary obstruction. He was stented with some improvement      of his abdominal pain and bloating. CAT scanning found multiple liver metastases that      were biopsied. This confirmed stage 4 disease.             PDL1 TPS > 1            Foundation CDx result: KRAS G12D, CDKN2A/B loss, TMB = 3 Muts/Mb, MS-stable.            C1D1 Gemcitabine/Abraxane 10/17/2019      C1D8 10/24/2019      C1D15 held on 10/31/2019 due to thrombocytopenia.            Patient is here accompanied with spouse and children for follow s/p one cycle of      [place]/Abraxane. Patient tolerated C1 moderately well. On D15 developed      thrombocytopenia, plt count 70 K/uL and increase in fatigue, nausea/vomiting and      diarrhea. He was given decadron 8mg IV and IVF with excellent relief.      Today he feels much better and denies any of the above symptoms.            CBC with diff performed today demonstrated elevated WBC and Plt count. Will have      patient RTO on 11/8/2019 for repeat blood work to r/o underlying infection.            Patient is scheduled for C2D1 on 11/13/2019. Will likely dose reduce      gemcitabine/abraxane and keep patient on day 1,8, 15 cycle. 62 y/o PMH of traumatic work injury s/p multilumbar fusion s/p hardware removal (2016, on daily opiates and methadone), chronic constipation, GERD, HTN, metastatic pancreatic adenocarcinoma s/p biliary stent placement (2m ago) sent in from [facility] for concern for biliary sepsis on 11/8. He was admitted on 9/17/19 for abd pain, weight loss 25 lbs for the prior 3 weeks and jaundice and found on CTAP w/ intrahepatic biliary ductal dilation and multiple hepatic lesions that were new and EUS/ERCP was + for pancreatic adenocarcinoma.  He went in for a check up appointment and the labs were significant for leukocytosis of >20 and the patient himself has been having increased amount of fatigue and weakness with decreased PO intake for the past 2 days. he reports no fevers, n/v/d/CP/SOB, cough, sick contacts or recent travels. Ever since his pancreatic adenocarcinoma diagnosis he has had  generalized abd pain and it has now worsened these last few days. He is also complaining of worsening lower back pain but has had chronic LBP and had prior neurosurgery followup with multilum    EUS/ERCP on 9/19/2019 by Dr. Deepak Day demonstrated positive for malignant cells, adenocarcinoma. Metal stent was placed.  CT chest 9/20/2019: Bilateral lower lobe linear atelectasis. No masses are identified.Unchanged heterogeneously hypodense hepatic lesions and pancreatic   lesions as noted on the prior CT abdomen pelvis.Moderate hiatal hernia with ascites.  Patient underwent right lobe liver CT guided biopsy which demonstrated positive for malignant cells, adenocarcinoma.  CA 19-9 elevated 1832  Patient was discharge on 10/1/2019.    As of today he has 7/10 pain and had been 10/10 this AM. He is using Dilaudid 8 mg every 3 hours right now. Back pain remains at 10/10 despite dilaudid. He also takes Methadone 45 mg q12 and Morphine ER 75 mg q12. He saw pain management Dr. Lilly yesterday. He has chronic back pain due to fall at work in 2001. He is on workman's compensation and disability. He has a epidural pain pump in right lower quadrant and it is not in use. He also has a spinal cord stimulator with implanted battery.     He has digestive problems with eating such as fatty meals that causes diarrhea. This also leads to fear of eating.    He has a long history of depression and anxiety that has been exacerbated by the current diagnosis.    10/15/19:  1) Pain in low back - chronic, surgery in past, uses rolling walker since that time. He never recovered with fully recover and stand upright after surgery in 2008. Then 2016, he was then able to stand upright. However, he continued to have chronic pain in low back and radiation to legs and leg weakness. Spinal stimulator placed 2010. Pain pump placed in abdomen in ~ 2012.  2) Pain in mid back - This started June 2019. This occurred during physical therapy with an injury. He noted that there was a snapping sound during physical therapy and pain in the low and mid back. He was advised to have CAT scans by Dr. Lopez.   3) Pain in abdomen - middle and lower abdomen. He has significant constipation. Had a BM after enema. This started around 5/2019. It has gotten worse over the next few months. He is using Colace 100 mg q8, Senna 2 pills at night. Not using Miralax.He used Magnesium Citrate 1/2 bottle with some benefit. He has not used lactulose.  4) Pancreas cancer - Patient and son here today to discuss initiation of chemotherapy. His screening test for the [employer] trial was negative; that is Claudin 18.2 IHC was negative. Regardless, we will start him on standard of care gemcitabine + Abraxane. We reviewed the medications, side effects, purpose, and he agreed to sign a treatment consent. Will schedule to start on 10/17/19 C1D1. Will give as 3 weeks on then 1 week off schedule.    11/7/2019:  1)Pancreas CA: Patient is here for follow up accompanied with spouse and children. He is s/p one cycle of gemcitabine/abraxane. Due to thrombocytopenia and multiple adverse events, i.e fatigue, nausea and vomiting, C1D15 was held. Today he presents to the office follow up and is feeling "better". He received Decadron 8mg IV and IVF on 10/30/2019 which helped. The fatigue has improved. He is able to perform his routine/instrumental ADLs without any restrictions.   2) Diarrhea: Patient is having intermittent episodes of diarrhea. On 11/6/2019 had 3 episodes of "semi-formed" bowel movements however today had no bowel movements.  3)Abdominal pain: Patient is scheduled for PCA pump installation on Friday, 11/8/2019. He continues to have moderate-severe abdominal pain. He currently sees Dr. Rico and Dr. Lilly for pain control.     Disease: pancreas   Pathology: adenocarcinoma   TNM stage: T4, N0, M1   AJCC Stage: 4     diagnosed metastatic pancreatic adenocarcinoma. He was found to      have jaundice due to biliary obstruction. He was stented with some improvement      of his abdominal pain and bloating. CAT scanning found multiple liver metastases that      were biopsied. This confirmed stage 4 disease.             PDL1 TPS > 1            Foundation CDx result: KRAS G12D, CDKN2A/B loss, TMB = 3 Muts/Mb, MS-stable.            C1D1 Gemcitabine/Abraxane 10/17/2019      C1D8 10/24/2019      C1D15 held on 10/31/2019 due to thrombocytopenia.            Patient is here accompanied with spouse and children for follow s/p one cycle of      [place]/Abraxane. Patient tolerated C1 moderately well. On D15 developed      thrombocytopenia, plt count 70 K/uL and increase in fatigue, nausea/vomiting and      diarrhea. He was given decadron 8mg IV and IVF with excellent relief.      Today he feels much better and denies any of the above symptoms.            CBC with diff performed today demonstrated elevated WBC and Plt count. Will have      patient RTO on 11/8/2019 for repeat blood work to r/o underlying infection.            Patient is scheduled for C2D1 on 11/13/2019. Will likely dose reduce      gemcitabine/abraxane and keep patient on day 1,8, 15 cycle. 64 y/o PMH of traumatic work injury (2001) s/p multilumbar fusion (T8-S1) s/p hardware removal (2016, on daily opiates and methadone), chronic constipation, GERD, HTN, depression, anxiety, metastatic pancreatic adenocarcinoma s/p biliary stent placement (2m ago) sent in from [facility] for concern for biliary sepsis on 11/8. He was admitted on 9/17/19 for abd pain, weight loss 25 lbs for the prior 3 weeks and jaundice and found on CTAP w/ intrahepatic biliary ductal dilation and multiple hepatic lesions that were new and EUS/ERCP was + for malignant adenocarcinoma cancer cells and CT guided biopsy of R liver lesion + also for malignant adenocarcinoma w/ elevated CA 19-9 1832. Started on Gemcitabine +Abraxane on 10/17 for C1D1 for 3 wk on and 1 wk off but due to thrombocytopenia and fatigue, nausea and vomiting, C1D15 was held.  He went in for a check up appointment on 11/7 and the labs were significant for leukocytosis of >20 and the patient himself has been having increased amount of fatigue and weakness with decreased PO intake for the past 2 days. he reports no fevers, n/v/d/CP/SOB, cough, sick contacts or recent travels. Ever since his pancreatic adenocarcinoma diagnosis he has had  generalized abd pain and it has now worsened these last few days. Long standing history of LBP w/ extensive neurosurgical followup and pain managment doctors with currently Methadone 40 mg BID, Morphine ER 40 BID, dialudid 8mg x12 each day, spinal cord stimulator (2010) and pain pump in 2012 in abdomen which is no longer there. As per last oncology visit, he was scheduled for PCA pump installation on Friday, 11/8/2019 by Dr. Rico and Dr. Lilly for pain control.    In the ED: Afebrile, BP stable in sBP 130s, HR 90-100s, RR 18, SaO2 93-98% on RA  Labs: on admission showed 26.39 WBC now 21 WBC , N 82.4%, Hgb 10.9, Na 128 now 133, Cr  1.57 to 1.24, lactate 3.0  CXR shows RLL PNA vs possible atelectasis   EKG , no TWI or KEYSHAWN, QTc 477  Given Vanco and Zosyn, 2.7 L of NS, dilaudid IV 1mg x2, 16mg PO, reglan 10 and famotdine 20 62 y/o PMH of traumatic work injury (2001) s/p multilumbar fusion (T8-S1) s/p hardware removal (2016, on daily opiates and methadone), chronic constipation, GERD, HTN, depression, anxiety, metastatic pancreatic adenocarcinoma s/p biliary stent placement (2m ago) and chemo sent in from [facility] for concern for biliary sepsis on 11/8. He was admitted on 9/17/19 for abd pain, weight loss 25 lbs for the prior 3 weeks and jaundice and found on CTAP w/ intrahepatic biliary ductal dilation and multiple hepatic lesions that were new and EUS/ERCP was + for malignant adenocarcinoma cancer cells and CT guided biopsy of R liver lesion + also for malignant adenocarcinoma w/ elevated CA 19-9 1832. Started on Gemcitabine +Abraxane on 10/17 for C1D1 for 3 wk on and 1 wk off but due to thrombocytopenia and fatigue, nausea and vomiting, C1D15 was held.  He went in for a check up appointment on 11/7 and the labs were significant for leukocytosis of >20 and the patient himself has been having increased amount of fatigue and weakness with decreased PO intake for the past 2 days. he reports no fevers, n/v/d/CP/SOB, cough, sick contacts or recent travels. Ever since his pancreatic adenocarcinoma diagnosis he has had  generalized abd pain and it has now worsened these last few days. Long standing history of LBP w/ extensive neurosurgical followup and pain managment doctors with currently Methadone 40 mg BID, Morphine ER 40 BID, dialudid 8mg x12 each day, spinal cord stimulator (2010) and pain pump in 2012 in abdomen which is no longer there. As per last oncology visit, he was scheduled for PCA pump installation on Friday, 11/8/2019 by Dr. Rico and Dr. Lilly for pain control.    In the ED: Afebrile, BP stable in sBP 130s, HR 90-100s, RR 18, SaO2 93-98% on RA  Labs: on admission showed 26.39 WBC now 21 WBC , N 82.4%, Hgb 10.9, Na 128 now 133, Cr  1.57 to 1.24, lactate 3.0  CXR shows RLL PNA vs possible atelectasis   EKG , no TWI or KEYSHAWN, QTc 477  Given Vanco and Zosyn, 2.7 L of NS, dilaudid IV 1mg x2, 16mg PO, reglan 10 and famotdine 20

## 2019-11-09 NOTE — H&P ADULT - PROBLEM SELECTOR PLAN 9
DVT ppx: hep sub q in case there is need for any surgical interventions  MOLST given to patient, hasn't had GOC before and will discuss with family, still currently full code

## 2019-11-09 NOTE — H&P ADULT - NSHPOUTPATIENTPROVIDERS_GEN_ALL_CORE
Onco ([facility]): Dr. Ward   GI: Dr. Deepak Day [phone]  Neurosurg: Dr. Adama Reyes [phone]  PCP: Dr. Adama Fernández [phone]

## 2019-11-09 NOTE — H&P ADULT - ASSESSMENT
64 y/o PMH of traumatic work injury (2001) s/p multilumbar fusion (T8-S1) s/p hardware removal (2016, on daily opiates and methadone), chronic constipation, GERD, HTN, depression, anxiety, metastatic pancreatic adenocarcinoma s/p biliary stent placement (9/17) sent in from [facility] for concern for biliary sepsis on 11/8 62 y/o PMH of traumatic work injury (2001) s/p multilumbar fusion (T8-S1) s/p hardware removal (2016, on daily opiates and methadone), chronic constipation, GERD, HTN, DMII (A1c 6.8), depression, anxiety, metastatic pancreatic adenocarcinoma s/p biliary stent placement (9/17) sent in from [facility] for concern for biliary sepsis on 11/8

## 2019-11-09 NOTE — CHART NOTE - NSCHARTNOTEFT_GEN_A_CORE
Seferino Day | Reference #: [id number]    Others' Prescriptions  Patient Name:	Lucian Ortiz	YOB: 1955  Address:	[address] [place] [address]	Sex:	Male  Rx Written	Rx Dispensed	Drug	Quantity	Days Supply	Prescriber Name  10/31/2019	11/01/2019	fentanyl 100 mcg/hr patch	10	20	Johnson, Tram PA  10/31/2019	10/31/2019	diazepam 2 mg tablet	45	15	Johnson, Tram PA  10/31/2019	10/31/2019	hydromorphone 8 mg tablet	180	15	Johnson, Tram PA  10/31/2019	10/31/2019	methadone hcl 10 mg tablet	120	15	Johnson, Tram PA  10/16/2019	10/17/2019	fentanyl 50 mcg/hr patch	10	20	Johnson, Tram PA  10/16/2019	10/16/2019	methadone hcl 10 mg tablet	120	15	Johnson, Tram PA  10/16/2019	10/16/2019	hydromorphone 8 mg tablet	180	15	Johnson, Tram PA  10/16/2019	10/16/2019	diazepam 2 mg tablet	45	15	Johnson, Tram PA  10/03/2019	10/14/2019	methadone hcl 10 mg tablet	120	15	Johnson, Tram PA  10/02/2019	10/03/2019	hydromorphone 8 mg tablet	180	15	Johnson, Tram PA  10/02/2019	10/03/2019	morphine sulfate ir 30 mg tab	75	15	Johnson, Tram PA  10/02/2019	10/03/2019	diazepam 2 mg tablet	45	15	Johnson, Tram PA  10/01/2019	10/02/2019	diazepam 2 mg tablet	6	2	[facility]  10/01/2019	10/02/2019	methadone hcl 5 mg tablet	36	2	[facility]  10/01/2019	10/02/2019	morphine sulf er 15 mg tablet	20	2	[facility]  10/01/2019	10/02/2019	hydromorphone 4 mg tablet	64	2	[facility]  09/17/2019	09/17/2019	methadone hcl 10 mg tablet	120	15	Johnson, Tram PA  09/17/2019	09/17/2019	morphine sulfate ir 30 mg tab	75	15	Johnson, Tram PA  09/17/2019	09/17/2019	diazepam 2 mg tablet	45	15	Johnson, Tram PA  09/03/2019	09/03/2019	methadone hcl 10 mg tablet	120	15	Johnson, Tram PA  09/03/2019	09/03/2019	morphine sulfate ir 30 mg tab	75	15	Johnson, Tram PA  09/03/2019	09/03/2019	diazepam 2 mg tablet	45	15	Johnson, Tram PA  08/19/2019	08/19/2019	morphine sulfate ir 30 mg tab	75	15	Johnson, Tram PA  08/19/2019	08/19/2019	methadone hcl 10 mg tablet	120	15	Johnson, Tram PA  08/19/2019	08/19/2019	diazepam 2 mg tablet	45	15	Johnson, Tram PA  08/05/2019	08/05/2019	methadone hcl 10 mg tablet	120	15	Johnson, Tram PA  08/05/2019	08/05/2019	diazepam 2 mg tablet	45	15	Johnson, Tram PA  08/05/2019	08/05/2019	morphine sulfate ir 30 mg tab	75	15	Johnson, Tram PA  07/23/2019	07/24/2019	methadone hcl 10 mg tablet	120	15	Johnson, Tram PA  07/23/2019	07/24/2019	diazepam 2 mg tablet	45	15	Johnson, Tram PA  07/23/2019	07/24/2019	morphine sulfate ir 30 mg tab	75	15	Johnson, Tram PA  07/09/2019	07/10/2019	methadone hcl 10 mg tablet	120	15	Johnson, Tram PA  07/09/2019	07/10/2019	diazepam 2 mg tablet	45	15	Johnson, Tram PA  07/09/2019	07/10/2019	morphine sulfate ir 30 mg tab	75	15	Johnson, Tram PA  06/25/2019	06/25/2019	diazepam 2 mg tablet	45	15	Hadi, Abdussami  06/25/2019	06/25/2019	morphine sulfate ir 30 mg tab	75	15	Hadi, Abdussami  06/25/2019	06/25/2019	methadone hcl 10 mg tablet	120	15	Hadi, Abdussami  06/10/2019	06/11/2019	diazepam 2 mg tablet	45	15	Johnson, Tram PA  06/10/2019	06/11/2019	methadone hcl 10 mg tablet	120	15	Johnson, Tram PA  06/10/2019	06/11/2019	morphine sulfate ir 30 mg tab	75	15	Johnson, Tram PA  05/28/2019	05/28/2019	morphine sulfate ir 30 mg tab	75	15	Johnson, Tram PA  05/28/2019	05/28/2019	methadone hcl 10 mg tablet	120	15	Johnson, Tram PA  05/28/2019	05/28/2019	diazepam 2 mg tablet	45	15	Johnson, Tram PA  05/09/2019	05/10/2019	morphine sulfate ir 30 mg tab	75	15	Johnson, Tram PA  05/09/2019	05/10/2019	diazepam 2 mg tablet	45	15	Johnson, Tram PA  05/09/2019	05/10/2019	methadone hcl 10 mg tablet	80	10	Johnson, Tram PA  05/01/2019	05/01/2019	methadone hcl 5 mg tablet	112	7	Johnson, Tram PA  04/25/2019	04/25/2019	morphine sulfate ir 30 mg tab	75	15	Geovanna, umail  04/25/2019	04/25/2019	diazepam 2 mg tablet	45	15	Geovanna, [facility]  04/25/2019	04/25/2019	methadone hcl 5 mg tablet	112	7	Geovanna, [facility]  04/11/2019	04/11/2019	methadone hcl 10 mg tablet	120	15	Geovanna, [facility]  04/11/2019	04/11/2019	morphine sulfate ir 30 mg tab	75	15	Geovanna, [facility]  04/11/2019	04/11/2019	diazepam 2 mg tablet	45	15	Geovanna, [facility]  03/28/2019	04/02/2019	methadone hcl 10 mg tablet	120	15	Johnson, Tram PA  03/28/2019	03/29/2019	morphine sulfate ir 30 mg tab	75	15	Johnson, Tram PA  03/28/2019	03/29/2019	diazepam 2 mg tablet	45	15	Johnson, Tram PA  03/12/2019	03/15/2019	diazepam 2 mg tablet	45	15	Johnson, Tram PA  03/12/2019	03/15/2019	morphine sulfate ir 30 mg tab	75	15	Johnson, Tram PA  02/28/2019	03/05/2019	methadone hcl 10 mg tablet	120	15	Johnson, Tram PA  02/27/2019	02/28/2019	diazepam 2 mg tablet	45	15	Johnson, Tram PA  02/28/2019	02/28/2019	morphine sulfate ir 30 mg tab	75	15	Johnson, Tram PA  02/11/2019	02/13/2019	methadone hcl 10 mg tablet	120	15	Johnson, Tram PA  02/11/2019	02/13/2019	diazepam 2 mg tablet	45	15	Johnson, Tram PA  02/11/2019	02/13/2019	morphine sulfate ir 30 mg tab	75	15	Johnson, Tram PA  01/29/2019	02/11/2019	methadone hcl 10 mg tablet	120	15	Johnson, Tram PA  01/29/2019	01/31/2019	diazepam 2 mg tablet	45	15	Johnson, Tram PA  01/29/2019	01/31/2019	morphine sulfate ir 30 mg tab	75	15	Johnson, Tram PA  01/31/2019	01/31/2019	methadone hcl 5 mg tablet	80	5	Kory Austin  01/15/2019	01/17/2019	diazepam 2 mg tablet	45	15	Johnson, Tram PA  01/15/2019	01/17/2019	morphine sulfate ir 30 mg tab	75	15	Johnson, Tram PA  01/17/2019	01/17/2019	methadone hcl 5 mg tablet	240	15	Johnson, Tram PA  01/02/2019	01/03/2019	diazepam 2 mg tablet	45	15	Johnson, Tram PA  01/02/2019	01/03/2019	methadone hcl 10 mg tablet	120	15	Johnson, Tram PA  01/02/2019	01/03/2019	morphine sulfate ir 30 mg tab	75	15	Johnson, Tram PA  12/17/2018	12/18/2018	morphine sulfate ir 15 mg tab	150	15	Johnson, Tram PA  12/17/2018	12/18/2018	methadone hcl 10 mg tablet	120	15	Johnson, Tram PA  12/17/2018	12/18/2018	diazepam 2 mg tablet	45	15	Johnson, Tram PA  12/11/2018	12/11/2018	morphine sulfate ir 15 mg tab	62	7	Johnson, Tram PA  12/08/2018	12/08/2018	morphine sulfate ir 30 mg tab	14	2	Johnson, Tram PA  11/21/2018	12/03/2018	methadone hcl 10 mg tablet	120	15	Johnson, Tram PA  11/21/2018	11/24/2018	diazepam 2 mg tablet	45	15	Johnson, Tram PA  11/21/2018	11/24/2018	morphine sulfate ir 30 mg tab	75	15	Johnson, Tram PA  Patient Name:	Lucian Ortiz	YOB: 1955  Address:	[address] [place] [address]	Sex:	Male  Rx Written	Rx Dispensed	Drug	Quantity	Days Supply	Prescriber Name  03/14/2019	03/15/2019	methadone hcl 10 mg tablet	120	15	Johnson, Tram PA  11/10/2018	11/19/2018	methadone hcl 10 mg tablet	120	15	Johnson, Tram PA  11/10/2018	11/10/2018	diazepam 2 mg tablet	45	15	Johnson, Tram PA  11/10/2018	11/10/2018	morphine sulfate ir 30 mg tab	75	15	Johnson, Tram PA

## 2019-11-09 NOTE — H&P ADULT - NSICDXPASTMEDICALHX_GEN_ALL_CORE_FT
PAST MEDICAL HISTORY:  Anxiety and depression     Diastolic dysfunction stage I    Empyema lung 11/2015 left lung, s/p VATS, decortication    H/O peptic ulcer over 10 years ago    Herniated Disc S/P work injury 2001    History of diabetes mellitus, type II     history of renal calculus     Hypertension Dx: 2002    Postlaminectomy Syndrome     Spinal Stenosis

## 2019-11-09 NOTE — CHART NOTE - NSCHARTNOTEFT_GEN_A_CORE
CXR with likely RLL pneumonia. Minimal concern for biliary obstruction given bilirubin of 0.9. Spoke with covering NP - no need for GI consult at this time.     Case discussed with on-call GI attending.    Please call GI ([phone]) if there are any additional questions or concerns. Please call on-call GI fellow after 5pm and before 8am, and on weekends.

## 2019-11-09 NOTE — H&P ADULT - PROBLEM SELECTOR PLAN 1
Leukocytosis to the 20s, tachycardic to 100s and severe abdominal and back pain  -concerns for possible intrabdominal etiology including SBP, biliary cholangitis/cholecysitis  -c/w broad spec abx w/ zosyn Leukocytosis to the 20s, tachycardic to 100s and severe abdominal and back pain, UA neg  -concerns for possible RLL PNA vs intraabdominal etiology including SBP less likely biliary cholangitis/ cholecystitis with normal bilirubin and elevated ALP   -c/w broad spec abx w/ zosyn for now   -f/u BCx2 and UCx  -f/u CT Chest w/o contrast for now, has a prior empyema history in 2015 s/p VATs and may need CT Chest w/ contrast if a concern Leukocytosis to the 20s, tachycardic to 100s and severe abdominal and back pain (most likely similar pain), UA neg  -concerns for possible RLL PNA vs intraabdominal etiology including SBP less likely biliary cholangitis/ cholecystitis with normal bilirubin and elevated ALP   -c/w broad spec abx w/ zosyn for now   -f/u BCx2 and UCx  -f/u CT Chest w/o contrast for now, has a prior empyema history in 2015 s/p VATs and may need CT Chest w/ contrast if a concern  -f/u US abd for ascites and RUQ etiologies, unlikely it is   -order CT AP w/ IV contrast and PO contrast once MERRILL resolves to eval  -GI doesn't think it is intraabdominal etiology and to reconsult if needed for eval Leukocytosis to the 20s, tachycardic to 100s and severe abdominal and back pain (most likely similar pain), UA neg  -concerns for possible RLL PNA vs intraabdominal etiology including SBP less likely biliary cholangitis/ cholecystitis with normal bilirubin and elevated ALP   -c/w broad spec abx w/ zosyn for now   -f/u BCx2 and UCx  -f/u CT Chest w/o contrast for now, has a prior empyema history in 2015 s/p VATs and may need CT Chest w/ contrast if a concern  -f/u US abd for ascites and RUQ etiologies, unlikely it is   -order CT AP w/ IV contrast and PO contrast once MERRILL resolves to eval  -GI doesn't think it is intraabdominal etiology and to reconsult if needed for eval  -consider ID consult if worsening Leukocytosis to the 20s, tachycardic to 100s and severe abdominal and back pain (most likely similar pain), UA neg, RVP neg  -concerns for possible RLL PNA vs intraabdominal etiology including SBP less likely biliary cholangitis/ cholecystitis with normal bilirubin and elevated ALP   -c/w broad spec abx w/ zosyn for now   -f/u BCx2 and UCx  -f/u CT Chest w/o contrast for now, has a prior empyema history in 2015 s/p VATs and may need CT Chest w/ contrast if a concern  -f/u US abd for ascites and RUQ etiologies, unlikely it is   -order CT AP w/ IV contrast and PO contrast once MERRILL resolves to eval  -GI doesn't think it is intraabdominal etiology and to reconsult if needed for eval  -consider ID consult if worsening, would possibly be able to be transitioned to PO levaquin if stable Leukocytosis to the 20s, tachycardic to 100s and severe abdominal and back pain (most likely similar pain), UA neg, RVP neg, elevated ALP, bilirubin wnl, lactate downtrended 3 to 1  -concerns for possible RLL PNA vs intraabdominal etiology including SBP less likely biliary cholangitis/ cholecystitis with normal bilirubin and elevated ALP   -c/w broad spec abx w/ zosyn for now   -f/u BCx2 and UCx  -f/u CT Chest w/o contrast for now, has a prior empyema history in 2015 s/p VATs and may need CT Chest w/ contrast if a concern  -f/u US abd for ascites and RUQ etiologies, unlikely it is   -order CT AP w/ IV contrast and PO contrast once MERRILL resolves to eval  -GI doesn't think it is intraabdominal etiology and to reconsult if needed for eval  -consider ID consult if worsening, would possibly be able to be transitioned to PO levaquin if stable

## 2019-11-09 NOTE — H&P ADULT - PROBLEM SELECTOR PLAN 5
A1c 6.8 (9/2019), never been diagnosed before, possibly related to pancreatic adenocarcinoma  -ISS and FS for now and monitor was on cymblata 60 BID   -EKG QTc 477 on admission  -can c/w cymblata

## 2019-11-09 NOTE — H&P ADULT - NSHPPHYSICALEXAM_GEN_ALL_CORE
General: NAD, well nourished, appears stated age  HEENT:  Head: NT, AT  Eyes: EOMI, scleara non-icteric, conjunctiva clear, PERRLA, visual fields full to confrontration   Ears: hearing intact b/l  Nose: no discharge, obstruction, non-deviated  Mouth: no exudates, injected in pharynx, uvula midlines   Neck: Supple, No JVD, no carotid bruits no lymphadenopathy, no thyroidomegaly  Cardiac: RRR, S1 S2, No M/R/G, PMI in 5th IC  Pulmonary: CTA b/l, Breathing unlabored, No Rhonchi/Rales/Wheezing, diaphragm moves symmetrically b/l  Abdomen: Soft, Non -tender, +BS x 4 quads, no hepatomegaly or splenomegaly  Back: straight, no step-offs, no kyphosis, no CVA  Extremities: Warm, nontender, No Rashes, lesions, bruises, No pitting edema, venous stasis  Neuro: AO x 3, No focal deficits, CNII-CNXII grossly intact, Motor: strength 5/5 b/l UE and LE. Sensory: touch and pinprick sensation intact b/l. Cerebellar: no dysmetria, no tremor  Reflexes: 2+ b/l in LE and UE, no Babinski sign b/l General: in mild distress, resting, AOx4  HEENT:  Head: NT, AT  Eyes: EOMI, scleara icteric  Ears: hearing intact b/l  Nose: no discharge, obstruction, non-deviated  Mouth: no exudates, injected in pharynx, uvula midlines   Neck: Supple, No JVD, no carotid bruits no lymphadenopathy, no thyroidomegaly  Cardiac: RRR, S1 S2, No M/R/G  Pulmonary: Crackles and rhonchi mostly in the bases  Abdomen: Soft, tender and guarding throughout the abdomen   Back: tender to palpation especially in the upper back which a scoliosis bend  Extremities: Warm, nontender, No Rashes, lesions, bruises, No pitting edema, venous stasis  Neuro: AO x 4, No focal deficits, CNII-CNXII grossly intact,   Reflexes: 2+ b/l in LE and UE, no Babinski sign b/l

## 2019-11-09 NOTE — H&P ADULT - PROBLEM SELECTOR PLAN 2
newly diagnosed metastatic pancreatic cancer w/ liver mets (9/2019) s/p gem+abraxane (unable to complete full cycle)  -oncology aware of patient and appreciate their recs MERRILL 1.57 to 1.24  -most likely pre-renal and improved after IVF  -started on  cc/hr MERRILL 1.57 to 1.24, baseline Cr 0.8  -most likely pre-renal and improved after IVF  -started on  cc/hr for 20 hrs for now  -monitor Cr daily  -avoid nephrotoxins and NSAIDs

## 2019-11-09 NOTE — CHART NOTE - NSCHARTNOTEFT_GEN_A_CORE
Pain clinic closed today, attempted to call at [phone] in order to contact Dr. Johnson who has been prescribing his pain meds at [facility]. Notified pharmacy of this and awaiting their own approval and validation for his home methadone. Patient has been previously on it and is currently on per [employer] Reference #: [id number] methadone hcl 10 mg tablet	120 quanity	15 days	Elsy Johnson Pain clinic closed today, attempted to call at [phone] in order to contact Dr. Elsy Johnson who has been prescribing his pain meds at [facility] and had to leave  and answering service. Notified pharmacy of this and awaiting their own approval and validation for his home methadone. Patient has been previously on it and is currently on per [employer] Reference #: [id number] methadone hcl 10 mg tablet	120 quanity	15 days	Elsy Johnson Pain clinic closed today, attempted to call at [phone] in order to contact Dr. Elsy Johnson who has been prescribing his pain meds at [facility] and spoke to the on call covering Dr. Austin. He said Dr. Lilly and the [facility] has had this patient on methadone 10mg 2 tabs q6h, dilaudid 8mg 2 tabs q4h and fenatyl patch 100mcg patch every 2 days.  Patient has been previously on it and is currently on per [employer] Reference #: [id number] methadone hcl 10 mg tablet	120 quanity	15 days	Elsy Johnson

## 2019-11-09 NOTE — H&P ADULT - PROBLEM SELECTOR PLAN 7
-start on protonix   -c/w home simethicone and sulfucrate hold off home BP meds while currently in sepsis  -on HCTZ and clondine 0.1 hold off home BP meds while currently in sepsis  -on HCTZ and clondine 0.1 at home  -c/w clondine for now and hold off HCTZ

## 2019-11-09 NOTE — CONSULT NOTE ADULT - ASSESSMENT
64 y/o PMH of traumatic work injury (2001) s/p multilumbar fusion (T8-S1) s/p hardware removal (2016, on daily opiates and methadone), chronic constipation, GERD, HTN, DMII (A1c 6.8), depression, anxiety, metastatic pancreatic adenocarcinoma s/p biliary stent placement (9/17), on gemcitabine/abraxone (last dose 10/24/19) sent in from [facility] for concern for biliary sepsis on 11/8/19.    #r/o sepsis  -concern for biliary source due to highly elevated WBC count from baseline and recently placed stent  -electrolytes and anion gap improving after IV fluids and antibotics administered in ER; would repeat lactate (elevated on admission at at [facility])  -f/u blood cultures; agree with Zosyn  -renal function improving, continue to monitor, renally dose meds  -no plan for inpatient chemotherapy at this time  -would repeat CT abdomen / pelvis with IV contrast  -suspect CXR finding likely atelectasis; no symptoms of PNA on exam, and would be covered by Zosyn; continue to monitor clinically    #Pancreatic adenocarcinoma, Stage IV  -holding chemotherapy while admitted  -patient on complex pain regimen including methadone, dilaudid, and fentanyl; pain control per primary team; please ensure patient is on bowel regimen as well to avoid constipation  -repeat imaging as above  -will continue to follow      Maciej Howard MD, MPH  Hematology / Oncology, PGY5  p

## 2019-11-09 NOTE — H&P ADULT - NSHPLABSRESULTS_GEN_ALL_CORE
LABS:                        9.7    21.43 )-----------( 563      ( 2019 06:47 )             29.6         133<L>  |  90<L>  |  41<H>  ----------------------------<  147<H>  4.6   |  31  |  1.24    Ca    8.7      2019 06:47  Phos  2.6       Mg     2.1         TPro  7.3  /  Alb  2.8<L>  /  TBili  0.9  /  DBili  x   /  AST  26  /  ALT  28  /  AlkPhos  185<H>    PT/INR - ( 2019 23:01 )   PT: 15.7 sec;   INR: 1.35 ratio    PTT - ( 2019 23:01 )  PTT:29.8 sec  Urinalysis Basic - ( 2019 00:33 )    Color: Yellow / Appearance: Clear / S.021 / pH: x  Gluc: x / Ketone: Negative  / Bili: Negative / Urobili: Negative   Blood: x / Protein: 30 mg/dL / Nitrite: Negative   Leuk Esterase: Negative / RBC: 12 /hpf / WBC 2 /HPF   Sq Epi: x / Non Sq Epi: 0 /hpf / Bacteria: Negative      RADIOLOGY & ADDITIONAL TESTS:  < from: Xray Chest 2 Views PA/Lat (19 @ 00:58) >      IMPRESSION: Probable right lower lobe pneumonia. A follow-up chest   radiograph is suggested after treatment.    < end of copied text >      Imaging Personally Reviewed:  Yes LABS:                        9.7    21.43 )-----------( 563      ( 2019 06:47 )             29.6         133<L>  |  90<L>  |  41<H>  ----------------------------<  147<H>  4.6   |  31  |  1.24    Ca    8.7      2019 06:47  Phos  2.6       Mg     2.1         TPro  7.3  /  Alb  2.8<L>  /  TBili  0.9  /  DBili  x   /  AST  26  /  ALT  28  /  AlkPhos  185<H>    PT/INR - ( 2019 23:01 )   PT: 15.7 sec;   INR: 1.35 ratio    PTT - ( 2019 23:01 )  PTT:29.8 sec  Urinalysis Basic - ( 2019 00:33 )    Color: Yellow / Appearance: Clear / S.021 / pH: x  Gluc: x / Ketone: Negative  / Bili: Negative / Urobili: Negative   Blood: x / Protein: 30 mg/dL / Nitrite: Negative   Leuk Esterase: Negative / RBC: 12 /hpf / WBC 2 /HPF   Sq Epi: x / Non Sq Epi: 0 /hpf / Bacteria: Negative      RADIOLOGY & ADDITIONAL TESTS:  < from: Xray Chest 2 Views PA/Lat (19 @ 00:58) >      IMPRESSION: Probable right lower lobe pneumonia. A follow-up chest   radiograph is suggested after treatment.    < end of copied text >    Imaging Personally Reviewed:  Yes

## 2019-11-09 NOTE — H&P ADULT - PROBLEM SELECTOR PLAN 8
DVT ppx: hep sub q in case there is need for any surgical interventions  MOLST given to patient, hasn't had GOC before and will discuss with family, still currently full code -start on protonix   -c/w home simethicone and sulfucrate -dietian consult, albumin low f/u recs on soft diet and low fat for now  -c/w home simethicone and sulfucrate  -started on famotidine for now

## 2019-11-09 NOTE — CONSULT NOTE ADULT - SUBJECTIVE AND OBJECTIVE BOX
Hematology Consult Note    HPI:  62 y/o PMH of traumatic work injury () s/p multilumbar fusion (T8-S1) s/p hardware removal (, on daily opiates and methadone), chronic constipation, GERD, HTN, depression, anxiety, metastatic pancreatic adenocarcinoma s/p biliary stent placement (2m ago) and chemo sent in from [facility] for concern for biliary sepsis on . He was admitted on 19 for abd pain, weight loss 25 lbs for the prior 3 weeks and jaundice and found on CTAP w/ intrahepatic biliary ductal dilation and multiple hepatic lesions that were new and EUS/ERCP was + for malignant adenocarcinoma cancer cells and CT guided biopsy of R liver lesion + also for malignant adenocarcinoma w/ elevated CA 19-9 1832. Started on Gemcitabine +Abraxane on 10/17 for C1D1 for 3 wk on and 1 wk off but due to thrombocytopenia and fatigue, nausea and vomiting, C1D15 was held.  He went in for a check up appointment on  and the labs were significant for leukocytosis of >20 and the patient himself has been having increased amount of fatigue and weakness with decreased PO intake for the past 2 days. he reports no fevers, n/v/d/CP/SOB, cough, sick contacts or recent travels. Ever since his pancreatic adenocarcinoma diagnosis he has had  generalized abd pain and it has now worsened these last few days. Long standing history of LBP w/ extensive neurosurgical followup and pain managment doctors with currently Methadone 40 mg BID, Morphine ER 40 BID, dialudid 8mg x12 each day, spinal cord stimulator () and pain pump in  in abdomen which is no longer there. As per last oncology visit, he was scheduled for PCA pump installation on Friday, 2019 by Dr. Rico and Dr. Lilly for pain control.    In the ED: Afebrile, BP stable in sBP 130s, HR 90-100s, RR 18, SaO2 93-98% on RA  Labs: on admission showed 26.39 WBC now 21 WBC , N 82.4%, Hgb 10.9, Na 128 now 133, Cr  1.57 to 1.24, lactate 3.0  CXR shows RLL PNA vs possible atelectasis   EKG , no TWI or KEYSHAWN, QTc 477  Given Vanco and Zosyn, 2.7 L of NS, dilaudid IV 1mg x2, 16mg PO, reglan 10 and famotdine 20 (2019 09:11)      PAST MEDICAL & SURGICAL HISTORY:  History of diabetes mellitus, type II  Anxiety and depression  Diastolic dysfunction: stage I  Empyema lun2015 left lung, s/p VATS, decortication  H/O peptic ulcer: over 10 years ago  history of renal calculus  Herniated Disc: S/P work injury   Hypertension: Dx:   Spinal Stenosis  Postlaminectomy Syndrome  S/P  shunt  S/P insertion of spinal cord stimulator:   History of lumbar fusion:   Insertion of Intrathecal Dilaudid Pump: 2011  S/P Spinal Surgery: corrective lumbar fusion   S/P Knee Replacement: left knee   S/P Arthroscopy of Left Knee  S/P Tonsillectomy: childhood  Ankle Fracture: s/p ORIF left ankle       FAMILY HISTORY:  No pertinent family history in first degree relatives      MEDICATIONS  (STANDING):  bisacodyl 5 milliGRAM(s) Oral at bedtime  cloNIDine 0.1 milliGRAM(s) Oral two times a day  dextrose 5%. 1000 milliLiter(s) (50 mL/Hr) IV Continuous <Continuous>  dextrose 50% Injectable 12.5 Gram(s) IV Push once  dextrose 50% Injectable 25 Gram(s) IV Push once  dextrose 50% Injectable 25 Gram(s) IV Push once  DULoxetine 60 milliGRAM(s) Oral daily  enoxaparin Injectable 40 milliGRAM(s) SubCutaneous daily  fentaNYL   Patch 100 MICROgram(s)/Hr 1 Patch Transdermal every 72 hours  influenza   Vaccine 0.5 milliLiter(s) IntraMuscular once  insulin lispro (HumaLOG) corrective regimen sliding scale   SubCutaneous three times a day before meals  insulin lispro (HumaLOG) corrective regimen sliding scale   SubCutaneous at bedtime  methadone    Tablet 40 milliGRAM(s) Oral two times a day  piperacillin/tazobactam IVPB.. 3.375 Gram(s) IV Intermittent every 8 hours  polyethylene glycol 3350 17 Gram(s) Oral two times a day  senna 2 Tablet(s) Oral at bedtime  sodium chloride 0.9%. 1000 milliLiter(s) (100 mL/Hr) IV Continuous <Continuous>  sucralfate suspension 1 Gram(s) Oral four times a day    MEDICATIONS  (PRN):  dextrose 40% Gel 15 Gram(s) Oral once PRN Blood Glucose LESS THAN 70 milliGRAM(s)/deciliter  diazepam    Tablet 2 milliGRAM(s) Oral every 8 hours PRN anxiety  glucagon  Injectable 1 milliGRAM(s) IntraMuscular once PRN Glucose LESS THAN 70 milligrams/deciliter  HYDROmorphone   Tablet 12 milliGRAM(s) Oral every 3 hours PRN Severe Pain (7 - 10)  simethicone 80 milliGRAM(s) Chew every 8 hours PRN Gas      Allergies    No Known Allergies    Intolerances        SOCIAL HISTORY: No EtOH, no tobacco    REVIEW OF SYSTEMS:    CONSTITUTIONAL: No weakness, fevers or chills  EYES/ENT: No visual changes;  No vertigo or throat pain   NECK: No pain or stiffness  RESPIRATORY: No cough, wheezing, hemoptysis; No shortness of breath  CARDIOVASCULAR: No chest pain or palpitations  GASTROINTESTINAL: abdominal pain as in HPI; denies constipation.  GENITOURINARY: No dysuria, frequency or hematuria  NEUROLOGICAL: No numbness or weakness  SKIN: No itching, burning, rashes, or lesions   All other review of systems is negative unless indicated above.    Height (cm): 177.8 ( @ :36)  Weight (kg): 86.2 ( @ :36)  BMI (kg/m2): 27.3 (:36)  BSA (m2): 2.04 (:36)    T(F): 97.9 (19 @ 12:15), Max: 98.1 (19 @ 06:19)  HR: 88 (19 @ 12:15)  BP: 106/64 (19 @ 12:15)  RR: 18 (19 @ 12:15)  SpO2: 93% (19 @ 12:15)  Wt(kg): --    GENERAL: moderate distress, hypoactive  HEAD:  Atraumatic, Normocephalic  EYES: EOMI, PERRLA, conjunctiva and sclera clear  NECK: Supple, No JVD  CHEST/LUNG: Clear to auscultation bilaterally; No wheeze  HEART: Regular rate and rhythm; No murmurs, rubs, or gallops  ABDOMEN: Soft, tender to palpation with rebound tenderness, no guarding, bowel sounds quiet  EXTREMITIES:  2+ Peripheral Pulses, No clubbing, cyanosis, or edema  NEUROLOGY: non-focal  SKIN: No rashes or lesions                          9.7    21.43 )-----------( 563      ( 2019 06:47 )             29.6           133<L>  |  90<L>  |  41<H>  ----------------------------<  147<H>  4.6   |  31  |  1.24    Ca    8.7      2019 06:47  Phos  2.6       Mg     2.1         TPro  6.3  /  Alb  2.7<L>  /  TBili  0.9  /  DBili  0.5<H>  /  AST  31  /  ALT  25  /  AlkPhos  183<H>        Magnesium, Serum: 2.1 mg/dL ( @ 06:47)  Phosphorus Level, Serum: 2.6 mg/dL ( @ 06:47)    < from: Xray Chest 2 Views PA/Lat (19 @ 00:58) >    EXAM:  XR CHEST PA LAT 2V                            PROCEDURE DATE:  2019            INTERPRETATION:  HISTORY: Sepsis    TECHNIQUE: PA and lateral views of the chest    COMPARISON: 2019     FINDINGS:  The cardiac silhouette is normal insize. There is a   right-sided Mediport with its tip overlying the superior vena cava. There   is a patchy airspace opacity in the right lower lobe, likely representing   right lower lobe pneumonia. No pleural effusions are seen. The hilar and   mediastinal structures appear unremarkable. The osseous structures are   intact.    IMPRESSION: Probable right lower lobe pneumonia. A follow-up chest   radiograph is suggested after treatment.    ESTUARDO LANDAVERDE M.D., ATTENDING RADIOLOGIST  This document has been electronically signed. 2019  7:38AM        < end of copied text >

## 2019-11-09 NOTE — H&P ADULT - PROBLEM SELECTOR PLAN 6
hold off home BP meds while currently in sepsis  -on HCTZ and clondine 0.1 A1c 6.8 (9/2019), never been diagnosed before, possibly related to pancreatic adenocarcinoma  -ISS and FS for now and monitor A1c 7.7, never been diagnosed before, possibly related to pancreatic adenocarcinoma, given metformin on the last admission  -ISS and FS for now and monitor

## 2019-11-09 NOTE — H&P ADULT - PROBLEM SELECTOR PLAN 3
-extensive neurosurgery history w/ multilevel fusion and pain control issues  - -extensive neurosurgery history w/ multilevel fusion and pain control issues  -ISTOP Reference #: [id number]  -c/w home fentanyl 100 mcg q72 hrs and dilaudid 12mg q3h PRN for severe pain  -ordered methadone and awaiting approval from pharmacy w/ methadone 40 BID  (methadone hcl 10 mg tablet 120 pills	15days)  -f/u pain consult -extensive neurosurgery history w/ multilevel fusion and pain control issues  -ISTOP Reference #: [id number]  -c/w home fentanyl 100 mcg q72 hrs and dilaudid 12mg q3h PRN for severe pain  -ordered methadone and awaiting approval from pharmacy w/ methadone 40 BID  (methadone hcl 10 mg tablet 120 pills	15days)  -f/u chronic pain consult, wanted to monitor for home meds and consider PCA pump and fentayl  -started on extensive bowel regimen (miralax, dulcusate, colace and senna) -extensive neurosurgery history w/ multilevel fusion and pain control issues  -ISTOP Reference #: [id number]  -c/w home fentanyl 100 mcg q72 hrs and dilaudid 12mg q3h PRN for severe pain  -ordered methadone and awaiting approval from pharmacy w/ methadone 40 BID  (methadone hcl 10 mg tablet 120 pills	15days)  Pain clinic closed today, attempted to call at [phone] in order to contact Dr. Elsy Johnson who has been prescribing his pain meds at [facility] and had to leave VM and answering service. Notified pharmacy of this and awaiting their own approval and validation for his home methadone.   -f/u chronic pain consult, wanted to monitor for home meds and consider PCA pump and fentayl  -started on extensive bowel regimen (miralax, dulcusate, colace and senna) -extensive neurosurgery history w/ multilevel fusion and pain control issues  -ISTOP Reference #: [id number]  -c/w home fentanyl 100 mcg q72 hrs and dilaudid 12mg q3h PRN for severe pain  -ordered methadone and awaiting approval from pharmacy w/ methadone 40 BID  (methadone hcl 10 mg tablet 120 pills	15days)  [facility] clinic closed today, attempted to call at [phone] in order to contact Dr. Elsy Johnson who has been prescribing his pain meds at [facility] and spoke to the on call covering Dr. Austin. He said Dr. Lilly and the [facility] has had this patient on methadone 10mg 2 tabs q6h, dilaudid 8mg 2 tabs q4h and fenatyl patch 100mcg patch every 2 days.    -f/u chronic pain consult, wanted to monitor for home meds and consider PCA pump and fentayl  -started on extensive bowel regimen (miralax, dulolax and senna) colace not available anymore

## 2019-11-10 NOTE — PROGRESS NOTE ADULT - PROBLEM SELECTOR PLAN 3
newly diagnosed metastatic pancreatic cancer w/ liver mets (9/2019) s/p gem+abraxane (unable to complete full cycle)  -oncology aware of patient and appreciate their recs  -holding off chemo  -as per GI, unlikely stent is the issue or occluded since bilirubin is normal  - f/up abd CT  - pain mgt as above

## 2019-11-10 NOTE — PROGRESS NOTE ADULT - ASSESSMENT
64 y/o PMH of traumatic work injury (2001) s/p multilumbar fusion (T8-S1) s/p hardware removal (2016, on daily opiates and methadone), chronic constipation, GERD, HTN, DMII (A1c 6.8), depression, anxiety, metastatic pancreatic adenocarcinoma s/p biliary stent placement (9/17) sent in from [facility] for concern for biliary sepsis on 11/8

## 2019-11-10 NOTE — DIETITIAN INITIAL EVALUATION ADULT. - PROBLEM SELECTOR PLAN 2
MERRILL 1.57 to 1.24, baseline Cr 0.8  -most likely pre-renal and improved after IVF  -started on  cc/hr for 20 hrs for now  -monitor Cr daily  -avoid nephrotoxins and NSAIDs

## 2019-11-10 NOTE — PROGRESS NOTE ADULT - PROBLEM SELECTOR PLAN 7
A1c 7.7, never been diagnosed before, possibly related to pancreatic adenocarcinoma, given metformin on the last admission  -ISS and FS for now and monitor

## 2019-11-10 NOTE — DIETITIAN INITIAL EVALUATION ADULT. - PROBLEM SELECTOR PLAN 8
-dietian consult, albumin low f/u recs on soft diet and low fat for now  -c/w home simethicone and sulfucrate  -started on famotidine for now

## 2019-11-10 NOTE — PROGRESS NOTE ADULT - PROBLEM SELECTOR PLAN 10
DVT ppx: Heparin SC    MOLST given to patient, hasn't had GOC before and will discuss with family, still currently full code

## 2019-11-10 NOTE — DIETITIAN INITIAL EVALUATION ADULT. - FACTORS AFF FOOD INTAKE
as per family at bedside, pt is not eating much at all, so far today, has only had half of a mini muffin; no chewing/swallowing issues reported at this time; spouse reports pt c regular BMs PTA; family reports pt has been in pain as well which has affected his overall intake and appetite

## 2019-11-10 NOTE — CHART NOTE - NSCHARTNOTEFT_GEN_A_CORE
Upon Nutritional Assessment by the Registered Dietitian your patient was determined to meet criteria / has evidence of the following diagnosis/diagnoses:          [ ]  Mild Protein Calorie Malnutrition        [ ]  Moderate Protein Calorie Malnutrition        [x] Severe Protein Calorie Malnutrition        [ ] Unspecified Protein Calorie Malnutrition        [ ] Underweight / BMI <19        [ ] Morbid Obesity / BMI > 40      Findings as based on:  [ ] Comprehensive nutrition assessment   [ ] Nutrition Focused Physical Exam  [ ] Other:       Nutrition Plan/Recommendations:      Continue w/ Glucerna shakes. Discussed c family to encourage nutrient dense foods, to offer Prot rich foods first at meal times and then other foods on trays, offer Glucerna shakes after meals in order to optimize intake at the meal times.    PROVIDER Section:     By signing this assessment you are acknowledging and agree with the diagnosis/diagnoses assigned by the Registered Dietitian    Comments:

## 2019-11-10 NOTE — DIETITIAN INITIAL EVALUATION ADULT. - DIET TYPE
pt previously on low fat diet- discussed c NP to have diet liberalized to allow for more choices to promote intake, discussed addition of Glucerna shakes c NP as well prior to note

## 2019-11-10 NOTE — PROGRESS NOTE ADULT - SUBJECTIVE AND OBJECTIVE BOX
Patient is a 63y old  Male who presents with a chief complaint of sepsis (2019 13:20)      SUBJECTIVE / OVERNIGHT EVENTS:    MEDICATIONS  (STANDING):  bisacodyl 5 milliGRAM(s) Oral at bedtime  cloNIDine 0.1 milliGRAM(s) Oral two times a day  dextrose 5%. 1000 milliLiter(s) (50 mL/Hr) IV Continuous <Continuous>  dextrose 50% Injectable 12.5 Gram(s) IV Push once  dextrose 50% Injectable 25 Gram(s) IV Push once  dextrose 50% Injectable 25 Gram(s) IV Push once  DULoxetine 60 milliGRAM(s) Oral daily  enoxaparin Injectable 40 milliGRAM(s) SubCutaneous daily  famotidine    Tablet 20 milliGRAM(s) Oral daily  fentaNYL   Patch 100 MICROgram(s)/Hr 1 Patch Transdermal every 72 hours  influenza   Vaccine 0.5 milliLiter(s) IntraMuscular once  insulin lispro (HumaLOG) corrective regimen sliding scale   SubCutaneous three times a day before meals  insulin lispro (HumaLOG) corrective regimen sliding scale   SubCutaneous at bedtime  methadone    Tablet 20 milliGRAM(s) Oral every 6 hours  piperacillin/tazobactam IVPB.. 3.375 Gram(s) IV Intermittent every 8 hours  polyethylene glycol 3350 17 Gram(s) Oral two times a day  senna 2 Tablet(s) Oral at bedtime  sodium chloride 0.9%. 1000 milliLiter(s) (100 mL/Hr) IV Continuous <Continuous>  sucralfate suspension 1 Gram(s) Oral four times a day    MEDICATIONS  (PRN):  dextrose 40% Gel 15 Gram(s) Oral once PRN Blood Glucose LESS THAN 70 milliGRAM(s)/deciliter  diazepam    Tablet 2 milliGRAM(s) Oral every 8 hours PRN anxiety  glucagon  Injectable 1 milliGRAM(s) IntraMuscular once PRN Glucose LESS THAN 70 milligrams/deciliter  HYDROmorphone   Tablet 12 milliGRAM(s) Oral every 4 hours PRN Severe Pain (7 - 10)  ondansetron Injectable 4 milliGRAM(s) IV Push every 6 hours PRN Nausea and/or Vomiting  simethicone 80 milliGRAM(s) Chew every 8 hours PRN Gas      Vital Signs Last 24 Hrs  T(C): 36.7 (10 Nov 2019 10:04), Max: 36.9 (2019 21:06)  T(F): 98.1 (10 Nov 2019 10:04), Max: 98.4 (2019 21:06)  HR: 111 (10 Nov 2019 10:04) (88 - 180)  BP: 123/82 (10 Nov 2019 10:04) (106/64 - 137/84)  BP(mean): --  RR: 18 (10 Nov 2019 10:04) (18 - 18)  SpO2: 92% (10 Nov 2019 10:04) (92% - 99%)  CAPILLARY BLOOD GLUCOSE      POCT Blood Glucose.: 237 mg/dL (10 Nov 2019 08:20)  POCT Blood Glucose.: 183 mg/dL (2019 21:27)  POCT Blood Glucose.: 137 mg/dL (2019 17:40)  POCT Blood Glucose.: 113 mg/dL (2019 12:22)    I&O's Summary    2019 07:01  -  10 Nov 2019 07:00  --------------------------------------------------------  IN: 1760 mL / OUT: 450 mL / NET: 1310 mL        PHYSICAL EXAM:  GENERAL: NAD, well-developed  HEAD:  Atraumatic, Normocephalic  EYES: EOMI, PERRLA, conjunctiva and sclera clear  NECK: Supple, No JVD  CHEST/LUNG: Clear to auscultation bilaterally; No wheeze  HEART: Regular rate and rhythm; No murmurs, rubs, or gallops  ABDOMEN: Soft, Nontender, Nondistended; Bowel sounds present  EXTREMITIES:  2+ Peripheral Pulses, No clubbing, cyanosis, or edema  PSYCH: AAOx3  NEUROLOGY: non-focal  SKIN: No rashes or lesions    LABS:                        9.7    21.43 )-----------( 563      ( 2019 06:47 )             29.6     11-10    128<L>  |  93<L>  |  24<H>  ----------------------------<  245<H>  3.6   |  27  |  0.86    Ca    8.2<L>      10 Nov 2019 07:07  Phos  3.3     11-10  Mg     2.0     11-10    TPro  6.0  /  Alb  2.1<L>  /  TBili  1.0  /  DBili  x   /  AST  14  /  ALT  17  /  AlkPhos  151<H>  11-10    PT/INR - ( 10 Nov 2019 08:51 )   PT: 16.2 sec;   INR: 1.39 ratio         PTT - ( 10 Nov 2019 08:51 )  PTT:30.9 sec      Urinalysis Basic - ( 2019 00:33 )    Color: Yellow / Appearance: Clear / S.021 / pH: x  Gluc: x / Ketone: Negative  / Bili: Negative / Urobili: Negative   Blood: x / Protein: 30 mg/dL / Nitrite: Negative   Leuk Esterase: Negative / RBC: 12 /hpf / WBC 2 /HPF   Sq Epi: x / Non Sq Epi: 0 /hpf / Bacteria: Negative        RADIOLOGY & ADDITIONAL TESTS:    Imaging Personally Reviewed:    Consultant(s) Notes Reviewed:      Care Discussed with Consultants/Other Providers: Patient is a 63y old  Male who presents with a chief complaint of sepsis (2019 13:20)      SUBJECTIVE / OVERNIGHT EVENTS:  Pt seen and examined with family at bedside. He c/o severe abd pain and worsening abd distension. Denies cough, sob, fever/chills.    MEDICATIONS  (STANDING):  bisacodyl 5 milliGRAM(s) Oral at bedtime  cloNIDine 0.1 milliGRAM(s) Oral two times a day  dextrose 5%. 1000 milliLiter(s) (50 mL/Hr) IV Continuous <Continuous>  dextrose 50% Injectable 12.5 Gram(s) IV Push once  dextrose 50% Injectable 25 Gram(s) IV Push once  dextrose 50% Injectable 25 Gram(s) IV Push once  DULoxetine 60 milliGRAM(s) Oral daily  enoxaparin Injectable 40 milliGRAM(s) SubCutaneous daily  famotidine    Tablet 20 milliGRAM(s) Oral daily  fentaNYL   Patch 100 MICROgram(s)/Hr 1 Patch Transdermal every 72 hours  influenza   Vaccine 0.5 milliLiter(s) IntraMuscular once  insulin lispro (HumaLOG) corrective regimen sliding scale   SubCutaneous three times a day before meals  insulin lispro (HumaLOG) corrective regimen sliding scale   SubCutaneous at bedtime  methadone    Tablet 20 milliGRAM(s) Oral every 6 hours  piperacillin/tazobactam IVPB.. 3.375 Gram(s) IV Intermittent every 8 hours  polyethylene glycol 3350 17 Gram(s) Oral two times a day  senna 2 Tablet(s) Oral at bedtime  sodium chloride 0.9%. 1000 milliLiter(s) (100 mL/Hr) IV Continuous <Continuous>  sucralfate suspension 1 Gram(s) Oral four times a day    MEDICATIONS  (PRN):  dextrose 40% Gel 15 Gram(s) Oral once PRN Blood Glucose LESS THAN 70 milliGRAM(s)/deciliter  diazepam    Tablet 2 milliGRAM(s) Oral every 8 hours PRN anxiety  glucagon  Injectable 1 milliGRAM(s) IntraMuscular once PRN Glucose LESS THAN 70 milligrams/deciliter  HYDROmorphone   Tablet 12 milliGRAM(s) Oral every 4 hours PRN Severe Pain (7 - 10)  ondansetron Injectable 4 milliGRAM(s) IV Push every 6 hours PRN Nausea and/or Vomiting  simethicone 80 milliGRAM(s) Chew every 8 hours PRN Gas      Vital Signs Last 24 Hrs  T(C): 36.7 (10 Nov 2019 10:04), Max: 36.9 (2019 21:06)  T(F): 98.1 (10 Nov 2019 10:04), Max: 98.4 (2019 21:06)  HR: 111 (10 Nov 2019 10:04) (88 - 180)  BP: 123/82 (10 Nov 2019 10:04) (106/64 - 137/84)  BP(mean): --  RR: 18 (10 Nov 2019 10:) (18 - 18)  SpO2: 92% (10 Nov 2019 10:) (92% - 99%)  CAPILLARY BLOOD GLUCOSE      POCT Blood Glucose.: 237 mg/dL (10 Nov 2019 08:20)  POCT Blood Glucose.: 183 mg/dL (2019 21:27)  POCT Blood Glucose.: 137 mg/dL (2019 17:40)  POCT Blood Glucose.: 113 mg/dL (2019 12:22)    I&O's Summary    2019 07:01  -  10 Nov 2019 07:00  --------------------------------------------------------  IN: 1760 mL / OUT: 450 mL / NET: 1310 mL        PHYSICAL EXAM:    GENERAL: painful distress, afebrile to touch    Head: NT, AT  	Eyes: EOMI,  icteric sclera    Neck: Supple, No JVD, no carotid bruits no lymphadenopathy, no thyroidomegaly  	Cardiac: RRR, S1 S2, No M/R/G  	Pulmonary: decreased air entry b/l , mild bibasilar crackles   	Abdomen: distended, tender and guarding throughout the abdomen   	Back: tender to palpation especially in the upper back   	Extremities: Warm, nontender, No Rashes, lesions, bruises, No pitting edema, venous stasis    Neuro: AO x 4, No focal deficits, CNII-CNXII grossly intact      LABS:                        9.7    21.43 )-----------( 563      ( 2019 06:47 )             29.6     11-10    128<L>  |  93<L>  |  24<H>  ----------------------------<  245<H>  3.6   |  27  |  0.86    Ca    8.2<L>      10 Nov 2019 07:07  Phos  3.3     11-10  Mg     2.0     11-10    TPro  6.0  /  Alb  2.1<L>  /  TBili  1.0  /  DBili  x   /  AST  14  /  ALT  17  /  AlkPhos  151<H>  11-10    PT/INR - ( 10 Nov 2019 08:51 )   PT: 16.2 sec;   INR: 1.39 ratio         PTT - ( 10 Nov 2019 08:51 )  PTT:30.9 sec      Urinalysis Basic - ( 2019 00:33 )    Color: Yellow / Appearance: Clear / S.021 / pH: x  Gluc: x / Ketone: Negative  / Bili: Negative / Urobili: Negative   Blood: x / Protein: 30 mg/dL / Nitrite: Negative   Leuk Esterase: Negative / RBC: 12 /hpf / WBC 2 /HPF   Sq Epi: x / Non Sq Epi: 0 /hpf / Bacteria: Negative    CT CHEST     2019     LUNGS AND AIRWAYS: Patent central airways.  Persistent bilateral lower   lobe linear atelectasis and bronchectasis. Multiple scattered nodular   opacities and multiple tree-in-bud opacities on the right. For example, a   7mm nodule (3,50) in the right upper lobe. Many of these are new from the   prior exam.  No pleural effusion.  IMPRESSION:   Likely infectious process in the right lung. Follow-up CT is advised to   evaluate for resolution.

## 2019-11-10 NOTE — DIETITIAN INITIAL EVALUATION ADULT. - PHYSICAL APPEARANCE
other (specify) nutrition focused physical exam deferred at this time as pt is sleeping, visually pt c thin legs, some indentation around temples.  Skin: intact  Edema: +1 sandie. feet  ht: 70", wt: 189.6pounds, BMI: 27.2kg/m2, IBW: 166 pounds +/- 10%

## 2019-11-10 NOTE — DIETITIAN INITIAL EVALUATION ADULT. - REASON INDICATOR FOR ASSESSMENT
Pt seen for consult for "assessment and education."  Source: spouse, sons at bedside; pt sleeping    Pt admitted c concern for biliary sepsis, pt c mets pancreatic adenocarcinoma, pt c chronic pain, was on Methadone.

## 2019-11-10 NOTE — PROGRESS NOTE ADULT - PROBLEM SELECTOR PLAN 1
- meets sepsis criteria on admission with leukocytosis, tachycardia  - UA neg, RVP neg, elevated ALP, bilirubin wnl, lactate downtrended 3 to 1  - CT showing RLL PNA   - will check CT abd to evaluate for intraabdominal etiology in light of worsening abd pain and distension   -c/w  zosyn for now   -Bld Cx ngtd , urine cx < 10,000 cfu

## 2019-11-10 NOTE — PROGRESS NOTE ADULT - PROBLEM SELECTOR PLAN 2
-extensive neurosurgery history w/ multilevel fusion and pain control issues  -ISTOP Reference #: [id number]  - c/o severe back pain and worsening abd pain  -current pain regimen underdosed per home meds  - will increase  fentanyl to 100 mcg q48 hrs and dilaudid to 16mg q3h PRN for severe pain  -c/w  Methadone 20mg po q6  - follows w/Dr Rico for pain mgt and was scheduled for PCA pump 11/8/19 ; unable to keep appt on account of this admission  - will obtain Palliative care consult for Pain mgt reccs     -c/w  bowel regimen (miralax, dulolax and senna) -extensive neurosurgery history w/ multilevel fusion and pain control issues  -ISTOP Reference #: [id number]  - c/o severe back pain and worsening abd pain  -current pain regimen underdosed per home meds  - will increase  fentanyl to 100 mcg q48 hrs and dilaudid to 16mg q3h PRN for severe pain  -c/w  Methadone 20mg po q6  - Narcan 0.4mg ivp q 3min for RR < 12, O2 sat < 90, sedation score of 6  - follows w/Dr Rico for pain mgt and was scheduled for PCA pump 11/8/19 ; unable to keep appt on account of this admission  - will obtain Palliative care consult for Pain mgt reccs     -c/w  bowel regimen (miralax, dulolax and senna)

## 2019-11-10 NOTE — PROGRESS NOTE ADULT - PROBLEM SELECTOR PLAN 5
- Na 128  - will start IVF NS at 75cc/hr x 8 hrs  - BMP q - Na 128  - will start IVF NS at 75cc/hr x 8 hrs  - BMP q 4hrs  - Do not correct sNa > 8meq in 24 hrs

## 2019-11-10 NOTE — DIETITIAN INITIAL EVALUATION ADULT. - PROBLEM SELECTOR PLAN 4
newly diagnosed metastatic pancreatic cancer w/ liver mets (9/2019) s/p gem+abraxane (unable to complete full cycle)  -oncology aware of patient and appreciate their recs  -holding off chemo  -as per GI, unlikely stent is the issue or occluded since bilirubin is normal

## 2019-11-10 NOTE — PROGRESS NOTE ADULT - PROBLEM SELECTOR PLAN 8
hold off home BP meds while currently in sepsis  -on HCTZ and clondine 0.1 at home  -c/w clondine for now and hold off HCTZ

## 2019-11-10 NOTE — DIETITIAN INITIAL EVALUATION ADULT. - PROBLEM SELECTOR PLAN 3
-extensive neurosurgery history w/ multilevel fusion and pain control issues  -ISTOP Reference #: [id number]  -c/w home fentanyl 100 mcg q72 hrs and dilaudid 12mg q3h PRN for severe pain  -ordered methadone and awaiting approval from pharmacy w/ methadone 40 BID  (methadone hcl 10 mg tablet 120 pills	15days)  [facility] today, attempted to call at [phone] in order to contact Dr. Elsy Johnson who has been prescribing his pain meds at [facility] and spoke to the on call covering Dr. Austin. He said Dr. Lilly and the [facility] has had this patient on methadone 10mg 2 tabs q6h, dilaudid 8mg 2 tabs q4h and fenatyl patch 100mcg patch every 2 days.    -f/u chronic pain consult, wanted to monitor for home meds and consider PCA pump and fentayl  -started on extensive bowel regimen (miralax, dulolax and senna) colace not available anymore

## 2019-11-10 NOTE — PROGRESS NOTE ADULT - PROBLEM SELECTOR PLAN 4
- MERRILL 1.57 to 1.24, baseline Cr 0.8  -most likely pre-renal and improved after IVF  - monitor Cr daily  -avoid nephrotoxins and NSAIDs

## 2019-11-10 NOTE — DIETITIAN INITIAL EVALUATION ADULT. - ADD RECOMMEND
1. Continue w/ Glucerna shakes. 2. Discussed c family to encourage nutrient dense foods, to offer Prot rich foods first at meal times and then other foods on trays, offer Glucerna shakes after meals in order to optimize intake at the meal times.

## 2019-11-10 NOTE — CHART NOTE - NSCHARTNOTEFT_GEN_A_CORE
2 y/o PMH of traumatic work injury (2001) s/p multilumbar fusion (T8-S1) s/p hardware removal (2016, on daily opiates and methadone), chronic constipation, GERD, HTN, DMII (A1c 6.8), depression, anxiety, metastatic pancreatic adenocarcinoma s/p biliary stent placement (9/17), on gemcitabine/abraxone (last dose 10/24/19) sent in from [facility] for concern for biliary sepsis on 11/8/19. Palliative Consulted for Pain management.  ISTOP Reference #[id number].    Was called by the Team regarding patients pain. Stating that is 10/10 in intensity, Sharp, achy and constant pain located in the abdomen and back, non radiating and not improving with the pain medication.   Previously patient was in the outpatient setting with:  - Methadone 20mg q6h, Dilaudid 16 mg q4h and Fentanyl patch 100mcg patch q48h.   In hospital patient has been on:  - Methadone 20mg q6h, Dilaudid 12 mg q4h and Fentanyl patch 100mcg patch q72h.     Recommendations:  - Patient still is undergoing work-up of why the patient is having more pain, is it related to progression of his metastatic disease or is it worsening of his chronic back pain.  - As for now patient is under dosed for his current pain regimen, as for now:  Continue with Methadone 20 mg q6h  Change Fentanyl Patch from q72h to q48h   Change Dilaudid PO for IV Dilaudid 3.2 mg IV q3h PRN pain.   - Will continue to reevaluate pain to meet his needs, if using 4 or more PRN please page us back.  - Plan discussed with primary team. 62 y/o M with PMH of traumatic work injury (2001) s/p multilumbar fusion (T8-S1) s/p hardware removal (2016, on daily opiates and methadone), chronic constipation, GERD, HTN, DMII (A1c 6.8), depression, anxiety, metastatic pancreatic adenocarcinoma s/p biliary stent placement (9/17), on gemcitabine/abraxone (last dose 10/24/19) sent in from [facility] for concern for biliary sepsis on 11/8/19. Palliative Consulted for Pain management.  ISTOP Reference #[id number].    Was called by the Team regarding patient's pain. Stating that is 10/10 in intensity, Sharp, achy and constant pain located in the abdomen and back, non radiating and not improving with the pain medication.   Previously patient was in the outpatient setting on:  - Methadone 20mg q6h, Dilaudid 16 mg q4h and Fentanyl patch 100mcg patch q48h.   In hospital patient has been on:  - Methadone 20mg q6h, Dilaudid 12 mg q4h and Fentanyl patch 100mcg patch q72h.     Recommendations:  - Patient still is undergoing work-up for his worsening  pain. Questions are if it is related to progression of his metastatic disease or if it is worsening due to his chronic back pain or 2/2 to withdrawal from opioids   - As for now patient is under dosed for his home pain regimen, as for now:  Continue with Methadone 20 mg q6h  Change Fentanyl Patch from q72h to q48h   Change Dilaudid PO for IV Dilaudid 3.2 mg IV q3h PRN pain.   - Will continue to reevaluate pain to meet his needs, if using 4 or more PRN please page us back.  - Plan discussed with primary team.

## 2019-11-11 NOTE — CHART NOTE - NSCHARTNOTEFT_GEN_A_CORE
62 yo M metastatic pancreatic adenocarcinoma s/p biliary stent placement 2 months ago and chemotherapy admitted with sepsis, cholecystitis with possible perforated gallbladder on CT A/P.     At this time, patient appears hemodynamically stable and afebrile despite persistently elevated white count.     Patient received prophylactic lovenox today and was also not made NPO until noon today. In this setting, IR percutaneous cholecystostomy can be performed tomorrow on an urgent basis.     If patient becomes unstable or deteriorates overnight, please reconsult IR for percutaneous cholecystostomy on an emergent basis.

## 2019-11-11 NOTE — CONSULT NOTE ADULT - ASSESSMENT
Lucian Ortiz is a 63-year man with PMH of traumatic work injury (2001) s/p multilumbar fusion (T8-S1) s/p hardware removal (2016, on daily opiates and methadone), chronic constipation, GERD, HTN, DM type 2, depression, anxiety, metastatic pancreatic adenocarcinoma s/p biliary stent placement (9/17) who was sent in from [facility] for concern for biliary sepsis on 11/8/19. Persistent leukocytosis, significant abdominal tenderness with distension, despite antibiotics. Interval imaging concerning for gallbladder rupture, sludge, stones and pericholecystic fluid raising concerns for acute cholecystitis. Surgery consulted for possible intervention.    PLAN:  - No acute surgical intervention warranted. Patient is poor surgical candidate and would not be clinically amenable to a cholecystectomy.   - Consider Gastroenterology consultation, please appreciate recommendations. Per GI, no concern for stent dislodgment, given low liver enzymes, T.Bili.   - Discussed with IR re: percutaneous cholecystostomy tube. Planned for tomorrow. Please make patient NPO at midnight in anticipation and hold anticoagulation.  - All cares per primary team  - Patient discussed with Chief Resident, Dr. MARGE Head  - Plan to be discussed with Attending, Dr. LEON Pavon. Lucian Ortiz is a 63-year man with PMH of traumatic work injury (2001) s/p multilumbar fusion (T8-S1) s/p hardware removal (2016, on daily opiates and methadone), chronic constipation, GERD, HTN, DM type 2, depression, anxiety, metastatic pancreatic adenocarcinoma s/p biliary stent placement (9/17) who was sent in from [facility] for concern for biliary sepsis on 11/8/19. Persistent leukocytosis, significant abdominal tenderness with distension, despite antibiotics. Interval imaging concerning for gallbladder rupture, sludge, stones and pericholecystic fluid raising concerns for acute cholecystitis. Surgery consulted for possible intervention.    PLAN:  - No acute surgical intervention warranted. Patient is poor surgical candidate and would not be clinically amenable to a cholecystectomy.   - Consider Gastroenterology consultation, please appreciate recommendations. Per GI, no concern for stent dislodgment, given low liver enzymes, T.Bili.   - Discussed with IR re: percutaneous cholecystostomy tube. Planned for tomorrow. Please make patient NPO at midnight in anticipation and hold anticoagulation.  - All cares per primary team  - Patient discussed with Chief Resident, Dr. MARGE Head  - Plan to be discussed with Attending, Dr. LEON Pavon.

## 2019-11-11 NOTE — PROGRESS NOTE ADULT - PROBLEM SELECTOR PLAN 3
Case briefly d/w oncology fellow.  Await their follow-up recommendations.  Currently holding chemo.  Pain management.

## 2019-11-11 NOTE — CONSULT NOTE ADULT - SUBJECTIVE AND OBJECTIVE BOX
Vascular Cardiology Consult Note     [facility] [id number]              EMAIL [email or web address]   OFFICE [phone]    CC:  Concern for PAD     HPI:    63-year man with PMH of traumatic work injury () s/p multi-lumbar fusion (T8-S1) s/p hardware removal (, on daily opiates and methadone), chronic constipation, GERD, HTN, DM type 2, depression, anxiety, metastatic pancreatic adenocarcinoma s/p biliary stent placement () sent in from [facility] for concern for biliary sepsis on 19 and found to have ruptured gallbladder pending placement of drain by IR. Family was concerned for change in color of the tis of the bilateral lower extremities noted that they were blue/purplish color.        Allergies  No Known Allergies    Intolerances	    MEDICATIONS:  cloNIDine 0.1 milliGRAM(s) Oral two times a day  enoxaparin Injectable 40 milliGRAM(s) SubCutaneous daily  piperacillin/tazobactam IVPB.. 3.375 Gram(s) IV Intermittent every 8 hours  diazepam    Tablet 2 milliGRAM(s) Oral every 8 hours PRN  DULoxetine 60 milliGRAM(s) Oral daily  fentaNYL   Patch 100 MICROgram(s)/Hr. 1 Patch Transdermal every 48 hours  HYDROmorphone  Injectable 3.2 milliGRAM(s) IV Push every 3 hours PRN  methadone    Tablet 20 milliGRAM(s) Oral every 6 hours  ondansetron Injectable 4 milliGRAM(s) IV Push every 6 hours PRN  bisacodyl 5 milliGRAM(s) Oral at bedtime  famotidine    Tablet 20 milliGRAM(s) Oral daily  polyethylene glycol 3350 17 Gram(s) Oral two times a day  senna 2 Tablet(s) Oral at bedtime  simethicone 80 milliGRAM(s) Chew every 8 hours PRN  sucralfate suspension 1 Gram(s) Oral four times a day  dextrose 40% Gel 15 Gram(s) Oral once PRN  dextrose 50% Injectable 12.5 Gram(s) IV Push once  dextrose 50% Injectable 25 Gram(s) IV Push once  dextrose 50% Injectable 25 Gram(s) IV Push once  glucagon  Injectable 1 milliGRAM(s) IntraMuscular once PRN  insulin lispro (HumaLOG) corrective regimen sliding scale   SubCutaneous three times a day before meals  insulin lispro (HumaLOG) corrective regimen sliding scale   SubCutaneous at bedtime  dextrose 5%. 1000 milliLiter(s) IV Continuous <Continuous>  influenza   Vaccine 0.5 milliLiter(s) IntraMuscular once  sodium chloride 0.9%. 1000 milliLiter(s) IV Continuous <Continuous>      PAST MEDICAL & SURGICAL HISTORY:  History of diabetes mellitus, type II  Anxiety and depression  Diastolic dysfunction: stage I  Empyema lun2015 left lung, s/p VATS, decortication  H/O peptic ulcer: over 10 years ago  history of renal calculus  Herniated Disc: S/P work injury   Hypertension: Dx:   Spinal Stenosis  Postlaminectomy Syndrome  S/P  shunt  S/P insertion of spinal cord stimulator:   History of lumbar fusion:   Insertion of Intrathecal Dilaudid Pump: 2011  S/P Spinal Surgery: corrective lumbar fusion   S/P Knee Replacement: left knee   S/P Arthroscopy of Left Knee  S/P Tonsillectomy: childhood  Ankle Fracture: s/p ORIF left ankle       FAMILY HISTORY:  No pertinent family history in first degree relatives      SOCIAL HISTORY:  unchanged    REVIEW OF SYSTEMS:  CONSTITUTIONAL: No fever, weight loss, or fatigue  EYES: No eye pain, visual disturbances, or discharge  ENMT:  No difficulty hearing, tinnitus, vertigo; No sinus or throat pain  NECK: No pain or stiffness  RESPIRATORY:  no shortness of breath, cough with (-) phlegm production   CARDIOVASCULAR:  no chest pain, no shortness of breath, no lower extremity edema   GASTROINTESTINAL: +abdominal or epigastric pain. No nausea, vomiting, or hematemesis; No diarrhea or constipation. No melena or hematochezia.  GENITOURINARY: No dysuria, frequency, hematuria, or incontinence  NEUROLOGICAL: No headaches, memory loss, loss of strength, numbness, or tremors  SKIN:   LYMPH Nodes: No enlarged glands  ENDOCRINE: No heat or cold intolerance; No hair loss  MUSCULOSKELETAL: No joint pain or swelling; No muscle, back, or extremity pain  PSYCHIATRIC: No depression, anxiety, mood swings, or difficulty sleeping  HEME/LYMPH: No easy bruising, or bleeding gums  ALLERY AND IMMUNOLOGIC: No hives or eczema	    [ x] All others negative	  [ ] Unable to obtain    PHYSICAL EXAM:  T(C): 36.9 (19 @ 09:56), Max: 36.9 (11-10-19 @ 21:28)  HR: 100 (19 @ 09:56) (96 - 103)  BP: 133/92 (19 @ 09:56) (118/81 - 139/87)  RR: 18 (19 @ 09:56) (16 - 20)  SpO2: 94% (19 @ 09:59) (86% - 94%)  Wt(kg): --  I&O's Summary    10 Nov 2019 07:01  -  2019 07:00  --------------------------------------------------------  IN: 1235 mL / OUT: 325 mL / NET: 910 mL        Appearance:  	  HEENT:   Normal oral mucosa, PERRL, EOMI	  Carotid:   Right:    Left:    Lymphatic: No lymphadenopathy  Cardiovascular:    Respiratory:  	  Psychiatry:  AAO x   Gastrointestinal:  Soft, Non-tender, + BS	  Skin: No rashes, No ecchymoses, No cyanosis	  Neurologic:    Extremities:      Vascular Pulse Exam:  Right DP: []palpable []non-palpable []audible      Left DP :   []palpable []non-palpable []audible  Right PT: []palpable [] non-palpable []audible   Left PT:  [] palpable [] non-palpable []audible         Foot Exam:        LABS:	 	    CBC Full  -  ( 2019 09:19 )  WBC Count : 19.09 K/uL  Hemoglobin : 10.5 g/dL  Hematocrit : 33.6 %  Platelet Count - Automated : 631 K/uL  Mean Cell Volume : 83.4 fl  Mean Cell Hemoglobin : 26.1 pg  Mean Cell Hemoglobin Concentration : 31.3 gm/dL  Auto Neutrophil # : 16.09 K/uL  Auto Lymphocyte # : 0.67 K/uL  Auto Monocyte # : 1.83 K/uL  Auto Eosinophil # : 0.00 K/uL  Auto Basophil # : 0.00 K/uL  Auto Neutrophil % : 82.6 %  Auto Lymphocyte % : 3.5 %  Auto Monocyte % : 9.6 %  Auto Eosinophil % : 0.0 %  Auto Basophil % : 0.0 %        127<L>  |  91<L>  |  21  ----------------------------<  166<H>  3.4<L>   |  29  |  0.80      129<L>  |  90<L>  |  22  ----------------------------<  180<H>  3.6   |  27  |  0.78    Ca    8.4      2019 07:05  Ca    8.2<L>      2019 02:44  Phos  3.3     11-10  Mg     2.0     11-10    TPro  6.1  /  Alb  2.3<L>  /  TBili  0.9  /  DBili  x   /  AST  29  /  ALT  23  /  AlkPhos  182<H>    TPro  6.0  /  Alb  2.1<L>  /  TBili  1.0  /  DBili  x   /  AST  14  /  ALT  17  /  AlkPhos  151<H>  11-10          Assessment:  1.            Plan:  1.          Thank you      Vascular Cardiology Service    Please call with any questions:   S[facility] - [id number]  Office [phone]  email:  [email or web address] Vascular Cardiology Consult Note     [facility] [id number]              EMAIL [email or web address]   OFFICE [phone]    CC:  Concern for PAD     HPI:    63-year man with PMH of traumatic work injury () s/p multi-lumbar fusion (T8-S1) s/p hardware removal (, on daily opiates and methadone), chronic constipation, GERD, HTN, DM type 2, depression, anxiety, metastatic pancreatic adenocarcinoma s/p biliary stent placement () sent in from [facility] for concern for biliary sepsis on 19 and found to have ruptured gallbladder pending placement of drain by IR. Family was concerned for change in color of the tis of the bilateral lower extremities noted that they were blue/purplish color.   Patient currently states no pain of the lower extremities, numbness / tingling or loss of sensation of the bilateral lower extremities.   Patient was started on chemotherapy for metastatic pancreatic adenocarcinoma a couple of weeks ago and currently on HOLD due to sepsis secondary to perforated gallbladder.        Allergies  No Known Allergies    Intolerances	    MEDICATIONS:  cloNIDine 0.1 milliGRAM(s) Oral two times a day  enoxaparin Injectable 40 milliGRAM(s) SubCutaneous daily  piperacillin/tazobactam IVPB.. 3.375 Gram(s) IV Intermittent every 8 hours  diazepam    Tablet 2 milliGRAM(s) Oral every 8 hours PRN  DULoxetine 60 milliGRAM(s) Oral daily  fentaNYL   Patch 100 MICROgram(s)/Hr. 1 Patch Transdermal every 48 hours  HYDROmorphone  Injectable 3.2 milliGRAM(s) IV Push every 3 hours PRN  methadone    Tablet 20 milliGRAM(s) Oral every 6 hours  ondansetron Injectable 4 milliGRAM(s) IV Push every 6 hours PRN  bisacodyl 5 milliGRAM(s) Oral at bedtime  famotidine    Tablet 20 milliGRAM(s) Oral daily  polyethylene glycol 3350 17 Gram(s) Oral two times a day  senna 2 Tablet(s) Oral at bedtime  simethicone 80 milliGRAM(s) Chew every 8 hours PRN  sucralfate suspension 1 Gram(s) Oral four times a day  dextrose 40% Gel 15 Gram(s) Oral once PRN  dextrose 50% Injectable 12.5 Gram(s) IV Push once  dextrose 50% Injectable 25 Gram(s) IV Push once  dextrose 50% Injectable 25 Gram(s) IV Push once  glucagon  Injectable 1 milliGRAM(s) IntraMuscular once PRN  insulin lispro (HumaLOG) corrective regimen sliding scale   SubCutaneous three times a day before meals  insulin lispro (HumaLOG) corrective regimen sliding scale   SubCutaneous at bedtime  dextrose 5%. 1000 milliLiter(s) IV Continuous <Continuous>  influenza   Vaccine 0.5 milliLiter(s) IntraMuscular once  sodium chloride 0.9%. 1000 milliLiter(s) IV Continuous <Continuous>      PAST MEDICAL & SURGICAL HISTORY:  History of diabetes mellitus, type II  Anxiety and depression  Diastolic dysfunction: stage I  Empyema lun2015 left lung, s/p VATS, decortication  H/O peptic ulcer: over 10 years ago  history of renal calculus  Herniated Disc: S/P work injury   Hypertension: Dx:   Spinal Stenosis  Postlaminectomy Syndrome  S/P  shunt  S/P insertion of spinal cord stimulator:   History of lumbar fusion:   Insertion of Intrathecal Dilaudid Pump: 2011  S/P Spinal Surgery: corrective lumbar fusion   S/P Knee Replacement: left knee   S/P Arthroscopy of Left Knee  S/P Tonsillectomy: childhood  Ankle Fracture: s/p ORIF left ankle       FAMILY HISTORY:  No pertinent family history in first degree relatives      SOCIAL HISTORY:  unchanged    REVIEW OF SYSTEMS:  CONSTITUTIONAL: No fever, weight loss, or fatigue  EYES: No eye pain, visual disturbances, or discharge  ENMT:  No difficulty hearing, tinnitus, vertigo; No sinus or throat pain  NECK: No pain or stiffness  RESPIRATORY:  no shortness of breath, cough with (-) phlegm production   CARDIOVASCULAR:  no chest pain, no shortness of breath, no lower extremity edema   GASTROINTESTINAL: +abdominal or epigastric pain. No nausea, vomiting, or hematemesis; No diarrhea or constipation. No melena or hematochezia.  GENITOURINARY: No dysuria, frequency, hematuria, or incontinence  NEUROLOGICAL: No headaches, memory loss, loss of strength, numbness, or tremors  SKIN: bluish/ purplish hue to the tips of the toes of the bilateral lower extremitites   LYMPH Nodes: No enlarged glands  ENDOCRINE: No heat or cold intolerance; No hair loss  MUSCULOSKELETAL: No joint pain or swelling; No muscle, back, or extremity pain  PSYCHIATRIC: No depression, anxiety, mood swings, or difficulty sleeping  HEME/LYMPH: No easy bruising, or bleeding gums  ALLERY AND IMMUNOLOGIC: No hives or eczema	    [ x] All others negative	  [ ] Unable to obtain    PHYSICAL EXAM:  T(C): 36.9 (19 @ 09:56), Max: 36.9 (11-10-19 @ 21:28)  HR: 100 (19 @ 09:56) (96 - 103)  BP: 133/92 (19 @ 09:56) (118/81 - 139/87)  RR: 18 (19 @ 09:56) (16 - 20)  SpO2: 94% (19 @ 09:59) (86% - 94%)  Wt(kg): --  I&O's Summary    10 Nov 2019 07:01  -  2019 07:00  --------------------------------------------------------  IN: 1235 mL / OUT: 325 mL / NET: 910 mL        Appearance:  	  HEENT:   Normal oral mucosa, PERRL, EOMI	  Carotid: Right:    Left:  no appreciated   Lymphatic: No lymphadenopathy  Cardiovascular:  + S1S2 RRR   Respiratory: CTA b/l   Psychiatry:  AAO x 3  Gastrointestinal: distended, mild tenderness to palpation of the RUQ and RLQ, + BS	  Skin: No rashes, No ecchymoses, No cyanosis	  Neurologic:  no focal neurologic deficits   Extremities: skin  of the bilateral lower extremities:  + purplish hue to the tips of the toes of the bilateral lower extremities     Vascular Pulse Exam:  Femoral: + palpable pulses bilaterally   Popiteal: + pulses palpated bilaterally   Right DP: []palpable [x]non-palpable [x]audible on doppler      Left DP :   []palpable [x]non-palpable [x]audible on doppler   Right PT: [x]palpable [] non-palpable [x]audible triphasic on doppler   Left PT:  [x] palpable [] non-palpable [x]audible  triphasic on doppler       Foot Exam:  cold extremities on examination and upon rewarming of the digits and elevation of the feet there was resolution of cyanosis with brisk capillary refill      LABS:	 	    CBC Full  -  ( 2019 09:19 )  WBC Count : 19.09 K/uL  Hemoglobin : 10.5 g/dL  Hematocrit : 33.6 %  Platelet Count - Automated : 631 K/uL  Mean Cell Volume : 83.4 fl  Mean Cell Hemoglobin : 26.1 pg  Mean Cell Hemoglobin Concentration : 31.3 gm/dL  Auto Neutrophil # : 16.09 K/uL  Auto Lymphocyte # : 0.67 K/uL  Auto Monocyte # : 1.83 K/uL  Auto Eosinophil # : 0.00 K/uL  Auto Basophil # : 0.00 K/uL  Auto Neutrophil % : 82.6 %  Auto Lymphocyte % : 3.5 %  Auto Monocyte % : 9.6 %  Auto Eosinophil % : 0.0 %  Auto Basophil % : 0.0 %        127<L>  |  91<L>  |  21  ----------------------------<  166<H>  3.4<L>   |  29  |  0.80      129<L>  |  90<L>  |  22  ----------------------------<  180<H>  3.6   |  27  |  0.78    Ca    8.4      2019 07:05  Ca    8.2<L>      2019 02:44  Phos  3.3     11-10  Mg     2.0     11-10    TPro  6.1  /  Alb  2.3<L>  /  TBili  0.9  /  DBili  x   /  AST  29  /  ALT  23  /  AlkPhos  182<H>    TPro  6.0  /  Alb  2.1<L>  /  TBili  1.0  /  DBili  x   /  AST  14  /  ALT  17  /  AlkPhos  151<H>  11-10          Assessment:  63-year man with PMH of traumatic work injury () s/p multi-lumbar fusion (T8-S1) s/p hardware removal (, on daily opiates and methadone), chronic constipation, GERD, HTN, DM type 2, depression, anxiety, metastatic pancreatic adenocarcinoma s/p biliary stent placement () sent in from [facility] for concern for biliary sepsis on 19 and found to have ruptured gallbladder pending placement of drain by IR. Family was concerned for change in color of the tis of the bilateral lower extremities noted that they were blue/purplish color concern for PAD     1. Bilateral lower extremity discoloration secondary to acrocyanosis  - palpable and dopplerable pulses of the bilateral lower extremities       Plan:  - continue with treatment of perforated gallbladder and above findings does not preclude possible intervention by IR   - recommend EMELINA/PVR and US arterial duplex of the bilateral lower extremities  - keep extremities warm   - will follow up      Thank you  Lillian Tejeda D.O.     Vascular Cardiology Service    Please call with any questions:   [facility] - [id number]  Office [phone]  email:  [email or web address]

## 2019-11-11 NOTE — PROGRESS NOTE ADULT - PROBLEM SELECTOR PLAN 1
CT imaging concerning for gallbladder wall rupture and PNA.  Continue Zosyn.  Surgery consult requested, they recommended IR who will be called as well.  Blood cultures remain negative to date.  At this point suspect hypoxia secondary to PNA and perhaps due to pain.  Continue supplemental oxygen and monitor clinically.

## 2019-11-11 NOTE — CONSULT NOTE ADULT - SUBJECTIVE AND OBJECTIVE BOX
Chief Complaint:  Patient is a 63y old  Male who presents with a chief complaint of sepsis (2019 12:58)      HPI:  64 y/o PMH of traumatic work injury () s/p multilumbar fusion (T8-S1) s/p hardware removal (, on daily opiates and methadone), chronic constipation, GERD, HTN, depression, anxiety, metastatic pancreatic adenocarcinoma s/p biliary stent placement (2m ago) and chemo sent in from [facility] for concern sepsis found to have gallbladder perforation.    He was admitted on 19 for abd pain, weight loss 25 lbs for the prior 3 weeks and jaundice and found on CTAP w/ intrahepatic biliary ductal dilation and multiple hepatic lesions that were new and EUS/ERCP was + for malignant adenocarcinoma cancer cells and CT guided biopsy of R liver lesion + also for malignant adenocarcinoma w/ elevated CA 19-9 1832. Started on Gemcitabine +Abraxane on 10/17 for C1D1 for 3 wk on and 1 wk off but due to thrombocytopenia and fatigue, nausea and vomiting, C1D15 was held.      He went in for a check up appointment on  and the labs were significant for leukocytosis of >20 and the patient himself has been having increased amount of fatigue and weakness with decreased PO intake for the past 2 days. He reports no fevers, n/v/d/CP/SOB, cough, sick contacts or recent travels. Ever since his pancreatic adenocarcinoma diagnosis he has had  generalized abd pain and it has now worsened these last few days. Long standing history of LBP w/ extensive neurosurgical followup and pain management doctors with currently Methadone 40 mg BID, Morphine ER 40 BID, dialudid 8mg x12 each day, spinal cord stimulator () and pain pump in  in abdomen which is no longer there. As per last oncology visit, he was scheduled for PCA pump installation on Friday, 2019 by Dr. Rico and Dr. Lilly for pain control.    Allergies:  No Known Allergies      Home Medications:    Hospital Medications:  bisacodyl 5 milliGRAM(s) Oral at bedtime  cloNIDine 0.1 milliGRAM(s) Oral two times a day  dextrose 40% Gel 15 Gram(s) Oral once PRN  dextrose 5%. 1000 milliLiter(s) IV Continuous <Continuous>  dextrose 50% Injectable 12.5 Gram(s) IV Push once  dextrose 50% Injectable 25 Gram(s) IV Push once  dextrose 50% Injectable 25 Gram(s) IV Push once  diazepam    Tablet 2 milliGRAM(s) Oral every 8 hours PRN  DULoxetine 60 milliGRAM(s) Oral daily  enoxaparin Injectable 40 milliGRAM(s) SubCutaneous daily  famotidine    Tablet 20 milliGRAM(s) Oral daily  fentaNYL   Patch 100 MICROgram(s)/Hr. 1 Patch Transdermal every 48 hours  glucagon  Injectable 1 milliGRAM(s) IntraMuscular once PRN  HYDROmorphone  Injectable 3.2 milliGRAM(s) IV Push every 3 hours PRN  influenza   Vaccine 0.5 milliLiter(s) IntraMuscular once  insulin lispro (HumaLOG) corrective regimen sliding scale   SubCutaneous three times a day before meals  insulin lispro (HumaLOG) corrective regimen sliding scale   SubCutaneous at bedtime  methadone    Tablet 20 milliGRAM(s) Oral every 6 hours  naloxone Injectable 0.4 milliGRAM(s) IV Push every 3 minutes  ondansetron Injectable 4 milliGRAM(s) IV Push every 6 hours PRN  piperacillin/tazobactam IVPB.. 3.375 Gram(s) IV Intermittent every 8 hours  polyethylene glycol 3350 17 Gram(s) Oral two times a day  senna 2 Tablet(s) Oral at bedtime  simethicone 80 milliGRAM(s) Chew every 8 hours PRN  sodium chloride 0.9%. 1000 milliLiter(s) IV Continuous <Continuous>  sucralfate suspension 1 Gram(s) Oral four times a day      PMHX/PSHX:  History of diabetes mellitus, type II  Anxiety and depression  Diastolic dysfunction  Empyema lung  H/O peptic ulcer  history of renal calculus  Renal calculus  Current smoker  Herniated Disc  Hypertension  Depression  Spinal Stenosis  Peripheral Neuropathy  Postlaminectomy Syndrome  S/P  shunt  S/P insertion of spinal cord stimulator  History of lumbar fusion  Insertion of Intrathecal Dilaudid Pump  S/P Spinal Surgery  S/P Knee Replacement  S/P Arthroscopy of Left Knee  S/P Tonsillectomy  Ankle Fracture      Family history:  No pertinent family history in first degree relatives      Social History:     ROS:     General:  No wt loss, fevers, chills, night sweats, fatigue,   Eyes:  Good vision, no reported pain  ENT:  No sore throat, pain, runny nose, dysphagia  CV:  No pain, palpitations, hypo/hypertension  Resp:  No dyspnea, cough, tachypnea, wheezing  GI:  See HPI  :  No pain, bleeding, incontinence, nocturia  Muscle:  No pain, weakness  Neuro:  No weakness, tingling, memory problems  Psych:  No fatigue, insomnia, mood problems, depression  Endocrine:  No polyuria, polydipsia, cold/heat intolerance  Heme:  No petechiae, ecchymosis, easy bruisability  Skin:  No rash, edema      PHYSICAL EXAM:     GENERAL:  NAD  HEENT:  sclera anicteric  CHEST:  Full & symmetric excursion  HEART:  Regular rhythm, S1, S2  ABDOMEN:  Soft, non-tender, non-distended, normoactive bowel sounds,  no masses ,  EXTREMITIES:  no cyanosis,clubbing or edema  SKIN:  No rash/erythema/ecchymoses/petechiae/wounds/abscess/warm/dry  NEURO:  Alert, oriented    Vital Signs:  Vital Signs Last 24 Hrs  T(C): 36.9 (2019 09:56), Max: 36.9 (10 Nov 2019 21:28)  T(F): 98.4 (2019 09:56), Max: 98.5 (10 Nov 2019 21:28)  HR: 100 (2019 09:56) (96 - 103)  BP: 133/92 (2019 09:56) (118/81 - 139/87)  BP(mean): --  RR: 18 (2019 09:56) (16 - 20)  SpO2: 94% (2019 09:59) (86% - 94%)  Daily     Daily Weight in k (10 Nov 2019 14:27)    LABS:                        10.5   19.09 )-----------( 631      ( 2019 09:19 )             33.6         127<L>  |  91<L>  |  21  ----------------------------<  166<H>  3.4<L>   |  29  |  0.80    Ca    8.4      2019 07:05  Phos  3.3     11-10  Mg     2.0     -10    TPro  6.1  /  Alb  2.3<L>  /  TBili  0.9  /  DBili  x   /  AST  29  /  ALT  23  /  AlkPhos  182<H>      LIVER FUNCTIONS - ( 2019 07:05 )  Alb: 2.3 g/dL / Pro: 6.1 g/dL / ALK PHOS: 182 U/L / ALT: 23 U/L / AST: 29 U/L / GGT: x           PT/INR - ( 10 Nov 2019 08:51 )   PT: 16.2 sec;   INR: 1.39 ratio         PTT - ( 10 Nov 2019 08:51 )  PTT:30.9 sec        Imaging: Chief Complaint:  Patient is a 63y old  Male who presents with a chief complaint of sepsis (2019 12:58)      HPI:  62 y/o PMH of traumatic work injury () s/p multilumbar fusion (T8-S1) s/p hardware removal (, on daily opiates and methadone), chronic constipation, GERD, HTN, depression, anxiety, metastatic pancreatic adenocarcinoma s/p biliary stent placement (2m ago) and chemo sent in from [facility] for concern sepsis found to have gallbladder perforation.    He was admitted on 19 for abd pain, weight loss 25 lbs for the prior 3 weeks and jaundice and found on CTAP w/ intrahepatic biliary ductal dilation and multiple hepatic lesions that were new and EUS/ERCP was + for malignant adenocarcinoma cancer cells and CT guided biopsy of R liver lesion + also for malignant adenocarcinoma w/ elevated CA 19-9 1832. Started on Gemcitabine +Abraxane on 10/17 for C1D1 for 3 wk on and 1 wk off but due to thrombocytopenia and fatigue, nausea and vomiting, C1D15 was held.  Patient reports worsening abdominal pain over the last month.    He went in for a check up appointment on  and the labs were significant for leukocytosis of >20 and the patient himself has been having increased amount of fatigue and weakness with decreased PO intake for the past 2 days. He reports no fevers, n/v/d/CP/SOB, cough, sick contacts or recent travels. Ever since his pancreatic adenocarcinoma diagnosis he has had  generalized abd pain and it has now worsened these last few days. Long standing history of LBP w/ extensive neurosurgical followup and pain management doctors with currently Methadone 40 mg BID, Morphine ER 40 BID, dialudid 8mg x12 each day, spinal cord stimulator () and pain pump in  in abdomen which is no longer there. As per last oncology visit, he was scheduled for PCA pump installation on Friday, 2019 by Dr. Rico and Dr. Lilly for pain control.    Allergies:  No Known Allergies      Home Medications:    Hospital Medications:  bisacodyl 5 milliGRAM(s) Oral at bedtime  cloNIDine 0.1 milliGRAM(s) Oral two times a day  dextrose 40% Gel 15 Gram(s) Oral once PRN  dextrose 5%. 1000 milliLiter(s) IV Continuous <Continuous>  dextrose 50% Injectable 12.5 Gram(s) IV Push once  dextrose 50% Injectable 25 Gram(s) IV Push once  dextrose 50% Injectable 25 Gram(s) IV Push once  diazepam    Tablet 2 milliGRAM(s) Oral every 8 hours PRN  DULoxetine 60 milliGRAM(s) Oral daily  enoxaparin Injectable 40 milliGRAM(s) SubCutaneous daily  famotidine    Tablet 20 milliGRAM(s) Oral daily  fentaNYL   Patch 100 MICROgram(s)/Hr. 1 Patch Transdermal every 48 hours  glucagon  Injectable 1 milliGRAM(s) IntraMuscular once PRN  HYDROmorphone  Injectable 3.2 milliGRAM(s) IV Push every 3 hours PRN  influenza   Vaccine 0.5 milliLiter(s) IntraMuscular once  insulin lispro (HumaLOG) corrective regimen sliding scale   SubCutaneous three times a day before meals  insulin lispro (HumaLOG) corrective regimen sliding scale   SubCutaneous at bedtime  methadone    Tablet 20 milliGRAM(s) Oral every 6 hours  naloxone Injectable 0.4 milliGRAM(s) IV Push every 3 minutes  ondansetron Injectable 4 milliGRAM(s) IV Push every 6 hours PRN  piperacillin/tazobactam IVPB.. 3.375 Gram(s) IV Intermittent every 8 hours  polyethylene glycol 3350 17 Gram(s) Oral two times a day  senna 2 Tablet(s) Oral at bedtime  simethicone 80 milliGRAM(s) Chew every 8 hours PRN  sodium chloride 0.9%. 1000 milliLiter(s) IV Continuous <Continuous>  sucralfate suspension 1 Gram(s) Oral four times a day      PMHX/PSHX:  History of diabetes mellitus, type II  Anxiety and depression  Diastolic dysfunction  Empyema lung  H/O peptic ulcer  history of renal calculus  Renal calculus  Current smoker  Herniated Disc  Hypertension  Depression  Spinal Stenosis  Peripheral Neuropathy  Postlaminectomy Syndrome  S/P  shunt  S/P insertion of spinal cord stimulator  History of lumbar fusion  Insertion of Intrathecal Dilaudid Pump  S/P Spinal Surgery  S/P Knee Replacement  S/P Arthroscopy of Left Knee  S/P Tonsillectomy  Ankle Fracture      Family history:  No pertinent family history in first degree relatives      Social History:     ROS:     General:  No wt loss, fevers, chills, night sweats, fatigue,   Eyes:  Good vision, no reported pain  ENT:  No sore throat, pain, runny nose, dysphagia  CV:  No pain, palpitations, hypo/hypertension  Resp:  No dyspnea, cough, tachypnea, wheezing  GI:  See HPI  :  No pain, bleeding, incontinence, nocturia  Muscle:  No pain, weakness  Neuro:  No weakness, tingling, memory problems  Psych:  No fatigue, insomnia, mood problems, depression  Endocrine:  No polyuria, polydipsia, cold/heat intolerance  Heme:  No petechiae, ecchymosis, easy bruisability  Skin:  No rash, edema      PHYSICAL EXAM:     GENERAL:  looks in distress  HEENT:  sclera anicteric  CHEST:  Full & symmetric excursion  HEART:  Regular rhythm, S1, S2  ABDOMEN: diffusely tender with rebound and guarding, distended  exam limited due to pain  EXTREMITIES:  no cyanosis,clubbing or edema  SKIN:  No rash/erythema/ecchymoses/petechiae/wounds/abscess/warm/dry  NEURO:  Alert, oriented    Vital Signs:  Vital Signs Last 24 Hrs  T(C): 36.9 (2019 09:56), Max: 36.9 (10 Nov 2019 21:28)  T(F): 98.4 (2019 09:56), Max: 98.5 (10 Nov 2019 21:28)  HR: 100 (2019 09:56) (96 - 103)  BP: 133/92 (2019 09:56) (118/81 - 139/87)  BP(mean): --  RR: 18 (2019 09:56) (16 - 20)  SpO2: 94% (2019 09:59) (86% - 94%)  Daily     Daily Weight in k (10 Nov 2019 14:27)    LABS:                        10.5   19.09 )-----------( 631      ( 2019 09:19 )             33.6         127<L>  |  91<L>  |  21  ----------------------------<  166<H>  3.4<L>   |  29  |  0.80    Ca    8.4      2019 07:05  Phos  3.3     11-10  Mg     2.0     11-10    TPro  6.1  /  Alb  2.3<L>  /  TBili  0.9  /  DBili  x   /  AST  29  /  ALT  23  /  AlkPhos  182<H>  11-11    LIVER FUNCTIONS - ( 2019 07:05 )  Alb: 2.3 g/dL / Pro: 6.1 g/dL / ALK PHOS: 182 U/L / ALT: 23 U/L / AST: 29 U/L / GGT: x           PT/INR - ( 10 Nov 2019 08:51 )   PT: 16.2 sec;   INR: 1.39 ratio         PTT - ( 10 Nov 2019 08:51 )  PTT:30.9 sec        Imaging:

## 2019-11-11 NOTE — PROGRESS NOTE ADULT - ASSESSMENT
The patient is a 63-year man with PMH of traumatic work injury (2001) s/p multilumbar fusion (T8-S1) s/p hardware removal (2016, on daily opiates and methadone), chronic constipation, GERD, HTN, DM type 2, depression, anxiety, metastatic pancreatic adenocarcinoma s/p biliary stent placement (9/17) sent in from [facility] for concern for biliary sepsis on 11/8/19.

## 2019-11-11 NOTE — CONSULT NOTE ADULT - SUBJECTIVE AND OBJECTIVE BOX
SURGICAL ONCOLOGY CONSULT NOTE  --------------------------------------------------------------------------------------------    Consulting Service: Surgery - Blue Team  Consulting Attending: LEON Pavon MD    HPI:  Lucian Ortiz is a 64 y/o PMH of traumatic work injury () s/p multilumbar fusion (T8-S1) s/p hardware removal (2016, on daily opiates and methadone), chronic constipation, GERD, HTN, depression, anxiety, metastatic pancreatic adenocarcinoma s/p biliary stent placement (2m ago) and chemo sent in from [facility] for concern for biliary sepsis on . He was admitted on 19 for abd pain, weight loss 25 lbs for the prior 3 weeks and jaundice and found on CTAP w/ intrahepatic biliary ductal dilation and multiple hepatic lesions that were new and EUS/ERCP was + for malignant adenocarcinoma cancer cells and CT guided biopsy of R liver lesion + also for malignant adenocarcinoma w/ elevated CA 19-9 1832. Started on Gemcitabine +Abraxane on 10/17 for C1D1 for 3 wk on and 1 wk off but due to thrombocytopenia and fatigue, nausea and vomiting, C1D15 was held.  He went in for a check up appointment on  and the labs were significant for leukocytosis of >20 and the patient himself has been having increased amount of fatigue and weakness with decreased PO intake for the past 2 days. He reports no fevers, n/v/d/CP/SOB, cough, sick contacts or recent travels. Ever since his pancreatic adenocarcinoma diagnosis he has had  generalized abd pain with acute worsening in the days leading up to admission. He was empirically started on antibiotics following a sepsis workup including blood, and urine cultures etc. He underwent a CT scan yesterday given the absence of interval improvement of symptoms since admission and persistent leukocytosis. Imaging findings concerning for GB rupture, Surgery consulted for possible intervention.        PAST MEDICAL & SURGICAL HISTORY:  History of diabetes mellitus, type II  Anxiety and depression  Diastolic dysfunction: stage I  Empyema lun2015 left lung, s/p VATS, decortication  H/O peptic ulcer: over 10 years ago  history of renal calculus  Herniated Disc: S/P work injury   Hypertension: Dx: 2002  Spinal Stenosis  Postlaminectomy Syndrome  S/P  shunt  S/P insertion of spinal cord stimulator:   History of lumbar fusion:   Insertion of Intrathecal Dilaudid Pump: 2011  S/P Spinal Surgery: corrective lumbar fusion   S/P Knee Replacement: left knee   S/P Arthroscopy of Left Knee  S/P Tonsillectomy: childhood  Ankle Fracture: s/p ORIF left ankle     FAMILY HISTORY:  No pertinent family history in first degree relatives    ALLERGIES: No Known Allergies    CURRENT MEDICATIONS  MEDICATIONS (STANDING): bisacodyl 5 milliGRAM(s) Oral at bedtime  cloNIDine 0.1 milliGRAM(s) Oral two times a day  dextrose 5%. 1000 milliLiter(s) IV Continuous <Continuous>  dextrose 50% Injectable 12.5 Gram(s) IV Push once  dextrose 50% Injectable 25 Gram(s) IV Push once  dextrose 50% Injectable 25 Gram(s) IV Push once  DULoxetine 60 milliGRAM(s) Oral daily  enoxaparin Injectable 40 milliGRAM(s) SubCutaneous daily  famotidine    Tablet 20 milliGRAM(s) Oral daily  fentaNYL   Patch 100 MICROgram(s)/Hr. 1 Patch Transdermal every 48 hours  influenza   Vaccine 0.5 milliLiter(s) IntraMuscular once  insulin lispro (HumaLOG) corrective regimen sliding scale   SubCutaneous three times a day before meals  insulin lispro (HumaLOG) corrective regimen sliding scale   SubCutaneous at bedtime  methadone    Tablet 20 milliGRAM(s) Oral every 6 hours  naloxone Injectable 0.4 milliGRAM(s) IV Push every 3 minutes  piperacillin/tazobactam IVPB.. 3.375 Gram(s) IV Intermittent every 8 hours  polyethylene glycol 3350 17 Gram(s) Oral two times a day  senna 2 Tablet(s) Oral at bedtime  sodium chloride 0.9%. 1000 milliLiter(s) IV Continuous <Continuous>  sucralfate suspension 1 Gram(s) Oral four times a day    MEDICATIONS (PRN):dextrose 40% Gel 15 Gram(s) Oral once PRN Blood Glucose LESS THAN 70 milliGRAM(s)/deciliter  diazepam    Tablet 2 milliGRAM(s) Oral every 8 hours PRN anxiety  glucagon  Injectable 1 milliGRAM(s) IntraMuscular once PRN Glucose LESS THAN 70 milligrams/deciliter  HYDROmorphone  Injectable 3.2 milliGRAM(s) IV Push every 3 hours PRN Severe Pain (7 - 10)  ondansetron Injectable 4 milliGRAM(s) IV Push every 6 hours PRN Nausea and/or Vomiting  simethicone 80 milliGRAM(s) Chew every 8 hours PRN Gas    --------------------------------------------------------------------------------------------    Vitals:   T(C): 36.9 (19 @ 09:56), Max: 36.9 (11-10-19 @ 21:28)  HR: 100 (19 @ 09:56) (96 - 103)  BP: 133/92 (19 @ 09:56) (118/81 - 139/87)  RR: 18 (19 @ 09:56) (16 - 20)  SpO2: 94% (19 @ 09:59) (86% - 94%)  CAPILLARY BLOOD GLUCOSE      POCT Blood Glucose.: 203 mg/dL (2019 12:09)  POCT Blood Glucose.: 157 mg/dL (2019 08:19)  POCT Blood Glucose.: 178 mg/dL (10 Nov 2019 22:49)  POCT Blood Glucose.: 195 mg/dL (10 Nov 2019 17:05)      11-10 @ 07:  -   @ 07:00  --------------------------------------------------------  IN:    IV PiggyBack: 200 mL    Oral Fluid: 360 mL    sodium chloride 0.9%.: 675 mL  Total IN: 1235 mL    OUT:    Voided: 325 mL  Total OUT: 325 mL  Total NET: 910 mL    Height (cm): 177.8 ( @ 19:36)  Weight (kg): 86.2 ( 19:36)  BMI (kg/m2): 27.3 (:36)  BSA (m2): 2.04 (:36)      PHYSICAL EXAM:  General: NAD, lying in bed comfortably,   Neuro: A+Ox3  HEENT: NC/AT, EOMI, anicteric sclerae  Resp: Good effort, CTABL, no respiratory distress  GI/Abd: Soft, protuberant, positive fluid wave, exquisite tenderness, +guarding, in the right and left lower quadrants.   Vascular: All 4 extremities warm.  Skin: Intact, no breakdown  Musculoskeletal: All 4 extremities moving spontaneously, no limitations  --------------------------------------------------------------------------------------------    LABS  CBC ( @ 09:19)                              10.5<L>                         19.09<H>  )----------------(  631<H>     82.6<H>% Neutrophils, 3.5<L>% Lymphocytes, ANC: 16.09<H>                              33.6<L>  CBC (11-10 @ 09:10)                              11.4<L>                         16.54<H>  )----------------(  751<H>     84.9<H>% Neutrophils, 1.8<L>% Lymphocytes, ANC: 14.77<H>                              36.2<L>    BMP ( @ 07:05)             127<L>  |  91<L>   |  21    		Ca++ --      Ca 8.4                ---------------------------------( 166<H>		Mg --                 3.4<L>  |  29      |  0.80  			Ph --      BMP ( @ 02:44)             129<L>  |  90<L>   |  22    		Ca++ --      Ca 8.2<L>             ---------------------------------( 180<H>		Mg --                 3.6     |  27      |  0.78  			Ph --        LFTs ( @ 07:05)      TPro 6.1 / Alb 2.3<L> / TBili 0.9 / DBili -- / AST 29 / ALT 23 / AlkPhos 182<H>  LFTs (11-10 @ 07:07)      TPro 6.0 / Alb 2.1<L> / TBili 1.0 / DBili -- / AST 14 / ALT 17 / AlkPhos 151<H>  Coags (11-10 @ 08:51)  aPTT 30.9 / INR 1.39<H> / PT 16.2<H>    --------------------------------------------------------------------------------------------    MICROBIOLOGY  Urinalysis ( @ 00:33):     Color: Yellow / Appearance: Clear / S.021 / pH: 5.5 / Gluc: Negative / Ketones: Negative / Bili: Negative / Urobili: Negative / Protein :30 mg/dL<!> / Nitrites: Negative / Leuk.Est: Negative / RBC: 12<H> / WBC: 2 / Sq Epi:  / Non Sq Epi: 0 / Bacteria Negative       -> .Blood Blood-Peripheral Culture ( @ 03:00)     NG    NG    No growth to date.    -> .Urine Clean Catch (Midstream) Culture ( @ 02:57)     NG    NG    <10,000 CFU/mL Normal Urogenital Veronika      --------------------------------------------------------------------------------------------    IMAGING  < from: CT Abdomen and Pelvis w/ IV Cont (11.10.19 @ 16:59) >    IMPRESSION:     Distended gallbladder with discontinuity of the wall and lobulated fluid   along the inferior liver image highly concerning for gallbladder rupture.   New moderate loculated ascites.    Ill-defined pancreatic head mass with pancreatic ductal dilatation.   Significant attenuation of the main portal vein at the portal splenic   confluence with question of ill-defined filling defect possibly   representing tumor thrombus.    Hepatic metastases without significant change.    Small bilateral pleural effusions and atelectasis and a patchy right   middle lobe opacity; superimposed infection is not excluded.    < end of copied text >    < from: US Abdomen Complete (19 @ 12:14) >  IMPRESSION: Right pleural fluid.    Pneumobilia. Distended gallbladder. Cholelithiasis, sludge and air within   the gallbladder. Complex pericholecystic fluid.    Small to moderate perihepatic free fluid has some degree of complexity.    Pancreas not visualized.    Mildly enlarged spleen.    < end of copied text >    --------------------------------------------------------------------------------------------

## 2019-11-11 NOTE — CONSULT NOTE ADULT - ASSESSMENT
64 y/o PMH of traumatic work injury (2001) s/p multilumbar fusion (T8-S1) s/p hardware removal (2016, on daily opiates and methadone), chronic constipation, GERD, HTN, depression, anxiety, metastatic pancreatic adenocarcinoma s/p biliary stent placement (2m ago) and chemo sent in from [facility] for concern for sepsis found to have gallbladder perforation.    1) Sepsis  Patient with sepsis with WBC of 25 found to have perforated gallbladder on CTAP as well as RLL PNA on Xray.   T bili WNL Alk phos of 182 AST and ALT WNL, low suspicion for acute cholangitis  2) Metastatic Pancreatic adenocarcinoma  Mets to liver with intrahepatic biliary ductal dilatation s/p ERCP and stent on 9/19/2019. 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 Was started on chemo on 10/17 which was held recently due to thrombocytopenia fatigue, N/V.  3) Perforated Gallbladder  May be in the setting of metastatic disease.    - please consult surgery/IR for possible intervention of perforated gallbladder  - IV abx as per primary team  - would consult oncology as well as palliative care about patient's prognosis  - rest of plan as per primary team  - please call back with additional questions or concerns 64 y/o PMH of traumatic work injury (2001) s/p multilumbar fusion (T8-S1) s/p hardware removal (2016, on daily opiates and methadone), chronic constipation, GERD, HTN, depression, anxiety, metastatic pancreatic adenocarcinoma s/p biliary stent placement (2m ago) and chemo sent in from [facility] for concern for sepsis found to have gallbladder perforation.    1) Sepsis  Patient with sepsis with WBC of 25 found to have acute cholecystitis with possible perforated gallbladder on CTAP as well as RLL PNA on Xray.   T bili WNL Alk phos of 182 AST and ALT WNL, low suspicion for acute cholangitis  Also found to have ascitic fluid on imaging.  Peritoneal signs of physical exam concerning for perforation  2) Metastatic Pancreatic adenocarcinoma  Mets to liver with intrahepatic biliary ductal dilatation s/p ERCP and stent on 9/19/2019. 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 Was started on chemo on 10/17 which was held recently due to thrombocytopenia fatigue, N/V.  3) Acute cholecystitis cb perforation?    - please consult surgery/IR for possible intervention acute cholecystitis/perforated gallbladder  - IV abx as per primary team  - would consult oncology as well as palliative care about patient's prognosis  - rest of plan as per primary team  - please call back with additional questions or concerns

## 2019-11-11 NOTE — PROGRESS NOTE ADULT - PROBLEM SELECTOR PLAN 10
DVT ppx: Lovenox SC    MOLST given to patient, hasn't had GOC before and will discuss with family, still currently full code

## 2019-11-11 NOTE — PROGRESS NOTE ADULT - SUBJECTIVE AND OBJECTIVE BOX
INTERVAL HPI/OVERNIGHT EVENTS:  Patient S&E at bedside. No o/n events,     VITAL SIGNS:  T(F): 98.4 (11-11-19 @ 09:56)  HR: 100 (11-11-19 @ 09:56)  BP: 133/92 (11-11-19 @ 09:56)  RR: 18 (11-11-19 @ 09:56)  SpO2: 94% (11-11-19 @ 09:59)  Wt(kg): --    PHYSICAL EXAM:    Constitutional: NAD  Eyes: EOMI, sclera non-icteric  Neck: supple, no masses, no JVD  Respiratory: CTA b/l, good air entry b/l  Cardiovascular: RRR, no M/R/G  Gastrointestinal: soft, NTND, no masses palpable, + BS, no hepatosplenomegaly  Extremities: no c/c/e  Neurological: AAOx3      MEDICATIONS  (STANDING):  bisacodyl 5 milliGRAM(s) Oral at bedtime  cloNIDine 0.1 milliGRAM(s) Oral two times a day  dextrose 5%. 1000 milliLiter(s) (50 mL/Hr) IV Continuous <Continuous>  dextrose 50% Injectable 12.5 Gram(s) IV Push once  dextrose 50% Injectable 25 Gram(s) IV Push once  dextrose 50% Injectable 25 Gram(s) IV Push once  DULoxetine 60 milliGRAM(s) Oral daily  enoxaparin Injectable 40 milliGRAM(s) SubCutaneous daily  famotidine    Tablet 20 milliGRAM(s) Oral daily  fentaNYL   Patch 100 MICROgram(s)/Hr. 1 Patch Transdermal every 48 hours  influenza   Vaccine 0.5 milliLiter(s) IntraMuscular once  insulin lispro (HumaLOG) corrective regimen sliding scale   SubCutaneous three times a day before meals  insulin lispro (HumaLOG) corrective regimen sliding scale   SubCutaneous at bedtime  methadone    Tablet 20 milliGRAM(s) Oral every 6 hours  naloxone Injectable 0.4 milliGRAM(s) IV Push every 3 minutes  piperacillin/tazobactam IVPB.. 3.375 Gram(s) IV Intermittent every 8 hours  polyethylene glycol 3350 17 Gram(s) Oral two times a day  senna 2 Tablet(s) Oral at bedtime  sodium chloride 0.9%. 1000 milliLiter(s) (75 mL/Hr) IV Continuous <Continuous>  sucralfate suspension 1 Gram(s) Oral four times a day    MEDICATIONS  (PRN):  dextrose 40% Gel 15 Gram(s) Oral once PRN Blood Glucose LESS THAN 70 milliGRAM(s)/deciliter  diazepam    Tablet 2 milliGRAM(s) Oral every 8 hours PRN anxiety  glucagon  Injectable 1 milliGRAM(s) IntraMuscular once PRN Glucose LESS THAN 70 milligrams/deciliter  HYDROmorphone  Injectable 3.2 milliGRAM(s) IV Push every 3 hours PRN Severe Pain (7 - 10)  ondansetron Injectable 4 milliGRAM(s) IV Push every 6 hours PRN Nausea and/or Vomiting  simethicone 80 milliGRAM(s) Chew every 8 hours PRN Gas      Allergies    No Known Allergies    Intolerances        LABS:                        10.5 19.09 )-----------( 631      ( 11 Nov 2019 09:19 )             33.6     11-11    127<L>  |  91<L>  |  21  ----------------------------<  166<H>  3.4<L>   |  29  |  0.80    Ca    8.4      11 Nov 2019 07:05  Phos  3.3     11-10  Mg     2.0     11-10    TPro  6.1  /  Alb  2.3<L>  /  TBili  0.9  /  DBili  x   /  AST  29  /  ALT  23  /  AlkPhos  182<H>  11-11    PT/INR - ( 10 Nov 2019 08:51 )   PT: 16.2 sec;   INR: 1.39 ratio         PTT - ( 10 Nov 2019 08:51 )  PTT:30.9 sec      RADIOLOGY & ADDITIONAL TESTS:  Studies reviewed.    < from: US Abdomen Complete (11.11.19 @ 12:14) >  FINDINGS:Right pleural effusion.    Liver: Two liver lesions, one located anteriorly within the right lobe of   liver measuring 3.1 cm x 2.3 cm x 2.7 cm, hypoechoic/heterogeneous in   appearance and the second lesion is hypoechoic measuring 1.6 cm x 1.3 cm   x 1.5 cm, also within the right lobe. Findings are consistent with   hepatic metastases.    Bile ducts: Pneumobilia. Proximal common hepatic duct 2 mm.     Gallbladder: Distended with multiple small gallstones, sludge and air.   Complex pericholecystic fluid is noted. No gross wall thickening. The   discontinuity of the gallbladder wall seen on CT examination 11/10/2019   is not appreciated on this study.    Pancreas: Not visualized due to overlying bowel gas.    Spleen: 14.8 cm. Within normal limits.    Right kidney: 10.6 cm. No hydronephrosis.    Left kidney: 9.8 cm.  No hydronephrosis.    Ascites: Small to moderate perihepatic free fluid which has some degree   of complexity.    Aorta and IVC: Limited visualization of the inferior vena cava. Aorta not   visualized.     IMPRESSION: Right pleural fluid.    Pneumobilia. Distended gallbladder. Cholelithiasis, sludge and air within   the gallbladder. Complex pericholecystic fluid.    Small to moderate perihepatic free fluid has some degree of complexity.    Pancreas not visualized.    Mildly enlarged spleen.      < from: CT Abdomen and Pelvis w/ IV Cont (11.10.19 @ 16:59) >  LOWER CHEST: Small bilateral pleural effusions, new since prior   examination. Bibasilar atelectasis and more patchy opacity in the right   middle lobe.    LIVER: Heterogeneous appearance of the liver with large geographic areas   of hypoattenuation. Several hypoattenuating hepatic lesions are again   identified with reference lesions described below  -1.1 x 0.8 cm hypoattenuating lesion in segment 7 (7-36), more   conspicuous than on the prior exam when it measured 1.1 x 0.7 cm.  -Segment 8 lesion measures 1.7 x 1.8 cm (7:37), previously 1.5 x 1.9 cm.  BILE DUCTS: Pneumobilia with a common bile duct stent.  GALLBLADDER: Gallbladder is distended with sludge and stones and   intraluminal air. There is discontinuity of the gallbladder wall along   the inferior aspect (series 7 image 67) with associated loculated fluid   in the right upper quadrant (series 11 image 33).  SPLEEN: Within normal limits.  PANCREAS: Ill-defined hypodense pancreatic head lesion spanning   approximately 3.9 x 2.4 cm (5:51). There is dilation of the pancreatic   duct up to 8 mm. The pancreatic tail is atrophic. Hypoattenuating   structures seen of the pancreatic tail measuring 2.2 x 1.6 cm (5:50),   previously 2.0 x 1.9 cm on CT abdomen pelvis 9/17/2019.  ADRENALS: 1 cm right adrenal nodule, unchanged. The left adrenal gland is   within normal limits.  KIDNEYS/URETERS: Nonobstructing left 3 mm renal calculi. No   hydronephrosis.    BLADDER: Within normal limits.  REPRODUCTIVE ORGANS: Prostate is enlarged.    BOWEL: Moderate sized hiatal hernia. Wall thickening of the distal   stomach and hepatic flexure, likely reactive. No bowel obstruction.   Appendix within normal limits.  PERITONEUM: Moderate loculated ascites, new. Omental nodularity which may   be reactive.  VESSELS: The right and left portal veins are patent. Attenuation of the   main portal vein and the portal splenic confluence with question of   Ill-defined filling defect, possibly representing tumor thrombus. Distal   superior mesenteric vein is patent. The abdominal aorta is normal in   caliber with atherosclerotic disease. The celiac artery, SMA, and LANE are   patent.  RETROPERITONEUM/LYMPH NODES: No lymphadenopathy.    ABDOMINAL WALL: Small fat-containing umbilical hernia with a   hyperattenuating nodule, unchanged. Spinal stimulator device along the   left posterior abdominal wall.  BONES: Streak artifact from spinal fusion hardware. Unchanged compression   deformity of L3.    IMPRESSION:     Distended gallbladder with discontinuity of the wall and lobulated fluid   along the inferior liver image highly concerning for gallbladder rupture.   New moderate loculated ascites.    Ill-defined pancreatic head mass with pancreatic ductal dilatation.   Significant attenuation of the main portal vein at the portal splenic   confluence with question of ill-defined filling defect possibly   representing tumor thrombus.    Hepatic metastases without significant change.    Small bilateral pleural effusions and atelectasis and a patchy right   middle lobe opacity; superimposed infection is not excluded.    These findings were discussed with KAMLESH Chacon at 11/11/2019 10:22 AM by   Dr. Marks with read back confirmation.      < end of copied text > INTERVAL HPI/OVERNIGHT EVENTS:  Patient S&E at bedside.  has a lot of pain in his stomach    VITAL SIGNS:  T(F): 98.4 (11-11-19 @ 09:56)  HR: 100 (11-11-19 @ 09:56)  BP: 133/92 (11-11-19 @ 09:56)  RR: 18 (11-11-19 @ 09:56)  SpO2: 94% (11-11-19 @ 09:59)  Wt(kg): --    PHYSICAL EXAM:    Constitutional: NAD  Eyes: EOMI, sclera non-icteric  Neck: supple, no masses, no JVD  Respiratory: CTA b/l, good air entry b/l  Cardiovascular: RRR, no M/R/G  Gastrointestinal: tender in RLQ   Extremities: no c/c/e  Neurological: AAOx3      MEDICATIONS  (STANDING):  bisacodyl 5 milliGRAM(s) Oral at bedtime  cloNIDine 0.1 milliGRAM(s) Oral two times a day  dextrose 5%. 1000 milliLiter(s) (50 mL/Hr) IV Continuous <Continuous>  dextrose 50% Injectable 12.5 Gram(s) IV Push once  dextrose 50% Injectable 25 Gram(s) IV Push once  dextrose 50% Injectable 25 Gram(s) IV Push once  DULoxetine 60 milliGRAM(s) Oral daily  enoxaparin Injectable 40 milliGRAM(s) SubCutaneous daily  famotidine    Tablet 20 milliGRAM(s) Oral daily  fentaNYL   Patch 100 MICROgram(s)/Hr. 1 Patch Transdermal every 48 hours  influenza   Vaccine 0.5 milliLiter(s) IntraMuscular once  insulin lispro (HumaLOG) corrective regimen sliding scale   SubCutaneous three times a day before meals  insulin lispro (HumaLOG) corrective regimen sliding scale   SubCutaneous at bedtime  methadone    Tablet 20 milliGRAM(s) Oral every 6 hours  naloxone Injectable 0.4 milliGRAM(s) IV Push every 3 minutes  piperacillin/tazobactam IVPB.. 3.375 Gram(s) IV Intermittent every 8 hours  polyethylene glycol 3350 17 Gram(s) Oral two times a day  senna 2 Tablet(s) Oral at bedtime  sodium chloride 0.9%. 1000 milliLiter(s) (75 mL/Hr) IV Continuous <Continuous>  sucralfate suspension 1 Gram(s) Oral four times a day    MEDICATIONS  (PRN):  dextrose 40% Gel 15 Gram(s) Oral once PRN Blood Glucose LESS THAN 70 milliGRAM(s)/deciliter  diazepam    Tablet 2 milliGRAM(s) Oral every 8 hours PRN anxiety  glucagon  Injectable 1 milliGRAM(s) IntraMuscular once PRN Glucose LESS THAN 70 milligrams/deciliter  HYDROmorphone  Injectable 3.2 milliGRAM(s) IV Push every 3 hours PRN Severe Pain (7 - 10)  ondansetron Injectable 4 milliGRAM(s) IV Push every 6 hours PRN Nausea and/or Vomiting  simethicone 80 milliGRAM(s) Chew every 8 hours PRN Gas      Allergies    No Known Allergies    Intolerances        LABS:                        10.5 19.09 )-----------( 631      ( 11 Nov 2019 09:19 )             33.6     11-11    127<L>  |  91<L>  |  21  ----------------------------<  166<H>  3.4<L>   |  29  |  0.80    Ca    8.4      11 Nov 2019 07:05  Phos  3.3     11-10  Mg     2.0     11-10    TPro  6.1  /  Alb  2.3<L>  /  TBili  0.9  /  DBili  x   /  AST  29  /  ALT  23  /  AlkPhos  182<H>  11-11    PT/INR - ( 10 Nov 2019 08:51 )   PT: 16.2 sec;   INR: 1.39 ratio         PTT - ( 10 Nov 2019 08:51 )  PTT:30.9 sec      RADIOLOGY & ADDITIONAL TESTS:  Studies reviewed.    < from: US Abdomen Complete (11.11.19 @ 12:14) >  FINDINGS:Right pleural effusion.    Liver: Two liver lesions, one located anteriorly within the right lobe of   liver measuring 3.1 cm x 2.3 cm x 2.7 cm, hypoechoic/heterogeneous in   appearance and the second lesion is hypoechoic measuring 1.6 cm x 1.3 cm   x 1.5 cm, also within the right lobe. Findings are consistent with   hepatic metastases.    Bile ducts: Pneumobilia. Proximal common hepatic duct 2 mm.     Gallbladder: Distended with multiple small gallstones, sludge and air.   Complex pericholecystic fluid is noted. No gross wall thickening. The   discontinuity of the gallbladder wall seen on CT examination 11/10/2019   is not appreciated on this study.    Pancreas: Not visualized due to overlying bowel gas.    Spleen: 14.8 cm. Within normal limits.    Right kidney: 10.6 cm. No hydronephrosis.    Left kidney: 9.8 cm.  No hydronephrosis.    Ascites: Small to moderate perihepatic free fluid which has some degree   of complexity.    Aorta and IVC: Limited visualization of the inferior vena cava. Aorta not   visualized.     IMPRESSION: Right pleural fluid.    Pneumobilia. Distended gallbladder. Cholelithiasis, sludge and air within   the gallbladder. Complex pericholecystic fluid.    Small to moderate perihepatic free fluid has some degree of complexity.    Pancreas not visualized.    Mildly enlarged spleen.      < from: CT Abdomen and Pelvis w/ IV Cont (11.10.19 @ 16:59) >  LOWER CHEST: Small bilateral pleural effusions, new since prior   examination. Bibasilar atelectasis and more patchy opacity in the right   middle lobe.    LIVER: Heterogeneous appearance of the liver with large geographic areas   of hypoattenuation. Several hypoattenuating hepatic lesions are again   identified with reference lesions described below  -1.1 x 0.8 cm hypoattenuating lesion in segment 7 (7-36), more   conspicuous than on the prior exam when it measured 1.1 x 0.7 cm.  -Segment 8 lesion measures 1.7 x 1.8 cm (7:37), previously 1.5 x 1.9 cm.  BILE DUCTS: Pneumobilia with a common bile duct stent.  GALLBLADDER: Gallbladder is distended with sludge and stones and   intraluminal air. There is discontinuity of the gallbladder wall along   the inferior aspect (series 7 image 67) with associated loculated fluid   in the right upper quadrant (series 11 image 33).  SPLEEN: Within normal limits.  PANCREAS: Ill-defined hypodense pancreatic head lesion spanning   approximately 3.9 x 2.4 cm (5:51). There is dilation of the pancreatic   duct up to 8 mm. The pancreatic tail is atrophic. Hypoattenuating   structures seen of the pancreatic tail measuring 2.2 x 1.6 cm (5:50),   previously 2.0 x 1.9 cm on CT abdomen pelvis 9/17/2019.  ADRENALS: 1 cm right adrenal nodule, unchanged. The left adrenal gland is   within normal limits.  KIDNEYS/URETERS: Nonobstructing left 3 mm renal calculi. No   hydronephrosis.    BLADDER: Within normal limits.  REPRODUCTIVE ORGANS: Prostate is enlarged.    BOWEL: Moderate sized hiatal hernia. Wall thickening of the distal   stomach and hepatic flexure, likely reactive. No bowel obstruction.   Appendix within normal limits.  PERITONEUM: Moderate loculated ascites, new. Omental nodularity which may   be reactive.  VESSELS: The right and left portal veins are patent. Attenuation of the   main portal vein and the portal splenic confluence with question of   Ill-defined filling defect, possibly representing tumor thrombus. Distal   superior mesenteric vein is patent. The abdominal aorta is normal in   caliber with atherosclerotic disease. The celiac artery, SMA, and LANE are   patent.  RETROPERITONEUM/LYMPH NODES: No lymphadenopathy.    ABDOMINAL WALL: Small fat-containing umbilical hernia with a   hyperattenuating nodule, unchanged. Spinal stimulator device along the   left posterior abdominal wall.  BONES: Streak artifact from spinal fusion hardware. Unchanged compression   deformity of L3.    IMPRESSION:     Distended gallbladder with discontinuity of the wall and lobulated fluid   along the inferior liver image highly concerning for gallbladder rupture.   New moderate loculated ascites.    Ill-defined pancreatic head mass with pancreatic ductal dilatation.   Significant attenuation of the main portal vein at the portal splenic   confluence with question of ill-defined filling defect possibly   representing tumor thrombus.    Hepatic metastases without significant change.    Small bilateral pleural effusions and atelectasis and a patchy right   middle lobe opacity; superimposed infection is not excluded.    These findings were discussed with KAMLESH Chacon at 11/11/2019 10:22 AM by   Dr. Marks with read back confirmation.      < end of copied text >

## 2019-11-11 NOTE — PROGRESS NOTE ADULT - SUBJECTIVE AND OBJECTIVE BOX
SUBJECTIVE:  Sitting up on side of bed.  Reports significant abdominal pain, but says that overall pain is controlled with current pain regimen.  Family at bedside reports legs to now have a purplish discoloration.    MEDICATIONS  (STANDING):  bisacodyl 5 milliGRAM(s) Oral at bedtime  cloNIDine 0.1 milliGRAM(s) Oral two times a day  dextrose 5%. 1000 milliLiter(s) (50 mL/Hr) IV Continuous <Continuous>  dextrose 50% Injectable 12.5 Gram(s) IV Push once  dextrose 50% Injectable 25 Gram(s) IV Push once  dextrose 50% Injectable 25 Gram(s) IV Push once  DULoxetine 60 milliGRAM(s) Oral daily  enoxaparin Injectable 40 milliGRAM(s) SubCutaneous daily  famotidine    Tablet 20 milliGRAM(s) Oral daily  fentaNYL   Patch 100 MICROgram(s)/Hr. 1 Patch Transdermal every 48 hours  influenza   Vaccine 0.5 milliLiter(s) IntraMuscular once  insulin lispro (HumaLOG) corrective regimen sliding scale   SubCutaneous three times a day before meals  insulin lispro (HumaLOG) corrective regimen sliding scale   SubCutaneous at bedtime  methadone    Tablet 20 milliGRAM(s) Oral every 6 hours  naloxone Injectable 0.4 milliGRAM(s) IV Push every 3 minutes  piperacillin/tazobactam IVPB.. 3.375 Gram(s) IV Intermittent every 8 hours  polyethylene glycol 3350 17 Gram(s) Oral two times a day  senna 2 Tablet(s) Oral at bedtime  sodium chloride 0.9%. 1000 milliLiter(s) (75 mL/Hr) IV Continuous <Continuous>  sucralfate suspension 1 Gram(s) Oral four times a day    MEDICATIONS  (PRN):  dextrose 40% Gel 15 Gram(s) Oral once PRN Blood Glucose LESS THAN 70 milliGRAM(s)/deciliter  diazepam    Tablet 2 milliGRAM(s) Oral every 8 hours PRN anxiety  glucagon  Injectable 1 milliGRAM(s) IntraMuscular once PRN Glucose LESS THAN 70 milligrams/deciliter  HYDROmorphone  Injectable 3.2 milliGRAM(s) IV Push every 3 hours PRN Severe Pain (7 - 10)  ondansetron Injectable 4 milliGRAM(s) IV Push every 6 hours PRN Nausea and/or Vomiting  simethicone 80 milliGRAM(s) Chew every 8 hours PRN Gas      Vital Signs Last 24 Hrs  T(C): 36.9 (11 Nov 2019 09:56), Max: 36.9 (10 Nov 2019 21:28)  T(F): 98.4 (11 Nov 2019 09:56), Max: 98.5 (10 Nov 2019 21:28)  HR: 100 (11 Nov 2019 09:56) (96 - 103)  BP: 133/92 (11 Nov 2019 09:56) (118/81 - 139/87)  BP(mean): --  RR: 18 (11 Nov 2019 09:56) (16 - 20)  SpO2: 94% (11 Nov 2019 09:59) (86% - 94%)    CAPILLARY BLOOD GLUCOSE      POCT Blood Glucose.: 203 mg/dL (11 Nov 2019 12:09)  POCT Blood Glucose.: 157 mg/dL (11 Nov 2019 08:19)  POCT Blood Glucose.: 178 mg/dL (10 Nov 2019 22:49)  POCT Blood Glucose.: 195 mg/dL (10 Nov 2019 17:05)    I&O's Summary    10 Nov 2019 07:01  -  11 Nov 2019 07:00  --------------------------------------------------------  IN: 1235 mL / OUT: 325 mL / NET: 910 mL        PHYSICAL EXAM:  GENERAL: Looks stated age, NAD  CARDIOVASCULAR: Normal S1, S2  PULMONARY: Lungs clear to auscultation bilaterally. No wheezes/rales/rhonchi  GI: Abdomen tender throughout.  Bowel sounds present  MSK/Ext:  Legs cool to touch, purplish in color.  Thighs normal color bilaterally.  +DP pulses R>L.  No leg edema.  No calf tenderness bilaterally  PSYCH: Normal Affect.      LABS:                        10.5   19.09 )-----------( 631      ( 11 Nov 2019 09:19 )             33.6     11-11    127<L>  |  91<L>  |  21  ----------------------------<  166<H>  3.4<L>   |  29  |  0.80    Ca    8.4      11 Nov 2019 07:05  Phos  3.3     11-10  Mg     2.0     11-10    TPro  6.1  /  Alb  2.3<L>  /  TBili  0.9  /  DBili  x   /  AST  29  /  ALT  23  /  AlkPhos  182<H>  11-11    PT/INR - ( 10 Nov 2019 08:51 )   PT: 16.2 sec;   INR: 1.39 ratio         PTT - ( 10 Nov 2019 08:51 )  PTT:30.9 sec            RADIOLOGY & ADDITIONAL TESTS:    < from: CT Abdomen and Pelvis w/ IV Cont (11.10.19 @ 16:59) >  IMPRESSION:     Distended gallbladder with discontinuity of the wall and lobulated fluid   along the inferior liver image highly concerning for gallbladder rupture.   New moderate loculated ascites.    Ill-defined pancreatic head mass with pancreatic ductal dilatation.   Significant attenuation of the main portal vein at the portal splenic   confluence with question of ill-defined filling defect possibly   representing tumor thrombus.    Hepatic metastases without significant change.    Small bilateral pleural effusions and atelectasis and a patchy right   middle lobe opacity; superimposed infection is not excluded.    < end of copied text >    < from: US Abdomen Complete (11.11.19 @ 12:14) >  IMPRESSION: Right pleural fluid.    Pneumobilia. Distended gallbladder. Cholelithiasis, sludge and air within   the gallbladder. Complex pericholecystic fluid.    Small to moderate perihepatic free fluid has some degree of complexity.    Pancreas not visualized.    Mildly enlarged spleen.    < end of copied text >

## 2019-11-11 NOTE — PROGRESS NOTE ADULT - PROBLEM SELECTOR PLAN 8
Continue Clonidine.  Holding HCTZ.  Lower extremity discoloration and coolness.  Suspect related to sepsis despite lack of hypotension.  Vascular cardiology consult requested.

## 2019-11-11 NOTE — PROGRESS NOTE ADULT - ASSESSMENT
64 y/o PMH of traumatic work injury (2001) s/p multilumbar fusion (T8-S1) s/p hardware removal (2016, on daily opiates and methadone), chronic constipation, GERD, HTN, DMII (A1c 6.8), depression, anxiety, metastatic pancreatic adenocarcinoma s/p biliary stent placement (9/17), on gemcitabine/abraxone (last dose 10/24/19) sent in from [facility] for concern for biliary sepsis on 11/8/19.    # abdominal pain-  -concern for biliary source due to highly elevated WBC count from baseline and recently placed stent  - CT abdomen done showing "   Distended gallbladder with discontinuity of the wall and lobulated fluid   along the inferior liver image highly concerning for gallbladder rupture.   New moderate loculated ascites.   surgical consult  on zosyn  -renal function improving, continue to monitor, renally dose meds  -no plan for inpatient chemotherapy at this time  -suspect CXR finding likely atelectasis; no symptoms of PNA on exam, and would be covered by Zosyn; continue to monitor clinically    #Pancreatic adenocarcinoma, Stage IV  -holding chemotherapy while admitted  -patient on complex pain regimen including methadone, dilaudid, and fentanyl; pain control per primary team; please ensure patient is on bowel regimen as well to avoid constipation  -repeat imaging as above  -will continue to follow 64 y/o PMH of traumatic work injury (2001) s/p multilumbar fusion (T8-S1) s/p hardware removal (2016, on daily opiates and methadone), chronic constipation, GERD, HTN, DMII (A1c 6.8), depression, anxiety, metastatic pancreatic adenocarcinoma s/p biliary stent placement (9/17), on gemcitabine/abraxone (last dose 10/24/19) sent in from [facility] for concern for biliary sepsis on 11/8/19.    # abdominal pain- CT abdomen done showing " Distended gallbladder with discontinuity of the wall and lobulated fluid along the inferior liver image highly concerning for gallbladder rupture. New moderate loculated ascites. "   surgical consult, IR consult   on zosyn  -renal function improving, continue to monitor, renally dose meds  -no plan for inpatient chemotherapy at this time      #Pancreatic adenocarcinoma, Stage IV  -holding chemotherapy while admitted  -patient on complex pain regimen including methadone, dilaudid, and fentanyl; pain control per primary team; please ensure patient is on bowel regimen as well to avoid constipation  -will continue to follow

## 2019-11-11 NOTE — PROGRESS NOTE ADULT - PROBLEM SELECTOR PLAN 7
A1c 7.7, never been diagnosed before, possibly related to pancreatic adenocarcinoma, given metformin on the last admission.  -ISS and FS for now and monitor

## 2019-11-11 NOTE — PROGRESS NOTE ADULT - PROBLEM SELECTOR PLAN 2
"                                             Progress Note    Client Name: Malena Mcduffie  Date: 1/10/2019         Service Type: Individual      Session Start Time: 7:00  Session End Time: 7:45      Session Length: 45     Session #: 3 (with this provider, transferred from [facility])     Attendees: mother provided update for first 5 minutes, then client attended alone    Treatment Plan Last Reviewed: 1/10/2019  PHQ-9 / SUZETTE-7 :      DATA      Progress Since Last Session (Related to Symptoms / Goals / Homework):   Symptoms: increased anxiety about upcoming tests at school    Homework: Did not complete      Episode of Care Goals: Minimal progress - PREPARATION (Decided to change - considering how); Intervened by negotiating a change plan and determining options / strategies for behavior change, identifying triggers, exploring social supports, and working towards setting a date to begin behavior change. Reported that organization of her schoolwork is a primary area of stress, and that she is working with her school counselor to develop better strategies. Reported that she is trying, but finds it hard to apply those strategies on a regular basis. Reported that it is hard for her to keep track of notes and to get started on tasks.      Current / Ongoing Stressors and Concerns:   Mother reported \"things are about the same\" but noted that client had her appendix removed over winter break. Reported that her recovery has been smooth.     Client reported feeling anxious about upcoming final exams. Reported that she is struggling to form good study habits because \"I never really had to study before, and I would still get good grades.\" Also reported feeling stressed about peer relationships and \"drama\" amongst her friends.      Treatment Objective(s) Addressed in This Session:   improving organization  Managing anxiety     Intervention:   CBT: surfaced thinking habits that generate anxiety and increase procrastination  Solution " "Focused: coached client on self-talk she can use to improve her follow through with schoolwork; coached her on breaking tasks into smaller parts  Supportive therapy: provided active listening, support, and encouragement. Reinforced positive behavioral choices and ways she stood up to some peers who were saying \"mean things\" to others.          ASSESSMENT: Current Emotional / Mental Status (status of significant symptoms):   Risk status (Self / Other harm or suicidal ideation)   Client denies current fears or concerns for personal safety.   Client denies current or recent suicidal ideation or behaviors.   Client denies current or recent homicidal ideation or behaviors.   Client denies current or recent self injurious behavior or ideation.   Client denies other safety concerns.   Client Client reports there has been no change in risk factors since their last session.     Client Client reports there has been a chance in protective factors since their last session.  more friends   A safety and risk management plan has not been developed at this time, however client was given the after-hours number / 911 should there be a change in any of these risk factors.     Appearance:   Appropriate    Eye Contact:   Fair    Psychomotor Behavior: Normal    Attitude:   Cooperative    Orientation:   All   Speech    Rate / Production: Normal     Volume:  Normal    Mood:    Anxious    Affect:    Restricted    Thought Content:  worry    Thought Form:  Coherent  Logical    Insight:    Good      Medication Review:   No current psychiatric medications prescribed     Medication Compliance:   NA     Changes in Health Issues:   None reported     Chemical Use Review:   Substance Use: Chemical use reviewed, no active concerns identified      Tobacco Use: No current tobacco use.       Collateral Reports Completed:   Not Applicable    PLAN: (Client Tasks / Therapist Tasks / Other)  Future appointments are scheduled. Create a list of calming " activities that can be done at home so you have reminders for when it is hard to remember what to do. Try to keep as many notes as possible on your iPad to limit the possibility of losing paper. Use encouraging self-talk as discussed in session to get started on tasks that you otherwise tend to put off until the last minute. Break projects into smaller parts to prevent getting too overwhelmed by schoolwork as discussed in session.        Keri Caro PsyD LP                                                     Treatment Plan    Client's Name: Malena Mcduffie  YOB: 2006    Date: 1/10/2019    DSM-V Diagnoses: adjustment disorder with anxiety  Psychosocial / Contextual Factors: school stress, tension with mother at home  WHODAS:     Referral / Collaboration:  Referral to another professional/service is not indicated at this time..    Anticipated number of session or this episode of care: reassess after 12 visits      MeasurableTreatment Goal(s) related to diagnosis / functional impairment(s)  Goal 1: Client will learn and use coping skills to manage anxiety more effectively.    I will know I've met my goal when I don't feel so anxious.      Objective #A (Client Action)    Client will create a list of calming activities to use as a reminder when anxiety is high.  Status: Continued - Date(s): 1/10/2019     Intervention(s)  Therapist will assist client in identifying a variety of calming activities.    Objective #B  Client will learn strategies to improve organization to reduce anxiety about school performance.  Status: Continued - Date(s): 1/10/2019     Intervention(s)  Therapist will teach organization strategies; problem-solve barriers to follow through.    Objective #C  Client will  client on assertiveness skills to improve her ability to communicate her needs openly.  Status: Continued - Date(s): 1/10/2019     Intervention(s)  Therapist will  client on assertiveness skills.      Client and  Parent / Guardian has reviewed and agreed to the above plan.      Keri Caro PsyD LP  1/10/2019                                  Extensive neurosurgery history w/ multilevel fusion and pain control issues  ISTOP Reference #: [id number] done on admission.  Continue fentanyl to 100 mcg q48 hrs and dilaudid 16mg q3h PRN for severe pain.  Continue Methadone 20mg po q6.  Narcan 0.4mg iv q 3min for RR < 12, O2 sat < 90, sedation score of 6  Follows w/Dr Rico for pain mgt and was scheduled for PCA pump 11/8/19.  Palliative care for help with pain control.     Continue bowel regimen (miralax, dulolax and senna).

## 2019-11-12 NOTE — CONSULT NOTE ADULT - SUBJECTIVE AND OBJECTIVE BOX
Patient is a 63y old  Male who presents with a chief complaint of sepsis (2019 11:08)      HPI:  62 y/o PMH of traumatic work injury () s/p multilumbar fusion (T8-S1) s/p hardware removal (, on daily opiates and methadone), chronic constipation, GERD, HTN, depression, anxiety, metastatic pancreatic adenocarcinoma s/p biliary stent placement (2m ago) and chemo sent in from [facility] for concern for biliary sepsis on . He was admitted on 19 for abd pain, weight loss 25 lbs for the prior 3 weeks and jaundice and found on CTAP w/ intrahepatic biliary ductal dilation and multiple hepatic lesions that were new and EUS/ERCP was + for malignant adenocarcinoma cancer cells and CT guided biopsy of R liver lesion + also for malignant adenocarcinoma w/ elevated CA 19-9 1832. Started on Gemcitabine +Abraxane on 10/17 for C1D1 for 3 wk on and 1 wk off but due to thrombocytopenia and fatigue, nausea and vomiting, C1D15 was held.  He went in for a check up appointment on  and the labs were significant for leukocytosis of >20 and the patient himself has been having increased amount of fatigue and weakness with decreased PO intake for the past 2 days. he reports no fevers, n/v/d/CP/SOB, cough, sick contacts or recent travels. Ever since his pancreatic adenocarcinoma diagnosis he has had  generalized abd pain and it has now worsened these last few days. Long standing history of LBP w/ extensive neurosurgical followup and pain managment doctors with currently Methadone 40 mg BID, Morphine ER 40 BID, dialudid 8mg x12 each day, spinal cord stimulator () and pain pump in  in abdomen which is no longer there. As per last oncology visit, he was scheduled for PCA pump installation on Friday, 2019 by Dr. Rico and Dr. Lilly for pain control.    In the ED: Afebrile, BP stable in sBP 130s, HR 90-100s, RR 18, SaO2 93-98% on RA  Labs: on admission showed 26.39 WBC now 21 WBC , N 82.4%, Hgb 10.9, Na 128 now 133, Cr  1.57 to 1.24, lactate 3.0  CXR shows RLL PNA vs possible atelectasis   EKG , no TWI or KEYSHAWN, QTc 477  Given Vanco and Zosyn, 2.7 L of NS, dilaudid IV 1mg x2, 16mg PO, reglan 10 and famotdine 20 (2019 09:11)    Above reviewed. Pt came in due to weakness and lethargy as well as leukocytosis found at [facility]. Imaging was suspicious for pneumonia and possibly biliary focus and was started on antibiotics(Zosyn). Due to abdominal pain and findings on imaging surgery was consulted for possible cholecystectomy but it was decided to undergo  per cholecystotomy placement which occurred today. ID consulted for workup and antibiotic management     PAST MEDICAL & SURGICAL HISTORY:  History of diabetes mellitus, type II  Anxiety and depression  Diastolic dysfunction: stage I  Empyema lun2015 left lung, s/p VATS, decortication  H/O peptic ulcer: over 10 years ago  history of renal calculus  Herniated Disc: S/P work injury   Hypertension: Dx:   Spinal Stenosis  Postlaminectomy Syndrome  S/P  shunt  S/P insertion of spinal cord stimulator:   History of lumbar fusion:   Insertion of Intrathecal Dilaudid Pump: 2011  S/P Spinal Surgery: corrective lumbar fusion   S/P Knee Replacement: left knee   S/P Arthroscopy of Left Knee  S/P Tonsillectomy: childhood  Ankle Fracture: s/p ORIF left ankle       Allergies  No Known Allergies        ANTIMICROBIALS:  piperacillin/tazobactam IVPB.. 3.375 every 8 hours      MEDICATIONS  (STANDING):  piperacillin/tazobactam IVPB.   200 mL/Hr IV Intermittent (19 @ 22:43)    piperacillin/tazobactam IVPB..   25 mL/Hr IV Intermittent (19 @ 08:24)   25 mL/Hr IV Intermittent (19 @ 00:59)   25 mL/Hr IV Intermittent (19 @ 17:34)   25 mL/Hr IV Intermittent (19 @ 08:50)   25 mL/Hr IV Intermittent (19 @ 00:01)   25 mL/Hr IV Intermittent (11-10-19 @ 17:05)   25 mL/Hr IV Intermittent (11-10-19 @ 05:40)   25 mL/Hr IV Intermittent (19 @ 22:36)   25 mL/Hr IV Intermittent (19 @ 13:26)    vancomycin  IVPB   250 mL/Hr IV Intermittent (19 @ 00:01)        OTHER MEDS: MEDICATIONS  (STANDING):  bisacodyl 5 at bedtime  cloNIDine 0.1 two times a day  dextrose 40% Gel 15 once PRN  dextrose 50% Injectable 12.5 once  dextrose 50% Injectable 25 once  dextrose 50% Injectable 25 once  diazepam    Tablet 2 every 8 hours PRN  DULoxetine 60 daily  enoxaparin Injectable 40 daily  famotidine    Tablet 20 daily  fentaNYL   Patch 100 MICROgram(s)/Hr. 1 every 48 hours  glucagon  Injectable 1 once PRN  HYDROmorphone  Injectable 3.2 every 3 hours PRN  influenza   Vaccine 0.5 once  insulin lispro (HumaLOG) corrective regimen sliding scale  three times a day before meals  insulin lispro (HumaLOG) corrective regimen sliding scale  at bedtime  methadone    Tablet 20 every 6 hours  ondansetron Injectable 4 every 6 hours PRN  polyethylene glycol 3350 17 two times a day  senna 2 at bedtime  simethicone 80 every 8 hours PRN  sucralfate suspension 1 four times a day      SOCIAL HISTORY:       FAMILY HISTORY:  No pertinent family history in first degree relatives      REVIEW OF SYSTEMS  [  ] ROS unobtainable because:    [  ] All other systems negative except as noted below:	    Constitutional:  [ ] fever [ ] chills  [ ] weight loss  [ ] weakness  Skin:  [ ] rash [ ] phlebitis	  Eyes: [ ] icterus [ ] pain  [ ] discharge	  ENMT: [ ] sore throat  [ ] thrush [ ] ulcers [ ] exudates  Respiratory: [ ] dyspnea [ ] hemoptysis [ ] cough [ ] sputum	  Cardiovascular:  [ ] chest pain [ ] palpitations [ ] edema	  Gastrointestinal:  [ ] nausea [ ] vomiting [ ] diarrhea [ ] constipation [ ] pain	  Genitourinary:  [ ] dysuria [ ] frequency [ ] hematuria [ ] discharge [ ] flank pain  [ ] incontinence  Musculoskeletal:  [ ] myalgias [ ] arthralgias [ ] arthritis  [ ] back pain  Neurological:  [ ] headache [ ] seizures  [ ] confusion/altered mental status  Psychiatric:  [ ] anxiety [ ] depression	  Hematology/Lymphatics:  [ ] lymphadenopathy  Endocrine:  [ ] adrenal [ ] thyroid  Allergic/Immunologic:	 [ ] transplant [ ] seasonal    Vital Signs Last 24 Hrs  T(F): 98.1 (19 @ 11:30), Max: 98.5 (11-10-19 @ 21:28)    Vital Signs Last 24 Hrs  HR: 91 (19 @ 11:30) (88 - 98)  BP: 110/75 (19 @ 11:30) (110/75 - 124/87)  RR: 18 (19 @ 11:30)  SpO2: 95% (19 @ 11:30) (94% - 98%)  Wt(kg): --    PHYSICAL EXAM:  General: non-toxic  HEAD/EYES: anicteric, PERRL  ENT:  supple  Cardiovascular:   S1, S2  Respiratory:  clear bilaterally  GI:  soft, non-tender, normal bowel sounds  :  no CVA tenderness   Musculoskeletal:  no synovitis  Neurologic:  grossly non-focal  Skin:  no rash  Lymph: no lymphadenopathy  Psychiatric:  appropriate affect  Vascular:  no phlebitis          WBC Count: 14.58 K/uL ( @ 09:38)  WBC Count: 19.09 K/uL ( @ 09:19)  WBC Count: 16.54 K/uL (11-10 @ 09:10)  WBC Count: 21.43 K/uL ( @ 06:47)  WBC Count: 26.39 K/uL ( @ 23:01)  WBC Count: 25.8 K/uL ( @ 11:07)  WBC Count: 23.9 K/uL ( @ 12:56)                            10.5   14.58 )-----------( 584      ( 2019 09:38 )             33.7           127<L>  |  90<L>  |  22  ----------------------------<  145<H>  4.3   |  29  |  0.84    Ca    8.6      2019 07:22    TPro  6.2  /  Alb  2.3<L>  /  TBili  0.9  /  DBili  x   /  AST  28  /  ALT  24  /  AlkPhos  186<H>        Creatinine Trend: 0.84<--, 0.80<--, 0.78<--, 0.83<--, 0.86<--, 1.24<--        MICROBIOLOGY:  Culture - Blood (19 @ 03:00)    Specimen Source: .Blood Blood-Peripheral    Culture Results:   No growth to date.    Culture - Urine (19 @ 02:57)    Specimen Source: .Urine Clean Catch (Midstream)    Culture Results:   <10,000 CFU/mL Normal Urogenital Veronika    Rapid RVP Result: NotDetec ( @ 00:20)    RADIOLOGY:  < from: US Abdomen Complete (19 @ 12:14) >  IMPRESSION: Right pleural fluid.    Pneumobilia. Distended gallbladder. Cholelithiasis, sludge and air within   the gallbladder. Complex pericholecystic fluid.    Small to moderate perihepatic free fluid has some degree of complexity.    Pancreas not visualized.    Mildly enlarged spleen.    < end of copied text >      < from: CT Abdomen and Pelvis w/ IV Cont (11.10.19 @ 16:59) >  IMPRESSION:     Distended gallbladder with discontinuity of the wall and lobulated fluid   along the inferior liver image highly concerning for gallbladder rupture.   New moderate loculated ascites.    Ill-defined pancreatic head mass with pancreatic ductal dilatation.   Significant attenuation of the main portal vein at the portal splenic   confluence with question of ill-defined filling defect possibly   representing tumor thrombus.    Hepatic metastases without significant change.    Small bilateral pleural effusions and atelectasis and a patchy right   middle lobe opacity; superimposed infection is not excluded.    < end of copied text >    < from: CT Chest No Cont (19 @ 14:48) >  IMPRESSION:     Likely infectious process in the right lung. Follow-up CT is advised to evaluate for resolution.  Moderate size hiatal hernia with surrounding small amount of free fluid.   The esophagus is fluid-filled and thickened which can be secondary to   esophagitis/reflux.    < end of copied text > Patient is a 63y old  Male who presents with a chief complaint of sepsis (2019 11:08)      HPI:  64 y/o PMH of traumatic work injury () s/p multilumbar fusion (T8-S1) s/p hardware removal (, on daily opiates and methadone), chronic constipation, GERD, HTN, depression, anxiety, metastatic pancreatic adenocarcinoma s/p biliary stent placement (2m ago) and chemo sent in from [facility] for concern for biliary sepsis on . He was admitted on 19 for abd pain, weight loss 25 lbs for the prior 3 weeks and jaundice and found on CTAP w/ intrahepatic biliary ductal dilation and multiple hepatic lesions that were new and EUS/ERCP was + for malignant adenocarcinoma cancer cells and CT guided biopsy of R liver lesion + also for malignant adenocarcinoma w/ elevated CA 19-9 1832. Started on Gemcitabine +Abraxane on 10/17 for C1D1 for 3 wk on and 1 wk off but due to thrombocytopenia and fatigue, nausea and vomiting, C1D15 was held.  He went in for a check up appointment on  and the labs were significant for leukocytosis of >20 and the patient himself has been having increased amount of fatigue and weakness with decreased PO intake for the past 2 days. he reports no fevers, n/v/d/CP/SOB, cough, sick contacts or recent travels. Ever since his pancreatic adenocarcinoma diagnosis he has had  generalized abd pain and it has now worsened these last few days. Long standing history of LBP w/ extensive neurosurgical followup and pain managment doctors with currently Methadone 40 mg BID, Morphine ER 40 BID, dialudid 8mg x12 each day, spinal cord stimulator () and pain pump in  in abdomen which is no longer there. As per last oncology visit, he was scheduled for PCA pump installation on Friday, 2019 by Dr. Rico and Dr. Lilly for pain control.    In the ED: Afebrile, BP stable in sBP 130s, HR 90-100s, RR 18, SaO2 93-98% on RA  Labs: on admission showed 26.39 WBC now 21 WBC , N 82.4%, Hgb 10.9, Na 128 now 133, Cr  1.57 to 1.24, lactate 3.0  CXR shows RLL PNA vs possible atelectasis   EKG , no TWI or KEYSHAWN, QTc 477  Given Vanco and Zosyn, 2.7 L of NS, dilaudid IV 1mg x2, 16mg PO, reglan 10 and famotdine 20 (2019 09:11)    Above reviewed. Pt came in due to weakness and lethargy as well as leukocytosis found at [facility]. Imaging was suspicious for pneumonia and possibly biliary focus and was started on antibiotics (Zosyn). Due to abdominal pain and findings on imaging surgery was consulted for possible cholecystectomy but it was decided to undergo  per cholecystotomy placement which occurred today. ID consulted for workup and antibiotic management     PAST MEDICAL & SURGICAL HISTORY:  History of diabetes mellitus, type II  Anxiety and depression  Diastolic dysfunction: stage I  Empyema lun2015 left lung, s/p VATS, decortication  H/O peptic ulcer: over 10 years ago  history of renal calculus  Herniated Disc: S/P work injury   Hypertension: Dx:   Spinal Stenosis  Postlaminectomy Syndrome  S/P  shunt  S/P insertion of spinal cord stimulator:   History of lumbar fusion:   Insertion of Intrathecal Dilaudid Pump: 2011  S/P Spinal Surgery: corrective lumbar fusion   S/P Knee Replacement: left knee   S/P Arthroscopy of Left Knee  S/P Tonsillectomy: childhood  Ankle Fracture: s/p ORIF left ankle       Allergies  No Known Allergies        ANTIMICROBIALS:  piperacillin/tazobactam IVPB.. 3.375 every 8 hours      MEDICATIONS  (STANDING):  piperacillin/tazobactam IVPB.   200 mL/Hr IV Intermittent (19 @ 22:43)    piperacillin/tazobactam IVPB..   25 mL/Hr IV Intermittent (19 @ 08:24)   25 mL/Hr IV Intermittent (19 @ 00:59)   25 mL/Hr IV Intermittent (19 @ 17:34)   25 mL/Hr IV Intermittent (19 @ 08:50)   25 mL/Hr IV Intermittent (19 @ 00:01)   25 mL/Hr IV Intermittent (11-10-19 @ 17:05)   25 mL/Hr IV Intermittent (11-10-19 @ 05:40)   25 mL/Hr IV Intermittent (19 @ 22:36)   25 mL/Hr IV Intermittent (19 @ 13:26)    vancomycin  IVPB   250 mL/Hr IV Intermittent (19 @ 00:01)        OTHER MEDS: MEDICATIONS  (STANDING):  bisacodyl 5 at bedtime  cloNIDine 0.1 two times a day  dextrose 40% Gel 15 once PRN  dextrose 50% Injectable 12.5 once  dextrose 50% Injectable 25 once  dextrose 50% Injectable 25 once  diazepam    Tablet 2 every 8 hours PRN  DULoxetine 60 daily  enoxaparin Injectable 40 daily  famotidine    Tablet 20 daily  fentaNYL   Patch 100 MICROgram(s)/Hr. 1 every 48 hours  glucagon  Injectable 1 once PRN  HYDROmorphone  Injectable 3.2 every 3 hours PRN  influenza   Vaccine 0.5 once  insulin lispro (HumaLOG) corrective regimen sliding scale  three times a day before meals  insulin lispro (HumaLOG) corrective regimen sliding scale  at bedtime  methadone    Tablet 20 every 6 hours  ondansetron Injectable 4 every 6 hours PRN  polyethylene glycol 3350 17 two times a day  senna 2 at bedtime  simethicone 80 every 8 hours PRN  sucralfate suspension 1 four times a day      SOCIAL HISTORY:     Former smoker  Denies ETOH or drugs     FAMILY HISTORY:  Father had dementia  Mother had stomach issues       REVIEW OF SYSTEMS  [  ] ROS unobtainable because:    [ X ] All other systems negative except as noted below:	    Constitutional:  [ ] fever [ ] chills  [ ] weight loss  [X ] weakness  Skin:  [ ] rash [ ] phlebitis	  Eyes: [ ] icterus [ ] pain  [ ] discharge	  ENMT: [ ] sore throat  [ ] thrush [ ] ulcers [ ] exudates  Respiratory: [ ] dyspnea [ ] hemoptysis [ ] cough [ ] sputum	  Cardiovascular:  [ ] chest pain [ ] palpitations [ ] edema	  Gastrointestinal:  [X ] nausea [ ] vomiting [ ] diarrhea [X ] constipation [X ] pain	  Genitourinary:  [ ] dysuria [ ] frequency [ ] hematuria [ ] discharge [ ] flank pain  [ ] incontinence  Musculoskeletal:  [ ] myalgias [ ] arthralgias [ ] arthritis  [ ] back pain  Neurological:  [ ] headache [ ] seizures  [ ] confusion/altered mental status  Psychiatric:  [ ] anxiety [ ] depression	  Hematology/Lymphatics:  [ ] lymphadenopathy  Endocrine:  [ ] adrenal [ ] thyroid  Allergic/Immunologic:	 [ ] transplant [ ] seasonal    Vital Signs Last 24 Hrs  T(F): 98.1 (19 @ 11:30), Max: 98.5 (11-10-19 @ 21:28)    Vital Signs Last 24 Hrs  HR: 91 (19 @ 11:30) (88 - 98)  BP: 110/75 (19 @ 11:30) (110/75 - 124/87)  RR: 18 (19 @ 11:30)  SpO2: 95% (19 @ 11:30) (94% - 98%)  Wt(kg): --    PHYSICAL EXAM:  General: ill appearing and uncomfortable,   HEAD/EYES: anicteric, PERRL  ENT:  supple, no oral lesions  Cardiovascular:   RRR, S1, S2, no murmurs    Respiratory:  decreased breath sounds bilaterally at the bases   GI:  soft, very tender, normal bowel sounds, +distended, right sided per cholecystostomy tube   :  no CVA tenderness   Musculoskeletal:  no synovitis  Neurologic:  alert and oriented but lethargic and weak  Skin:  no rash, very pale   Lymph: no lymphadenopathy  Vascular:  no phlebitis          WBC Count: 14.58 K/uL ( @ 09:38)  WBC Count: 19.09 K/uL ( @ 09:19)  WBC Count: 16.54 K/uL (11-10 @ 09:10)  WBC Count: 21.43 K/uL ( @ 06:47)  WBC Count: 26.39 K/uL ( @ 23:01)  WBC Count: 25.8 K/uL ( @ 11:07)  WBC Count: 23.9 K/uL ( @ 12:56)                            10.5   14.58 )-----------( 584      ( 2019 09:38 )             33.7           127<L>  |  90<L>  |  22  ----------------------------<  145<H>  4.3   |  29  |  0.84    Ca    8.6      2019 07:22    TPro  6.2  /  Alb  2.3<L>  /  TBili  0.9  /  DBili  x   /  AST  28  /  ALT  24  /  AlkPhos  186<H>        Creatinine Trend: 0.84<--, 0.80<--, 0.78<--, 0.83<--, 0.86<--, 1.24<--        MICROBIOLOGY:  Culture - Blood (19 @ 03:00)    Specimen Source: .Blood Blood-Peripheral    Culture Results:   No growth to date.    Culture - Urine (19 @ 02:57)    Specimen Source: .Urine Clean Catch (Midstream)    Culture Results:   <10,000 CFU/mL Normal Urogenital Veronika    Rapid RVP Result: NotDetec ( @ 00:20)    RADIOLOGY:  < from: US Abdomen Complete (19 @ 12:14) >  IMPRESSION: Right pleural fluid.    Pneumobilia. Distended gallbladder. Cholelithiasis, sludge and air within   the gallbladder. Complex pericholecystic fluid.    Small to moderate perihepatic free fluid has some degree of complexity.    Pancreas not visualized.    Mildly enlarged spleen.    < end of copied text >      < from: CT Abdomen and Pelvis w/ IV Cont (11.10.19 @ 16:59) >  IMPRESSION:     Distended gallbladder with discontinuity of the wall and lobulated fluid   along the inferior liver image highly concerning for gallbladder rupture.   New moderate loculated ascites.    Ill-defined pancreatic head mass with pancreatic ductal dilatation.   Significant attenuation of the main portal vein at the portal splenic   confluence with question of ill-defined filling defect possibly   representing tumor thrombus.    Hepatic metastases without significant change.    Small bilateral pleural effusions and atelectasis and a patchy right   middle lobe opacity; superimposed infection is not excluded.    < end of copied text >    < from: CT Chest No Cont (19 @ 14:48) >  IMPRESSION:     Likely infectious process in the right lung. Follow-up CT is advised to evaluate for resolution.  Moderate size hiatal hernia with surrounding small amount of free fluid.   The esophagus is fluid-filled and thickened which can be secondary to   esophagitis/reflux.    < end of copied text > Patient is a 63y old  Male who presents with a chief complaint of sepsis (2019 11:08)      HPI:  64 y/o PMH of traumatic work injury () s/p multilumbar fusion (T8-S1) s/p hardware removal (, on daily opiates and methadone), chronic constipation, GERD, HTN, depression, anxiety, metastatic pancreatic adenocarcinoma s/p biliary stent placement (2m ago) and chemo sent in from [facility] for concern for biliary sepsis on . He was admitted on 19 for abd pain, weight loss 25 lbs for the prior 3 weeks and jaundice and found on CTAP w/ intrahepatic biliary ductal dilation and multiple hepatic lesions that were new and EUS/ERCP was + for malignant adenocarcinoma cancer cells and CT guided biopsy of R liver lesion + also for malignant adenocarcinoma w/ elevated CA 19-9 1832. Started on Gemcitabine +Abraxane on 10/17 for C1D1 for 3 wk on and 1 wk off but due to thrombocytopenia and fatigue, nausea and vomiting, C1D15 was held.  He went in for a check up appointment on  and the labs were significant for leukocytosis of >20 and the patient himself has been having increased amount of fatigue and weakness with decreased PO intake for the past 2 days. he reports no fevers, n/v/d/CP/SOB, cough, sick contacts or recent travels. Ever since his pancreatic adenocarcinoma diagnosis he has had  generalized abd pain and it has now worsened these last few days. Long standing history of LBP w/ extensive neurosurgical followup and pain managment doctors with currently Methadone 40 mg BID, Morphine ER 40 BID, dialudid 8mg x12 each day, spinal cord stimulator () and pain pump in  in abdomen which is no longer there. As per last oncology visit, he was scheduled for PCA pump installation on Friday, 2019 by Dr. Rico and Dr. Lilly for pain control.    In the ED: Afebrile, BP stable in sBP 130s, HR 90-100s, RR 18, SaO2 93-98% on RA  Labs: on admission showed 26.39 WBC now 21 WBC , N 82.4%, Hgb 10.9, Na 128 now 133, Cr  1.57 to 1.24, lactate 3.0  CXR shows RLL PNA vs possible atelectasis   EKG , no TWI or KEYSHAWN, QTc 477  Given Vanco and Zosyn, 2.7 L of NS, dilaudid IV 1mg x2, 16mg PO, reglan 10 and famotdine 20 (2019 09:11)    Above reviewed. Pt came in due to weakness and lethargy as well as leukocytosis found at [facility]. Imaging was suspicious for pneumonia and possibly biliary focus and was started on antibiotics (Zosyn). Due to abdominal pain and findings on imaging surgery was consulted for possible cholecystectomy but it was decided to undergo  per cholecystotomy placement which occurred today. ID consulted for workup and antibiotic management     PAST MEDICAL & SURGICAL HISTORY:  History of diabetes mellitus, type II  Anxiety and depression  Diastolic dysfunction: stage I  Empyema lun2015 left lung, s/p VATS, decortication  H/O peptic ulcer: over 10 years ago  history of renal calculus  Herniated Disc: S/P work injury   Hypertension: Dx:   Spinal Stenosis  Postlaminectomy Syndrome  S/P  shunt  S/P insertion of spinal cord stimulator:   History of lumbar fusion:   Insertion of Intrathecal Dilaudid Pump: 2011  S/P Spinal Surgery: corrective lumbar fusion   S/P Knee Replacement: left knee   S/P Arthroscopy of Left Knee  S/P Tonsillectomy: childhood  Ankle Fracture: s/p ORIF left ankle       Allergies  No Known Allergies        ANTIMICROBIALS:  piperacillin/tazobactam IVPB.. 3.375 every 8 hours      MEDICATIONS  (STANDING):  piperacillin/tazobactam IVPB.   200 mL/Hr IV Intermittent (19 @ 22:43)    piperacillin/tazobactam IVPB..   25 mL/Hr IV Intermittent (19 @ 08:24)   25 mL/Hr IV Intermittent (19 @ 00:59)   25 mL/Hr IV Intermittent (19 @ 17:34)   25 mL/Hr IV Intermittent (19 @ 08:50)   25 mL/Hr IV Intermittent (19 @ 00:01)   25 mL/Hr IV Intermittent (11-10-19 @ 17:05)   25 mL/Hr IV Intermittent (11-10-19 @ 05:40)   25 mL/Hr IV Intermittent (19 @ 22:36)   25 mL/Hr IV Intermittent (19 @ 13:26)    vancomycin  IVPB   250 mL/Hr IV Intermittent (19 @ 00:01)        OTHER MEDS: MEDICATIONS  (STANDING):  bisacodyl 5 at bedtime  cloNIDine 0.1 two times a day  dextrose 40% Gel 15 once PRN  dextrose 50% Injectable 12.5 once  dextrose 50% Injectable 25 once  dextrose 50% Injectable 25 once  diazepam    Tablet 2 every 8 hours PRN  DULoxetine 60 daily  enoxaparin Injectable 40 daily  famotidine    Tablet 20 daily  fentaNYL   Patch 100 MICROgram(s)/Hr. 1 every 48 hours  glucagon  Injectable 1 once PRN  HYDROmorphone  Injectable 3.2 every 3 hours PRN  influenza   Vaccine 0.5 once  insulin lispro (HumaLOG) corrective regimen sliding scale  three times a day before meals  insulin lispro (HumaLOG) corrective regimen sliding scale  at bedtime  methadone    Tablet 20 every 6 hours  ondansetron Injectable 4 every 6 hours PRN  polyethylene glycol 3350 17 two times a day  senna 2 at bedtime  simethicone 80 every 8 hours PRN  sucralfate suspension 1 four times a day      SOCIAL HISTORY:   lives with familu    Former smoker  Denies ETOH or drugs     FAMILY HISTORY:  Father had dementia  Mother had stomach issues       REVIEW OF SYSTEMS  [  ] ROS unobtainable because:    [ X ] All other systems negative except as noted below:	    Constitutional:  [ ] fever [ ] chills  [ ] weight loss  [X ] weakness  Skin:  [ ] rash [ ] phlebitis	  Eyes: [ ] icterus [ ] pain  [ ] discharge	  ENMT: [ ] sore throat  [ ] thrush [ ] ulcers [ ] exudates  Respiratory: [ ] dyspnea [ ] hemoptysis [ ] cough [ ] sputum	  Cardiovascular:  [ ] chest pain [ ] palpitations [ ] edema	  Gastrointestinal:  [X ] nausea [ ] vomiting [ ] diarrhea [X ] constipation [X ] pain	  Genitourinary:  [ ] dysuria [ ] frequency [ ] hematuria [ ] discharge [ ] flank pain  [ ] incontinence  Musculoskeletal:  [ ] myalgias [ ] arthralgias [ ] arthritis  [ ] back pain  Neurological:  [ ] headache [ ] seizures  [ ] confusion/altered mental status  Psychiatric:  [ ] anxiety [ ] depression	  Hematology/Lymphatics:  [ ] lymphadenopathy  Endocrine:  [ ] adrenal [ ] thyroid  Allergic/Immunologic:	 [ ] transplant [ ] seasonal    Vital Signs Last 24 Hrs  T(F): 98.1 (19 @ 11:30), Max: 98.5 (11-10-19 @ 21:28)    Vital Signs Last 24 Hrs  HR: 91 (19 @ 11:30) (88 - 98)  BP: 110/75 (19 @ 11:30) (110/75 - 124/87)  RR: 18 (19 @ 11:30)  SpO2: 95% (19 @ 11:30) (94% - 98%)  Wt(kg): --    PHYSICAL EXAM:  General: ill appearing and uncomfortable,   HEAD: atraumatic, normocephalic  EYES: pale sclera, anicteric, PERRL  ENT:  supple, no oral lesions  Cardiovascular:   RRR, S1, S2, no murmurs    Respiratory:  decreased breath sounds bilaterally at the bases   GI:  soft, very tender, normal bowel sounds, +distended, right sided per cholecystostomy tube   :  no CVA tenderness   Musculoskeletal:  no synovitis  Neurologic:  alert and oriented but lethargic and weak  Skin:  no rash, very pale   Lymph: no lymphadenopathy  Vascular:  no phlebitis          WBC Count: 14.58 K/uL ( @ 09:38)  WBC Count: 19.09 K/uL ( @ 09:19)  WBC Count: 16.54 K/uL (11-10 @ 09:10)  WBC Count: 21.43 K/uL ( @ 06:47)  WBC Count: 26.39 K/uL ( @ 23:01)  WBC Count: 25.8 K/uL ( @ 11:07)  WBC Count: 23.9 K/uL ( @ 12:56)                            10.5   14.58 )-----------( 584      ( 2019 09:38 )             33.7           127<L>  |  90<L>  |  22  ----------------------------<  145<H>  4.3   |  29  |  0.84    Ca    8.6      2019 07:22    TPro  6.2  /  Alb  2.3<L>  /  TBili  0.9  /  DBili  x   /  AST  28  /  ALT  24  /  AlkPhos  186<H>        Creatinine Trend: 0.84<--, 0.80<--, 0.78<--, 0.83<--, 0.86<--, 1.24<--        MICROBIOLOGY:  Culture - Blood (19 @ 03:00)    Specimen Source: .Blood Blood-Peripheral    Culture Results:   No growth to date.    Culture - Urine (19 @ 02:57)    Specimen Source: .Urine Clean Catch (Midstream)    Culture Results:   <10,000 CFU/mL Normal Urogenital Veronika    Rapid RVP Result: NotDetec ( @ 00:20)    RADIOLOGY:  < from: US Abdomen Complete (19 @ 12:14) >  IMPRESSION: Right pleural fluid.    Pneumobilia. Distended gallbladder. Cholelithiasis, sludge and air within   the gallbladder. Complex pericholecystic fluid.    Small to moderate perihepatic free fluid has some degree of complexity.    Pancreas not visualized.    Mildly enlarged spleen.    < end of copied text >      < from: CT Abdomen and Pelvis w/ IV Cont (11.10.19 @ 16:59) >  IMPRESSION:     Distended gallbladder with discontinuity of the wall and lobulated fluid   along the inferior liver image highly concerning for gallbladder rupture.   New moderate loculated ascites.    Ill-defined pancreatic head mass with pancreatic ductal dilatation.   Significant attenuation of the main portal vein at the portal splenic   confluence with question of ill-defined filling defect possibly   representing tumor thrombus.    Hepatic metastases without significant change.    Small bilateral pleural effusions and atelectasis and a patchy right   middle lobe opacity; superimposed infection is not excluded.    < end of copied text >    < from: CT Chest No Cont (19 @ 14:48) >  IMPRESSION:     Likely infectious process in the right lung. Follow-up CT is advised to evaluate for resolution.  Moderate size hiatal hernia with surrounding small amount of free fluid.   The esophagus is fluid-filled and thickened which can be secondary to   esophagitis/reflux.    < end of copied text >

## 2019-11-12 NOTE — PROGRESS NOTE ADULT - SUBJECTIVE AND OBJECTIVE BOX
Interventional Radiology  Pre-Procedure Note    This is a 63y  Male      HPI:  This is a 62 y/o male with hx of traumatic work injury () s/p multilumbar fusion (T8-S1) s/p hardware removal (, on daily opiates and methadone), chronic constipation, GERD, HTN, depression, anxiety, metastatic pancreatic adenocarcinoma s/p biliary stent placement (2m ago) and chemo sent in from [facility] for concern for biliary sepsis on . He was admitted on 19 for abd pain, weight loss 25 lbs for the prior 3 weeks and jaundice and found on CTAP w/ intrahepatic biliary ductal dilation and multiple hepatic lesions that were new and EUS/ERCP was + for malignant adenocarcinoma cancer cells and CT guided biopsy of R liver lesion + also for malignant adenocarcinoma w/ elevated CA 19-9 1832. Appt on  identified profound leukocytosis of >20 and the patient himself reported increased fatigue and weakness with decreased PO intake for 2 days prior to admission. In the ED: CXR revealed RLL PNA vs atelectasis. On 11/10 CT revealed  acute cholecystitis. The pt was deemed a non surgical candidate therefore the primary team has requested percutaneous cholecystostomy placement in IR.    NPO since before midnight . Last lovenox given 1 pm on . Last Zosyn given 8 am today.        PAST MEDICAL & SURGICAL HISTORY:  History of diabetes mellitus, type II  Anxiety and depression  Diastolic dysfunction: stage I  Empyema lun2015 left lung, s/p VATS, decortication  H/O peptic ulcer: over 10 years ago  history of renal calculus  Herniated Disc: S/P work injury   Hypertension: Dx: 2002  Spinal Stenosis  Postlaminectomy Syndrome  S/P  shunt  S/P insertion of spinal cord stimulator:   History of lumbar fusion:   Insertion of Intrathecal Dilaudid Pump: 2011  S/P Spinal Surgery: corrective lumbar fusion   S/P Knee Replacement: left knee   S/P Arthroscopy of Left Knee  S/P Tonsillectomy: childhood  Ankle Fracture: s/p ORIF left ankle       FAMILY HISTORY:  No pertinent family history in first degree relatives      Allergies: No Known Allergies      Current Medications: bisacodyl 5 milliGRAM(s) Oral at bedtime  chlorhexidine 4% Liquid 1 Application(s) Topical daily  cloNIDine 0.1 milliGRAM(s) Oral two times a day  dextrose 40% Gel 15 Gram(s) Oral once PRN  dextrose 5%. 1000 milliLiter(s) IV Continuous <Continuous>  dextrose 50% Injectable 12.5 Gram(s) IV Push once  dextrose 50% Injectable 25 Gram(s) IV Push once  dextrose 50% Injectable 25 Gram(s) IV Push once  diazepam    Tablet 2 milliGRAM(s) Oral every 8 hours PRN  DULoxetine 60 milliGRAM(s) Oral daily  enoxaparin Injectable 40 milliGRAM(s) SubCutaneous daily  famotidine    Tablet 20 milliGRAM(s) Oral daily  fentaNYL   Patch 100 MICROgram(s)/Hr. 1 Patch Transdermal every 48 hours  glucagon  Injectable 1 milliGRAM(s) IntraMuscular once PRN  HYDROmorphone  Injectable 3.2 milliGRAM(s) IV Push every 3 hours PRN  influenza   Vaccine 0.5 milliLiter(s) IntraMuscular once  insulin lispro (HumaLOG) corrective regimen sliding scale   SubCutaneous three times a day before meals  insulin lispro (HumaLOG) corrective regimen sliding scale   SubCutaneous at bedtime  methadone    Tablet 20 milliGRAM(s) Oral every 6 hours  naloxone Injectable 0.4 milliGRAM(s) IV Push every 3 minutes  ondansetron Injectable 4 milliGRAM(s) IV Push every 6 hours PRN  piperacillin/tazobactam IVPB.. 3.375 Gram(s) IV Intermittent every 8 hours  polyethylene glycol 3350 17 Gram(s) Oral two times a day  senna 2 Tablet(s) Oral at bedtime  simethicone 80 milliGRAM(s) Chew every 8 hours PRN  sodium chloride 0.9%. 1000 milliLiter(s) IV Continuous <Continuous>  sucralfate suspension 1 Gram(s) Oral four times a day      Labs:        11-10 INR 1.39                     10.5   19.09 )-----------( 631      ( 2019 09:19 )             33.6       11-12    127<L>  |  90<L>  |  22  ----------------------------<  145<H>  4.3   |  29  |  0.84    Ca    8.6      2019 07:22  Phos  3.3     11-10  Mg     2.0     11-10    TPro  6.2  /  Alb  2.3<L>  /  TBili  0.9  /  DBili  x   /  AST  28  /  ALT  24  /  AlkPhos  186<H>                Blood Bank: Type + Screen  @ 22:53  A  --  Negative  Positive      Blood Available Until: 2nd specimen being sent stat per Kristyn  blood bank    Assessment/Plan:   This is a 63 year old Male who presents with CT identified acute cholecystitis  Patient presents to IR for percutaneous cholecystostomy.  Procedure/ risks/ benefits/ goals/ alternatives were explained to the pt who is A & O x 3. . All questions answered. Informed content obtained from patient. Consent placed in chart.     Samia LEOS BC  ext [phone]  # [phone]

## 2019-11-12 NOTE — CHART NOTE - NSCHARTNOTEFT_GEN_A_CORE
Surgery Post-Procedure Note    Pre-op Dx: Sepsis  Surgeon: Dr. Daniels  Procedure: Cholecystostomy tube    SUBJECTIVE:  - Patient seen and examined at bedside   - Patient states not feeling well, with exquisite abdominal unchanged from this morning  --------------------------------------------------------------------------------------------------  OBJECTIVE:   Physical Exam:  General: AAOx3, in visible pain, pale skin  HEENT: NC/AT  Respiratory: nonlabored breathing  Cardiovascular: RRR, normal S1 and S2, no murmurs or gallops  Abdomen: distended, soft,  exquisitely tender  Extremities: WWP, no edema  Drain: Cholecystostomy with dark bilious drainage  --------------------------------------------------------------------------------------------------  V/S:  Vital Signs Last 24 Hrs  T(C): 36.7 (12 Nov 2019 11:30), Max: 36.8 (12 Nov 2019 08:31)  T(F): 98.1 (12 Nov 2019 11:30), Max: 98.3 (12 Nov 2019 08:31)  HR: 91 (12 Nov 2019 11:30) (88 - 98)  BP: 110/75 (12 Nov 2019 11:30) (110/75 - 124/87)  BP(mean): --  RR: 18 (12 Nov 2019 11:30) (18 - 18)  SpO2: 95% (12 Nov 2019 11:30) (94% - 98%)    --------------------------------------------------------------------------------------------------  I/Os:    11 Nov 2019 07:01  -  12 Nov 2019 07:00  --------------------------------------------------------  IN:    IV PiggyBack: 100 mL    Oral Fluid: 480 mL  Total IN: 580 mL    OUT:  Total OUT: 0 mL    Total NET: 580 mL      12 Nov 2019 07:01  -  12 Nov 2019 14:39  --------------------------------------------------------  IN:    Oral Fluid: 240 mL  Total IN: 240 mL    OUT:  Total OUT: 0 mL    Total NET: 240 mL        --------------------------------------------------------------------------------------------------  LABS:                        10.5   14.58 )-----------( 584      ( 12 Nov 2019 09:38 )             33.7     12 Nov 2019 07:22    127    |  90     |  22     ----------------------------<  145    4.3     |  29     |  0.84     Ca    8.6        12 Nov 2019 07:22    TPro  6.2    /  Alb  2.3    /  TBili  0.9    /  DBili  x      /  AST  28     /  ALT  24     /  AlkPhos  186    12 Nov 2019 07:22    PT/INR - ( 12 Nov 2019 09:29 )   PT: 15.4 sec;   INR: 1.35 ratio         PTT - ( 12 Nov 2019 09:29 )  PTT:28.0 sec  CAPILLARY BLOOD GLUCOSE      POCT Blood Glucose.: 146 mg/dL (12 Nov 2019 12:36)  POCT Blood Glucose.: 122 mg/dL (12 Nov 2019 08:03)  POCT Blood Glucose.: 182 mg/dL (11 Nov 2019 21:07)  POCT Blood Glucose.: 163 mg/dL (11 Nov 2019 17:29)        LIVER FUNCTIONS - ( 12 Nov 2019 07:22 )  Alb: 2.3 g/dL / Pro: 6.2 g/dL / ALK PHOS: 186 U/L / ALT: 24 U/L / AST: 28 U/L / GGT: x               --------------------------------------------------------------------------------------------------  MEDICATIONS  (STANDING):  acetaminophen   Tablet .. 1000 milliGRAM(s) Oral every 8 hours  bisacodyl 5 milliGRAM(s) Oral at bedtime  chlorhexidine 4% Liquid 1 Application(s) Topical daily  cloNIDine 0.1 milliGRAM(s) Oral two times a day  dextrose 5%. 1000 milliLiter(s) (50 mL/Hr) IV Continuous <Continuous>  dextrose 50% Injectable 12.5 Gram(s) IV Push once  dextrose 50% Injectable 25 Gram(s) IV Push once  dextrose 50% Injectable 25 Gram(s) IV Push once  DULoxetine 60 milliGRAM(s) Oral daily  enoxaparin Injectable 40 milliGRAM(s) SubCutaneous daily  famotidine    Tablet 20 milliGRAM(s) Oral daily  fentaNYL   Patch  75 MICROgram(s)/Hr. 1 Patch Transdermal every 48 hours  fentaNYL   Patch 100 MICROgram(s)/Hr. 1 Patch Transdermal every 48 hours  influenza   Vaccine 0.5 milliLiter(s) IntraMuscular once  insulin lispro (HumaLOG) corrective regimen sliding scale   SubCutaneous three times a day before meals  insulin lispro (HumaLOG) corrective regimen sliding scale   SubCutaneous at bedtime  methadone    Tablet 20 milliGRAM(s) Oral every 6 hours  naloxone Injectable 0.4 milliGRAM(s) IV Push every 3 minutes  piperacillin/tazobactam IVPB.. 3.375 Gram(s) IV Intermittent every 8 hours  polyethylene glycol 3350 17 Gram(s) Oral two times a day  senna 2 Tablet(s) Oral at bedtime  sodium chloride 0.9%. 1000 milliLiter(s) (75 mL/Hr) IV Continuous <Continuous>  sucralfate suspension 1 Gram(s) Oral four times a day    MEDICATIONS  (PRN):  bisacodyl Suppository 10 milliGRAM(s) Rectal daily PRN Constipation  dextrose 40% Gel 15 Gram(s) Oral once PRN Blood Glucose LESS THAN 70 milliGRAM(s)/deciliter  diazepam    Tablet 2 milliGRAM(s) Oral every 8 hours PRN anxiety  glucagon  Injectable 1 milliGRAM(s) IntraMuscular once PRN Glucose LESS THAN 70 milligrams/deciliter  HYDROmorphone  Injectable 3 milliGRAM(s) IV Push every 2 hours PRN Severe Pain (7 - 10)  ondansetron Injectable 4 milliGRAM(s) IV Push every 6 hours PRN Nausea and/or Vomiting  simethicone 80 milliGRAM(s) Chew every 8 hours PRN Gas    --------------------------------------------------------------------------------------------------  ASSESSMENT:  63-year man with PMH of traumatic work injury (2001) s/p multilumbar fusion (T8-S1) s/p hardware removal (2016, on daily opiates and methadone), chronic constipation, GERD, HTN, DM type 2, depression, anxiety, metastatic pancreatic adenocarcinoma s/p biliary stent placement (9/17) who was sent in from [facility] for concern for biliary sepsis on 11/8/19. Persistent leukocytosis, significant abdominal tenderness with distension, despite antibiotics. Interval imaging concerning for gallbladder rupture, sludge, stones and pericholecystic fluid raising concerns for acute cholecystitis. Surgery consulted for possible intervention. Patient underwent IR cholecystostomy tube placement today.    PLAN:  - Monitor drain output  - Monitor VSS and LFTs  - Pain control  - Care per primary team  - Will continue to follow    [facility]   [id number] Surgery Post-Procedure Note    Pre-op Dx: Sepsis  Surgeon: Dr. Daniels  Procedure: Cholecystostomy tube    SUBJECTIVE:  - Patient seen and examined at bedside   - Patient states not feeling well, with exquisite abdominal unchanged from this morning  --------------------------------------------------------------------------------------------------  OBJECTIVE:   Physical Exam:  General: AAOx3, in visible pain, pale skin  HEENT: NC/AT  Respiratory: nonlabored breathing  Cardiovascular: RRR, normal S1 and S2, no murmurs or gallops  Abdomen: distended, soft,  exquisitely tender  Extremities: WWP, no edema  Drain: Cholecystostomy with dark bilious drainage  --------------------------------------------------------------------------------------------------  V/S:  Vital Signs Last 24 Hrs  T(C): 36.7 (12 Nov 2019 11:30), Max: 36.8 (12 Nov 2019 08:31)  T(F): 98.1 (12 Nov 2019 11:30), Max: 98.3 (12 Nov 2019 08:31)  HR: 91 (12 Nov 2019 11:30) (88 - 98)  BP: 110/75 (12 Nov 2019 11:30) (110/75 - 124/87)  BP(mean): --  RR: 18 (12 Nov 2019 11:30) (18 - 18)  SpO2: 95% (12 Nov 2019 11:30) (94% - 98%)    --------------------------------------------------------------------------------------------------  I/Os:    11 Nov 2019 07:01  -  12 Nov 2019 07:00  --------------------------------------------------------  IN:    IV PiggyBack: 100 mL    Oral Fluid: 480 mL  Total IN: 580 mL    OUT:  Total OUT: 0 mL    Total NET: 580 mL      12 Nov 2019 07:01  -  12 Nov 2019 14:39  --------------------------------------------------------  IN:    Oral Fluid: 240 mL  Total IN: 240 mL    OUT:  Total OUT: 0 mL    Total NET: 240 mL        --------------------------------------------------------------------------------------------------  LABS:                        10.5   14.58 )-----------( 584      ( 12 Nov 2019 09:38 )             33.7     12 Nov 2019 07:22    127    |  90     |  22     ----------------------------<  145    4.3     |  29     |  0.84     Ca    8.6        12 Nov 2019 07:22    TPro  6.2    /  Alb  2.3    /  TBili  0.9    /  DBili  x      /  AST  28     /  ALT  24     /  AlkPhos  186    12 Nov 2019 07:22    PT/INR - ( 12 Nov 2019 09:29 )   PT: 15.4 sec;   INR: 1.35 ratio         PTT - ( 12 Nov 2019 09:29 )  PTT:28.0 sec  CAPILLARY BLOOD GLUCOSE       POCT Blood Glucose.: 146 mg/dL (12 Nov 2019 12:36)  POCT Blood Glucose.: 122 mg/dL (12 Nov 2019 08:03)  POCT Blood Glucose.: 182 mg/dL (11 Nov 2019 21:07)  POCT Blood Glucose.: 163 mg/dL (11 Nov 2019 17:29)        LIVER FUNCTIONS - ( 12 Nov 2019 07:22 )  Alb: 2.3 g/dL / Pro: 6.2 g/dL / ALK PHOS: 186 U/L / ALT: 24 U/L / AST: 28 U/L / GGT: x               --------------------------------------------------------------------------------------------------  MEDICATIONS  (STANDING):  acetaminophen   Tablet .. 1000 milliGRAM(s) Oral every 8 hours  bisacodyl 5 milliGRAM(s) Oral at bedtime  chlorhexidine 4% Liquid 1 Application(s) Topical daily  cloNIDine 0.1 milliGRAM(s) Oral two times a day  dextrose 5%. 1000 milliLiter(s) (50 mL/Hr) IV Continuous <Continuous>  dextrose 50% Injectable 12.5 Gram(s) IV Push once  dextrose 50% Injectable 25 Gram(s) IV Push once  dextrose 50% Injectable 25 Gram(s) IV Push once  DULoxetine 60 milliGRAM(s) Oral daily  enoxaparin Injectable 40 milliGRAM(s) SubCutaneous daily  famotidine    Tablet 20 milliGRAM(s) Oral daily  fentaNYL   Patch  75 MICROgram(s)/Hr. 1 Patch Transdermal every 48 hours  fentaNYL   Patch 100 MICROgram(s)/Hr. 1 Patch Transdermal every 48 hours  influenza   Vaccine 0.5 milliLiter(s) IntraMuscular once  insulin lispro (HumaLOG) corrective regimen sliding scale   SubCutaneous three times a day before meals  insulin lispro (HumaLOG) corrective regimen sliding scale   SubCutaneous at bedtime  methadone    Tablet 20 milliGRAM(s) Oral every 6 hours  naloxone Injectable 0.4 milliGRAM(s) IV Push every 3 minutes  piperacillin/tazobactam IVPB.. 3.375 Gram(s) IV Intermittent every 8 hours  polyethylene glycol 3350 17 Gram(s) Oral two times a day  senna 2 Tablet(s) Oral at bedtime  sodium chloride 0.9%. 1000 milliLiter(s) (75 mL/Hr) IV Continuous <Continuous>  sucralfate suspension 1 Gram(s) Oral four times a day    MEDICATIONS  (PRN):  bisacodyl Suppository 10 milliGRAM(s) Rectal daily PRN Constipation  dextrose 40% Gel 15 Gram(s) Oral once PRN Blood Glucose LESS THAN 70 milliGRAM(s)/deciliter  diazepam    Tablet 2 milliGRAM(s) Oral every 8 hours PRN anxiety  glucagon  Injectable 1 milliGRAM(s) IntraMuscular once PRN Glucose LESS THAN 70 milligrams/deciliter  HYDROmorphone  Injectable 3 milliGRAM(s) IV Push every 2 hours PRN Severe Pain (7 - 10)  ondansetron Injectable 4 milliGRAM(s) IV Push every 6 hours PRN Nausea and/or Vomiting  simethicone 80 milliGRAM(s) Chew every 8 hours PRN Gas    --------------------------------------------------------------------------------------------------  ASSESSMENT:  63-year man with PMH of traumatic work injury (2001) s/p multilumbar fusion (T8-S1) s/p hardware removal (2016, on daily opiates and methadone), chronic constipation, GERD, HTN, DM type 2, depression, anxiety, metastatic pancreatic adenocarcinoma s/p biliary stent placement (9/17) who was sent in from [facility] for concern for biliary sepsis on 11/8/19. Persistent leukocytosis, significant abdominal tenderness with distension, despite antibiotics. Interval imaging concerning for gallbladder rupture, sludge, stones and pericholecystic fluid raising concerns for acute cholecystitis. Surgery consulted for possible intervention. Patient underwent IR cholecystostomy tube placement today.    PLAN:  - Monitor drain output  - Monitor VS and LFTs  - Pain control  - Care per primary team  - Will continue to follow    [facility]   [id number]

## 2019-11-12 NOTE — PROGRESS NOTE ADULT - PROBLEM SELECTOR PLAN 5
Mild.  Urine/serum osmol c/w SIADH.  Urine sodium not sent, will try to add on.  Suspect SIADH secondary to pain and perhaps recent nausea.  Fluid restrict to 1Liter for now.  Hopefully hyponatremia will improve as pain improves.

## 2019-11-12 NOTE — CONSULT NOTE ADULT - SUBJECTIVE AND OBJECTIVE BOX
HPI:  62 y/o PMH of traumatic work injury (2001) s/p multilumbar fusion (T8-S1) s/p hardware removal (2016, on daily opiates and methadone), chronic constipation, GERD, HTN, depression, anxiety, metastatic pancreatic adenocarcinoma s/p biliary stent placement (2m ago) and chemo sent in from [facility] for concern for biliary sepsis on 11/8. He was admitted on 9/17/19 for abd pain, weight loss 25 lbs for the prior 3 weeks and jaundice and found on CTAP w/ intrahepatic biliary ductal dilation and multiple hepatic lesions that were new and EUS/ERCP was + for malignant adenocarcinoma cancer cells and CT guided biopsy of R liver lesion + also for malignant adenocarcinoma w/ elevated CA 19-9 1832. Started on Gemcitabine +Abraxane on 10/17 for C1D1 for 3 wk on and 1 wk off but due to thrombocytopenia and fatigue, nausea and vomiting, C1D15 was held.  He went in for a check up appointment on 11/7 and the labs were significant for leukocytosis of >20 and the patient himself has been having increased amount of fatigue and weakness with decreased PO intake for the past 2 days. he reports no fevers, n/v/d/CP/SOB, cough, sick contacts or recent travels. Ever since his pancreatic adenocarcinoma diagnosis he has had  generalized abd pain and it has now worsened these last few days. Long standing history of LBP w/ extensive neurosurgical followup and pain managment doctors with currently Methadone 40 mg BID, Morphine ER 40 BID, dialudid 8mg x12 each day, spinal cord stimulator (2010) and pain pump in 2012 in abdomen which is no longer there. As per last oncology visit, he was scheduled for PCA pump installation on Friday, 11/8/2019 by Dr. Rico and Dr. Lilly for pain control.    In the ED: Afebrile, BP stable in sBP 130s, HR 90-100s, RR 18, SaO2 93-98% on RA  Labs: on admission showed 26.39 WBC now 21 WBC , N 82.4%, Hgb 10.9, Na 128 now 133, Cr  1.57 to 1.24, lactate 3.0  CXR shows RLL PNA vs possible atelectasis   EKG , no TWI or KEYSHAWN, QTc 477  Given Vanco and Zosyn, 2.7 L of NS, dilaudid IV 1mg x2, 16mg PO, reglan 10 and famotdine 20 (09 Nov 2019 09:11)    PERTINENT PM/SXH:   History of diabetes mellitus, type II  Anxiety and depression  Diastolic dysfunction  Empyema lung  H/O peptic ulcer  history of renal calculus  Renal calculus  Current smoker  Herniated Disc  Hypertension  Depression  Spinal Stenosis  Peripheral Neuropathy  Postlaminectomy Syndrome    S/P  shunt  S/P insertion of spinal cord stimulator  History of lumbar fusion  Insertion of Intrathecal Dilaudid Pump  S/P Spinal Surgery  S/P Knee Replacement  S/P Arthroscopy of Left Knee  S/P Tonsillectomy  Ankle Fracture    FAMILY HISTORY:  No pertinent family history in first degree relatives    ITEMS NOT CHECKED ARE NOT PRESENT    SOCIAL HISTORY:   Significant other/partner:  [ ]  Children:  [ ]  Sabianism/Spirituality:  Substance hx:  [ ]   Tobacco hx:  [ ]   Alcohol hx: [ ]   Home Opioid hx:  [ ] I-Stop Reference No:  Living Situation: [ ]Home  [ ]Long term care  [ ]Rehab [ ]Other    ADVANCE DIRECTIVES:    DNR  MOLST  [ ]  Living Will  [ ]   DECISION MAKER(s):  [ ] Health Care Proxy(s)  [ ] Surrogate(s)  [ ] Guardian           Name(s): Phone Number(s):    BASELINE (I)ADL(s) (prior to admission):  [place]: [ ]Total  [ ] Moderate [ ]Dependent    Allergies    No Known Allergies    Intolerances    MEDICATIONS  (STANDING):  acetaminophen   Tablet .. 1000 milliGRAM(s) Oral every 8 hours  bisacodyl 5 milliGRAM(s) Oral at bedtime  chlorhexidine 4% Liquid 1 Application(s) Topical daily  cloNIDine 0.1 milliGRAM(s) Oral two times a day  dextrose 5%. 1000 milliLiter(s) (50 mL/Hr) IV Continuous <Continuous>  dextrose 50% Injectable 12.5 Gram(s) IV Push once  dextrose 50% Injectable 25 Gram(s) IV Push once  dextrose 50% Injectable 25 Gram(s) IV Push once  DULoxetine 60 milliGRAM(s) Oral daily  enoxaparin Injectable 40 milliGRAM(s) SubCutaneous daily  famotidine    Tablet 20 milliGRAM(s) Oral daily  fentaNYL   Patch  75 MICROgram(s)/Hr. 1 Patch Transdermal every 48 hours  fentaNYL   Patch 100 MICROgram(s)/Hr. 1 Patch Transdermal every 48 hours  influenza   Vaccine 0.5 milliLiter(s) IntraMuscular once  insulin lispro (HumaLOG) corrective regimen sliding scale   SubCutaneous three times a day before meals  insulin lispro (HumaLOG) corrective regimen sliding scale   SubCutaneous at bedtime  methadone    Tablet 20 milliGRAM(s) Oral every 6 hours  naloxone Injectable 0.4 milliGRAM(s) IV Push every 3 minutes  piperacillin/tazobactam IVPB.. 3.375 Gram(s) IV Intermittent every 8 hours  polyethylene glycol 3350 17 Gram(s) Oral two times a day  senna 2 Tablet(s) Oral at bedtime  sodium chloride 0.9%. 1000 milliLiter(s) (75 mL/Hr) IV Continuous <Continuous>  sucralfate suspension 1 Gram(s) Oral four times a day    MEDICATIONS  (PRN):  dextrose 40% Gel 15 Gram(s) Oral once PRN Blood Glucose LESS THAN 70 milliGRAM(s)/deciliter  diazepam    Tablet 2 milliGRAM(s) Oral every 8 hours PRN anxiety  glucagon  Injectable 1 milliGRAM(s) IntraMuscular once PRN Glucose LESS THAN 70 milligrams/deciliter  HYDROmorphone  Injectable 3 milliGRAM(s) IV Push every 2 hours PRN Severe Pain (7 - 10)  ondansetron Injectable 4 milliGRAM(s) IV Push every 6 hours PRN Nausea and/or Vomiting  simethicone 80 milliGRAM(s) Chew every 8 hours PRN Gas    PRESENT SYMPTOMS: [ ]Unable to obtain due to poor mentation   Source if other than patient:  [ ]Family   [ ]Team     Pain: [ ] yes [ ] no  QOL impact -   Location -                    Aggravating factors -  Quality -  Radiation -  Timing-  Severity (0-10 scale):  Minimal acceptable level (0-10 scale):     PAIN AD Score:     [email or web address] (press ctrl +  left click to view)    Dyspnea:                           [ ]Mild [ ]Moderate [ ]Severe  Anxiety:                             [ ]Mild [ ]Moderate [ ]Severe  Fatigue:                             [ ]Mild [ ]Moderate [ ]Severe  Nausea:                             [ ]Mild [ ]Moderate [ ]Severe  Loss of appetite:              [ ]Mild [ ]Moderate [ ]Severe  Constipation:                    [ ]Mild [ ]Moderate [ ]Severe    Other Symptoms:  [ ]All other review of systems negative     Karnofsky Performance Score/Palliative Performance Status Version 2:         %    http://npcrc.org/files/news/palliative_performance_scale_ppsv2.pdf  PHYSICAL EXAM:  Vital Signs Last 24 Hrs  T(C): 36.7 (12 Nov 2019 11:30), Max: 36.8 (12 Nov 2019 08:31)  T(F): 98.1 (12 Nov 2019 11:30), Max: 98.3 (12 Nov 2019 08:31)  HR: 91 (12 Nov 2019 11:30) (88 - 98)  BP: 110/75 (12 Nov 2019 11:30) (110/75 - 124/87)  BP(mean): --  RR: 18 (12 Nov 2019 11:30) (18 - 18)  SpO2: 95% (12 Nov 2019 11:30) (94% - 98%) I&O's Summary    11 Nov 2019 07:01  -  12 Nov 2019 07:00  --------------------------------------------------------  IN: 580 mL / OUT: 0 mL / NET: 580 mL    GENERAL:  [ ]Alert  [ ]Oriented x   [ ]Lethargic  [ ]Cachexia  [ ]Unarousable  [ ]Verbal  [ ]Non-Verbal  Behavioral:   [ ] Anxiety  [ ] Delirium [ ] Agitation [ ] Other  HEENT:  [ ]Normal   [ ]Dry mouth   [ ]ET Tube/Trach  [ ]Oral lesions  PULMONARY:   [ ]Clear [ ]Tachypnea  [ ]Audible excessive secretions   [ ]Rhonchi        [ ]Right [ ]Left [ ]Bilateral  [ ]Crackles        [ ]Right [ ]Left [ ]Bilateral  [ ]Wheezing     [ ]Right [ ]Left [ ]Bilateral  CARDIOVASCULAR:    [ ]Regular [ ]Irregular [ ]Tachy  [ ]Jean [ ]Murmur [ ]Other  GASTROINTESTINAL:  [ ]Soft  [ ]Distended   [ ]+BS  [ ]Non tender [ ]Tender  [ ]PEG [ ]OGT/ NGT  Last BM:   GENITOURINARY:  [ ]Normal [ ] Incontinent   [ ]Oliguria/Anuria   [ ]Heredia  MUSCULOSKELETAL:   [ ]Normal   [ ]Weakness  [ ]Bed/Wheelchair bound [ ]Edema  NEUROLOGIC:   [ ]No focal deficits  [ ] Cognitive impairment  [ ] Dysphagia [ ]Dysarthria [ ] Paresis [ ]Other   SKIN:   [ ]Normal   [ ]Pressure ulcer(s)  [ ]Rash    CRITICAL CARE:  [ ] Shock Present  [ ]Septic [ ]Cardiogenic [ ]Neurologic [ ]Hypovolemic  [ ]  Vasopressors [ ]  Inotropes   [ ] Respiratory failure present [ ] mechanical ventilation [ ] non-invasive ventilatory support [ ] High flow  [ ] Acute  [ ] Chronic [ ] Hypoxic  [ ] Hypercarbic [ ] Other  [ ] Other organ failure     LABS:                        10.5   14.58 )-----------( 584      ( 12 Nov 2019 09:38 )             33.7   11-12    127<L>  |  90<L>  |  22  ----------------------------<  145<H>  4.3   |  29  |  0.84    Ca    8.6      12 Nov 2019 07:22    TPro  6.2  /  Alb  2.3<L>  /  TBili  0.9  /  DBili  x   /  AST  28  /  ALT  24  /  AlkPhos  186<H>  11-12  PT/INR - ( 12 Nov 2019 09:29 )   PT: 15.4 sec;   INR: 1.35 ratio         PTT - ( 12 Nov 2019 09:29 )  PTT:28.0 sec      RADIOLOGY & ADDITIONAL STUDIES:    PROTEIN CALORIE MALNUTRITION PRESENT: [ ] Yes [ ] No  [ ] PPSV2 < or = to 30% [ ] significant weight loss  [ ] poor nutritional intake [ ] catabolic state [ ] anasarca     Albumin, Serum: 2.3 g/dL (11-12-19 @ 07:22)  Artificial Nutrition [ ]     REFERRALS:   [ ]Chaplaincy  [ ] Hospice  [ ]Child Life  [ ]Social Work  [ ]Case management [ ]Holistic Therapy     Goals of Care Document:   Care Coordination Assessment [C. Provider] (11-10-19 @ 09:23)   Progress Notes - Care Coordination [C. Provider] (11-12-19 @ 10:36) HPI:  64 y/o PMH of traumatic work injury (2001) s/p multilumbar fusion (T8-S1) s/p hardware removal (2016, on daily opiates and methadone), chronic constipation, GERD, HTN, depression, anxiety, metastatic pancreatic adenocarcinoma s/p biliary stent placement (2m ago) and chemo sent in from [facility] for concern for biliary sepsis on 11/8. He was admitted on 9/17/19 for abd pain, weight loss 25 lbs for the prior 3 weeks and jaundice and found on CTAP w/ intrahepatic biliary ductal dilation and multiple hepatic lesions that were new and EUS/ERCP was + for malignant adenocarcinoma cancer cells and CT guided biopsy of R liver lesion + also for malignant adenocarcinoma w/ elevated CA 19-9 1832. Started on Gemcitabine +Abraxane on 10/17 for C1D1 for 3 wk on and 1 wk off but due to thrombocytopenia and fatigue, nausea and vomiting, C1D15 was held.  He went in for a check up appointment on 11/7 and the labs were significant for leukocytosis of >20. Furthermore, the patient himself has been having increased amount of fatigue and weakness with decreased PO intake for the past 2 days before his admission.  Long standing history of LBP w/ extensive neurosurgical followup and pain management doctors. He has h/o spinal cord stimulator (2010) and pain pump in 2012 in abdomen which is no longer there. As per last oncology visit, he was scheduled for PCA pump installation on Friday, 11/8/2019 by Dr. Rico and Dr. Lilly for pain control. During this admission, CT showed gallbladder rupture, stable metastatic disease (to liver), and tumor trombosis. Palliative care was called pain Rx.      PERTINENT PM/SXH:   History of diabetes mellitus, type II  Anxiety and depression  Diastolic dysfunction  Empyema lung  H/O peptic ulcer  history of renal calculus  Renal calculus  Current smoker  Herniated Disc  Hypertension  Depression  Spinal Stenosis  Peripheral Neuropathy  Postlaminectomy Syndrome    S/P  shunt  S/P insertion of spinal cord stimulator  History of lumbar fusion  Insertion of Intrathecal Dilaudid Pump  S/P Spinal Surgery  S/P Knee Replacement  S/P Arthroscopy of Left Knee  S/P Tonsillectomy  Ankle Fracture    FAMILY HISTORY:  No pertinent family history in first degree relatives    ITEMS NOT CHECKED ARE NOT PRESENT    SOCIAL HISTORY:   Significant other/partner:   [ ]  Children: yes  [ ]  Protestant/Spirituality: Scientology   Substance hx: No  [ ]   Tobacco hx:  [ ]   Alcohol hx: [ ]   Home Opioid hx:  [ x] I-Stop Reference No:  Living Situation: [ ]Home  [ ]Long term care  [ ]Rehab [ ]Other    ADVANCE DIRECTIVES:    DNR  MOLST  [ ]  Living Will  [ ]   DECISION MAKER(s):  [ ] Health Care Proxy(s)  [ ] Surrogate(s)  [ ] Guardian           Name(s): Phone Number(s):    BASELINE (I)ADL(s) (prior to admission):  [place]: [ ]Total  [ ] Moderate [ ]Dependent    Allergies    No Known Allergies    Intolerances    MEDICATIONS  (STANDING):  acetaminophen   Tablet .. 1000 milliGRAM(s) Oral every 8 hours  bisacodyl 5 milliGRAM(s) Oral at bedtime  chlorhexidine 4% Liquid 1 Application(s) Topical daily  cloNIDine 0.1 milliGRAM(s) Oral two times a day  dextrose 5%. 1000 milliLiter(s) (50 mL/Hr) IV Continuous <Continuous>  dextrose 50% Injectable 12.5 Gram(s) IV Push once  dextrose 50% Injectable 25 Gram(s) IV Push once  dextrose 50% Injectable 25 Gram(s) IV Push once  DULoxetine 60 milliGRAM(s) Oral daily  enoxaparin Injectable 40 milliGRAM(s) SubCutaneous daily  famotidine    Tablet 20 milliGRAM(s) Oral daily  fentaNYL   Patch  75 MICROgram(s)/Hr. 1 Patch Transdermal every 48 hours  fentaNYL   Patch 100 MICROgram(s)/Hr. 1 Patch Transdermal every 48 hours  influenza   Vaccine 0.5 milliLiter(s) IntraMuscular once  insulin lispro (HumaLOG) corrective regimen sliding scale   SubCutaneous three times a day before meals  insulin lispro (HumaLOG) corrective regimen sliding scale   SubCutaneous at bedtime  methadone    Tablet 20 milliGRAM(s) Oral every 6 hours  naloxone Injectable 0.4 milliGRAM(s) IV Push every 3 minutes  piperacillin/tazobactam IVPB.. 3.375 Gram(s) IV Intermittent every 8 hours  polyethylene glycol 3350 17 Gram(s) Oral two times a day  senna 2 Tablet(s) Oral at bedtime  sodium chloride 0.9%. 1000 milliLiter(s) (75 mL/Hr) IV Continuous <Continuous>  sucralfate suspension 1 Gram(s) Oral four times a day    MEDICATIONS  (PRN):  dextrose 40% Gel 15 Gram(s) Oral once PRN Blood Glucose LESS THAN 70 milliGRAM(s)/deciliter  diazepam    Tablet 2 milliGRAM(s) Oral every 8 hours PRN anxiety  glucagon  Injectable 1 milliGRAM(s) IntraMuscular once PRN Glucose LESS THAN 70 milligrams/deciliter  HYDROmorphone  Injectable 3 milliGRAM(s) IV Push every 2 hours PRN Severe Pain (7 - 10)  ondansetron Injectable 4 milliGRAM(s) IV Push every 6 hours PRN Nausea and/or Vomiting  simethicone 80 milliGRAM(s) Chew every 8 hours PRN Gas    PRESENT SYMPTOMS: [ ]Unable to obtain due to poor mentation   Source if other than patient:  [ ]Family   [ ]Team     Pain: [ ] yes [ ] no  QOL impact -   Location -                    Aggravating factors -  Quality -  Radiation -  Timing-  Severity (0-10 scale):  Minimal acceptable level (0-10 scale):     PAIN AD Score:     [email or web address] (press ctrl +  left click to view)    Dyspnea:                           [ ]Mild [ ]Moderate [ ]Severe  Anxiety:                             [ ]Mild [ ]Moderate [ ]Severe  Fatigue:                             [ ]Mild [ ]Moderate [ ]Severe  Nausea:                             [ ]Mild [ ]Moderate [ ]Severe  Loss of appetite:              [ ]Mild [ ]Moderate [ ]Severe  Constipation:                    [ ]Mild [ ]Moderate [ ]Severe    Other Symptoms:  [ ]All other review of systems negative     Karnofsky Performance Score/Palliative Performance Status Version 2:         %    http://npcrc.org/files/news/palliative_performance_scale_ppsv2.pdf  PHYSICAL EXAM:  Vital Signs Last 24 Hrs  T(C): 36.7 (12 Nov 2019 11:30), Max: 36.8 (12 Nov 2019 08:31)  T(F): 98.1 (12 Nov 2019 11:30), Max: 98.3 (12 Nov 2019 08:31)  HR: 91 (12 Nov 2019 11:30) (88 - 98)  BP: 110/75 (12 Nov 2019 11:30) (110/75 - 124/87)  BP(mean): --  RR: 18 (12 Nov 2019 11:30) (18 - 18)  SpO2: 95% (12 Nov 2019 11:30) (94% - 98%) I&O's Summary    11 Nov 2019 07:01  -  12 Nov 2019 07:00  --------------------------------------------------------  IN: 580 mL / OUT: 0 mL / NET: 580 mL    GENERAL:  [ ]Alert  [ ]Oriented x   [ ]Lethargic  [ ]Cachexia  [ ]Unarousable  [ ]Verbal  [ ]Non-Verbal  Behavioral:   [ ] Anxiety  [ ] Delirium [ ] Agitation [ ] Other  HEENT:  [ ]Normal   [ ]Dry mouth   [ ]ET Tube/Trach  [ ]Oral lesions  PULMONARY:   [ ]Clear [ ]Tachypnea  [ ]Audible excessive secretions   [ ]Rhonchi        [ ]Right [ ]Left [ ]Bilateral  [ ]Crackles        [ ]Right [ ]Left [ ]Bilateral  [ ]Wheezing     [ ]Right [ ]Left [ ]Bilateral  CARDIOVASCULAR:    [ ]Regular [ ]Irregular [ ]Tachy  [ ]Jean [ ]Murmur [ ]Other  GASTROINTESTINAL:  [ ]Soft  [ ]Distended   [ ]+BS  [ ]Non tender [ ]Tender  [ ]PEG [ ]OGT/ NGT  Last BM:   GENITOURINARY:  [ ]Normal [ ] Incontinent   [ ]Oliguria/Anuria   [ ]Heredia  MUSCULOSKELETAL:   [ ]Normal   [ ]Weakness  [ ]Bed/Wheelchair bound [ ]Edema  NEUROLOGIC:   [ ]No focal deficits  [ ] Cognitive impairment  [ ] Dysphagia [ ]Dysarthria [ ] Paresis [ ]Other   SKIN:   [ ]Normal   [ ]Pressure ulcer(s)  [ ]Rash    CRITICAL CARE:  [ ] Shock Present  [ ]Septic [ ]Cardiogenic [ ]Neurologic [ ]Hypovolemic  [ ]  Vasopressors [ ]  Inotropes   [ ] Respiratory failure present [ ] mechanical ventilation [ ] non-invasive ventilatory support [ ] High flow  [ ] Acute  [ ] Chronic [ ] Hypoxic  [ ] Hypercarbic [ ] Other  [ ] Other organ failure     LABS:                        10.5   14.58 )-----------( 584      ( 12 Nov 2019 09:38 )             33.7   11-12    127<L>  |  90<L>  |  22  ----------------------------<  145<H>  4.3   |  29  |  0.84    Ca    8.6      12 Nov 2019 07:22    TPro  6.2  /  Alb  2.3<L>  /  TBili  0.9  /  DBili  x   /  AST  28  /  ALT  24  /  AlkPhos  186<H>  11-12  PT/INR - ( 12 Nov 2019 09:29 )   PT: 15.4 sec;   INR: 1.35 ratio         PTT - ( 12 Nov 2019 09:29 )  PTT:28.0 sec      RADIOLOGY & ADDITIONAL STUDIES:    PROTEIN CALORIE MALNUTRITION PRESENT: [ ] Yes [ ] No  [ ] PPSV2 < or = to 30% [ ] significant weight loss  [ ] poor nutritional intake [ ] catabolic state [ ] anasarca     Albumin, Serum: 2.3 g/dL (11-12-19 @ 07:22)  Artificial Nutrition [ ]     REFERRALS:   [ ]Chaplaincy  [ ] Hospice  [ ]Child Life  [ ]Social Work  [ ]Case management [ ]Holistic Therapy     Goals of Care Document:   Care Coordination Assessment [C. Provider] (11-10-19 @ 09:23)   Progress Notes - Care Coordination [C. Provider] (11-12-19 @ 10:36) HPI:  64 y/o PMH of traumatic work injury (2001) s/p multilumbar fusion (T8-S1) s/p hardware removal (2016, on daily opiates and methadone), chronic constipation, GERD, HTN, depression, anxiety, metastatic pancreatic adenocarcinoma s/p biliary stent placement (2m ago) and chemo sent in from [facility] for concern for biliary sepsis on 11/8. He was admitted on 9/17/19 for abd pain, weight loss 25 lbs for the prior 3 weeks and jaundice and found on CTAP w/ intrahepatic biliary ductal dilation and multiple hepatic lesions that were new and EUS/ERCP was + for malignant adenocarcinoma cancer cells and CT guided biopsy of R liver lesion + also for malignant adenocarcinoma w/ elevated CA 19-9 1832. Started on Gemcitabine +Abraxane on 10/17 for C1D1 for 3 wk on and 1 wk off but due to thrombocytopenia and fatigue, nausea and vomiting, C1D15 was held.  He went in for a check up appointment on 11/7 and the labs were significant for leukocytosis of >20. Furthermore, the patient himself has been having increased amount of fatigue and weakness with decreased PO intake for the past 2 days before his admission.  Long standing history of LBP w/ extensive neurosurgical followup and pain management doctors. He has h/o spinal cord stimulator (2010) and pain pump in 2012 in abdomen which is no longer there. As per last oncology visit, he was scheduled for PCA pump installation on Friday, 11/8/2019 by Dr. Rico and Dr. Lilly for pain control. During this admission, CT showed gallbladder rupture, stable metastatic disease (to liver), and tumor trombosis. Palliative care was called pain Rx.      PERTINENT PM/SXH:   History of diabetes mellitus, type II  Anxiety and depression  Diastolic dysfunction  Empyema lung  H/O peptic ulcer  history of renal calculus  Renal calculus  Current smoker  Herniated Disc  Hypertension  Depression  Spinal Stenosis  Peripheral Neuropathy  Postlaminectomy Syndrome    S/P  shunt  S/P insertion of spinal cord stimulator  History of lumbar fusion  Insertion of Intrathecal Dilaudid Pump  S/P Spinal Surgery  S/P Knee Replacement  S/P Arthroscopy of Left Knee  S/P Tonsillectomy  Ankle Fracture    FAMILY HISTORY:  No pertinent family history in first degree relatives    ITEMS NOT CHECKED ARE NOT PRESENT    SOCIAL HISTORY:   Significant other/partner:   [ ]  Children: yes  [ ]  Denominational/Spirituality: Christian   Substance hx: No  [ ]   Tobacco hx:  [ ]   Alcohol hx: [ ]   Home Opioid hx:  [ x] I-Stop Reference No:   [id number]   Others' Prescriptions  Patient Name:	Lucian Ortiz	YOB: 1955	  Address:	[address] [place] [address]	Sex:	Male	    Rx Written	Rx Dispensed	Drug	Quantity	Days Supply	Prescriber Name			  11/08/2019	11/08/2019	hydromorphone hcl powder * 	0gm	30	Armani Escobar			    Patient Name:	Lucian Ortiz	YOB: 1955	  Address:	[address] [place] [address]	Sex:	Male	    Rx Written	Rx Dispensed	Drug	Quantity	Days Supply	Prescriber Name			  10/31/2019	11/01/2019	fentanyl 100 mcg/hr patch 	10	20	Johnson, Tram PA			  10/31/2019	10/31/2019	diazepam 2 mg tablet 	45	15	Johnson, Tram PA			  10/31/2019	10/31/2019	hydromorphone 8 mg tablet 	180	15	Johnson, Tram PA			  10/31/2019	10/31/2019	methadone hcl 10 mg tablet 	120	15	Johnson, Tram PA			  10/16/2019	10/17/2019	fentanyl 50 mcg/hr patch 						      Living Situation: [x ]Home  [ ]Long term care  [ ]Rehab [ ]Other  As per care coordination notes:   "Pt requesting  for spouse at bedside Cyndi to participate in discharge planning. Confirmed  demographics. Discussed role of , medical plan of care and  discharge needs. Pt and spouse verbalized understanding. Pt states mostly  independent in ADLs prior to hospitalization, ambulates with rollator, has  functional glucometer and independent in glucose monitoring. Discharge needs  unclear at present, pending hospital course, pt and spouse in agreement. Case   information given to patient. Pt assigned spouse Cyndi as  caregiver . "   ADVANCE DIRECTIVES:   DNR  MOLST  [ ]  Living Will  [ ]   DECISION MAKER(s):  [ ] Health Care Proxy(s)  [x ] Surrogate(s)  [ ] Guardian           Name(s): Phone Number(s): Wife     BASELINE (I)ADL(s) (prior to admission):  [place]: [x ]Total  [ ] Moderate [ ]Dependent    Allergies    No Known Allergies    Intolerances    MEDICATIONS  (STANDING):  acetaminophen   Tablet .. 1000 milliGRAM(s) Oral every 8 hours  bisacodyl 5 milliGRAM(s) Oral at bedtime  chlorhexidine 4% Liquid 1 Application(s) Topical daily  cloNIDine 0.1 milliGRAM(s) Oral two times a day  dextrose 5%. 1000 milliLiter(s) (50 mL/Hr) IV Continuous <Continuous>  dextrose 50% Injectable 12.5 Gram(s) IV Push once  dextrose 50% Injectable 25 Gram(s) IV Push once  dextrose 50% Injectable 25 Gram(s) IV Push once  DULoxetine 60 milliGRAM(s) Oral daily  enoxaparin Injectable 40 milliGRAM(s) SubCutaneous daily  famotidine    Tablet 20 milliGRAM(s) Oral daily  fentaNYL   Patch  75 MICROgram(s)/Hr. 1 Patch Transdermal every 48 hours  fentaNYL   Patch 100 MICROgram(s)/Hr. 1 Patch Transdermal every 48 hours  influenza   Vaccine 0.5 milliLiter(s) IntraMuscular once  insulin lispro (HumaLOG) corrective regimen sliding scale   SubCutaneous three times a day before meals  insulin lispro (HumaLOG) corrective regimen sliding scale   SubCutaneous at bedtime  methadone    Tablet 20 milliGRAM(s) Oral every 6 hours  naloxone Injectable 0.4 milliGRAM(s) IV Push every 3 minutes  piperacillin/tazobactam IVPB.. 3.375 Gram(s) IV Intermittent every 8 hours  polyethylene glycol 3350 17 Gram(s) Oral two times a day  senna 2 Tablet(s) Oral at bedtime  sodium chloride 0.9%. 1000 milliLiter(s) (75 mL/Hr) IV Continuous <Continuous>  sucralfate suspension 1 Gram(s) Oral four times a day    MEDICATIONS  (PRN):  dextrose 40% Gel 15 Gram(s) Oral once PRN Blood Glucose LESS THAN 70 milliGRAM(s)/deciliter  diazepam    Tablet 2 milliGRAM(s) Oral every 8 hours PRN anxiety  glucagon  Injectable 1 milliGRAM(s) IntraMuscular once PRN Glucose LESS THAN 70 milligrams/deciliter  HYDROmorphone  Injectable 3 milliGRAM(s) IV Push every 2 hours PRN Severe Pain (7 - 10)  ondansetron Injectable 4 milliGRAM(s) IV Push every 6 hours PRN Nausea and/or Vomiting  simethicone 80 milliGRAM(s) Chew every 8 hours PRN Gas    PRESENT SYMPTOMS: [ ]Unable to obtain due to poor mentation   Source if other than patient:  [ ]Family   [ ]Team     Pain: [x ] yes [ ] no  QOL impact - not able to concentrate or rest.   Location -  Generalized over abdomen but mainly over his mid to right abdominal areas.                   Aggravating factors - palpation   Alleviating factors - Dilaudid IV but only lasting for 90 minutes.   Quality - Pressure   Radiation - towards his back   Timing- constant   Severity (0-10 scale): severe   Minimal acceptable level (0-10 scale): Mild to moderate.     PAIN AD Score:     [email or web address]/[email or web address] (press ctrl +  left click to view)    Dyspnea:                           [ ]Mild [ ]Moderate [ ]Severe  Anxiety:                             [ ]Mild [ ]Moderate [ ]Severe  Fatigue:                             [ ]Mild [x ]Moderate [ ]Severe  Nausea:                             [ ]Mild [ ]Moderate [ ]Severe  Loss of appetite:              [ ]Mild [ ]Moderate [ ]Severe  Constipation:                    [ x]Mild [ ]Moderate [ ]Severe    Other Symptoms:  [x ]All other review of systems negative     Karnofsky Performance Score/Palliative Performance Status Version 2:   50      %    http://npcrc.org/files/news/palliative_performance_scale_ppsv2.pdf  PHYSICAL EXAM:  Vital Signs Last 24 Hrs  T(C): 36.7 (12 Nov 2019 11:30), Max: 36.8 (12 Nov 2019 08:31)  T(F): 98.1 (12 Nov 2019 11:30), Max: 98.3 (12 Nov 2019 08:31)  HR: 91 (12 Nov 2019 11:30) (88 - 98)  BP: 110/75 (12 Nov 2019 11:30) (110/75 - 124/87)  BP(mean): --  RR: 18 (12 Nov 2019 11:30) (18 - 18)  SpO2: 95% (12 Nov 2019 11:30) (94% - 98%) I&O's Summary    11 Nov 2019 07:01  -  12 Nov 2019 07:00  --------------------------------------------------------  IN: 580 mL / OUT: 0 mL / NET: 580 mL    GENERAL:  [x ]Alert  [x ]Oriented x 3  [ ]Lethargic  [ ]Cachexia  [ ]Unarousable  [ ]Verbal  [ ]Non-Verbal [x] Acute distress due to pain   Behavioral:   [ x] Anxiety  [ ] Delirium [ ] Agitation [ ] Other  HEENT:  [ x]Normal   [ ]Dry mouth   [ ]ET Tube/Trach  [ ]Oral lesions  PULMONARY:   [x ]Clear [ ]Tachypnea  [ ]Audible excessive secretions   [ ]Rhonchi        [ ]Right [ ]Left [ ]Bilateral  [ ]Crackles        [ ]Right [ ]Left [ ]Bilateral  [ ]Wheezing     [ ]Right [ ]Left [ ]Bilateral  CARDIOVASCULAR:    [x ]Regular [ ]Irregular [ ]Tachy  [ ]Jean [ ]Murmur [ ]Other  GASTROINTESTINAL:  [x ]Soft  [ ]Distended   [ ]+BS  [ ]Non tender [ x]Tender  Generalized but mainly over his mid and right abdominal regions.  [ ]PEG [ ]OGT/ NGT  Last BM: 11/10  GENITOURINARY:  [x ]Normal [ ] Incontinent   [ ]Oliguria/Anuria   [ ]Heredia  MUSCULOSKELETAL:   [ ]Normal   [x ]Weakness  [ ]Bed/Wheelchair bound [ ]Edema  NEUROLOGIC:   [ ]No focal deficits  [ ] Cognitive impairment  [ ] Dysphagia [ ]Dysarthria [ ] Paresis [ ]Other   SKIN:   [x ]Normal   [ ]Pressure ulcer(s)  [ ]Rash    CRITICAL CARE:  [ ] Shock Present  [ ]Septic [ ]Cardiogenic [ ]Neurologic [ ]Hypovolemic  [ ]  Vasopressors [ ]  Inotropes   [ ] Respiratory failure present [ ] mechanical ventilation [ ] non-invasive ventilatory support [ ] High flow  [ ] Acute  [ ] Chronic [ ] Hypoxic  [ ] Hypercarbic [ ] Other  [ ] Other organ failure     LABS:                        10.5   14.58 )-----------( 584      ( 12 Nov 2019 09:38 )             33.7   11-12    127<L>  |  90<L>  |  22  ----------------------------<  145<H>  4.3   |  29  |  0.84    Ca    8.6      12 Nov 2019 07:22    TPro  6.2  /  Alb  2.3<L>  /  TBili  0.9  /  DBili  x   /  AST  28  /  ALT  24  /  AlkPhos  186<H>  11-12  PT/INR - ( 12 Nov 2019 09:29 )   PT: 15.4 sec;   INR: 1.35 ratio         PTT - ( 12 Nov 2019 09:29 )  PTT:28.0 sec      RADIOLOGY & ADDITIONAL STUDIES:  Reviewed   PROTEIN CALORIE MALNUTRITION PRESENT: [ ] Yes [ ] No  [ ] PPSV2 < or = to 30% [ ] significant weight loss  [ ] poor nutritional intake [ ] catabolic state [ ] anasarca     Albumin, Serum: 2.3 g/dL (11-12-19 @ 07:22)  Artificial Nutrition [ ]     REFERRALS:   [ ]Chaplaincy  [ ] Hospice  [ ]Child Life  [ ]Social Work  [ ]Case management [ ]Holistic Therapy     Goals of Care Document:   Care Coordination Assessment [C. Provider] (11-10-19 @ 09:23)   Progress Notes - Care Coordination [C. Provider] (11-12-19 @ 10:36)

## 2019-11-12 NOTE — PROGRESS NOTE ADULT - PROBLEM SELECTOR PLAN 7
A1c 7.7, never been diagnosed before, possibly related to pancreatic adenocarcinoma, given metformin on the last admission.  Continue insulin sliding scale coverage.

## 2019-11-12 NOTE — PHYSICAL THERAPY INITIAL EVALUATION ADULT - PERTINENT HX OF CURRENT PROBLEM, REHAB EVAL
Pt is a 64 y/o PMH of traumatic work injury (2001) s/p multilumbar fusion (T8-S1) s/p hardware removal (2016, on daily opiates and methadone), chronic constipation, GERD, HTN, depression, anxiety, metastatic pancreatic adenocarcinoma s/p biliary stent placement (2m ago) and chemo sent in from [facility] for concern for biliary sepsis on 11/8.  Continued...

## 2019-11-12 NOTE — PROGRESS NOTE ADULT - PROBLEM SELECTOR PLAN 1
CT imaging concerning for gallbladder wall rupture and PNA.  Suspect PNA and cholecystitis.  Patient now s/p cholecystostomy tube placement by IR.  Continue Zosyn.  ID consult requested to help with antibiotic management.  Blood cultures remain negative to date.  At this point suspect hypoxia secondary to PNA and perhaps due to pain.  Continue supplemental oxygen and monitor clinically.

## 2019-11-12 NOTE — CONSULT NOTE ADULT - ASSESSMENT
63-year man with PMH of traumatic work injury (2001) s/p multilumbar fusion (T8-S1) s/p hardware removal (2016, on daily opiates and methadone), chronic constipation, GERD, HTN, DM type 2, depression, anxiety, metastatic pancreatic adenocarcinoma s/p biliary stent placement (9/17/19) sent in from [facility] for concern for biliary sepsis on 11/8/19.   Came in with leukocytosis 26k, afebrile, tachycardic to 100  CT C/A/P with possible pneumonia, distended GB with likely GB rupture as well as new moderate loculated ascites.  s/p percutaneous cholecystostomy today with foul smelling fluid   Has been on Zosyn for both pneumonia and intra-abdominal/hepatobiliary infection     Overall, 63M met pancreatic ca on chemo here with ruptured GB with acute cholecystitis and possible pneumonia.    Recommendations:  *Continue Piperacillin/tazobactam 3.375 grams every 8 hours   *f/u on culture sent from IR  *trend temp and WBC  *surgery follow up for ongoing discussion regarding surgery     Discussed with Dr. Negar Harris,   Pager [phone]   ID fellow, PGY 5  After 5pm/weekends call [phone]

## 2019-11-12 NOTE — PHYSICAL THERAPY INITIAL EVALUATION ADULT - IMPAIRMENTS CONTRIBUTING TO GAIT DEVIATIONS, PT EVAL
p ambulated with stooped posture, knees flexed bilaterally. Pt required verbal cuing to stand upright and to stay within boundaries of rollator secondary to tendency to hold rolaltor far ahead of home while ambulating./impaired balance/decreased strength/impaired postural control Pt ambulated with stooped posture, knees flexed bilaterally. Pt required verbal cuing to stand upright and to stay within boundaries of rollator secondary to tendency to hold rolaltor far ahead of home while ambulating./impaired postural control/impaired balance/decreased strength impaired postural control/impaired balance/decreased strength/Pt ambulated with stooped posture, knees flexed bilaterally. Pt required verbal cuing to stand upright and to stay within boundaries of rollator secondary to tendency to hold rollator far ahead of home while ambulating. impaired balance/decreased strength/impaired postural control/Pt ambulated with stooped posture, knees flexed bilaterally. Pt required verbal cuing to stand upright and to stay within boundaries of rollator secondary to tendency to hold rollator far ahead of him while ambulating.

## 2019-11-12 NOTE — PHYSICAL THERAPY INITIAL EVALUATION ADULT - PRECAUTIONS/LIMITATIONS, REHAB EVAL
Pertinent history continued...He was admitted on 9/17/19 for abd pain, weight loss 25 lbs for the prior 3 weeks and jaundice and found on CTAP w/ intrahepatic biliary ductal dilation and multiple hepatic lesions that were new and EUS/ERCP was + for malignant adenocarcinoma cancer cells and CT guided biopsy of R liver lesion + also for malignant adenocarcinoma w/ elevated CA 19-9 1832. Started on Gemcitabine +Abraxane on 10/17 for C1D1 for 3 wk on and 1 wk off but due to thrombocytopenia and fatigue, nausea and vomiting, C1D15 was held.  He went in for a check up appointment on 11/7 and the labs were significant for leukocytosis of >20 and the patient himself has been having increased amount of fatigue and weakness with decreased PO intake for the past 2 days. he reports no fevers, n/v/d/CP/SOB, cough, sick contacts or recent travels. Ever since his pancreatic adenocarcinoma diagnosis he has had  generalized abd pain and it has now worsened these last few days. Long standing history of LBP w/ extensive neurosurgical followup and pain management doctors with currently Methadone 40 mg BID, Morphine ER 40 BID, dialudid 8mg x12 each day, spinal cord stimulator (2010) and pain pump in 2012 in abdomen which is no longer there. As per last oncology visit, he was scheduled for PCA pump installation on Friday, 11/8/2019 by Dr. Rico and Dr. Lilly for pain control.  In the ED: Afebrile, BP stable in sBP 130s, HR 90-100s, RR 18, SaO2 93-98% on RA.  Labs: on admission showed 26.39 WBC now 21 WBC , N 82.4%, Hgb 10.9, Na 128 now 133, Cr  1.57 to 1.24, lactate 3.0  EKG , no TWI or KEYSHAWN, QTc 477  Given Vanco and Zosyn, 2.7 L of NS, dilaudid IV 1mg x2, 16mg PO, reglan 10 and famotdine 20. Pertinent history continued...He was admitted on 9/17/19 for abd pain, weight loss 25 lbs for the prior 3 weeks and jaundice and found on CTAP w/ intrahepatic biliary ductal dilation and multiple hepatic lesions that were new and EUS/ERCP was + for malignant adenocarcinoma cancer cells and CT guided biopsy of R liver lesion + also for malignant adenocarcinoma w/ elevated CA 19-9 1832. Started on Gemcitabine +Abraxane on 10/17 for C1D1 for 3 wk on and 1 wk off but due to thrombocytopenia and fatigue, nausea and vomiting, C1D15 was held.  He went in for a check up appointment on 11/7 and the labs were significant for leukocytosis of >20 and the patient himself has been having increased amount of fatigue and weakness with decreased PO intake for the past 2 days. he reports no fevers, n/v/d/CP/SOB, cough, sick contacts or recent travels. Ever since his pancreatic adenocarcinoma diagnosis he has had  generalized abd pain and it has now worsened these last few days. Long standing history of LBP w/ extensive neurosurgical followup and pain management doctors with currently Methadone 40 mg BID, Morphine ER 40 BID, dialudid 8mg x12 each day, spinal cord stimulator (2010) and pain pump in 2012 in abdomen which is no longer there. As per last oncology visit, he was scheduled for PCA pump installation on Friday, 11/8/2019 by Dr. Rico and Dr. Lilly for pain control.  In the ED: Afebrile, BP stable in sBP 130s, HR 90-100s, RR 18, SaO2 93-98% on RA.  Labs: on admission showed 26.39 WBC now 21 WBC , N 82.4%, Hgb 10.9, Na 128 now 133, Cr  1.57 to 1.24, lactate 3.0  EKG , no TWI or KEYSHAWN, QTc 477  Given Vanco and Zosyn, 2.7 L of NS, dilaudid IV 1mg x2, 16mg PO, reglan 10 and famotdine 20. Pt  found to have gallbladder perforation. Pertinent history continued...He was admitted on 9/17/19 for abd pain, weight loss 25 lbs for the prior 3 weeks and jaundice and found on CTAP w/ intrahepatic biliary ductal dilation and multiple hepatic lesions that were new and EUS/ERCP was + for malignant adenocarcinoma cancer cells and CT guided biopsy of R liver lesion + also for malignant adenocarcinoma w/ elevated CA 19-9 1832. Started on Gemcitabine +Abraxane on 10/17 for C1D1 for 3 wk on and 1 wk off but due to thrombocytopenia and fatigue, nausea and vomiting, C1D15 was held.  He went in for a check up appointment on 11/7 and the labs were significant for leukocytosis of >20 and the patient himself has been having increased amount of fatigue and weakness with decreased PO intake for the past 2 days. he reports no fevers, n/v/d/CP/SOB, cough, sick contacts or recent travels. Ever since his pancreatic adenocarcinoma diagnosis he has had  generalized abd pain and it has now worsened these last few days. Long standing history of LBP w/ extensive neurosurgical followup and pain management doctors with currently Methadone 40 mg BID, Morphine ER 40 BID, dialudid 8mg x12 each day, spinal cord stimulator (2010) and pain pump in 2012 in abdomen which is no longer there. As per last oncology visit, he was scheduled for PCA pump installation on Friday, 11/8/2019 by Dr. Rico and Dr. Lilly for pain control.  In the ED: Afebrile, BP stable in sBP 130s, HR 90-100s, RR 18, SaO2 93-98% on RA.  Labs: on admission showed 26.39 WBC now 21 WBC , N 82.4%, Hgb 10.9, Na 128 now 133, Cr  1.57 to 1.24, lactate 3.0  EKG , no TWI or KEYSHAWN, QTc 477  Given Vanco and Zosyn, 2.7 L of NS, dilaudid IV 1mg x2, 16mg PO, reglan 10 and famotdine 20. Pt  found to have gallbladder perforation.  No acute surgical intervention warranted. Patient is poor surgical candidate and would not be clinically amenable to a cholecystectomy. Pertinent history continued...He was admitted on 9/17/19 for abd pain, weight loss 25 lbs for the prior 3 weeks and jaundice and found on CTAP w/ intrahepatic biliary ductal dilation and multiple hepatic lesions that were new and EUS/ERCP was + for malignant adenocarcinoma cancer cells and CT guided biopsy of R liver lesion + also for malignant adenocarcinoma w/ elevated CA 19-9 1832. Started on Gemcitabine +Abraxane on 10/17 for C1D1 for 3 wk on and 1 wk off but due to thrombocytopenia and fatigue, nausea and vomiting, C1D15 was held.  He went in for a check up appointment on 11/7 and the labs were significant for leukocytosis of >20 and the patient himself has been having increased amount of fatigue and weakness with decreased PO intake for the past 2 days. he reports no fevers, n/v/d/CP/SOB, cough, sick contacts or recent travels. Ever since his pancreatic adenocarcinoma diagnosis he has had  generalized abd pain and it has now worsened these last few days. Long standing history of LBP w/ extensive neurosurgical followup and pain management doctors with currently Methadone 40 mg BID, Morphine ER 40 BID, dialudid 8mg x12 each day, spinal cord stimulator (2010) and pain pump in 2012 in abdomen which is no longer there. As per last oncology visit, he was scheduled for PCA pump installation on Friday, 11/8/2019 by Dr. Rico and Dr. Lilly for pain control.  In the ED: Afebrile, BP stable in sBP 130s, HR 90-100s, RR 18, SaO2 93-98% on RA.  Labs: on admission showed 26.39 WBC now 21 WBC , N 82.4%, Hgb 10.9, Na 128 now 133, Cr  1.57 to 1.24, lactate 3.0  EKG , no TWI or KEYSHAWN, QTc 477  Given Vanco and Zosyn, 2.7 L of NS, dilaudid IV 1mg x2, 16mg PO, reglan 10 and famotdine 20. Pt  found to have gallbladder perforation.  No acute surgical intervention warranted. Patient is poor surgical candidate and would not be clinically amenable to a cholecystectomy./fall precautions

## 2019-11-12 NOTE — PROGRESS NOTE ADULT - PROBLEM SELECTOR PROBLEM 4
Primary Nurse [place]ash[place] MIRACLE Larkin and Ashtyn Enriquez RN performed a dual skin assessment on this patient No impairment noted Diallo score is 21 
 MERRILL (acute kidney injury)

## 2019-11-12 NOTE — PROGRESS NOTE ADULT - SUBJECTIVE AND OBJECTIVE BOX
SUBJECTIVE:  Resting in bed.  Reports that abd pain decreased since cholecystostomy tube place by IR earlier today.  Still with decreased appetite.    MEDICATIONS  (STANDING):  acetaminophen   Tablet .. 1000 milliGRAM(s) Oral every 8 hours  bisacodyl 5 milliGRAM(s) Oral at bedtime  chlorhexidine 4% Liquid 1 Application(s) Topical daily  cloNIDine 0.1 milliGRAM(s) Oral two times a day  dextrose 5%. 1000 milliLiter(s) (50 mL/Hr) IV Continuous <Continuous>  dextrose 50% Injectable 12.5 Gram(s) IV Push once  dextrose 50% Injectable 25 Gram(s) IV Push once  dextrose 50% Injectable 25 Gram(s) IV Push once  DULoxetine 60 milliGRAM(s) Oral daily  enoxaparin Injectable 40 milliGRAM(s) SubCutaneous daily  famotidine    Tablet 20 milliGRAM(s) Oral daily  fentaNYL   Patch  75 MICROgram(s)/Hr. 1 Patch Transdermal every 48 hours  fentaNYL   Patch 100 MICROgram(s)/Hr. 1 Patch Transdermal every 48 hours  influenza   Vaccine 0.5 milliLiter(s) IntraMuscular once  insulin lispro (HumaLOG) corrective regimen sliding scale   SubCutaneous three times a day before meals  insulin lispro (HumaLOG) corrective regimen sliding scale   SubCutaneous at bedtime  methadone    Tablet 20 milliGRAM(s) Oral every 6 hours  naloxone Injectable 0.4 milliGRAM(s) IV Push every 3 minutes  piperacillin/tazobactam IVPB.. 3.375 Gram(s) IV Intermittent every 8 hours  polyethylene glycol 3350 17 Gram(s) Oral two times a day  senna 2 Tablet(s) Oral at bedtime  sodium chloride 0.9%. 1000 milliLiter(s) (75 mL/Hr) IV Continuous <Continuous>  sucralfate suspension 1 Gram(s) Oral four times a day    MEDICATIONS  (PRN):  bisacodyl Suppository 10 milliGRAM(s) Rectal daily PRN Constipation  dextrose 40% Gel 15 Gram(s) Oral once PRN Blood Glucose LESS THAN 70 milliGRAM(s)/deciliter  diazepam    Tablet 2 milliGRAM(s) Oral every 8 hours PRN anxiety  glucagon  Injectable 1 milliGRAM(s) IntraMuscular once PRN Glucose LESS THAN 70 milligrams/deciliter  HYDROmorphone  Injectable 3 milliGRAM(s) IV Push every 2 hours PRN Severe Pain (7 - 10)  ondansetron Injectable 4 milliGRAM(s) IV Push every 6 hours PRN Nausea and/or Vomiting  simethicone 80 milliGRAM(s) Chew every 8 hours PRN Gas      Vital Signs Last 24 Hrs  T(C): 36.7 (12 Nov 2019 11:30), Max: 36.8 (12 Nov 2019 08:31)  T(F): 98.1 (12 Nov 2019 11:30), Max: 98.3 (12 Nov 2019 08:31)  HR: 91 (12 Nov 2019 11:30) (88 - 98)  BP: 110/75 (12 Nov 2019 11:30) (110/75 - 124/87)  BP(mean): --  RR: 18 (12 Nov 2019 11:30) (18 - 18)  SpO2: 95% (12 Nov 2019 11:30) (94% - 98%)    CAPILLARY BLOOD GLUCOSE      POCT Blood Glucose.: 146 mg/dL (12 Nov 2019 12:36)  POCT Blood Glucose.: 122 mg/dL (12 Nov 2019 08:03)  POCT Blood Glucose.: 182 mg/dL (11 Nov 2019 21:07)  POCT Blood Glucose.: 163 mg/dL (11 Nov 2019 17:29)    I&O's Summary    11 Nov 2019 07:01  -  12 Nov 2019 07:00  --------------------------------------------------------  IN: 580 mL / OUT: 0 mL / NET: 580 mL    12 Nov 2019 07:01  -  12 Nov 2019 14:46  --------------------------------------------------------  IN: 240 mL / OUT: 0 mL / NET: 240 mL        PHYSICAL EXAM:  GENERAL: Looks stated age, NAD  CARDIOVASCULAR: Normal S1, S2  PULMONARY: Lungs clear to auscultation bilaterally. No wheezes/rales/rhonchi  GI: Cholecystostomy tube in place.  Abdomen distended but soft.  Remains tender throughout.  Bowel sounds present.  MSK/Ext:  Legs with slight purplish discoloration and slightly cool to touch, but improved from yesterday.  No leg edema.  No calf tenderness bilaterally  PSYCH: Normal Affect.      LABS:                        10.5   14.58 )-----------( 584      ( 12 Nov 2019 09:38 )             33.7     11-12    127<L>  |  90<L>  |  22  ----------------------------<  145<H>  4.3   |  29  |  0.84    Ca    8.6      12 Nov 2019 07:22    TPro  6.2  /  Alb  2.3<L>  /  TBili  0.9  /  DBili  x   /  AST  28  /  ALT  24  /  AlkPhos  186<H>  11-12    PT/INR - ( 12 Nov 2019 09:29 )   PT: 15.4 sec;   INR: 1.35 ratio         PTT - ( 12 Nov 2019 09:29 )  PTT:28.0 sec            RADIOLOGY & ADDITIONAL TESTS:

## 2019-11-12 NOTE — PROGRESS NOTE ADULT - PROBLEM SELECTOR PLAN 2
Extensive neurosurgery history w/ multilevel fusion and pain control issues.  Now with acute pain secondary to cholecystitis.  ISTOP Reference #: [id number] done on admission.  Palliative Care input appreciated.  Continue fentanyl (dose increased to 175 mcg q48), PRN IV Dilaudid, and Methadone 20mg per Palliative Care recommendations.  Narcan 0.4mg iv q 3min for RR < 12, O2 sat < 90, sedation score of 6  Follows w/Dr Rico for pain mgt and was scheduled for PCA pump 11/8/19.  Continue bowel regimen (miralax, dulolax and senna).

## 2019-11-12 NOTE — CONSULT NOTE ADULT - ASSESSMENT
62 y/o PMH of traumatic work injury (2001) s/p multilumbar fusion (T8-S1) s/p hardware removal (2016, on daily opiates and methadone), chronic constipation, GERD, HTN, depression, anxiety, metastatic pancreatic adenocarcinoma s/p biliary stent placement (2m ago) and chemo sent in from [facility] for concern for biliary sepsis on 11/8.During this admission, CT showed gallbladder rupture, stable metastatic disease (to liver), and tumor trombosis. Palliative care was called pain Rx.

## 2019-11-12 NOTE — PROGRESS NOTE ADULT - ASSESSMENT
The patient is a 63-year man with PMH of traumatic work injury (2001) s/p multilumbar fusion (T8-S1) s/p hardware removal (2016, on daily opiates and methadone), chronic constipation, GERD, HTN, DM type 2, depression, anxiety, metastatic pancreatic adenocarcinoma s/p biliary stent placement (9/17) sent in from [facility] for concern for biliary sepsis on 11/8/19.

## 2019-11-12 NOTE — CONSULT NOTE ADULT - CONSULT REASON
"    Preventive Care at the 11 - 14 Year Visit    Growth Percentiles & Measurements   Weight: 145 lbs 3.2 oz / 65.9 kg (actual weight) / 89 %ile based on CDC (Boys, 2-20 Years) weight-for-age data based on Weight recorded on 8/2/2019.  Length: 5' 11.25\" / 181 cm 99 %ile based on CDC (Boys, 2-20 Years) Stature-for-age data based on Stature recorded on 8/2/2019.   BMI: Body mass index is 20.11 kg/m . 63 %ile based on CDC (Boys, 2-20 Years) BMI-for-age based on body measurements available as of 8/2/2019.     Next Visit    Continue to see your health care provider every year for preventive care.    Nutrition    It s very important to eat breakfast. This will help you make it through the morning.    Sit down with your family for a meal on a regular basis.    Eat healthy meals and snacks, including fruits and vegetables. Avoid salty and sugary snack foods.    Be sure to eat foods that are high in calcium and iron.    Avoid or limit caffeine (often found in soda pop).    Sleeping    Your body needs about 9 hours of sleep each night.    Keep screens (TV, computer, and video) out of the bedroom / sleeping area.  They can lead to poor sleep habits and increased obesity.    Health    Limit TV, computer and video time to one to two hours per day.    Set a goal to be physically fit.  Do some form of exercise every day.  It can be an active sport like skating, running, swimming, team sports, etc.    Try to get 30 to 60 minutes of exercise at least three times a week.    Make healthy choices: don t smoke or drink alcohol; don t use drugs.    In your teen years, you can expect . . .    To develop or strengthen hobbies.    To build strong friendships.    To be more responsible for yourself and your actions.    To be more independent.    To use words that best express your thoughts and feelings.    To develop self-confidence and a sense of self.    To see big differences in how you and your friends grow and develop.    To have body " odor from perspiration (sweating).  Use underarm deodorant each day.    To have some acne, sometimes or all the time.  (Talk with your doctor or nurse about this.)    Girls will usually begin puberty about two years before boys.  o Girls will develop breasts and pubic hair. They will also start their menstrual periods.  o Boys will develop a larger penis and testicles, as well as pubic hair. Their voices will change, and they ll start to have  wet dreams.     Sexuality    It is normal to have sexual feelings.    Find a supportive person who can answer questions about puberty, sexual development, sex, abstinence (choosing not to have sex), sexually transmitted diseases (STDs) and birth control.    Think about how you can say no to sex.    Safety    Accidents are the greatest threat to your health and life.    Always wear a seat belt in the car.    Practice a fire escape plan at home.  Check smoke detector batteries twice a year.    Keep electric items (like blow dryers, razors, curling irons, etc.) away from water.    Wear a helmet and other protective gear when bike riding, skating, skateboarding, etc.    Use sunscreen to reduce your risk of skin cancer.    Learn first aid and CPR (cardiopulmonary resuscitation).    Avoid dangerous behaviors and situations.  For example, never get in a car if the  has been drinking or using drugs.    Avoid peers who try to pressure you into risky activities.    Learn skills to manage stress, anger and conflict.    Do not use or carry any kind of weapon.    Find a supportive person (teacher, parent, health provider, counselor) whom you can talk to when you feel sad, angry, lonely or like hurting yourself.    Find help if you are being abused physically or sexually, or if you fear being hurt by others.    As a teenager, you will be given more responsibility for your health and health care decisions.  While your parent or guardian still has an important role, you will likely  Pain management start spending some time alone with your health care provider as you get older.  Some teen health issues are actually considered confidential, and are protected by law.  Your health care team will discuss this and what it means with you.  Our goal is for you to become comfortable and confident caring for your own health.  ==============================================================

## 2019-11-12 NOTE — PROGRESS NOTE ADULT - SUBJECTIVE AND OBJECTIVE BOX
Interventional Radiology Procedure Note    Procedure:  Cholecystostomy tube     Indication:  acute cholecystitis     Operators:  Frances    Anesthesia (type):  IV sedation    Contrast:   3 cc    EBL:  minimal    Findings/Follow up Plan of Care:  Successful cholecystostomy tube insertion using US and fluoro guidance.  Approx 160 cc of foul-smelling bilious material aspirate.  Specimen sent for culture.  Full report to follow.    Specimens Removed: culture    Implants: 8 Turkmen drain    Complications: none    Condition/Disposition: PACU then floors.    Please call Interventional Radiology x [phone] with any questions, concerns, or issues.

## 2019-11-12 NOTE — PROGRESS NOTE ADULT - PROBLEM SELECTOR PLAN 8
Continue Clonidine.  Holding HCTZ.  Lower extremity discoloration and coolness.   Vascular cardiology consult appreciated.  Good pulses, EMELINA/PVR ordered.

## 2019-11-12 NOTE — CONSULT NOTE ADULT - ATTENDING COMMENTS
The appearance of the digits is consistent with acrocyanosis  He has healthy, triphasic sounding signal in the PT bilaterally without any evidence of tissue loss.    Motion and sensation are intact    When stable, can perform an elective EMELINA/PVR and arterial duplex  This should not delay any surgical or IR procedures that are planned    [place] [id number]
the patient diagnosed metastatic pancreatic cancer to the liver s/p biliary stent placement in September on gemcitabine and abraxane s/p C1D1 and D8  p/w leukocytosis and epigastric abdominal pain. Patient denies any fever, chills, cough, diarrhea.     agree on zosyn and vanco. Although the patient is suspected to have biliary infection s/p stent placement his LFT has been almost normal except mild elevation of ALK phosphatase. I doubt that the patient needs stent exchange but please consult with Gastroenterology.     experience a severe pain, on dilaudid 12mg q3hrs, fentanyl patch. His methadone is for his chronic back pain.
I agree with the resident assessment and plan. Patient with advanced pancreatic cancer, liver decompressed with CBD stent, no GOO, not a candidate for any surgical intervention at this time. Patient with evidence of sepsis from perforated gallbladder. Recommend fluid resuscitation, IV antibiotics, urgent IR consultation for percutaneous RUQ drainage/cholecystostomy.
63 m with DM, HTN, traumatic work injury (2001) s/p multilumbar fusion (T8-S1) s/p hardware removal (2016, on daily opiates and methadone), metastatic pancreatic adenocarcinoma s/p biliary stent placement (9/17/19) sent in from [facility] for concern for biliary sepsis on 11/8/19.   Came in with leukocytosis 26k, afebrile, tachycardic to 100  CT C/A/P with possible pneumonia, distended GB with likely GB rupture as well as new moderate loculated ascites.  s/p percutaneous cholecystostomy today with foul smelling fluid     metastatic pancreatic ca with ruptured gall bladder and cholecystitis s/p percutaneous cholecystostomy   R side pulmonary findings likely due to cholecystitis and contagious inflammation     * c/w zosyn 3/375 q 8 for now, started 11/8, now day 5  * will likely do a 7 day course of antibiotic post cholecystostomy  * f/u the bile cx
Patient seen and examined with the GI fellow. I agree with the above assessment and plan. Thank you for allowing us to care for your patient.

## 2019-11-12 NOTE — PROGRESS NOTE ADULT - SUBJECTIVE AND OBJECTIVE BOX
Interventional Radiology Pre-Procedure Note    This is a 63y Male with acute cholecystitis for cholecystostomy tube    Procedure: cholecystostomy tube    Diagnosis/Indication: Patient is a 63y old  Male who presents with a chief complaint of sepsis (2019 09:03)      PAST MEDICAL & SURGICAL HISTORY:  History of diabetes mellitus, type II  Anxiety and depression  Diastolic dysfunction: stage I  Empyema lun2015 left lung, s/p VATS, decortication  H/O peptic ulcer: over 10 years ago  history of renal calculus  Herniated Disc: S/P work injury   Hypertension: Dx:   Spinal Stenosis  Postlaminectomy Syndrome  S/P  shunt  S/P insertion of spinal cord stimulator:   History of lumbar fusion:   Insertion of Intrathecal Dilaudid Pump: 2011  S/P Spinal Surgery: corrective lumbar fusion   S/P Knee Replacement: left knee   S/P Arthroscopy of Left Knee  S/P Tonsillectomy: childhood  Ankle Fracture: s/p ORIF left ankle        CBC Full  -  ( 2019 09:19 )  WBC Count : 19.09 K/uL  RBC Count : 4.03 M/uL  Hemoglobin : 10.5 g/dL  Hematocrit : 33.6 %  Platelet Count - Automated : 631 K/uL  Mean Cell Volume : 83.4 fl  Mean Cell Hemoglobin : 26.1 pg  Mean Cell Hemoglobin Concentration : 31.3 gm/dL  Auto Neutrophil # : 16.09 K/uL  Auto Lymphocyte # : 0.67 K/uL  Auto Monocyte # : 1.83 K/uL  Auto Eosinophil # : 0.00 K/uL  Auto Basophil # : 0.00 K/uL  Auto Neutrophil % : 82.6 %  Auto Lymphocyte % : 3.5 %  Auto Monocyte % : 9.6 %  Auto Eosinophil % : 0.0 %  Auto Basophil % : 0.0 %        127<L>  |  90<L>  |  22  ----------------------------<  145<H>  4.3   |  29  |  0.84    Ca    8.6      2019 07:22    TPro  6.2  /  Alb  2.3<L>  /  TBili  0.9  /  DBili  x   /  AST  28  /  ALT  24  /  AlkPhos  186<H>          Procedure/ risks/ benefits were explained, informed consent obtained from patient, verbalizes understanding.

## 2019-11-13 NOTE — PROGRESS NOTE ADULT - SUBJECTIVE AND OBJECTIVE BOX
SUBJECTIVE:  Resting in bed.  Reports decreased pain today, says pain is now tolerable.  Also reports that appetite is improving.  No constipation, last BM yesterday.  No SOB.  NAD.    MEDICATIONS  (STANDING):  acetaminophen   Tablet .. 1000 milliGRAM(s) Oral every 8 hours  bisacodyl 5 milliGRAM(s) Oral at bedtime  chlorhexidine 4% Liquid 1 Application(s) Topical daily  cloNIDine 0.1 milliGRAM(s) Oral two times a day  dextrose 5%. 1000 milliLiter(s) (50 mL/Hr) IV Continuous <Continuous>  dextrose 50% Injectable 12.5 Gram(s) IV Push once  dextrose 50% Injectable 25 Gram(s) IV Push once  dextrose 50% Injectable 25 Gram(s) IV Push once  DULoxetine 60 milliGRAM(s) Oral daily  enoxaparin Injectable 40 milliGRAM(s) SubCutaneous daily  famotidine    Tablet 20 milliGRAM(s) Oral daily  fentaNYL   Patch  75 MICROgram(s)/Hr. 1 Patch Transdermal every 48 hours  fentaNYL   Patch 100 MICROgram(s)/Hr. 1 Patch Transdermal every 48 hours  influenza   Vaccine 0.5 milliLiter(s) IntraMuscular once  insulin lispro (HumaLOG) corrective regimen sliding scale   SubCutaneous three times a day before meals  insulin lispro (HumaLOG) corrective regimen sliding scale   SubCutaneous at bedtime  methadone    Tablet 20 milliGRAM(s) Oral every 6 hours  naloxone Injectable 0.4 milliGRAM(s) IV Push every 3 minutes  piperacillin/tazobactam IVPB.. 3.375 Gram(s) IV Intermittent every 8 hours  polyethylene glycol 3350 17 Gram(s) Oral two times a day  senna 2 Tablet(s) Oral at bedtime  sodium chloride 0.9%. 1000 milliLiter(s) (75 mL/Hr) IV Continuous <Continuous>  sucralfate suspension 1 Gram(s) Oral four times a day    MEDICATIONS  (PRN):  bisacodyl Suppository 10 milliGRAM(s) Rectal daily PRN Constipation  dextrose 40% Gel 15 Gram(s) Oral once PRN Blood Glucose LESS THAN 70 milliGRAM(s)/deciliter  diazepam    Tablet 2 milliGRAM(s) Oral every 8 hours PRN anxiety  glucagon  Injectable 1 milliGRAM(s) IntraMuscular once PRN Glucose LESS THAN 70 milligrams/deciliter  HYDROmorphone  Injectable 3 milliGRAM(s) IV Push every 2 hours PRN Severe Pain (7 - 10)  ondansetron Injectable 4 milliGRAM(s) IV Push every 6 hours PRN Nausea and/or Vomiting  simethicone 80 milliGRAM(s) Chew every 8 hours PRN Gas      Vital Signs Last 24 Hrs  T(C): 36.2 (13 Nov 2019 10:20), Max: 36.7 (12 Nov 2019 23:27)  T(F): 97.2 (13 Nov 2019 10:20), Max: 98 (12 Nov 2019 23:27)  HR: 96 (13 Nov 2019 10:20) (87 - 98)  BP: 133/82 (13 Nov 2019 10:20) (122/78 - 141/87)  BP(mean): --  RR: 18 (13 Nov 2019 10:20) (18 - 18)  SpO2: 95% (13 Nov 2019 10:20) (94% - 97%)    CAPILLARY BLOOD GLUCOSE      POCT Blood Glucose.: 119 mg/dL (13 Nov 2019 09:00)  POCT Blood Glucose.: 162 mg/dL (12 Nov 2019 22:11)  POCT Blood Glucose.: 133 mg/dL (12 Nov 2019 17:10)  POCT Blood Glucose.: 146 mg/dL (12 Nov 2019 12:36)    I&O's Summary    12 Nov 2019 07:01  -  13 Nov 2019 07:00  --------------------------------------------------------  IN: 1090 mL / OUT: 190 mL / NET: 900 mL    13 Nov 2019 07:01  -  13 Nov 2019 12:34  --------------------------------------------------------  IN: 0 mL / OUT: 0 mL / NET: 0 mL        PHYSICAL EXAM:  GENERAL: Looks stated age, NAD  CARDIOVASCULAR: Normal S1, S2  PULMONARY: Lungs clear to auscultation bilaterally. No wheezes/rales/rhonchi  GI: Abdomen distended, soft, tender to palpation. Bowel sounds present  MSK/Ext:  No leg edema.  No calf tenderness bilaterally  PSYCH: Normal Affect.      LABS:                        10.6   17.97 )-----------( 600      ( 13 Nov 2019 10:17 )             34.4     11-13    133<L>  |  91<L>  |  22  ----------------------------<  107<H>  4.7   |  29  |  0.85    Ca    8.8      13 Nov 2019 07:12    TPro  6.3  /  Alb  2.3<L>  /  TBili  0.8  /  DBili  x   /  AST  20  /  ALT  18  /  AlkPhos  178<H>  11-13    PT/INR - ( 12 Nov 2019 09:29 )   PT: 15.4 sec;   INR: 1.35 ratio         PTT - ( 12 Nov 2019 09:29 )  PTT:28.0 sec            RADIOLOGY & ADDITIONAL TESTS:

## 2019-11-13 NOTE — PROGRESS NOTE ADULT - ASSESSMENT
62 y/o PMH of traumatic work injury (2001) s/p multilumbar fusion (T8-S1) s/p hardware removal (2016, on daily opiates and methadone), chronic constipation, GERD, HTN, DMII (A1c 6.8), depression, anxiety, metastatic pancreatic adenocarcinoma s/p biliary stent placement (9/17), on gemcitabine/abraxone (last dose 10/24/19) sent in from [facility] for concern for sepsis on 11/8/19, found to have gallbladder perforation    # gallbladder perforation-  s/p percutaneous cholecystostomy tube placed by IR on 11-12-19   CT abdomen done showing " Distended gallbladder with discontinuity of the wall and lobulated fluid along the inferior liver image highly concerning for gallbladder rupture. New moderate loculated ascites. "   surgical also following appreciate their recs  numerous G- rods from culture found from fluid from procedure  on zosyn  -renal function improving, continue to monitor, renally dose meds  -no plan for inpatient chemotherapy at this time  - appreciate excellent medical care      #Pancreatic adenocarcinoma, Stage IV  -holding chemotherapy while admitted  -patient on complex pain regimen including methadone, dilaudid, and fentanyl; pain control per primary team; please ensure patient is on bowel regimen as well to avoid constipation  -will continue to follow  -s/p biliary stent placement (9/17), on gemcitabine/abraxone (last dose 10/24/19)   follows with Dr. Ward outpatient

## 2019-11-13 NOTE — PROGRESS NOTE ADULT - SUBJECTIVE AND OBJECTIVE BOX
Interventional Radiology Follow- Up Note    63y Male with cholecystitis s/p cholecystostomy drain on 11/12  in Interventional Radiology with Dr. Daniels. Reports significant improvement in abdominal pain. Still with 8/10 pain in the lower abdomen. Denies n/v. Tolerating PO diet.           Vitals: T(F): 97.8 (11-13-19 @ 04:18), Max: 98.1 (11-12-19 @ 11:30)  HR: 87 (11-13-19 @ 04:18) (87 - 98)  BP: 124/73 (11-13-19 @ 04:18) (110/75 - 141/87)  RR: 18 (11-13-19 @ 04:18) (18 - 18)  SpO2: 97% (11-13-19 @ 04:18) (94% - 97%)  Wt(kg): --    LABS:                        10.5   14.58 )-----------( 584      ( 12 Nov 2019 09:38 )             33.7     11-13    133<L>  |  91<L>  |  22  ----------------------------<  107<H>  4.7   |  29  |  0.85    Ca    8.8      13 Nov 2019 07:12    TPro  6.3  /  Alb  2.3<L>  /  TBili  0.8  /  DBili  x   /  AST  20  /  ALT  18  /  AlkPhos  178<H>  11-13    PT/INR - ( 12 Nov 2019 09:29 )   PT: 15.4 sec;   INR: 1.35 ratio         PTT - ( 12 Nov 2019 09:29 )  PTT:28.0 sec      Culture - Body Fluid with Gram Stain (collected 11-12-19 @ 17:40)  Source: .Body Fluid Bile Fluid  Gram Stain (11-12-19 @ 23:08):    No polymorphonuclear cells seen per low power field    Rare Gram Negative Rods seen per oil power field      PHYSICAL EXAM:  General: Nontoxic, in NAD  Neuro:  Alert & oriented x 3  Abdomen: soft, Distended. RUQ ttp. Drain flushed with 5cc NS w/o difficulty. Drain with brown bilious output. 90cc documented output. Dressing c/d/i.    Impression: 63y Male with hx of metastatic pancreatic adenocarcinoma s/p biliary stent placement, acute cholecystitis s/p cholecystostomy drain POD # 1.     Plan:  - IV antibiotics   - Continue to monitor out put.  - Flush drain per doctor orders.  - Trend vitals, labs.  - Pt should follow up with IR for tube check in 6 weeks if planning for surgery as out pt. Otherwise, every three months for tube check and change.  - Business card given to pt. Instructed pt to call and make appt at [phone].  - Will discuss with IR attending.     Please call IR at extension [phone] with any questions, concerns, or issues regarding above. Interventional Radiology Follow- Up Note    63y Male with cholecystitis s/p cholecystostomy drain on 11/12  in Interventional Radiology with Dr. Daniels. Reports significant improvement in abdominal pain. Still with 8/10 pain in the lower abdomen. Denies n/v. Tolerating PO diet.           Vitals: T(F): 97.8 (11-13-19 @ 04:18), Max: 98.1 (11-12-19 @ 11:30)  HR: 87 (11-13-19 @ 04:18) (87 - 98)  BP: 124/73 (11-13-19 @ 04:18) (110/75 - 141/87)  RR: 18 (11-13-19 @ 04:18) (18 - 18)  SpO2: 97% (11-13-19 @ 04:18) (94% - 97%)  Wt(kg): --    LABS:                        10.5   14.58 )-----------( 584      ( 12 Nov 2019 09:38 )             33.7     11-13    133<L>  |  91<L>  |  22  ----------------------------<  107<H>  4.7   |  29  |  0.85    Ca    8.8      13 Nov 2019 07:12    TPro  6.3  /  Alb  2.3<L>  /  TBili  0.8  /  DBili  x   /  AST  20  /  ALT  18  /  AlkPhos  178<H>  11-13    PT/INR - ( 12 Nov 2019 09:29 )   PT: 15.4 sec;   INR: 1.35 ratio         PTT - ( 12 Nov 2019 09:29 )  PTT:28.0 sec      Culture - Body Fluid with Gram Stain (collected 11-12-19 @ 17:40)  Source: .Body Fluid Bile Fluid  Gram Stain (11-12-19 @ 23:08):    No polymorphonuclear cells seen per low power field    Rare Gram Negative Rods seen per oil power field      PHYSICAL EXAM:  General: Nontoxic, in NAD  Neuro:  Alert & oriented x 3  Abdomen: soft, Distended. RUQ ttp. Drain flushed with 5cc NS w/o difficulty. Drain with brown bilious output. 90cc documented output. Dressing c/d/i.    Impression: 63y Male with hx of metastatic pancreatic adenocarcinoma s/p biliary stent placement, acute cholecystitis s/p cholecystostomy drain POD # 1.     Plan:  - IV antibiotics   - Continue to monitor out put.  - Flush drain per doctor orders.  - Trend vitals, labs.  - Pt should follow up with IR every three months for tube check and change, if no plan for cholecystectomy as out pt.   - Business card given to pt. Instructed pt to call and make appt at [phone].  - Will discuss with IR attending.     Please call IR at extension [phone] with any questions, concerns, or issues regarding above.

## 2019-11-13 NOTE — PROGRESS NOTE ADULT - SUBJECTIVE AND OBJECTIVE BOX
Follow Up:  cholecystitis    Interval History: pt afebrile, improved abd pain after perc daysi, bile cx growing GNR    ROS:      All other systems negative    Constitutional: no fever, no chills  Head: no trauma  Eyes: no vision changes, no eye pain  ENT:  no sore throat, no rhinorrhea  Cardiovascular:  no chest pain, no palpitation  Respiratory:  no SOB, no cough  GI:  +RUQ abd pain, no vomiting, no diarrhea  urinary: no dysuria, no hematuria, no flank pain  musculoskeletal:  no joint pain, no joint swelling  skin:  no rash  neurology:  no headache, no seizure          Allergies  No Known Allergies        ANTIMICROBIALS:  piperacillin/tazobactam IVPB.. 3.375 every 8 hours      OTHER MEDS:  acetaminophen   Tablet .. 1000 milliGRAM(s) Oral every 8 hours  bisacodyl 5 milliGRAM(s) Oral at bedtime  bisacodyl Suppository 10 milliGRAM(s) Rectal daily PRN  chlorhexidine 4% Liquid 1 Application(s) Topical daily  cloNIDine 0.1 milliGRAM(s) Oral two times a day  dextrose 40% Gel 15 Gram(s) Oral once PRN  dextrose 5%. 1000 milliLiter(s) IV Continuous <Continuous>  dextrose 50% Injectable 12.5 Gram(s) IV Push once  dextrose 50% Injectable 25 Gram(s) IV Push once  dextrose 50% Injectable 25 Gram(s) IV Push once  diazepam    Tablet 2 milliGRAM(s) Oral every 8 hours PRN  DULoxetine 60 milliGRAM(s) Oral daily  enoxaparin Injectable 40 milliGRAM(s) SubCutaneous daily  famotidine    Tablet 20 milliGRAM(s) Oral daily  fentaNYL   Patch  75 MICROgram(s)/Hr. 1 Patch Transdermal every 48 hours  fentaNYL   Patch 100 MICROgram(s)/Hr. 1 Patch Transdermal every 48 hours  glucagon  Injectable 1 milliGRAM(s) IntraMuscular once PRN  HYDROmorphone  Injectable 3 milliGRAM(s) IV Push every 2 hours PRN  influenza   Vaccine 0.5 milliLiter(s) IntraMuscular once  insulin lispro (HumaLOG) corrective regimen sliding scale   SubCutaneous three times a day before meals  insulin lispro (HumaLOG) corrective regimen sliding scale   SubCutaneous at bedtime  methadone    Tablet 20 milliGRAM(s) Oral every 6 hours  naloxone Injectable 0.4 milliGRAM(s) IV Push every 3 minutes  ondansetron Injectable 4 milliGRAM(s) IV Push every 6 hours PRN  polyethylene glycol 3350 17 Gram(s) Oral two times a day  senna 2 Tablet(s) Oral at bedtime  simethicone 80 milliGRAM(s) Chew every 8 hours PRN  sodium chloride 0.9%. 1000 milliLiter(s) IV Continuous <Continuous>  sucralfate suspension 1 Gram(s) Oral four times a day      Vital Signs Last 24 Hrs  T(C): 36.2 (13 Nov 2019 10:20), Max: 36.7 (12 Nov 2019 11:30)  T(F): 97.2 (13 Nov 2019 10:20), Max: 98.1 (12 Nov 2019 11:30)  HR: 96 (13 Nov 2019 10:20) (87 - 98)  BP: 133/82 (13 Nov 2019 10:20) (110/75 - 141/87)  BP(mean): --  RR: 18 (13 Nov 2019 10:20) (18 - 18)  SpO2: 95% (13 Nov 2019 10:20) (94% - 97%)    Physical Exam:  General:    NAD,  non toxic  Head: atraumatic, normocephalic  Eye: palce sclera and conjunctiva  ENT:    no oropharyngeal lesions,   no LAD,   neck supple  Cardio:     regular S1, S2,  no murmur  Respiratory:    clear b/l,    no wheezing  abd:    distended with palpable liver and mass, diffuse tenderness more on RUQ, perc daysi with brownish drainage  :   no CVAT,  no suprapubic tenderness,   no  saavedra  Musculoskeletal:   no joint swelling  vascular: no phlebitis, both LEs cold but palpable pulses and no cyanosis  Skin:    no rash  Neurologic:     no focal deficit  psych: normal affect                          10.6   17.97 )-----------( 600      ( 13 Nov 2019 10:17 )             34.4       11-13    133<L>  |  91<L>  |  22  ----------------------------<  107<H>  4.7   |  29  |  0.85    Ca    8.8      13 Nov 2019 07:12    TPro  6.3  /  Alb  2.3<L>  /  TBili  0.8  /  DBili  x   /  AST  20  /  ALT  18  /  AlkPhos  178<H>  11-13          MICROBIOLOGY:  v  .Body Fluid Bile Fluid  11-12-19   Numerous Gram Negative Rods  --    No polymorphonuclear cells seen per low power field  Rare Gram Negative Rods seen per oil power field      .Blood Blood-Peripheral  11-09-19   No growth to date.  --  --      .Urine Clean Catch (Midstream)  11-09-19   <10,000 CFU/mL Normal Urogenital Veronika  --  --          Rapid RVP Result: NotDetec (11-09 @ 00:20)        RADIOLOGY:  Images below reviewed personally  < from: CT Abdomen and Pelvis w/ IV Cont (11.10.19 @ 16:59) >    IMPRESSION:     Distended gallbladder with discontinuity of the wall and lobulated fluid   along the inferior liver image highly concerning for gallbladder rupture.   New moderate loculated ascites.    Ill-defined pancreatic head mass with pancreatic ductal dilatation.   Significant attenuation of the main portal vein at the portal splenic   confluence with question of ill-defined filling defect possibly   representing tumor thrombus.    Hepatic metastases without significant change.    Small bilateral pleural effusions and atelectasis and a patchy right   middle lobe opacity; superimposed infection is not excluded.

## 2019-11-13 NOTE — CHART NOTE - NSCHARTNOTEFT_GEN_A_CORE
Nutrition Follow Up Note  Patient seen for:  malnutrition follow up    Source: chart reviewed events noted   62 y/o PMH of traumatic work injury () s/p multilumbar fusion (T8-S1) s/p hardware removal (2016, on daily opiates and methadone), chronic constipation, GERD, HTN, depression, anxiety, metastatic pancreatic adenocarcinoma 9liver), s/p biliary stent placement (2m ago) and chemo sent in from [facility] for concern for biliary sepsis on . Recent admission on 19 for abd pain, weight loss 25 lbs for the prior 3 weeks and jaundice. Patient himself has been having increased amount of fatigue and weakness with decreased PO intake for the past 2 days before his admission.         Diet : Consistent carbohydrate  low fat, soft, restricted fluid 1000ml    Patient reports: not hungry, glucerna makes his mouth "too dry", ice provided to water it down; patient c/o pain, followed by palliative for pain management. Daughter at bedside states "he's not eating anything."     PO intake : very poor < 30%     Source for PO intake: PCA, patient's daughter          Daily Weight in k.8 (11-13), Weight in k (11-10)  % Weight Change    Pertinent Medications: MEDICATIONS  (STANDING):  acetaminophen   Tablet .. 1000 milliGRAM(s) Oral every 8 hours  bisacodyl 5 milliGRAM(s) Oral at bedtime  chlorhexidine 4% Liquid 1 Application(s) Topical daily  cloNIDine 0.1 milliGRAM(s) Oral two times a day  dextrose 5%. 1000 milliLiter(s) (50 mL/Hr) IV Continuous <Continuous>  dextrose 50% Injectable 12.5 Gram(s) IV Push once  dextrose 50% Injectable 25 Gram(s) IV Push once  dextrose 50% Injectable 25 Gram(s) IV Push once  DULoxetine 60 milliGRAM(s) Oral daily  enoxaparin Injectable 40 milliGRAM(s) SubCutaneous daily  famotidine    Tablet 20 milliGRAM(s) Oral daily  fentaNYL   Patch  75 MICROgram(s)/Hr. 1 Patch Transdermal every 48 hours  fentaNYL   Patch 100 MICROgram(s)/Hr. 1 Patch Transdermal every 48 hours  influenza   Vaccine 0.5 milliLiter(s) IntraMuscular once  insulin lispro (HumaLOG) corrective regimen sliding scale   SubCutaneous three times a day before meals  insulin lispro (HumaLOG) corrective regimen sliding scale   SubCutaneous at bedtime  methadone    Tablet 20 milliGRAM(s) Oral every 6 hours  naloxone Injectable 0.4 milliGRAM(s) IV Push every 3 minutes  piperacillin/tazobactam IVPB.. 3.375 Gram(s) IV Intermittent every 8 hours  polyethylene glycol 3350 17 Gram(s) Oral two times a day  senna 2 Tablet(s) Oral at bedtime  sodium chloride 0.9%. 1000 milliLiter(s) (75 mL/Hr) IV Continuous <Continuous>  sucralfate suspension 1 Gram(s) Oral four times a day    MEDICATIONS  (PRN):  bisacodyl Suppository 10 milliGRAM(s) Rectal daily PRN Constipation  dextrose 40% Gel 15 Gram(s) Oral once PRN Blood Glucose LESS THAN 70 milliGRAM(s)/deciliter  diazepam    Tablet 2 milliGRAM(s) Oral every 8 hours PRN anxiety  glucagon  Injectable 1 milliGRAM(s) IntraMuscular once PRN Glucose LESS THAN 70 milligrams/deciliter  HYDROmorphone  Injectable 3 milliGRAM(s) IV Push every 2 hours PRN Severe Pain (7 - 10)  ondansetron Injectable 4 milliGRAM(s) IV Push every 6 hours PRN Nausea and/or Vomiting  simethicone 80 milliGRAM(s) Chew every 8 hours PRN Gas    Pertinent Labs:  @ 07:12: Na 133<L>, BUN 22, Cr 0.85, <H>, K+ 4.7, Phos --, Mg --, Alk Phos 178<H>, ALT/SGPT 18, AST/SGOT 20, HbA1c --    Finger Sticks:  POCT Blood Glucose.: 150 mg/dL ( @ 12:39)  POCT Blood Glucose.: 119 mg/dL ( @ 09:00)  POCT Blood Glucose.: 162 mg/dL ( @ 22:11)    GI BM x0 bowel, pt on bowel  regimen dulcolax HS, senna HS daily  Skin per nursing documentation: no pressure breakdown  Edema: right/left foot 3+, right/left ankle 3+    Estimated Needs:   [X ] no change since previous assessment  [ ] recalculated:     Previous Nutrition Diagnosis: Malnutrition  Nutrition Diagnosis is:  ongoing, addressed      Recommend  1) Monitor/encourage PO intake as tolerated  2) continue glucerna 2x daily  3) provide tray prep assistance  4) continue bowel regimen  Discussed with team   Monitoring and Evaluation:     Continue to monitor Nutritional intake, Tolerance to diet prescription, weights, labs, skin integrity    RD remains available upon request and will follow up per protocol

## 2019-11-13 NOTE — PROGRESS NOTE ADULT - ASSESSMENT
The patient is a 63-year man with PMH of traumatic work injury (2001) s/p multilumbar fusion (T8-S1) s/p hardware removal (2016, on daily opiates and methadone), chronic constipation, GERD, HTN, DM type 2, depression, anxiety, metastatic pancreatic adenocarcinoma s/p biliary stent placement (9/17) sent in from [facility] for concern for biliary sepsis on 11/8/19.

## 2019-11-13 NOTE — PROGRESS NOTE ADULT - PROBLEM SELECTOR PLAN 2
Extensive neurosurgery history w/ multilevel fusion and pain control issues.  Now with acute pain secondary to cholecystitis.  ISTOP Reference #: [id number] done on admission.  Palliative Care input appreciated.  Continue fentanyl (dose increased to 175 mcg q48), PRN IV Dilaudid, and Methadone 20mg per Palliative Care recommendations.  Narcan 0.4mg iv q 3min for RR < 12, O2 sat < 90, sedation score of 6  Follows w/Dr Rico for pain mgt and was scheduled for PCA pump 11/8/19.  Continue bowel regimen (miralax, dulolax and senna).

## 2019-11-13 NOTE — PROGRESS NOTE ADULT - PROBLEM SELECTOR PLAN 8
Continue Clonidine.  Holding HCTZ.  Lower extremity discoloration and coolness appears resolved today.   Vascular cardiology consult appreciated.  Good pulses, EMELINA/PVR ordered.

## 2019-11-13 NOTE — PROGRESS NOTE ADULT - ASSESSMENT
Lucian Ortiz is a 63-year man with PMH of traumatic work injury (2001) s/p multilumbar fusion (T8-S1) s/p hardware removal (2016, on daily opiates and methadone), chronic constipation, GERD, HTN, DM type 2, depression, anxiety, metastatic pancreatic adenocarcinoma s/p biliary stent placement (9/17) who was sent in from [facility] for concern for biliary sepsis on 11/8/19. Persistent leukocytosis, significant abdominal tenderness with distension, despite antibiotics. Interval imaging concerning for gallbladder rupture, sludge, stones and pericholecystic fluid raising concerns for acute cholecystitis. Surgery consulted for possible intervention. Lucian Ortiz is a 63-year man with PMH of traumatic work injury (2001) s/p multilumbar fusion (T8-S1) s/p hardware removal (2016, on daily opiates and methadone), chronic constipation, GERD, HTN, DM type 2, depression, anxiety, metastatic pancreatic adenocarcinoma s/p biliary stent placement (9/17) who was sent in from [facility] for concern for biliary sepsis on 11/8/19. Persistent leukocytosis, significant abdominal tenderness with distension, despite antibiotics. Interval imaging concerning for gallbladder rupture, sludge, stones and pericholecystic fluid raising concerns for acute cholecystitis. Surgery consulted for possible intervention.    - Monitor drain output  - Appreciate excellent medical care  - Please contact [facility] surgery with further questions  p: [phone]

## 2019-11-13 NOTE — PROGRESS NOTE ADULT - SUBJECTIVE AND OBJECTIVE BOX
HPI: 64 y/o PMH of traumatic work injury (2001) s/p multilumbar fusion (T8-S1) s/p hardware removal (2016, on daily opiates and methadone), chronic constipation, GERD, HTN, depression, anxiety, metastatic pancreatic adenocarcinoma s/p biliary stent placement (2m ago) and chemo sent in from [facility] for concern for biliary sepsis on 11/8. He was admitted on 9/17/19 for abd pain, weight loss 25 lbs for the prior 3 weeks and jaundice and found on CTAP w/ intrahepatic biliary ductal dilation and multiple hepatic lesions that were new and EUS/ERCP was + for malignant adenocarcinoma cancer cells and CT guided biopsy of R liver lesion + also for malignant adenocarcinoma w/ elevated CA 19-9 1832. Started on Gemcitabine +Abraxane on 10/17 for C1D1 for 3 wk on and 1 wk off but due to thrombocytopenia and fatigue, nausea and vomiting, C1D15 was held.  He went in for a check up appointment on 11/7 and the labs were significant for leukocytosis of >20. Furthermore, the patient himself has been having increased amount of fatigue and weakness with decreased PO intake for the past 2 days before his admission.  Long standing history of LBP w/ extensive neurosurgical followup and pain management doctors. He has h/o spinal cord stimulator (2010) and pain pump in 2012 in abdomen which is no longer there. As per last oncology visit, he was scheduled for PCA pump installation on Friday, 11/8/2019 by Dr. Rico and Dr. Lilly for pain control. During this admission, CT showed gallbladder rupture, stable metastatic disease (to liver), and tumor trombosis. Palliative care was called pain Rx.      INTERVAL EVENTS:  11/13: states pain is tolerably managed with dilaudid, fentanyl increased 11/12 to 175mcg from 100mcg    ADVANCE DIRECTIVES:   DNR  MOLST  [ ]  Living Will  [ ]   DECISION MAKER(s):  [ ] Health Care Proxy(s)  [x ] Surrogate(s)  [ ] Guardian           Name(s): Phone Number(s): Wife     BASELINE (I)ADL(s) (prior to admission):  [place]: [x ]Total  [ ] Moderate [ ]Dependent    Allergies    No Known Allergies    Intolerances    MEDICATIONS  (STANDING):  acetaminophen   Tablet .. 1000 milliGRAM(s) Oral every 8 hours  bisacodyl 5 milliGRAM(s) Oral at bedtime  chlorhexidine 4% Liquid 1 Application(s) Topical daily  cloNIDine 0.1 milliGRAM(s) Oral two times a day  dextrose 5%. 1000 milliLiter(s) (50 mL/Hr) IV Continuous <Continuous>  dextrose 50% Injectable 12.5 Gram(s) IV Push once  dextrose 50% Injectable 25 Gram(s) IV Push once  dextrose 50% Injectable 25 Gram(s) IV Push once  DULoxetine 60 milliGRAM(s) Oral daily  enoxaparin Injectable 40 milliGRAM(s) SubCutaneous daily  famotidine    Tablet 20 milliGRAM(s) Oral daily  fentaNYL   Patch  75 MICROgram(s)/Hr. 1 Patch Transdermal every 48 hours  fentaNYL   Patch 100 MICROgram(s)/Hr. 1 Patch Transdermal every 48 hours  influenza   Vaccine 0.5 milliLiter(s) IntraMuscular once  insulin lispro (HumaLOG) corrective regimen sliding scale   SubCutaneous three times a day before meals  insulin lispro (HumaLOG) corrective regimen sliding scale   SubCutaneous at bedtime  methadone    Tablet 20 milliGRAM(s) Oral every 6 hours  naloxone Injectable 0.4 milliGRAM(s) IV Push every 3 minutes  piperacillin/tazobactam IVPB.. 3.375 Gram(s) IV Intermittent every 8 hours  polyethylene glycol 3350 17 Gram(s) Oral two times a day  senna 2 Tablet(s) Oral at bedtime  sodium chloride 0.9%. 1000 milliLiter(s) (75 mL/Hr) IV Continuous <Continuous>  sucralfate suspension 1 Gram(s) Oral four times a day    MEDICATIONS  (PRN):  bisacodyl Suppository 10 milliGRAM(s) Rectal daily PRN Constipation  dextrose 40% Gel 15 Gram(s) Oral once PRN Blood Glucose LESS THAN 70 milliGRAM(s)/deciliter  diazepam    Tablet 2 milliGRAM(s) Oral every 8 hours PRN anxiety  glucagon  Injectable 1 milliGRAM(s) IntraMuscular once PRN Glucose LESS THAN 70 milligrams/deciliter  HYDROmorphone  Injectable 3 milliGRAM(s) IV Push every 2 hours PRN Severe Pain (7 - 10)  ondansetron Injectable 4 milliGRAM(s) IV Push every 6 hours PRN Nausea and/or Vomiting  simethicone 80 milliGRAM(s) Chew every 8 hours PRN Gas      PRESENT SYMPTOMS: [ ]Unable to obtain due to poor mentation   Source if other than patient:  [ ]Family   [ ]Team     Pain: [x ] yes [ ] no  QOL impact - not able to concentrate or rest.   Location -  Generalized over abdomen but mainly over his mid to right abdominal areas.                   Aggravating factors - palpation   Alleviating factors - Dilaudid IV but only lasting for 90 minutes.   Quality - Pressure   Radiation - towards his back   Timing- constant   Severity (0-10 scale): severe   Minimal acceptable level (0-10 scale): Mild to moderate.     PAIN AD Score:     [email or web address] (press ctrl +  left click to view)    Dyspnea:                           [ ]Mild [ ]Moderate [ ]Severe  Anxiety:                             [ ]Mild [ ]Moderate [ ]Severe  Fatigue:                             [ ]Mild [x ]Moderate [ ]Severe  Nausea:                             [ ]Mild [ ]Moderate [ ]Severe  Loss of appetite:              [ ]Mild [ ]Moderate [ ]Severe  Constipation:                    [ x]Mild [ ]Moderate [ ]Severe    Other Symptoms:  [x ]All other review of systems negative     Karnofsky Performance Score/Palliative Performance Status Version 2:   50      %    http://npcrc.org/files/news/palliative_performance_scale_ppsv2.pdf    PHYSICAL EXAM:  Vital Signs Last 24 Hrs  T(C): 36.2 (13 Nov 2019 10:20), Max: 36.7 (12 Nov 2019 23:27)  T(F): 97.2 (13 Nov 2019 10:20), Max: 98 (12 Nov 2019 23:27)  HR: 89 (13 Nov 2019 14:31) (87 - 98)  BP: 120/73 (13 Nov 2019 14:31) (120/73 - 141/87)  BP(mean): --  RR: 18 (13 Nov 2019 14:31) (18 - 18)  SpO2: 93% (13 Nov 2019 14:31) (93% - 97%)    GENERAL:  [x ]Alert  [x ]Oriented x 3  [ ]Lethargic  [ ]Cachexia  [ ]Unarousable  [ ]Verbal  [ ]Non-Verbal [x] Acute distress due to pain   Behavioral:   [ x] Anxiety  [ ] Delirium [ ] Agitation [ ] Other  HEENT:  [ x]Normal   [ ]Dry mouth   [ ]ET Tube/Trach  [ ]Oral lesions  PULMONARY:   [x ]Clear [ ]Tachypnea  [ ]Audible excessive secretions   [ ]Rhonchi        [ ]Right [ ]Left [ ]Bilateral  [ ]Crackles        [ ]Right [ ]Left [ ]Bilateral  [ ]Wheezing     [ ]Right [ ]Left [ ]Bilateral  CARDIOVASCULAR:    [x ]Regular [ ]Irregular [ ]Tachy  [ ]Jean [ ]Murmur [ ]Other  GASTROINTESTINAL:  [x ]Soft  [ ]Distended   [ ]+BS  [ ]Non tender [ x]Tender  Generalized but mainly over his mid and right abdominal regions.  [ ]PEG [ ]OGT/ NGT  Last BM: 11/10  GENITOURINARY:  [x ]Normal [ ] Incontinent   [ ]Oliguria/Anuria   [ ]Heredia  MUSCULOSKELETAL:   [ ]Normal   [x ]Weakness  [ ]Bed/Wheelchair bound [ ]Edema  NEUROLOGIC:   [ ]No focal deficits  [ ] Cognitive impairment  [ ] Dysphagia [ ]Dysarthria [ ] Paresis [ ]Other   SKIN:   [x ]Normal   [ ]Pressure ulcer(s)  [ ]Rash    CRITICAL CARE:  [ ] Shock Present  [ ]Septic [ ]Cardiogenic [ ]Neurologic [ ]Hypovolemic  [ ]  Vasopressors [ ]  Inotropes   [ ] Respiratory failure present [ ] mechanical ventilation [ ] non-invasive ventilatory support [ ] High flow  [ ] Acute  [ ] Chronic [ ] Hypoxic  [ ] Hypercarbic [ ] Other  [ ] Other organ failure     LABS: reviewed                                10.6   17.97 )-----------( 600      ( 13 Nov 2019 10:17 )             34.4     11-13    133<L>  |  91<L>  |  22  ----------------------------<  107<H>  4.7   |  29  |  0.85    Ca    8.8      13 Nov 2019 07:12      RADIOLOGY & ADDITIONAL STUDIES:  Reviewed     PROTEIN CALORIE MALNUTRITION PRESENT: [ ] Yes [ ] No  [ ] PPSV2 < or = to 30% [ ] significant weight loss  [ ] poor nutritional intake [ ] catabolic state [ ] anasarca     Albumin, Serum: 2.3 g/dL (11-12-19 @ 07:22)  Artificial Nutrition [ ]     REFERRALS:   [ ]Chaplaincy  [ ] Hospice  [ ]Child Life  [ ]Social Work  [ ]Case management [ ]Holistic Therapy     Goals of Care Document:   Care Coordination Assessment [C. Provider] (11-10-19 @ 09:23)   Progress Notes - Care Coordination [C. Provider] (11-12-19 @ 10:36)

## 2019-11-13 NOTE — PROGRESS NOTE ADULT - ASSESSMENT
63 m with DM, HTN, traumatic work injury (2001) s/p multilumbar fusion (T8-S1) s/p hardware removal (2016, on daily opiates and methadone), metastatic pancreatic adenocarcinoma s/p biliary stent placement (9/17/19) sent in from [facility] for concern for biliary sepsis on 11/8/19.   Came in with leukocytosis 26k, afebrile, tachycardic to 100  CT C/A/P with possible pneumonia, distended GB with likely GB rupture as well as new moderate loculated ascites.  s/p percutaneous cholecystostomy today with foul smelling fluid     metastatic pancreatic ca with ruptured gall bladder and cholecystitis s/p percutaneous cholecystostomy 11/12  IR biliary cx: GNR  R side pulmonary findings likely due to cholecystitis and contagious inflammation     * c/w zosyn 3/375 q 8 for now, started 11/8, now day 6 but post cholecystostomy day 2  * will likely do a 7 day course of antibiotic post cholecystostomy  * f/u the bile cx

## 2019-11-13 NOTE — PROGRESS NOTE ADULT - ASSESSMENT
64 y/o PMH of traumatic work injury (2001) s/p multilumbar fusion (T8-S1) s/p hardware removal (2016, on daily opiates and methadone), chronic constipation, GERD, HTN, depression, anxiety, metastatic pancreatic adenocarcinoma s/p biliary stent placement (2m ago) and chemo sent in from [facility] for concern for biliary sepsis on 11/8.During this admission, CT showed gallbladder rupture, stable metastatic disease (to liver), and tumor trombosis. Palliative care was called pain Rx.

## 2019-11-13 NOTE — PROGRESS NOTE ADULT - SUBJECTIVE AND OBJECTIVE BOX
SURGERY DAILY PROGRESS NOTE:       SUBJECTIVE/ROS: Patient examined at bedside  Denies nausea, vomiting, chest pain, shortness of breath         MEDICATIONS  (STANDING):  acetaminophen   Tablet .. 1000 milliGRAM(s) Oral every 8 hours  bisacodyl 5 milliGRAM(s) Oral at bedtime  chlorhexidine 4% Liquid 1 Application(s) Topical daily  cloNIDine 0.1 milliGRAM(s) Oral two times a day  dextrose 5%. 1000 milliLiter(s) (50 mL/Hr) IV Continuous <Continuous>  dextrose 50% Injectable 12.5 Gram(s) IV Push once  dextrose 50% Injectable 25 Gram(s) IV Push once  dextrose 50% Injectable 25 Gram(s) IV Push once  DULoxetine 60 milliGRAM(s) Oral daily  enoxaparin Injectable 40 milliGRAM(s) SubCutaneous daily  famotidine    Tablet 20 milliGRAM(s) Oral daily  fentaNYL   Patch  75 MICROgram(s)/Hr. 1 Patch Transdermal every 48 hours  fentaNYL   Patch 100 MICROgram(s)/Hr. 1 Patch Transdermal every 48 hours  influenza   Vaccine 0.5 milliLiter(s) IntraMuscular once  insulin lispro (HumaLOG) corrective regimen sliding scale   SubCutaneous three times a day before meals  insulin lispro (HumaLOG) corrective regimen sliding scale   SubCutaneous at bedtime  methadone    Tablet 20 milliGRAM(s) Oral every 6 hours  naloxone Injectable 0.4 milliGRAM(s) IV Push every 3 minutes  piperacillin/tazobactam IVPB.. 3.375 Gram(s) IV Intermittent every 8 hours  polyethylene glycol 3350 17 Gram(s) Oral two times a day  senna 2 Tablet(s) Oral at bedtime  sodium chloride 0.9%. 1000 milliLiter(s) (75 mL/Hr) IV Continuous <Continuous>  sucralfate suspension 1 Gram(s) Oral four times a day    MEDICATIONS  (PRN):  bisacodyl Suppository 10 milliGRAM(s) Rectal daily PRN Constipation  dextrose 40% Gel 15 Gram(s) Oral once PRN Blood Glucose LESS THAN 70 milliGRAM(s)/deciliter  diazepam    Tablet 2 milliGRAM(s) Oral every 8 hours PRN anxiety  glucagon  Injectable 1 milliGRAM(s) IntraMuscular once PRN Glucose LESS THAN 70 milligrams/deciliter  HYDROmorphone  Injectable 3 milliGRAM(s) IV Push every 2 hours PRN Severe Pain (7 - 10)  ondansetron Injectable 4 milliGRAM(s) IV Push every 6 hours PRN Nausea and/or Vomiting  simethicone 80 milliGRAM(s) Chew every 8 hours PRN Gas      OBJECTIVE:    Vital Signs Last 24 Hrs  T(C): 36.6 (13 Nov 2019 04:18), Max: 36.7 (12 Nov 2019 11:30)  T(F): 97.8 (13 Nov 2019 04:18), Max: 98.1 (12 Nov 2019 11:30)  HR: 87 (13 Nov 2019 04:18) (87 - 98)  BP: 124/73 (13 Nov 2019 04:18) (110/75 - 141/87)  BP(mean): --  RR: 18 (13 Nov 2019 04:18) (18 - 18)  SpO2: 97% (13 Nov 2019 04:18) (94% - 97%)        I&O's Detail    12 Nov 2019 07:01  -  13 Nov 2019 07:00  --------------------------------------------------------  IN:    IV PiggyBack: 200 mL    Oral Fluid: 890 mL  Total IN: 1090 mL    OUT:    Drain: 90 mL    Voided: 100 mL  Total OUT: 190 mL    Total NET: 900 mL          Daily     Daily     LABS:                        10.5   14.58 )-----------( 584      ( 12 Nov 2019 09:38 )             33.7     11-13    133<L>  |  91<L>  |  22  ----------------------------<  107<H>  4.7   |  29  |  0.85    Ca    8.8      13 Nov 2019 07:12    TPro  6.3  /  Alb  2.3<L>  /  TBili  0.8  /  DBili  x   /  AST  20  /  ALT  18  /  AlkPhos  178<H>  11-13    PT/INR - ( 12 Nov 2019 09:29 )   PT: 15.4 sec;   INR: 1.35 ratio         PTT - ( 12 Nov 2019 09:29 )  PTT:28.0 sec                  PHYSICAL EXAM:  Constitutional: well developed, well nourished, NAD  Eyes: anicteric  ENMT: normal facies, symmetric  Respiratory: Breathing comfortably    Gastrointestinal: abdomen soft, nontender, nondistended.   Extremities: FROM, warm  Neurological: intact, non-focal  Psychiatric: oriented x 3; appropriate SURGERY DAILY PROGRESS NOTE:       SUBJECTIVE/ROS: Patient examined at bedside. endorses right upper quadrant pain, otherwise says he is comfortable   Denies nausea, vomiting, chest pain, shortness of breath         MEDICATIONS  (STANDING):  acetaminophen   Tablet .. 1000 milliGRAM(s) Oral every 8 hours  bisacodyl 5 milliGRAM(s) Oral at bedtime  chlorhexidine 4% Liquid 1 Application(s) Topical daily  cloNIDine 0.1 milliGRAM(s) Oral two times a day  dextrose 5%. 1000 milliLiter(s) (50 mL/Hr) IV Continuous <Continuous>  dextrose 50% Injectable 12.5 Gram(s) IV Push once  dextrose 50% Injectable 25 Gram(s) IV Push once  dextrose 50% Injectable 25 Gram(s) IV Push once  DULoxetine 60 milliGRAM(s) Oral daily  enoxaparin Injectable 40 milliGRAM(s) SubCutaneous daily  famotidine    Tablet 20 milliGRAM(s) Oral daily  fentaNYL   Patch  75 MICROgram(s)/Hr. 1 Patch Transdermal every 48 hours  fentaNYL   Patch 100 MICROgram(s)/Hr. 1 Patch Transdermal every 48 hours  influenza   Vaccine 0.5 milliLiter(s) IntraMuscular once  insulin lispro (HumaLOG) corrective regimen sliding scale   SubCutaneous three times a day before meals  insulin lispro (HumaLOG) corrective regimen sliding scale   SubCutaneous at bedtime  methadone    Tablet 20 milliGRAM(s) Oral every 6 hours  naloxone Injectable 0.4 milliGRAM(s) IV Push every 3 minutes  piperacillin/tazobactam IVPB.. 3.375 Gram(s) IV Intermittent every 8 hours  polyethylene glycol 3350 17 Gram(s) Oral two times a day  senna 2 Tablet(s) Oral at bedtime  sodium chloride 0.9%. 1000 milliLiter(s) (75 mL/Hr) IV Continuous <Continuous>  sucralfate suspension 1 Gram(s) Oral four times a day    MEDICATIONS  (PRN):  bisacodyl Suppository 10 milliGRAM(s) Rectal daily PRN Constipation  dextrose 40% Gel 15 Gram(s) Oral once PRN Blood Glucose LESS THAN 70 milliGRAM(s)/deciliter  diazepam    Tablet 2 milliGRAM(s) Oral every 8 hours PRN anxiety  glucagon  Injectable 1 milliGRAM(s) IntraMuscular once PRN Glucose LESS THAN 70 milligrams/deciliter  HYDROmorphone  Injectable 3 milliGRAM(s) IV Push every 2 hours PRN Severe Pain (7 - 10)  ondansetron Injectable 4 milliGRAM(s) IV Push every 6 hours PRN Nausea and/or Vomiting  simethicone 80 milliGRAM(s) Chew every 8 hours PRN Gas      OBJECTIVE:    Vital Signs Last 24 Hrs  T(C): 36.6 (13 Nov 2019 04:18), Max: 36.7 (12 Nov 2019 11:30)  T(F): 97.8 (13 Nov 2019 04:18), Max: 98.1 (12 Nov 2019 11:30)  HR: 87 (13 Nov 2019 04:18) (87 - 98)  BP: 124/73 (13 Nov 2019 04:18) (110/75 - 141/87)  BP(mean): --  RR: 18 (13 Nov 2019 04:18) (18 - 18)  SpO2: 97% (13 Nov 2019 04:18) (94% - 97%)        I&O's Detail    12 Nov 2019 07:01  -  13 Nov 2019 07:00  --------------------------------------------------------  IN:    IV PiggyBack: 200 mL    Oral Fluid: 890 mL  Total IN: 1090 mL    OUT:    Drain: 90 mL    Voided: 100 mL  Total OUT: 190 mL    Total NET: 900 mL          Daily     Daily     LABS:                        10.5   14.58 )-----------( 584      ( 12 Nov 2019 09:38 )             33.7     11-13    133<L>  |  91<L>  |  22  ----------------------------<  107<H>  4.7   |  29  |  0.85    Ca    8.8      13 Nov 2019 07:12    TPro  6.3  /  Alb  2.3<L>  /  TBili  0.8  /  DBili  x   /  AST  20  /  ALT  18  /  AlkPhos  178<H>  11-13    PT/INR - ( 12 Nov 2019 09:29 )   PT: 15.4 sec;   INR: 1.35 ratio         PTT - ( 12 Nov 2019 09:29 )  PTT:28.0 sec                  PHYSICAL EXAM:  Constitutional: well developed, well nourished, NAD  Eyes: anicteric  ENMT: normal facies, symmetric  Respiratory: Breathing comfortably    Gastrointestinal: abdomen mildly distended, soft, tender to palpation in RUQ, cholecystostomy tube dressing c/d/i, tube draining bilious fluid  Extremities: FROM, warm  Neurological: intact, non-focal  Psychiatric: oriented x 3; appropriate

## 2019-11-13 NOTE — PROGRESS NOTE ADULT - PROBLEM SELECTOR PLAN 5
Mild.  Suspect SIADH secondary to pain and perhaps recent nausea.  Continue fluid restriction for now.  Hopefully hyponatremia will continue to improve as pain improves.

## 2019-11-13 NOTE — PROGRESS NOTE ADULT - SUBJECTIVE AND OBJECTIVE BOX
INTERVAL HPI/OVERNIGHT EVENTS:  Patient S&E at bedside. still has pain but much better after tube was placed.    VITAL SIGNS:  T(F): 97.2 (11-13-19 @ 10:20)  HR: 96 (11-13-19 @ 10:20)  BP: 133/82 (11-13-19 @ 10:20)  RR: 18 (11-13-19 @ 10:20)  SpO2: 95% (11-13-19 @ 10:20)  Wt(kg): --    PHYSICAL EXAM:    Constitutional: NAD  Eyes: EOMI, sclera non-icteric  Neck: supple, no masses, no JVD  Respiratory: CTA b/l, good air entry b/l  Cardiovascular: RRR, no M/R/G  Gastrointestinal: distended  Extremities: no c/c/e  Neurological: AAOx3      MEDICATIONS  (STANDING):  acetaminophen   Tablet .. 1000 milliGRAM(s) Oral every 8 hours  bisacodyl 5 milliGRAM(s) Oral at bedtime  chlorhexidine 4% Liquid 1 Application(s) Topical daily  cloNIDine 0.1 milliGRAM(s) Oral two times a day  dextrose 5%. 1000 milliLiter(s) (50 mL/Hr) IV Continuous <Continuous>  dextrose 50% Injectable 12.5 Gram(s) IV Push once  dextrose 50% Injectable 25 Gram(s) IV Push once  dextrose 50% Injectable 25 Gram(s) IV Push once  DULoxetine 60 milliGRAM(s) Oral daily  enoxaparin Injectable 40 milliGRAM(s) SubCutaneous daily  famotidine    Tablet 20 milliGRAM(s) Oral daily  fentaNYL   Patch  75 MICROgram(s)/Hr. 1 Patch Transdermal every 48 hours  fentaNYL   Patch 100 MICROgram(s)/Hr. 1 Patch Transdermal every 48 hours  influenza   Vaccine 0.5 milliLiter(s) IntraMuscular once  insulin lispro (HumaLOG) corrective regimen sliding scale   SubCutaneous three times a day before meals  insulin lispro (HumaLOG) corrective regimen sliding scale   SubCutaneous at bedtime  methadone    Tablet 20 milliGRAM(s) Oral every 6 hours  naloxone Injectable 0.4 milliGRAM(s) IV Push every 3 minutes  piperacillin/tazobactam IVPB.. 3.375 Gram(s) IV Intermittent every 8 hours  polyethylene glycol 3350 17 Gram(s) Oral two times a day  senna 2 Tablet(s) Oral at bedtime  sodium chloride 0.9%. 1000 milliLiter(s) (75 mL/Hr) IV Continuous <Continuous>  sucralfate suspension 1 Gram(s) Oral four times a day    MEDICATIONS  (PRN):  bisacodyl Suppository 10 milliGRAM(s) Rectal daily PRN Constipation  dextrose 40% Gel 15 Gram(s) Oral once PRN Blood Glucose LESS THAN 70 milliGRAM(s)/deciliter  diazepam    Tablet 2 milliGRAM(s) Oral every 8 hours PRN anxiety  glucagon  Injectable 1 milliGRAM(s) IntraMuscular once PRN Glucose LESS THAN 70 milligrams/deciliter  HYDROmorphone  Injectable 3 milliGRAM(s) IV Push every 2 hours PRN Severe Pain (7 - 10)  ondansetron Injectable 4 milliGRAM(s) IV Push every 6 hours PRN Nausea and/or Vomiting  simethicone 80 milliGRAM(s) Chew every 8 hours PRN Gas      Allergies    No Known Allergies    Intolerances        LABS:                        10.6   17.97 )-----------( 600      ( 13 Nov 2019 10:17 )             34.4     11-13    133<L>  |  91<L>  |  22  ----------------------------<  107<H>  4.7   |  29  |  0.85    Ca    8.8      13 Nov 2019 07:12    TPro  6.3  /  Alb  2.3<L>  /  TBili  0.8  /  DBili  x   /  AST  20  /  ALT  18  /  AlkPhos  178<H>  11-13    PT/INR - ( 12 Nov 2019 09:29 )   PT: 15.4 sec;   INR: 1.35 ratio         PTT - ( 12 Nov 2019 09:29 )  PTT:28.0 sec      RADIOLOGY & ADDITIONAL TESTS:  Studies reviewed.

## 2019-11-14 NOTE — PROGRESS NOTE ADULT - PROBLEM SELECTOR PLAN 1
CT imaging concerning for gallbladder wall rupture and PNA.  Suspect cholecystitis and possible PNA versus inflammatory changes secondary to the cholecystitis.  Patient now s/p cholecystostomy tube placement by IR.  WBC rising, but patient clinically feeling better, tube draining, and patient afebrile.  Continue Zosyn.  Blood cultures negative.  Biliary fluid culture grew out Klebsiella Oxytoca that is sensitive to Zosyn.

## 2019-11-14 NOTE — PROGRESS NOTE ADULT - ASSESSMENT
The patient is a 63-year man with PMH of traumatic work injury (2001) s/p multilumbar fusion (T8-S1) s/p hardware removal (2016, on daily opiates and methadone), chronic constipation, GERD, HTN, DM type 2, depression, anxiety, metastatic pancreatic adenocarcinoma s/p biliary stent placement (9/17) sent in from [facility] for concern for biliary sepsis on 11/8/19.

## 2019-11-14 NOTE — PROGRESS NOTE ADULT - PROBLEM SELECTOR PLAN 1
- c/w fentanyl 175mcg  - on methadone 80mg daily (20mg Q6)  - c/w dilaudid 3mg IV (used 7 doses/24 hours), will increase to 4mg and consider increasing fentanyl tomorrow  - on cymbalta as well

## 2019-11-14 NOTE — PROGRESS NOTE ADULT - PROBLEM SELECTOR PLAN 8
Continue Clonidine.  Holding HCTZ.  Lower extremity discoloration and coolness appears to be resolved.   EMELINA/PVR ordered, patient now says he would prefer to do without the test.  It seems that at least a limit study was done, will f/u results.

## 2019-11-14 NOTE — PROGRESS NOTE ADULT - PROBLEM SELECTOR PLAN 2
Extensive neurosurgery history w/ multilevel fusion and pain control issues.  Now with acute pain secondary to cholecystitis.  ISTOP Reference #: [id number] done on admission.  Palliative Care input appreciated.  Continue fentanyl (dose increased to 175 mcg q48), PRN IV Dilaudid, and Methadone 20mg per Palliative Care recommendations.  Will ask Palliative care to see in follow-up today per patient request.  Follows w/Dr Rico for pain mgt and was scheduled for PCA pump 11/8/19.  Continue bowel regimen (miralax, dulolax and senna).

## 2019-11-14 NOTE — PROGRESS NOTE ADULT - SUBJECTIVE AND OBJECTIVE BOX
Follow Up:  cholecystitis    Interval History: pt afebrile, improved abd pain after perc daysi, bile cx klebsiella oxytoca    ROS:      All other systems negative    Constitutional: no fever, no chills  Head: no trauma  Eyes: no vision changes, no eye pain  ENT:  no sore throat, no rhinorrhea  Cardiovascular:  no chest pain, no palpitation  Respiratory:  no SOB, no cough  GI:  + abd pain, no vomiting, no diarrhea  urinary: no dysuria, no hematuria, no flank pain  musculoskeletal:  no joint pain, no joint swelling  skin:  no rash  neurology:  no headache, no seizure        Allergies  No Known Allergies        ANTIMICROBIALS:  piperacillin/tazobactam IVPB.. 3.375 every 8 hours      OTHER MEDS:  bisacodyl 5 milliGRAM(s) Oral at bedtime  bisacodyl Suppository 10 milliGRAM(s) Rectal daily PRN  chlorhexidine 4% Liquid 1 Application(s) Topical daily  cloNIDine 0.1 milliGRAM(s) Oral two times a day  dextrose 40% Gel 15 Gram(s) Oral once PRN  dextrose 5%. 1000 milliLiter(s) IV Continuous <Continuous>  dextrose 50% Injectable 12.5 Gram(s) IV Push once  dextrose 50% Injectable 25 Gram(s) IV Push once  dextrose 50% Injectable 25 Gram(s) IV Push once  diazepam    Tablet 2 milliGRAM(s) Oral every 8 hours PRN  DULoxetine 60 milliGRAM(s) Oral daily  enoxaparin Injectable 40 milliGRAM(s) SubCutaneous daily  famotidine    Tablet 20 milliGRAM(s) Oral daily  fentaNYL   Patch  75 MICROgram(s)/Hr. 1 Patch Transdermal every 48 hours  fentaNYL   Patch 100 MICROgram(s)/Hr. 1 Patch Transdermal every 48 hours  glucagon  Injectable 1 milliGRAM(s) IntraMuscular once PRN  HYDROmorphone  Injectable 3 milliGRAM(s) IV Push every 2 hours PRN  influenza   Vaccine 0.5 milliLiter(s) IntraMuscular once  insulin lispro (HumaLOG) corrective regimen sliding scale   SubCutaneous three times a day before meals  insulin lispro (HumaLOG) corrective regimen sliding scale   SubCutaneous at bedtime  methadone    Tablet 20 milliGRAM(s) Oral every 6 hours  naloxone Injectable 0.4 milliGRAM(s) IV Push every 3 minutes  ondansetron Injectable 4 milliGRAM(s) IV Push every 6 hours PRN  polyethylene glycol 3350 17 Gram(s) Oral two times a day  senna 2 Tablet(s) Oral at bedtime  simethicone 80 milliGRAM(s) Chew every 8 hours PRN  sodium chloride 0.9%. 1000 milliLiter(s) IV Continuous <Continuous>  sucralfate suspension 1 Gram(s) Oral four times a day      Vital Signs Last 24 Hrs  T(C): 36.3 (14 Nov 2019 12:45), Max: 36.7 (13 Nov 2019 20:49)  T(F): 97.3 (14 Nov 2019 12:45), Max: 98 (13 Nov 2019 20:49)  HR: 94 (14 Nov 2019 12:45) (79 - 97)  BP: 118/80 (14 Nov 2019 12:45) (108/67 - 145/88)  BP(mean): --  RR: 18 (14 Nov 2019 12:45) (17 - 18)  SpO2: 94% (14 Nov 2019 12:45) (92% - 96%)    Physical Exam:  General:    NAD,  non toxic  Head: atraumatic, normocephalic  Eye: palce sclera and conjunctiva  ENT:    no oropharyngeal lesions,   no LAD,   neck supple  Cardio:     regular S1, S2,  no murmur  Respiratory:    clear b/l,    no wheezing  abd:    distended with palpable liver and mass, diffuse tenderness more on RUQ, perc daysi with brownish drainage  :   no CVAT,  no suprapubic tenderness,   no  saavedra  Musculoskeletal:   no joint swelling  vascular: no phlebitis, both LEs cold but palpable pulses and no cyanosis  Skin:    no rash  Neurologic:     no focal deficit  psych: normal affect                          9.9    23.52 )-----------( 549      ( 14 Nov 2019 09:16 )             31.6       11-14    131<L>  |  91<L>  |  18  ----------------------------<  146<H>  4.1   |  30  |  0.79    Ca    8.9      14 Nov 2019 07:10    TPro  6.3  /  Alb  2.3<L>  /  TBili  0.8  /  DBili  x   /  AST  20  /  ALT  18  /  AlkPhos  178<H>  11-13          MICROBIOLOGY:  v  .Body Fluid Bile Fluid  11-12-19   Numerous Klebsiella oxytoca  --  Klebsiella oxytoca      .Blood Blood-Peripheral  11-09-19   No growth at 5 days.  --  --      .Urine Clean Catch (Midstream)  11-09-19   <10,000 CFU/mL Normal Urogenital Veronika  --  --          Rapid RVP Result: NotDetec (11-09 @ 00:20)        RADIOLOGY:  Images below reviewed personally  < from: CT Chest No Cont (11.09.19 @ 14:48) >  IMPRESSION:     Likely infectious process in the right lung. Follow-up CT is advised to   evaluate for resolution.  Moderate size hiatal hernia with surrounding small amount of free fluid.   The esophagus is fluid-filled and thickened which can be secondary to   esophagitis/reflux.

## 2019-11-14 NOTE — PROGRESS NOTE ADULT - SUBJECTIVE AND OBJECTIVE BOX
Vascular Cardiology  Progress note     SPECTRA [id number]              EMAIL [email or web address]   OFFICE [phone]    CC:      INTERVAL HISTORY:           Allergies    No Known Allergies    Intolerances    	    MEDICATIONS:  cloNIDine 0.1 milliGRAM(s) Oral two times a day  enoxaparin Injectable 40 milliGRAM(s) SubCutaneous daily    piperacillin/tazobactam IVPB.. 3.375 Gram(s) IV Intermittent every 8 hours      acetaminophen   Tablet .. 1000 milliGRAM(s) Oral every 8 hours  diazepam    Tablet 2 milliGRAM(s) Oral every 8 hours PRN  DULoxetine 60 milliGRAM(s) Oral daily  fentaNYL   Patch  75 MICROgram(s)/Hr. 1 Patch Transdermal every 48 hours  fentaNYL   Patch 100 MICROgram(s)/Hr. 1 Patch Transdermal every 48 hours  HYDROmorphone  Injectable 3 milliGRAM(s) IV Push every 2 hours PRN  methadone    Tablet 20 milliGRAM(s) Oral every 6 hours  ondansetron Injectable 4 milliGRAM(s) IV Push every 6 hours PRN    bisacodyl 5 milliGRAM(s) Oral at bedtime  bisacodyl Suppository 10 milliGRAM(s) Rectal daily PRN  famotidine    Tablet 20 milliGRAM(s) Oral daily  polyethylene glycol 3350 17 Gram(s) Oral two times a day  senna 2 Tablet(s) Oral at bedtime  simethicone 80 milliGRAM(s) Chew every 8 hours PRN  sucralfate suspension 1 Gram(s) Oral four times a day    dextrose 40% Gel 15 Gram(s) Oral once PRN  dextrose 50% Injectable 12.5 Gram(s) IV Push once  dextrose 50% Injectable 25 Gram(s) IV Push once  dextrose 50% Injectable 25 Gram(s) IV Push once  glucagon  Injectable 1 milliGRAM(s) IntraMuscular once PRN  insulin lispro (HumaLOG) corrective regimen sliding scale   SubCutaneous three times a day before meals  insulin lispro (HumaLOG) corrective regimen sliding scale   SubCutaneous at bedtime    chlorhexidine 4% Liquid 1 Application(s) Topical daily  dextrose 5%. 1000 milliLiter(s) IV Continuous <Continuous>  influenza   Vaccine 0.5 milliLiter(s) IntraMuscular once  sodium chloride 0.9%. 1000 milliLiter(s) IV Continuous <Continuous>      PAST MEDICAL & SURGICAL HISTORY:  History of diabetes mellitus, type II  Anxiety and depression  Diastolic dysfunction: stage I  Empyema lun2015 left lung, s/p VATS, decortication  H/O peptic ulcer: over 10 years ago  history of renal calculus  Herniated Disc: S/P work injury   Hypertension: Dx:   Spinal Stenosis  Postlaminectomy Syndrome  S/P  shunt  S/P insertion of spinal cord stimulator:   History of lumbar fusion:   Insertion of Intrathecal Dilaudid Pump: 2011  S/P Spinal Surgery: corrective lumbar fusion   S/P Knee Replacement: left knee   S/P Arthroscopy of Left Knee  S/P Tonsillectomy: childhood  Ankle Fracture: s/p ORIF left ankle       FAMILY HISTORY:  No pertinent family history in first degree relatives      SOCIAL HISTORY:  unchanged    REVIEW OF SYSTEMS:  CONSTITUTIONAL: No fever, weight loss, or fatigue  EYES: No eye pain, visual disturbances, or discharge  ENMT:  No difficulty hearing, tinnitus, vertigo; No sinus or throat pain  NECK: No pain or stiffness  RESPIRATORY: No cough, wheezing, chills or hemoptysis; No Shortness of Breath  CARDIOVASCULAR: No chest pain, palpitations, passing out, dizziness, or leg swelling  GASTROINTESTINAL: No abdominal or epigastric pain. No nausea, vomiting, or hematemesis; No diarrhea or constipation. No melena or hematochezia.  GENITOURINARY: No dysuria, frequency, hematuria, or incontinence  NEUROLOGICAL: No headaches, memory loss, loss of strength, numbness, or tremors  SKIN: No itching, burning, rashes, or lesions   LYMPH Nodes: No enlarged glands  ENDOCRINE: No heat or cold intolerance; No hair loss  MUSCULOSKELETAL: No joint pain or swelling; No muscle, back, or extremity pain  PSYCHIATRIC: No depression, anxiety, mood swings, or difficulty sleeping  HEME/LYMPH: No easy bruising, or bleeding gums  ALLERY AND IMMUNOLOGIC: No hives or eczema	    [ x] All others negative	  [ ] Unable to obtain    PHYSICAL EXAM:  T(C): 36.5 (19 @ 09:09), Max: 36.7 (19 @ 20:49)  HR: 79 (19 @ 09:09) (79 - 97)  BP: 132/84 (19 @ 09:09) (108/67 - 145/88)  RR: 18 (19 @ 09:09) (17 - 18)  SpO2: 94% (19 @ 09:09) (92% - 96%)  Wt(kg): --  I&O's Summary    2019 07:01  -  2019 07:00  --------------------------------------------------------  IN: 1790 mL / OUT: 370 mL / NET: 1420 mL        Appearance: Normal	  HEENT:   Normal oral mucosa, PERRL, EOMI	  Carotid:  Right: No bruit    Left:  No bruit  Lymphatic: No lymphadenopathy  Cardiovascular: Normal S1 S2, No JVD, No murmurs, No edema  Respiratory: Lungs clear to auscultation	  Psychiatry: A & O x 3, Mood & affect appropriate  Gastrointestinal:  Soft, Non-tender, + BS	  Skin: No rashes, No ecchymoses, No cyanosis	  Neurologic: Non-focal  Extremities: Normal range of motion, No clubbing, cyanosis.  Vascular:   Right Femoral:  Palpable   Left Femoral:  Palpable  Right Popliteal: Palpable   Left Popliteal:  palpable  Right DP:  Palpable             Left DP:  Palpable  Right PT:  Palpable              Left PT:  Palpable      LABS:	 	    CBC Full  -  ( 2019 09:16 )  WBC Count : 23.52 K/uL  Hemoglobin : 9.9 g/dL  Hematocrit : 31.6 %  Platelet Count - Automated : 549 K/uL  Mean Cell Volume : 84.9 fl  Mean Cell Hemoglobin : 26.6 pg  Mean Cell Hemoglobin Concentration : 31.3 gm/dL  Auto Neutrophil # : x  Auto Lymphocyte # : x  Auto Monocyte # : x  Auto Eosinophil # : x  Auto Basophil # : x  Auto Neutrophil % : x  Auto Lymphocyte % : x  Auto Monocyte % : x  Auto Eosinophil % : x  Auto Basophil % : x    11-14    131<L>  |  91<L>  |  18  ----------------------------<  146<H>  4.1   |  30  |  0.79  11-13    133<L>  |  91<L>  |  22  ----------------------------<  107<H>  4.7   |  29  |  0.85    Ca    8.9      2019 07:10  Ca    8.8      2019 07:12    TPro  6.3  /  Alb  2.3<L>  /  TBili  0.8  /  DBili  x   /  AST  20  /  ALT  18  /  AlkPhos  178<H>            Assessment:  1.            Plan:  1.          Thank you      Vascular Cardiology Service   [facility] [address]  Office [phone]  email:   [email or web address] Vascular Cardiology  Progress note     SPECTRA [id number]              EMAIL [email or web address]   OFFICE [phone]    CC: Acrocyanosis     INTERVAL HISTORY:    Patient is doing well with no complaints of chest pain or shortness of breath; otherwise does ot complaint of any bilateral lower extremity pain       Allergies  No Known Allergies  Intolerances    	    MEDICATIONS:  cloNIDine 0.1 milliGRAM(s) Oral two times a day  enoxaparin Injectable 40 milliGRAM(s) SubCutaneous daily    piperacillin/tazobactam IVPB.. 3.375 Gram(s) IV Intermittent every 8 hours      acetaminophen   Tablet .. 1000 milliGRAM(s) Oral every 8 hours  diazepam    Tablet 2 milliGRAM(s) Oral every 8 hours PRN  DULoxetine 60 milliGRAM(s) Oral daily  fentaNYL   Patch  75 MICROgram(s)/Hr. 1 Patch Transdermal every 48 hours  fentaNYL   Patch 100 MICROgram(s)/Hr. 1 Patch Transdermal every 48 hours  HYDROmorphone  Injectable 3 milliGRAM(s) IV Push every 2 hours PRN  methadone    Tablet 20 milliGRAM(s) Oral every 6 hours  ondansetron Injectable 4 milliGRAM(s) IV Push every 6 hours PRN    bisacodyl 5 milliGRAM(s) Oral at bedtime  bisacodyl Suppository 10 milliGRAM(s) Rectal daily PRN  famotidine    Tablet 20 milliGRAM(s) Oral daily  polyethylene glycol 3350 17 Gram(s) Oral two times a day  senna 2 Tablet(s) Oral at bedtime  simethicone 80 milliGRAM(s) Chew every 8 hours PRN  sucralfate suspension 1 Gram(s) Oral four times a day    dextrose 40% Gel 15 Gram(s) Oral once PRN  dextrose 50% Injectable 12.5 Gram(s) IV Push once  dextrose 50% Injectable 25 Gram(s) IV Push once  dextrose 50% Injectable 25 Gram(s) IV Push once  glucagon  Injectable 1 milliGRAM(s) IntraMuscular once PRN  insulin lispro (HumaLOG) corrective regimen sliding scale   SubCutaneous three times a day before meals  insulin lispro (HumaLOG) corrective regimen sliding scale   SubCutaneous at bedtime    chlorhexidine 4% Liquid 1 Application(s) Topical daily  dextrose 5%. 1000 milliLiter(s) IV Continuous <Continuous>  influenza   Vaccine 0.5 milliLiter(s) IntraMuscular once  sodium chloride 0.9%. 1000 milliLiter(s) IV Continuous <Continuous>      PAST MEDICAL & SURGICAL HISTORY:  History of diabetes mellitus, type II  Anxiety and depression  Diastolic dysfunction: stage I  Empyema lun2015 left lung, s/p VATS, decortication  H/O peptic ulcer: over 10 years ago  history of renal calculus  Herniated Disc: S/P work injury   Hypertension: Dx:   Spinal Stenosis  Postlaminectomy Syndrome  S/P  shunt  S/P insertion of spinal cord stimulator:   History of lumbar fusion:   Insertion of Intrathecal Dilaudid Pump: 2011  S/P Spinal Surgery: corrective lumbar fusion   S/P Knee Replacement: left knee   S/P Arthroscopy of Left Knee  S/P Tonsillectomy: childhood  Ankle Fracture: s/p ORIF left ankle       FAMILY HISTORY:  No pertinent family history in first degree relatives      SOCIAL HISTORY:  unchanged    REVIEW OF SYSTEMS:  CONSTITUTIONAL: No fever, weight loss, or fatigue  EYES: No eye pain, visual disturbances, or discharge  ENMT:  No difficulty hearing, tinnitus, vertigo; No sinus or throat pain  NECK: No pain or stiffness  RESPIRATORY: No cough, wheezing, chills or hemoptysis; No Shortness of Breath  CARDIOVASCULAR: No chest pain, palpitations, passing out, dizziness, or leg swelling  GASTROINTESTINAL: No abdominal or epigastric pain. No nausea, vomiting, or hematemesis; No diarrhea or constipation. No melena or hematochezia.  GENITOURINARY: No dysuria, frequency, hematuria, or incontinence  NEUROLOGICAL: No headaches, memory loss, loss of strength, numbness, or tremors  SKIN: No itching, burning, rashes, or lesions   LYMPH Nodes: No enlarged glands  ENDOCRINE: No heat or cold intolerance; No hair loss  MUSCULOSKELETAL: No joint pain or swelling; No muscle, back, or extremity pain  PSYCHIATRIC: No depression, anxiety, mood swings, or difficulty sleeping  HEME/LYMPH: No easy bruising, or bleeding gums  ALLERY AND IMMUNOLOGIC: No hives or eczema	    [ x] All others negative	  [ ] Unable to obtain    PHYSICAL EXAM:  T(C): 36.5 (19 @ 09:09), Max: 36.7 (19 @ 20:49)  HR: 79 (19 @ 09:09) (79 - 97)  BP: 132/84 (19 @ 09:09) (108/67 - 145/88)  RR: 18 (19 @ 09:09) (17 - 18)  SpO2: 94% (19 @ 09:09) (92% - 96%)  Wt(kg): --  I&O's Summary    2019 07:01  -  2019 07:00  --------------------------------------------------------  IN: 1790 mL / OUT: 370 mL / NET: 1420 mL        Appearance: Normal	  HEENT:   Normal oral mucosa, PERRL, EOMI	  Carotid:  Right: No bruit    Left:  No bruit  Lymphatic: No lymphadenopathy  Cardiovascular: Normal S1 S2, No JVD, No murmurs, No edema  Respiratory: Lungs clear to auscultation	  Psychiatry: A & O x 3, Mood & affect appropriate  Gastrointestinal:  Soft, Non-tender, + BS	  Skin: No rashes, No ecchymoses, No cyanosis	  Neurologic: Non-focal  Extremities: Normal range of motion, No clubbing, cyanosis.  Vascular:   Right Femoral:  Palpable   Left Femoral:  Palpable  Right Popliteal: Palpable   Left Popliteal:  palpable  Right DP:  Palpable             Left DP:  Palpable  Right PT:  Palpable              Left PT:  Palpable      LABS:	 	    CBC Full  -  ( 2019 09:16 )  WBC Count : 23.52 K/uL  Hemoglobin : 9.9 g/dL  Hematocrit : 31.6 %  Platelet Count - Automated : 549 K/uL  Mean Cell Volume : 84.9 fl  Mean Cell Hemoglobin : 26.6 pg  Mean Cell Hemoglobin Concentration : 31.3 gm/dL  Auto Neutrophil # : x  Auto Lymphocyte # : x  Auto Monocyte # : x  Auto Eosinophil # : x  Auto Basophil # : x  Auto Neutrophil % : x  Auto Lymphocyte % : x  Auto Monocyte % : x  Auto Eosinophil % : x  Auto Basophil % : x    11-14    131<L>  |  91<L>  |  18  ----------------------------<  146<H>  4.1   |  30  |  0.79  11-13    133<L>  |  91<L>  |  22  ----------------------------<  107<H>  4.7   |  29  |  0.85    Ca    8.9      2019 07:10  Ca    8.8      2019 07:12    TPro  6.3  /  Alb  2.3<L>  /  TBili  0.8  /  DBili  x   /  AST  20  /  ALT  18  /  AlkPhos  178<H>  11-          Assessment:  1.            Plan:  1.          Thank you      Vascular Cardiology Service   [id number]  [id number]  Office [phone]  email:   [email or web address] Vascular Cardiology  Progress note     SPECTRA [id number]              EMAIL [email or web address]   OFFICE [phone]    CC: Acrocyanosis     INTERVAL HISTORY:    Patient is doing well with no complaints of chest pain or shortness of breath; otherwise does ot complaint of any bilateral lower extremity pain       Allergies  No Known Allergies  Intolerances    	    MEDICATIONS:  cloNIDine 0.1 milliGRAM(s) Oral two times a day  enoxaparin Injectable 40 milliGRAM(s) SubCutaneous daily    piperacillin/tazobactam IVPB.. 3.375 Gram(s) IV Intermittent every 8 hours      acetaminophen   Tablet .. 1000 milliGRAM(s) Oral every 8 hours  diazepam    Tablet 2 milliGRAM(s) Oral every 8 hours PRN  DULoxetine 60 milliGRAM(s) Oral daily  fentaNYL   Patch  75 MICROgram(s)/Hr. 1 Patch Transdermal every 48 hours  fentaNYL   Patch 100 MICROgram(s)/Hr. 1 Patch Transdermal every 48 hours  HYDROmorphone  Injectable 3 milliGRAM(s) IV Push every 2 hours PRN  methadone    Tablet 20 milliGRAM(s) Oral every 6 hours  ondansetron Injectable 4 milliGRAM(s) IV Push every 6 hours PRN    bisacodyl 5 milliGRAM(s) Oral at bedtime  bisacodyl Suppository 10 milliGRAM(s) Rectal daily PRN  famotidine    Tablet 20 milliGRAM(s) Oral daily  polyethylene glycol 3350 17 Gram(s) Oral two times a day  senna 2 Tablet(s) Oral at bedtime  simethicone 80 milliGRAM(s) Chew every 8 hours PRN  sucralfate suspension 1 Gram(s) Oral four times a day    dextrose 40% Gel 15 Gram(s) Oral once PRN  dextrose 50% Injectable 12.5 Gram(s) IV Push once  dextrose 50% Injectable 25 Gram(s) IV Push once  dextrose 50% Injectable 25 Gram(s) IV Push once  glucagon  Injectable 1 milliGRAM(s) IntraMuscular once PRN  insulin lispro (HumaLOG) corrective regimen sliding scale   SubCutaneous three times a day before meals  insulin lispro (HumaLOG) corrective regimen sliding scale   SubCutaneous at bedtime    chlorhexidine 4% Liquid 1 Application(s) Topical daily  dextrose 5%. 1000 milliLiter(s) IV Continuous <Continuous>  influenza   Vaccine 0.5 milliLiter(s) IntraMuscular once  sodium chloride 0.9%. 1000 milliLiter(s) IV Continuous <Continuous>      PAST MEDICAL & SURGICAL HISTORY:  History of diabetes mellitus, type II  Anxiety and depression  Diastolic dysfunction: stage I  Empyema lun2015 left lung, s/p VATS, decortication  H/O peptic ulcer: over 10 years ago  history of renal calculus  Herniated Disc: S/P work injury   Hypertension: Dx:   Spinal Stenosis  Postlaminectomy Syndrome  S/P  shunt  S/P insertion of spinal cord stimulator:   History of lumbar fusion:   Insertion of Intrathecal Dilaudid Pump: 2011  S/P Spinal Surgery: corrective lumbar fusion   S/P Knee Replacement: left knee   S/P Arthroscopy of Left Knee  S/P Tonsillectomy: childhood  Ankle Fracture: s/p ORIF left ankle       FAMILY HISTORY:  No pertinent family history in first degree relatives      SOCIAL HISTORY:  unchanged    REVIEW OF SYSTEMS:  CONSTITUTIONAL: No fever, weight loss, or fatigue  EYES: No eye pain, visual disturbances, or discharge  ENMT:  No difficulty hearing, tinnitus, vertigo; No sinus or throat pain  NECK: No pain or stiffness  RESPIRATORY: No cough, wheezing, chills or hemoptysis; No Shortness of Breath  CARDIOVASCULAR: No chest pain, palpitations, passing out, dizziness, or leg swelling  GASTROINTESTINAL: No abdominal or epigastric pain. No nausea, vomiting, or hematemesis; No diarrhea or constipation. No melena or hematochezia.  GENITOURINARY: No dysuria, frequency, hematuria, or incontinence  NEUROLOGICAL: No headaches, memory loss, loss of strength, numbness, or tremors  SKIN: No itching, burning, rashes, or lesions   LYMPH Nodes: No enlarged glands  ENDOCRINE: No heat or cold intolerance; No hair loss  MUSCULOSKELETAL: No joint pain or swelling; No muscle, back, or extremity pain  PSYCHIATRIC: No depression, anxiety, mood swings, or difficulty sleeping  HEME/LYMPH: No easy bruising, or bleeding gums  ALLERY AND IMMUNOLOGIC: No hives or eczema	    [ x] All others negative	  [ ] Unable to obtain    PHYSICAL EXAM:  T(C): 36.5 (19 @ 09:09), Max: 36.7 (19 @ 20:49)  HR: 79 (19 @ 09:09) (79 - 97)  BP: 132/84 (19 @ 09:09) (108/67 - 145/88)  RR: 18 (19 @ 09:09) (17 - 18)  SpO2: 94% (19 @ 09:09) (92% - 96%)  Wt(kg): --  I&O's Summary    2019 07:01  -  2019 07:00  --------------------------------------------------------  IN: 1790 mL / OUT: 370 mL / NET: 1420 mL        Appearance: Normal	  HEENT:   Normal oral mucosa, PERRL, EOMI	  Carotid:  Right: No bruit    Left:  No bruit  Lymphatic: No lymphadenopathy  Cardiovascular: Normal S1 S2, No JVD, No murmurs, No edema  Respiratory: Lungs clear to auscultation	  Psychiatry: A & O x 3, Mood & affect appropriate  Gastrointestinal:  Soft, Non-tender, + BS	  Skin: No rashes, No ecchymoses, No cyanosis	  Neurologic: Non-focal  Extremities: Normal range of motion, No clubbing, cyanosis.  Vascular:   Right Femoral:  Palpable   Left Femoral:  Palpable  Right Popliteal: Palpable   Left Popliteal:  palpable  Right DP:  Palpable             Left DP:  Palpable  Right PT:  Palpable              Left PT:  Palpable      LABS:	 	    CBC Full  -  ( 2019 09:16 )  WBC Count : 23.52 K/uL  Hemoglobin : 9.9 g/dL  Hematocrit : 31.6 %  Platelet Count - Automated : 549 K/uL  Mean Cell Volume : 84.9 fl  Mean Cell Hemoglobin : 26.6 pg  Mean Cell Hemoglobin Concentration : 31.3 gm/dL  Auto Neutrophil # : x  Auto Lymphocyte # : x  Auto Monocyte # : x  Auto Eosinophil # : x  Auto Basophil # : x  Auto Neutrophil % : x  Auto Lymphocyte % : x  Auto Monocyte % : x  Auto Eosinophil % : x  Auto Basophil % : x    11-14    131<L>  |  91<L>  |  18  ----------------------------<  146<H>  4.1   |  30  |  0.79  11-13    133<L>  |  91<L>  |  22  ----------------------------<  107<H>  4.7   |  29  |  0.85    Ca    8.9      2019 07:10  Ca    8.8      2019 07:12    TPro  6.3  /  Alb  2.3<L>  /  TBili  0.8  /  DBili  x   /  AST  20  /  ALT  18  /  AlkPhos  178<H>  11-13    US arterial Duplex of the bilateral lower extremities: Mild diffuse atherosclerosis seen of the arteries of both  lower extremities. No hemodynamically significant stenosis in the arteries of either lower   extremity.      Assessment:  63-year man with PMH of traumatic work injury () s/p multi-lumbar fusion (T8-S1) s/p hardware removal (, on daily opiates and methadone), chronic constipation, GERD, HTN, DM type 2, depression, anxiety, metastatic pancreatic adenocarcinoma s/p biliary stent placement () sent in from [facility] for concern for biliary sepsis on 19 and found to have ruptured gallbladder pending placement of drain by IR. Family was concerned for change in color of the tis of the bilateral lower extremities noted that they were blue/purplish color concern for PAD     1. Bilateral lower extremity discoloration secondary to acrocyanosis  - palpable and dopplerable pulses of the bilateral lower extremities  - US arterial of the bilateral lower extremities shows mild disease       Plan:  - no evidence of severe obstructive disease and no need for further vascular evaluation   - continue with current management of underlying acute medical issues    - keep extremities warm        Thank you  Lillian Tejeda D.O.     Vascular Cardiology Service    Please call with any questions:   [place]  [id number]  Office [phone]  email:  [email or web address]

## 2019-11-14 NOTE — PROGRESS NOTE ADULT - SUBJECTIVE AND OBJECTIVE BOX
HPI: 64 y/o PMH of traumatic work injury (2001) s/p multilumbar fusion (T8-S1) s/p hardware removal (2016, on daily opiates and methadone), chronic constipation, GERD, HTN, depression, anxiety, metastatic pancreatic adenocarcinoma s/p biliary stent placement (2m ago) and chemo sent in from [facility] for concern for biliary sepsis on 11/8. He was admitted on 9/17/19 for abd pain, weight loss 25 lbs for the prior 3 weeks and jaundice and found on CTAP w/ intrahepatic biliary ductal dilation and multiple hepatic lesions that were new and EUS/ERCP was + for malignant adenocarcinoma cancer cells and CT guided biopsy of R liver lesion + also for malignant adenocarcinoma w/ elevated CA 19-9 1832. Started on Gemcitabine +Abraxane on 10/17 for C1D1 for 3 wk on and 1 wk off but due to thrombocytopenia and fatigue, nausea and vomiting, C1D15 was held.  He went in for a check up appointment on 11/7 and the labs were significant for leukocytosis of >20. Furthermore, the patient himself has been having increased amount of fatigue and weakness with decreased PO intake for the past 2 days before his admission.  Long standing history of LBP w/ extensive neurosurgical followup and pain management doctors. He has h/o spinal cord stimulator (2010) and pain pump in 2012 in abdomen which is no longer there. As per last oncology visit, he was scheduled for PCA pump installation on Friday, 11/8/2019 by Dr. Rico and Dr. Lilly for pain control. During this admission, CT showed gallbladder rupture, stable metastatic disease (to liver), and tumor trombosis. Palliative care was called pain Rx.      INTERVAL EVENTS:  11/13: states pain is tolerably managed with dilaudid, fentanyl increased 11/12 to 175mcg from 100mcg  11/14: patient sleeping, family at bedside states he is having pain despite current doses, used 7 doses/24 hours    ADVANCE DIRECTIVES:   DNR  MOLST  [ ]  Living Will  [ ]   DECISION MAKER(s):  [ ] Health Care Proxy(s)  [x ] Surrogate(s)  [ ] Guardian           Name(s): Phone Number(s): Wife     BASELINE (I)ADL(s) (prior to admission):  [place]: [x ]Total  [ ] Moderate [ ]Dependent    Allergies    No Known Allergies    Intolerances    MEDICATIONS  (STANDING):  bisacodyl 5 milliGRAM(s) Oral at bedtime  chlorhexidine 4% Liquid 1 Application(s) Topical daily  cloNIDine 0.1 milliGRAM(s) Oral two times a day  dextrose 5%. 1000 milliLiter(s) (50 mL/Hr) IV Continuous <Continuous>  dextrose 50% Injectable 12.5 Gram(s) IV Push once  dextrose 50% Injectable 25 Gram(s) IV Push once  dextrose 50% Injectable 25 Gram(s) IV Push once  DULoxetine 60 milliGRAM(s) Oral daily  enoxaparin Injectable 40 milliGRAM(s) SubCutaneous daily  famotidine    Tablet 20 milliGRAM(s) Oral daily  fentaNYL   Patch  75 MICROgram(s)/Hr. 1 Patch Transdermal every 48 hours  fentaNYL   Patch 100 MICROgram(s)/Hr. 1 Patch Transdermal every 48 hours  influenza   Vaccine 0.5 milliLiter(s) IntraMuscular once  insulin lispro (HumaLOG) corrective regimen sliding scale   SubCutaneous three times a day before meals  insulin lispro (HumaLOG) corrective regimen sliding scale   SubCutaneous at bedtime  methadone    Tablet 20 milliGRAM(s) Oral every 6 hours  naloxone Injectable 0.4 milliGRAM(s) IV Push every 3 minutes  piperacillin/tazobactam IVPB.. 3.375 Gram(s) IV Intermittent every 8 hours  polyethylene glycol 3350 17 Gram(s) Oral two times a day  senna 2 Tablet(s) Oral at bedtime  sodium chloride 0.9%. 1000 milliLiter(s) (75 mL/Hr) IV Continuous <Continuous>  sucralfate suspension 1 Gram(s) Oral four times a day    MEDICATIONS  (PRN):  bisacodyl Suppository 10 milliGRAM(s) Rectal daily PRN Constipation  dextrose 40% Gel 15 Gram(s) Oral once PRN Blood Glucose LESS THAN 70 milliGRAM(s)/deciliter  diazepam    Tablet 2 milliGRAM(s) Oral every 8 hours PRN anxiety  glucagon  Injectable 1 milliGRAM(s) IntraMuscular once PRN Glucose LESS THAN 70 milligrams/deciliter  HYDROmorphone  Injectable 4 milliGRAM(s) IV Push every 2 hours PRN Severe Pain (7 - 10)  ondansetron Injectable 4 milliGRAM(s) IV Push every 6 hours PRN Nausea and/or Vomiting  simethicone 80 milliGRAM(s) Chew every 8 hours PRN Gas      PRESENT SYMPTOMS: [ ]Unable to obtain due to poor mentation   Source if other than patient:  [x ]Family   [ ]Team     Pain: [x ] yes [ ] no  QOL impact - not able to concentrate or rest.   Location -  Generalized over abdomen but mainly over his mid to right abdominal areas.                   Aggravating factors - palpation   Alleviating factors - Dilaudid IV but only lasting for 90 minutes.   Quality - Pressure   Radiation - towards his back   Timing- constant   Severity (0-10 scale): severe   Minimal acceptable level (0-10 scale): Mild to moderate.     PAIN AD Score:     [email or web address] (press ctrl +  left click to view)    Dyspnea:                           [ ]Mild [ ]Moderate [ ]Severe  Anxiety:                             [ ]Mild [ ]Moderate [ ]Severe  Fatigue:                             [ ]Mild [x ]Moderate [ ]Severe  Nausea:                             [ ]Mild [ ]Moderate [ ]Severe  Loss of appetite:              [ ]Mild [ ]Moderate [ ]Severe  Constipation:                    [ x]Mild [ ]Moderate [ ]Severe    Other Symptoms:  [x ]All other review of systems negative     Karnofsky Performance Score/Palliative Performance Status Version 2:   50      %    http://npcrc.org/files/news/palliative_performance_scale_[email or web address].pdf    PHYSICAL EXAM:  Vital Signs Last 24 Hrs  T(C): 36.3 (14 Nov 2019 12:45), Max: 36.7 (13 Nov 2019 20:49)  T(F): 97.3 (14 Nov 2019 12:45), Max: 98 (13 Nov 2019 20:49)  HR: 94 (14 Nov 2019 12:45) (79 - 97)  BP: 118/80 (14 Nov 2019 12:45) (108/67 - 145/88)  BP(mean): --  RR: 18 (14 Nov 2019 12:45) (17 - 18)  SpO2: 94% (14 Nov 2019 12:45) (92% - 96%)    Limited exam for patient comfort  GENERAL:  [x ]Alert  [x ]Oriented x 3  [ ]Lethargic  [ ]Cachexia  [ ]Unarousable  [ ]Verbal  [ ]Non-Verbal [x] Acute distress due to pain   Behavioral:   [ x] Anxiety  [ ] Delirium [ ] Agitation [ ] Other  HEENT:  [ x]Normal   [ ]Dry mouth   [ ]ET Tube/Trach  [ ]Oral lesions  PULMONARY:   [ ]Clear [ ]Tachypnea  [ ]Audible excessive secretions   [ ]Rhonchi        [ ]Right [ ]Left [ ]Bilateral  [ ]Crackles        [ ]Right [ ]Left [ ]Bilateral  [ ]Wheezing     [ ]Right [ ]Left [ ]Bilateral  CARDIOVASCULAR:    [ ]Regular [ ]Irregular [ ]Tachy  [ ]Jean [ ]Murmur [ ]Other  GASTROINTESTINAL:  [ ]Soft  [ ]Distended   [ ]+BS  [ ]Non tender [ ]Tender  Generalized but mainly over his mid and right abdominal regions.  [ ]PEG [ ]OGT/ NGT  Last BM: 11/14  GENITOURINARY:  [x ]Normal [ ] Incontinent   [ ]Oliguria/Anuria   [ ][place]  MUSCULOSKELETAL:   [ ]Normal   [x ]Weakness  [ ]Bed/Wheelchair bound [ ]Edema  NEUROLOGIC:   [ ]No focal deficits  [ ] Cognitive impairment  [ ] Dysphagia [ ]Dysarthria [ ] Paresis [ ]Other   SKIN:   [x ]Normal   [ ]Pressure ulcer(s)  [ ]Rash    CRITICAL CARE:  [ ] Shock Present  [ ]Septic [ ]Cardiogenic [ ]Neurologic [ ]Hypovolemic  [ ]  Vasopressors [ ]  Inotropes   [ ] Respiratory failure present [ ] mechanical ventilation [ ] non-invasive ventilatory support [ ] High flow  [ ] Acute  [ ] Chronic [ ] Hypoxic  [ ] Hypercarbic [ ] Other  [ ] Other organ failure     LABS: reviewed                                9.9    23.52 )-----------( 549      ( 14 Nov 2019 09:16 )             31.6     11-14    131<L>  |  91<L>  |  18  ----------------------------<  146<H>  4.1   |  30  |  0.79    Ca    8.9      14 Nov 2019 07:10        RADIOLOGY & ADDITIONAL STUDIES:  Reviewed     PROTEIN CALORIE MALNUTRITION PRESENT: [ ] Yes [ ] No  [ ] PPSV2 < or = to 30% [ ] significant weight loss  [ ] poor nutritional intake [ ] catabolic state [ ] anasarca     Albumin, Serum: 2.3 g/dL (11-12-19 @ 07:22)  Artificial Nutrition [ ]     REFERRALS:   [ ]Chaplaincy  [ ] Hospice  [ ]Child Life  [ ]Social Work  [ ]Case management [ ]Holistic Therapy     Goals of Care Document:   Care Coordination Assessment [C. Provider] (11-10-19 @ 09:23)   Progress Notes - Care Coordination [C. Provider] (11-12-19 @ 10:36)

## 2019-11-14 NOTE — PROGRESS NOTE ADULT - SUBJECTIVE AND OBJECTIVE BOX
SUBJECTIVE:  Seen with son at bedside.  Initially OOB in chair and then moved to bed.  Still with pain but reports feeling much better today and says he feels that he is close to discharge, but is asking for Palliative care follow-up for better pain control.  Reports having had BM this morning.  NAD.    MEDICATIONS  (STANDING):  bisacodyl 5 milliGRAM(s) Oral at bedtime  chlorhexidine 4% Liquid 1 Application(s) Topical daily  cloNIDine 0.1 milliGRAM(s) Oral two times a day  dextrose 5%. 1000 milliLiter(s) (50 mL/Hr) IV Continuous <Continuous>  dextrose 50% Injectable 12.5 Gram(s) IV Push once  dextrose 50% Injectable 25 Gram(s) IV Push once  dextrose 50% Injectable 25 Gram(s) IV Push once  DULoxetine 60 milliGRAM(s) Oral daily  enoxaparin Injectable 40 milliGRAM(s) SubCutaneous daily  famotidine    Tablet 20 milliGRAM(s) Oral daily  fentaNYL   Patch  75 MICROgram(s)/Hr. 1 Patch Transdermal every 48 hours  fentaNYL   Patch 100 MICROgram(s)/Hr. 1 Patch Transdermal every 48 hours  influenza   Vaccine 0.5 milliLiter(s) IntraMuscular once  insulin lispro (HumaLOG) corrective regimen sliding scale   SubCutaneous three times a day before meals  insulin lispro (HumaLOG) corrective regimen sliding scale   SubCutaneous at bedtime  methadone    Tablet 20 milliGRAM(s) Oral every 6 hours  naloxone Injectable 0.4 milliGRAM(s) IV Push every 3 minutes  piperacillin/tazobactam IVPB.. 3.375 Gram(s) IV Intermittent every 8 hours  polyethylene glycol 3350 17 Gram(s) Oral two times a day  senna 2 Tablet(s) Oral at bedtime  sodium chloride 0.9%. 1000 milliLiter(s) (75 mL/Hr) IV Continuous <Continuous>  sucralfate suspension 1 Gram(s) Oral four times a day    MEDICATIONS  (PRN):  bisacodyl Suppository 10 milliGRAM(s) Rectal daily PRN Constipation  dextrose 40% Gel 15 Gram(s) Oral once PRN Blood Glucose LESS THAN 70 milliGRAM(s)/deciliter  diazepam    Tablet 2 milliGRAM(s) Oral every 8 hours PRN anxiety  glucagon  Injectable 1 milliGRAM(s) IntraMuscular once PRN Glucose LESS THAN 70 milligrams/deciliter  HYDROmorphone  Injectable 3 milliGRAM(s) IV Push every 2 hours PRN Severe Pain (7 - 10)  ondansetron Injectable 4 milliGRAM(s) IV Push every 6 hours PRN Nausea and/or Vomiting  simethicone 80 milliGRAM(s) Chew every 8 hours PRN Gas      Vital Signs Last 24 Hrs  T(C): 36.3 (14 Nov 2019 12:45), Max: 36.7 (13 Nov 2019 20:49)  T(F): 97.3 (14 Nov 2019 12:45), Max: 98 (13 Nov 2019 20:49)  HR: 94 (14 Nov 2019 12:45) (79 - 97)  BP: 118/80 (14 Nov 2019 12:45) (108/67 - 145/88)  BP(mean): --  RR: 18 (14 Nov 2019 12:45) (17 - 18)  SpO2: 94% (14 Nov 2019 12:45) (92% - 96%)    CAPILLARY BLOOD GLUCOSE      POCT Blood Glucose.: 141 mg/dL (14 Nov 2019 12:18)  POCT Blood Glucose.: 148 mg/dL (14 Nov 2019 08:21)  POCT Blood Glucose.: 159 mg/dL (13 Nov 2019 22:19)  POCT Blood Glucose.: 210 mg/dL (13 Nov 2019 17:26)    I&O's Summary    13 Nov 2019 07:01  -  14 Nov 2019 07:00  --------------------------------------------------------  IN: 1790 mL / OUT: 370 mL / NET: 1420 mL        PHYSICAL EXAM:  GENERAL: Looks stated age, NAD.  CARDIOVASCULAR: Normal S1, S2.  PULMONARY: Lungs clear to auscultation bilaterally. No wheezes/rales/rhonchi  GI: Abdomen soft, Nontender. Bowel sounds present  MSK/Ext:  Mild 1+ distal leg edema.  Distal legs slightly cool to touch, but legs normal in color.  No calf tenderness bilaterally  PSYCH: Normal Affect.      LABS:                        9.9    23.52 )-----------( 549      ( 14 Nov 2019 09:16 )             31.6     11-14    131<L>  |  91<L>  |  18  ----------------------------<  146<H>  4.1   |  30  |  0.79    Ca    8.9      14 Nov 2019 07:10    TPro  6.3  /  Alb  2.3<L>  /  TBili  0.8  /  DBili  x   /  AST  20  /  ALT  18  /  AlkPhos  178<H>  11-13                RADIOLOGY & ADDITIONAL TESTS:

## 2019-11-14 NOTE — PROGRESS NOTE ADULT - ASSESSMENT
63 m with DM, HTN, traumatic work injury (2001) s/p multilumbar fusion (T8-S1) s/p hardware removal (2016, on daily opiates and methadone), metastatic pancreatic adenocarcinoma s/p biliary stent placement (9/17/19) sent in from [facility] for concern for biliary sepsis on 11/8/19.   Came in with leukocytosis 26k, afebrile, tachycardic to 100  CT C/A/P with possible pneumonia, distended GB with likely GB rupture as well as new moderate loculated ascites.  s/p percutaneous cholecystostomy today with foul smelling fluid     metastatic pancreatic ca with ruptured gall bladder and cholecystitis s/p percutaneous cholecystostomy 11/12  IR biliary cx: klebsiella oxytoca, R to amp/sulbactam  R side pulmonary findings likely due to cholecystitis and contagious inflammation     * c/w zosyn 3/375 q 8 for now, started 11/8, now day 6 but post cholecystostomy day 3  * will likely do a 7 day course of antibiotic post cholecystostomy  * if ready for discharge will switch to PO

## 2019-11-14 NOTE — PROGRESS NOTE ADULT - ASSESSMENT
64 y/o PMH of traumatic work injury (2001) s/p multilumbar fusion (T8-S1) s/p hardware removal (2016, on daily opiates and methadone), chronic constipation, GERD, HTN, depression, anxiety, metastatic pancreatic adenocarcinoma s/p biliary stent placement (2m ago) and chemo sent in from [facility] for concern for biliary sepsis on 11/8.During this admission, CT showed gallbladder rupture, stable metastatic disease (to liver), and tumor trombosis. Palliative care was called pain Rx.

## 2019-11-15 NOTE — PROGRESS NOTE ADULT - SUBJECTIVE AND OBJECTIVE BOX
Interventional Radiology Follow- Up Note      This is a 63y Male s/p perc daysi on 11/12 in Interventional Radiology with Dr. Daniels.     Patient seen and examined @ bedside. Sitting in bed stating he is lethargic. Continues to report generalized abdominal pain. OOB ambulating in the unit, voiding without difficulty. Patient denies fever, chills, nausea, vomiting. No other complaints offered at this time.     Vitals: T(F): 97.8 (11-15-19 @ 12:28), Max: 98.1 (11-14-19 @ 20:32)  HR: 96 (11-15-19 @ 12:28) (84 - 100)  BP: 125/83 (11-15-19 @ 12:28) (118/83 - 125/83)  RR: 18 (11-15-19 @ 12:28) (18 - 18)  SpO2: 95% (11-15-19 @ 12:28) (88% - 95%)      LABS:                        10.3   28.57 )-----------( 577      ( 15 Nov 2019 08:33 )             32.4     11-15    132<L>  |  91<L>  |  15  ----------------------------<  136<H>  4.1   |  27  |  0.71    Ca    9.0      15 Nov 2019 06:48  Phos  3.6     11-15  Mg     1.8     11-15    TPro  6.8  /  Alb  2.5<L>  /  TBili  0.8  /  DBili  0.3<H>  /  AST  18  /  ALT  16  /  AlkPhos  176<H>  11-15        Antibiotics: Zosyn 3.375 g IV BID    Cultures:   Culture - Body Fluid with Gram Stain (11.12.19 @ 17:40)    -  Trimethoprim/Sulfamethoxazole: S <=2/38    -  Tobramycin: S <=2    Gram Stain:   No polymorphonuclear cells seen per low power field  Rare Gram Negative Rods seen per oil power field       Specimen Source: .Body Fluid Bile Fluid    Culture Results:   Numerous Klebsiella oxytoca    Organism Identification: Klebsiella oxytoca    Organism: Klebsiella oxytoca    Method Type: MARIN      24hr Drain output: 100cc; purulent green/gray fluid noted in bag. Flushed with 10cc NS without leaking/pain at site. Positive return of fluid into drainage catheter.    PHYSICAL EXAM:  General: In NAD, A&O x3, lethargic  Abdomen: soft, generalized abdominal pain, nothing localized to drain site.  Extremities: no pedal edema or calf tenderness noted   Drain device: Drain intact attached to drainage bag, dressing changed now clean, dry, intact.        Assessment/Plan:    The patient is a 63-year man with pmhx of metastatic pancreatic adenocarcinoma s/p biliary stent placement (9/17), acute cholecystitis s/p percutaneous cholecystostomy. Patient now with mild tachycardia and rising wbc.       -continue to monitor daily out put    -flush drain with 10cc NS forward only once daily; do not aspirate  -continue IV antibiotics  -monitor vitals/cbc  -recommend repeat abd/pelvis ct w/ contrast to assess for residual collection/resolution of perforated gallbladder/?etc  -spoke with Dr. rOellana re above recommendations  -will discuss with IR attending     Please call IR at extension [phone] with any questions, concerns, or issues regarding above.        Tsering Vaca, NEIL-BC  Spectra # [id number]

## 2019-11-15 NOTE — PROGRESS NOTE ADULT - PROBLEM SELECTOR PLAN 1
CT imaging concerning for gallbladder wall rupture and PNA.  Suspect cholecystitis and possible PNA versus inflammatory changes secondary to the cholecystitis.  Patient now s/p cholecystostomy tube placement by IR.  WBC rising, but patient clinically feeling better, tube appears to be draining, and patient afebrile.  Blood cultures negative.  Biliary fluid culture grew out Klebsiella Oxytoca that is sensitive to Zosyn.  Spoke with IR PA, will see in follow-up.  Case also d/w ID attending, if WBC continues to rise, may need repeat imaging.  Continue Zosyn.  Will also add LFTs to AM labs.

## 2019-11-15 NOTE — PROGRESS NOTE ADULT - ASSESSMENT
64 y/o PMH of traumatic work injury (2001) s/p multilumbar fusion (T8-S1) s/p hardware removal (2016, on daily opiates and methadone), chronic constipation, GERD, HTN, depression, anxiety, metastatic pancreatic adenocarcinoma s/p biliary stent placement (2m ago) and chemo sent in from [facility] for concern for biliary sepsis on 11/8.During this admission, CT showed gallbladder rupture, stable metastatic disease (to liver), and tumor trombosis. Palliative care was called pain Rx.

## 2019-11-15 NOTE — PROGRESS NOTE ADULT - PROBLEM SELECTOR PLAN 2
Extensive neurosurgery history w/ multilevel fusion and pain control issues.  Now with acute pain secondary to cholecystitis.  ISTOP Reference #: [id number] done on admission.  Palliative Care input appreciated.  Continue pain control per Palliative care service.  Currently switched to Dilaudid PCA with Methadone.  Follows w/Dr Rico for pain mgt and was scheduled for PCA pump 11/8/19.  Continue bowel regimen (miralax, dulolax and senna). Extensive neurosurgery history w/ multilevel fusion and pain control issues.  Now with acute pain secondary to cholecystitis.  ISTOP Reference #: [id number] done on admission.  Palliative Care input appreciated.  Continue pain control per Palliative care service.  Currently switched to Dilaudid PCA with Methadone.  Will reorder Methadone as order is set to  tomorrow.  Case d/w Dr. Medeiros.  Follows w/Dr Rico for pain mgt and was scheduled for PCA pump 19.  Continue bowel regimen (miralax, dulolax and senna).

## 2019-11-15 NOTE — PROGRESS NOTE ADULT - ASSESSMENT
The patient is a 63-year man with PMH of traumatic work injury (2001) s/p multilumbar fusion (T8-S1) s/p hardware removal (2016, on daily opiates and methadone), chronic constipation, GERD, HTN, DM type 2, depression, anxiety, metastatic pancreatic adenocarcinoma s/p biliary stent placement (9/17) sent in from [facility] for concern for biliary sepsis on 11/8/19.

## 2019-11-15 NOTE — PROGRESS NOTE ADULT - PROBLEM SELECTOR PLAN 1
- c/w methadone 80mg daily (20mg Q6)  - used 23mg IV dilaudid in 24 hours, and is on 175mcg fentanyl patch; this is equivalent to approximately 43mg IV dilaudid/day (according for incomplete cross-tolerance of 25%). Will switch to PCA, using above, would be on 1.7mg continuous rate, and will use 1mg IV Q20 minutes dilaudid for PRN. Current settings: dilaudid PCA [1.7/1/20//2CABQ1]  - monitor BMs  - stop dilaudid PRN and fentanyl patch  - will reach steady state on dilaudid gtt around the time fentanyl patch wears off  - patient agreeable to above  - patient has an inactivated pain pump implanted; on d/c, should meet with interventional pain to switch PCA settings over to implantable pump if possible

## 2019-11-15 NOTE — PROGRESS NOTE ADULT - PROBLEM SELECTOR PLAN 8
Continue Clonidine.  Holding HCTZ.  Lower extremity discoloration and coolness appears to be resolved and EMELINA/PVR without and significant disease.

## 2019-11-15 NOTE — PROGRESS NOTE ADULT - SUBJECTIVE AND OBJECTIVE BOX
HPI: 64 y/o PMH of traumatic work injury (2001) s/p multilumbar fusion (T8-S1) s/p hardware removal (2016, on daily opiates and methadone), chronic constipation, GERD, HTN, depression, anxiety, metastatic pancreatic adenocarcinoma s/p biliary stent placement (2m ago) and chemo sent in from [facility] for concern for biliary sepsis on 11/8. He was admitted on 9/17/19 for abd pain, weight loss 25 lbs for the prior 3 weeks and jaundice and found on CTAP w/ intrahepatic biliary ductal dilation and multiple hepatic lesions that were new and EUS/ERCP was + for malignant adenocarcinoma cancer cells and CT guided biopsy of R liver lesion + also for malignant adenocarcinoma w/ elevated CA 19-9 1832. Started on Gemcitabine +Abraxane on 10/17 for C1D1 for 3 wk on and 1 wk off but due to thrombocytopenia and fatigue, nausea and vomiting, C1D15 was held.  He went in for a check up appointment on 11/7 and the labs were significant for leukocytosis of >20. Furthermore, the patient himself has been having increased amount of fatigue and weakness with decreased PO intake for the past 2 days before his admission.  Long standing history of LBP w/ extensive neurosurgical followup and pain management doctors. He has h/o spinal cord stimulator (2010) and pain pump in 2012 in abdomen which is no longer there. As per last oncology visit, he was scheduled for PCA pump installation on Friday, 11/8/2019 by Dr. Rico and Dr. Lilly for pain control. During this admission, CT showed gallbladder rupture, stable metastatic disease (to liver), and tumor trombosis. Palliative care was called pain Rx.      INTERVAL EVENTS:  11/13: states pain is tolerably managed with dilaudid, fentanyl increased 11/12 to 175mcg from 100mcg  11/14: patient sleeping, family at bedside states he is having pain despite current doses, used 7 doses/24 hours  11/15: used 23 mg dilaudid in last 24 hours, states pain is controlled, but son and RN state patient complaining of pain; son states patient will downplay pain in efforts to go home    ADVANCE DIRECTIVES:   DNR  MOLST  [ ]  Living Will  [ ]   DECISION MAKER(s):  [ ] Health Care Proxy(s)  [x ] Surrogate(s)  [ ] Guardian           Name(s): Phone Number(s): Wife     BASELINE (I)ADL(s) (prior to admission):  [place]: [x ]Total  [ ] Moderate [ ]Dependent    Allergies    No Known Allergies    Intolerances    MEDICATIONS  (STANDING):  bisacodyl 5 milliGRAM(s) Oral at bedtime  chlorhexidine 4% Liquid 1 Application(s) Topical daily  cloNIDine 0.1 milliGRAM(s) Oral two times a day  dextrose 5%. 1000 milliLiter(s) (50 mL/Hr) IV Continuous <Continuous>  dextrose 50% Injectable 12.5 Gram(s) IV Push once  dextrose 50% Injectable 25 Gram(s) IV Push once  dextrose 50% Injectable 25 Gram(s) IV Push once  DULoxetine 60 milliGRAM(s) Oral daily  enoxaparin Injectable 40 milliGRAM(s) SubCutaneous daily  famotidine    Tablet 20 milliGRAM(s) Oral daily  HYDROmorphone PCA (5 mG/mL) 30 milliLiter(s) PCA Continuous PCA Continuous  influenza   Vaccine 0.5 milliLiter(s) IntraMuscular once  insulin lispro (HumaLOG) corrective regimen sliding scale   SubCutaneous three times a day before meals  insulin lispro (HumaLOG) corrective regimen sliding scale   SubCutaneous at bedtime  methadone    Tablet 20 milliGRAM(s) Oral every 6 hours  naloxone Injectable 0.4 milliGRAM(s) IV Push every 3 minutes  piperacillin/tazobactam IVPB.. 3.375 Gram(s) IV Intermittent every 8 hours  polyethylene glycol 3350 17 Gram(s) Oral two times a day  senna 2 Tablet(s) Oral at bedtime  sodium chloride 0.9%. 1000 milliLiter(s) (75 mL/Hr) IV Continuous <Continuous>  sucralfate suspension 1 Gram(s) Oral four times a day    MEDICATIONS  (PRN):  bisacodyl Suppository 10 milliGRAM(s) Rectal daily PRN Constipation  dextrose 40% Gel 15 Gram(s) Oral once PRN Blood Glucose LESS THAN 70 milliGRAM(s)/deciliter  diazepam    Tablet 2 milliGRAM(s) Oral every 8 hours PRN anxiety  glucagon  Injectable 1 milliGRAM(s) IntraMuscular once PRN Glucose LESS THAN 70 milligrams/deciliter  HYDROmorphone PCA (5 mG/mL) Rescue Clinician Bolus 2 milliGRAM(s) IV Push every 1 hour PRN pain unrelieved with bolus dosing  naloxone Injectable 0.1 milliGRAM(s) IV Push every 3 minutes PRN For ANY of the following changes in patient status:  A. RR LESS THAN 10 breaths per minute, B. Oxygen saturation LESS THAN 90%, C. Sedation score of 6  ondansetron Injectable 4 milliGRAM(s) IV Push every 6 hours PRN Nausea and/or Vomiting  simethicone 80 milliGRAM(s) Chew every 8 hours PRN Gas      PRESENT SYMPTOMS: [ ]Unable to obtain due to poor mentation   Source if other than patient:  [x ]Family   [ ]Team     Pain: [x ] yes [ ] no  QOL impact - not able to concentrate or rest.   Location -  Generalized over abdomen but mainly over his mid to right abdominal areas.                   Aggravating factors - palpation   Alleviating factors - Dilaudid IV but only lasting for 90 minutes.   Quality - Pressure   Radiation - towards his back   Timing- constant   Severity (0-10 scale): severe   Minimal acceptable level (0-10 scale): Mild to moderate.     PAIN AD Score:     [email or web address] (press ctrl +  left click to view)    Dyspnea:                           [ ]Mild [ ]Moderate [ ]Severe  Anxiety:                             [ ]Mild [ ]Moderate [ ]Severe  Fatigue:                             [ ]Mild [x ]Moderate [ ]Severe  Nausea:                             [ ]Mild [ ]Moderate [ ]Severe  Loss of appetite:              [ ]Mild [ ]Moderate [ ]Severe  Constipation:                    [ x]Mild [ ]Moderate [ ]Severe    Other Symptoms:  [x ]All other review of systems negative     Karnofsky Performance Score/Palliative Performance Status Version 2:   50      %    http://npcrc.org/files/news/palliative_performance_scale_[email or web address].pdf    PHYSICAL EXAM:  Vital Signs Last 24 Hrs  T(C): 36.7 (15 Nov 2019 06:01), Max: 36.7 (14 Nov 2019 20:32)  T(F): 98 (15 Nov 2019 06:01), Max: 98.1 (14 Nov 2019 20:32)  HR: 99 (15 Nov 2019 06:01) (84 - 100)  BP: 118/83 (15 Nov 2019 06:01) (118/80 - 125/81)  BP(mean): --  RR: 18 (15 Nov 2019 06:01) (18 - 18)  SpO2: 93% (15 Nov 2019 06:01) (88% - 94%)    Limited exam for patient comfort  GENERAL:  [x ]Alert  [x ]Oriented x 3  [ ]Lethargic  [ ]Cachexia  [ ]Unarousable  [ ]Verbal  [ ]Non-Verbal [x] Acute distress due to pain   Behavioral:   [ x] Anxiety  [ ] Delirium [ ] Agitation [ ] Other  HEENT:  [ x]Normal   [ ]Dry mouth   [ ]ET Tube/Trach  [ ]Oral lesions  PULMONARY:   [ ]Clear [ ]Tachypnea  [ ]Audible excessive secretions   [ ]Rhonchi        [ ]Right [ ]Left [ ]Bilateral  [ ]Crackles        [ ]Right [ ]Left [ ]Bilateral  [ ]Wheezing     [ ]Right [ ]Left [ ]Bilateral  CARDIOVASCULAR:    [ ]Regular [ ]Irregular [ ]Tachy  [ ]Jean [ ]Murmur [ ]Other  GASTROINTESTINAL:  [ ]Soft  [ ]Distended   [ ]+BS  [ ]Non tender [ ]Tender  Generalized but mainly over his mid and right abdominal regions.  [ ]PEG [ ]OGT/ NGT  Last BM: 11/14  GENITOURINARY:  [x ]Normal [ ] Incontinent   [ ]Oliguria/Anuria   [ ]Heredia  MUSCULOSKELETAL:   [ ]Normal   [x ]Weakness  [ ]Bed/Wheelchair bound [ ]Edema  NEUROLOGIC:   [ ]No focal deficits  [ ] Cognitive impairment  [ ] Dysphagia [ ]Dysarthria [ ] Paresis [ ]Other   SKIN:   [x ]Normal   [ ]Pressure ulcer(s)  [ ]Rash    CRITICAL CARE:  [ ] Shock Present  [ ]Septic [ ]Cardiogenic [ ]Neurologic [ ]Hypovolemic  [ ]  Vasopressors [ ]  Inotropes   [ ] Respiratory failure present [ ] mechanical ventilation [ ] non-invasive ventilatory support [ ] High flow  [ ] Acute  [ ] Chronic [ ] Hypoxic  [ ] Hypercarbic [ ] Other  [ ] Other organ failure     LABS: reviewed                                10.3   28.57 )-----------( 577      ( 15 Nov 2019 08:33 )             32.4     11-15    132<L>  |  91<L>  |  15  ----------------------------<  136<H>  4.1   |  27  |  0.71    Ca    9.0      15 Nov 2019 06:48  Phos  3.6     11-15  Mg     1.8     11-15        RADIOLOGY & ADDITIONAL STUDIES:  Reviewed     PROTEIN CALORIE MALNUTRITION PRESENT: [ ] Yes [ ] No  [ ] PPSV2 < or = to 30% [ ] significant weight loss  [ ] poor nutritional intake [ ] catabolic state [ ] anasarca     Albumin, Serum: 2.3 g/dL (11-12-19 @ 07:22)  Artificial Nutrition [ ]     REFERRALS:   [ ]Chaplaincy  [ ] Hospice  [ ]Child Life  [ ]Social Work  [ ]Case management [ ]Holistic Therapy     Goals of Care Document:   Care Coordination Assessment [C. Provider] (11-10-19 @ 09:23)   Progress Notes - Care Coordination [C. Provider] (11-12-19 @ 10:36)

## 2019-11-15 NOTE — PROGRESS NOTE ADULT - SUBJECTIVE AND OBJECTIVE BOX
SUBJECTIVE:  Seen ambulating in hallway.  Overall reports feeling better and appears more comfortable.  Reports improved pain control.  Last BM this morning.  NAD.    MEDICATIONS  (STANDING):  bisacodyl 5 milliGRAM(s) Oral at bedtime  chlorhexidine 4% Liquid 1 Application(s) Topical daily  cloNIDine 0.1 milliGRAM(s) Oral two times a day  dextrose 5%. 1000 milliLiter(s) (50 mL/Hr) IV Continuous <Continuous>  dextrose 50% Injectable 12.5 Gram(s) IV Push once  dextrose 50% Injectable 25 Gram(s) IV Push once  dextrose 50% Injectable 25 Gram(s) IV Push once  DULoxetine 60 milliGRAM(s) Oral daily  enoxaparin Injectable 40 milliGRAM(s) SubCutaneous daily  famotidine    Tablet 20 milliGRAM(s) Oral daily  HYDROmorphone PCA (5 mG/mL) 30 milliLiter(s) PCA Continuous PCA Continuous  influenza   Vaccine 0.5 milliLiter(s) IntraMuscular once  insulin lispro (HumaLOG) corrective regimen sliding scale   SubCutaneous three times a day before meals  insulin lispro (HumaLOG) corrective regimen sliding scale   SubCutaneous at bedtime  methadone    Tablet 20 milliGRAM(s) Oral every 6 hours  naloxone Injectable 0.4 milliGRAM(s) IV Push every 3 minutes  piperacillin/tazobactam IVPB.. 3.375 Gram(s) IV Intermittent every 8 hours  polyethylene glycol 3350 17 Gram(s) Oral two times a day  senna 2 Tablet(s) Oral at bedtime  sodium chloride 0.9%. 1000 milliLiter(s) (75 mL/Hr) IV Continuous <Continuous>  sucralfate suspension 1 Gram(s) Oral four times a day    MEDICATIONS  (PRN):  bisacodyl Suppository 10 milliGRAM(s) Rectal daily PRN Constipation  dextrose 40% Gel 15 Gram(s) Oral once PRN Blood Glucose LESS THAN 70 milliGRAM(s)/deciliter  diazepam    Tablet 2 milliGRAM(s) Oral every 8 hours PRN anxiety  glucagon  Injectable 1 milliGRAM(s) IntraMuscular once PRN Glucose LESS THAN 70 milligrams/deciliter  HYDROmorphone PCA (5 mG/mL) Rescue Clinician Bolus 2 milliGRAM(s) IV Push every 1 hour PRN pain unrelieved with bolus dosing  naloxone Injectable 0.1 milliGRAM(s) IV Push every 3 minutes PRN For ANY of the following changes in patient status:  A. RR LESS THAN 10 breaths per minute, B. Oxygen saturation LESS THAN 90%, C. Sedation score of 6  ondansetron Injectable 4 milliGRAM(s) IV Push every 6 hours PRN Nausea and/or Vomiting  simethicone 80 milliGRAM(s) Chew every 8 hours PRN Gas      Vital Signs Last 24 Hrs  T(C): 36.7 (15 Nov 2019 06:01), Max: 36.7 (14 Nov 2019 20:32)  T(F): 98 (15 Nov 2019 06:01), Max: 98.1 (14 Nov 2019 20:32)  HR: 99 (15 Nov 2019 06:01) (84 - 100)  BP: 118/83 (15 Nov 2019 06:01) (118/80 - 125/81)  BP(mean): --  RR: 18 (15 Nov 2019 06:01) (18 - 18)  SpO2: 93% (15 Nov 2019 06:01) (88% - 94%)    CAPILLARY BLOOD GLUCOSE      POCT Blood Glucose.: 150 mg/dL (15 Nov 2019 08:02)  POCT Blood Glucose.: 159 mg/dL (14 Nov 2019 21:30)  POCT Blood Glucose.: 140 mg/dL (14 Nov 2019 17:25)  POCT Blood Glucose.: 141 mg/dL (14 Nov 2019 12:18)    I&O's Summary    14 Nov 2019 07:01  -  15 Nov 2019 07:00  --------------------------------------------------------  IN: 1140 mL / OUT: 751 mL / NET: 389 mL        PHYSICAL EXAM:  GENERAL: Looks stated age, NAD.  CARDIOVASCULAR: Normal S1, S2  PULMONARY: Lungs clear to auscultation bilaterally. No wheezes/rales/rhonchi  GI: Abdomen soft.  No longer with tenderness to light palpation.  Biliary tube draining biliary fluid.  Bowel sounds present  MSK/Ext:  Legs warm, normal color.  No leg edema.  No calf tenderness bilaterally  PSYCH: Normal Affect.      LABS:                        10.3   28.57 )-----------( 577      ( 15 Nov 2019 08:33 )             32.4     11-15    132<L>  |  91<L>  |  15  ----------------------------<  136<H>  4.1   |  27  |  0.71    Ca    9.0      15 Nov 2019 06:48  Phos  3.6     11-15  Mg     1.8     11-15                  RADIOLOGY & ADDITIONAL TESTS:    < from: [facility] Duplex Lower Extrem Arterial, Bilat (11.14.19 @ 11:41) >  IMPRESSION: Mild diffuse atherosclerosis seen of the arteries of both   lower extremities.    No hemodynamically significant stenosis in the arteries of either lower   extremity.    < end of copied text > SUBJECTIVE:  Seen ambulating in hallway.  Overall reports feeling better and appears more comfortable.  Reports improved pain control.  Last BM this morning.  NAD.    MEDICATIONS  (STANDING):  bisacodyl 5 milliGRAM(s) Oral at bedtime  chlorhexidine 4% Liquid 1 Application(s) Topical daily  cloNIDine 0.1 milliGRAM(s) Oral two times a day  dextrose 5%. 1000 milliLiter(s) (50 mL/Hr) IV Continuous <Continuous>  dextrose 50% Injectable 12.5 Gram(s) IV Push once  dextrose 50% Injectable 25 Gram(s) IV Push once  dextrose 50% Injectable 25 Gram(s) IV Push once  DULoxetine 60 milliGRAM(s) Oral daily  enoxaparin Injectable 40 milliGRAM(s) SubCutaneous daily  famotidine    Tablet 20 milliGRAM(s) Oral daily  HYDROmorphone PCA (5 mG/mL) 30 milliLiter(s) PCA Continuous PCA Continuous  influenza   Vaccine 0.5 milliLiter(s) IntraMuscular once  insulin lispro (HumaLOG) corrective regimen sliding scale   SubCutaneous three times a day before meals  insulin lispro (HumaLOG) corrective regimen sliding scale   SubCutaneous at bedtime  methadone    Tablet 20 milliGRAM(s) Oral every 6 hours  naloxone Injectable 0.4 milliGRAM(s) IV Push every 3 minutes  piperacillin/tazobactam IVPB.. 3.375 Gram(s) IV Intermittent every 8 hours  polyethylene glycol 3350 17 Gram(s) Oral two times a day  senna 2 Tablet(s) Oral at bedtime  sodium chloride 0.9%. 1000 milliLiter(s) (75 mL/Hr) IV Continuous <Continuous>  sucralfate suspension 1 Gram(s) Oral four times a day    MEDICATIONS  (PRN):  bisacodyl Suppository 10 milliGRAM(s) Rectal daily PRN Constipation  dextrose 40% Gel 15 Gram(s) Oral once PRN Blood Glucose LESS THAN 70 milliGRAM(s)/deciliter  diazepam    Tablet 2 milliGRAM(s) Oral every 8 hours PRN anxiety  glucagon  Injectable 1 milliGRAM(s) IntraMuscular once PRN Glucose LESS THAN 70 milligrams/deciliter  HYDROmorphone PCA (5 mG/mL) Rescue Clinician Bolus 2 milliGRAM(s) IV Push every 1 hour PRN pain unrelieved with bolus dosing  naloxone Injectable 0.1 milliGRAM(s) IV Push every 3 minutes PRN For ANY of the following changes in patient status:  A. RR LESS THAN 10 breaths per minute, B. Oxygen saturation LESS THAN 90%, C. Sedation score of 6  ondansetron Injectable 4 milliGRAM(s) IV Push every 6 hours PRN Nausea and/or Vomiting  simethicone 80 milliGRAM(s) Chew every 8 hours PRN Gas      Vital Signs Last 24 Hrs  T(C): 36.7 (15 Nov 2019 06:01), Max: 36.7 (14 Nov 2019 20:32)  T(F): 98 (15 Nov 2019 06:01), Max: 98.1 (14 Nov 2019 20:32)  HR: 99 (15 Nov 2019 06:01) (84 - 100)  BP: 118/83 (15 Nov 2019 06:01) (118/80 - 125/81)  BP(mean): --  RR: 18 (15 Nov 2019 06:01) (18 - 18)  SpO2: 93% (15 Nov 2019 06:01) (88% - 94%)    CAPILLARY BLOOD GLUCOSE      POCT Blood Glucose.: 150 mg/dL (15 Nov 2019 08:02)  POCT Blood Glucose.: 159 mg/dL (14 Nov 2019 21:30)  POCT Blood Glucose.: 140 mg/dL (14 Nov 2019 17:25)  POCT Blood Glucose.: 141 mg/dL (14 Nov 2019 12:18)    I&O's Summary    14 Nov 2019 07:01  -  15 Nov 2019 07:00  --------------------------------------------------------  IN: 1140 mL / OUT: 751 mL / NET: 389 mL        PHYSICAL EXAM:  GENERAL: Looks stated age, NAD.  CARDIOVASCULAR: Normal S1, S2  PULMONARY: Lungs clear to auscultation bilaterally. No wheezes/rales/rhonchi  GI: Abdomen soft.  No longer with tenderness to light palpation.  Biliary tube draining brownish/tannish biliary fluid.  Bowel sounds present  MSK/Ext:  Legs warm, normal color.  No leg edema.  No calf tenderness bilaterally  PSYCH: Normal Affect.      LABS:                        10.3   28.57 )-----------( 577      ( 15 Nov 2019 08:33 )             32.4     11-15    132<L>  |  91<L>  |  15  ----------------------------<  136<H>  4.1   |  27  |  0.71    Ca    9.0      15 Nov 2019 06:48  Phos  3.6     11-15  Mg     1.8     11-15                  RADIOLOGY & ADDITIONAL TESTS:    < from: [facility] Duplex Lower Extrem Arterial, Bilat (11.14.19 @ 11:41) >  IMPRESSION: Mild diffuse atherosclerosis seen of the arteries of both   lower extremities.    No hemodynamically significant stenosis in the arteries of either lower   extremity.    < end of copied text >

## 2019-11-15 NOTE — PROGRESS NOTE ADULT - ASSESSMENT
63 m with DM, HTN, traumatic work injury (2001) s/p multilumbar fusion (T8-S1) s/p hardware removal (2016, on daily opiates and methadone), metastatic pancreatic adenocarcinoma s/p biliary stent placement (9/17/19) sent in from [facility] for concern for biliary sepsis on 11/8/19.   Came in with leukocytosis 26k, afebrile, tachycardic to 100  CT C/A/P with possible pneumonia, distended GB with likely GB rupture as well as new moderate loculated ascites.  s/p percutaneous cholecystostomy today with foul smelling fluid     metastatic pancreatic ca with ruptured gall bladder and cholecystitis s/p percutaneous cholecystostomy 11/12  IR biliary cx: klebsiella oxytoca, R to amp/sulbactam  R side pulmonary findings likely due to cholecystitis and contagious inflammation   now worsening leukocytosis to 28 but pt clinically better    * c/w zosyn 3/375 q 8 for now, started 11/8, now day 6 but post cholecystostomy day 4  * if no improvement in the WBC will need a repeat abd/pelvis CT  * if diarrhea, check C-diff  * will likely do a 7 day course of antibiotic post cholecystostomy if no acute events and WBC improves

## 2019-11-15 NOTE — PROGRESS NOTE ADULT - ASSESSMENT
64 y/o PMH of traumatic work injury (2001) s/p multilumbar fusion (T8-S1) s/p hardware removal (2016, on daily opiates and methadone), chronic constipation, GERD, HTN, DMII (A1c 6.8), depression, anxiety, metastatic pancreatic adenocarcinoma s/p biliary stent placement (9/17), on gemcitabine/abraxone (last dose 10/24/19) sent in from [facility] for concern for sepsis on 11/8/19, found to have gallbladder perforation    # gallbladder perforation-  s/p percutaneous cholecystostomy tube placed by IR on 11-12-19   fluid from this procedure showing Klebsiella oxytoca  CT abdomen done showing " Distended gallbladder with discontinuity of the wall and lobulated fluid along the inferior liver image highly concerning for gallbladder rupture. New moderate loculated ascites. "   surgery also following appreciate their recs  -on zosyn  -renal function improving, continue to monitor, renally dose meds  -no plan for inpatient chemotherapy at this time  - appreciate excellent medical care    leukocytosis- check cbc with diff  check LFTS  could be reactive still from infection/procedure  continue to trend    #Pancreatic adenocarcinoma, Stage IV  -holding chemotherapy while admitted  -patient on complex pain regimen including methadone, dilaudid, and fentanyl; pain control per palliative care  on PCA pump   bowel regimen   -will continue to follow  -s/p biliary stent placement (9/17), on gemcitabine/abraxone (last dose 10/24/19)   follows with Dr. Ward outpatient  will need follow up with Dr. Ward after acute issues resolve 64 y/o PMH of traumatic work injury (2001) s/p multilumbar fusion (T8-S1) s/p hardware removal (2016, on daily opiates and methadone), chronic constipation, GERD, HTN, DMII (A1c 6.8), depression, anxiety, metastatic pancreatic adenocarcinoma s/p biliary stent placement (9/17), on gemcitabine/abraxone (last dose 10/24/19) sent in from [facility] for concern for sepsis on 11/8/19, found to have gallbladder perforation    # gallbladder perforation-  s/p percutaneous cholecystostomy tube placed by IR on 11-12-19   fluid from this procedure showing Klebsiella oxytoca  CT abdomen done showing " Distended gallbladder with discontinuity of the wall and lobulated fluid along the inferior liver image highly concerning for gallbladder rupture. New moderate loculated ascites. "   surgery also following appreciate their recs  -on zosyn  -renal function improving, continue to monitor, renally dose meds  -no plan for inpatient chemotherapy at this time  - appreciate excellent medical care    leukocytosis- check cbc with diff  check LFTS  could be reactive still from infection/procedure  continue to trend    #Pancreatic adenocarcinoma, Stage IV  -holding chemotherapy while admitted  -patient on complex pain regimen including methadone, dilaudid, and fentanyl; pain control per palliative care  to get  PCA pump today   bowel regimen   -will continue to follow  -s/p biliary stent placement (9/17), on gemcitabine/abraxone (last dose 10/24/19)   follows with Dr. Ward outpatient  will need follow up with Dr. Ward after acute issues resolve

## 2019-11-15 NOTE — PROGRESS NOTE ADULT - SUBJECTIVE AND OBJECTIVE BOX
Follow Up:  cholecystitis    Interval History: pt afebrile, improved abd pain after perc daysi but now WBC increased to 28    ROS:      All other systems negative    Constitutional: no fever, no chills  Head: no trauma  Eyes: no vision changes, no eye pain  ENT:  no sore throat, no rhinorrhea  Cardiovascular:  no chest pain, no palpitation  Respiratory:  no SOB, no cough  GI:  + abd pain, no vomiting, no diarrhea  urinary: no dysuria, no hematuria, no flank pain  musculoskeletal:  no joint pain, no joint swelling  skin:  no rash  neurology:  no headache, no seizure          Allergies  No Known Allergies        ANTIMICROBIALS:  piperacillin/tazobactam IVPB.. 3.375 every 8 hours      OTHER MEDS:  bisacodyl 5 milliGRAM(s) Oral at bedtime  bisacodyl Suppository 10 milliGRAM(s) Rectal daily PRN  chlorhexidine 4% Liquid 1 Application(s) Topical daily  cloNIDine 0.1 milliGRAM(s) Oral two times a day  dextrose 40% Gel 15 Gram(s) Oral once PRN  dextrose 5%. 1000 milliLiter(s) IV Continuous <Continuous>  dextrose 50% Injectable 12.5 Gram(s) IV Push once  dextrose 50% Injectable 25 Gram(s) IV Push once  dextrose 50% Injectable 25 Gram(s) IV Push once  diazepam    Tablet 2 milliGRAM(s) Oral every 8 hours PRN  DULoxetine 60 milliGRAM(s) Oral daily  enoxaparin Injectable 40 milliGRAM(s) SubCutaneous daily  famotidine    Tablet 20 milliGRAM(s) Oral daily  glucagon  Injectable 1 milliGRAM(s) IntraMuscular once PRN  HYDROmorphone PCA (5 mG/mL) 30 milliLiter(s) PCA Continuous PCA Continuous  HYDROmorphone PCA (5 mG/mL) Rescue Clinician Bolus 2 milliGRAM(s) IV Push every 1 hour PRN  influenza   Vaccine 0.5 milliLiter(s) IntraMuscular once  insulin lispro (HumaLOG) corrective regimen sliding scale   SubCutaneous three times a day before meals  insulin lispro (HumaLOG) corrective regimen sliding scale   SubCutaneous at bedtime  methadone    Tablet 20 milliGRAM(s) Oral every 6 hours  naloxone Injectable 0.1 milliGRAM(s) IV Push every 3 minutes PRN  naloxone Injectable 0.4 milliGRAM(s) IV Push every 3 minutes  ondansetron Injectable 4 milliGRAM(s) IV Push every 6 hours PRN  polyethylene glycol 3350 17 Gram(s) Oral two times a day  senna 2 Tablet(s) Oral at bedtime  simethicone 80 milliGRAM(s) Chew every 8 hours PRN  sodium chloride 0.9%. 1000 milliLiter(s) IV Continuous <Continuous>  sucralfate suspension 1 Gram(s) Oral four times a day      Vital Signs Last 24 Hrs  T(C): 36.6 (15 Nov 2019 12:28), Max: 36.7 (14 Nov 2019 20:32)  T(F): 97.8 (15 Nov 2019 12:28), Max: 98.1 (14 Nov 2019 20:32)  HR: 96 (15 Nov 2019 12:28) (84 - 100)  BP: 125/83 (15 Nov 2019 12:28) (118/83 - 125/83)  BP(mean): --  RR: 18 (15 Nov 2019 12:28) (18 - 18)  SpO2: 95% (15 Nov 2019 12:28) (88% - 95%)    Physical Exam:  General:    NAD,  non toxic  Head: atraumatic, normocephalic  Eye: palce sclera and conjunctiva  ENT:    no oropharyngeal lesions,   no LAD,   neck supple  Cardio:     regular S1, S2,  no murmur  Respiratory:    clear b/l,    no wheezing  abd:    distended with palpable liver and mass, diffuse tenderness more on RUQ, perc daysi with brownish drainage  :   no CVAT,  no suprapubic tenderness,   no  saavedra  Musculoskeletal:   no joint swelling  vascular: no phlebitis, both LEs cold but palpable pulses and no cyanosis  Skin:    no rash  Neurologic:     no focal deficit  psych: normal affect                          10.3   28.57 )-----------( 577      ( 15 Nov 2019 08:33 )             32.4       11-15    132<L>  |  91<L>  |  15  ----------------------------<  136<H>  4.1   |  27  |  0.71    Ca    9.0      15 Nov 2019 06:48  Phos  3.6     11-15  Mg     1.8     11-15    TPro  6.8  /  Alb  2.5<L>  /  TBili  0.8  /  DBili  0.3<H>  /  AST  18  /  ALT  16  /  AlkPhos  176<H>  11-15          MICROBIOLOGY:  v  .Body Fluid Bile Fluid  11-12-19   Numerous Klebsiella oxytoca  --  Klebsiella oxytoca      .Blood Blood-Peripheral  11-09-19   No growth at 5 days.  --  --      .Urine Clean Catch (Midstream)  11-09-19   <10,000 CFU/mL Normal Urogenital Veronika  --  --          Rapid RVP Result: NotDetec (11-09 @ 00:20)        RADIOLOGY:  Images below reviewed personally  < from: [facility] Duplex Lower Extrem Arterial, Bilat (11.14.19 @ 11:41) >    IMPRESSION: Mild diffuse atherosclerosis seen of the arteries of both   lower extremities.    No hemodynamically significant stenosis in the arteries of either lower   extremity.      < from: CT Abdomen and Pelvis w/ IV Cont (11.10.19 @ 16:59) >  IMPRESSION:     Distended gallbladder with discontinuity of the wall and lobulated fluid   along the inferior liver image highly concerning for gallbladder rupture.   New moderate loculated ascites.    Ill-defined pancreatic head mass with pancreatic ductal dilatation.   Significant attenuation of the main portal vein at the portal splenic   confluence with question of ill-defined filling defect possibly   representing tumor thrombus.    Hepatic metastases without significant change.    Small bilateral pleural effusions and atelectasis and a patchy right   middle lobe opacity; superimposed infection is not excluded.

## 2019-11-15 NOTE — PROGRESS NOTE ADULT - SUBJECTIVE AND OBJECTIVE BOX
INTERVAL HPI/OVERNIGHT EVENTS:  Patient S&E at bedside. No fever      VITAL SIGNS:  T(F): 97.8 (11-15-19 @ 12:28)  HR: 96 (11-15-19 @ 12:28)  BP: 125/83 (11-15-19 @ 12:28)  RR: 18 (11-15-19 @ 12:28)  SpO2: 95% (11-15-19 @ 12:28)  Wt(kg): --    PHYSICAL EXAM:    Constitutional: NAD  Eyes: EOMI, sclera non-icteric  Neck: supple, no masses, no JVD  Respiratory: CTA b/l, good air entry b/l  Cardiovascular: RRR, no M/R/G  Gastrointestinal: soft, NTND, no masses palpable, + BS, no hepatosplenomegaly  Extremities: no c/c/e  Neurological: AAOx3      MEDICATIONS  (STANDING):  bisacodyl 5 milliGRAM(s) Oral at bedtime  chlorhexidine 4% Liquid 1 Application(s) Topical daily  cloNIDine 0.1 milliGRAM(s) Oral two times a day  dextrose 5%. 1000 milliLiter(s) (50 mL/Hr) IV Continuous <Continuous>  dextrose 50% Injectable 12.5 Gram(s) IV Push once  dextrose 50% Injectable 25 Gram(s) IV Push once  dextrose 50% Injectable 25 Gram(s) IV Push once  DULoxetine 60 milliGRAM(s) Oral daily  enoxaparin Injectable 40 milliGRAM(s) SubCutaneous daily  famotidine    Tablet 20 milliGRAM(s) Oral daily  HYDROmorphone PCA (5 mG/mL) 30 milliLiter(s) PCA Continuous PCA Continuous  influenza   Vaccine 0.5 milliLiter(s) IntraMuscular once  insulin lispro (HumaLOG) corrective regimen sliding scale   SubCutaneous three times a day before meals  insulin lispro (HumaLOG) corrective regimen sliding scale   SubCutaneous at bedtime  methadone    Tablet 20 milliGRAM(s) Oral every 6 hours  naloxone Injectable 0.4 milliGRAM(s) IV Push every 3 minutes  piperacillin/tazobactam IVPB.. 3.375 Gram(s) IV Intermittent every 8 hours  polyethylene glycol 3350 17 Gram(s) Oral two times a day  senna 2 Tablet(s) Oral at bedtime  sodium chloride 0.9%. 1000 milliLiter(s) (75 mL/Hr) IV Continuous <Continuous>  sucralfate suspension 1 Gram(s) Oral four times a day    MEDICATIONS  (PRN):  bisacodyl Suppository 10 milliGRAM(s) Rectal daily PRN Constipation  dextrose 40% Gel 15 Gram(s) Oral once PRN Blood Glucose LESS THAN 70 milliGRAM(s)/deciliter  diazepam    Tablet 2 milliGRAM(s) Oral every 8 hours PRN anxiety  glucagon  Injectable 1 milliGRAM(s) IntraMuscular once PRN Glucose LESS THAN 70 milligrams/deciliter  HYDROmorphone PCA (5 mG/mL) Rescue Clinician Bolus 2 milliGRAM(s) IV Push every 1 hour PRN pain unrelieved with bolus dosing  naloxone Injectable 0.1 milliGRAM(s) IV Push every 3 minutes PRN For ANY of the following changes in patient status:  A. RR LESS THAN 10 breaths per minute, B. Oxygen saturation LESS THAN 90%, C. Sedation score of 6  ondansetron Injectable 4 milliGRAM(s) IV Push every 6 hours PRN Nausea and/or Vomiting  simethicone 80 milliGRAM(s) Chew every 8 hours PRN Gas      Allergies    No Known Allergies    Intolerances        LABS:                        10.3   28.57 )-----------( 577      ( 15 Nov 2019 08:33 )             32.4     11-15    132<L>  |  91<L>  |  15  ----------------------------<  136<H>  4.1   |  27  |  0.71    Ca    9.0      15 Nov 2019 06:48  Phos  3.6     11-15  Mg     1.8     11-15        Culture - Body Fluid with Gram Stain (11.12.19 @ 17:40)    Gram Stain:   No polymorphonuclear cells seen per low power field  Rare Gram Negative Rods seen per oil power field    -  Amikacin: S <=16    -  Amoxicillin/Clavulanic Acid: S <=8/4    -  Ampicillin: R >16 These ampicillin results predict results for amoxicillin    -  Ampicillin/Sulbactam: I 16/8 Enterobacter, Citrobacter, and Serratia may develop resistance during prolonged therapy (3-4 days)    -  Cefazolin: R >16 Enterobacter, Citrobacter, and Serratia may develop resistance during prolonged therapy (3-4 days)    -  Cefepime: S <=2    -  Cefoxitin: S <=8    -  Aztreonam: S <=4    -  Ceftriaxone: S <=1 Enterobacter, Citrobacter, and Serratia may develop resistance during prolonged therapy    -  Ciprofloxacin: S <=1    -  Meropenem: S <=1    -  Imipenem: S <=1    -  Levofloxacin: S <=2    -  Ertapenem: S <=0.5    -  Gentamicin: S <=2    -  Tobramycin: S <=2    -  Piperacillin/Tazobactam: S <=8    -  Trimethoprim/Sulfamethoxazole: S <=2/38    Specimen Source: .Body Fluid Bile Fluid    Culture Results:   Numerous Klebsiella oxytoca    Organism Identification: Klebsiella oxytoca    Organism: Klebsiella oxytoca    Method Type: MARIN INTERVAL HPI/OVERNIGHT EVENTS:  Patient S&E at bedside with his family. he says his pain is better. he is getting up and walking around with a walker.    No fever      VITAL SIGNS:  T(F): 97.8 (11-15-19 @ 12:28)  HR: 96 (11-15-19 @ 12:28)  BP: 125/83 (11-15-19 @ 12:28)  RR: 18 (11-15-19 @ 12:28)  SpO2: 95% (11-15-19 @ 12:28)  Wt(kg): --    PHYSICAL EXAM:    Constitutional: NAD  Eyes: EOMI, sclera non-icteric  Neck: supple, no masses, no JVD  Respiratory: CTA b/l, good air entry b/l  Cardiovascular: RRR, no M/R/G  Gastrointestinal: soft, NTND, no masses palpable, + BS, no hepatosplenomegaly  Extremities: no c/c/e  Neurological: AAOx3      MEDICATIONS  (STANDING):  bisacodyl 5 milliGRAM(s) Oral at bedtime  chlorhexidine 4% Liquid 1 Application(s) Topical daily  cloNIDine 0.1 milliGRAM(s) Oral two times a day  dextrose 5%. 1000 milliLiter(s) (50 mL/Hr) IV Continuous <Continuous>  dextrose 50% Injectable 12.5 Gram(s) IV Push once  dextrose 50% Injectable 25 Gram(s) IV Push once  dextrose 50% Injectable 25 Gram(s) IV Push once  DULoxetine 60 milliGRAM(s) Oral daily  enoxaparin Injectable 40 milliGRAM(s) SubCutaneous daily  famotidine    Tablet 20 milliGRAM(s) Oral daily  HYDROmorphone PCA (5 mG/mL) 30 milliLiter(s) PCA Continuous PCA Continuous  influenza   Vaccine 0.5 milliLiter(s) IntraMuscular once  insulin lispro (HumaLOG) corrective regimen sliding scale   SubCutaneous three times a day before meals  insulin lispro (HumaLOG) corrective regimen sliding scale   SubCutaneous at bedtime  methadone    Tablet 20 milliGRAM(s) Oral every 6 hours  naloxone Injectable 0.4 milliGRAM(s) IV Push every 3 minutes  piperacillin/tazobactam IVPB.. 3.375 Gram(s) IV Intermittent every 8 hours  polyethylene glycol 3350 17 Gram(s) Oral two times a day  senna 2 Tablet(s) Oral at bedtime  sodium chloride 0.9%. 1000 milliLiter(s) (75 mL/Hr) IV Continuous <Continuous>  sucralfate suspension 1 Gram(s) Oral four times a day    MEDICATIONS  (PRN):  bisacodyl Suppository 10 milliGRAM(s) Rectal daily PRN Constipation  dextrose 40% Gel 15 Gram(s) Oral once PRN Blood Glucose LESS THAN 70 milliGRAM(s)/deciliter  diazepam    Tablet 2 milliGRAM(s) Oral every 8 hours PRN anxiety  glucagon  Injectable 1 milliGRAM(s) IntraMuscular once PRN Glucose LESS THAN 70 milligrams/deciliter  HYDROmorphone PCA (5 mG/mL) Rescue Clinician Bolus 2 milliGRAM(s) IV Push every 1 hour PRN pain unrelieved with bolus dosing  naloxone Injectable 0.1 milliGRAM(s) IV Push every 3 minutes PRN For ANY of the following changes in patient status:  A. RR LESS THAN 10 breaths per minute, B. Oxygen saturation LESS THAN 90%, C. Sedation score of 6  ondansetron Injectable 4 milliGRAM(s) IV Push every 6 hours PRN Nausea and/or Vomiting  simethicone 80 milliGRAM(s) Chew every 8 hours PRN Gas      Allergies    No Known Allergies    Intolerances        LABS:                        10.3   28.57 )-----------( 577      ( 15 Nov 2019 08:33 )             32.4     11-15    132<L>  |  91<L>  |  15  ----------------------------<  136<H>  4.1   |  27  |  0.71    Ca    9.0      15 Nov 2019 06:48  Phos  3.6     11-15  Mg     1.8     11-15        Culture - Body Fluid with Gram Stain (11.12.19 @ 17:40)    Gram Stain:   No polymorphonuclear cells seen per low power field  Rare Gram Negative Rods seen per oil power field    -  Amikacin: S <=16    -  Amoxicillin/Clavulanic Acid: S <=8/4    -  Ampicillin: R >16 These ampicillin results predict results for amoxicillin    -  Ampicillin/Sulbactam: I 16/8 Enterobacter, Citrobacter, and Serratia may develop resistance during prolonged therapy (3-4 days)    -  Cefazolin: R >16 Enterobacter, Citrobacter, and Serratia may develop resistance during prolonged therapy (3-4 days)    -  Cefepime: S <=2    -  Cefoxitin: S <=8    -  Aztreonam: S <=4    -  Ceftriaxone: S <=1 Enterobacter, Citrobacter, and Serratia may develop resistance during prolonged therapy    -  Ciprofloxacin: S <=1    -  Meropenem: S <=1    -  Imipenem: S <=1    -  Levofloxacin: S <=2    -  Ertapenem: S <=0.5    -  Gentamicin: S <=2    -  Tobramycin: S <=2    -  Piperacillin/Tazobactam: S <=8    -  Trimethoprim/Sulfamethoxazole: S <=2/38    Specimen Source: .Body Fluid Bile Fluid    Culture Results:   Numerous Klebsiella oxytoca    Organism Identification: Klebsiella oxytoca    Organism: Klebsiella oxytoca    Method Type: MARIN

## 2019-11-16 NOTE — PROGRESS NOTE ADULT - PROBLEM SELECTOR PLAN 1
CT imaging concerning for gallbladder wall rupture and PNA.  Suspect cholecystitis and possible PNA versus inflammatory changes secondary to the cholecystitis.  Patient now s/p cholecystostomy tube placement by IR.  WBC initially rising but now downtrending ; 25 today  patient clinically feeling better, tube is draining, and patient afebrile.  Blood cultures negative.  Biliary fluid culture grew out Klebsiella Oxytoca that is sensitive to Zosyn.   d/w ID attending ; monitor WBC ; if  rising will repeat imaging.  Continue Zosyn.  will likely do a 7 day course of antibiotic post cholecystostomy if no acute events and WBC continues to improve

## 2019-11-16 NOTE — PROGRESS NOTE ADULT - SUBJECTIVE AND OBJECTIVE BOX
SUBJECTIVE AND OBJECTIVE: patient seen in follow up for pain. states current regimen is working well. denies pain, DD is sufficient to make pain tolerable. had BM today. Anxious to go home, explained that patient has not been on PCA even a total of 24 hours, and will need to be monitored further prior to d/c. Explained that unlikely to happen prior to early next week. Patient frustrated as he wants to get out of the hospital. discussed with RN.     INTERVAL HPI/OVERNIGHT EVENTS: 18 injections/22 attempts over last 18 hours    DNR on chart:   Allergies    No Known Allergies    Intolerances    MEDICATIONS  (STANDING):  bisacodyl 5 milliGRAM(s) Oral at bedtime  chlorhexidine 4% Liquid 1 Application(s) Topical daily  cloNIDine 0.1 milliGRAM(s) Oral two times a day  dextrose 5%. 1000 milliLiter(s) (50 mL/Hr) IV Continuous <Continuous>  dextrose 50% Injectable 12.5 Gram(s) IV Push once  dextrose 50% Injectable 25 Gram(s) IV Push once  dextrose 50% Injectable 25 Gram(s) IV Push once  DULoxetine 60 milliGRAM(s) Oral daily  enoxaparin Injectable 40 milliGRAM(s) SubCutaneous daily  famotidine    Tablet 20 milliGRAM(s) Oral daily  HYDROmorphone PCA (5 mG/mL) 30 milliLiter(s) PCA Continuous PCA Continuous  influenza   Vaccine 0.5 milliLiter(s) IntraMuscular once  insulin lispro (HumaLOG) corrective regimen sliding scale   SubCutaneous three times a day before meals  insulin lispro (HumaLOG) corrective regimen sliding scale   SubCutaneous at bedtime  methadone    Tablet 20 milliGRAM(s) Oral every 6 hours  naloxone Injectable 0.4 milliGRAM(s) IV Push every 3 minutes  piperacillin/tazobactam IVPB.. 3.375 Gram(s) IV Intermittent every 8 hours  polyethylene glycol 3350 17 Gram(s) Oral two times a day  senna 2 Tablet(s) Oral at bedtime  sodium chloride 0.9%. 1000 milliLiter(s) (75 mL/Hr) IV Continuous <Continuous>  sodium chloride 0.9%. 1000 milliLiter(s) (20 mL/Hr) IV Continuous <Continuous>  sucralfate suspension 1 Gram(s) Oral four times a day    MEDICATIONS  (PRN):  bisacodyl Suppository 10 milliGRAM(s) Rectal daily PRN Constipation  dextrose 40% Gel 15 Gram(s) Oral once PRN Blood Glucose LESS THAN 70 milliGRAM(s)/deciliter  diazepam    Tablet 2 milliGRAM(s) Oral every 8 hours PRN anxiety  glucagon  Injectable 1 milliGRAM(s) IntraMuscular once PRN Glucose LESS THAN 70 milligrams/deciliter  HYDROmorphone PCA (5 mG/mL) Rescue Clinician Bolus 2 milliGRAM(s) IV Push every 1 hour PRN pain unrelieved with bolus dosing  naloxone Injectable 0.1 milliGRAM(s) IV Push every 3 minutes PRN For ANY of the following changes in patient status:  A. RR LESS THAN 10 breaths per minute, B. Oxygen saturation LESS THAN 90%, C. Sedation score of 6  ondansetron Injectable 4 milliGRAM(s) IV Push every 6 hours PRN Nausea and/or Vomiting  simethicone 80 milliGRAM(s) Chew every 8 hours PRN Gas      ITEMS UNCHECKED ARE NOT PRESENT    PRESENT SYMPTOMS: [ ]Unable to obtain due to poor mentation   Source if other than patient:  [ ]Family   [ ]Team     Pain (Impact on QOL):  improved  Location: abdomen   Minimal acceptable level (0-10 scale):    5               Aggravating factors: movement, examination  Quality: aching  Radiation: right side abdomen, back  Severity (0-10 scale):  8-9/10  Timing: comes/goes    Dyspnea:                           [ ]Mild [ ]Moderate [ ]Severe  Anxiety:                             [ ]Mild [ ]Moderate [ ]Severe  Fatigue:                             [ ]Mild [ ]Moderate [ ]Severe  Nausea:                             [ ]Mild [ ]Moderate [ ]Severe  Loss of appetite:              [ ]Mild [ ]Moderate [ ]Severe  Constipation:                    [ x]Mild [ ]Moderate [ ]Severe    PAIN AD Score:	  [email or web address].[email or web address]/cog/painad.pdf (Ctrl + left click to view)    Other Symptoms:  [x ]All other review of systems negative     Karnofsky Performance Score/Palliative Performance Status Version 2:      40   %    http://palliative.info/resource_material/[email or web address].pdf  PHYSICAL EXAM:  Vital Signs Last 24 Hrs  T(C): 36.8 (16 Nov 2019 09:21), Max: 36.8 (16 Nov 2019 00:07)  T(F): 98.2 (16 Nov 2019 09:21), Max: 98.3 (16 Nov 2019 00:07)  HR: 93 (16 Nov 2019 09:21) (90 - 103)  BP: 126/81 (16 Nov 2019 09:21) (121/82 - 130/85)  BP(mean): --  RR: 18 (16 Nov 2019 09:21) (16 - 18)  SpO2: 93% (16 Nov 2019 09:21) (93% - 96%) I&O's Summary    15 Nov 2019 07:01  -  16 Nov 2019 07:00  --------------------------------------------------------  IN: 1020 mL / OUT: 100 mL / NET: 920 mL    16 Nov 2019 07:01  -  16 Nov 2019 11:26  --------------------------------------------------------  IN: 100 mL / OUT: 300 mL / NET: -200 mL     GENERAL:  [x ]Alert  [x ]Oriented x3   [ ]Lethargic  [ ]Cachexia  [ ]Unarousable  [ ]Verbal  [ ]Non-Verbal  Behavioral:   [ ] Anxiety  [ ] Delirium [ ] Agitation [ ] Other  HEENT:  [ ]Normal   [ x]Dry mouth   [ ]ET Tube/Trach  [ ]Oral lesions  PULMONARY:   [x ]Clear [ ]Tachypnea  [ ]Audible excessive secretions   [ ]Rhonchi        [ ]Right [ ]Left [ ]Bilateral  [ ]Crackles        [ ]Right [ ]Left [ ]Bilateral  [ ]Wheezing     [ ]Right [ ]Left [ ]Bilateral  CARDIOVASCULAR:    [x ]Regular [ ]Irregular [ ]Tachy  [ ]Jean [ ]Murmur [ ]Other  GASTROINTESTINAL: +bili drain  [ x]Soft  [ x]Distended   [ ]+BS  [ ]Non tender [x ]Tender  [ ]PEG [ ]OGT/ NGT   Last BM: 11/16  GENITOURINARY:  [x ]Normal [ ] Incontinent   [ ]Oliguria/Anuria   [ ]Heredia  MUSCULOSKELETAL:   [ ]Normal   [x ]Weakness  [ ]Bed/Wheelchair bound [ ]Edema  NEUROLOGIC:   [x ]No focal deficits  [ ] Cognitive impairment  [ ] Dysphagia [ ]Dysarthria [ ] Paresis [ ]Other   SKIN: ecchymoses  [ ]Normal   [ ]Pressure ulcer(s)  [ ]Rash    CRITICAL CARE:  [ ] Shock Present  [ ]Septic [ ]Cardiogenic [ ]Neurologic [ ]Hypovolemic  [ ]  Vasopressors [ ]  Inotropes   [ ] Respiratory failure present  [ ] Acute  [ ] Chronic [ ] Hypoxic  [ ] Hypercarbic [ ] Other  [ ] Other organ failure     LABS:                        9.7    25.39 )-----------( 415      ( 16 Nov 2019 10:34 )             31.1   11-16    131<L>  |  91<L>  |  13  ----------------------------<  127<H>  3.7   |  27  |  0.70    Ca    8.5      16 Nov 2019 07:20  Phos  3.6     11-15  Mg     1.8     11-15    TPro  6.3  /  Alb  2.2<L>  /  TBili  0.7  /  DBili  x   /  AST  11  /  ALT  7<L>  /  AlkPhos  150<H>  11-16        RADIOLOGY & ADDITIONAL STUDIES: no new imaging studies    Protein Calorie Malnutrition Present: [x ] yes [ ] no  [ ] PPSV2 < or = 30%  [ ] significant weight loss [x ] poor nutritional intake [ ] anasarca [x ] catabolic state Albumin, Serum: 2.2 g/dL (11-16-19 @ 07:20)  Artificial Nutrition [ ]     REFERRALS:   [ ]Chaplaincy  [ ] Hospice  [ ]Child Life  [ ]Social Work  [x ]Case management [ ]Holistic Therapy   Goals of Care Document:

## 2019-11-16 NOTE — PROGRESS NOTE ADULT - PROBLEM SELECTOR PLAN 2
Extensive neurosurgery history w/ multilevel fusion and pain control issues.  Now with acute pain secondary to cholecystitis.  ISTOP Reference #: [id number] done on admission.  Palliative Care input appreciated.  Continue pain control per Palliative care service.  Currently switched to Dilaudid PCA with Methadone.  Follows w/Dr Rico for pain mgt and was scheduled for PCA pump 11/8/19.  Continue bowel regimen (miralax, dulolax and senna).

## 2019-11-16 NOTE — PROGRESS NOTE ADULT - SUBJECTIVE AND OBJECTIVE BOX
Follow Up:  cholecystitis    Interval History: pt afebrile, improved abd pain after perc daysi WBC now coming down to 25k    ROS:      All other systems negative    Constitutional: no fever, no chills  Head: no trauma  Eyes: no vision changes, no eye pain  ENT:  no sore throat, no rhinorrhea  Cardiovascular:  no chest pain, no palpitation  Respiratory:  no SOB, no cough  GI:  + abd pain, no vomiting, no diarrhea  urinary: no dysuria, no hematuria, no flank pain  musculoskeletal:  no joint pain, no joint swelling  skin:  no rash  neurology:  no headache, no seizure          Allergies  No Known Allergies        ANTIMICROBIALS:  piperacillin/tazobactam IVPB.. 3.375 every 8 hours      OTHER MEDS:  bisacodyl 5 milliGRAM(s) Oral at bedtime  bisacodyl Suppository 10 milliGRAM(s) Rectal daily PRN  chlorhexidine 4% Liquid 1 Application(s) Topical daily  cloNIDine 0.1 milliGRAM(s) Oral two times a day  dextrose 40% Gel 15 Gram(s) Oral once PRN  dextrose 5%. 1000 milliLiter(s) IV Continuous <Continuous>  dextrose 50% Injectable 12.5 Gram(s) IV Push once  dextrose 50% Injectable 25 Gram(s) IV Push once  dextrose 50% Injectable 25 Gram(s) IV Push once  diazepam    Tablet 2 milliGRAM(s) Oral every 8 hours PRN  DULoxetine 60 milliGRAM(s) Oral daily  enoxaparin Injectable 40 milliGRAM(s) SubCutaneous daily  famotidine    Tablet 20 milliGRAM(s) Oral daily  glucagon  Injectable 1 milliGRAM(s) IntraMuscular once PRN  HYDROmorphone PCA (5 mG/mL) 30 milliLiter(s) PCA Continuous PCA Continuous  HYDROmorphone PCA (5 mG/mL) Rescue Clinician Bolus 2 milliGRAM(s) IV Push every 1 hour PRN  influenza   Vaccine 0.5 milliLiter(s) IntraMuscular once  insulin lispro (HumaLOG) corrective regimen sliding scale   SubCutaneous three times a day before meals  insulin lispro (HumaLOG) corrective regimen sliding scale   SubCutaneous at bedtime  methadone    Tablet 20 milliGRAM(s) Oral every 6 hours  naloxone Injectable 0.1 milliGRAM(s) IV Push every 3 minutes PRN  naloxone Injectable 0.4 milliGRAM(s) IV Push every 3 minutes  ondansetron Injectable 4 milliGRAM(s) IV Push every 6 hours PRN  polyethylene glycol 3350 17 Gram(s) Oral two times a day  senna 2 Tablet(s) Oral at bedtime  simethicone 80 milliGRAM(s) Chew every 8 hours PRN  sodium chloride 0.9%. 1000 milliLiter(s) IV Continuous <Continuous>  sucralfate suspension 1 Gram(s) Oral four times a day      Vital Signs Last 24 Hrs  Vital Signs Last 24 Hrs  T(C): 36.8 (16 Nov 2019 09:21), Max: 36.8 (16 Nov 2019 00:07)  T(F): 98.2 (16 Nov 2019 09:21), Max: 98.3 (16 Nov 2019 00:07)  HR: 93 (16 Nov 2019 09:21) (90 - 103)  BP: 126/81 (16 Nov 2019 09:21) (121/82 - 130/85)  BP(mean): --  RR: 18 (16 Nov 2019 09:21) (16 - 18)  SpO2: 93% (16 Nov 2019 09:21) (93% - 96%)    Physical Exam:  General:    NAD,  non toxic  Head: atraumatic, normocephalic  Eye: palce sclera and conjunctiva  ENT:    no oropharyngeal lesions,   no LAD,   neck supple  Cardio:     regular S1, S2,  no murmur  Respiratory:    clear b/l,    no wheezing  abd:    distended with palpable liver and mass, diffuse tenderness (but improved) more on RUQ, perc daysi with brownish drainage  :   no CVAT,  no suprapubic tenderness,   no  saavedra  Musculoskeletal:   no joint swelling  vascular: no phlebitis, both LEs cold but palpable pulses and no cyanosis  Skin:    no rash  Neurologic:     no focal deficit  psych: normal affect     WBC Count: 25.39 K/uL (11-16 @ 10:34)  WBC Count: 28.57 K/uL (11-15 @ 08:33)  WBC Count: 23.52 K/uL (11-14 @ 09:16)  WBC Count: 17.97 K/uL (11-13 @ 10:17)  WBC Count: 14.58 K/uL (11-12 @ 09:38)  WBC Count: 19.09 K/uL (11-11 @ 09:19)  WBC Count: 16.54 K/uL (11-10 @ 09:10)                            9.7    25.39 )-----------( 415      ( 16 Nov 2019 10:34 )             31.1       11-16    131<L>  |  91<L>  |  13  ----------------------------<  127<H>  3.7   |  27  |  0.70    Ca    8.5      16 Nov 2019 07:20  Phos  3.6     11-15  Mg     1.8     11-15    TPro  6.3  /  Alb  2.2<L>  /  TBili  0.7  /  DBili  x   /  AST  11  /  ALT  7<L>  /  AlkPhos  150<H>  11-16      Creatinine Trend: 0.70<--, 0.71<--, 0.79<--, 0.85<--, 0.84<--, 0.80<--        MICROBIOLOGY:  v  .Body Fluid Bile Fluid  11-12-19   Numerous Klebsiella oxytoca  --  Klebsiella oxytoca      .Blood Blood-Peripheral  11-09-19   No growth at 5 days.  --  --      .Urine Clean Catch (Midstream)  11-09-19   <10,000 CFU/mL Normal Urogenital Veronika  --  --          Rapid RVP Result: NotDetec (11-09 @ 00:20)        RADIOLOGY:  Images below reviewed personally  < from: [facility] Duplex Lower Extrem Arterial, Bilat (11.14.19 @ 11:41) >    IMPRESSION: Mild diffuse atherosclerosis seen of the arteries of both   lower extremities.    No hemodynamically significant stenosis in the arteries of either lower   extremity.      < from: CT Abdomen and Pelvis w/ IV Cont (11.10.19 @ 16:59) >  IMPRESSION:     Distended gallbladder with discontinuity of the wall and lobulated fluid   along the inferior liver image highly concerning for gallbladder rupture.   New moderate loculated ascites.    Ill-defined pancreatic head mass with pancreatic ductal dilatation.   Significant attenuation of the main portal vein at the portal splenic   confluence with question of ill-defined filling defect possibly   representing tumor thrombus.    Hepatic metastases without significant change.    Small bilateral pleural effusions and atelectasis and a patchy right   middle lobe opacity; superimposed infection is not excluded. Follow Up:  cholecystitis    Interval History: pt afebrile, improved abd pain after perc daysi WBC now coming down to 25k, denies N/V, wants to go home.       ROS:    Constitutional: no fever, no chills  Cardiovascular:  no chest pain,   Respiratory:  no SOB, no cough  GI:  + abd pain, no diarrhea  urinary: no dysuria,   skin:  no rash  seizure      Allergies  No Known Allergies        ANTIMICROBIALS:  piperacillin/tazobactam IVPB.. 3.375 every 8 hours      OTHER MEDS:  bisacodyl 5 milliGRAM(s) Oral at bedtime  bisacodyl Suppository 10 milliGRAM(s) Rectal daily PRN  chlorhexidine 4% Liquid 1 Application(s) Topical daily  cloNIDine 0.1 milliGRAM(s) Oral two times a day  dextrose 40% Gel 15 Gram(s) Oral once PRN  dextrose 5%. 1000 milliLiter(s) IV Continuous <Continuous>  dextrose 50% Injectable 12.5 Gram(s) IV Push once  dextrose 50% Injectable 25 Gram(s) IV Push once  dextrose 50% Injectable 25 Gram(s) IV Push once  diazepam    Tablet 2 milliGRAM(s) Oral every 8 hours PRN  DULoxetine 60 milliGRAM(s) Oral daily  enoxaparin Injectable 40 milliGRAM(s) SubCutaneous daily  famotidine    Tablet 20 milliGRAM(s) Oral daily  glucagon  Injectable 1 milliGRAM(s) IntraMuscular once PRN  HYDROmorphone PCA (5 mG/mL) 30 milliLiter(s) PCA Continuous PCA Continuous  HYDROmorphone PCA (5 mG/mL) Rescue Clinician Bolus 2 milliGRAM(s) IV Push every 1 hour PRN  influenza   Vaccine 0.5 milliLiter(s) IntraMuscular once  insulin lispro (HumaLOG) corrective regimen sliding scale   SubCutaneous three times a day before meals  insulin lispro (HumaLOG) corrective regimen sliding scale   SubCutaneous at bedtime  methadone    Tablet 20 milliGRAM(s) Oral every 6 hours  naloxone Injectable 0.1 milliGRAM(s) IV Push every 3 minutes PRN  naloxone Injectable 0.4 milliGRAM(s) IV Push every 3 minutes  ondansetron Injectable 4 milliGRAM(s) IV Push every 6 hours PRN  polyethylene glycol 3350 17 Gram(s) Oral two times a day  senna 2 Tablet(s) Oral at bedtime  simethicone 80 milliGRAM(s) Chew every 8 hours PRN  sodium chloride 0.9%. 1000 milliLiter(s) IV Continuous <Continuous>  sucralfate suspension 1 Gram(s) Oral four times a day      Vital Signs Last 24 Hrs  Vital Signs Last 24 Hrs  T(C): 36.8 (16 Nov 2019 09:21), Max: 36.8 (16 Nov 2019 00:07)  T(F): 98.2 (16 Nov 2019 09:21), Max: 98.3 (16 Nov 2019 00:07)  HR: 93 (16 Nov 2019 09:21) (90 - 103)  BP: 126/81 (16 Nov 2019 09:21) (121/82 - 130/85)  BP(mean): --  RR: 18 (16 Nov 2019 09:21) (16 - 18)  SpO2: 93% (16 Nov 2019 09:21) (93% - 96%)    Physical Exam:  General:    NAD,  non toxic  Eye: pale sclera and conjunctiva  Cardio:     regular S1, S2,  no murmur  Respiratory: + air entry b/l  abd: distended with palpable liver and mass, diffuse tenderness (but improved) more on RUQ, perc daysi with brownish drainage  vascular: no phlebitis, both LEs cold but palpable pulses  Skin:    no rash       WBC Count: 25.39 K/uL (11-16 @ 10:34)  WBC Count: 28.57 K/uL (11-15 @ 08:33)  WBC Count: 23.52 K/uL (11-14 @ 09:16)  WBC Count: 17.97 K/uL (11-13 @ 10:17)  WBC Count: 14.58 K/uL (11-12 @ 09:38)  WBC Count: 19.09 K/uL (11-11 @ 09:19)  WBC Count: 16.54 K/uL (11-10 @ 09:10)                            9.7    25.39 )-----------( 415      ( 16 Nov 2019 10:34 )             31.1       11-16    131<L>  |  91<L>  |  13  ----------------------------<  127<H>  3.7   |  27  |  0.70    Ca    8.5      16 Nov 2019 07:20  Phos  3.6     11-15  Mg     1.8     11-15    TPro  6.3  /  Alb  2.2<L>  /  TBili  0.7  /  DBili  x   /  AST  11  /  ALT  7<L>  /  AlkPhos  150<H>  11-16      Creatinine Trend: 0.70<--, 0.71<--, 0.79<--, 0.85<--, 0.84<--, 0.80<--        MICROBIOLOGY:  v  .Body Fluid Bile Fluid  11-12-19   Numerous Klebsiella oxytoca  --  Klebsiella oxytoca      .Blood Blood-Peripheral  11-09-19   No growth at 5 days.  --  --      .Urine Clean Catch (Midstream)  11-09-19   <10,000 CFU/mL Normal Urogenital Veronika  --  --          Rapid RVP Result: NotDetec (11-09 @ 00:20)        RADIOLOGY:  Images below reviewed personally  < from: [facility] Duplex Lower Extrem Arterial, Bilat (11.14.19 @ 11:41) >    IMPRESSION: Mild diffuse atherosclerosis seen of the arteries of both   lower extremities.    No hemodynamically significant stenosis in the arteries of either lower   extremity.      < from: CT Abdomen and Pelvis w/ IV Cont (11.10.19 @ 16:59) >  IMPRESSION:     Distended gallbladder with discontinuity of the wall and lobulated fluid   along the inferior liver image highly concerning for gallbladder rupture.   New moderate loculated ascites.    Ill-defined pancreatic head mass with pancreatic ductal dilatation.   Significant attenuation of the main portal vein at the portal splenic   confluence with question of ill-defined filling defect possibly   representing tumor thrombus.    Hepatic metastases without significant change.    Small bilateral pleural effusions and atelectasis and a patchy right   middle lobe opacity; superimposed infection is not excluded.

## 2019-11-16 NOTE — PROGRESS NOTE ADULT - PROBLEM SELECTOR PLAN 1
- c/w methadone 80mg daily (20mg Q6)  - continue dilaudid PCA, 1.7mg continuous rate, 1mg IV Q20 minutes dilaudid for PRN. Current settings: dilaudid PCA [1.7/1/20//2CABQ1]  - monitor BMs, LAST 11/16  - Off dilaudid PRN and fentanyl patch  - patient agreeable to above  - on d/c, should meet with interventional pain to switch PCA settings over to implantable pump if possible

## 2019-11-16 NOTE — PROGRESS NOTE ADULT - ASSESSMENT
64 y/o PMH of traumatic work injury (2001) s/p multilumbar fusion (T8-S1) s/p hardware removal (2016, on daily opiates and methadone), chronic constipation, GERD, HTN, depression, anxiety, metastatic pancreatic adenocarcinoma s/p biliary stent placement (2m ago) and chemo sent in from [facility] for concern for biliary sepsis on 11/8.During this admission, CT showed gallbladder rupture, stable metastatic disease (to liver), and tumor trombosis. Palliative care was called pain Rx.

## 2019-11-16 NOTE — PROGRESS NOTE ADULT - SUBJECTIVE AND OBJECTIVE BOX
Patient is a 63y old  Male who presents with a chief complaint of sepsis (15 Nov 2019 13:07)      SUBJECTIVE / OVERNIGHT EVENTS:    MEDICATIONS  (STANDING):  bisacodyl 5 milliGRAM(s) Oral at bedtime  chlorhexidine 4% Liquid 1 Application(s) Topical daily  cloNIDine 0.1 milliGRAM(s) Oral two times a day  dextrose 5%. 1000 milliLiter(s) (50 mL/Hr) IV Continuous <Continuous>  dextrose 50% Injectable 12.5 Gram(s) IV Push once  dextrose 50% Injectable 25 Gram(s) IV Push once  dextrose 50% Injectable 25 Gram(s) IV Push once  DULoxetine 60 milliGRAM(s) Oral daily  enoxaparin Injectable 40 milliGRAM(s) SubCutaneous daily  famotidine    Tablet 20 milliGRAM(s) Oral daily  HYDROmorphone PCA (5 mG/mL) 30 milliLiter(s) PCA Continuous PCA Continuous  influenza   Vaccine 0.5 milliLiter(s) IntraMuscular once  insulin lispro (HumaLOG) corrective regimen sliding scale   SubCutaneous three times a day before meals  insulin lispro (HumaLOG) corrective regimen sliding scale   SubCutaneous at bedtime  methadone    Tablet 20 milliGRAM(s) Oral every 6 hours  naloxone Injectable 0.4 milliGRAM(s) IV Push every 3 minutes  piperacillin/tazobactam IVPB.. 3.375 Gram(s) IV Intermittent every 8 hours  polyethylene glycol 3350 17 Gram(s) Oral two times a day  senna 2 Tablet(s) Oral at bedtime  sodium chloride 0.9%. 1000 milliLiter(s) (75 mL/Hr) IV Continuous <Continuous>  sodium chloride 0.9%. 1000 milliLiter(s) (20 mL/Hr) IV Continuous <Continuous>  sucralfate suspension 1 Gram(s) Oral four times a day    MEDICATIONS  (PRN):  bisacodyl Suppository 10 milliGRAM(s) Rectal daily PRN Constipation  dextrose 40% Gel 15 Gram(s) Oral once PRN Blood Glucose LESS THAN 70 milliGRAM(s)/deciliter  diazepam    Tablet 2 milliGRAM(s) Oral every 8 hours PRN anxiety  glucagon  Injectable 1 milliGRAM(s) IntraMuscular once PRN Glucose LESS THAN 70 milligrams/deciliter  HYDROmorphone PCA (5 mG/mL) Rescue Clinician Bolus 2 milliGRAM(s) IV Push every 1 hour PRN pain unrelieved with bolus dosing  naloxone Injectable 0.1 milliGRAM(s) IV Push every 3 minutes PRN For ANY of the following changes in patient status:  A. RR LESS THAN 10 breaths per minute, B. Oxygen saturation LESS THAN 90%, C. Sedation score of 6  ondansetron Injectable 4 milliGRAM(s) IV Push every 6 hours PRN Nausea and/or Vomiting  simethicone 80 milliGRAM(s) Chew every 8 hours PRN Gas      Vital Signs Last 24 Hrs  T(C): 36.8 (16 Nov 2019 09:21), Max: 36.8 (16 Nov 2019 00:07)  T(F): 98.2 (16 Nov 2019 09:21), Max: 98.3 (16 Nov 2019 00:07)  HR: 93 (16 Nov 2019 09:21) (90 - 103)  BP: 126/81 (16 Nov 2019 09:21) (121/82 - 130/85)  BP(mean): --  RR: 18 (16 Nov 2019 09:21) (16 - 18)  SpO2: 93% (16 Nov 2019 09:21) (93% - 96%)  CAPILLARY BLOOD GLUCOSE      POCT Blood Glucose.: 129 mg/dL (16 Nov 2019 08:18)  POCT Blood Glucose.: 127 mg/dL (15 Nov 2019 22:06)  POCT Blood Glucose.: 160 mg/dL (15 Nov 2019 17:53)  POCT Blood Glucose.: 161 mg/dL (15 Nov 2019 12:15)    I&O's Summary    15 Nov 2019 07:01  -  16 Nov 2019 07:00  --------------------------------------------------------  IN: 1020 mL / OUT: 100 mL / NET: 920 mL    16 Nov 2019 07:01  -  16 Nov 2019 09:54  --------------------------------------------------------  IN: 100 mL / OUT: 300 mL / NET: -200 mL        PHYSICAL EXAM:  GENERAL: NAD, well-developed  HEAD:  Atraumatic, Normocephalic  EYES: EOMI, PERRLA, conjunctiva and sclera clear  NECK: Supple, No JVD  CHEST/LUNG: Clear to auscultation bilaterally; No wheeze  HEART: Regular rate and rhythm; No murmurs, rubs, or gallops  ABDOMEN: Soft, Nontender, Nondistended; Bowel sounds present  EXTREMITIES:  2+ Peripheral Pulses, No clubbing, cyanosis, or edema  PSYCH: AAOx3  NEUROLOGY: non-focal  SKIN: No rashes or lesions    LABS:                        10.3   28.57 )-----------( 577      ( 15 Nov 2019 08:33 )             32.4     11-16    131<L>  |  91<L>  |  13  ----------------------------<  127<H>  3.7   |  27  |  0.70    Ca    8.5      16 Nov 2019 07:20  Phos  3.6     11-15  Mg     1.8     11-15    TPro  6.3  /  Alb  2.2<L>  /  TBili  0.7  /  DBili  x   /  AST  11  /  ALT  7<L>  /  AlkPhos  150<H>  11-16              RADIOLOGY & ADDITIONAL TESTS:    Imaging Personally Reviewed:    Consultant(s) Notes Reviewed:      Care Discussed with Consultants/Other Providers: Patient is a 63y old  Male who presents with a chief complaint of sepsis (15 Nov 2019 13:07)      SUBJECTIVE / OVERNIGHT EVENTS:  Pt seen and examined. No acute events overnight. Reports that pain is well controlled on PCA pump. Denies fever/chills. Pt is eager to go home.  Seen by ID ; reccs appreciated.    MEDICATIONS  (STANDING):  bisacodyl 5 milliGRAM(s) Oral at bedtime  chlorhexidine 4% Liquid 1 Application(s) Topical daily  cloNIDine 0.1 milliGRAM(s) Oral two times a day  dextrose 5%. 1000 milliLiter(s) (50 mL/Hr) IV Continuous <Continuous>  dextrose 50% Injectable 12.5 Gram(s) IV Push once  dextrose 50% Injectable 25 Gram(s) IV Push once  dextrose 50% Injectable 25 Gram(s) IV Push once  DULoxetine 60 milliGRAM(s) Oral daily  enoxaparin Injectable 40 milliGRAM(s) SubCutaneous daily  famotidine    Tablet 20 milliGRAM(s) Oral daily  HYDROmorphone PCA (5 mG/mL) 30 milliLiter(s) PCA Continuous PCA Continuous  influenza   Vaccine 0.5 milliLiter(s) IntraMuscular once  insulin lispro (HumaLOG) corrective regimen sliding scale   SubCutaneous three times a day before meals  insulin lispro (HumaLOG) corrective regimen sliding scale   SubCutaneous at bedtime  methadone    Tablet 20 milliGRAM(s) Oral every 6 hours  naloxone Injectable 0.4 milliGRAM(s) IV Push every 3 minutes  piperacillin/tazobactam IVPB.. 3.375 Gram(s) IV Intermittent every 8 hours  polyethylene glycol 3350 17 Gram(s) Oral two times a day  senna 2 Tablet(s) Oral at bedtime  sodium chloride 0.9%. 1000 milliLiter(s) (75 mL/Hr) IV Continuous <Continuous>  sodium chloride 0.9%. 1000 milliLiter(s) (20 mL/Hr) IV Continuous <Continuous>  sucralfate suspension 1 Gram(s) Oral four times a day    MEDICATIONS  (PRN):  bisacodyl Suppository 10 milliGRAM(s) Rectal daily PRN Constipation  dextrose 40% Gel 15 Gram(s) Oral once PRN Blood Glucose LESS THAN 70 milliGRAM(s)/deciliter  diazepam    Tablet 2 milliGRAM(s) Oral every 8 hours PRN anxiety  glucagon  Injectable 1 milliGRAM(s) IntraMuscular once PRN Glucose LESS THAN 70 milligrams/deciliter  HYDROmorphone PCA (5 mG/mL) Rescue Clinician Bolus 2 milliGRAM(s) IV Push every 1 hour PRN pain unrelieved with bolus dosing  naloxone Injectable 0.1 milliGRAM(s) IV Push every 3 minutes PRN For ANY of the following changes in patient status:  A. RR LESS THAN 10 breaths per minute, B. Oxygen saturation LESS THAN 90%, C. Sedation score of 6  ondansetron Injectable 4 milliGRAM(s) IV Push every 6 hours PRN Nausea and/or Vomiting  simethicone 80 milliGRAM(s) Chew every 8 hours PRN Gas      Vital Signs Last 24 Hrs  T(C): 36.8 (16 Nov 2019 09:21), Max: 36.8 (16 Nov 2019 00:07)  T(F): 98.2 (16 Nov 2019 09:21), Max: 98.3 (16 Nov 2019 00:07)  HR: 93 (16 Nov 2019 09:21) (90 - 103)  BP: 126/81 (16 Nov 2019 09:21) (121/82 - 130/85)  BP(mean): --  RR: 18 (16 Nov 2019 09:21) (16 - 18)  SpO2: 93% (16 Nov 2019 09:21) (93% - 96%)  CAPILLARY BLOOD GLUCOSE      POCT Blood Glucose.: 129 mg/dL (16 Nov 2019 08:18)  POCT Blood Glucose.: 127 mg/dL (15 Nov 2019 22:06)  POCT Blood Glucose.: 160 mg/dL (15 Nov 2019 17:53)  POCT Blood Glucose.: 161 mg/dL (15 Nov 2019 12:15)    I&O's Summary    15 Nov 2019 07:01  -  16 Nov 2019 07:00  --------------------------------------------------------  IN: 1020 mL / OUT: 100 mL / NET: 920 mL    16 Nov 2019 07:01  -  16 Nov 2019 09:54  --------------------------------------------------------  IN: 100 mL / OUT: 300 mL / NET: -200 mL        PHYSICAL EXAM:  Physical Exam:  General:    NAD,  non toxic  Head: atraumatic, normocephalic  Eye: pale sclera and conjunctiva  Cardio:     regular S1, S2,  no murmur  Respiratory:    clear b/l,    no wheezing  Abd : distended with palpable liver and mass, diffuse tenderness (but improved) more on RUQ, perc daysi with brownish drainage  :   no CVAT,  no suprapubic tenderness,   no  saavedra  Musculoskeletal:   no joint swelling  Skin:    no rash  Neurologic:     no focal deficit  psych: normal affect      LABS:                        10.3   28.57 )-----------( 577      ( 15 Nov 2019 08:33 )             32.4     11-16    131<L>  |  91<L>  |  13  ----------------------------<  127<H>  3.7   |  27  |  0.70    Ca    8.5      16 Nov 2019 07:20  Phos  3.6     11-15  Mg     1.8     11-15    TPro  6.3  /  Alb  2.2<L>  /  TBili  0.7  /  DBili  x   /  AST  11  /  ALT  7<L>  /  AlkPhos  150<H>  11-16                Care Discussed with Consultants/Other Providers: ID

## 2019-11-17 NOTE — PROGRESS NOTE ADULT - PROBLEM SELECTOR PLAN 2
Extensive neurosurgery history w/ multilevel fusion and pain control issues.  Now with acute pain secondary to cholecystitis.  ISTOP Reference #: [id number] done on admission.  Palliative Care input appreciated.  Continue pain control per Palliative care service.  c/w Dilaudid PCA with Methadone.  Follows w/Dr Rico for pain mgt and was scheduled for PCA pump 11/8/19.  Continue bowel regimen (miralax, dulolax and senna).  will d/c zofran and give Ativan prn for nausea in light of prolonged  on EKG today

## 2019-11-17 NOTE — PROGRESS NOTE ADULT - SUBJECTIVE AND OBJECTIVE BOX
SUBJECTIVE AND OBJECTIVE:  INTERVAL HPI/OVERNIGHT EVENTS:    DNR on chart:   Allergies    No Known Allergies    Intolerances    MEDICATIONS  (STANDING):  bisacodyl 5 milliGRAM(s) Oral at bedtime  chlorhexidine 4% Liquid 1 Application(s) Topical daily  cloNIDine 0.1 milliGRAM(s) Oral two times a day  dextrose 5%. 1000 milliLiter(s) (50 mL/Hr) IV Continuous <Continuous>  dextrose 50% Injectable 12.5 Gram(s) IV Push once  dextrose 50% Injectable 25 Gram(s) IV Push once  dextrose 50% Injectable 25 Gram(s) IV Push once  DULoxetine 60 milliGRAM(s) Oral daily  enoxaparin Injectable 40 milliGRAM(s) SubCutaneous daily  famotidine    Tablet 20 milliGRAM(s) Oral daily  HYDROmorphone PCA (5 mG/mL) 30 milliLiter(s) PCA Continuous PCA Continuous  influenza   Vaccine 0.5 milliLiter(s) IntraMuscular once  insulin lispro (HumaLOG) corrective regimen sliding scale   SubCutaneous three times a day before meals  insulin lispro (HumaLOG) corrective regimen sliding scale   SubCutaneous at bedtime  methadone    Tablet 20 milliGRAM(s) Oral every 6 hours  naloxone Injectable 0.4 milliGRAM(s) IV Push every 3 minutes  piperacillin/tazobactam IVPB.. 3.375 Gram(s) IV Intermittent every 8 hours  polyethylene glycol 3350 17 Gram(s) Oral two times a day  senna 2 Tablet(s) Oral at bedtime  sodium chloride 0.9%. 1000 milliLiter(s) (20 mL/Hr) IV Continuous <Continuous>  sodium chloride 0.9%. 1000 milliLiter(s) (75 mL/Hr) IV Continuous <Continuous>  sucralfate suspension 1 Gram(s) Oral four times a day    MEDICATIONS  (PRN):  bisacodyl Suppository 10 milliGRAM(s) Rectal daily PRN Constipation  dextrose 40% Gel 15 Gram(s) Oral once PRN Blood Glucose LESS THAN 70 milliGRAM(s)/deciliter  diazepam    Tablet 2 milliGRAM(s) Oral every 8 hours PRN anxiety  glucagon  Injectable 1 milliGRAM(s) IntraMuscular once PRN Glucose LESS THAN 70 milligrams/deciliter  HYDROmorphone PCA (5 mG/mL) Rescue Clinician Bolus 2 milliGRAM(s) IV Push every 1 hour PRN pain unrelieved with bolus dosing  naloxone Injectable 0.1 milliGRAM(s) IV Push every 3 minutes PRN For ANY of the following changes in patient status:  A. RR LESS THAN 10 breaths per minute, B. Oxygen saturation LESS THAN 90%, C. Sedation score of 6  ondansetron Injectable 4 milliGRAM(s) IV Push every 6 hours PRN Nausea and/or Vomiting  simethicone 80 milliGRAM(s) Chew every 8 hours PRN Gas      ITEMS UNCHECKED ARE NOT PRESENT    PRESENT SYMPTOMS: [ ]Unable to obtain due to poor mentation   Source if other than patient:  [ ]Family   [ ]Team     Pain (Impact on QOL):    Location:  Minimal acceptable level (0-10 scale):                   Aggravating factors:  Quality:  Radiation:  Severity (0-10 scale):    Timing:    Dyspnea:                           [ ]Mild [ ]Moderate [ ]Severe  Anxiety:                             [ ]Mild [ ]Moderate [ ]Severe  Fatigue:                             [ ]Mild [ ]Moderate [ ]Severe  Nausea:                             [ ]Mild [ ]Moderate [ ]Severe  Loss of appetite:              [ ]Mild [ ]Moderate [ ]Severe  Constipation:                    [ ]Mild [ ]Moderate [ ]Severe    PAIN AD Score:	  [email or web address] (Ctrl + left click to view)    Other Symptoms:  [ ]All other review of systems negative     Karnofsky Performance Score/Palliative Performance Status Version 2:         %    http://palliative.info/resource_material/[email or web address].pdf  PHYSICAL EXAM:  Vital Signs Last 24 Hrs  T(C): 36.7 (17 Nov 2019 09:08), Max: 36.7 (16 Nov 2019 22:05)  T(F): 98.1 (17 Nov 2019 09:08), Max: 98.1 (17 Nov 2019 02:08)  HR: 95 (17 Nov 2019 09:54) (86 - 99)  BP: 133/88 (17 Nov 2019 09:54) (113/64 - 133/88)  BP(mean): --  RR: 18 (17 Nov 2019 09:54) (18 - 18)  SpO2: 93% (17 Nov 2019 09:54) (93% - 94%) I&O's Summary    16 Nov 2019 07:01  -  17 Nov 2019 07:00  --------------------------------------------------------  IN: 835 mL / OUT: 403 mL / NET: 432 mL     GENERAL:  [ ]Alert  [ ]Oriented x   [ ]Lethargic  [ ]Cachexia  [ ]Unarousable  [ ]Verbal  [ ]Non-Verbal  Behavioral:   [ ] Anxiety  [ ] Delirium [ ] Agitation [ ] Other  HEENT:  [ ]Normal   [ ]Dry mouth   [ ]ET Tube/Trach  [ ]Oral lesions  PULMONARY:   [ ]Clear [ ]Tachypnea  [ ]Audible excessive secretions   [ ]Rhonchi        [ ]Right [ ]Left [ ]Bilateral  [ ]Crackles        [ ]Right [ ]Left [ ]Bilateral  [ ]Wheezing     [ ]Right [ ]Left [ ]Bilateral  CARDIOVASCULAR:    [ ]Regular [ ]Irregular [ ]Tachy  [ ]Jean [ ]Murmur [ ]Other  GASTROINTESTINAL:  [ ]Soft  [ ]Distended   [ ]+BS  [ ]Non tender [ ]Tender  [ ]PEG [ ]OGT/ NGT   Last BM:   11-13-19 @ 07:01  -  11-14-19 @ 07:00  --------------------------------------------------------  OUT: 0 mL    11-14-19 @ 07:01  -  11-15-19 @ 07:00  --------------------------------------------------------  OUT: 1 mL     GENITOURINARY:  [ ]Normal [ ] Incontinent   [ ]Oliguria/Anuria   [ ]Heredia  MUSCULOSKELETAL:   [ ]Normal   [ ]Weakness  [ ]Bed/Wheelchair bound [ ]Edema  NEUROLOGIC:   [ ]No focal deficits  [ ] Cognitive impairment  [ ] Dysphagia [ ]Dysarthria [ ] Paresis [ ]Other   SKIN:   [ ]Normal   [ ]Pressure ulcer(s)  [ ]Rash    CRITICAL CARE:  [ ] Shock Present  [ ]Septic [ ]Cardiogenic [ ]Neurologic [ ]Hypovolemic  [ ]  Vasopressors [ ]  Inotropes   [ ] Respiratory failure present  [ ] Acute  [ ] Chronic [ ] Hypoxic  [ ] Hypercarbic [ ] Other  [ ] Other organ failure     LABS:                        9.7    25.39 )-----------( 415      ( 16 Nov 2019 10:34 )             31.1   11-17    131<L>  |  90<L>  |  11  ----------------------------<  143<H>  4.1   |  25  |  0.72    Ca    8.6      17 Nov 2019 07:54    TPro  6.3  /  Alb  2.2<L>  /  TBili  0.7  /  DBili  x   /  AST  11  /  ALT  7<L>  /  AlkPhos  150<H>  11-16        RADIOLOGY & ADDITIONAL STUDIES:    Protein Calorie Malnutrition Present: [ ] yes [ ] no  [ ] PPSV2 < or = 30%  [ ] significant weight loss [ ] poor nutritional intake [ ] anasarca [ ] catabolic state Albumin, Serum: 2.2 g/dL (11-16-19 @ 07:20)  Artificial Nutrition [ ]     REFERRALS:   [ ]Chaplaincy  [ ] Hospice  [ ]Child Life  [ ]Social Work  [ ]Case management [ ]Holistic Therapy   Goals of Care Document: SUBJECTIVE AND OBJECTIVE: Patient seen and examined. Grimacing and near tears, complains of excruciating pain in lower abdomen, like someone is stabbing him. Utilized 22 injections/29 attempts on PCA, but states reluctance to press due to nausea. EKG showing Qtc >480. Primary team ordering CT abdomen given acute onset pain that is different than yesterday with elevated WBC count.     INTERVAL HPI/OVERNIGHT EVENTS: worsening abdominal pain despite being on PCA    DNR on chart:   Allergies    No Known Allergies    Intolerances    MEDICATIONS  (STANDING):  bisacodyl 5 milliGRAM(s) Oral at bedtime  chlorhexidine 4% Liquid 1 Application(s) Topical daily  cloNIDine 0.1 milliGRAM(s) Oral two times a day  dextrose 5%. 1000 milliLiter(s) (50 mL/Hr) IV Continuous <Continuous>  dextrose 50% Injectable 12.5 Gram(s) IV Push once  dextrose 50% Injectable 25 Gram(s) IV Push once  dextrose 50% Injectable 25 Gram(s) IV Push once  DULoxetine 60 milliGRAM(s) Oral daily  enoxaparin Injectable 40 milliGRAM(s) SubCutaneous daily  famotidine    Tablet 20 milliGRAM(s) Oral daily  HYDROmorphone PCA (5 mG/mL) 30 milliLiter(s) PCA Continuous PCA Continuous  influenza   Vaccine 0.5 milliLiter(s) IntraMuscular once  insulin lispro (HumaLOG) corrective regimen sliding scale   SubCutaneous three times a day before meals  insulin lispro (HumaLOG) corrective regimen sliding scale   SubCutaneous at bedtime  methadone    Tablet 20 milliGRAM(s) Oral every 6 hours  naloxone Injectable 0.4 milliGRAM(s) IV Push every 3 minutes  piperacillin/tazobactam IVPB.. 3.375 Gram(s) IV Intermittent every 8 hours  polyethylene glycol 3350 17 Gram(s) Oral two times a day  senna 2 Tablet(s) Oral at bedtime  sodium chloride 0.9%. 1000 milliLiter(s) (20 mL/Hr) IV Continuous <Continuous>  sodium chloride 0.9%. 1000 milliLiter(s) (75 mL/Hr) IV Continuous <Continuous>  sucralfate suspension 1 Gram(s) Oral four times a day    MEDICATIONS  (PRN):  bisacodyl Suppository 10 milliGRAM(s) Rectal daily PRN Constipation  dextrose 40% Gel 15 Gram(s) Oral once PRN Blood Glucose LESS THAN 70 milliGRAM(s)/deciliter  diazepam    Tablet 2 milliGRAM(s) Oral every 8 hours PRN anxiety  glucagon  Injectable 1 milliGRAM(s) IntraMuscular once PRN Glucose LESS THAN 70 milligrams/deciliter  HYDROmorphone PCA (5 mG/mL) Rescue Clinician Bolus 2 milliGRAM(s) IV Push every 1 hour PRN pain unrelieved with bolus dosing  naloxone Injectable 0.1 milliGRAM(s) IV Push every 3 minutes PRN For ANY of the following changes in patient status:  A. RR LESS THAN 10 breaths per minute, B. Oxygen saturation LESS THAN 90%, C. Sedation score of 6  ondansetron Injectable 4 milliGRAM(s) IV Push every 6 hours PRN Nausea and/or Vomiting  simethicone 80 milliGRAM(s) Chew every 8 hours PRN Gas      ITEMS UNCHECKED ARE NOT PRESENT    PRESENT SYMPTOMS: [ ]Unable to obtain due to poor mentation   Source if other than patient:  [ ]Family   [ ]Team     Pain (Impact on QOL):  yes  Location: abdomen  Minimal acceptable level (0-10 scale):    4               Aggravating factors: movement, nausea, palpation  Quality: stabbing  Radiation: diffuse abdomen  Severity (0-10 scale):  10  Timing: constant    Dyspnea:                           [ ]Mild [ ]Moderate [ ]Severe  Anxiety:                             [ ]Mild [ ]Moderate [ ]Severe  Fatigue:                             [ ]Mild [ ]Moderate [ ]Severe  Nausea:                             [ ]Mild [ x]Moderate [ ]Severe  Loss of appetite:              [ ]Mild [ ]Moderate [ ]Severe  Constipation:                    [ ]Mild [ ]Moderate [ ]Severe    PAIN AD Score:	  [email or web address]/[email or web address] (Ctrl + left click to view)    Other Symptoms:  [ x]All other review of systems negative     Karnofsky Performance Score/Palliative Performance Status Version 2:    30%    http://palliative.info/resource_material/[email or web address].pdf  PHYSICAL EXAM:  Vital Signs Last 24 Hrs  T(C): 36.7 (17 Nov 2019 09:08), Max: 36.7 (16 Nov 2019 22:05)  T(F): 98.1 (17 Nov 2019 09:08), Max: 98.1 (17 Nov 2019 02:08)  HR: 95 (17 Nov 2019 09:54) (86 - 99)  BP: 133/88 (17 Nov 2019 09:54) (113/64 - 133/88)  BP(mean): --  RR: 18 (17 Nov 2019 09:54) (18 - 18)  SpO2: 93% (17 Nov 2019 09:54) (93% - 94%) I&O's Summary    16 Nov 2019 07:01  -  17 Nov 2019 07:00  --------------------------------------------------------  IN: 835 mL / OUT: 403 mL / NET: 432 mL     GENERAL: uncomfortable appearing  [ x]Alert  [x ]Oriented x 3  [ ]Lethargic  [ ]Cachexia  [ ]Unarousable  [ ]Verbal  [ ]Non-Verbal  Behavioral:   [ ] Anxiety  [ ] Delirium [x ] Agitation [ ] Other  HEENT:  [ ]Normal   [x ]Dry mouth   [ ]ET Tube/Trach  [ ]Oral lesions  PULMONARY:   [ x]Clear [ ]Tachypnea  [ ]Audible excessive secretions   [ ]Rhonchi        [ ]Right [ ]Left [ ]Bilateral  [ ]Crackles        [ ]Right [ ]Left [ ]Bilateral  [ ]Wheezing     [ ]Right [ ]Left [ ]Bilateral  CARDIOVASCULAR:    [x ]Regular [ ]Irregular [ ]Tachy  [ ]Jean [ ]Murmur [ ]Other  GASTROINTESTINAL:  [ ]Soft  [x ]Distended   [ x]+BS  [ ]Non tender [ x]Tender  [ ]PEG [ ]OGT/ NGT   Last BM: 11/16   GENITOURINARY:  [x ]Normal [ ] Incontinent   [ ]Oliguria/Anuria   [ ]Heredia  MUSCULOSKELETAL:   [ ]Normal   [ x]Weakness  [ ]Bed/Wheelchair bound [ ]Edema  NEUROLOGIC:   [x ]No focal deficits  [ ] Cognitive impairment  [ ] Dysphagia [ ]Dysarthria [ ] Paresis [ ]Other   SKIN: ecchymoses  [ ]Normal   [ ]Pressure ulcer(s)  [ ]Rash    CRITICAL CARE:  [ ] Shock Present  [ ]Septic [ ]Cardiogenic [ ]Neurologic [ ]Hypovolemic  [ ]  Vasopressors [ ]  Inotropes   [ ] Respiratory failure present  [ ] Acute  [ ] Chronic [ ] Hypoxic  [ ] Hypercarbic [ ] Other  [ ] Other organ failure     LABS:                        9.7    25.39 )-----------( 415      ( 16 Nov 2019 10:34 )             31.1   11-17    131<L>  |  90<L>  |  11  ----------------------------<  143<H>  4.1   |  25  |  0.72    Ca    8.6      17 Nov 2019 07:54    TPro  6.3  /  Alb  2.2<L>  /  TBili  0.7  /  DBili  x   /  AST  11  /  ALT  7<L>  /  AlkPhos  150<H>  11-16    RADIOLOGY & ADDITIONAL STUDIES: CT a/p pending    Protein Calorie Malnutrition Present: [ x] yes [ ] no  [ ] PPSV2 < or = 30%  [ ] significant weight loss [x ] poor nutritional intake [ ] anasarca [x ] catabolic state Albumin, Serum: 2.2 g/dL (11-16-19 @ 07:20)  Artificial Nutrition [ ]     REFERRALS:   [ ]Chaplaincy  [ ] Hospice  [ ]Child Life  [ ]Social Work  [x ]Case management [ ]Holistic Therapy   Goals of Care Document:

## 2019-11-17 NOTE — PROGRESS NOTE ADULT - SUBJECTIVE AND OBJECTIVE BOX
Patient is a 63y old  Male who presents with a chief complaint of sepsis (16 Nov 2019 11:26)      SUBJECTIVE / OVERNIGHT EVENTS:    MEDICATIONS  (STANDING):  bisacodyl 5 milliGRAM(s) Oral at bedtime  chlorhexidine 4% Liquid 1 Application(s) Topical daily  cloNIDine 0.1 milliGRAM(s) Oral two times a day  dextrose 5%. 1000 milliLiter(s) (50 mL/Hr) IV Continuous <Continuous>  dextrose 50% Injectable 12.5 Gram(s) IV Push once  dextrose 50% Injectable 25 Gram(s) IV Push once  dextrose 50% Injectable 25 Gram(s) IV Push once  DULoxetine 60 milliGRAM(s) Oral daily  enoxaparin Injectable 40 milliGRAM(s) SubCutaneous daily  famotidine    Tablet 20 milliGRAM(s) Oral daily  HYDROmorphone PCA (5 mG/mL) 30 milliLiter(s) PCA Continuous PCA Continuous  influenza   Vaccine 0.5 milliLiter(s) IntraMuscular once  insulin lispro (HumaLOG) corrective regimen sliding scale   SubCutaneous three times a day before meals  insulin lispro (HumaLOG) corrective regimen sliding scale   SubCutaneous at bedtime  methadone    Tablet 20 milliGRAM(s) Oral every 6 hours  naloxone Injectable 0.4 milliGRAM(s) IV Push every 3 minutes  piperacillin/tazobactam IVPB.. 3.375 Gram(s) IV Intermittent every 8 hours  polyethylene glycol 3350 17 Gram(s) Oral two times a day  senna 2 Tablet(s) Oral at bedtime  sodium chloride 0.9%. 1000 milliLiter(s) (20 mL/Hr) IV Continuous <Continuous>  sodium chloride 0.9%. 1000 milliLiter(s) (75 mL/Hr) IV Continuous <Continuous>  sucralfate suspension 1 Gram(s) Oral four times a day    MEDICATIONS  (PRN):  bisacodyl Suppository 10 milliGRAM(s) Rectal daily PRN Constipation  dextrose 40% Gel 15 Gram(s) Oral once PRN Blood Glucose LESS THAN 70 milliGRAM(s)/deciliter  diazepam    Tablet 2 milliGRAM(s) Oral every 8 hours PRN anxiety  glucagon  Injectable 1 milliGRAM(s) IntraMuscular once PRN Glucose LESS THAN 70 milligrams/deciliter  HYDROmorphone PCA (5 mG/mL) Rescue Clinician Bolus 2 milliGRAM(s) IV Push every 1 hour PRN pain unrelieved with bolus dosing  naloxone Injectable 0.1 milliGRAM(s) IV Push every 3 minutes PRN For ANY of the following changes in patient status:  A. RR LESS THAN 10 breaths per minute, B. Oxygen saturation LESS THAN 90%, C. Sedation score of 6  ondansetron Injectable 4 milliGRAM(s) IV Push every 6 hours PRN Nausea and/or Vomiting  simethicone 80 milliGRAM(s) Chew every 8 hours PRN Gas      Vital Signs Last 24 Hrs  T(C): 36.7 (17 Nov 2019 09:08), Max: 36.7 (16 Nov 2019 22:05)  T(F): 98.1 (17 Nov 2019 09:08), Max: 98.1 (17 Nov 2019 02:08)  HR: 95 (17 Nov 2019 09:54) (86 - 99)  BP: 133/88 (17 Nov 2019 09:54) (113/64 - 133/88)  BP(mean): --  RR: 18 (17 Nov 2019 09:54) (18 - 18)  SpO2: 93% (17 Nov 2019 09:54) (93% - 94%)  CAPILLARY BLOOD GLUCOSE      POCT Blood Glucose.: 162 mg/dL (17 Nov 2019 08:21)  POCT Blood Glucose.: 107 mg/dL (16 Nov 2019 21:36)  POCT Blood Glucose.: 135 mg/dL (16 Nov 2019 17:25)  POCT Blood Glucose.: 219 mg/dL (16 Nov 2019 12:26)    I&O's Summary    16 Nov 2019 07:01  -  17 Nov 2019 07:00  --------------------------------------------------------  IN: 835 mL / OUT: 403 mL / NET: 432 mL        PHYSICAL EXAM:  GENERAL: NAD, well-developed  HEAD:  Atraumatic, Normocephalic  EYES: EOMI, PERRLA, conjunctiva and sclera clear  NECK: Supple, No JVD  CHEST/LUNG: Clear to auscultation bilaterally; No wheeze  HEART: Regular rate and rhythm; No murmurs, rubs, or gallops  ABDOMEN: Soft, Nontender, Nondistended; Bowel sounds present  EXTREMITIES:  2+ Peripheral Pulses, No clubbing, cyanosis, or edema  PSYCH: AAOx3  NEUROLOGY: non-focal  SKIN: No rashes or lesions    LABS:                        9.7    25.39 )-----------( 415      ( 16 Nov 2019 10:34 )             31.1     11-17    131<L>  |  90<L>  |  11  ----------------------------<  143<H>  4.1   |  25  |  0.72    Ca    8.6      17 Nov 2019 07:54    TPro  6.3  /  Alb  2.2<L>  /  TBili  0.7  /  DBili  x   /  AST  11  /  ALT  7<L>  /  AlkPhos  150<H>  11-16              RADIOLOGY & ADDITIONAL TESTS:    Imaging Personally Reviewed:    Consultant(s) Notes Reviewed:      Care Discussed with Consultants/Other Providers: Patient is a 63y old  Male who presents with a chief complaint of sepsis (16 Nov 2019 11:26)      SUBJECTIVE / OVERNIGHT EVENTS: Pt seen and examined. He c/o severe abd pain this AM ; different from previous. Described as a sharp knife cutting across his abd. Pt also believes his abd is more distended today. c/o nausea with PCA use.  Denies fever/chills. Seen by Palliative Care ; reccs appreciated.    MEDICATIONS  (STANDING):  bisacodyl 5 milliGRAM(s) Oral at bedtime  chlorhexidine 4% Liquid 1 Application(s) Topical daily  cloNIDine 0.1 milliGRAM(s) Oral two times a day  dextrose 5%. 1000 milliLiter(s) (50 mL/Hr) IV Continuous <Continuous>  dextrose 50% Injectable 12.5 Gram(s) IV Push once  dextrose 50% Injectable 25 Gram(s) IV Push once  dextrose 50% Injectable 25 Gram(s) IV Push once  DULoxetine 60 milliGRAM(s) Oral daily  enoxaparin Injectable 40 milliGRAM(s) SubCutaneous daily  famotidine    Tablet 20 milliGRAM(s) Oral daily  HYDROmorphone PCA (5 mG/mL) 30 milliLiter(s) PCA Continuous PCA Continuous  influenza   Vaccine 0.5 milliLiter(s) IntraMuscular once  insulin lispro (HumaLOG) corrective regimen sliding scale   SubCutaneous three times a day before meals  insulin lispro (HumaLOG) corrective regimen sliding scale   SubCutaneous at bedtime  methadone    Tablet 20 milliGRAM(s) Oral every 6 hours  naloxone Injectable 0.4 milliGRAM(s) IV Push every 3 minutes  piperacillin/tazobactam IVPB.. 3.375 Gram(s) IV Intermittent every 8 hours  polyethylene glycol 3350 17 Gram(s) Oral two times a day  senna 2 Tablet(s) Oral at bedtime  sodium chloride 0.9%. 1000 milliLiter(s) (20 mL/Hr) IV Continuous <Continuous>  sodium chloride 0.9%. 1000 milliLiter(s) (75 mL/Hr) IV Continuous <Continuous>  sucralfate suspension 1 Gram(s) Oral four times a day    MEDICATIONS  (PRN):  bisacodyl Suppository 10 milliGRAM(s) Rectal daily PRN Constipation  dextrose 40% Gel 15 Gram(s) Oral once PRN Blood Glucose LESS THAN 70 milliGRAM(s)/deciliter  diazepam    Tablet 2 milliGRAM(s) Oral every 8 hours PRN anxiety  glucagon  Injectable 1 milliGRAM(s) IntraMuscular once PRN Glucose LESS THAN 70 milligrams/deciliter  HYDROmorphone PCA (5 mG/mL) Rescue Clinician Bolus 2 milliGRAM(s) IV Push every 1 hour PRN pain unrelieved with bolus dosing  naloxone Injectable 0.1 milliGRAM(s) IV Push every 3 minutes PRN For ANY of the following changes in patient status:  A. RR LESS THAN 10 breaths per minute, B. Oxygen saturation LESS THAN 90%, C. Sedation score of 6  ondansetron Injectable 4 milliGRAM(s) IV Push every 6 hours PRN Nausea and/or Vomiting  simethicone 80 milliGRAM(s) Chew every 8 hours PRN Gas      Vital Signs Last 24 Hrs  T(C): 36.7 (17 Nov 2019 09:08), Max: 36.7 (16 Nov 2019 22:05)  T(F): 98.1 (17 Nov 2019 09:08), Max: 98.1 (17 Nov 2019 02:08)  HR: 95 (17 Nov 2019 09:54) (86 - 99)  BP: 133/88 (17 Nov 2019 09:54) (113/64 - 133/88)  BP(mean): --  RR: 18 (17 Nov 2019 09:54) (18 - 18)  SpO2: 93% (17 Nov 2019 09:54) (93% - 94%)  CAPILLARY BLOOD GLUCOSE      POCT Blood Glucose.: 162 mg/dL (17 Nov 2019 08:21)  POCT Blood Glucose.: 107 mg/dL (16 Nov 2019 21:36)  POCT Blood Glucose.: 135 mg/dL (16 Nov 2019 17:25)  POCT Blood Glucose.: 219 mg/dL (16 Nov 2019 12:26)    I&O's Summary    16 Nov 2019 07:01  -  17 Nov 2019 07:00  --------------------------------------------------------  IN: 835 mL / OUT: 403 mL / NET: 432 mL        PHYSICAL EXAM:  General: acute painful distress  Head: atraumatic, normocephalic  Eye: pale sclera and conjunctiva  Cardio: regular S1, S2,  no murmur  Respiratory: clear b/l, no wheezing  Abd : distended with palpable liver and mass, diffuse tenderness++ , perc daysi with brownish drainage  Musculoskeletal: no joint swelling  Skin: no rash  Neurologic: no focal deficit  Psych: anxious     LABS:                        9.7    25.39 )-----------( 415      ( 16 Nov 2019 10:34 )             31.1     11-17    131<L>  |  90<L>  |  11  ----------------------------<  143<H>  4.1   |  25  |  0.72    Ca    8.6      17 Nov 2019 07:54    TPro  6.3  /  Alb  2.2<L>  /  TBili  0.7  /  DBili  x   /  AST  11  /  ALT  7<L>  /  AlkPhos  150<H>  11-16                    Care Discussed with Consultants/Other Providers: Palliative care team

## 2019-11-17 NOTE — PROGRESS NOTE ADULT - PROBLEM SELECTOR PLAN 1
Awake/Alert Confirmed with Pancreatic biopsy and IR liver biopsy--> making this stage 4 metastatic pancreatic cancer.  -surg onc not offering any treatments; explained to patient and family that given stage 4 disease treatment would be geared towards palliation and non-curative management.   -Heme/Onc recs appreciated: appt on Oct 3rd at 10am at [facility]

## 2019-11-17 NOTE — PROGRESS NOTE ADULT - PROBLEM SELECTOR PLAN 2
EKG ordered this am, prolonged, >480  would be mindful when ordering medications, avoiding Qtc prolonging meds as patient on methadone as well  would trial ativan 0.2mg IV q4h prn for refractory nausea/vomiting

## 2019-11-17 NOTE — PROGRESS NOTE ADULT - ASSESSMENT
64 y/o PMH of traumatic work injury (2001) s/p multilumbar fusion (T8-S1) s/p hardware removal (2016, on daily opiates and methadone), chronic constipation, GERD, HTN, depression, anxiety, metastatic pancreatic adenocarcinoma s/p biliary stent placement (2m ago) and chemo sent in from [facility] for concern for biliary sepsis on 11/8.During this admission, CT showed gallbladder rupture, stable metastatic disease (to liver), and tumor trombosis. Palliative care was called pain Rx.

## 2019-11-17 NOTE — PROGRESS NOTE ADULT - ASSESSMENT
The patient is a 63-year man with PMH of traumatic work injury (2001) s/p multilumbar fusion (T8-S1) s/p hardware removal (2016, on daily opiates and methadone), chronic constipation, GERD, HTN, DM type 2, depression, anxiety, metastatic pancreatic adenocarcinoma s/p biliary stent placement (9/17) sent in from [facility] for concern for biliary sepsis on 11/8/19.

## 2019-11-17 NOTE — PROGRESS NOTE ADULT - PROBLEM SELECTOR PLAN 1
initial CT imaging concerning for gallbladder wall rupture and PNA.  Suspect cholecystitis and possible PNA versus inflammatory changes secondary to the cholecystitis.  Patient now s/p cholecystostomy tube placement by IR.  WBC uptrending 31 today  patient clinically better yesterday but today in marked painful distress   Biliary fluid culture grew out Klebsiella Oxytoca that is sensitive to Zosyn.  repeat CT abd/pelvis stat  Continue Zosyn for now  ID following

## 2019-11-17 NOTE — PROGRESS NOTE ADULT - PROBLEM SELECTOR PLAN 6
discussed with NP and attending  urgent CT abdomen ordered, meds adjusted, monitor nausea, avoid Qtc prolonging meds. please page palliative on call if acute uncontrolled symptoms

## 2019-11-17 NOTE — PROGRESS NOTE ADULT - PROBLEM SELECTOR PLAN 1
-acute pain this morning, worse compared to yesterday (11/16 had no complaints) now with worsening WBC count.  -discussed with primary team that imaging may be useful to help ensure now acute changes/infection where opiates are masking  - c/w methadone 80mg daily (20mg Q6)  - continue dilaudid PCA,  given acute pain with increase to 2mg continuous rate, 1.5mg IV Q15 minutes dilaudid for PRN. Current settings: dilaudid PCA [2/1.5/15//2CABQ1]  - monitor BMs, LAST 11/16  - Off dilaudid PRN and fentanyl patch  - patient agreeable to above  - on d/c, should meet with interventional pain to switch PCA settings over to implantable pump if possible -acute pain this morning, worse compared to yesterday (11/16 had no complaints) now with worsening WBC count.  -discussed with primary team that imaging may be useful to help ensure now acute changes/infection where opiates are masking  - c/w methadone 80mg daily (20mg Q6)  - continue dilaudid PCA,  given acute pain with increase to 2mg continuous rate, 1.5mg IV Q15 minutes dilaudid for PRN. Current settings: dilaudid PCA [2/1.5/15//3CABQ1]  - monitor BMs, LAST 11/16  - Off dilaudid PRN and fentanyl patch  - patient agreeable to above  - on d/c, should meet with interventional pain to switch PCA settings over to implantable pump if possible

## 2019-11-18 NOTE — PROGRESS NOTE ADULT - PROBLEM SELECTOR PLAN 1
initial CT imaging concerning for gallbladder wall rupture and PNA.  Suspect cholecystitis and possible PNA versus inflammatory changes secondary to the cholecystitis.  Patient now s/p cholecystostomy tube placement by IR.  WBC remains elevated ; 30 today  c/o worsening abd pain and distension  CT abd /pelvis concerning for loculated ascites and peritonitis  Urgent IR eval for paracentesis  Continue Zosyn for now per ID

## 2019-11-18 NOTE — PROGRESS NOTE ADULT - PROBLEM SELECTOR PLAN 2
- EKG with prolonged QTc >480  - would be mindful when ordering medications, avoiding Qtc prolonging meds as patient on methadone as well  - consider a trial of ativan 0.2mg IV q4h prn for refractory nausea/vomiting

## 2019-11-18 NOTE — PROGRESS NOTE ADULT - SUBJECTIVE AND OBJECTIVE BOX
Patient is a 63y old  Male who presents with a chief complaint of sepsis (17 Nov 2019 10:31)      SUBJECTIVE / OVERNIGHT EVENTS:    MEDICATIONS  (STANDING):  bisacodyl 5 milliGRAM(s) Oral at bedtime  chlorhexidine 4% Liquid 1 Application(s) Topical daily  cloNIDine 0.1 milliGRAM(s) Oral two times a day  dextrose 5%. 1000 milliLiter(s) (50 mL/Hr) IV Continuous <Continuous>  dextrose 50% Injectable 12.5 Gram(s) IV Push once  dextrose 50% Injectable 25 Gram(s) IV Push once  dextrose 50% Injectable 25 Gram(s) IV Push once  DULoxetine 60 milliGRAM(s) Oral daily  enoxaparin Injectable 40 milliGRAM(s) SubCutaneous daily  famotidine    Tablet 20 milliGRAM(s) Oral daily  HYDROmorphone PCA (5 mG/mL) 30 milliLiter(s) PCA Continuous PCA Continuous  influenza   Vaccine 0.5 milliLiter(s) IntraMuscular once  insulin lispro (HumaLOG) corrective regimen sliding scale   SubCutaneous three times a day before meals  insulin lispro (HumaLOG) corrective regimen sliding scale   SubCutaneous at bedtime  methadone    Tablet 20 milliGRAM(s) Oral every 6 hours  naloxone Injectable 0.4 milliGRAM(s) IV Push every 3 minutes  piperacillin/tazobactam IVPB.. 3.375 Gram(s) IV Intermittent every 8 hours  polyethylene glycol 3350 17 Gram(s) Oral two times a day  senna 2 Tablet(s) Oral at bedtime  sodium chloride 0.9%. 1000 milliLiter(s) (20 mL/Hr) IV Continuous <Continuous>  sodium chloride 0.9%. 1000 milliLiter(s) (75 mL/Hr) IV Continuous <Continuous>  sucralfate suspension 1 Gram(s) Oral four times a day    MEDICATIONS  (PRN):  bisacodyl Suppository 10 milliGRAM(s) Rectal daily PRN Constipation  dextrose 40% Gel 15 Gram(s) Oral once PRN Blood Glucose LESS THAN 70 milliGRAM(s)/deciliter  diazepam    Tablet 2 milliGRAM(s) Oral every 8 hours PRN anxiety  glucagon  Injectable 1 milliGRAM(s) IntraMuscular once PRN Glucose LESS THAN 70 milligrams/deciliter  HYDROmorphone PCA (5 mG/mL) Rescue Clinician Bolus 3 milliGRAM(s) IV Push every 1 hour PRN pain unrelieved with bolus dosing  naloxone Injectable 0.1 milliGRAM(s) IV Push every 3 minutes PRN For ANY of the following changes in patient status:  A. RR LESS THAN 10 breaths per minute, B. Oxygen saturation LESS THAN 90%, C. Sedation score of 6  simethicone 80 milliGRAM(s) Chew every 8 hours PRN Gas      Vital Signs Last 24 Hrs  T(C): 36.3 (18 Nov 2019 09:49), Max: 36.8 (17 Nov 2019 14:12)  T(F): 97.3 (18 Nov 2019 09:49), Max: 98.2 (17 Nov 2019 14:12)  HR: 90 (18 Nov 2019 09:49) (64 - 103)  BP: 144/81 (18 Nov 2019 09:49) (113/72 - 144/81)  BP(mean): --  RR: 18 (18 Nov 2019 09:49) (18 - 18)  SpO2: 93% (18 Nov 2019 09:49) (92% - 96%)  CAPILLARY BLOOD GLUCOSE      POCT Blood Glucose.: 120 mg/dL (18 Nov 2019 08:27)  POCT Blood Glucose.: 137 mg/dL (17 Nov 2019 22:08)  POCT Blood Glucose.: 120 mg/dL (17 Nov 2019 17:17)  POCT Blood Glucose.: 146 mg/dL (17 Nov 2019 12:20)    I&O's Summary    17 Nov 2019 07:01  -  18 Nov 2019 07:00  --------------------------------------------------------  IN: 360 mL / OUT: 50 mL / NET: 310 mL    18 Nov 2019 07:01  -  18 Nov 2019 10:04  --------------------------------------------------------  IN: 100 mL / OUT: 0 mL / NET: 100 mL        PHYSICAL EXAM:  GENERAL: NAD, well-developed  HEAD:  Atraumatic, Normocephalic  EYES: EOMI, PERRLA, conjunctiva and sclera clear  NECK: Supple, No JVD  CHEST/LUNG: Clear to auscultation bilaterally; No wheeze  HEART: Regular rate and rhythm; No murmurs, rubs, or gallops  ABDOMEN: Soft, Nontender, Nondistended; Bowel sounds present  EXTREMITIES:  2+ Peripheral Pulses, No clubbing, cyanosis, or edema  PSYCH: AAOx3  NEUROLOGY: non-focal  SKIN: No rashes or lesions    LABS:                        10.4   31.01 )-----------( 469      ( 17 Nov 2019 11:24 )             32.8     11-17    131<L>  |  90<L>  |  11  ----------------------------<  143<H>  4.1   |  25  |  0.72    Ca    8.6      17 Nov 2019 07:54                RADIOLOGY & ADDITIONAL TESTS:    Imaging Personally Reviewed:    Consultant(s) Notes Reviewed:      Care Discussed with Consultants/Other Providers: Patient is a 63y old  Male who presents with a chief complaint of sepsis (17 Nov 2019 10:31)      SUBJECTIVE / OVERNIGHT EVENTS:  Pt seen and examined with family at bedside. He c/o severe abd pain and worsening distension. Denies fever/chills, n/v.    MEDICATIONS  (STANDING):  bisacodyl 5 milliGRAM(s) Oral at bedtime  chlorhexidine 4% Liquid 1 Application(s) Topical daily  cloNIDine 0.1 milliGRAM(s) Oral two times a day  dextrose 5%. 1000 milliLiter(s) (50 mL/Hr) IV Continuous <Continuous>  dextrose 50% Injectable 12.5 Gram(s) IV Push once  dextrose 50% Injectable 25 Gram(s) IV Push once  dextrose 50% Injectable 25 Gram(s) IV Push once  DULoxetine 60 milliGRAM(s) Oral daily  enoxaparin Injectable 40 milliGRAM(s) SubCutaneous daily  famotidine    Tablet 20 milliGRAM(s) Oral daily  HYDROmorphone PCA (5 mG/mL) 30 milliLiter(s) PCA Continuous PCA Continuous  influenza   Vaccine 0.5 milliLiter(s) IntraMuscular once  insulin lispro (HumaLOG) corrective regimen sliding scale   SubCutaneous three times a day before meals  insulin lispro (HumaLOG) corrective regimen sliding scale   SubCutaneous at bedtime  methadone    Tablet 20 milliGRAM(s) Oral every 6 hours  naloxone Injectable 0.4 milliGRAM(s) IV Push every 3 minutes  piperacillin/tazobactam IVPB.. 3.375 Gram(s) IV Intermittent every 8 hours  polyethylene glycol 3350 17 Gram(s) Oral two times a day  senna 2 Tablet(s) Oral at bedtime  sodium chloride 0.9%. 1000 milliLiter(s) (20 mL/Hr) IV Continuous <Continuous>  sodium chloride 0.9%. 1000 milliLiter(s) (75 mL/Hr) IV Continuous <Continuous>  sucralfate suspension 1 Gram(s) Oral four times a day    MEDICATIONS  (PRN):  bisacodyl Suppository 10 milliGRAM(s) Rectal daily PRN Constipation  dextrose 40% Gel 15 Gram(s) Oral once PRN Blood Glucose LESS THAN 70 milliGRAM(s)/deciliter  diazepam    Tablet 2 milliGRAM(s) Oral every 8 hours PRN anxiety  glucagon  Injectable 1 milliGRAM(s) IntraMuscular once PRN Glucose LESS THAN 70 milligrams/deciliter  HYDROmorphone PCA (5 mG/mL) Rescue Clinician Bolus 3 milliGRAM(s) IV Push every 1 hour PRN pain unrelieved with bolus dosing  naloxone Injectable 0.1 milliGRAM(s) IV Push every 3 minutes PRN For ANY of the following changes in patient status:  A. RR LESS THAN 10 breaths per minute, B. Oxygen saturation LESS THAN 90%, C. Sedation score of 6  simethicone 80 milliGRAM(s) Chew every 8 hours PRN Gas      Vital Signs Last 24 Hrs  T(C): 36.3 (18 Nov 2019 09:49), Max: 36.8 (17 Nov 2019 14:12)  T(F): 97.3 (18 Nov 2019 09:49), Max: 98.2 (17 Nov 2019 14:12)  HR: 90 (18 Nov 2019 09:49) (64 - 103)  BP: 144/81 (18 Nov 2019 09:49) (113/72 - 144/81)  BP(mean): --  RR: 18 (18 Nov 2019 09:49) (18 - 18)  SpO2: 93% (18 Nov 2019 09:49) (92% - 96%)  CAPILLARY BLOOD GLUCOSE      POCT Blood Glucose.: 120 mg/dL (18 Nov 2019 08:27)  POCT Blood Glucose.: 137 mg/dL (17 Nov 2019 22:08)  POCT Blood Glucose.: 120 mg/dL (17 Nov 2019 17:17)  POCT Blood Glucose.: 146 mg/dL (17 Nov 2019 12:20)    I&O's Summary    17 Nov 2019 07:01  -  18 Nov 2019 07:00  --------------------------------------------------------  IN: 360 mL / OUT: 50 mL / NET: 310 mL    18 Nov 2019 07:01  -  18 Nov 2019 10:04  --------------------------------------------------------  IN: 100 mL / OUT: 0 mL / NET: 100 mL        PHYSICAL EXAM:  General: acute painful distress  Head: atraumatic, normocephalic  Eye: pale sclera and conjunctiva  Cardio: regular S1, S2,  no murmur  Respiratory: clear b/l, no wheezing  Abd : distended with palpable liver and mass, diffuse tenderness++ , perc daysi with brownish drainage  Musculoskeletal: no joint swelling  Skin: no rash  Neurologic: no focal deficit  Psych: anxious     LABS:                        10.4   31.01 )-----------( 469      ( 17 Nov 2019 11:24 )             32.8     11-17    131<L>  |  90<L>  |  11  ----------------------------<  143<H>  4.1   |  25  |  0.72    Ca    8.6      17 Nov 2019 07:54                RADIOLOGY & ADDITIONAL TESTS:    Imaging Personally Reviewed:  CT ABDOMEN AND PELVIS IC             Pancreatic cancer with hepatic metastatic disease, unchanged.    Interval percutaneous cholecystostomy with decreased distention of the   gallbladder.    Increased loculated ascites, with peritoneal enhancement, concerning for   peritonitis.

## 2019-11-18 NOTE — PROGRESS NOTE ADULT - ASSESSMENT
62 y/o PMH of traumatic work injury (2001) s/p multilumbar fusion (T8-S1) s/p hardware removal (2016, on daily opiates and methadone), chronic constipation, GERD, HTN, depression, anxiety, metastatic pancreatic adenocarcinoma s/p biliary stent placement (2m ago) and chemo sent in from [facility] for concern for biliary sepsis on 11/8.During this admission, CT showed gallbladder rupture, stable metastatic disease (to liver), and tumor trombosis. Palliative care was called pain Rx.

## 2019-11-18 NOTE — PROGRESS NOTE ADULT - PROBLEM SELECTOR PLAN 2
Extensive neurosurgery history w/ multilevel fusion and pain control issues.  Now with acute pain secondary to cholecystitis.  ISTOP Reference #: [id number] done on admission.  Palliative Care input appreciated.  Continue pain control per Palliative care service.  c/w Dilaudid PCA with Methadone.  Follows w/Dr Rico for pain mgt and was scheduled for PCA pump 11/8/19.  Continue bowel regimen (miralax, dulolax and senna).   Ativan prn for nausea in light of prolonged QTc

## 2019-11-18 NOTE — PROGRESS NOTE ADULT - SUBJECTIVE AND OBJECTIVE BOX
Interventional Radiology Follow- Up Note    This is a 63y Male s/p  Percutaneous cholecystostomy on 19 in Interventional Radiology with Dr. Daniels.     Patient seen and examined @ bedside. Reports diffuse abdominal pain and distension.     PAST MEDICAL & SURGICAL HISTORY:  History of diabetes mellitus, type II  Anxiety and depression  Diastolic dysfunction: stage I  Empyema lun2015 left lung, s/p VATS, decortication  H/O peptic ulcer: over 10 years ago  history of renal calculus  Herniated Disc: S/P work injury   Hypertension: Dx:   Spinal Stenosis  Postlaminectomy Syndrome  S/P  shunt  S/P insertion of spinal cord stimulator:   History of lumbar fusion:   Insertion of Intrathecal Dilaudid Pump: 2011  S/P Spinal Surgery: corrective lumbar fusion   S/P Knee Replacement: left knee   S/P Arthroscopy of Left Knee  S/P Tonsillectomy: childhood  Ankle Fracture: s/p ORIF left ankle     Allergies: No Known Allergies    LABS:                        10.3   30.08 )-----------( 443      ( 2019 09:55 )             31.9         133<L>  |  94<L>  |  10  ----------------------------<  132<H>  5.4<H>   |  29  |  0.82    Ca    8.7      2019 09:37      Vitals: T(F): 97.3 (19 @ 09:49), Max: 98.2 (19 @ 14:12)  HR: 90 (19 @ 09:49) (64 - 103)  BP: 144/81 (19 @ 09:49) (113/72 - 144/81)  RR: 18 (19 @ 09:49) (18 - 18)  SpO2: 93% (19 @ 09:49) (92% - 96%)    PHYSICAL EXAM:  General: Appears uncomfortable, NAD, A&O x3  Abd: distended, tense, mild diffuse TTP. Rosa M tube intact to bag with purulent output. Drain flushed with 10cc NS w/o difficulty. +fluid return noted in tubing. As per chart record 50cc/24hr.     Radiology:  < from: CT Abdomen and Pelvis w/ IV Cont (19 @ 16:36) >  IMPRESSION:   Pancreatic cancer with hepatic metastatic disease, unchanged.    Interval percutaneous cholecystostomy with decreased distention of the   gallbladder.    Increased loculated ascites, with peritoneal enhancement, concerning for   peritonitis.    < end of copied text >      A/P: 63y Male with PMH of metastatic pancreatic adenocarcinoma s/p biliary stent placement (), acute cholecystitis s/p percutaneous cholecystostomy on 19 with Dr. Daniels.   -WBC trending up now 30   -CT demonstrates increased ascites since prior imaging. May consider dx/tx paracentesis. This may help decrease some of his abdominal pain.   -Above d/w KAMLESH Vasquez, will plan for para today  -Continue abx therapy per ID recs  -Continue global care per primary team  -Case and imaging reviewed with Dr. mAaro  -Above d/w Patient and family at bedside. Pt in agreement for paracentesis. Procedure/ risks/ benefits/ alternatives discussed with the pt. Consent to obtained in IR suite.   -Please call IR at extension [phone] with any questions, concerns, or issues regarding above.

## 2019-11-18 NOTE — PROGRESS NOTE ADULT - SUBJECTIVE AND OBJECTIVE BOX
Interventional Radiology     63y Male with metastatic pancreatic cancer with ascites referred to IR for paracentesis.       PAST MEDICAL & SURGICAL HISTORY:  History of diabetes mellitus, type II  Anxiety and depression  Diastolic dysfunction: stage I  Empyema lun2015 left lung, s/p VATS, decortication  H/O peptic ulcer: over 10 years ago  history of renal calculus  Herniated Disc: S/P work injury   Hypertension: Dx:   Spinal Stenosis  Postlaminectomy Syndrome  S/P  shunt  S/P insertion of spinal cord stimulator:   History of lumbar fusion:   Insertion of Intrathecal Dilaudid Pump: 2011  S/P Spinal Surgery: corrective lumbar fusion   S/P Knee Replacement: left knee   S/P Arthroscopy of Left Knee  S/P Tonsillectomy: childhood  Ankle Fracture: s/p ORIF left ankle     Allergies  No Known Allergies    PE: Gen Appearance: in moderate distress c/o diffuse abdominal pain    MS: Alert OX2, not time     Labs:                        10.3   30.08 )-----------( 443      ( 2019 09:55 )             31.9     11-18    133<L>  |  94<L>  |  10  ----------------------------<  132<H>  5.4<H>   |  29  |  0.82    Ca    8.7      2019 09:37    Activated Partial Thromboplastin Time in AM (19 @ 09:29)    Activated Partial Thromboplastin Time: 28.0:   Prothrombin Time and INR, Plasma in AM (19 @ 09:29)    Prothrombin Time, Plasma: 15.4 sec    INR: 1.35:    After risks, benefits, alternatives discussion with the patient and his wife she verbalized understanding and signed family consent.   Will plan for image-guided paracentesis.    Jigar Medley, [id number]  [facility] [id number]  Ext [phone]

## 2019-11-18 NOTE — PROGRESS NOTE ADULT - SUBJECTIVE AND OBJECTIVE BOX
INTERVAL EVENTS:  Pt seen and examined. No acute events overnight. Reports that pain is well controlled on PCA pump. Denies fever/chills. Pt is eager to go home.    Vital Signs Last 24 Hrs  T(C): 36.3 (18 Nov 2019 09:49), Max: 36.8 (17 Nov 2019 14:12)  T(F): 97.3 (18 Nov 2019 09:49), Max: 98.2 (17 Nov 2019 14:12)  HR: 90 (18 Nov 2019 09:49) (64 - 103)  BP: 144/81 (18 Nov 2019 09:49) (113/72 - 144/81)  BP(mean): --  RR: 18 (18 Nov 2019 09:49) (18 - 18)  SpO2: 93% (18 Nov 2019 09:49) (92% - 96%)      PHYSICAL EXAM:   GENERAL: no acute distress  HEENT: EOMI, neck supple  RESPIRATORY: LCTAB/L, no rhonchi, rales, or wheezing  CARDIOVASCULAR: RRR, no murmurs, gallops, rubs  ABDOMINAL: + distended abd, RUQ tenderness to palpation, positive bowel sounds   EXTREMITIES: no clubbing, cyanosis, or edema  NEUROLOGICAL: alert and oriented x 3, non-focal  SKIN: no rashes or lesions   MUSCULOSKELETAL: no gross joint deformity                          10.3   30.08 )-----------( 443      ( 18 Nov 2019 09:55 )             31.9     11-18    133<L>  |  94<L>  |  10  ----------------------------<  132<H>  5.4<H>   |  29  |  0.82    Ca    8.7      18 Nov 2019 09:37          MEDICATIONS  (STANDING):  bisacodyl 5 milliGRAM(s) Oral at bedtime  chlorhexidine 4% Liquid 1 Application(s) Topical daily  cloNIDine 0.1 milliGRAM(s) Oral two times a day  dextrose 5%. 1000 milliLiter(s) (50 mL/Hr) IV Continuous <Continuous>  dextrose 50% Injectable 12.5 Gram(s) IV Push once  dextrose 50% Injectable 25 Gram(s) IV Push once  dextrose 50% Injectable 25 Gram(s) IV Push once  DULoxetine 60 milliGRAM(s) Oral daily  enoxaparin Injectable 40 milliGRAM(s) SubCutaneous daily  famotidine    Tablet 20 milliGRAM(s) Oral daily  HYDROmorphone PCA (5 mG/mL) 30 milliLiter(s) PCA Continuous PCA Continuous  influenza   Vaccine 0.5 milliLiter(s) IntraMuscular once  insulin lispro (HumaLOG) corrective regimen sliding scale   SubCutaneous three times a day before meals  insulin lispro (HumaLOG) corrective regimen sliding scale   SubCutaneous at bedtime  methadone    Tablet 20 milliGRAM(s) Oral every 6 hours  naloxone Injectable 0.4 milliGRAM(s) IV Push every 3 minutes  piperacillin/tazobactam IVPB.. 3.375 Gram(s) IV Intermittent every 8 hours  polyethylene glycol 3350 17 Gram(s) Oral two times a day  senna 2 Tablet(s) Oral at bedtime  sodium chloride 0.9%. 1000 milliLiter(s) (20 mL/Hr) IV Continuous <Continuous>  sodium chloride 0.9%. 1000 milliLiter(s) (75 mL/Hr) IV Continuous <Continuous>  sucralfate suspension 1 Gram(s) Oral four times a day INTERVAL EVENTS:  Pt seen and examined. No acute events overnight. Reports that pain is well controlled on PCA pump. Tolerated paracentesis well. >4L fluid removed. Denies fever/chills. Pt is eager to go home.    Vital Signs Last 24 Hrs  T(C): 36.3 (18 Nov 2019 09:49), Max: 36.8 (17 Nov 2019 14:12)  T(F): 97.3 (18 Nov 2019 09:49), Max: 98.2 (17 Nov 2019 14:12)  HR: 90 (18 Nov 2019 09:49) (64 - 103)  BP: 144/81 (18 Nov 2019 09:49) (113/72 - 144/81)  BP(mean): --  RR: 18 (18 Nov 2019 09:49) (18 - 18)  SpO2: 93% (18 Nov 2019 09:49) (92% - 96%)      PHYSICAL EXAM:   GENERAL: no acute distress  HEENT: EOMI, neck supple  RESPIRATORY: LCTAB/L, no rhonchi, rales, or wheezing  CARDIOVASCULAR: RRR, no murmurs, gallops, rubs  ABDOMINAL: +RUQ tenderness to palpation, positive bowel sounds, cholecystostomy tube draining well   EXTREMITIES: no clubbing, cyanosis, or edema  NEUROLOGICAL: alert and oriented x 3, non-focal  SKIN: no rashes or lesions   MUSCULOSKELETAL: no gross joint deformity                          10.3   30.08 )-----------( 443      ( 18 Nov 2019 09:55 )             31.9     11-18    133<L>  |  94<L>  |  10  ----------------------------<  132<H>  5.4<H>   |  29  |  0.82    Ca    8.7      18 Nov 2019 09:37          MEDICATIONS  (STANDING):  bisacodyl 5 milliGRAM(s) Oral at bedtime  chlorhexidine 4% Liquid 1 Application(s) Topical daily  cloNIDine 0.1 milliGRAM(s) Oral two times a day  dextrose 5%. 1000 milliLiter(s) (50 mL/Hr) IV Continuous <Continuous>  dextrose 50% Injectable 12.5 Gram(s) IV Push once  dextrose 50% Injectable 25 Gram(s) IV Push once  dextrose 50% Injectable 25 Gram(s) IV Push once  DULoxetine 60 milliGRAM(s) Oral daily  enoxaparin Injectable 40 milliGRAM(s) SubCutaneous daily  famotidine    Tablet 20 milliGRAM(s) Oral daily  HYDROmorphone PCA (5 mG/mL) 30 milliLiter(s) PCA Continuous PCA Continuous  influenza   Vaccine 0.5 milliLiter(s) IntraMuscular once  insulin lispro (HumaLOG) corrective regimen sliding scale   SubCutaneous three times a day before meals  insulin lispro (HumaLOG) corrective regimen sliding scale   SubCutaneous at bedtime  methadone    Tablet 20 milliGRAM(s) Oral every 6 hours  naloxone Injectable 0.4 milliGRAM(s) IV Push every 3 minutes  piperacillin/tazobactam IVPB.. 3.375 Gram(s) IV Intermittent every 8 hours  polyethylene glycol 3350 17 Gram(s) Oral two times a day  senna 2 Tablet(s) Oral at bedtime  sodium chloride 0.9%. 1000 milliLiter(s) (20 mL/Hr) IV Continuous <Continuous>  sodium chloride 0.9%. 1000 milliLiter(s) (75 mL/Hr) IV Continuous <Continuous>  sucralfate suspension 1 Gram(s) Oral four times a day    < from: CT Abdomen and Pelvis w/ IV Cont (11.17.19 @ 16:36) >  IMPRESSION:   Pancreatic cancer with hepatic metastatic disease, unchanged.    Interval percutaneous cholecystostomy with decreased distention of the   gallbladder.    Increased loculated ascites, with peritoneal enhancement, concerning for   peritonitis.    < end of copied text >      < from: CT Chest No Cont (11.09.19 @ 14:48) >  IMPRESSION:     Likely infectious process in the right lung. Follow-up CT is advised to   evaluate for resolution.  Moderate size hiatal hernia with surrounding small amount of free fluid.   The esophagus is fluid-filled and thickened which can be secondary to   esophagitis/reflux.    < end of copied text >

## 2019-11-18 NOTE — PROGRESS NOTE ADULT - SUBJECTIVE AND OBJECTIVE BOX
Interventional Radiology Procedure Note    Procedure: Image-guided paracentesis. (Tx and Dx)     Indication: metastatic pancreatic cancer with ascites.    Operators: TY Medley, Dr. Edenilson Daniels    Anesthesia (type): Lidocaine 1% (Local)     Contrast: None.     EBL: Minimal.     Findings/Follow up Plan of Care: Drained 4100 ml of cloudy yellow ascites via the LLQ abdominal approach.  pt tolerated the procedure well.  Hemostasis secure and VSS.  Dressing applied to the site is c/d/i.    Specimens Removed: Cytology, Microbiology, chemistry, cell count.     Implants: None.     Complications: None.     Condition/Disposition: Stable, sent back to patient room in stable condition.     Please call Interventional Radiology x [phone] with any questions, concerns, or issues.

## 2019-11-18 NOTE — PROGRESS NOTE ADULT - PROBLEM SELECTOR PLAN 1
- continues to have acute pain, ? component of peritonitis per imagnig  - used 27 injections of PCA DD in 24 hours, will change PCA settings to dilaudid PCA [2.5CR/2DD/15mins//4 Q1 CAB] and monitor usage  - would benefit from enhanced pain management in PCU, family amenable, placed on board  - c/w methadone 80mg daily (20mg Q6)  - continue dilaudid PCA,  given acute pain with increase to 2mg continuous rate, 1.5mg IV Q15 minutes dilaudid for PRN. Current settings: dilaudid PCA [2/1.5/15//3CABQ1]  - monitor BMs, LAST 11/16  - Off dilaudid PRN and fentanyl patch  - patient agreeable to above  - on d/c, should meet with interventional pain to switch PCA settings over to implantable pump if possible

## 2019-11-18 NOTE — PROGRESS NOTE ADULT - SUBJECTIVE AND OBJECTIVE BOX
SUBJECTIVE AND OBJECTIVE: Patient seen and examined. Grimacing and near tears, complains of excruciating pain in lower abdomen, like someone is stabbing him. Utilized 22 injections/29 attempts on PCA, but states reluctance to press due to nausea. EKG showing Qtc >480. Primary team ordering CT abdomen given acute onset pain that is different than yesterday with elevated WBC count.     INTERVAL HPI/OVERNIGHT EVENTS:   11/17: worsening abdominal pain despite being on PCA  11/18: son states patient had a bad night in terms of pain; 27 injections and 41 attempts in last 24 hours    DNR on chart:   Allergies    No Known Allergies    Intolerances    MEDICATIONS  (STANDING):  bisacodyl 5 milliGRAM(s) Oral at bedtime  chlorhexidine 4% Liquid 1 Application(s) Topical daily  cloNIDine 0.1 milliGRAM(s) Oral two times a day  dextrose 5%. 1000 milliLiter(s) (50 mL/Hr) IV Continuous <Continuous>  dextrose 50% Injectable 12.5 Gram(s) IV Push once  dextrose 50% Injectable 25 Gram(s) IV Push once  dextrose 50% Injectable 25 Gram(s) IV Push once  DULoxetine 60 milliGRAM(s) Oral daily  enoxaparin Injectable 40 milliGRAM(s) SubCutaneous daily  famotidine    Tablet 20 milliGRAM(s) Oral daily  HYDROmorphone PCA (5 mG/mL) 30 milliLiter(s) PCA Continuous PCA Continuous  influenza   Vaccine 0.5 milliLiter(s) IntraMuscular once  insulin lispro (HumaLOG) corrective regimen sliding scale   SubCutaneous three times a day before meals  insulin lispro (HumaLOG) corrective regimen sliding scale   SubCutaneous at bedtime  methadone    Tablet 20 milliGRAM(s) Oral every 6 hours  naloxone Injectable 0.4 milliGRAM(s) IV Push every 3 minutes  piperacillin/tazobactam IVPB.. 3.375 Gram(s) IV Intermittent every 8 hours  polyethylene glycol 3350 17 Gram(s) Oral two times a day  senna 2 Tablet(s) Oral at bedtime  sodium chloride 0.9%. 1000 milliLiter(s) (20 mL/Hr) IV Continuous <Continuous>  sodium chloride 0.9%. 1000 milliLiter(s) (75 mL/Hr) IV Continuous <Continuous>  sucralfate suspension 1 Gram(s) Oral four times a day    MEDICATIONS  (PRN):  bisacodyl Suppository 10 milliGRAM(s) Rectal daily PRN Constipation  dextrose 40% Gel 15 Gram(s) Oral once PRN Blood Glucose LESS THAN 70 milliGRAM(s)/deciliter  diazepam    Tablet 2 milliGRAM(s) Oral every 8 hours PRN anxiety  glucagon  Injectable 1 milliGRAM(s) IntraMuscular once PRN Glucose LESS THAN 70 milligrams/deciliter  HYDROmorphone PCA (5 mG/mL) Rescue Clinician Bolus 4 milliGRAM(s) IV Push every 1 hour PRN pain unrelieved with bolus dosing  naloxone Injectable 0.1 milliGRAM(s) IV Push every 3 minutes PRN For ANY of the following changes in patient status:  A. RR LESS THAN 10 breaths per minute, B. Oxygen saturation LESS THAN 90%, C. Sedation score of 6  simethicone 80 milliGRAM(s) Chew every 8 hours PRN Gas        ITEMS UNCHECKED ARE NOT PRESENT    PRESENT SYMPTOMS: [ ]Unable to obtain due to poor mentation   Source if other than patient:  [ ]Family   [ ]Team     Pain (Impact on QOL):  yes  Location: abdomen  Minimal acceptable level (0-10 scale):    4               Aggravating factors: movement, nausea, palpation  Quality: stabbing  Radiation: diffuse abdomen  Severity (0-10 scale):  10  Timing: constant    Dyspnea:                           [ ]Mild [ ]Moderate [ ]Severe  Anxiety:                             [ ]Mild [ ]Moderate [ ]Severe  Fatigue:                             [ ]Mild [ ]Moderate [ ]Severe  Nausea:                             [ ]Mild [ x]Moderate [ ]Severe  Loss of appetite:              [ ]Mild [ ]Moderate [ ]Severe  Constipation:                    [ ]Mild [ ]Moderate [ ]Severe    PAIN AD Score:	  [email or web address]/[email or web address] (Ctrl + left click to view)    Other Symptoms:  [ x]All other review of systems negative     Karnofsky Performance Score/Palliative Performance Status Version 2:    30%    http://palliative.info/resource_material/[email or web address].pdf  PHYSICAL EXAM:  Vital Signs Last 24 Hrs  T(C): 36.4 (18 Nov 2019 13:59), Max: 36.7 (18 Nov 2019 00:17)  T(F): 97.6 (18 Nov 2019 13:59), Max: 98 (18 Nov 2019 00:17)  HR: 97 (18 Nov 2019 13:59) (64 - 103)  BP: 104/68 (18 Nov 2019 13:59) (104/68 - 144/81)  BP(mean): --  RR: 18 (18 Nov 2019 13:59) (18 - 18)  SpO2: 95% (18 Nov 2019 13:59) (93% - 96%)     GENERAL: uncomfortable appearing  [ x]Alert  [x ]Oriented x 3  [ ]Lethargic  [ ]Cachexia  [ ]Unarousable  [ ]Verbal  [ ]Non-Verbal  Behavioral:   [ ] Anxiety  [ ] Delirium [x ] Agitation [ ] Other  HEENT:  [ ]Normal   [x ]Dry mouth   [ ]ET Tube/Trach  [ ]Oral lesions  PULMONARY:   [ x]Clear [ ]Tachypnea  [ ]Audible excessive secretions   [ ]Rhonchi        [ ]Right [ ]Left [ ]Bilateral  [ ]Crackles        [ ]Right [ ]Left [ ]Bilateral  [ ]Wheezing     [ ]Right [ ]Left [ ]Bilateral  CARDIOVASCULAR:    [x ]Regular [ ]Irregular [ ]Tachy  [ ]Jean [ ]Murmur [ ]Other  GASTROINTESTINAL:  [ ]Soft  [x ]Distended   [ x]+BS  [ ]Non tender [ x]Tender  [ ]PEG [ ]OGT/ NGT   Last BM: 11/16   GENITOURINARY:  [x ]Normal [ ] Incontinent   [ ]Oliguria/Anuria   [ ]Heredia  MUSCULOSKELETAL:   [ ]Normal   [ x]Weakness  [ ]Bed/Wheelchair bound [ ]Edema  NEUROLOGIC:   [x ]No focal deficits  [ ] Cognitive impairment  [ ] Dysphagia [ ]Dysarthria [ ] Paresis [ ]Other   SKIN: ecchymoses  [ ]Normal   [ ]Pressure ulcer(s)  [ ]Rash    CRITICAL CARE:  [ ] Shock Present  [ ]Septic [ ]Cardiogenic [ ]Neurologic [ ]Hypovolemic  [ ]  Vasopressors [ ]  Inotropes   [ ] Respiratory failure present  [ ] Acute  [ ] Chronic [ ] Hypoxic  [ ] Hypercarbic [ ] Other  [ ] Other organ failure     LABS:                                   10.3   30.08 )-----------( 443      ( 18 Nov 2019 09:55 )             31.9     11-18    133<L>  |  94<L>  |  10  ----------------------------<  132<H>  5.4<H>   |  29  |  0.82    Ca    8.7      18 Nov 2019 09:37        RADIOLOGY & ADDITIONAL STUDIES:     CT A/P 11/17/19    Pancreatic cancer with hepatic metastatic disease, unchanged.    Interval percutaneous cholecystostomy with decreased distention of the   gallbladder.    Increased loculated ascites, with peritoneal enhancement, concerning for   peritonitis.        Protein Calorie Malnutrition Present: [ x] yes [ ] no  [ ] PPSV2 < or = 30%  [ ] significant weight loss [x ] poor nutritional intake [ ] anasarca [x ] catabolic state Albumin, Serum: 2.2 g/dL (11-16-19 @ 07:20)  Artificial Nutrition [ ]     REFERRALS:   [ ]Chaplaincy  [ ] Hospice  [ ]Child Life  [ ]Social Work  [x ]Case management [ ]Holistic Therapy   Goals of Care Document:

## 2019-11-18 NOTE — PROGRESS NOTE ADULT - ASSESSMENT
62 y/o PMH of traumatic work injury (2001) s/p multilumbar fusion (T8-S1) s/p hardware removal (2016, on daily opiates and methadone), chronic constipation, GERD, HTN, DMII (A1c 6.8), depression, anxiety, metastatic pancreatic adenocarcinoma s/p biliary stent placement (9/17), on gemcitabine/abraxone (last dose 10/24/19) sent in from [facility] for concern for sepsis on 11/8/19, found to have gallbladder perforation    # gallbladder perforation-  s/p percutaneous cholecystostomy tube placed by IR on 11-12-19   -fluid from this procedure showing Klebsiella oxytoca  -CT abdomen done showing " Distended gallbladder with discontinuity of the wall and lobulated fluid along the inferior liver image highly concerning for gallbladder rupture. New moderate loculated ascites. "   -on zosyn per ID  -renal function improving, continue to monitor, renally dose meds  -no plan for inpatient chemotherapy at this time  - appreciate excellent medical care    # leukocytosis  -differential showed neutrophil predominant  -likely be reactive still from infection/procedure  -continue to trend cbc with diff    # Pancreatic adenocarcinoma, Stage IV  -holding chemotherapy while admitted  -patient on complex pain regimen including methadone, Dilaudid and fentanyl;  -now pain is better controlled with PCA pump that is being managed byr palliative care team  -pt had louclated ascites on CT a/p s/p drainage (11/18) - pending culture  -continue with bowel regimen   -s/p biliary stent placement (9/17), on gemcitabine/abraxane (last dose 10/24/19)   -follows with Dr. Ward outpatient      Carson Moseley MD. PGY 5  Heme-Onc fellow  [phone]; [id number]

## 2019-11-18 NOTE — PROGRESS NOTE ADULT - ASSESSMENT
63 m with DM, HTN, traumatic work injury (2001) s/p multilumbar fusion (T8-S1) s/p hardware removal (2016, on daily opiates and methadone), metastatic pancreatic adenocarcinoma s/p biliary stent placement (9/17/19) sent in from [facility] for concern for biliary sepsis on 11/8/19.   Came in with leukocytosis 26k, afebrile, tachycardic to 100  CT C/A/P with possible pneumonia, distended GB with likely GB rupture as well as new moderate loculated ascites.  s/p percutaneous cholecystostomy today with foul smelling fluid     metastatic pancreatic ca with ruptured gall bladder and cholecystitis s/p percutaneous cholecystostomy 11/12  IR biliary cx: klebsiella oxytoca, R to amp/sulbactam  R side pulmonary findings likely due to cholecystitis and contagious inflammation   now worsening leukocytosis to 30s and peritonitis with loculated ascites    * repeat CT is showing loculated ascites and peritonitis, the perc daysi drainage is also more purulent  * c/w zosyn 3/375 q 8 for now, started 11/8, now day 6 but post cholecystostomy day 7  * agree with paracentesis  * monitor CBC, if no improvement, repeat blood cultures

## 2019-11-18 NOTE — PROGRESS NOTE ADULT - SUBJECTIVE AND OBJECTIVE BOX
Follow Up:  cholecystitis    Interval History: pt afebrile but still significant abd pain with increases WBC to 30s, repeat CT with increased loculated ascites and peritonitis    ROS:      All other systems negative    Constitutional: no fever, no chills  Head: no trauma  Eyes: no vision changes, no eye pain  ENT:  no sore throat, no rhinorrhea  Cardiovascular:  no chest pain, no palpitation  Respiratory:  no SOB, no cough  GI:  + abd pain, no vomiting, no diarrhea  urinary: no dysuria, no hematuria, no flank pain  musculoskeletal:  no joint pain, no joint swelling  skin:  no rash  neurology:  no headache, no seizure        Allergies  No Known Allergies        ANTIMICROBIALS:  piperacillin/tazobactam IVPB.. 3.375 every 8 hours      OTHER MEDS:  bisacodyl 5 milliGRAM(s) Oral at bedtime  bisacodyl Suppository 10 milliGRAM(s) Rectal daily PRN  chlorhexidine 4% Liquid 1 Application(s) Topical daily  cloNIDine 0.1 milliGRAM(s) Oral two times a day  dextrose 40% Gel 15 Gram(s) Oral once PRN  dextrose 5%. 1000 milliLiter(s) IV Continuous <Continuous>  dextrose 50% Injectable 12.5 Gram(s) IV Push once  dextrose 50% Injectable 25 Gram(s) IV Push once  dextrose 50% Injectable 25 Gram(s) IV Push once  diazepam    Tablet 2 milliGRAM(s) Oral every 8 hours PRN  DULoxetine 60 milliGRAM(s) Oral daily  enoxaparin Injectable 40 milliGRAM(s) SubCutaneous daily  famotidine    Tablet 20 milliGRAM(s) Oral daily  glucagon  Injectable 1 milliGRAM(s) IntraMuscular once PRN  HYDROmorphone PCA (5 mG/mL) 30 milliLiter(s) PCA Continuous PCA Continuous  HYDROmorphone PCA (5 mG/mL) Rescue Clinician Bolus 3 milliGRAM(s) IV Push every 1 hour PRN  influenza   Vaccine 0.5 milliLiter(s) IntraMuscular once  insulin lispro (HumaLOG) corrective regimen sliding scale   SubCutaneous three times a day before meals  insulin lispro (HumaLOG) corrective regimen sliding scale   SubCutaneous at bedtime  methadone    Tablet 20 milliGRAM(s) Oral every 6 hours  naloxone Injectable 0.1 milliGRAM(s) IV Push every 3 minutes PRN  naloxone Injectable 0.4 milliGRAM(s) IV Push every 3 minutes  polyethylene glycol 3350 17 Gram(s) Oral two times a day  senna 2 Tablet(s) Oral at bedtime  simethicone 80 milliGRAM(s) Chew every 8 hours PRN  sodium chloride 0.9%. 1000 milliLiter(s) IV Continuous <Continuous>  sodium chloride 0.9%. 1000 milliLiter(s) IV Continuous <Continuous>  sucralfate suspension 1 Gram(s) Oral four times a day      Vital Signs Last 24 Hrs  T(C): 36.3 (18 Nov 2019 09:49), Max: 36.8 (17 Nov 2019 14:12)  T(F): 97.3 (18 Nov 2019 09:49), Max: 98.2 (17 Nov 2019 14:12)  HR: 90 (18 Nov 2019 09:49) (64 - 103)  BP: 144/81 (18 Nov 2019 09:49) (113/72 - 144/81)  BP(mean): --  RR: 18 (18 Nov 2019 09:49) (18 - 18)  SpO2: 93% (18 Nov 2019 09:49) (92% - 96%)    Physical Exam:  General:    NAD,  non toxic  Head: atraumatic, normocephalic  Eye: palce sclera and conjunctiva  ENT:    no oropharyngeal lesions,   no LAD,   neck supple  Cardio:     regular S1, S2,  no murmur  Respiratory:    clear b/l,    no wheezing  abd:    distended with palpable liver and mass, diffuse tenderness more on RUQ, perc daysi with purulent drainage  :   no CVAT,  no suprapubic tenderness,   no  saavedra  Musculoskeletal:   no joint swelling  vascular: no phlebitis, both LEs cold but palpable pulses and no cyanosis  Skin:    no rash  Neurologic:     no focal deficit  psych: normal affect                          10.3   30.08 )-----------( 443      ( 18 Nov 2019 09:55 )             31.9       11-18    133<L>  |  94<L>  |  10  ----------------------------<  132<H>  5.4<H>   |  29  |  0.82    Ca    8.7      18 Nov 2019 09:37            MICROBIOLOGY:  v  .Body Fluid Bile Fluid  11-12-19   Numerous Klebsiella oxytoca  --  Klebsiella oxytoca      .Blood Blood-Peripheral  11-09-19   No growth at 5 days.  --  --      .Urine Clean Catch (Midstream)  11-09-19   <10,000 CFU/mL Normal Urogenital Veronika  --  --                RADIOLOGY:  Images below reviewed personally  < from: CT Abdomen and Pelvis w/ IV Cont (11.17.19 @ 16:36) >  Pancreatic cancer with hepatic metastatic disease, unchanged.    Interval percutaneous cholecystostomy with decreased distention of the   gallbladder.    Increased loculated ascites, with peritoneal enhancement, concerning for   peritonitis.

## 2019-11-18 NOTE — PROGRESS NOTE ADULT - ASSESSMENT
The patient is a 63-year man with PMH of traumatic work injury (2001) s/p multilumbar fusion (T8-S1) s/p hardware removal (2016, on daily opiates and methadone), chronic constipation, GERD, HTN, DM type 2, depression, anxiety, metastatic pancreatic adenocarcinoma s/p biliary stent placement (9/17) sent in from [facility] for concern for biliary sepsis on 11/8/19.

## 2019-11-19 NOTE — PROGRESS NOTE ADULT - PROBLEM SELECTOR PLAN 3
- DDT on hold while inpatient  - follows with Dr. Ward at [facility]

## 2019-11-19 NOTE — PROGRESS NOTE ADULT - ASSESSMENT
62 y/o PMH of traumatic work injury (2001) s/p multilumbar fusion (T8-S1) s/p hardware removal (2016, on daily opiates and methadone), chronic constipation, GERD, HTN, depression, anxiety, metastatic pancreatic adenocarcinoma s/p biliary stent placement (2m ago) and chemo sent in from [facility] for concern for biliary sepsis on 11/8.During this admission, CT showed gallbladder rupture, stable metastatic disease (to liver), and tumor trombosis. Palliative care was called pain Rx.

## 2019-11-19 NOTE — PROGRESS NOTE ADULT - SUBJECTIVE AND OBJECTIVE BOX
Follow Up:  cholecystitis    Interval History: s/p paracentesis and over 4 liter cloudy fluid was drained    ROS:      All other systems negative    Constitutional: no fever, no chills  Head: no trauma  Eyes: no vision changes, no eye pain  ENT:  no sore throat, no rhinorrhea  Cardiovascular:  no chest pain, no palpitation  Respiratory:  no SOB, no cough  GI:  + abd pain but some improvement, no vomiting, no diarrhea  urinary: no dysuria, no hematuria, no flank pain  musculoskeletal:  no joint pain, no joint swelling  skin:  no rash  neurology:  no headache, no seizure        Allergies  No Known Allergies        ANTIMICROBIALS:  piperacillin/tazobactam IVPB.. 3.375 every 8 hours      OTHER MEDS:  bisacodyl 5 milliGRAM(s) Oral at bedtime  bisacodyl Suppository 10 milliGRAM(s) Rectal daily PRN  chlorhexidine 4% Liquid 1 Application(s) Topical daily  cloNIDine 0.1 milliGRAM(s) Oral two times a day  dextrose 40% Gel 15 Gram(s) Oral once PRN  dextrose 5%. 1000 milliLiter(s) IV Continuous <Continuous>  dextrose 50% Injectable 12.5 Gram(s) IV Push once  dextrose 50% Injectable 25 Gram(s) IV Push once  dextrose 50% Injectable 25 Gram(s) IV Push once  diazepam    Tablet 2 milliGRAM(s) Oral every 8 hours PRN  DULoxetine 60 milliGRAM(s) Oral daily  enoxaparin Injectable 40 milliGRAM(s) SubCutaneous daily  famotidine    Tablet 20 milliGRAM(s) Oral daily  glucagon  Injectable 1 milliGRAM(s) IntraMuscular once PRN  HYDROmorphone PCA (5 mG/mL) 30 milliLiter(s) PCA Continuous PCA Continuous  HYDROmorphone PCA (5 mG/mL) Rescue Clinician Bolus 4 milliGRAM(s) IV Push every 1 hour PRN  influenza   Vaccine 0.5 milliLiter(s) IntraMuscular once  insulin lispro (HumaLOG) corrective regimen sliding scale   SubCutaneous three times a day before meals  insulin lispro (HumaLOG) corrective regimen sliding scale   SubCutaneous at bedtime  methadone    Tablet 20 milliGRAM(s) Oral every 6 hours  naloxone Injectable 0.1 milliGRAM(s) IV Push every 3 minutes PRN  naloxone Injectable 0.4 milliGRAM(s) IV Push every 3 minutes  polyethylene glycol 3350 17 Gram(s) Oral two times a day  senna 2 Tablet(s) Oral at bedtime  simethicone 80 milliGRAM(s) Chew every 8 hours PRN  sodium chloride 0.9%. 1000 milliLiter(s) IV Continuous <Continuous>  sodium chloride 0.9%. 1000 milliLiter(s) IV Continuous <Continuous>  sucralfate suspension 1 Gram(s) Oral four times a day      Vital Signs Last 24 Hrs  T(C): 36.3 (19 Nov 2019 10:03), Max: 36.5 (19 Nov 2019 06:02)  T(F): 97.3 (19 Nov 2019 10:03), Max: 97.7 (19 Nov 2019 06:02)  HR: 98 (19 Nov 2019 10:03) (93 - 98)  BP: 111/72 (19 Nov 2019 10:03) (104/68 - 118/81)  BP(mean): --  RR: 18 (19 Nov 2019 10:03) (18 - 18)  SpO2: 93% (19 Nov 2019 10:03) (91% - 97%)    Physical Exam:  General:    NAD,  non toxic  Head: atraumatic, normocephalic  Eye: palce sclera and conjunctiva  ENT:    no oropharyngeal lesions,   no LAD,   neck supple  Cardio:     regular S1, S2,  no murmur  Respiratory:    clear b/l,    no wheezing  abd:    distended with palpable liver and mass, diffuse tenderness more on RUQ, perc daysi with purulent drainage  :   no CVAT,  no suprapubic tenderness,   no  saavedra  Musculoskeletal:   no joint swelling  vascular: no phlebitis  Skin:    no rash  Neurologic:     no focal deficit  psych: normal affect                          9.7    27.64 )-----------( 431      ( 19 Nov 2019 09:10 )             31.3       11-19    133<L>  |  91<L>  |  8   ----------------------------<  104<H>  4.0   |  28  |  0.71    Ca    8.7      19 Nov 2019 07:05            MICROBIOLOGY:  v  Peritoneal Peritoneal Fluid  11-18-19   Testing in progress  --    polymorphonuclear leukocytes seen  No organisms seen  by cytocentrifuge      .Body Fluid Bile Fluid  11-12-19   Numerous Klebsiella oxytoca  --  Klebsiella oxytoca      .Blood Blood-Peripheral  11-09-19   No growth at 5 days.  --  --      .Urine Clean Catch (Midstream)  11-09-19   <10,000 CFU/mL Normal Urogenital Veronika  --  --                RADIOLOGY:  Images below reviewed personally  < from: CT Abdomen and Pelvis w/ IV Cont (11.17.19 @ 16:36) >  Pancreatic cancer with hepatic metastatic disease, unchanged.    Interval percutaneous cholecystostomy with decreased distention of the   gallbladder.    Increased loculated ascites, with peritoneal enhancement, concerning for   peritonitis.    < from: IR Procedure (11.18.19 @ 16:23) >  approximately 4100   cc of ascites were drained.  The sheath was removed and a sterile   dressing was applied.  The patient tolerated the procedure with no   immediate complication.    Impression: Ultrasound-guided paracentesis as described above.

## 2019-11-19 NOTE — PROGRESS NOTE ADULT - ASSESSMENT
The patient is a 63-year man with PMH of traumatic work injury (2001) s/p multilumbar fusion (T8-S1) s/p hardware removal (2016, on daily opiates and methadone), chronic constipation, GERD, HTN, DM type 2, depression, anxiety, metastatic pancreatic adenocarcinoma s/p biliary stent placement (9/17) sent in from [facility] for concern for biliary sepsis on 11/8/19.

## 2019-11-19 NOTE — CHART NOTE - NSCHARTNOTEFT_GEN_A_CORE
Nutrition Service      Chart reviewed, events noted  Noted with abdominal pain, s/p paracentesis 4 liters removed. Pain improved  "Dx; Pancreatic cancer with hepatic metastatic disease, unchanged."  Interval percutaneous cholecystostomy with decreased distention of the   gallbladder".      Diet Consistent carbohydrate Fluid restriction low fat, Soft  PO intake :  remains poor, HHA at bedside , intake improved  30-50% following paracentesis;, accepts glucerna 1-2 daily    GI c/o constipation,  BM x1, receiving senna,  simethicone, bisacodef daily     WT 86kg    SKIN no pressure breakdown      Nutrition Dx, Malnutrition  Diagnosis ongoing    Recommendations  1.continue Consistent carbohydrate restricted fluid 1000ml low fat, DASH diet  2. continue glucerna 2x daily  3. Reinforce diet adherence, fluid restriction   Discussed with team     RD remains available

## 2019-11-19 NOTE — PROGRESS NOTE ADULT - ASSESSMENT
63 m with DM, HTN, traumatic work injury (2001) s/p multilumbar fusion (T8-S1) s/p hardware removal (2016, on daily opiates and methadone), metastatic pancreatic adenocarcinoma s/p biliary stent placement (9/17/19) sent in from [facility] for concern for biliary sepsis on 11/8/19.   Came in with leukocytosis 26k, afebrile, tachycardic to 100  CT C/A/P with possible pneumonia, distended GB with likely GB rupture as well as new moderate loculated ascites.  s/p percutaneous cholecystostomy today with foul smelling fluid     metastatic pancreatic ca with ruptured gall bladder and cholecystitis s/p percutaneous cholecystostomy 11/12  IR biliary cx: klebsiella oxytoca, R to amp/sulbactam  R side pulmonary findings likely due to cholecystitis and contagious inflammation   now worsening leukocytosis  and peritonitis with loculated ascites s/p tap over 4 liter cloudy fluid drained, WBC 1370, PMN 50%    * f/u the peritoneal fluid cx  * c/w zosyn 3/375 q 8 for now, started 11/8, but post cholecystostomy day 8  * monitor CBC, if no improvement, repeat blood cultures

## 2019-11-19 NOTE — PROGRESS NOTE ADULT - SUBJECTIVE AND OBJECTIVE BOX
Patient is a 63y old  Male who presents with a chief complaint of sepsis (19 Nov 2019 10:18)      SUBJECTIVE / OVERNIGHT EVENTS:    MEDICATIONS  (STANDING):  bisacodyl 5 milliGRAM(s) Oral at bedtime  chlorhexidine 4% Liquid 1 Application(s) Topical daily  cloNIDine 0.1 milliGRAM(s) Oral two times a day  dextrose 5%. 1000 milliLiter(s) (50 mL/Hr) IV Continuous <Continuous>  dextrose 50% Injectable 12.5 Gram(s) IV Push once  dextrose 50% Injectable 25 Gram(s) IV Push once  dextrose 50% Injectable 25 Gram(s) IV Push once  DULoxetine 60 milliGRAM(s) Oral daily  enoxaparin Injectable 40 milliGRAM(s) SubCutaneous daily  famotidine    Tablet 20 milliGRAM(s) Oral daily  HYDROmorphone PCA (5 mG/mL) 30 milliLiter(s) PCA Continuous PCA Continuous  influenza   Vaccine 0.5 milliLiter(s) IntraMuscular once  insulin lispro (HumaLOG) corrective regimen sliding scale   SubCutaneous three times a day before meals  insulin lispro (HumaLOG) corrective regimen sliding scale   SubCutaneous at bedtime  methadone    Tablet 20 milliGRAM(s) Oral every 6 hours  naloxone Injectable 0.4 milliGRAM(s) IV Push every 3 minutes  piperacillin/tazobactam IVPB.. 3.375 Gram(s) IV Intermittent every 8 hours  polyethylene glycol 3350 17 Gram(s) Oral two times a day  senna 2 Tablet(s) Oral at bedtime  sodium chloride 0.9%. 1000 milliLiter(s) (20 mL/Hr) IV Continuous <Continuous>  sodium chloride 0.9%. 1000 milliLiter(s) (75 mL/Hr) IV Continuous <Continuous>  sucralfate suspension 1 Gram(s) Oral four times a day    MEDICATIONS  (PRN):  bisacodyl Suppository 10 milliGRAM(s) Rectal daily PRN Constipation  dextrose 40% Gel 15 Gram(s) Oral once PRN Blood Glucose LESS THAN 70 milliGRAM(s)/deciliter  diazepam    Tablet 2 milliGRAM(s) Oral every 8 hours PRN anxiety  glucagon  Injectable 1 milliGRAM(s) IntraMuscular once PRN Glucose LESS THAN 70 milligrams/deciliter  HYDROmorphone PCA (5 mG/mL) Rescue Clinician Bolus 4 milliGRAM(s) IV Push every 1 hour PRN pain unrelieved with bolus dosing  naloxone Injectable 0.1 milliGRAM(s) IV Push every 3 minutes PRN For ANY of the following changes in patient status:  A. RR LESS THAN 10 breaths per minute, B. Oxygen saturation LESS THAN 90%, C. Sedation score of 6  simethicone 80 milliGRAM(s) Chew every 8 hours PRN Gas      Vital Signs Last 24 Hrs  T(C): 36.3 (19 Nov 2019 10:03), Max: 36.5 (19 Nov 2019 06:02)  T(F): 97.3 (19 Nov 2019 10:03), Max: 97.7 (19 Nov 2019 06:02)  HR: 98 (19 Nov 2019 10:03) (93 - 98)  BP: 111/72 (19 Nov 2019 10:03) (104/68 - 118/81)  BP(mean): --  RR: 18 (19 Nov 2019 10:03) (18 - 18)  SpO2: 93% (19 Nov 2019 10:03) (91% - 97%)  CAPILLARY BLOOD GLUCOSE      POCT Blood Glucose.: 113 mg/dL (19 Nov 2019 08:11)  POCT Blood Glucose.: 121 mg/dL (18 Nov 2019 22:13)  POCT Blood Glucose.: 114 mg/dL (18 Nov 2019 17:31)  POCT Blood Glucose.: 134 mg/dL (18 Nov 2019 12:13)    I&O's Summary    18 Nov 2019 07:01  -  19 Nov 2019 07:00  --------------------------------------------------------  IN: 1420 mL / OUT: 50 mL / NET: 1370 mL    19 Nov 2019 07:01  -  19 Nov 2019 10:49  --------------------------------------------------------  IN: 240 mL / OUT: 50 mL / NET: 190 mL        PHYSICAL EXAM:  GENERAL: NAD, well-developed  HEAD:  Atraumatic, Normocephalic  EYES: EOMI, PERRLA, conjunctiva and sclera clear  NECK: Supple, No JVD  CHEST/LUNG: Clear to auscultation bilaterally; No wheeze  HEART: Regular rate and rhythm; No murmurs, rubs, or gallops  ABDOMEN: Soft, Nontender, Nondistended; Bowel sounds present  EXTREMITIES:  2+ Peripheral Pulses, No clubbing, cyanosis, or edema  PSYCH: AAOx3  NEUROLOGY: non-focal  SKIN: No rashes or lesions    LABS:                        9.7    27.64 )-----------( 431      ( 19 Nov 2019 09:10 )             31.3     11-19    133<L>  |  91<L>  |  8   ----------------------------<  104<H>  4.0   |  28  |  0.71    Ca    8.7      19 Nov 2019 07:05                RADIOLOGY & ADDITIONAL TESTS:    Imaging Personally Reviewed:    Consultant(s) Notes Reviewed:      Care Discussed with Consultants/Other Providers: Patient is a 63y old  Male who presents with a chief complaint of sepsis (19 Nov 2019 10:18)      SUBJECTIVE / OVERNIGHT EVENTS: Pt seen and examined with family at bedside. s/p paracentesis by IR yesterday ; 4100ml of liquid removed. Pt reports improvement in abd pain. Denies fever/chills. Seen by Palliative care ; plan for transfer to PCU for pain management.      MEDICATIONS  (STANDING):  bisacodyl 5 milliGRAM(s) Oral at bedtime  chlorhexidine 4% Liquid 1 Application(s) Topical daily  cloNIDine 0.1 milliGRAM(s) Oral two times a day  dextrose 5%. 1000 milliLiter(s) (50 mL/Hr) IV Continuous <Continuous>  dextrose 50% Injectable 12.5 Gram(s) IV Push once  dextrose 50% Injectable 25 Gram(s) IV Push once  dextrose 50% Injectable 25 Gram(s) IV Push once  DULoxetine 60 milliGRAM(s) Oral daily  enoxaparin Injectable 40 milliGRAM(s) SubCutaneous daily  famotidine    Tablet 20 milliGRAM(s) Oral daily  HYDROmorphone PCA (5 mG/mL) 30 milliLiter(s) PCA Continuous PCA Continuous  influenza   Vaccine 0.5 milliLiter(s) IntraMuscular once  insulin lispro (HumaLOG) corrective regimen sliding scale   SubCutaneous three times a day before meals  insulin lispro (HumaLOG) corrective regimen sliding scale   SubCutaneous at bedtime  methadone    Tablet 20 milliGRAM(s) Oral every 6 hours  naloxone Injectable 0.4 milliGRAM(s) IV Push every 3 minutes  piperacillin/tazobactam IVPB.. 3.375 Gram(s) IV Intermittent every 8 hours  polyethylene glycol 3350 17 Gram(s) Oral two times a day  senna 2 Tablet(s) Oral at bedtime  sodium chloride 0.9%. 1000 milliLiter(s) (20 mL/Hr) IV Continuous <Continuous>  sodium chloride 0.9%. 1000 milliLiter(s) (75 mL/Hr) IV Continuous <Continuous>  sucralfate suspension 1 Gram(s) Oral four times a day    MEDICATIONS  (PRN):  bisacodyl Suppository 10 milliGRAM(s) Rectal daily PRN Constipation  dextrose 40% Gel 15 Gram(s) Oral once PRN Blood Glucose LESS THAN 70 milliGRAM(s)/deciliter  diazepam    Tablet 2 milliGRAM(s) Oral every 8 hours PRN anxiety  glucagon  Injectable 1 milliGRAM(s) IntraMuscular once PRN Glucose LESS THAN 70 milligrams/deciliter  HYDROmorphone PCA (5 mG/mL) Rescue Clinician Bolus 4 milliGRAM(s) IV Push every 1 hour PRN pain unrelieved with bolus dosing  naloxone Injectable 0.1 milliGRAM(s) IV Push every 3 minutes PRN For ANY of the following changes in patient status:  A. RR LESS THAN 10 breaths per minute, B. Oxygen saturation LESS THAN 90%, C. Sedation score of 6  simethicone 80 milliGRAM(s) Chew every 8 hours PRN Gas      Vital Signs Last 24 Hrs  T(C): 36.3 (19 Nov 2019 10:03), Max: 36.5 (19 Nov 2019 06:02)  T(F): 97.3 (19 Nov 2019 10:03), Max: 97.7 (19 Nov 2019 06:02)  HR: 98 (19 Nov 2019 10:03) (93 - 98)  BP: 111/72 (19 Nov 2019 10:03) (104/68 - 118/81)  BP(mean): --  RR: 18 (19 Nov 2019 10:03) (18 - 18)  SpO2: 93% (19 Nov 2019 10:03) (91% - 97%)  CAPILLARY BLOOD GLUCOSE      POCT Blood Glucose.: 113 mg/dL (19 Nov 2019 08:11)  POCT Blood Glucose.: 121 mg/dL (18 Nov 2019 22:13)  POCT Blood Glucose.: 114 mg/dL (18 Nov 2019 17:31)  POCT Blood Glucose.: 134 mg/dL (18 Nov 2019 12:13)    I&O's Summary    18 Nov 2019 07:01  -  19 Nov 2019 07:00  --------------------------------------------------------  IN: 1420 mL / OUT: 50 mL / NET: 1370 mL    19 Nov 2019 07:01  -  19 Nov 2019 10:49  --------------------------------------------------------  IN: 240 mL / OUT: 50 mL / NET: 190 mL        PHYSICAL EXAM:  General: NAD, anicteric, afebrile  Head: atraumatic, normocephalic  Eye: pale sclera and conjunctiva  Cardio: regular S1, S2,  no murmur  Respiratory: clear b/l, no wheezing  Abd : distended with palpable liver and mass,  tenderness+ , perc daysi with brownish drainage  Musculoskeletal: no joint swelling  Skin: no rash  Neurologic: no focal deficit  Psych: appropriate affect    LABS:                        9.7    27.64 )-----------( 431      ( 19 Nov 2019 09:10 )             31.3     11-19    133<L>  |  91<L>  |  8   ----------------------------<  104<H>  4.0   |  28  |  0.71    Ca    8.7      19 Nov 2019 07:05

## 2019-11-19 NOTE — PROGRESS NOTE ADULT - PROBLEM SELECTOR PLAN 5
Mild;    Suspect SIADH secondary to pain and perhaps recent nausea.  Continue fluid restriction for now.

## 2019-11-19 NOTE — PROGRESS NOTE ADULT - SUBJECTIVE AND OBJECTIVE BOX
Interventional Radiology Follow- Up Note    This is a 63y Male s/p  Percutaneous cholecystostomy on 19 in Interventional Radiology with Dr. Daniels.     Patient seen and examined @ bedside. He underwent diagnostic/ therapeutic paracentesis yesterday with removal of 4.1L. He reports feeling "a lot better" since paracentesis.     PAST MEDICAL & SURGICAL HISTORY:  History of diabetes mellitus, type II  Anxiety and depression  Diastolic dysfunction: stage I  Empyema lun2015 left lung, s/p VATS, decortication  H/O peptic ulcer: over 10 years ago  history of renal calculus  Herniated Disc: S/P work injury   Hypertension: Dx:   Spinal Stenosis  Postlaminectomy Syndrome  S/P  shunt  S/P insertion of spinal cord stimulator:   History of lumbar fusion:   Insertion of Intrathecal Dilaudid Pump: 2011  S/P Spinal Surgery: corrective lumbar fusion   S/P Knee Replacement: left knee   S/P Arthroscopy of Left Knee  S/P Tonsillectomy: childhood  Ankle Fracture: s/p ORIF left ankle       Allergies: No Known Allergies    LABS:                        9.7    27.64 )-----------( 431      ( 2019 09:10 )             31.3         133<L>  |  91<L>  |  8   ----------------------------<  104<H>  4.0   |  28  |  0.71    Ca    8.7      2019 07:05      Culture - Body Fluid with Gram Stain (19 @ 22:21)    Gram Stain:   polymorphonuclear leukocytes seen  No organisms seen  by cytocentrifuge    Specimen Source: Peritoneal Peritoneal Fluid      Vitals: T(F): 97.3 (19 @ 10:03), Max: 97.7 (19 @ 06:02)  HR: 98 (19 @ 10:03) (93 - 98)  BP: 111/72 (19 @ 10:03) (104/68 - 118/81)  RR: 18 (19 @ 10:03) (18 - 18)  SpO2: 93% (19 @ 10:03) (91% - 97%)    PHYSICAL EXAM:  General: Nontoxic, in NAD, A&O x3  Abd: ND, soft, NTTP. cholecystostomy tube intact to bag with 50cc of purulent grey/ green output.  50cc documented over 24hr, total in 24hr in 100cc. Drain flushed with  10cc NS w/o difficulty, +return of fluid noted in tubing.     A/P: 63y Male with PMH of metastatic pancreatic adenocarcinoma s/p biliary stent placement (), acute cholecystitis s/p percutaneous cholecystostomy on 19 with Dr. Daniels.   -WBC down trending, 30--> 27.64  -S/p 4.1L removal of ascites yesterday with improvement in symptoms.   -Continue abx therapy per ID recs  -Continue global care per primary team  -Patient to follow up for routine exchange in 3 months. Follow up info given to patient and wife. [phone] (IR booking).   -Patient will require supplies with dressing and flushes.   -Will sign off. IR referral appreciated. Please reconsult as needed.   -Please call IR at extension [phone] with any questions, concerns, or issues regarding above.

## 2019-11-19 NOTE — PROGRESS NOTE ADULT - SUBJECTIVE AND OBJECTIVE BOX
SUBJECTIVE AND OBJECTIVE: Patient seen and examined. Grimacing and near tears, complains of excruciating pain in lower abdomen, like someone is stabbing him. Utilized 22 injections/29 attempts on PCA, but states reluctance to press due to nausea. EKG showing Qtc >480. Primary team ordering CT abdomen given acute onset pain that is different than yesterday with elevated WBC count.     INTERVAL HPI/OVERNIGHT EVENTS:   11/17: worsening abdominal pain despite being on PCA  11/18: son states patient had a bad night in terms of pain; 27 injections and 41 attempts in last 24 hours  11/19: patient sleeping, wife states had a difficult night do to roommate, but pain has been better (24 inj, 34 attempts in last 24 hours, no CAB)    DNR on chart:   Allergies    No Known Allergies    Intolerances    MEDICATIONS  (STANDING):  bisacodyl 5 milliGRAM(s) Oral at bedtime  chlorhexidine 4% Liquid 1 Application(s) Topical daily  cloNIDine 0.1 milliGRAM(s) Oral two times a day  dextrose 5%. 1000 milliLiter(s) (50 mL/Hr) IV Continuous <Continuous>  dextrose 50% Injectable 12.5 Gram(s) IV Push once  dextrose 50% Injectable 25 Gram(s) IV Push once  dextrose 50% Injectable 25 Gram(s) IV Push once  DULoxetine 60 milliGRAM(s) Oral daily  enoxaparin Injectable 40 milliGRAM(s) SubCutaneous daily  famotidine    Tablet 20 milliGRAM(s) Oral daily  HYDROmorphone PCA (5 mG/mL) 30 milliLiter(s) PCA Continuous PCA Continuous  influenza   Vaccine 0.5 milliLiter(s) IntraMuscular once  insulin lispro (HumaLOG) corrective regimen sliding scale   SubCutaneous three times a day before meals  insulin lispro (HumaLOG) corrective regimen sliding scale   SubCutaneous at bedtime  methadone    Tablet 20 milliGRAM(s) Oral every 6 hours  naloxone Injectable 0.4 milliGRAM(s) IV Push every 3 minutes  piperacillin/tazobactam IVPB.. 3.375 Gram(s) IV Intermittent every 8 hours  polyethylene glycol 3350 17 Gram(s) Oral two times a day  senna 2 Tablet(s) Oral at bedtime  sodium chloride 0.9%. 1000 milliLiter(s) (20 mL/Hr) IV Continuous <Continuous>  sodium chloride 0.9%. 1000 milliLiter(s) (75 mL/Hr) IV Continuous <Continuous>  sucralfate suspension 1 Gram(s) Oral four times a day    MEDICATIONS  (PRN):  bisacodyl Suppository 10 milliGRAM(s) Rectal daily PRN Constipation  dextrose 40% Gel 15 Gram(s) Oral once PRN Blood Glucose LESS THAN 70 milliGRAM(s)/deciliter  diazepam    Tablet 2 milliGRAM(s) Oral every 8 hours PRN anxiety  glucagon  Injectable 1 milliGRAM(s) IntraMuscular once PRN Glucose LESS THAN 70 milligrams/deciliter  HYDROmorphone PCA (5 mG/mL) Rescue Clinician Bolus 4 milliGRAM(s) IV Push every 1 hour PRN pain unrelieved with bolus dosing  naloxone Injectable 0.1 milliGRAM(s) IV Push every 3 minutes PRN For ANY of the following changes in patient status:  A. RR LESS THAN 10 breaths per minute, B. Oxygen saturation LESS THAN 90%, C. Sedation score of 6  simethicone 80 milliGRAM(s) Chew every 8 hours PRN Gas        ITEMS UNCHECKED ARE NOT PRESENT    PRESENT SYMPTOMS: [ ]Unable to obtain due to poor mentation   Source if other than patient:  [ X]Family   [ ]Team     Pain (Impact on QOL):  yes  Location: abdomen  Minimal acceptable level (0-10 scale):    4               Aggravating factors: movement, nausea, palpation  Quality: stabbing  Radiation: diffuse abdomen  Severity (0-10 scale):  10  Timing: constant    Dyspnea:                           [ ]Mild [ ]Moderate [ ]Severe  Anxiety:                             [ ]Mild [ ]Moderate [ ]Severe  Fatigue:                             [ ]Mild [ ]Moderate [ ]Severe  Nausea:                             [ ]Mild [ x]Moderate [ ]Severe  Loss of appetite:              [ ]Mild [ ]Moderate [ ]Severe  Constipation:                    [ ]Mild [ ]Moderate [ ]Severe    PAIN AD Score:	  [email or web address] (Ctrl + left click to view)    Other Symptoms:  [ x]All other review of systems negative     Karnofsky Performance Score/Palliative Performance Status Version 2:    30%    http://palliative.info/resource_material/[email or web address].pdf  PHYSICAL EXAM:  Vital Signs Last 24 Hrs  T(C): 36.3 (19 Nov 2019 10:03), Max: 36.5 (19 Nov 2019 06:02)  T(F): 97.3 (19 Nov 2019 10:03), Max: 97.7 (19 Nov 2019 06:02)  HR: 98 (19 Nov 2019 10:03) (93 - 98)  BP: 111/72 (19 Nov 2019 10:03) (104/68 - 118/81)  BP(mean): --  RR: 18 (19 Nov 2019 10:03) (18 - 18)  SpO2: 93% (19 Nov 2019 10:03) (91% - 97%)    Limited exam for patient resting    GENERAL:   [ ]Alert  [ ]Oriented x 3  [ ]Lethargic  [ ]Cachexia  [ ]Unarousable  [ ]Verbal  [ ]Non-Verbal  Behavioral:   [ ] Anxiety  [ ] Delirium [x ] Agitation [ ] Other  HEENT:  [ ]Normal   [ ]Dry mouth   [ ]ET Tube/Trach  [ ]Oral lesions  PULMONARY:   [ ]Clear [ ]Tachypnea  [ ]Audible excessive secretions   [ ]Rhonchi        [ ]Right [ ]Left [ ]Bilateral  [ ]Crackles        [ ]Right [ ]Left [ ]Bilateral  [ ]Wheezing     [ ]Right [ ]Left [ ]Bilateral  CARDIOVASCULAR:    [ ]Regular [ ]Irregular [ ]Tachy  [ ]Jean [ ]Murmur [ ]Other  GASTROINTESTINAL:  [ ]Soft  [ ]Distended   [ ]+BS  [ ]Non tender [ ]Tender  [ ]PEG [ ]OGT/ NGT   Last BM: 11/16   GENITOURINARY:  [x ]Normal [ ] Incontinent   [ ]Oliguria/Anuria   [ ]Heredia  MUSCULOSKELETAL:   [ ]Normal   [ x]Weakness  [ ]Bed/Wheelchair bound [ ]Edema  NEUROLOGIC:   [x ]No focal deficits  [ ] Cognitive impairment  [ ] Dysphagia [ ]Dysarthria [ ] Paresis [ ]Other   SKIN: ecchymoses  [ ]Normal   [ ]Pressure ulcer(s)  [ ]Rash    CRITICAL CARE:  [ ] Shock Present  [ ]Septic [ ]Cardiogenic [ ]Neurologic [ ]Hypovolemic  [ ]  Vasopressors [ ]  Inotropes   [ ] Respiratory failure present  [ ] Acute  [ ] Chronic [ ] Hypoxic  [ ] Hypercarbic [ ] Other  [ ] Other organ failure     LABS:                          9.7    27.64 )-----------( 431      ( 19 Nov 2019 09:10 )             31.3     11-19    133<L>  |  91<L>  |  8   ----------------------------<  104<H>  4.0   |  28  |  0.71    Ca    8.7      19 Nov 2019 07:05      RADIOLOGY & ADDITIONAL STUDIES:     CT A/P 11/17/19    Pancreatic cancer with hepatic metastatic disease, unchanged.    Interval percutaneous cholecystostomy with decreased distention of the   gallbladder.    Increased loculated ascites, with peritoneal enhancement, concerning for   peritonitis.        Protein Calorie Malnutrition Present: [ x] yes [ ] no  [ ] PPSV2 < or = 30%  [ ] significant weight loss [x ] poor nutritional intake [ ] anasarca [x ] catabolic state Albumin, Serum: 2.2 g/dL (11-16-19 @ 07:20)  Artificial Nutrition [ ]     REFERRALS:   [ ]Chaplaincy  [ ] Hospice  [ ]Child Life  [ ]Social Work  [x ]Case management [ ]Holistic Therapy   Goals of Care Document:

## 2019-11-19 NOTE — PROGRESS NOTE ADULT - PROBLEM SELECTOR PLAN 1
- used 24 injections of PCA DD in 24 hours, but per wife is comfortable, will c/w current PCA settings: dilaudid PCA [2.5CR/2DD/15mins//4 Q1 CAB]   - would benefit from enhanced pain management in PCU, family amenable, placed on board  - c/w methadone 80mg daily (20mg Q6)  - monitor BMs  - Off dilaudid PRN and fentanyl patch  - on d/c, should meet with interventional pain to switch PCA settings over to implantable pump if possible

## 2019-11-19 NOTE — PROGRESS NOTE ADULT - PROBLEM SELECTOR PLAN 2
Extensive neurosurgery history w/ multilevel fusion and pain control issues.  Now with acute pain secondary to cholecystitis.  ISTOP Reference #: [id number] done on admission.  Palliative Care input appreciated.  Continue pain control per Palliative care service.  c/w Dilaudid PCA with Methadone.  Follows w/Dr Rico for pain mgt and was scheduled for PCA pump 11/8/19.  Continue bowel regimen (miralax, dulolax and senna).  Ativan prn for nausea in light of prolonged QTc

## 2019-11-20 NOTE — PROGRESS NOTE ADULT - PROBLEM SELECTOR PLAN 2
Extensive neurosurgery history w/ multilevel fusion and pain control issues.  Now with acute pain secondary to cholecystitis.  ISTOP Reference #: [id number] done on admission.  Palliative Care input appreciated.  Continue pain control per Palliative care service.  c/w Dilaudid PCA with Methadone.  Follows w/Dr Rico for pain mgt and was scheduled for PCA pump 11/8/19.  Continue bowel regimen (miralax, dulolax and senna).  Ativan prn for nausea in light of prolonged QTc  for transfer to PCU for umang Extensive neurosurgery history w/ multilevel fusion and pain control issues.  Now with acute pain secondary to cholecystitis.  ISTOP Reference #: [id number] done on admission.  Palliative Care input appreciated.  Continue pain control per Palliative care service.  c/w Dilaudid PCA with Methadone.  Follows w/Dr Rico for pain mgt and was scheduled for PCA pump 11/8/19.  Continue bowel regimen (miralax, dulolax and senna).  Ativan prn for nausea in light of prolonged QTc

## 2019-11-20 NOTE — PROGRESS NOTE ADULT - PROBLEM SELECTOR PLAN 1
- used 35injections of PCA DD in 24 hours, states being in continued pain, DD lasts 20 minutes and takes 20 minutes to act  - will increase CR to 3.5mg. New settings: dilaudid PCA [3.5CR/2DD/15 mins//4CABQ1]  - would benefit from enhanced pain management in PCU, family amenable, placed on board  - c/w methadone 80mg daily (20mg Q6)  - monitor BMs  - Off dilaudid PRN and fentanyl patch  - on d/c, should meet with interventional pain to switch PCA settings over to implantable pump if possible

## 2019-11-20 NOTE — PROGRESS NOTE ADULT - SUBJECTIVE AND OBJECTIVE BOX
Patient is a 63y old  Male who presents with a chief complaint of sepsis (19 Nov 2019 13:32)      SUBJECTIVE / OVERNIGHT EVENTS:    MEDICATIONS  (STANDING):  bisacodyl 5 milliGRAM(s) Oral at bedtime  chlorhexidine 4% Liquid 1 Application(s) Topical daily  cloNIDine 0.1 milliGRAM(s) Oral two times a day  dextrose 5%. 1000 milliLiter(s) (50 mL/Hr) IV Continuous <Continuous>  dextrose 50% Injectable 12.5 Gram(s) IV Push once  dextrose 50% Injectable 25 Gram(s) IV Push once  dextrose 50% Injectable 25 Gram(s) IV Push once  DULoxetine 60 milliGRAM(s) Oral daily  enoxaparin Injectable 40 milliGRAM(s) SubCutaneous daily  famotidine    Tablet 20 milliGRAM(s) Oral daily  HYDROmorphone PCA (5 mG/mL) 30 milliLiter(s) PCA Continuous PCA Continuous  influenza   Vaccine 0.5 milliLiter(s) IntraMuscular once  insulin lispro (HumaLOG) corrective regimen sliding scale   SubCutaneous three times a day before meals  insulin lispro (HumaLOG) corrective regimen sliding scale   SubCutaneous at bedtime  methadone    Tablet 20 milliGRAM(s) Oral every 6 hours  naloxone Injectable 0.4 milliGRAM(s) IV Push every 3 minutes  piperacillin/tazobactam IVPB.. 3.375 Gram(s) IV Intermittent every 8 hours  polyethylene glycol 3350 17 Gram(s) Oral two times a day  senna 2 Tablet(s) Oral at bedtime  sodium chloride 0.9%. 1000 milliLiter(s) (20 mL/Hr) IV Continuous <Continuous>  sucralfate suspension 1 Gram(s) Oral four times a day    MEDICATIONS  (PRN):  bisacodyl Suppository 10 milliGRAM(s) Rectal daily PRN Constipation  dextrose 40% Gel 15 Gram(s) Oral once PRN Blood Glucose LESS THAN 70 milliGRAM(s)/deciliter  diazepam    Tablet 2 milliGRAM(s) Oral every 8 hours PRN anxiety  glucagon  Injectable 1 milliGRAM(s) IntraMuscular once PRN Glucose LESS THAN 70 milligrams/deciliter  HYDROmorphone PCA (5 mG/mL) Rescue Clinician Bolus 4 milliGRAM(s) IV Push every 1 hour PRN pain unrelieved with bolus dosing  naloxone Injectable 0.1 milliGRAM(s) IV Push every 3 minutes PRN For ANY of the following changes in patient status:  A. RR LESS THAN 10 breaths per minute, B. Oxygen saturation LESS THAN 90%, C. Sedation score of 6  simethicone 80 milliGRAM(s) Chew every 8 hours PRN Gas      Vital Signs Last 24 Hrs  T(C): 36.5 (20 Nov 2019 10:00), Max: 37.1 (20 Nov 2019 01:59)  T(F): 97.7 (20 Nov 2019 10:00), Max: 98.7 (20 Nov 2019 01:59)  HR: 94 (20 Nov 2019 10:00) (93 - 102)  BP: 128/73 (20 Nov 2019 10:00) (109/69 - 134/90)  BP(mean): --  RR: 18 (20 Nov 2019 10:00) (18 - 18)  SpO2: 92% (20 Nov 2019 10:00) (91% - 93%)  CAPILLARY BLOOD GLUCOSE      POCT Blood Glucose.: 117 mg/dL (20 Nov 2019 08:14)  POCT Blood Glucose.: 119 mg/dL (19 Nov 2019 22:14)  POCT Blood Glucose.: 138 mg/dL (19 Nov 2019 17:16)  POCT Blood Glucose.: 173 mg/dL (19 Nov 2019 12:25)    I&O's Summary    19 Nov 2019 07:01  -  20 Nov 2019 07:00  --------------------------------------------------------  IN: 1740 mL / OUT: 50 mL / NET: 1690 mL        PHYSICAL EXAM:  GENERAL: NAD, well-developed  HEAD:  Atraumatic, Normocephalic  EYES: EOMI, PERRLA, conjunctiva and sclera clear  NECK: Supple, No JVD  CHEST/LUNG: Clear to auscultation bilaterally; No wheeze  HEART: Regular rate and rhythm; No murmurs, rubs, or gallops  ABDOMEN: Soft, Nontender, Nondistended; Bowel sounds present  EXTREMITIES:  2+ Peripheral Pulses, No clubbing, cyanosis, or edema  PSYCH: AAOx3  NEUROLOGY: non-focal  SKIN: No rashes or lesions    LABS:                        10.1   27.70 )-----------( 569      ( 20 Nov 2019 08:32 )             32.2     11-20    133<L>  |  91<L>  |  9   ----------------------------<  121<H>  4.0   |  27  |  0.82    Ca    8.7      20 Nov 2019 06:42                RADIOLOGY & ADDITIONAL TESTS:    Imaging Personally Reviewed:    Consultant(s) Notes Reviewed:      Care Discussed with Consultants/Other Providers: Patient is a 63y old  Male who presents with a chief complaint of sepsis (19 Nov 2019 13:32)      SUBJECTIVE / OVERNIGHT EVENTS:  Pt seen and examined with rex Chapman ( [phone]) at bedside. No acute events overnight. Pt c/o severe abd pain. Denies fever/chills.   Seen by ID ; reccs to switch abx to Meropenem. Palliative care following ; plan is for transfer to PCU for pain mgt when bed becomes available.    MEDICATIONS  (STANDING):  bisacodyl 5 milliGRAM(s) Oral at bedtime  chlorhexidine 4% Liquid 1 Application(s) Topical daily  cloNIDine 0.1 milliGRAM(s) Oral two times a day  dextrose 5%. 1000 milliLiter(s) (50 mL/Hr) IV Continuous <Continuous>  dextrose 50% Injectable 12.5 Gram(s) IV Push once  dextrose 50% Injectable 25 Gram(s) IV Push once  dextrose 50% Injectable 25 Gram(s) IV Push once  DULoxetine 60 milliGRAM(s) Oral daily  enoxaparin Injectable 40 milliGRAM(s) SubCutaneous daily  famotidine    Tablet 20 milliGRAM(s) Oral daily  HYDROmorphone PCA (5 mG/mL) 30 milliLiter(s) PCA Continuous PCA Continuous  influenza   Vaccine 0.5 milliLiter(s) IntraMuscular once  insulin lispro (HumaLOG) corrective regimen sliding scale   SubCutaneous three times a day before meals  insulin lispro (HumaLOG) corrective regimen sliding scale   SubCutaneous at bedtime  methadone    Tablet 20 milliGRAM(s) Oral every 6 hours  naloxone Injectable 0.4 milliGRAM(s) IV Push every 3 minutes  piperacillin/tazobactam IVPB.. 3.375 Gram(s) IV Intermittent every 8 hours  polyethylene glycol 3350 17 Gram(s) Oral two times a day  senna 2 Tablet(s) Oral at bedtime  sodium chloride 0.9%. 1000 milliLiter(s) (20 mL/Hr) IV Continuous <Continuous>  sucralfate suspension 1 Gram(s) Oral four times a day    MEDICATIONS  (PRN):  bisacodyl Suppository 10 milliGRAM(s) Rectal daily PRN Constipation  dextrose 40% Gel 15 Gram(s) Oral once PRN Blood Glucose LESS THAN 70 milliGRAM(s)/deciliter  diazepam    Tablet 2 milliGRAM(s) Oral every 8 hours PRN anxiety  glucagon  Injectable 1 milliGRAM(s) IntraMuscular once PRN Glucose LESS THAN 70 milligrams/deciliter  HYDROmorphone PCA (5 mG/mL) Rescue Clinician Bolus 4 milliGRAM(s) IV Push every 1 hour PRN pain unrelieved with bolus dosing  naloxone Injectable 0.1 milliGRAM(s) IV Push every 3 minutes PRN For ANY of the following changes in patient status:  A. RR LESS THAN 10 breaths per minute, B. Oxygen saturation LESS THAN 90%, C. Sedation score of 6  simethicone 80 milliGRAM(s) Chew every 8 hours PRN Gas      Vital Signs Last 24 Hrs  T(C): 36.5 (20 Nov 2019 10:00), Max: 37.1 (20 Nov 2019 01:59)  T(F): 97.7 (20 Nov 2019 10:00), Max: 98.7 (20 Nov 2019 01:59)  HR: 94 (20 Nov 2019 10:00) (93 - 102)  BP: 128/73 (20 Nov 2019 10:00) (109/69 - 134/90)  BP(mean): --  RR: 18 (20 Nov 2019 10:00) (18 - 18)  SpO2: 92% (20 Nov 2019 10:00) (91% - 93%)  CAPILLARY BLOOD GLUCOSE      POCT Blood Glucose.: 117 mg/dL (20 Nov 2019 08:14)  POCT Blood Glucose.: 119 mg/dL (19 Nov 2019 22:14)  POCT Blood Glucose.: 138 mg/dL (19 Nov 2019 17:16)  POCT Blood Glucose.: 173 mg/dL (19 Nov 2019 12:25)    I&O's Summary    19 Nov 2019 07:01  -  20 Nov 2019 07:00  --------------------------------------------------------  IN: 1740 mL / OUT: 50 mL / NET: 1690 mL        PHYSICAL EXAM:  General: mild painful distress, anicteric, afebrile  Head: atraumatic, normocephalic  Eye: pale sclera and conjunctiva  Cardio: regular S1, S2,  no murmur  Respiratory: clear b/l, no wheezing  Abd : distended with palpable liver and mass,  tenderness+ , perc daysi with brownish drainage  Musculoskeletal: no joint swelling  Skin: no rash  Neurologic: no focal deficit  Psych: tearful    LABS:                        10.1   27.70 )-----------( 569      ( 20 Nov 2019 08:32 )             32.2     11-20    133<L>  |  91<L>  |  9   ----------------------------<  121<H>  4.0   |  27  |  0.82    Ca    8.7      20 Nov 2019 06:42                  Consultant(s) Notes Reviewed:  ID, Palliative Care

## 2019-11-20 NOTE — PROGRESS NOTE ADULT - SUBJECTIVE AND OBJECTIVE BOX
Follow Up:  cholecystitis    Interval History: pt afebrile but WBC still 27    ROS:      All other systems negative    Constitutional: no fever, no chills  Head: no trauma  Eyes: no vision changes, no eye pain  ENT:  no sore throat, no rhinorrhea  Cardiovascular:  no chest pain, no palpitation  Respiratory:  no SOB, no cough  GI:  + abd pain, no vomiting, no diarrhea  urinary: no dysuria, no hematuria, no flank pain  musculoskeletal:  no joint pain, no joint swelling  skin:  no rash  neurology:  no headache, no seizure        Allergies  No Known Allergies        ANTIMICROBIALS:  piperacillin/tazobactam IVPB.. 3.375 every 8 hours      OTHER MEDS:  bisacodyl 5 milliGRAM(s) Oral at bedtime  bisacodyl Suppository 10 milliGRAM(s) Rectal daily PRN  chlorhexidine 4% Liquid 1 Application(s) Topical daily  cloNIDine 0.1 milliGRAM(s) Oral two times a day  dextrose 40% Gel 15 Gram(s) Oral once PRN  dextrose 5%. 1000 milliLiter(s) IV Continuous <Continuous>  dextrose 50% Injectable 12.5 Gram(s) IV Push once  dextrose 50% Injectable 25 Gram(s) IV Push once  dextrose 50% Injectable 25 Gram(s) IV Push once  diazepam    Tablet 2 milliGRAM(s) Oral every 8 hours PRN  DULoxetine 60 milliGRAM(s) Oral daily  enoxaparin Injectable 40 milliGRAM(s) SubCutaneous daily  famotidine    Tablet 20 milliGRAM(s) Oral daily  glucagon  Injectable 1 milliGRAM(s) IntraMuscular once PRN  HYDROmorphone PCA (5 mG/mL) 30 milliLiter(s) PCA Continuous PCA Continuous  HYDROmorphone PCA (5 mG/mL) Rescue Clinician Bolus 4 milliGRAM(s) IV Push every 1 hour PRN  influenza   Vaccine 0.5 milliLiter(s) IntraMuscular once  insulin lispro (HumaLOG) corrective regimen sliding scale   SubCutaneous three times a day before meals  insulin lispro (HumaLOG) corrective regimen sliding scale   SubCutaneous at bedtime  methadone    Tablet 20 milliGRAM(s) Oral every 6 hours  naloxone Injectable 0.1 milliGRAM(s) IV Push every 3 minutes PRN  naloxone Injectable 0.4 milliGRAM(s) IV Push every 3 minutes  polyethylene glycol 3350 17 Gram(s) Oral two times a day  senna 2 Tablet(s) Oral at bedtime  simethicone 80 milliGRAM(s) Chew every 8 hours PRN  sodium chloride 0.9%. 1000 milliLiter(s) IV Continuous <Continuous>  sucralfate suspension 1 Gram(s) Oral four times a day      Vital Signs Last 24 Hrs  T(C): 36.5 (20 Nov 2019 10:00), Max: 37.1 (20 Nov 2019 01:59)  T(F): 97.7 (20 Nov 2019 10:00), Max: 98.7 (20 Nov 2019 01:59)  HR: 94 (20 Nov 2019 10:00) (93 - 102)  BP: 128/73 (20 Nov 2019 10:00) (109/69 - 134/90)  BP(mean): --  RR: 18 (20 Nov 2019 10:00) (18 - 18)  SpO2: 92% (20 Nov 2019 10:00) (91% - 93%)    Physical Exam:  General:    NAD,  non toxic  Head: atraumatic, normocephalic  Eye: palce sclera and conjunctiva  ENT:    no oropharyngeal lesions,   no LAD,   neck supple  Cardio:     regular S1, S2,  no murmur  Respiratory:    clear b/l,    no wheezing  abd:    distended with palpable liver and mass, diffuse tenderness more on RUQ, perc daysi with purulent drainage  :   no CVAT,  no suprapubic tenderness,   no  saavedra  Musculoskeletal:   no joint swelling  vascular: no phlebitis  Skin:    no rash  Neurologic:     no focal deficit  psych: normal affect                          10.1   27.70 )-----------( 569      ( 20 Nov 2019 08:32 )             32.2       11-20    133<L>  |  91<L>  |  9   ----------------------------<  121<H>  4.0   |  27  |  0.82    Ca    8.7      20 Nov 2019 06:42            MICROBIOLOGY:  v  Peritoneal Peritoneal Fluid  11-18-19   No growth  --    polymorphonuclear leukocytes seen  No organisms seen  by cytocentrifuge      .Body Fluid Bile Fluid  11-12-19   Numerous Klebsiella oxytoca  --  Klebsiella oxytoca      .Blood Blood-Peripheral  11-09-19   No growth at 5 days.  --  --      .Urine Clean Catch (Midstream)  11-09-19   <10,000 CFU/mL Normal Urogenital Veronika  --  --                RADIOLOGY:  Images below reviewed personally  from: CT Abdomen and Pelvis w/ IV Cont (11.17.19 @ 16:36) >  Pancreatic cancer with hepatic metastatic disease, unchanged.    Interval percutaneous cholecystostomy with decreased distention of the   gallbladder.    Increased loculated ascites, with peritoneal enhancement, concerning for   peritonitis.    < from: IR Procedure (11.18.19 @ 16:23) >  approximately 4100   cc of ascites were drained.  The sheath was removed and a sterile   dressing was applied.  The patient tolerated the procedure with no   immediate complication.    Impression: Ultrasound-guided paracentesis as described above.

## 2019-11-20 NOTE — PROGRESS NOTE ADULT - PROBLEM SELECTOR PLAN 1
initial CT imaging concerning for gallbladder wall rupture and PNA.  Patient  s/p cholecystostomy tube placement by IR.  repeat CT abd /pelvis done  concerning for loculated ascites and peritonitis  now s/p paracentesis by IR 9/18 ; 4100ml of liquid removed ; Pt reports mild improvement in abd pain.  - f/u the peritoneal fluid cx  - s/p 9 days of zosyn, started 11/8, but WBC still 27  - repeat blood cultures and switch to meropenem 1 q 8 per ID reccs  - monitor the WBC and LFTs    Palliative care following ; possible transfer to PCU for pain control pending bed availability initial CT imaging concerning for gallbladder wall rupture and PNA.  Patient  s/p cholecystostomy tube placement by IR.  repeat CT abd /pelvis done  concerning for loculated ascites and peritonitis  now s/p paracentesis by IR 9/18 ; 4100ml of liquid removed ; Pt reports mild improvement in abd pain.  - f/u the peritoneal fluid cx  - s/p 9 days of zosyn, started 11/8, but WBC still 27  - repeat blood cultures and switch to meropenem 1 q 8 per ID reccs  - monitor the WBC and LFTs  - Palliative care following ; possible transfer to PCU for pain control pending bed availability

## 2019-11-20 NOTE — PROGRESS NOTE ADULT - ASSESSMENT
63 m with DM, HTN, traumatic work injury (2001) s/p multilumbar fusion (T8-S1) s/p hardware removal (2016, on daily opiates and methadone), metastatic pancreatic adenocarcinoma s/p biliary stent placement (9/17/19) sent in from [facility] for concern for biliary sepsis on 11/8/19.   Came in with leukocytosis 26k, afebrile, tachycardic to 100  CT C/A/P with possible pneumonia, distended GB with likely GB rupture as well as new moderate loculated ascites.  s/p percutaneous cholecystostomy today with foul smelling fluid 11/12, but had worsening leukocytosis and repeat CT showed peritonitis and loculated ascites s/p tap 11/18 with 1370 WBC, 50% PMN, cx negative for nowe    metastatic pancreatic ca with ruptured gall bladder and cholecystitis s/p percutaneous cholecystostomy 11/12  IR biliary cx: klebsiella oxytoca, R to amp/sulbactam  R side pulmonary findings likely due to cholecystitis and contagious inflammation   now worsening leukocytosis  and peritonitis with loculated ascites s/p tap over 4 liter cloudy fluid drained, WBC 1370, PMN 50%    * f/u the peritoneal fluid cx  * s/p 9 days of zosyn, started 11/8, but WBC still 27  * repeat blood cultures and switch to enzo 1 q 8  * monitor the WBC and LFTs

## 2019-11-20 NOTE — PROGRESS NOTE ADULT - PROBLEM SELECTOR PLAN 10
DVT ppx: Lovenox SC    MOLST given to patient, hasn't had GOC before and will discuss with family, still currently full code DVT ppx: Lovenox SC    GOC discussion with pt and family at bedside ; his dtr Adela is the alternate HCP, his son is the primary HCP. Pt's wife is also involved in decision making.  Pt remains Full Code at this time.

## 2019-11-20 NOTE — PROGRESS NOTE ADULT - SUBJECTIVE AND OBJECTIVE BOX
SUBJECTIVE AND OBJECTIVE: Patient seen and examined. Grimacing and near tears, complains of excruciating pain in lower abdomen, like someone is stabbing him. Utilized 22 injections/29 attempts on PCA, but states reluctance to press due to nausea. EKG showing Qtc >480. Primary team ordering CT abdomen given acute onset pain that is different than yesterday with elevated WBC count.     INTERVAL HPI/OVERNIGHT EVENTS:   11/17: worsening abdominal pain despite being on PCA  11/18: son states patient had a bad night in terms of pain; 27 injections and 41 attempts in last 24 hours  11/19: patient sleeping, wife states had a difficult night do to roommate, but pain has been better (24 inj, 34 attempts in last 24 hours, no CAB)  11/20: states pain remains terrible, used 35 injections and 47 attempts in last 24 hours    DNR on chart:   Allergies    No Known Allergies    Intolerances    MEDICATIONS  (STANDING):  bisacodyl 5 milliGRAM(s) Oral at bedtime  chlorhexidine 4% Liquid 1 Application(s) Topical daily  cloNIDine 0.1 milliGRAM(s) Oral two times a day  dextrose 5%. 1000 milliLiter(s) (50 mL/Hr) IV Continuous <Continuous>  dextrose 50% Injectable 12.5 Gram(s) IV Push once  dextrose 50% Injectable 25 Gram(s) IV Push once  dextrose 50% Injectable 25 Gram(s) IV Push once  DULoxetine 60 milliGRAM(s) Oral daily  enoxaparin Injectable 40 milliGRAM(s) SubCutaneous daily  famotidine    Tablet 20 milliGRAM(s) Oral daily  HYDROmorphone PCA (5 mG/mL) 30 milliLiter(s) PCA Continuous PCA Continuous  influenza   Vaccine 0.5 milliLiter(s) IntraMuscular once  insulin lispro (HumaLOG) corrective regimen sliding scale   SubCutaneous three times a day before meals  insulin lispro (HumaLOG) corrective regimen sliding scale   SubCutaneous at bedtime  meropenem  IVPB 1000 milliGRAM(s) IV Intermittent every 8 hours  methadone    Tablet 20 milliGRAM(s) Oral every 6 hours  naloxone Injectable 0.4 milliGRAM(s) IV Push every 3 minutes  polyethylene glycol 3350 17 Gram(s) Oral two times a day  senna 2 Tablet(s) Oral at bedtime  sodium chloride 0.9%. 1000 milliLiter(s) (20 mL/Hr) IV Continuous <Continuous>  sucralfate suspension 1 Gram(s) Oral four times a day    MEDICATIONS  (PRN):  bisacodyl Suppository 10 milliGRAM(s) Rectal daily PRN Constipation  dextrose 40% Gel 15 Gram(s) Oral once PRN Blood Glucose LESS THAN 70 milliGRAM(s)/deciliter  diazepam    Tablet 2 milliGRAM(s) Oral every 8 hours PRN anxiety  glucagon  Injectable 1 milliGRAM(s) IntraMuscular once PRN Glucose LESS THAN 70 milligrams/deciliter  HYDROmorphone PCA (5 mG/mL) Rescue Clinician Bolus 4 milliGRAM(s) IV Push every 1 hour PRN pain unrelieved with bolus dosing  naloxone Injectable 0.1 milliGRAM(s) IV Push every 3 minutes PRN For ANY of the following changes in patient status:  A. RR LESS THAN 10 breaths per minute, B. Oxygen saturation LESS THAN 90%, C. Sedation score of 6  simethicone 80 milliGRAM(s) Chew every 8 hours PRN Gas        ITEMS UNCHECKED ARE NOT PRESENT    PRESENT SYMPTOMS: [ ]Unable to obtain due to poor mentation   Source if other than patient:  [ X]Family   [ ]Team     Pain (Impact on QOL):  yes  Location: abdomen  Minimal acceptable level (0-10 scale):    4               Aggravating factors: movement, nausea, palpation  Quality: stabbing  Radiation: diffuse abdomen  Severity (0-10 scale):  10  Timing: constant    Dyspnea:                           [ ]Mild [ ]Moderate [ ]Severe  Anxiety:                             [ ]Mild [ ]Moderate [ ]Severe  Fatigue:                             [ ]Mild [ ]Moderate [ ]Severe  Nausea:                             [ ]Mild [ x]Moderate [ ]Severe  Loss of appetite:              [ ]Mild [ ]Moderate [ ]Severe  Constipation:                    [ ]Mild [ ]Moderate [ ]Severe    PAIN AD Score:	  [email or web address].[place].[email or web address]/cog/painad.pdf (Ctrl + left click to view)    Other Symptoms:  [ x]All other review of systems negative     Karnofsky Performance Score/Palliative Performance Status Version 2:    30%    http://palliative.info/resource_material/PPSv2.pdf  PHYSICAL EXAM:  Vital Signs Last 24 Hrs  T(C): 36.6 (20 Nov 2019 14:00), Max: 37.1 (20 Nov 2019 01:59)  T(F): 97.8 (20 Nov 2019 14:00), Max: 98.7 (20 Nov 2019 01:59)  HR: 99 (20 Nov 2019 14:00) (93 - 102)  BP: 116/68 (20 Nov 2019 14:00) (109/69 - 134/90)  BP(mean): --  RR: 18 (20 Nov 2019 14:00) (18 - 18)  SpO2: 92% (20 Nov 2019 14:00) (92% - 93%      GENERAL:   [X ]Alert  [ ]Oriented x 3  [ ]Lethargic  [ ]Cachexia  [ ]Unarousable  [ ]Verbal  [ ]Non-Verbal  Behavioral:   [ ] Anxiety  [ ] Delirium [x ] Agitation [ ] Other  HEENT:  [ ]Normal   [ ]Dry mouth   [ ]ET Tube/Trach  [ ]Oral lesions  PULMONARY:   [ X]Clear [ ]Tachypnea  [ ]Audible excessive secretions   [ ]Rhonchi        [ ]Right [ ]Left [ ]Bilateral  [ ]Crackles        [ ]Right [ ]Left [ ]Bilateral  [ ]Wheezing     [ ]Right [ ]Left [ ]Bilateral  CARDIOVASCULAR:    [ X]Regular [ ]Irregular [ ]Tachy  [ ]Jean [ ]Murmur [ ]Other  GASTROINTESTINAL:  [ X]Soft  [ ]Distended   [X ]+BS  [ ]Non tender [ ]Tender  [ ]PEG [ ]OGT/ NGT   Last BM: 11/16   GENITOURINARY:  [x ]Normal [ ] Incontinent   [ ]Oliguria/Anuria   [ ]Heredia  MUSCULOSKELETAL:   [ ]Normal   [ x]Weakness  [ ]Bed/Wheelchair bound [ ]Edema  NEUROLOGIC:   [x ]No focal deficits  [ ] Cognitive impairment  [ ] Dysphagia [ ]Dysarthria [ ] Paresis [ ]Other   SKIN: ecchymoses  [ ]Normal   [ ]Pressure ulcer(s)  [ ]Rash    CRITICAL CARE:  [ ] Shock Present  [ ]Septic [ ]Cardiogenic [ ]Neurologic [ ]Hypovolemic  [ ]  Vasopressors [ ]  Inotropes   [ ] Respiratory failure present  [ ] Acute  [ ] Chronic [ ] Hypoxic  [ ] Hypercarbic [ ] Other  [ ] Other organ failure     LABS:                          10.1   27.70 )-----------( 569      ( 20 Nov 2019 08:32 )             32.2   11-20    133<L>  |  91<L>  |  9   ----------------------------<  121<H>  4.0   |  27  |  0.82    Ca    8.7      20 Nov 2019 06:42          RADIOLOGY & ADDITIONAL STUDIES:     CT A/P 11/17/19    Pancreatic cancer with hepatic metastatic disease, unchanged.    Interval percutaneous cholecystostomy with decreased distention of the   gallbladder.    Increased loculated ascites, with peritoneal enhancement, concerning for   peritonitis.        Protein Calorie Malnutrition Present: [ x] yes [ ] no  [ ] PPSV2 < or = 30%  [ ] significant weight loss [x ] poor nutritional intake [ ] anasarca [x ] catabolic state Albumin, Serum: 2.2 g/dL (11-16-19 @ 07:20)  Artificial Nutrition [ ]     REFERRALS:   [ ]Chaplaincy  [ ] Hospice  [ ]Child Life  [ ]Social Work  [x ]Case management [ ]Holistic Therapy   Goals of Care Document:

## 2019-11-21 NOTE — PROGRESS NOTE ADULT - SUBJECTIVE AND OBJECTIVE BOX
Follow Up:  cholecystitis    Interval History: the WBC increased to 33 and LFTs significantly worse    ROS:      All other systems negative    Constitutional: no fever, no chills  Head: no trauma  Eyes: no vision changes, no eye pain  ENT:  no sore throat, no rhinorrhea  Cardiovascular:  no chest pain, no palpitation  Respiratory:  no SOB, no cough  GI:  + abd pain, no vomiting, no diarrhea  urinary: no dysuria, no hematuria, no flank pain  musculoskeletal:  no joint pain, no joint swelling  skin:  no rash  neurology:  no headache, no seizure        Allergies  No Known Allergies        ANTIMICROBIALS:  meropenem  IVPB 1000 every 8 hours      OTHER MEDS:  bisacodyl 5 milliGRAM(s) Oral at bedtime  bisacodyl Suppository 10 milliGRAM(s) Rectal daily PRN  chlorhexidine 4% Liquid 1 Application(s) Topical daily  cloNIDine 0.1 milliGRAM(s) Oral two times a day  dextrose 40% Gel 15 Gram(s) Oral once PRN  dextrose 5%. 1000 milliLiter(s) IV Continuous <Continuous>  dextrose 50% Injectable 12.5 Gram(s) IV Push once  dextrose 50% Injectable 25 Gram(s) IV Push once  dextrose 50% Injectable 25 Gram(s) IV Push once  diazepam    Tablet 2 milliGRAM(s) Oral every 8 hours PRN  DULoxetine 60 milliGRAM(s) Oral daily  enoxaparin Injectable 40 milliGRAM(s) SubCutaneous daily  famotidine    Tablet 20 milliGRAM(s) Oral daily  glucagon  Injectable 1 milliGRAM(s) IntraMuscular once PRN  HYDROmorphone PCA (5 mG/mL) 30 milliLiter(s) PCA Continuous PCA Continuous  HYDROmorphone PCA (5 mG/mL) Rescue Clinician Bolus 4 milliGRAM(s) IV Push every 1 hour PRN  influenza   Vaccine 0.5 milliLiter(s) IntraMuscular once  insulin lispro (HumaLOG) corrective regimen sliding scale   SubCutaneous three times a day before meals  insulin lispro (HumaLOG) corrective regimen sliding scale   SubCutaneous at bedtime  methadone    Tablet 20 milliGRAM(s) Oral every 6 hours  naloxone Injectable 0.1 milliGRAM(s) IV Push every 3 minutes PRN  naloxone Injectable 0.4 milliGRAM(s) IV Push every 3 minutes  polyethylene glycol 3350 17 Gram(s) Oral two times a day  senna 2 Tablet(s) Oral at bedtime  simethicone 80 milliGRAM(s) Chew every 8 hours PRN  sodium chloride 0.9%. 1000 milliLiter(s) IV Continuous <Continuous>  sucralfate suspension 1 Gram(s) Oral four times a day      Vital Signs Last 24 Hrs  T(C): 36.9 (21 Nov 2019 05:29), Max: 36.9 (21 Nov 2019 05:29)  T(F): 98.4 (21 Nov 2019 05:29), Max: 98.4 (21 Nov 2019 05:29)  HR: 99 (21 Nov 2019 05:29) (98 - 116)  BP: 111/68 (21 Nov 2019 05:29) (109/69 - 132/84)  BP(mean): --  RR: 18 (21 Nov 2019 05:29) (18 - 18)  SpO2: 93% (21 Nov 2019 05:29) (92% - 93%)    Physical Exam:  General:   pt appears uncomfortable  Head: atraumatic, normocephalic  Eye: palce sclera and conjunctiva  ENT:    no oropharyngeal lesions,   no LAD,   neck supple  Cardio:     regular S1, S2,  no murmur  Respiratory:    clear b/l,    no wheezing  abd:    distended with palpable liver and mass, diffuse tenderness more on RUQ, perc daysi with purulent drainage  :   no CVAT,  no suprapubic tenderness,   no  saavedra  Musculoskeletal:   no joint swelling  vascular: no phlebitis  Skin:    no rash  Neurologic:     no focal deficit  psych: normal affect             9.7    33.84 )-----------( 523      ( 21 Nov 2019 08:58 )             30.4       11-21    132<L>  |  90<L>  |  8   ----------------------------<  117<H>  4.6   |  29  |  0.94    Ca    8.8      21 Nov 2019 06:45    TPro  6.4  /  Alb  2.3<L>  /  TBili  6.0<H>  /  DBili  x   /  AST  267<H>  /  ALT  92<H>  /  AlkPhos  854<H>  11-21          MICROBIOLOGY:  v  Peritoneal Peritoneal Fluid  11-18-19   No growth  --    polymorphonuclear leukocytes seen  No organisms seen  by cytocentrifuge      .Body Fluid Bile Fluid  11-12-19   Numerous Klebsiella oxytoca  --  Klebsiella oxytoca      .Blood Blood-Peripheral  11-09-19   No growth at 5 days.  --  --      .Urine Clean Catch (Midstream)  11-09-19   <10,000 CFU/mL Normal Urogenital Veronika  --  --                RADIOLOGY:  Images below reviewed personally  from: CT Abdomen and Pelvis w/ IV Cont (11.17.19 @ 16:36) >  Pancreatic cancer with hepatic metastatic disease, unchanged.    Interval percutaneous cholecystostomy with decreased distention of the   gallbladder.    Increased loculated ascites, with peritoneal enhancement, concerning for   peritonitis.    < from: IR Procedure (11.18.19 @ 16:23) >  approximately 4100   cc of ascites were drained.  The sheath was removed and a sterile   dressing was applied.  The patient tolerated the procedure with no   immediate complication.    Impression: Ultrasound-guided paracentesis as described above.

## 2019-11-21 NOTE — PROGRESS NOTE ADULT - ASSESSMENT
63 m with DM, HTN, traumatic work injury (2001) s/p multilumbar fusion (T8-S1) s/p hardware removal (2016, on daily opiates and methadone), metastatic pancreatic adenocarcinoma s/p biliary stent placement (9/17/19) sent in from [facility] for concern for biliary sepsis on 11/8/19.   Came in with leukocytosis 26k, afebrile, tachycardic to 100  CT C/A/P with possible pneumonia, distended GB with likely GB rupture as well as new moderate loculated ascites.  s/p percutaneous cholecystostomy today with foul smelling fluid 11/12, but had worsening leukocytosis and repeat CT showed peritonitis and loculated ascites s/p tap 11/18 with 1370 WBC, 50% PMN, cx negative for nowe    metastatic pancreatic ca with ruptured gall bladder and cholecystitis s/p percutaneous cholecystostomy 11/12  IR biliary cx: klebsiella oxytoca, R to amp/sulbactam  R side pulmonary findings likely due to cholecystitis and contagious inflammation   peritonitis with loculated ascites s/p tap over 4 liter cloudy fluid drained, WBC 1370, PMN 50%  worsening LFTs and leukocytosis to 33      * f/u the peritoneal fluid cx  * f/u the repeat blood cx  * will need repeat imaging, abd/pelvis CT with contrast vs MRI/MRCP  * f/u with GI  * s/p 9 days of zosyn, started 11/8, switched to meropenem 11/20, now day 2  * monitor the WBC and LFTs  * poor prognosis

## 2019-11-21 NOTE — PROGRESS NOTE ADULT - PROBLEM SELECTOR PLAN 6
- will continue to follow, and meet with family to discuss GOC  - transfer to PCU when bed available for pain management

## 2019-11-21 NOTE — PROGRESS NOTE ADULT - PROBLEM SELECTOR PROBLEM 10
Prophylactic measure
Advance directive discussed with patient
Prophylactic measure

## 2019-11-21 NOTE — PROGRESS NOTE ADULT - PROBLEM SELECTOR PLAN 2
Extensive neurosurgery history w/ multilevel fusion and pain control issues.  Now with acute pain secondary to cholecystitis.  ISTOP Reference #: [id number] done on admission.  Palliative Care input appreciated.  Continue pain control per Palliative care service.  c/w Dilaudid PCA with Methadone.  Follows w/Dr Rico for pain mgt and was scheduled for PCA pump 11/8/19.  Continue bowel regimen (miralax, dulolax and senna).  Ativan prn for nausea in light of prolonged QTc

## 2019-11-21 NOTE — PROGRESS NOTE ADULT - PROBLEM SELECTOR PLAN 1
- used 37 injections of PCA DD in 24 hours, states being in continued pain  - no increases to current dilaudid PCA [3.5CR/2DD/15 mins//4CABQ1]  - would benefit from enhanced pain management in PCU, family amenable, placed on board  - c/w methadone 80mg daily (20mg Q6)  - monitor BMs  - Off dilaudid PRN and fentanyl patch  - on d/c, should meet with interventional pain to switch PCA settings over to implantable pump if possible  - CT A/P pending given worsening leukocytosis and clinical status

## 2019-11-21 NOTE — PROGRESS NOTE ADULT - ASSESSMENT
62 y/o PMH of traumatic work injury (2001) s/p multilumbar fusion (T8-S1) s/p hardware removal (2016, on daily opiates and methadone), chronic constipation, GERD, HTN, depression, anxiety, metastatic pancreatic adenocarcinoma s/p biliary stent placement (2m ago) and chemo sent in from [facility] for concern for biliary sepsis on 11/8.During this admission, CT showed gallbladder rupture, stable metastatic disease (to liver), and tumor trombosis. Palliative care was called pain Rx.

## 2019-11-21 NOTE — PROGRESS NOTE ADULT - PROBLEM SELECTOR PLAN 1
initial CT imaging concerning for gallbladder wall rupture and PNA.  Patient  s/p cholecystostomy tube placement by IR.  repeat CT abd /pelvis done  concerning for loculated ascites and peritonitis  now s/p paracentesis by IR 9/18 ; 4100ml of liquid removed   Now with worsening abd pain  - f/u the peritoneal fluid cx  - s/p 9 days of zosyn, started 11/8, but WBC uptrending ; 33 today  - f/up repeat blood cultures  -  switched to meropenem 1 q 8 per ID reccs yesterday  - LFTs uptrending  - Obtain urgent repeat CT Abd/pelvis   - Palliative care following ; possible transfer to PCU for pain control pending bed availability

## 2019-11-21 NOTE — PROGRESS NOTE ADULT - PROBLEM SELECTOR PLAN 10
DVT ppx: Lovenox SC    GOC discussion with pt and dtr at bedside (11/20/19 ) ; his dtr Adela is the alternate HCP, his son is the primary HCP. Pt's wife is also involved in decision making.  Pt remains Full Code at this time.  Family to bring in HCP, KAIST.    Will likely need another GOC discussion in light clinical deterioration. Palliative to facilitate.

## 2019-11-21 NOTE — PROGRESS NOTE ADULT - SUBJECTIVE AND OBJECTIVE BOX
Patient is a 63y old  Male who presents with a chief complaint of sepsis (20 Nov 2019 16:14)      SUBJECTIVE / OVERNIGHT EVENTS:    MEDICATIONS  (STANDING):  bisacodyl 5 milliGRAM(s) Oral at bedtime  chlorhexidine 4% Liquid 1 Application(s) Topical daily  cloNIDine 0.1 milliGRAM(s) Oral two times a day  dextrose 5%. 1000 milliLiter(s) (50 mL/Hr) IV Continuous <Continuous>  dextrose 50% Injectable 12.5 Gram(s) IV Push once  dextrose 50% Injectable 25 Gram(s) IV Push once  dextrose 50% Injectable 25 Gram(s) IV Push once  DULoxetine 60 milliGRAM(s) Oral daily  enoxaparin Injectable 40 milliGRAM(s) SubCutaneous daily  famotidine    Tablet 20 milliGRAM(s) Oral daily  HYDROmorphone PCA (5 mG/mL) 30 milliLiter(s) PCA Continuous PCA Continuous  influenza   Vaccine 0.5 milliLiter(s) IntraMuscular once  insulin lispro (HumaLOG) corrective regimen sliding scale   SubCutaneous three times a day before meals  insulin lispro (HumaLOG) corrective regimen sliding scale   SubCutaneous at bedtime  meropenem  IVPB 1000 milliGRAM(s) IV Intermittent every 8 hours  methadone    Tablet 20 milliGRAM(s) Oral every 6 hours  naloxone Injectable 0.4 milliGRAM(s) IV Push every 3 minutes  polyethylene glycol 3350 17 Gram(s) Oral two times a day  senna 2 Tablet(s) Oral at bedtime  sodium chloride 0.9%. 1000 milliLiter(s) (20 mL/Hr) IV Continuous <Continuous>  sucralfate suspension 1 Gram(s) Oral four times a day    MEDICATIONS  (PRN):  bisacodyl Suppository 10 milliGRAM(s) Rectal daily PRN Constipation  dextrose 40% Gel 15 Gram(s) Oral once PRN Blood Glucose LESS THAN 70 milliGRAM(s)/deciliter  diazepam    Tablet 2 milliGRAM(s) Oral every 8 hours PRN anxiety  glucagon  Injectable 1 milliGRAM(s) IntraMuscular once PRN Glucose LESS THAN 70 milligrams/deciliter  HYDROmorphone PCA (5 mG/mL) Rescue Clinician Bolus 4 milliGRAM(s) IV Push every 1 hour PRN pain unrelieved with bolus dosing  naloxone Injectable 0.1 milliGRAM(s) IV Push every 3 minutes PRN For ANY of the following changes in patient status:  A. RR LESS THAN 10 breaths per minute, B. Oxygen saturation LESS THAN 90%, C. Sedation score of 6  simethicone 80 milliGRAM(s) Chew every 8 hours PRN Gas      Vital Signs Last 24 Hrs  T(C): 36.9 (21 Nov 2019 05:29), Max: 36.9 (21 Nov 2019 05:29)  T(F): 98.4 (21 Nov 2019 05:29), Max: 98.4 (21 Nov 2019 05:29)  HR: 99 (21 Nov 2019 05:29) (94 - 116)  BP: 111/68 (21 Nov 2019 05:29) (109/69 - 132/84)  BP(mean): --  RR: 18 (21 Nov 2019 05:29) (18 - 18)  SpO2: 93% (21 Nov 2019 05:29) (92% - 93%)  CAPILLARY BLOOD GLUCOSE      POCT Blood Glucose.: 112 mg/dL (20 Nov 2019 22:11)  POCT Blood Glucose.: 116 mg/dL (20 Nov 2019 17:05)  POCT Blood Glucose.: 115 mg/dL (20 Nov 2019 12:15)    I&O's Summary    20 Nov 2019 07:01  -  21 Nov 2019 07:00  --------------------------------------------------------  IN: 1640 mL / OUT: 1190 mL / NET: 450 mL        PHYSICAL EXAM:  GENERAL: NAD, well-developed  HEAD:  Atraumatic, Normocephalic  EYES: EOMI, PERRLA, conjunctiva and sclera clear  NECK: Supple, No JVD  CHEST/LUNG: Clear to auscultation bilaterally; No wheeze  HEART: Regular rate and rhythm; No murmurs, rubs, or gallops  ABDOMEN: Soft, Nontender, Nondistended; Bowel sounds present  EXTREMITIES:  2+ Peripheral Pulses, No clubbing, cyanosis, or edema  PSYCH: AAOx3  NEUROLOGY: non-focal  SKIN: No rashes or lesions    LABS:                        10.1   27.70 )-----------( 569      ( 20 Nov 2019 08:32 )             32.2     11-21    132<L>  |  90<L>  |  8   ----------------------------<  117<H>  4.6   |  29  |  0.94    Ca    8.8      21 Nov 2019 06:45    TPro  6.4  /  Alb  2.3<L>  /  TBili  6.0<H>  /  DBili  x   /  AST  267<H>  /  ALT  92<H>  /  AlkPhos  854<H>  11-21              RADIOLOGY & ADDITIONAL TESTS:    Imaging Personally Reviewed:    Consultant(s) Notes Reviewed:      Care Discussed with Consultants/Other Providers: Patient is a 63y old  Male who presents with a chief complaint of sepsis (20 Nov 2019 16:14)      SUBJECTIVE / OVERNIGHT EVENTS:  Pt seen and examined. c/o worsening abd pain. Denies fever/chills.    MEDICATIONS  (STANDING):  bisacodyl 5 milliGRAM(s) Oral at bedtime  chlorhexidine 4% Liquid 1 Application(s) Topical daily  cloNIDine 0.1 milliGRAM(s) Oral two times a day  dextrose 5%. 1000 milliLiter(s) (50 mL/Hr) IV Continuous <Continuous>  dextrose 50% Injectable 12.5 Gram(s) IV Push once  dextrose 50% Injectable 25 Gram(s) IV Push once  dextrose 50% Injectable 25 Gram(s) IV Push once  DULoxetine 60 milliGRAM(s) Oral daily  enoxaparin Injectable 40 milliGRAM(s) SubCutaneous daily  famotidine    Tablet 20 milliGRAM(s) Oral daily  HYDROmorphone PCA (5 mG/mL) 30 milliLiter(s) PCA Continuous PCA Continuous  influenza   Vaccine 0.5 milliLiter(s) IntraMuscular once  insulin lispro (HumaLOG) corrective regimen sliding scale   SubCutaneous three times a day before meals  insulin lispro (HumaLOG) corrective regimen sliding scale   SubCutaneous at bedtime  meropenem  IVPB 1000 milliGRAM(s) IV Intermittent every 8 hours  methadone    Tablet 20 milliGRAM(s) Oral every 6 hours  naloxone Injectable 0.4 milliGRAM(s) IV Push every 3 minutes  polyethylene glycol 3350 17 Gram(s) Oral two times a day  senna 2 Tablet(s) Oral at bedtime  sodium chloride 0.9%. 1000 milliLiter(s) (20 mL/Hr) IV Continuous <Continuous>  sucralfate suspension 1 Gram(s) Oral four times a day    MEDICATIONS  (PRN):  bisacodyl Suppository 10 milliGRAM(s) Rectal daily PRN Constipation  dextrose 40% Gel 15 Gram(s) Oral once PRN Blood Glucose LESS THAN 70 milliGRAM(s)/deciliter  diazepam    Tablet 2 milliGRAM(s) Oral every 8 hours PRN anxiety  glucagon  Injectable 1 milliGRAM(s) IntraMuscular once PRN Glucose LESS THAN 70 milligrams/deciliter  HYDROmorphone PCA (5 mG/mL) Rescue Clinician Bolus 4 milliGRAM(s) IV Push every 1 hour PRN pain unrelieved with bolus dosing  naloxone Injectable 0.1 milliGRAM(s) IV Push every 3 minutes PRN For ANY of the following changes in patient status:  A. RR LESS THAN 10 breaths per minute, B. Oxygen saturation LESS THAN 90%, C. Sedation score of 6  simethicone 80 milliGRAM(s) Chew every 8 hours PRN Gas      Vital Signs Last 24 Hrs  T(C): 36.9 (21 Nov 2019 05:29), Max: 36.9 (21 Nov 2019 05:29)  T(F): 98.4 (21 Nov 2019 05:29), Max: 98.4 (21 Nov 2019 05:29)  HR: 99 (21 Nov 2019 05:29) (94 - 116)  BP: 111/68 (21 Nov 2019 05:29) (109/69 - 132/84)  BP(mean): --  RR: 18 (21 Nov 2019 05:29) (18 - 18)  SpO2: 93% (21 Nov 2019 05:29) (92% - 93%)  CAPILLARY BLOOD GLUCOSE      POCT Blood Glucose.: 112 mg/dL (20 Nov 2019 22:11)  POCT Blood Glucose.: 116 mg/dL (20 Nov 2019 17:05)  POCT Blood Glucose.: 115 mg/dL (20 Nov 2019 12:15)    I&O's Summary    20 Nov 2019 07:01  -  21 Nov 2019 07:00  --------------------------------------------------------  IN: 1640 mL / OUT: 1190 mL / NET: 450 mL        PHYSICAL EXAM:  General: severe painful distress, anicteric, afebrile  Head: atraumatic, normocephalic  Eye: pale sclera and conjunctiva  Cardio: regular S1, S2,  no murmur  Respiratory: clear b/l, no wheezing  Abd : distended++ with palpable liver and mass,  tenderness++ , perc daysi with brownish drainage  Musculoskeletal: no joint swelling  Skin: no rash  Neurologic: no focal deficit  Psych: tearful . in distress    LABS:                        10.1   27.70 )-----------( 569      ( 20 Nov 2019 08:32 )             32.2     11-21    132<L>  |  90<L>  |  8   ----------------------------<  117<H>  4.6   |  29  |  0.94    Ca    8.8      21 Nov 2019 06:45    TPro  6.4  /  Alb  2.3<L>  /  TBili  6.0<H>  /  DBili  x   /  AST  267<H>  /  ALT  92<H>  /  AlkPhos  854<H>  11-21                Consultant(s) Notes Reviewed:  ID

## 2019-11-21 NOTE — PROGRESS NOTE ADULT - SUBJECTIVE AND OBJECTIVE BOX
SUBJECTIVE AND OBJECTIVE: Patient seen and examined. Grimacing and near tears, complains of excruciating pain in lower abdomen, like someone is stabbing him. Utilized 22 injections/29 attempts on PCA, but states reluctance to press due to nausea. EKG showing Qtc >480. Primary team ordering CT abdomen given acute onset pain that is different than yesterday with elevated WBC count.     INTERVAL HPI/OVERNIGHT EVENTS:   11/17: worsening abdominal pain despite being on PCA  11/18: son states patient had a bad night in terms of pain; 27 injections and 41 attempts in last 24 hours  11/19: patient sleeping, wife states had a difficult night do to roommate, but pain has been better (24 inj, 34 attempts in last 24 hours, no CAB)  11/20: states pain remains terrible, used 35 injections and 47 attempts in last 24 hours  11/21: patient appears worse today clinically, more confused and lethargic, used 37 injections and 2 CAB (72mg total plus CR dose)    DNR on chart:   Allergies    No Known Allergies    Intolerances    MEDICATIONS  (STANDING):  bisacodyl 5 milliGRAM(s) Oral at bedtime  chlorhexidine 4% Liquid 1 Application(s) Topical daily  cloNIDine 0.1 milliGRAM(s) Oral two times a day  dextrose 5%. 1000 milliLiter(s) (50 mL/Hr) IV Continuous <Continuous>  dextrose 50% Injectable 12.5 Gram(s) IV Push once  dextrose 50% Injectable 25 Gram(s) IV Push once  dextrose 50% Injectable 25 Gram(s) IV Push once  DULoxetine 60 milliGRAM(s) Oral daily  enoxaparin Injectable 40 milliGRAM(s) SubCutaneous daily  famotidine    Tablet 20 milliGRAM(s) Oral daily  HYDROmorphone PCA (5 mG/mL) 30 milliLiter(s) PCA Continuous PCA Continuous  influenza   Vaccine 0.5 milliLiter(s) IntraMuscular once  insulin lispro (HumaLOG) corrective regimen sliding scale   SubCutaneous three times a day before meals  insulin lispro (HumaLOG) corrective regimen sliding scale   SubCutaneous at bedtime  meropenem  IVPB 1000 milliGRAM(s) IV Intermittent every 8 hours  methadone    Tablet 20 milliGRAM(s) Oral every 6 hours  naloxone Injectable 0.4 milliGRAM(s) IV Push every 3 minutes  polyethylene glycol 3350 17 Gram(s) Oral two times a day  senna 2 Tablet(s) Oral at bedtime  sodium chloride 0.9%. 1000 milliLiter(s) (20 mL/Hr) IV Continuous <Continuous>  sucralfate suspension 1 Gram(s) Oral four times a day    MEDICATIONS  (PRN):  bisacodyl Suppository 10 milliGRAM(s) Rectal daily PRN Constipation  dextrose 40% Gel 15 Gram(s) Oral once PRN Blood Glucose LESS THAN 70 milliGRAM(s)/deciliter  diazepam    Tablet 2 milliGRAM(s) Oral every 8 hours PRN anxiety  glucagon  Injectable 1 milliGRAM(s) IntraMuscular once PRN Glucose LESS THAN 70 milligrams/deciliter  HYDROmorphone PCA (5 mG/mL) Rescue Clinician Bolus 4 milliGRAM(s) IV Push every 1 hour PRN pain unrelieved with bolus dosing  naloxone Injectable 0.1 milliGRAM(s) IV Push every 3 minutes PRN For ANY of the following changes in patient status:  A. RR LESS THAN 10 breaths per minute, B. Oxygen saturation LESS THAN 90%, C. Sedation score of 6  simethicone 80 milliGRAM(s) Chew every 8 hours PRN Gas    ITEMS UNCHECKED ARE NOT PRESENT    PRESENT SYMPTOMS: [ ]Unable to obtain due to poor mentation   Source if other than patient:  [ X]Family   [ ]Team     Pain (Impact on QOL):  yes  Location: abdomen  Minimal acceptable level (0-10 scale):    4               Aggravating factors: movement, nausea, palpation  Quality: stabbing  Radiation: diffuse abdomen  Severity (0-10 scale):  10  Timing: constant    Dyspnea:                           [ ]Mild [ ]Moderate [ ]Severe  Anxiety:                             [ ]Mild [ ]Moderate [ ]Severe  Fatigue:                             [ ]Mild [ ]Moderate [ ]Severe  Nausea:                             [ ]Mild [ x]Moderate [ ]Severe  Loss of appetite:              [ ]Mild [ ]Moderate [ ]Severe  Constipation:                    [ ]Mild [ ]Moderate [ ]Severe    PAIN AD Score:	  http://geriatrictoolkit.[employer].edu/cog/painad.pdf (Ctrl + left click to view)    Other Symptoms:  [ x]All other review of systems negative     Karnofsky Performance Score/Palliative Performance Status Version 2:    30%    http://palliative.info/resource_material/[email or web address].pdf    PHYSICAL EXAM:  Vital Signs Last 24 Hrs  T(C): 36.1 (21 Nov 2019 13:57), Max: 36.9 (21 Nov 2019 05:29)  T(F): 97 (21 Nov 2019 13:57), Max: 98.4 (21 Nov 2019 05:29)  HR: 115 (21 Nov 2019 13:57) (98 - 130)  BP: 135/73 (21 Nov 2019 13:57) (100/73 - 135/73)  BP(mean): --  RR: 20 (21 Nov 2019 13:57) (18 - 20)  SpO2: 91% (21 Nov 2019 13:57) (91% - 93%)      GENERAL:   [X ]Alert  [ ]Oriented x 3  [ ]Lethargic  [ ]Cachexia  [ ]Unarousable  [ ]Verbal  [ ]Non-Verbal  Behavioral:   [ ] Anxiety  [ ] Delirium [x ] Agitation [ ] Other  HEENT:  [ ]Normal   [ ]Dry mouth   [ ]ET Tube/Trach  [ ]Oral lesions  PULMONARY:   [ X]Clear [ ]Tachypnea  [ ]Audible excessive secretions   [ ]Rhonchi        [ ]Right [ ]Left [ ]Bilateral  [ ]Crackles        [ ]Right [ ]Left [ ]Bilateral  [ ]Wheezing     [ ]Right [ ]Left [ ]Bilateral  CARDIOVASCULAR:    [ X]Regular [ ]Irregular [ ]Tachy  [ ]Jean [ ]Murmur [ ]Other  GASTROINTESTINAL:  [ X]Soft  [ ]Distended   [X ]+BS  [ ]Non tender [ ]Tender  [ ]PEG [ ]OGT/ NGT   Last BM: 11/16   GENITOURINARY:  [x ]Normal [ ] Incontinent   [ ]Oliguria/Anuria   [ ]Heredia  MUSCULOSKELETAL:   [ ]Normal   [ x]Weakness  [ ]Bed/Wheelchair bound [ ]Edema  NEUROLOGIC:   [x ]No focal deficits  [ ] Cognitive impairment  [ ] Dysphagia [ ]Dysarthria [ ] Paresis [ ]Other   SKIN: ecchymoses  [ ]Normal   [ ]Pressure ulcer(s)  [ ]Rash    CRITICAL CARE:  [ ] Shock Present  [ ]Septic [ ]Cardiogenic [ ]Neurologic [ ]Hypovolemic  [ ]  Vasopressors [ ]  Inotropes   [ ] Respiratory failure present  [ ] Acute  [ ] Chronic [ ] Hypoxic  [ ] Hypercarbic [ ] Other  [ ] Other organ failure     LABS:                          9.7    33.84 )-----------( 523      ( 21 Nov 2019 08:58 )               30.4   11-21    132<L>  |  90<L>  |  8   ----------------------------<  117<H>  4.6   |  29  |  0.94    Ca    8.8      21 Nov 2019 06:45        RADIOLOGY & ADDITIONAL STUDIES:     CT A/P 11/17/19    Pancreatic cancer with hepatic metastatic disease, unchanged.    Interval percutaneous cholecystostomy with decreased distention of the   gallbladder.    Increased loculated ascites, with peritoneal enhancement, concerning for   peritonitis.        Protein Calorie Malnutrition Present: [ x] yes [ ] no  [ ] PPSV2 < or = 30%  [ ] significant weight loss [x ] poor nutritional intake [ ] anasarca [x ] catabolic state Albumin, Serum: 2.2 g/dL (11-16-19 @ 07:20)  Artificial Nutrition [ ]     REFERRALS:   [ ]Chaplaincy  [ ] Hospice  [ ]Child Life  [ ]Social Work  [x ]Case management [ ]Holistic Therapy   Goals of Care Document:

## 2019-11-22 NOTE — PROGRESS NOTE ADULT - SUBJECTIVE AND OBJECTIVE BOX
GAP TEAM PALLIATIVE CARE UNIT PROGRESS NOTE:      [  ] Patient on hospice program.    INDICATION FOR PALLIATIVE CARE UNIT SERVICES: Management of severe pain in the setting of Stage IV pancreatic cancer and peritonitis s/p ruptured gallbladder.    INTERVAL HPI/OVERNIGHT EVENTS: Transferred to PCU overnight. Patient remains on methadone 20 mg q 6 hours and has PCA Dilaudid pump in place with continuous rate of 3.5 mg/hour and demand dose of 2 mg q 15 minutes. 52 demand doses administered in the past 24 hours and 121 injections attempted. Patient reports 9/10 pain this morning on assessment. Denies nausea. + anorexia. Abdomen distended and extremely tender to the touch.    DNR on chart:   Allergies    No Known Allergies    Intolerances    MEDICATIONS  (STANDING):  bisacodyl 5 milliGRAM(s) Oral at bedtime  chlorhexidine 4% Liquid 1 Application(s) Topical daily  dextrose 5%. 1000 milliLiter(s) (50 mL/Hr) IV Continuous <Continuous>  dextrose 50% Injectable 12.5 Gram(s) IV Push once  dextrose 50% Injectable 25 Gram(s) IV Push once  dextrose 50% Injectable 25 Gram(s) IV Push once  DULoxetine 60 milliGRAM(s) Oral daily  enoxaparin Injectable 40 milliGRAM(s) SubCutaneous daily  famotidine    Tablet 20 milliGRAM(s) Oral daily  HYDROmorphone PCA (5 mG/mL) 30 milliLiter(s) PCA Continuous PCA Continuous  influenza   Vaccine 0.5 milliLiter(s) IntraMuscular once  insulin lispro (HumaLOG) corrective regimen sliding scale   SubCutaneous three times a day before meals  insulin lispro (HumaLOG) corrective regimen sliding scale   SubCutaneous at bedtime  meropenem  IVPB 1000 milliGRAM(s) IV Intermittent every 8 hours  methadone    Tablet 20 milliGRAM(s) Oral every 6 hours  naloxone Injectable 0.4 milliGRAM(s) IV Push every 3 minutes  polyethylene glycol 3350 17 Gram(s) Oral two times a day  senna 2 Tablet(s) Oral at bedtime  sodium chloride 0.9%. 1000 milliLiter(s) (20 mL/Hr) IV Continuous <Continuous>  sucralfate suspension 1 Gram(s) Oral four times a day    MEDICATIONS  (PRN):  bisacodyl Suppository 10 milliGRAM(s) Rectal daily PRN Constipation  dextrose 40% Gel 15 Gram(s) Oral once PRN Blood Glucose LESS THAN 70 milliGRAM(s)/deciliter  diazepam    Tablet 2 milliGRAM(s) Oral every 8 hours PRN anxiety  glucagon  Injectable 1 milliGRAM(s) IntraMuscular once PRN Glucose LESS THAN 70 milligrams/deciliter  HYDROmorphone PCA (5 mG/mL) Rescue Clinician Bolus 4 milliGRAM(s) IV Push every 1 hour PRN pain unrelieved with bolus dosing  naloxone Injectable 0.1 milliGRAM(s) IV Push every 3 minutes PRN For ANY of the following changes in patient status:  A. RR LESS THAN 10 breaths per minute, B. Oxygen saturation LESS THAN 90%, C. Sedation score of 6  simethicone 80 milliGRAM(s) Chew every 8 hours PRN Gas    ITEMS UNCHECKED ARE NOT PRESENT    PRESENT SYMPTOMS: [ ]Unable to obtain due to poor mentation   Source if other than patient:  [ ]Family   [ ]Team     Pain: [x ] yes [ ] no  QOL impact - limited mobility; unable to perform ADLs  Location - lower abdomen                   Aggravating factors -touch; movement; position  Quality -aching, burning  Radiation -n/a  Timing-constant  Severity (0-10 scale): 9/10  Minimal acceptable level (0-10 scale):     Dyspnea:                           [ ]Mild [ ]Moderate [ ]Severe  Anxiety:                             [ ]Mild [ ]Moderate [ ]Severe  Fatigue:                             [ ]Mild [ ]Moderate [ ]Severe  Nausea:                             [ ]Mild [ ]Moderate [ ]Severe  Loss of appetite:              [ ]Mild [ ]Moderate [ ]Severe  Constipation:                    [ ]Mild [ ]Moderate [ ]Severe    PAINAD Score:    [email or web address] (Ctrl +  left click to view)  		  Other Symptoms:  [ ]All other review of systems negative     Karnofsky Performance Score/Palliative Performance Status Version 2:         %        http://npcrc.org/files/news/palliative_performance_scale_ppsv2.pdf  PHYSICAL EXAM:  Vital Signs Last 24 Hrs  T(C): 36.6 (22 Nov 2019 08:31), Max: 37 (21 Nov 2019 17:23)  T(F): 97.9 (22 Nov 2019 08:31), Max: 98.6 (21 Nov 2019 17:23)  HR: 90 (22 Nov 2019 08:31) (84 - 120)  BP: 96/62 (22 Nov 2019 08:31) (96/60 - 135/73)  BP(mean): --  RR: 18 (22 Nov 2019 08:31) (16 - 20)  SpO2: 94% (22 Nov 2019 08:31) (91% - 94%) I&O's Summary    21 Nov 2019 07:01  -  22 Nov 2019 07:00  --------------------------------------------------------  IN: 0 mL / OUT: 750 mL / NET: -750 mL    GENERAL:  [ ]Alert  [ ]Oriented x   [ ]Lethargic  [ ]Cachexia  [ ]Unarousable  [ ]Verbal  [ ]Non-Verbal  Behavioral:   [ ] Anxiety  [ ] Delirium [ ] Agitation [ ] Other  HEENT:  [ ]Normal   [ ]Dry mouth   [ ]ET Tube/Trach  [ ]Oral lesions  PULMONARY:   [ ]Clear [ ]Tachypnea  [ ]Audible excessive secretions   [ ]Rhonchi        [ ]Right [ ]Left [ ]Bilateral  [ ]Crackles        [ ]Right [ ]Left [ ]Bilateral  [ ]Wheezing     [ ]Right [ ]Left [ ]Bilateral  CARDIOVASCULAR:    [ ]Regular [ ]Irregular [ ]Tachy  [ ]Jean [ ]Murmur [ ]Other  GASTROINTESTINAL:  [ ]Soft  [ ]Distended   [ ]+BS  [ ]Non tender [ ]Tender  [ ]PEG [ ]OGT/ NGT   Last BM:     GENITOURINARY:  [ ]Normal [ ] Incontinent   [ ]Oliguria/Anuria   [ ]Heredia  MUSCULOSKELETAL:   [ ]Normal   [ ]Weakness  [ ]Bed/Wheelchair bound [ ]Edema  NEUROLOGIC:   [ ]No focal deficits  [ ] Cognitive impairment  [ ] Dysphagia [ ]Dysarthria [ ] Paresis [ ]Other   SKIN:   [ ]Normal   [ ]Pressure ulcer(s)  [ ]Rash    CRITICAL CARE:  [ ] Shock Present  [ ]Septic [ ]Cardiogenic [ ]Neurologic [ ]Hypovolemic  [ ]  Vasopressors [ ]  Inotropes   [ ] Respiratory failure present [ ] Mechanical Ventilation [ ] Non-invasive ventilatory support [ ] High-Flow  [ ] Acute  [ ] Chronic [ ] Hypoxic  [ ] Hypercarbic [ ] Other  [ ] Other organ failure     LABS:                        9.1    40.49 )-----------( 518      ( 22 Nov 2019 08:21 )             28.9   11-22    136  |  95<L>  |  14  ----------------------------<  110<H>  4.3   |  28  |  0.96    Ca    8.6      22 Nov 2019 06:17    TPro  6.2  /  Alb  2.2<L>  /  TBili  7.0<H>  /  DBili  x   /  AST  199<H>  /  ALT  95<H>  /  AlkPhos  781<H>  11-22  PT/INR - ( 22 Nov 2019 11:31 )   PT: 15.8 sec;   INR: 1.36 ratio         PTT - ( 22 Nov 2019 11:31 )  PTT:28.5 sec      RADIOLOGY & ADDITIONAL STUDIES:    PROTEIN CALORIE MALNUTRITION:   [ ] PPSV2 < or = 30% [ ] significant weight loss [ ] poor nutritional intake [ ] anasarca [ ] catabolic state   Albumin, Serum: 2.2 g/dL (11-22-19 @ 06:17)   Artificial Nutrition [ ]     REFERRALS:   [ ]Chaplaincy  [ ] Hospice  [ ]Child Life  [ ]Social Work  [ ]Case management [ ]Holistic Therapy [ ] Physical Therapy [ ] Dietary   Goals of Care Document:   Progress Notes - Care Coordination [C. Provider] (11-20-19 @ 10:25) GAP TEAM PALLIATIVE CARE UNIT PROGRESS NOTE:      [  ] Patient on hospice program.    INDICATION FOR PALLIATIVE CARE UNIT SERVICES: Management of severe pain in the setting of Stage IV pancreatic cancer and peritonitis s/p ruptured gallbladder.    INTERVAL HPI/OVERNIGHT EVENTS: Transferred to PCU overnight. Patient remains on methadone 20 mg q 6 hours and has PCA Dilaudid pump in place with continuous rate of 3.5 mg/hour and demand dose of 2 mg q 15 minutes. 52 demand doses administered in the past 24 hours and 121 injections attempted. Patient reports 9/10 pain this morning on assessment. Denies nausea. + anorexia. Abdomen distended and extremely tender to the touch.    DNR on chart:   Allergies    No Known Allergies    Intolerances    MEDICATIONS  (STANDING):  bisacodyl 5 milliGRAM(s) Oral at bedtime  chlorhexidine 4% Liquid 1 Application(s) Topical daily  dextrose 5%. 1000 milliLiter(s) (50 mL/Hr) IV Continuous <Continuous>  dextrose 50% Injectable 12.5 Gram(s) IV Push once  dextrose 50% Injectable 25 Gram(s) IV Push once  dextrose 50% Injectable 25 Gram(s) IV Push once  DULoxetine 60 milliGRAM(s) Oral daily  enoxaparin Injectable 40 milliGRAM(s) SubCutaneous daily  famotidine    Tablet 20 milliGRAM(s) Oral daily  HYDROmorphone PCA (5 mG/mL) 30 milliLiter(s) PCA Continuous PCA Continuous  influenza   Vaccine 0.5 milliLiter(s) IntraMuscular once  insulin lispro (HumaLOG) corrective regimen sliding scale   SubCutaneous three times a day before meals  insulin lispro (HumaLOG) corrective regimen sliding scale   SubCutaneous at bedtime  meropenem  IVPB 1000 milliGRAM(s) IV Intermittent every 8 hours  methadone    Tablet 20 milliGRAM(s) Oral every 6 hours  naloxone Injectable 0.4 milliGRAM(s) IV Push every 3 minutes  polyethylene glycol 3350 17 Gram(s) Oral two times a day  senna 2 Tablet(s) Oral at bedtime  sodium chloride 0.9%. 1000 milliLiter(s) (20 mL/Hr) IV Continuous <Continuous>  sucralfate suspension 1 Gram(s) Oral four times a day    MEDICATIONS  (PRN):  bisacodyl Suppository 10 milliGRAM(s) Rectal daily PRN Constipation  dextrose 40% Gel 15 Gram(s) Oral once PRN Blood Glucose LESS THAN 70 milliGRAM(s)/deciliter  diazepam    Tablet 2 milliGRAM(s) Oral every 8 hours PRN anxiety  glucagon  Injectable 1 milliGRAM(s) IntraMuscular once PRN Glucose LESS THAN 70 milligrams/deciliter  HYDROmorphone PCA (5 mG/mL) Rescue Clinician Bolus 4 milliGRAM(s) IV Push every 1 hour PRN pain unrelieved with bolus dosing  naloxone Injectable 0.1 milliGRAM(s) IV Push every 3 minutes PRN For ANY of the following changes in patient status:  A. RR LESS THAN 10 breaths per minute, B. Oxygen saturation LESS THAN 90%, C. Sedation score of 6  simethicone 80 milliGRAM(s) Chew every 8 hours PRN Gas    ITEMS UNCHECKED ARE NOT PRESENT    PRESENT SYMPTOMS: [ ]Unable to obtain due to poor mentation   Source if other than patient:  [ ]Family   [ ]Team     Pain: [x ] yes [ ] no  QOL impact - limited mobility; unable to perform ADLs  Location - lower abdomen                   Aggravating factors -touch; movement; position  Quality -aching, burning  Radiation -n/a  Timing-constant  Severity (0-10 scale): 9/10  Minimal acceptable level (0-10 scale):     Dyspnea:                           [ ]Mild [ ]Moderate [ ]Severe  Anxiety:                             [x ]Mild [ ]Moderate [ ]Severe  Fatigue:                             [ ]Mild [x ]Moderate [ ]Severe  Nausea:                             [ ]Mild [ ]Moderate [ ]Severe  Loss of appetite:              [ ]Mild [ ]Moderate [x ]Severe  Constipation:                    [x ]Mild [ ]Moderate [ ]Severe    PAINAD Score:    [email or web address] (Ctrl +  left click to view)  		  Other Symptoms:  [ ]All other review of systems negative     Karnofsky Performance Score/Palliative Performance Status Version 2:        30-40%        http://npcrc.org/files/news/palliative_performance_scale_ppsv2.pdf  PHYSICAL EXAM:  Vital Signs Last 24 Hrs  T(C): 36.6 (22 Nov 2019 08:31), Max: 37 (21 Nov 2019 17:23)  T(F): 97.9 (22 Nov 2019 08:31), Max: 98.6 (21 Nov 2019 17:23)  HR: 90 (22 Nov 2019 08:31) (84 - 120)  BP: 96/62 (22 Nov 2019 08:31) (96/60 - 135/73)  BP(mean): --  RR: 18 (22 Nov 2019 08:31) (16 - 20)  SpO2: 94% (22 Nov 2019 08:31) (91% - 94%) I&O's Summary    21 Nov 2019 07:01  -  22 Nov 2019 07:00  --------------------------------------------------------  IN: 0 mL / OUT: 750 mL / NET: -750 mL    GENERAL:  [x ]Alert  [ x]Oriented x 3  [ ]Lethargic  [ ]Cachexia  [ ]Unarousable  [x ]Verbal  [ ]Non-Verbal  Behavioral:   [ ] Anxiety  [ ] Delirium [ ] Agitation [ ] Other  HEENT:  [x ]Normal   [ ]Dry mouth   [ ]ET Tube/Trach  [ ]Oral lesions  PULMONARY:   [x ]Clear [ ]Tachypnea  [ ]Audible excessive secretions   [ ]Rhonchi        [ ]Right [ ]Left [ ]Bilateral  [ ]Crackles        [ ]Right [ ]Left [ ]Bilateral  [ ]Wheezing     [ ]Right [ ]Left [ ]Bilateral  CARDIOVASCULAR:    [ ]Regular [ ]Irregular [x ]Tachy  [ ]Jean [ ]Murmur [ ]Other  GASTROINTESTINAL:  [ ]Soft  [x ]Distended   [x ]+BS  [ ]Non tender [x ]Tender  [ ]PEG [ ]OGT/ NGT   Last BM: 11/17/19    GENITOURINARY:  [ x]Normal [ ] Incontinent   [ ]Oliguria/Anuria   [ ]Heredia  MUSCULOSKELETAL:   [ ]Normal   [x ]Weakness  [ ]Bed/Wheelchair bound [ ]Edema  NEUROLOGIC:   [x ]No focal deficits  [ ] Cognitive impairment  [ ] Dysphagia [ ]Dysarthria [ ] Paresis [ ]Other   SKIN:   [x ]Normal   [ ]Pressure ulcer(s)  [ ]Rash    CRITICAL CARE:  [ ] Shock Present  [ ]Septic [ ]Cardiogenic [ ]Neurologic [ ]Hypovolemic  [ ]  Vasopressors [ ]  Inotropes   [ ] Respiratory failure present [ ] Mechanical Ventilation [ ] Non-invasive ventilatory support [ ] High-Flow  [ ] Acute  [ ] Chronic [ ] Hypoxic  [ ] Hypercarbic [ ] Other  [ ] Other organ failure     LABS:                        9.1    40.49 )-----------( 518      ( 22 Nov 2019 08:21 )             28.9   11-22    136  |  95<L>  |  14  ----------------------------<  110<H>  4.3   |  28  |  0.96    Ca    8.6      22 Nov 2019 06:17    TPro  6.2  /  Alb  2.2<L>  /  TBili  7.0<H>  /  DBili  x   /  AST  199<H>  /  ALT  95<H>  /  AlkPhos  781<H>  11-22  PT/INR - ( 22 Nov 2019 11:31 )   PT: 15.8 sec;   INR: 1.36 ratio         PTT - ( 22 Nov 2019 11:31 )  PTT:28.5 sec      RADIOLOGY & ADDITIONAL STUDIES:    PROTEIN CALORIE MALNUTRITION:   [ ] PPSV2 < or = 30% [ ] significant weight loss [x ] poor nutritional intake [ ] anasarca [ ] catabolic state   Albumin, Serum: 2.2 g/dL (11-22-19 @ 06:17)   Artificial Nutrition [ ]     REFERRALS:   [ ]Chaplaincy  [ ] Hospice  [ ]Child Life  [x ]Social Work  [ ]Case management [ ]Holistic Therapy [ ] Physical Therapy [ ] Dietary   Goals of Care Document:   Progress Notes - Care Coordination [C. Provider] (11-20-19 @ 10:25) GAP TEAM PALLIATIVE CARE UNIT PROGRESS NOTE:      [  ] Patient on hospice program.    INDICATION FOR PALLIATIVE CARE UNIT SERVICES: Management of severe pain in the setting of Stage IV pancreatic cancer and peritonitis s/p ruptured gallbladder.    INTERVAL HPI/OVERNIGHT EVENTS: Transferred to PCU overnight. Patient remains on methadone 20 mg q 6 hours and has PCA Dilaudid pump in place with continuous rate of 3.5 mg/hour and demand dose of 2 mg q 15 minutes. 52 demand doses administered in the past 24 hours and 121 injections attempted. Patient reports 9/10 pain this morning on assessment. Denies nausea. + anorexia. Abdomen distended and extremely tender to the touch.    DNR on chart:   Allergies    No Known Allergies    Intolerances    MEDICATIONS  (STANDING):  bisacodyl 5 milliGRAM(s) Oral at bedtime  chlorhexidine 4% Liquid 1 Application(s) Topical daily  dextrose 5%. 1000 milliLiter(s) (50 mL/Hr) IV Continuous <Continuous>  dextrose 50% Injectable 12.5 Gram(s) IV Push once  dextrose 50% Injectable 25 Gram(s) IV Push once  dextrose 50% Injectable 25 Gram(s) IV Push once  DULoxetine 60 milliGRAM(s) Oral daily  enoxaparin Injectable 40 milliGRAM(s) SubCutaneous daily  famotidine    Tablet 20 milliGRAM(s) Oral daily  HYDROmorphone PCA (5 mG/mL) 30 milliLiter(s) PCA Continuous PCA Continuous  influenza   Vaccine 0.5 milliLiter(s) IntraMuscular once  insulin lispro (HumaLOG) corrective regimen sliding scale   SubCutaneous three times a day before meals  insulin lispro (HumaLOG) corrective regimen sliding scale   SubCutaneous at bedtime  meropenem  IVPB 1000 milliGRAM(s) IV Intermittent every 8 hours  methadone    Tablet 20 milliGRAM(s) Oral every 6 hours  naloxone Injectable 0.4 milliGRAM(s) IV Push every 3 minutes  polyethylene glycol 3350 17 Gram(s) Oral two times a day  senna 2 Tablet(s) Oral at bedtime  sodium chloride 0.9%. 1000 milliLiter(s) (20 mL/Hr) IV Continuous <Continuous>  sucralfate suspension 1 Gram(s) Oral four times a day    MEDICATIONS  (PRN):  bisacodyl Suppository 10 milliGRAM(s) Rectal daily PRN Constipation  dextrose 40% Gel 15 Gram(s) Oral once PRN Blood Glucose LESS THAN 70 milliGRAM(s)/deciliter  diazepam    Tablet 2 milliGRAM(s) Oral every 8 hours PRN anxiety  glucagon  Injectable 1 milliGRAM(s) IntraMuscular once PRN Glucose LESS THAN 70 milligrams/deciliter  HYDROmorphone PCA (5 mG/mL) Rescue Clinician Bolus 4 milliGRAM(s) IV Push every 1 hour PRN pain unrelieved with bolus dosing  naloxone Injectable 0.1 milliGRAM(s) IV Push every 3 minutes PRN For ANY of the following changes in patient status:  A. RR LESS THAN 10 breaths per minute, B. Oxygen saturation LESS THAN 90%, C. Sedation score of 6  simethicone 80 milliGRAM(s) Chew every 8 hours PRN Gas    ITEMS UNCHECKED ARE NOT PRESENT    PRESENT SYMPTOMS: [ ]Unable to obtain due to poor mentation   Source if other than patient:  [ ]Family   [ ]Team     Pain: [x ] yes [ ] no  QOL impact - limited mobility; unable to perform ADLs  Location - lower abdomen                   Aggravating factors -touch; movement; position  Quality -aching, burning  Radiation -n/a  Timing-constant  Severity (0-10 scale): 9/10  Minimal acceptable level (0-10 scale):     Dyspnea:                           [ ]Mild [ ]Moderate [ ]Severe  Anxiety:                             [x ]Mild [ ]Moderate [ ]Severe  Fatigue:                             [ ]Mild [x ]Moderate [ ]Severe  Nausea:                             [ ]Mild [ ]Moderate [ ]Severe  Loss of appetite:              [ ]Mild [ ]Moderate [x ]Severe  Constipation:                    [x ]Mild [ ]Moderate [ ]Severe    PAINAD Score:    [email or web address] (Ctrl +  left click to view)  		  Other Symptoms:  [ ]All other review of systems negative     Karnofsky Performance Score/Palliative Performance Status Version 2:        30-40%        http://npcrc.org/files/news/palliative_performance_scale_ppsv2.pdf  PHYSICAL EXAM:  Vital Signs Last 24 Hrs  T(C): 36.6 (22 Nov 2019 08:31), Max: 37 (21 Nov 2019 17:23)  T(F): 97.9 (22 Nov 2019 08:31), Max: 98.6 (21 Nov 2019 17:23)  HR: 90 (22 Nov 2019 08:31) (84 - 120)  BP: 96/62 (22 Nov 2019 08:31) (96/60 - 135/73)  BP(mean): --  RR: 18 (22 Nov 2019 08:31) (16 - 20)  SpO2: 94% (22 Nov 2019 08:31) (91% - 94%) I&O's Summary    21 Nov 2019 07:01  -  22 Nov 2019 07:00  --------------------------------------------------------  IN: 0 mL / OUT: 750 mL / NET: -750 mL    GENERAL:  [x ]Alert  [ x]Oriented x 3  [ ]Lethargic  [ ]Cachexia  [ ]Unarousable  [x ]Verbal  [ ]Non-Verbal  Behavioral:   [ ] Anxiety  [ ] Delirium [ ] Agitation [ ] Other  HEENT:  [x ]Normal   [ ]Dry mouth   [ ]ET Tube/Trach  [ ]Oral lesions  PULMONARY:   [x ]Clear [ ]Tachypnea  [ ]Audible excessive secretions   [ ]Rhonchi        [ ]Right [ ]Left [ ]Bilateral  [ ]Crackles        [ ]Right [ ]Left [ ]Bilateral  [ ]Wheezing     [ ]Right [ ]Left [ ]Bilateral  CARDIOVASCULAR:    [ ]Regular [ ]Irregular [x ]Tachy  [ ]Jean [ ]Murmur [ ]Other  GASTROINTESTINAL:  [ ]Soft  [x ]Distended   [x ]+BS  [ ]Non tender [x ]Tender  [ ]PEG [ ]OGT/ NGT   Last BM: 11/17/19    GENITOURINARY:  [ x]Normal [ ] Incontinent   [ ]Oliguria/Anuria   [ ]Heredia  MUSCULOSKELETAL:   [ ]Normal   [x ]Weakness  [ ]Bed/Wheelchair bound [x ]Edema  NEUROLOGIC:   [x ]No focal deficits  [ ] Cognitive impairment  [ ] Dysphagia [ ]Dysarthria [ ] Paresis [ ]Other   SKIN:   [x ]Normal   [ ]Pressure ulcer(s)  [ ]Rash    CRITICAL CARE:  [ ] Shock Present  [ ]Septic [ ]Cardiogenic [ ]Neurologic [ ]Hypovolemic  [ ]  Vasopressors [ ]  Inotropes   [ ] Respiratory failure present [ ] Mechanical Ventilation [ ] Non-invasive ventilatory support [ ] High-Flow  [ ] Acute  [ ] Chronic [ ] Hypoxic  [ ] Hypercarbic [ ] Other  [ ] Other organ failure     LABS:                        9.1    40.49 )-----------( 518      ( 22 Nov 2019 08:21 )             28.9   11-22    136  |  95<L>  |  14  ----------------------------<  110<H>  4.3   |  28  |  0.96    Ca    8.6      22 Nov 2019 06:17    TPro  6.2  /  Alb  2.2<L>  /  TBili  7.0<H>  /  DBili  x   /  AST  199<H>  /  ALT  95<H>  /  AlkPhos  781<H>  11-22  PT/INR - ( 22 Nov 2019 11:31 )   PT: 15.8 sec;   INR: 1.36 ratio         PTT - ( 22 Nov 2019 11:31 )  PTT:28.5 sec      RADIOLOGY & ADDITIONAL STUDIES: reveiwed    PROTEIN CALORIE MALNUTRITION:   [ ] PPSV2 < or = 30% [ ] significant weight loss [x ] poor nutritional intake [ ] anasarca [ ] catabolic state   Albumin, Serum: 2.2 g/dL (11-22-19 @ 06:17)   Artificial Nutrition [ ]     REFERRALS:   [ ]Chaplaincy  [ ] Hospice  [ ]Child Life  [x ]Social Work  [ ]Case management [ ]Holistic Therapy [ ] Physical Therapy [ ] Dietary   Goals of Care Document:   Progress Notes - Care Coordination [C. Provider] (11-20-19 @ 10:25)

## 2019-11-22 NOTE — PROGRESS NOTE ADULT - ASSESSMENT
63 m with DM, HTN, traumatic work injury (2001) s/p multilumbar fusion (T8-S1) s/p hardware removal (2016, on daily opiates and methadone), metastatic pancreatic adenocarcinoma s/p biliary stent placement (9/17/19) sent in from [facility] for concern for biliary sepsis on 11/8/19.   Came in with leukocytosis 26k, afebrile, tachycardic to 100  CT C/A/P with possible pneumonia, distended GB with likely GB rupture as well as new moderate loculated ascites.  s/p percutaneous cholecystostomy today with foul smelling fluid 11/12, but had worsening leukocytosis and repeat CT showed peritonitis and loculated ascites s/p tap 11/18 with 1370 WBC, 50% PMN, cx negative for nowe    metastatic pancreatic ca with ruptured gall bladder and cholecystitis s/p percutaneous cholecystostomy 11/12  IR biliary cx: klebsiella oxytoca, R to amp/sulbactam  R side pulmonary findings likely due to cholecystitis and contagious inflammation   peritonitis with loculated ascites s/p tap over 4 liter cloudy fluid drained, WBC 1370, PMN 50%  worsening LFTs and leukocytosis to 40, repeat CT unremarkable, decreased loculated fluid around the gallbladder and cholecystostomy in place     * pt was transferred to PCU but still on antibiotics  * f/u the final peritoneal fluid cx    * s/p 9 days of zosyn, started 11/8, switched to meropenem 11/20, now day 3  * GOC discussion will keep the antibiotics for now but antibiotics alone are unlikely to be curative in this case  * monitor the WBC and LFTs  * poor prognosis

## 2019-11-22 NOTE — PROGRESS NOTE ADULT - ASSESSMENT
64 y/o PMH of traumatic work injury (2001) s/p multilumbar fusion (T8-S1) s/p hardware removal (2016, on daily opiates and methadone), chronic constipation, GERD, HTN, depression, anxiety, metastatic pancreatic adenocarcinoma s/p biliary stent placement (2m ago) and chemo sent in from [facility] for concern for biliary sepsis on 11/8.During this admission, CT showed gallbladder rupture, stable metastatic disease (to liver), and tumor trombosis. Palliative care was called pain Rx. 62 y/o PMH of traumatic work injury (2001) s/p multilumbar fusion (T8-S1) s/p hardware removal (2016, on daily opiates and methadone), chronic constipation, GERD, HTN, depression, anxiety, metastatic pancreatic adenocarcinoma s/p biliary stent placement (2m ago) and chemo sent in from [facility] for concern for biliary sepsis on 11/8.During this admission, CT showed gallbladder rupture, stable metastatic disease (to liver), and tumor trombosis. Palliative care was called pain Rx.      Transferred to PCU and remains with significant pain. Abdomen distended, firm and tender. Bedside ultrasound ordered to evaluate for ascites. Patient received a total of 188 mg of Dilaudid in the past 24 hours.

## 2019-11-22 NOTE — PROGRESS NOTE ADULT - PROBLEM SELECTOR PLAN 4
- c/w cymbalta  - c/w PRN valium  - monitor for sedation 2/2 to ruptured gallbladder. S/p drain placement in IR. Not a surgical candidate at this time. Patient has not been responding to Meropenem which was recommended by ID and family and patient aware. Will c/w Meropenem as it may help with patient's symptoms, but patient and family aware that likely infection will be unable to get cleared. Patient remains with rising WBC count.

## 2019-11-22 NOTE — PROGRESS NOTE ADULT - PROBLEM SELECTOR PLAN 6
- will continue to follow, and meet with family to discuss GOC  - transfer to PCU when bed available for pain management Likely 2/2 to opioid use and minimal oral intake. Bowel regimen in place. C/w with Senna and Miralax as tolerated. Abdominal distension. Patient states paracentesis earlier this hospital admission was extremely effective for pain. In conjunction with IR, patient will go down today for a paracentesis. Will evaluate need to titrate pain medication s/p paracentesis.

## 2019-11-22 NOTE — PROGRESS NOTE ADULT - PROBLEM SELECTOR PLAN 2
- EKG with prolonged QTc >480  - would be mindful when ordering medications, avoiding Qtc prolonging meds as patient on methadone as well  - consider a trial of ativan 0.2mg IV q4h prn for refractory nausea/vomiting - EKG with prolonged QTc >480  - would be mindful when ordering medications, avoiding Qtc prolonging meds as patient on methadone as well  - patient denies nausea on assessment this morning  - consider a trial of ativan 0.2mg IV q4h prn for refractory nausea/vomiting; no prn use of Ativan in the past 24 hours.

## 2019-11-22 NOTE — PROGRESS NOTE ADULT - SUBJECTIVE AND OBJECTIVE BOX
Interventional Radiology Procedure Note    Procedure: Image-guided tx and dx paracentesis.    Indication: met pancreatic adenocarcinoma, Recurrent ascites.     Operators: TY Medley, Dr. Erik Amaro.     Anesthesia (type): Lidocaine 1% (local)    Contrast: None.     EBL: Minimal.     Findings/Follow up Plan of Care: LLQ abdominal approach. drained 2000 ml of clear yellow ascites.  pt tolerated the procedure well. hemostasis secure and VSS.  Dressing applied to the site is C/D/I.    Specimens Removed: Yes, Cell Count, Chemistry, Microbiology.     Implants: None.     Complications: None.     Condition/Disposition: Stable, pt was returned to his hospital room in stable condition.     Please call Interventional Radiology x [phone] with any questions, concerns, or issues.

## 2019-11-22 NOTE — PROGRESS NOTE ADULT - SUBJECTIVE AND OBJECTIVE BOX
Follow Up:  cholecystitis    Interval History: pt was transferred to PCU, the WBC worse to 40s    ROS:      All other systems negative    Constitutional: no fever, no chills  Head: no trauma  Eyes: no vision changes, no eye pain  ENT:  no sore throat, no rhinorrhea  Cardiovascular:  no chest pain, no palpitation  Respiratory:  no SOB, no cough  GI:  + abd pain, no vomiting, no diarrhea  urinary: no dysuria, no hematuria, no flank pain  musculoskeletal:  no joint pain, no joint swelling  skin:  no rash  neurology:  no headache, no seizure        Allergies  No Known Allergies        ANTIMICROBIALS:  meropenem  IVPB 1000 every 8 hours      OTHER MEDS:  bisacodyl 5 milliGRAM(s) Oral at bedtime  bisacodyl Suppository 10 milliGRAM(s) Rectal daily PRN  chlorhexidine 4% Liquid 1 Application(s) Topical daily  cloNIDine 0.1 milliGRAM(s) Oral two times a day  dextrose 40% Gel 15 Gram(s) Oral once PRN  dextrose 5%. 1000 milliLiter(s) IV Continuous <Continuous>  dextrose 50% Injectable 12.5 Gram(s) IV Push once  dextrose 50% Injectable 25 Gram(s) IV Push once  dextrose 50% Injectable 25 Gram(s) IV Push once  diazepam    Tablet 2 milliGRAM(s) Oral every 8 hours PRN  DULoxetine 60 milliGRAM(s) Oral daily  enoxaparin Injectable 40 milliGRAM(s) SubCutaneous daily  famotidine    Tablet 20 milliGRAM(s) Oral daily  glucagon  Injectable 1 milliGRAM(s) IntraMuscular once PRN  HYDROmorphone PCA (5 mG/mL) 30 milliLiter(s) PCA Continuous PCA Continuous  HYDROmorphone PCA (5 mG/mL) Rescue Clinician Bolus 4 milliGRAM(s) IV Push every 1 hour PRN  influenza   Vaccine 0.5 milliLiter(s) IntraMuscular once  insulin lispro (HumaLOG) corrective regimen sliding scale   SubCutaneous three times a day before meals  insulin lispro (HumaLOG) corrective regimen sliding scale   SubCutaneous at bedtime  methadone    Tablet 20 milliGRAM(s) Oral every 6 hours  naloxone Injectable 0.1 milliGRAM(s) IV Push every 3 minutes PRN  naloxone Injectable 0.4 milliGRAM(s) IV Push every 3 minutes  polyethylene glycol 3350 17 Gram(s) Oral two times a day  senna 2 Tablet(s) Oral at bedtime  simethicone 80 milliGRAM(s) Chew every 8 hours PRN  sodium chloride 0.9%. 1000 milliLiter(s) IV Continuous <Continuous>  sucralfate suspension 1 Gram(s) Oral four times a day      Vital Signs Last 24 Hrs  T(C): 36.6 (22 Nov 2019 08:31), Max: 37 (21 Nov 2019 17:23)  T(F): 97.9 (22 Nov 2019 08:31), Max: 98.6 (21 Nov 2019 17:23)  HR: 90 (22 Nov 2019 08:31) (84 - 130)  BP: 96/62 (22 Nov 2019 08:31) (96/60 - 135/73)  BP(mean): --  RR: 18 (22 Nov 2019 08:31) (16 - 20)  SpO2: 94% (22 Nov 2019 08:31) (91% - 94%)    Physical Exam:  General:   pt appears uncomfortable  Head: atraumatic, normocephalic  Eye: palce sclera and conjunctiva  ENT:    no oropharyngeal lesions,   no LAD,   neck supple  Cardio:     regular S1, S2,  no murmur  Respiratory:    clear b/l,    no wheezing  abd:    distended with palpable liver and mass, diffuse tenderness more on RUQ, perc daysi with purulent drainage  :   no CVAT,  no suprapubic tenderness,   no  saavedra  Musculoskeletal:   no joint swelling  vascular: no phlebitis  Skin:    no rash  Neurologic:     no focal deficit  psych: normal affect                          9.1    40.49 )-----------( 518      ( 22 Nov 2019 08:21 )             28.9       11-22    136  |  95<L>  |  14  ----------------------------<  110<H>  4.3   |  28  |  0.96    Ca    8.6      22 Nov 2019 06:17    TPro  6.2  /  Alb  2.2<L>  /  TBili  7.0<H>  /  DBili  x   /  AST  199<H>  /  ALT  95<H>  /  AlkPhos  781<H>  11-22          MICROBIOLOGY:  v  .Blood Blood-Peripheral  11-20-19   No growth to date.  --  --      Peritoneal Peritoneal Fluid  11-18-19   No growth  --    polymorphonuclear leukocytes seen  No organisms seen  by cytocentrifuge      .Body Fluid Bile Fluid  11-12-19   Numerous Klebsiella oxytoca  --  Klebsiella oxytoca      .Blood Blood-Peripheral  11-09-19   No growth at 5 days.  --  --      .Urine Clean Catch (Midstream)  11-09-19   <10,000 CFU/mL Normal Urogenital Veronika  --  --                RADIOLOGY:  Images below reviewed personally  < from: CT Abdomen and Pelvis w/ IV Cont (11.21.19 @ 14:41) >  IMPRESSION:   Loculated fluid around the gallbladder has decreased. Stable liver   lesions.    Minimal decrease in size of air and sludge filled gallbladder with tip of   the percutaneous cholecystostomy tube in place.    Pancreatic lesions are unchanged.    Moderate ascites unchanged.

## 2019-11-22 NOTE — PROGRESS NOTE ADULT - PROBLEM SELECTOR PLAN 8
Transferred overnight to PCU for pain management. Based on current condition along with diagnostics, likely patient will not be able to receive any further DMT. Patient has been unresponsive to antibiotics and is not a surgical candidate at this time.    Meeting was on the phone with two sons (including HCP son Gamal) and wife at bedside. They are aware of diagnosis/prognosis and that the disease is incurable and life expectancy is limited 2/2 to active infection and inability to proceed with DMT. At this time, focus will be on managing symptoms.    Patient and son agreeable to DNR and MOLST completed. Patient remains with capacity at this time. Ultimately goal would be to take patient home if symptoms can be managed.

## 2019-11-22 NOTE — PROGRESS NOTE ADULT - SUBJECTIVE AND OBJECTIVE BOX
Interventional Radiology     63y Male with PMH as below with metastatic pancreatic adenocarcinoma with recurrent ascites and referred to IR for tx paracentesis.      Last paracentesis by IR was on 19 and at that time 4.1 L was drained.  Per d/w PCU NP Alma Gomez the pt is for tx paracentesis only.      PAST MEDICAL & SURGICAL HISTORY:  History of diabetes mellitus, type II  Anxiety and depression  Diastolic dysfunction: stage I  Empyema lun2015 left lung, s/p VATS, decortication  H/O peptic ulcer: over 10 years ago  history of renal calculus  Herniated Disc: S/P work injury   Hypertension: Dx:   Spinal Stenosis  Postlaminectomy Syndrome  S/P  shunt  S/P insertion of spinal cord stimulator:   History of lumbar fusion:   Insertion of Intrathecal Dilaudid Pump: 2011  S/P Spinal Surgery: corrective lumbar fusion   S/P Knee Replacement: left knee   S/P Arthroscopy of Left Knee  S/P Tonsillectomy: childhood  Ankle Fracture: s/p ORIF left ankle     Allergies  No Known Allergies    Labs:                        9.1    40.49 )-----------( 518      ( 2019 08:21 )             28.9         136  |  95<L>  |  14  ----------------------------<  110<H>  4.3   |  28  |  0.96    Ca    8.6      2019 06:17    TPro  6.2  /  Alb  2.2<L>  /  TBili  7.0<H>  /  DBili  x   /  AST  199<H>  /  ALT  95<H>  /  AlkPhos  781<H>      PT/INR - ( 2019 11:31 )   PT: 15.8 sec;   INR: 1.36 ratio    PTT - ( 2019 11:31 )  PTT:28.5 sec    After risks, benefits, alternatives discussion the pt verbalized understanding and signed informed consent.  Will plan for image-guided paracentesis.      LUZ Encinas  [facility] [id number]  Ext [phone] Interventional Radiology     63y Male with PMH as below with metastatic pancreatic adenocarcinoma with recurrent ascites and referred to IR for tx paracentesis.      Last paracentesis by IR was on 19 and at that time 4.1 L was drained.  Per PCU NP the fluid should be sent for culture.      PAST MEDICAL & SURGICAL HISTORY:  History of diabetes mellitus, type II  Anxiety and depression  Diastolic dysfunction: stage I  Empyema lun2015 left lung, s/p VATS, decortication  H/O peptic ulcer: over 10 years ago  history of renal calculus  Herniated Disc: S/P work injury   Hypertension: Dx:   Spinal Stenosis  Postlaminectomy Syndrome  S/P  shunt  S/P insertion of spinal cord stimulator:   History of lumbar fusion:   Insertion of Intrathecal Dilaudid Pump: 2011  S/P Spinal Surgery: corrective lumbar fusion   S/P Knee Replacement: left knee   S/P Arthroscopy of Left Knee  S/P Tonsillectomy: childhood  Ankle Fracture: s/p ORIF left ankle     Allergies  No Known Allergies    Labs:                        9.1    40.49 )-----------( 518      ( 2019 08:21 )             28.9         136  |  95<L>  |  14  ----------------------------<  110<H>  4.3   |  28  |  0.96    Ca    8.6      2019 06:17    TPro  6.2  /  Alb  2.2<L>  /  TBili  7.0<H>  /  DBili  x   /  AST  199<H>  /  ALT  95<H>  /  AlkPhos  781<H>      PT/INR - ( 2019 11:31 )   PT: 15.8 sec;   INR: 1.36 ratio    PTT - ( 2019 11:31 )  PTT:28.5 sec    After risks, benefits, alternatives discussion with the patient's wife she verbalized understanding and signed informed consent.  Will plan for image-guided paracentesis.      LUZ Encinas  [facility] [address]  Ext [phone]

## 2019-11-22 NOTE — PROGRESS NOTE ADULT - PROBLEM SELECTOR PLAN 5
- c/w senna, miralax - c/w cymbalta  - c/w PRN valium  Anxiety may also be 2/2 to uncontrolled pain and disease process.

## 2019-11-22 NOTE — PROGRESS NOTE ADULT - PROBLEM SELECTOR PLAN 3
- DDT on hold while inpatient  - follows with Dr. Ward at [facility] - Patient not a candidate for DMT at this time 2/2 to active infection. Likely reaching the end of his disease course. Meeting today with family and patient who are aware and would like to focus on symptom management at this time.  - follows with Dr. Ward at [facility]

## 2019-11-22 NOTE — PROGRESS NOTE ADULT - PROBLEM SELECTOR PLAN 7
Transferred overnight to PCU for pain management. Based on current condition along with diagnostics, likely patient will not be able to receive any further DMT. Patient has been unresponsive to antibiotics and is not a surgical candidate at this time.    Meeting was on the phone with two sons (including HCP son Gamal) and wife at bedside. They are aware of diagnosis/prognosis and that the disease is incurable and life expectancy is limited 2/2 to active infection and inability to proceed with DMT. At this time, focus will be on managing symptoms.    Patient and son agreeable to DNR and MOLST completed. Patient remains with capacity at this time. Ultimately goal would be to take patient home if symptoms can be managed. Likely 2/2 to opioid use and minimal oral intake. Bowel regimen in place. C/w with Senna and Miralax as tolerated.

## 2019-11-23 NOTE — PROGRESS NOTE ADULT - PROBLEM SELECTOR PLAN 4
2/2 to ruptured gallbladder. S/p drain placement in IR. Not a surgical candidate at this time. Patient has not been responding to Meropenem which was recommended by ID and family and patient aware. Will c/w Meropenem as it may help with patient's symptoms, but patient and family aware that likely infection will be unable to get cleared. Patient remains with rising WBC count.

## 2019-11-23 NOTE — PROGRESS NOTE ADULT - ASSESSMENT
64 y/o PMH of traumatic work injury (2001) s/p multilumbar fusion (T8-S1) s/p hardware removal (2016, on daily opiates and methadone), chronic constipation, GERD, HTN, depression, anxiety, metastatic pancreatic adenocarcinoma s/p biliary stent placement (2m ago) and chemo sent in from [facility] for concern for biliary sepsis on 11/8.During this admission, CT showed gallbladder rupture, stable metastatic disease (to liver), and tumor trombosis. Palliative care was called pain Rx.

## 2019-11-23 NOTE — PROGRESS NOTE ADULT - PROBLEM SELECTOR PLAN 2
- EKG with prolonged QTc >480  - Increased methadone to 25mg [id number] today (11/23/19)  - patient denies nausea on assessment this morning  - consider a trial of ativan 0.2mg IV q4h prn for refractory nausea/vomiting; no prn use of Ativan in the past 24 hours.

## 2019-11-23 NOTE — PROGRESS NOTE ADULT - PROBLEM SELECTOR PLAN 6
Abdominal distension. s/p paracentesis yesterday. minimal relief, still required significant amount of demend boluses along with basal dilaudid infusion,

## 2019-11-23 NOTE — PROGRESS NOTE ADULT - PROBLEM SELECTOR PLAN 7
Likely 2/2 to opioid use and minimal oral intake. Bowel regimen in place. C/w with Senna and Miralax as tolerated.  LBM - 11/17, Dulcolax Suppository today

## 2019-11-23 NOTE — PROGRESS NOTE ADULT - SUBJECTIVE AND OBJECTIVE BOX
GAP TEAM PALLIATIVE CARE UNIT PROGRESS NOTE:      [  ] Patient on hospice program.    INDICATION FOR PALLIATIVE CARE UNIT SERVICES: Management of severe pain in the setting of Stage IV pancreatic cancer and peritonitis s/p ruptured gallbladder.    INTERVAL HPI/OVERNIGHT EVENTS: Pain minimally better after paracentesis yesterday. Remived 2000cc of ascitic fluid.   60 demand doses administered in the past 24 hours and 115 injections attempted. Patient reports significant pain this morning on assessment. Methadone increased to 25 mg q 6 hours and has PCA Dilaudid pump basal rate increased to 5 mg/hour from continuous rate of 3.5 mg/hour and demand dose increased to 3 mg q 15 minutes. Denies nausea. + anorexia. No BM since 11/17.     DNR on chart:   Allergies    No Known Allergies    Intolerances    MEDICATIONS  (STANDING):  bisacodyl 5 milliGRAM(s) Oral at bedtime  chlorhexidine 4% Liquid 1 Application(s) Topical daily  dextrose 5%. 1000 milliLiter(s) (50 mL/Hr) IV Continuous <Continuous>  dextrose 50% Injectable 12.5 Gram(s) IV Push once  dextrose 50% Injectable 25 Gram(s) IV Push once  dextrose 50% Injectable 25 Gram(s) IV Push once  DULoxetine 60 milliGRAM(s) Oral daily  enoxaparin Injectable 40 milliGRAM(s) SubCutaneous daily  famotidine    Tablet 20 milliGRAM(s) Oral daily  HYDROmorphone PCA (5 mG/mL) 30 milliLiter(s) PCA Continuous PCA Continuous  influenza   Vaccine 0.5 milliLiter(s) IntraMuscular once  insulin lispro (HumaLOG) corrective regimen sliding scale   SubCutaneous three times a day before meals  insulin lispro (HumaLOG) corrective regimen sliding scale   SubCutaneous at bedtime  meropenem  IVPB 1000 milliGRAM(s) IV Intermittent every 8 hours  methadone    Tablet 25 milliGRAM(s) Oral every 6 hours  naloxone Injectable 0.4 milliGRAM(s) IV Push every 3 minutes  polyethylene glycol 3350 17 Gram(s) Oral two times a day  senna 2 Tablet(s) Oral at bedtime  sodium chloride 0.9%. 1000 milliLiter(s) (20 mL/Hr) IV Continuous <Continuous>  sucralfate suspension 1 Gram(s) Oral four times a day    MEDICATIONS  (PRN):  bisacodyl Suppository 10 milliGRAM(s) Rectal daily PRN Constipation  dextrose 40% Gel 15 Gram(s) Oral once PRN Blood Glucose LESS THAN 70 milliGRAM(s)/deciliter  diazepam    Tablet 2 milliGRAM(s) Oral every 8 hours PRN anxiety  glucagon  Injectable 1 milliGRAM(s) IntraMuscular once PRN Glucose LESS THAN 70 milligrams/deciliter  HYDROmorphone PCA (5 mG/mL) Rescue Clinician Bolus 4 milliGRAM(s) IV Push every 1 hour PRN pain unrelieved with bolus dosing  naloxone Injectable 0.1 milliGRAM(s) IV Push every 3 minutes PRN For ANY of the following changes in patient status:  A. RR LESS THAN 10 breaths per minute, B. Oxygen saturation LESS THAN 90%, C. Sedation score of 6  simethicone 80 milliGRAM(s) Chew every 8 hours PRN Gas    ITEMS UNCHECKED ARE NOT PRESENT    PRESENT SYMPTOMS: [ ]Unable to obtain due to poor mentation   Source if other than patient:  [ ]Family   [ ]Team     Pain: [x ] yes [ ] no  QOL impact - limited mobility; unable to perform ADLs  Location - lower abdomen                   Aggravating factors -touch; movement; position  Quality -aching, burning  Radiation -n/a  Timing-constant  Severity (0-10 scale): 9/10  Minimal acceptable level (0-10 scale):     Dyspnea:                           [ ]Mild [ ]Moderate [ ]Severe  Anxiety:                             [x ]Mild [ ]Moderate [ ]Severe  Fatigue:                             [ ]Mild [x ]Moderate [ ]Severe  Nausea:                             [ ]Mild [ ]Moderate [ ]Severe  Loss of appetite:              [ ]Mild [ ]Moderate [x ]Severe  Constipation:                    [x ]Mild [ ]Moderate [ ]Severe    PAINAD Score:    [email or web address].[email or web address].[email or web address]/painad.pdf (Ctrl +  left click to view)  		  Other Symptoms:  [ ]All other review of systems negative     Karnofsky Performance Score/Palliative Performance Status Version 2:        30-40%        http://npcrc.org/files/news/palliative_performance_scale_ppsv2.pdf  PHYSICAL EXAM:  Vital Signs Last 24 Hrs  T(F): 98.3  HR: 95  BP: 114/70  RR: 20  SpO2: 90%     GENERAL:  [x ]Alert  [ x]Oriented x 3  [ ]Lethargic  [ ]Cachexia  [ ]Unarousable  [x ]Verbal  [ ]Non-Verbal  Behavioral:   [ ] Anxiety  [ ] Delirium [ ] Agitation [ ] Other  HEENT:  [x ]Normal   [ ]Dry mouth   [ ]ET Tube/Trach  [ ]Oral lesions  PULMONARY:   [x ]Clear [ ]Tachypnea  [ ]Audible excessive secretions   [ ]Rhonchi        [ ]Right [ ]Left [ ]Bilateral  [ ]Crackles        [ ]Right [ ]Left [ ]Bilateral  [ ]Wheezing     [ ]Right [ ]Left [ ]Bilateral  CARDIOVASCULAR:    [ ]Regular [ ]Irregular [x ]Tachy  [ ]Jean [ ]Murmur [ ]Other  GASTROINTESTINAL:  [ ]Soft  [x ]Distended   [x ]+BS  [ ]Non tender [x ]Tender  [ ]PEG [ ]OGT/ NGT   Last BM: 11/17/19    GENITOURINARY:  [ x]Normal [ ] Incontinent   [ ]Oliguria/Anuria   [ ]Heredia  MUSCULOSKELETAL:   [ ]Normal   [x ]Weakness  [ ]Bed/Wheelchair bound [x ]Edema  NEUROLOGIC:   [x ]No focal deficits  [ ] Cognitive impairment  [ ] Dysphagia [ ]Dysarthria [ ] Paresis [ ]Other   SKIN:   [x ]Normal   [ ]Pressure ulcer(s)  [ ]Rash    CRITICAL CARE:  [ ] Shock Present  [ ]Septic [ ]Cardiogenic [ ]Neurologic [ ]Hypovolemic  [ ]  Vasopressors [ ]  Inotropes   [ ] Respiratory failure present [ ] Mechanical Ventilation [ ] Non-invasive ventilatory support [ ] High-Flow  [ ] Acute  [ ] Chronic [ ] Hypoxic  [ ] Hypercarbic [ ] Other  [ ] Other organ failure     LABS:                        9.1    40.49 )-----------( 518      ( 22 Nov 2019 08:21 )             28.9   11-22    136  |  95<L>  |  14  ----------------------------<  110<H>  4.3   |  28  |  0.96    Ca    8.6      22 Nov 2019 06:17    TPro  6.2  /  Alb  2.2<L>  /  TBili  7.0<H>  /  DBili  x   /  AST  199<H>  /  ALT  95<H>  /  AlkPhos  781<H>  11-22  PT/INR - ( 22 Nov 2019 11:31 )   PT: 15.8 sec;   INR: 1.36 ratio         PTT - ( 22 Nov 2019 11:31 )  PTT:28.5 sec      RADIOLOGY & ADDITIONAL STUDIES: reveiwed    PROTEIN CALORIE MALNUTRITION:   [ ] PPSV2 < or = 30% [ ] significant weight loss [x ] poor nutritional intake [ ] anasarca [ ] catabolic state   Albumin, Serum: 2.2 g/dL (11-22-19 @ 06:17)   Artificial Nutrition [ ]     REFERRALS:   [ ]Chaplaincy  [ ] Hospice  [ ]Child Life  [x ]Social Work  [ ]Case management [ ]Holistic Therapy [ ] Physical Therapy [ ] Dietary   Goals of Care Document:   Progress Notes - Care Coordination [C. Provider] (11-20-19 @ 10:25)

## 2019-11-23 NOTE — PROGRESS NOTE ADULT - PROBLEM SELECTOR PLAN 8
Transferred to PCU for pain management. Based on current condition along with diagnostics, likely patient will not be able to receive any further DMT. Patient has been unresponsive to antibiotics and is not a surgical candidate at this time.    Meeting was on the phone with two sons yesterday (including HCP son Gamal) and wife at bedside. They are aware of diagnosis/prognosis and that the disease is incurable and life expectancy is limited 2/2 to active infection and inability to proceed with DMT. At this time, focus will be on managing symptoms.    Patient and son agreeable to DNR and MOLST completed. Patient remains with capacity at this time. Ultimately goal would be to take patient home if symptoms can be managed.

## 2019-11-23 NOTE — PROGRESS NOTE ADULT - PROBLEM SELECTOR PLAN 3
- Patient not a candidate for DMT at this time 2/2 to active infection. Likely reaching the end of his disease course. Family and patient would like to focus on symptom management at this time.  - follows with Dr. Ward at [facility]

## 2019-11-23 NOTE — PROGRESS NOTE ADULT - PROBLEM SELECTOR PLAN 1
- Remains on Dilaudid with PCA with settings as follows: Continuous: 5 mg/hour, demand: 3 mg, Lockout: 15 minutes; 4 hour limit: 53 mg.   - 60 injections in the past 24 hours and 115 attempts  - Methadone remains at 20 mg q 6 hours.  - Patient remains with significant, uncontrolled pain; spoke to IR about paracentesis which patient reports improved pain the last time  - Plan to have patient paracentesis and will evaluate pain after procedure

## 2019-11-24 NOTE — PROGRESS NOTE ADULT - SUBJECTIVE AND OBJECTIVE BOX
GAP TEAM PALLIATIVE CARE UNIT PROGRESS NOTE:      [  ] Patient on hospice program.    INDICATION FOR PALLIATIVE CARE UNIT SERVICES: Management of severe pain in the setting of Stage IV pancreatic cancer and peritonitis s/p ruptured gallbladder.    INTERVAL HPI/OVERNIGHT EVENTS: Pain minimally better after paracentesis on Friday. Removed 2000cc of ascitic fluid. Methadone was increased to 25 mg q 6 hours and has PCA Dilaudid pump basal rate increased to 5 mg/hour from continuous rate of 3.5 mg/hour and demand dose increased to 3 mg q 15 minutes yesterday.  30 demand doses administered in the past 24 hours and 67 injections attempted. Pt also had 4 Clinician assisted boluses. Patient still reports significant pain this morning on assessment. Denies nausea. + anorexia. Had 3 BM yesterday with Dulcolax supp. Pt appears to have reaccumulation of ascitic fluid - however he is in significant amount of pain to be transported to IR for paracentesis especially when the paracentesis made minimal improvement in his pain. Will try to get bedside paracentesis.      DNR on chart:   Allergies    No Known Allergies    Intolerances    MEDICATIONS  (STANDING):  bisacodyl 5 milliGRAM(s) Oral at bedtime  chlorhexidine 4% Liquid 1 Application(s) Topical daily  dextrose 5%. 1000 milliLiter(s) (50 mL/Hr) IV Continuous <Continuous>  dextrose 50% Injectable 12.5 Gram(s) IV Push once  dextrose 50% Injectable 25 Gram(s) IV Push once  dextrose 50% Injectable 25 Gram(s) IV Push once  DULoxetine 60 milliGRAM(s) Oral daily  enoxaparin Injectable 40 milliGRAM(s) SubCutaneous daily  famotidine    Tablet 20 milliGRAM(s) Oral daily  HYDROmorphone PCA (5 mG/mL) 30 milliLiter(s) PCA Continuous PCA Continuous  influenza   Vaccine 0.5 milliLiter(s) IntraMuscular once  insulin lispro (HumaLOG) corrective regimen sliding scale   SubCutaneous three times a day before meals  insulin lispro (HumaLOG) corrective regimen sliding scale   SubCutaneous at bedtime  meropenem  IVPB 1000 milliGRAM(s) IV Intermittent every 8 hours  methadone    Tablet 25 milliGRAM(s) Oral every 6 hours  naloxone Injectable 0.4 milliGRAM(s) IV Push every 3 minutes  polyethylene glycol 3350 17 Gram(s) Oral two times a day  senna 2 Tablet(s) Oral at bedtime  sodium chloride 0.9%. 1000 milliLiter(s) (20 mL/Hr) IV Continuous <Continuous>  sucralfate suspension 1 Gram(s) Oral four times a day    MEDICATIONS  (PRN):  bisacodyl Suppository 10 milliGRAM(s) Rectal daily PRN Constipation  dextrose 40% Gel 15 Gram(s) Oral once PRN Blood Glucose LESS THAN 70 milliGRAM(s)/deciliter  diazepam    Tablet 2 milliGRAM(s) Oral every 8 hours PRN anxiety  glucagon  Injectable 1 milliGRAM(s) IntraMuscular once PRN Glucose LESS THAN 70 milligrams/deciliter  HYDROmorphone PCA (5 mG/mL) Rescue Clinician Bolus 5 milliGRAM(s) IV Push every 1 hour PRN pain unrelieved with bolus dosing  naloxone Injectable 0.1 milliGRAM(s) IV Push every 3 minutes PRN For ANY of the following changes in patient status:  A. RR LESS THAN 10 breaths per minute, B. Oxygen saturation LESS THAN 90%, C. Sedation score of 6  simethicone 80 milliGRAM(s) Chew every 8 hours PRN Gas    ITEMS UNCHECKED ARE NOT PRESENT    PRESENT SYMPTOMS: [ ]Unable to obtain due to poor mentation   Source if other than patient:  [ ]Family   [ ]Team     Pain: [x ] yes [ ] no  QOL impact - limited mobility; unable to perform ADLs  Location - lower abdomen                   Aggravating factors -touch; movement; position  Quality -aching, burning  Radiation -n/a  Timing-constant  Severity (0-10 scale): 9/10  Minimal acceptable level (0-10 scale):     Dyspnea:                           [ ]Mild [ ]Moderate [ ]Severe  Anxiety:                             [x ]Mild [ ]Moderate [ ]Severe  Fatigue:                             [ ]Mild [x ]Moderate [ ]Severe  Nausea:                             [ ]Mild [ ]Moderate [ ]Severe  Loss of appetite:              [ ]Mild [ ]Moderate [x ]Severe  Constipation:                    [x ]Mild [ ]Moderate [ ]Severe    PAINAD Score:    http://geriatrictoolkit.[employer].edu/cog/painad.pdf (Ctrl +  left click to view)  		  Other Symptoms:  [ ]All other review of systems negative     Karnofsky Performance Score/Palliative Performance Status Version 2:        30-40%        http://npcrc.org/files/news/palliative_performance_scale_ppsv2.pdf  PHYSICAL EXAM:  Vital Signs Last 24 Hrs  T(F): 97.4  HR: 88  BP: 119/73  RR: 20  SpO2: 95%     GENERAL:  [x ]Alert  [ x]Oriented x 3  [ ]Lethargic  [ ]Cachexia  [ ]Unarousable  [x ]Verbal  [ ]Non-Verbal  Behavioral:   [ ] Anxiety  [ ] Delirium [ ] Agitation [ ] Other  HEENT:  [x ]Normal   [ ]Dry mouth   [ ]ET Tube/Trach  [ ]Oral lesions  PULMONARY:   [x ]Clear [ ]Tachypnea  [ ]Audible excessive secretions   [ ]Rhonchi        [ ]Right [ ]Left [ ]Bilateral  [ ]Crackles        [ ]Right [ ]Left [ ]Bilateral  [ ]Wheezing     [ ]Right [ ]Left [ ]Bilateral  CARDIOVASCULAR:    [ ]Regular [ ]Irregular [x ]Tachy  [ ]Jean [ ]Murmur [ ]Other  GASTROINTESTINAL:  [ ]Soft  [x ]Distended   [x ]+BS  [ ]Non tender [x ]Tender  [ ]PEG [ ]OGT/ NGT   Last BM: 11/17/19    GENITOURINARY:  [ x]Normal [ ] Incontinent   [ ]Oliguria/Anuria   [ ]Ehredia  MUSCULOSKELETAL:   [ ]Normal   [x ]Weakness  [ ]Bed/Wheelchair bound [x ]Edema  NEUROLOGIC:   [x ]No focal deficits  [ ] Cognitive impairment  [ ] Dysphagia [ ]Dysarthria [ ] Paresis [ ]Other   SKIN:   [x ]Normal   [ ]Pressure ulcer(s)  [ ]Rash    CRITICAL CARE:  [ ] Shock Present  [ ]Septic [ ]Cardiogenic [ ]Neurologic [ ]Hypovolemic  [ ]  Vasopressors [ ]  Inotropes   [ ] Respiratory failure present [ ] Mechanical Ventilation [ ] Non-invasive ventilatory support [ ] High-Flow  [ ] Acute  [ ] Chronic [ ] Hypoxic  [ ] Hypercarbic [ ] Other  [ ] Other organ failure     LABS:                        9.1    40.49 )-----------( 518      ( 22 Nov 2019 08:21 )             28.9   11-22    136  |  95<L>  |  14  ----------------------------<  110<H>  4.3   |  28  |  0.96    Ca    8.6      22 Nov 2019 06:17    TPro  6.2  /  Alb  2.2<L>  /  TBili  7.0<H>  /  DBili  x   /  AST  199<H>  /  ALT  95<H>  /  AlkPhos  781<H>  11-22  PT/INR - ( 22 Nov 2019 11:31 )   PT: 15.8 sec;   INR: 1.36 ratio         PTT - ( 22 Nov 2019 11:31 )  PTT:28.5 sec      RADIOLOGY & ADDITIONAL STUDIES: reveiwed    PROTEIN CALORIE MALNUTRITION:   [ ] PPSV2 < or = 30% [ ] significant weight loss [x ] poor nutritional intake [ ] anasarca [ ] catabolic state   Albumin, Serum: 2.2 g/dL (11-22-19 @ 06:17)   Artificial Nutrition [ ]     REFERRALS:   [ ]Chaplaincy  [ ] Hospice  [ ]Child Life  [x ]Social Work  [ ]Case management [ ]Holistic Therapy [ ] Physical Therapy [ ] Dietary   Goals of Care Document:   Progress Notes - Care Coordination [C. Provider] (11-20-19 @ 10:25)

## 2019-11-24 NOTE — PROGRESS NOTE ADULT - PROBLEM SELECTOR PLAN 7
Likely 2/2 to opioid use and minimal oral intake. Bowel regimen in place. C/w with Senna and Miralax as tolerated.  Dulcolax Suppository yesterday   3 BM yesterday

## 2019-11-24 NOTE — PROGRESS NOTE ADULT - PROBLEM SELECTOR PLAN 3
- Patient not a candidate for DMT at this time 2/2 to active infection. Likely reaching the end of his disease course. Family and patient would like to focus on symptom management at this time.  - follows with Dr. Ward at [facility]

## 2019-11-24 NOTE — PROGRESS NOTE ADULT - ASSESSMENT
64 y/o PMH of traumatic work injury (2001) s/p multilumbar fusion (T8-S1) s/p hardware removal (2016, on daily opiates and methadone), chronic constipation, GERD, HTN, depression, anxiety, metastatic pancreatic adenocarcinoma s/p biliary stent placement (2m ago) and chemo sent in from [facility] for concern for biliary sepsis on 11/8.During this admission, CT showed gallbladder rupture, stable metastatic disease (to liver), and tumor trombosis. Palliative care was called pain Rx.

## 2019-11-24 NOTE — PROGRESS NOTE ADULT - PROBLEM SELECTOR PLAN 6
Abdominal distension. s/p paracentesis on Friday with minimal relief, still required significant amount of demand boluses along with basal dilaudid infusion.  IR called for possible bedside paracentesis as pt appears to be reaccumulating ascitic fluid

## 2019-11-24 NOTE — PROGRESS NOTE ADULT - PROBLEM SELECTOR PLAN 2
- EKG with prolonged QTc >480  - Increased methadone to 25mg [id number] today (11/23/19)  - patient denies nausea on assessment this morning  - consider a trial of ativan 0.2mg IV q4h prn for refractory nausea/vomiting; no prn use of Ativan in the past 24 hours.

## 2019-11-24 NOTE — PROGRESS NOTE ADULT - PROBLEM SELECTOR PLAN 1
- Remains on Dilaudid with PCA with settings as follows: Continuous: 5 mg/hour, demand: 3 mg, Lockout: 15 minutes; 4 hour limit: 68 mg.   - 30 injections in the past 24 hours and 67 attempts  - Methadone increased to 25 mg q 6 hours yesterday.  - Patient remains with significant, uncontrolled pain;   - Plan to have bedside paracentesis and will evaluate pain after procedure. Called the IR and left a voicemail.

## 2019-11-25 NOTE — PROGRESS NOTE ADULT - ASSESSMENT
63 m with DM, HTN, traumatic work injury (2001) s/p multilumbar fusion (T8-S1) s/p hardware removal (2016, on daily opiates and methadone), metastatic pancreatic adenocarcinoma s/p biliary stent placement (9/17/19) sent in from [facility] for concern for biliary sepsis on 11/8/19.   Came in with leukocytosis 26k, afebrile, tachycardic to 100  CT C/A/P with possible pneumonia, distended GB with likely GB rupture as well as new moderate loculated ascites.  s/p percutaneous cholecystostomy today with foul smelling fluid 11/12, but had worsening leukocytosis and repeat CT showed peritonitis and loculated ascites s/p tap 11/18 with 1370 WBC, 50% PMN, cx negative,  repeat tap 11/22, , PMN: 9, cx negative    metastatic pancreatic ca with ruptured gall bladder and cholecystitis s/p percutaneous cholecystostomy 11/12  IR biliary cx: klebsiella oxytoca, R to amp/sulbactam  R side pulmonary findings likely due to cholecystitis and contagious inflammation   peritonitis with loculated ascites s/p tap over 4 liter cloudy fluid drained, WBC 1370, PMN 50%, cx negative, repeat tap 11/22, , PMN: 9, cx negative  worsening LFTs and leukocytosis to 40, repeat CT unremarkable, decreased loculated fluid around the gallbladder and cholecystostomy in place     * pt was transferred to PCU but still on antibiotics  * s/p 9 days of zosyn, started 11/8, switched to meropenem 11/20, now day 6  * repeat CBC and LFTs  * poor prognosis  * consider GI eval for celiac block for pain control

## 2019-11-25 NOTE — PROGRESS NOTE ADULT - PROBLEM SELECTOR PLAN 7
Long standing history of Type II diabetes. Patient was on sliding scale insulin, however blood glucose levels have remained from 80s to low 100s and patient has very minimal oral intake. Patient also getting upset with multiple finger sticks. Finger sticks changed to daily and sliding scale insulin discontinued at this time.

## 2019-11-25 NOTE — PROGRESS NOTE ADULT - PROBLEM SELECTOR PLAN 8
Likely 2/2 to opioid use and minimal oral intake. Bowel regimen in place. C/w with Senna and Miralax as tolerated.  Dulcolax Suppository yesterday and patient with subsequent bowel movements.

## 2019-11-25 NOTE — PROGRESS NOTE ADULT - PROBLEM SELECTOR PLAN 6
Abdominal distension. s/p paracentesis on Friday with minimal relief, still required significant amount of demand boluses along with basal dilaudid infusion.  IR called yesterday for possible bedside paracentesis as patient appears to be reaccumulating ascitic fluid. Awaiting to hear back. Unclear if pleurex would be placed as patient remains with active infection. Abdominal distension. s/p paracentesis on Friday with minimal relief, still required significant amount of demand boluses along with basal dilaudid infusion.  IR called yesterday for possible bedside paracentesis as patient appears to be reaccumulating ascitic fluid. Awaiting to hear back. Unclear if pleurex would be placed as patient remains with active infection.    Will speak with patient about repeat paracentesis and if he desires further taps, will contact IR. Abdominal distension. s/p paracentesis on Friday with minimal relief, still required significant amount of demand boluses along with basal Dilaudid infusion.  IR called yesterday for possible bedside paracentesis as patient appears to be reaccumulating ascitic fluid. Awaiting to hear back. Unclear if pleurex would be placed as patient remains with active infection.    Will speak with patient about repeat paracentesis and if he desires further taps, will contact IR.

## 2019-11-25 NOTE — PROGRESS NOTE ADULT - SUBJECTIVE AND OBJECTIVE BOX
GAP TEAM PALLIATIVE CARE UNIT PROGRESS NOTE:      [  ] Patient on hospice program.    INDICATION FOR PALLIATIVE CARE UNIT SERVICES: Management of severe pain in the setting of Stage IV pancreatic cancer and peritonitis s/p ruptured gallbladder.    INTERVAL HPI/OVERNIGHT EVENTS: Patient in severe pain on morning assessment. Reports that the paracentesis did not provide much relief of Friday. Over the weekend, the Methadone was increased to 25 mg q 6 and the Dilaudid PCA was increased to continuous rate of 5 mg/hour and demand dose increased to 3 with 15 minute lockout. Reports pain remains abdominal. Patient s/p bowel movement yesterday evening. Valium used prn x 2 in the past 24 hours. Total of 252 mg of Dilaudid in the past 24 hours.     DNR on chart:   Allergies    No Known Allergies    Intolerances    MEDICATIONS  (STANDING):  bisacodyl 5 milliGRAM(s) Oral at bedtime  chlorhexidine 4% Liquid 1 Application(s) Topical daily  DULoxetine 60 milliGRAM(s) Oral daily  enoxaparin Injectable 40 milliGRAM(s) SubCutaneous daily  famotidine    Tablet 20 milliGRAM(s) Oral daily  HYDROmorphone PCA (5 mG/mL) 30 milliLiter(s) PCA Continuous PCA Continuous  influenza   Vaccine 0.5 milliLiter(s) IntraMuscular once  meropenem  IVPB 1000 milliGRAM(s) IV Intermittent every 8 hours  methadone    Tablet 25 milliGRAM(s) Oral every 6 hours  polyethylene glycol 3350 17 Gram(s) Oral two times a day  senna 2 Tablet(s) Oral at bedtime  sodium chloride 0.9%. 1000 milliLiter(s) (20 mL/Hr) IV Continuous <Continuous>  sucralfate suspension 1 Gram(s) Oral four times a day    MEDICATIONS  (PRN):  bisacodyl Suppository 10 milliGRAM(s) Rectal daily PRN Constipation  diazepam    Tablet 2 milliGRAM(s) Oral every 8 hours PRN anxiety  glucagon  Injectable 1 milliGRAM(s) IntraMuscular once PRN Glucose LESS THAN 70 milligrams/deciliter  HYDROmorphone PCA (5 mG/mL) Rescue Clinician Bolus 5 milliGRAM(s) IV Push every 1 hour PRN Severe Pain (7 - 10)  naloxone Injectable 0.4 milliGRAM(s) IV Push every 3 minutes PRN any of the following  naloxone Injectable 0.1 milliGRAM(s) IV Push every 3 minutes PRN For ANY of the following changes in patient status:  A. RR LESS THAN 10 breaths per minute, B. Oxygen saturation LESS THAN 90%, C. Sedation score of 6  simethicone 80 milliGRAM(s) Chew every 8 hours PRN Gas      ITEMS UNCHECKED ARE NOT PRESENT    PRESENT SYMPTOMS: [ ]Unable to obtain due to poor mentation   Source if other than patient:  [ ]Family   [ ]Team     Pain: [x ] yes [ ] no  QOL impact - limited mobility; unable to perform ADLs  Location - lower abdomen                   Aggravating factors -touch; movement; position  Quality -aching, burning  Radiation -n/a  Timing-constant  Severity (0-10 scale): 10/10  Minimal acceptable level (0-10 scale):     Dyspnea:                           [ ]Mild [ ]Moderate [ ]Severe  Anxiety:                             [x ]Mild [ ]Moderate [ ]Severe  Fatigue:                             [ ]Mild [x ]Moderate [ ]Severe  Nausea:                             [ ]Mild [ ]Moderate [ ]Severe  Loss of appetite:              [ ]Mild [ ]Moderate [x ]Severe  Constipation:                    [x ]Mild [ ]Moderate [ ]Severe    PAINAD Score:    [email or web address] (Ctrl +  left click to view)  		  Other Symptoms:  [x ]All other review of systems negative     Karnofsky Performance Score/Palliative Performance Status Version 2:        30-40%        http://npcrc.org/files/news/palliative_performance_scale_ppsv2.pdf  PHYSICAL EXAM:  Vital Signs Last 24 Hrs  T(C): 36.4 (25 Nov 2019 08:34), Max: 36.4 (25 Nov 2019 08:34)  T(F): 97.5 (25 Nov 2019 08:34), Max: 97.5 (25 Nov 2019 08:34)  HR: 111 (25 Nov 2019 08:34) (111 - 111)  BP: 104/70 (25 Nov 2019 08:34) (104/70 - 104/70)  BP(mean): --  RR: 20 (25 Nov 2019 08:34) (20 - 20)  SpO2: 93% (25 Nov 2019 08:34) (93% - 93%)    GENERAL:  [x ]Alert  [ x]Oriented x 3  [ ]Lethargic  [ ]Cachexia  [ ]Unarousable  [x ]Verbal  [ ]Non-Verbal  Behavioral:   [ ] Anxiety  [ ] Delirium [ ] Agitation [ ] Other  HEENT:  [x ]Normal   [ ]Dry mouth   [ ]ET Tube/Trach  [ ]Oral lesions  PULMONARY:   [x ]Clear [ ]Tachypnea  [ ]Audible excessive secretions   [ ]Rhonchi        [ ]Right [ ]Left [ ]Bilateral  [ ]Crackles        [ ]Right [ ]Left [ ]Bilateral  [ ]Wheezing     [ ]Right [ ]Left [ ]Bilateral  CARDIOVASCULAR:    [x ]Regular [ ]Irregular [x ]Tachy  [ ]Jean [ ]Murmur [ ]Other  GASTROINTESTINAL:  [ ]Soft  [x ]Distended   [x ]+BS  [ ]Non tender [x ]Tender  [ ]PEG [ ]OGT/ NGT   Last BM: 11/24/19    GENITOURINARY:  [ x]Normal [ ] Incontinent   [ ]Oliguria/Anuria   [ ]Heredia  MUSCULOSKELETAL:   [ ]Normal   [x ]Weakness  [ ]Bed/Wheelchair bound [x ]Edema  NEUROLOGIC:   [x ]No focal deficits  [ ] Cognitive impairment  [ ] Dysphagia [ ]Dysarthria [ ] Paresis [ ]Other   SKIN:   [x ]Normal   [ ]Pressure ulcer(s)  [ ]Rash    CRITICAL CARE:  [ ] Shock Present  [ ]Septic [ ]Cardiogenic [ ]Neurologic [ ]Hypovolemic  [ ]  Vasopressors [ ]  Inotropes   [ ] Respiratory failure present [ ] Mechanical Ventilation [ ] Non-invasive ventilatory support [ ] High-Flow  [ ] Acute  [ ] Chronic [ ] Hypoxic  [ ] Hypercarbic [ ] Other  [ ] Other organ failure     LABS:                        9.1    40.49 )-----------( 518      ( 22 Nov 2019 08:21 )             28.9   11-22    136  |  95<L>  |  14  ----------------------------<  110<H>  4.3   |  28  |  0.96    Ca    8.6      22 Nov 2019 06:17    TPro  6.2  /  Alb  2.2<L>  /  TBili  7.0<H>  /  DBili  x   /  AST  199<H>  /  ALT  95<H>  /  AlkPhos  781<H>  11-22  PT/INR - ( 22 Nov 2019 11:31 )   PT: 15.8 sec;   INR: 1.36 ratio         PTT - ( 22 Nov 2019 11:31 )  PTT:28.5 sec      RADIOLOGY & ADDITIONAL STUDIES: reveiwed    PROTEIN CALORIE MALNUTRITION:   [ ] PPSV2 < or = 30% [ ] significant weight loss [x ] poor nutritional intake [ ] anasarca [ ] catabolic state   Albumin, Serum: 2.2 g/dL (11-22-19 @ 06:17)   Artificial Nutrition [ ]     REFERRALS:   [ ]Chaplaincy  [ ] Hospice  [ ]Child Life  [x ]Social Work  [ ]Case management [ ]Holistic Therapy [ ] Physical Therapy [ ] Dietary   Goals of Care Document:   Progress Notes - Care Coordination [C. Provider] (11-20-19 @ 10:25) GAP TEAM PALLIATIVE CARE UNIT PROGRESS NOTE:      [  ] Patient on hospice program.    INDICATION FOR PALLIATIVE CARE UNIT SERVICES: Management of severe pain in the setting of Stage IV pancreatic cancer and peritonitis s/p ruptured gallbladder.    INTERVAL HPI/OVERNIGHT EVENTS: Patient in severe pain on morning assessment. Reports that the paracentesis did not provide much relief of Friday. Over the weekend, the Methadone was increased to 25 mg q 6 and the Dilaudid PCA was increased to continuous rate of 5 mg/hour and demand dose increased to 3 with 15 minute lockout. Reports pain remains abdominal. Patient s/p bowel movement yesterday evening. Valium used prn x 2 in the past 24 hours. Total of 252 mg of Dilaudid in the past 24 hours.     DNR on chart:   Allergies    No Known Allergies    Intolerances    MEDICATIONS  (STANDING):  bisacodyl 5 milliGRAM(s) Oral at bedtime  chlorhexidine 4% Liquid 1 Application(s) Topical daily  DULoxetine 60 milliGRAM(s) Oral daily  enoxaparin Injectable 40 milliGRAM(s) SubCutaneous daily  famotidine    Tablet 20 milliGRAM(s) Oral daily  HYDROmorphone PCA (5 mG/mL) 30 milliLiter(s) PCA Continuous PCA Continuous  influenza   Vaccine 0.5 milliLiter(s) IntraMuscular once  meropenem  IVPB 1000 milliGRAM(s) IV Intermittent every 8 hours  methadone    Tablet 25 milliGRAM(s) Oral every 6 hours  polyethylene glycol 3350 17 Gram(s) Oral two times a day  senna 2 Tablet(s) Oral at bedtime  sodium chloride 0.9%. 1000 milliLiter(s) (20 mL/Hr) IV Continuous <Continuous>  sucralfate suspension 1 Gram(s) Oral four times a day    MEDICATIONS  (PRN):  bisacodyl Suppository 10 milliGRAM(s) Rectal daily PRN Constipation  diazepam    Tablet 2 milliGRAM(s) Oral every 8 hours PRN anxiety  glucagon  Injectable 1 milliGRAM(s) IntraMuscular once PRN Glucose LESS THAN 70 milligrams/deciliter  HYDROmorphone PCA (5 mG/mL) Rescue Clinician Bolus 5 milliGRAM(s) IV Push every 1 hour PRN Severe Pain (7 - 10)  naloxone Injectable 0.4 milliGRAM(s) IV Push every 3 minutes PRN any of the following  naloxone Injectable 0.1 milliGRAM(s) IV Push every 3 minutes PRN For ANY of the following changes in patient status:  A. RR LESS THAN 10 breaths per minute, B. Oxygen saturation LESS THAN 90%, C. Sedation score of 6  simethicone 80 milliGRAM(s) Chew every 8 hours PRN Gas      ITEMS UNCHECKED ARE NOT PRESENT    PRESENT SYMPTOMS: [ ]Unable to obtain due to poor mentation   Source if other than patient:  [ ]Family   [ ]Team     Pain: [x ] yes [ ] no  QOL impact - limited mobility; unable to perform ADLs  Location - lower abdomen                   Aggravating factors -touch; movement; position  Quality -aching, burning  Radiation -n/a  Timing-constant  Severity (0-10 scale): 10/10  Minimal acceptable level (0-10 scale):     Dyspnea:                           [ ]Mild [ ]Moderate [ ]Severe  Anxiety:                             [x ]Mild [ ]Moderate [ ]Severe  Fatigue:                             [ ]Mild [x ]Moderate [ ]Severe  Nausea:                             [ ]Mild [ ]Moderate [ ]Severe  Loss of appetite:              [ ]Mild [ ]Moderate [x ]Severe  Constipation:                    [x ]Mild [ ]Moderate [ ]Severe    PAINAD Score:    [email or web address] (Ctrl +  left click to view)  		  Other Symptoms:  [x ]All other review of systems negative     Karnofsky Performance Score/Palliative Performance Status Version 2:        30-40%        http://npcrc.org/files/news/palliative_performance_scale_ppsv2.pdf  PHYSICAL EXAM:  Vital Signs Last 24 Hrs  T(C): 36.4 (25 Nov 2019 08:34), Max: 36.4 (25 Nov 2019 08:34)  T(F): 97.5 (25 Nov 2019 08:34), Max: 97.5 (25 Nov 2019 08:34)  HR: 111 (25 Nov 2019 08:34) (111 - 111)  BP: 104/70 (25 Nov 2019 08:34) (104/70 - 104/70)  BP(mean): --  RR: 20 (25 Nov 2019 08:34) (20 - 20)  SpO2: 93% (25 Nov 2019 08:34) (93% - 93%)    GENERAL:  [x ]Alert  [ x]Oriented x 3  [ ]Lethargic  [ ]Cachexia  [ ]Unarousable  [x ]Verbal  [ ]Non-Verbal [x] ill appearing male.   Behavioral:   [ ] Anxiety  [ ] Delirium [ ] Agitation [ ] Other  HEENT:  [x ]Normal   [ ]Dry mouth   [ ]ET Tube/Trach  [ ]Oral lesions  PULMONARY:   [x ]Clear [ ]Tachypnea  [ ]Audible excessive secretions   [ ]Rhonchi        [ ]Right [ ]Left [ ]Bilateral  [ ]Crackles        [ ]Right [ ]Left [ ]Bilateral  [ ]Wheezing     [ ]Right [ ]Left [ ]Bilateral  CARDIOVASCULAR:    [x ]Regular [ ]Irregular [x ]Tachy  [ ]Jean [ ]Murmur [ ]Other  GASTROINTESTINAL:  [ ]Soft  [x ]Distended   [x ]+BS  [ ]Non tender [x ]Tender  [ ]PEG [ ]OGT/ NGT   Last BM: 11/24/19    GENITOURINARY:  [ x]Normal [ ] Incontinent   [ ]Oliguria/Anuria   [ ]Heredia  MUSCULOSKELETAL:   [ ]Normal   [x ]Weakness  [ ]Bed/Wheelchair bound [x ]Edema  NEUROLOGIC:   [x ]No focal deficits  [ ] Cognitive impairment  [ ] Dysphagia [ ]Dysarthria [ ] Paresis [ ]Other   SKIN:   [x ]Normal   [ ]Pressure ulcer(s)  [ ]Rash    CRITICAL CARE:  [ ] Shock Present  [ ]Septic [ ]Cardiogenic [ ]Neurologic [ ]Hypovolemic  [ ]  Vasopressors [ ]  Inotropes   [ ] Respiratory failure present [ ] Mechanical Ventilation [ ] Non-invasive ventilatory support [ ] High-Flow  [ ] Acute  [ ] Chronic [ ] Hypoxic  [ ] Hypercarbic [ ] Other  [ ] Other organ failure     LABS:                                          9.1    17.27 )-----------( 469      ( 25 Nov 2019 13:03 )             27.7   11-25    135  |  97  |  10  ----------------------------<  105<H>  4.2   |  27  |  0.67    Ca    8.8      25 Nov 2019 13:03    TPro  6.6  /  Alb  2.2<L>  /  TBili  13.2<H>  /  DBili  x   /  AST  184<H>  /  ALT  94<H>  /  AlkPhos  897<H>  11-25        RADIOLOGY & ADDITIONAL STUDIES: reviewed    PROTEIN CALORIE MALNUTRITION:   [ ] PPSV2 < or = 30% [ ] significant weight loss [x ] poor nutritional intake [ ] anasarca [ ] catabolic state   Albumin, Serum: 2.2 g/dL (11-22-19 @ 06:17)   Artificial Nutrition [ ]     REFERRALS:   [ ]Chaplaincy  [ ] Hospice  [ ]Child Life  [x ]Social Work  [ ]Case management [ ]Holistic Therapy [ ] Physical Therapy [ ] Dietary   Goals of Care Document:   Progress Notes - Care Coordination [C. Provider] (11-20-19 @ 10:25)

## 2019-11-25 NOTE — PROGRESS NOTE ADULT - PROBLEM SELECTOR PLAN 4
2/2 to ruptured gallbladder. S/p drain placement in IR. Not a surgical candidate at this time. Patient has not been responding to Meropenem which was recommended by ID and family and patient aware. Will c/w Meropenem as it may help with patient's symptoms, but patient and family aware that likely infection will be unable to get cleared. Rechecking CBC today to check WBC count.    ID saw patient today and it appears based on exam that likely infection remains to not be clearing. CBC, CMP and coags drawn as per ID request as patient has remained on the Meropenem.

## 2019-11-25 NOTE — PROGRESS NOTE ADULT - SUBJECTIVE AND OBJECTIVE BOX
Follow Up: ruptured cholecystitis, peritonitis    Interval History: s/p paracentesis 11/22 but pt still in severe abd pain    ROS:      All other systems negative    Constitutional: no fever, no chills  Head: no trauma  Eyes: no vision changes, no eye pain  ENT:  no sore throat, no rhinorrhea  Cardiovascular:  no chest pain, no palpitation  Respiratory:  no SOB, no cough  GI:  + abd pain, no vomiting, no diarrhea  urinary: no dysuria, no hematuria, no flank pain  musculoskeletal:  no joint pain, no joint swelling  skin:  no rash  neurology:  no headache, no seizure      Allergies  No Known Allergies        ANTIMICROBIALS:  meropenem  IVPB 1000 every 8 hours      OTHER MEDS:  bisacodyl 5 milliGRAM(s) Oral at bedtime  bisacodyl Suppository 10 milliGRAM(s) Rectal daily PRN  chlorhexidine 4% Liquid 1 Application(s) Topical daily  diazepam    Tablet 2 milliGRAM(s) Oral every 8 hours PRN  DULoxetine 60 milliGRAM(s) Oral daily  enoxaparin Injectable 40 milliGRAM(s) SubCutaneous daily  famotidine    Tablet 20 milliGRAM(s) Oral daily  glucagon  Injectable 1 milliGRAM(s) IntraMuscular once PRN  HYDROmorphone PCA (5 mG/mL) 30 milliLiter(s) PCA Continuous PCA Continuous  HYDROmorphone PCA (5 mG/mL) Rescue Clinician Bolus 5 milliGRAM(s) IV Push every 1 hour PRN  influenza   Vaccine 0.5 milliLiter(s) IntraMuscular once  methadone    Tablet 25 milliGRAM(s) Oral every 6 hours  naloxone Injectable 0.4 milliGRAM(s) IV Push every 3 minutes PRN  naloxone Injectable 0.1 milliGRAM(s) IV Push every 3 minutes PRN  polyethylene glycol 3350 17 Gram(s) Oral two times a day  senna 2 Tablet(s) Oral at bedtime  simethicone 80 milliGRAM(s) Chew every 8 hours PRN  sodium chloride 0.9%. 1000 milliLiter(s) IV Continuous <Continuous>  sucralfate suspension 1 Gram(s) Oral four times a day      Vital Signs Last 24 Hrs  T(C): 36.4 (25 Nov 2019 08:34), Max: 36.4 (25 Nov 2019 08:34)  T(F): 97.5 (25 Nov 2019 08:34), Max: 97.5 (25 Nov 2019 08:34)  HR: 111 (25 Nov 2019 08:34) (111 - 111)  BP: 104/70 (25 Nov 2019 08:34) (104/70 - 104/70)  BP(mean): --  RR: 20 (25 Nov 2019 08:34) (20 - 20)  SpO2: 93% (25 Nov 2019 08:34) (93% - 93%)    Physical Exam:  General:   pt appears uncomfortable  Head: atraumatic, normocephalic  Eye: palce sclera and conjunctiva  ENT:    no oropharyngeal lesions,   no LAD,   neck supple  Cardio:     regular S1, S2,  no murmur  Respiratory:    clear b/l,    no wheezing  abd:    distended with palpable liver and mass, diffuse tenderness more on RUQ, perc daysi with purulent drainage  :   no CVAT,  no suprapubic tenderness,   no  saavedra  Musculoskeletal:   no joint swelling  vascular: no phlebitis  Skin:    no rash  Neurologic:     no focal deficit  psych: normal affect                    MICROBIOLOGY:  v  .Body Fluid Peritoneal Fluid  11-22-19   No growth  --    Rare polymorphonuclear leukocytes  No organisms seen  by cytocentrifuge      .Blood Blood-Peripheral  11-20-19   No growth to date.  --  --      Peritoneal Peritoneal Fluid  11-18-19   No growth at 5 days  --    polymorphonuclear leukocytes seen  No organisms seen  by cytocentrifuge      .Body Fluid Bile Fluid  11-12-19   Numerous Klebsiella oxytoca  --  Klebsiella oxytoca      .Blood Blood-Peripheral  11-09-19   No growth at 5 days.  --  --      .Urine Clean Catch (Midstream)  11-09-19   <10,000 CFU/mL Normal Urogenital Veronika  --  --                RADIOLOGY:  Images below reviewed personally  < from: CT Abdomen and Pelvis w/ IV Cont (11.21.19 @ 14:41) >  IMPRESSION:   Loculated fluid around the gallbladder has decreased. Stable liver   lesions.    Minimal decrease in size of air and sludge filled gallbladder with tip of   the percutaneous cholecystostomy tube in place.    Pancreatic lesions are unchanged.    Moderate ascites unchanged.

## 2019-11-25 NOTE — PROGRESS NOTE ADULT - PROBLEM SELECTOR PLAN 1
- Remains on Dilaudid with PCA with settings as follows: Continuous: 5 mg/hour, demand: 3 mg, Lockout: 15 minutes; 4 hour limit: 68 mg.   - 39 injections and 99 attempts in past 24 hours.  - Clinician bolus of 5 mg x 3 in the past 24 hours.  - Total Dilaudid dose in 24 hours: 252 mg.  - Methadone increased over the weekend to 25 mg q 6 hours yesterday.  - Patient remains with significant, uncontrolled pain;   - Reports minimal pain relief from paracentesis on Friday. - Remains on Dilaudid with PCA with settings as follows: Continuous: 5 mg/hour, demand: 3 mg, Lockout: 15 minutes; 4 hour limit: 68 mg.   - 39 injections and 99 attempts in past 24 hours.  - Clinician bolus of 5 mg x 3 in the past 24 hours.  - Total Dilaudid dose in 24 hours: 252 mg.  - Methadone increased over the weekend to 25 mg q 6 hours yesterday.  - Patient remains with significant, uncontrolled pain; dilaudid PCA increased to 5 mg demand and 5 mg continuous. Clinician bolus doses increased to 7.5 mg q 1 prn.  - Reports minimal pain relief from paracentesis on Friday.

## 2019-11-25 NOTE — PROGRESS NOTE ADULT - PROBLEM SELECTOR PLAN 2
- EKG with prolonged QTc >480  - Increased methadone to 25mg q 6 over the weekend (11/23/19)  - patient denies nausea on assessment this morning, however, has very minimal oral intake.  - consider a trial of ativan 0.2mg IV q4h prn for refractory nausea/vomiting; no prn use of Ativan in the past 24 hours.

## 2019-11-25 NOTE — PROGRESS NOTE ADULT - ASSESSMENT
64 y/o PMH of traumatic work injury (2001) s/p multilumbar fusion (T8-S1) s/p hardware removal (2016, on daily opiates and methadone), chronic constipation, GERD, HTN, depression, anxiety, metastatic pancreatic adenocarcinoma s/p biliary stent placement (2m ago) and chemo sent in from [facility] for concern for biliary sepsis on 11/8.During this admission, CT showed gallbladder rupture, stable metastatic disease (to liver), and tumor trombosis. Palliative care was called pain Rx.      Patient remains with severe, unrelenting pain despite adjustment to pain regimen. S/p paracentesis Friday late afternoon which patient reports did not provide much relief to pain despite removal of 2 liters ascites.

## 2019-11-25 NOTE — PROGRESS NOTE ADULT - PROBLEM SELECTOR PLAN 3
- Patient not a candidate for DMT at this time 2/2 to active infection. Likely reaching the end of his disease course. Family and patient would like to focus on symptom management at this time.  - follows with Dr. Ward at [facility]

## 2019-11-26 NOTE — PROGRESS NOTE ADULT - ASSESSMENT
63 m with DM, HTN, traumatic work injury (2001) s/p multilumbar fusion (T8-S1) s/p hardware removal (2016, on daily opiates and methadone), metastatic pancreatic adenocarcinoma s/p biliary stent placement (9/17/19) sent in from [facility] for concern for biliary sepsis on 11/8/19.   Came in with leukocytosis 26k, afebrile, tachycardic to 100  CT C/A/P with possible pneumonia, distended GB with likely GB rupture as well as new moderate loculated ascites.  s/p percutaneous cholecystostomy today with foul smelling fluid 11/12, but had worsening leukocytosis and repeat CT showed peritonitis and loculated ascites s/p tap 11/18 with 1370 WBC, 50% PMN, cx negative,  repeat tap 11/22, , PMN: 9, cx negative    metastatic pancreatic ca with ruptured gall bladder and cholecystitis s/p percutaneous cholecystostomy 11/12  IR biliary cx: klebsiella oxytoca, R to amp/sulbactam  R side pulmonary findings likely due to cholecystitis and contagious inflammation   peritonitis with loculated ascites s/p tap over 4 liter cloudy fluid drained, WBC 1370, PMN 50%, cx negative, repeat tap 11/22, , PMN: 9, cx negative  worsening LFTs and leukocytosis , repeat CT unremarkable, decreased loculated fluid around the gallbladder and cholecystostomy in place     * pt was transferred to PCU but still on antibiotics, now WBC improved to 17  * s/p 9 days of zosyn, started 11/8, switched to meropenem 11/20, now day 7  * repeat CBC and LFTs  * poor prognosis and antibiotics alone does not seem to be curative  * would switch to PO levofloxacin 500 qd and flagyl 500 q 12 on discharge, duration unclear as pt has active infection, will need follow up in 2 weeks to evaluate and decide about further antibiotics  * consider GI eval for celiac block for pain control

## 2019-11-26 NOTE — PROGRESS NOTE ADULT - PROBLEM SELECTOR PLAN 3
- Patient not a candidate for DMT at this time 2/2 to active infection. Likely reaching the end of his disease course. Family and patient would like to focus on symptom management at this time.  - follows with Dr. Ward at [facility]

## 2019-11-26 NOTE — PROGRESS NOTE ADULT - PROBLEM SELECTOR PLAN 1
- Methadone increased over the weekend to 25 mg q 6 hours yesterday.  - Dilaudid PCA increased yesterday to 5 mg demand and 5 mg continuous. Clinician bolus doses increased to 7.5 mg q 1 prn.  - Clinician bolus x 3 in the past 24 hours.   - 36 PCA demand injections in the past 24 hours and 75 attempts.  - Total 24 hour Dilaudid administration 222.5 mg.  - Reports minimal pain relief from paracentesis on Friday.

## 2019-11-26 NOTE — PROGRESS NOTE ADULT - PROBLEM SELECTOR PLAN 6
Abdominal distension. s/p paracentesis on Friday with minimal relief, still required significant amount of demand boluses along with basal Dilaudid infusion.    Patient s/p bedside sonogram yesterday that displayed moderate volume ascites. At this time, it likely is not beneficial to continue with multiple taps as fluid rapidly reaccumulates and he is not a candidate for a pleurx. C/w managing symptoms and ongoing goals of care discussions with patient and family.

## 2019-11-26 NOTE — DISCHARGE NOTE NURSING/CASE MANAGEMENT/SOCIAL WORK - PATIENT PORTAL LINK FT
You can access the FollowMyHealth Patient Portal offered by [facility] by registering at the following website: [email or web address]. By joining [facility] FollowMyHealth portal, you will also be able to view your health information using other applications (apps) compatible with our system.

## 2019-11-26 NOTE — CHART NOTE - NSCHARTNOTEFT_GEN_A_CORE
Nutrition Follow Up Note  Patient seen for: Malnutrition follow up. Pt with stage IV pancreatic cancer c ruptured gallbladder S/P IR drain, no plans for further disease modifying treatment, transferred to PCU plan for symptom management.     Source: wife and sons at bedside, pt sleeping soundly     Diet : Consistent carbohydrate diet with evening snack, 1L fluid restriction, low fat, soft diet with glucerna 2 daily     Patient reports: Per family pt with episode of vomiting last night, last BM noted -pt ordered for senna and dulcolax     PO intake : Per family pt has been more sedated and therefore has not been able to eat much, family will provide pudding when pt wakes as well as glucerna shakes, family receptive to liberalizing diet to expand options.       Daily Weight in k.5 (11-20) no new wt  % Weight Change    Pertinent Medications: MEDICATIONS  (STANDING):  bisacodyl 5 milliGRAM(s) Oral at bedtime  chlorhexidine 4% Liquid 1 Application(s) Topical daily  DULoxetine 60 milliGRAM(s) Oral daily  enoxaparin Injectable 40 milliGRAM(s) SubCutaneous daily  famotidine Injectable 20 milliGRAM(s) IV Push daily  fentaNYL    Injectable 30 MICROGram(s) IV Push once  HYDROmorphone PCA (5 mG/mL) 30 milliLiter(s) PCA Continuous PCA Continuous  LORazepam   Injectable 0.5 milliGRAM(s) IV Push every 4 hours  meropenem  IVPB 1000 milliGRAM(s) IV Intermittent every 8 hours  methadone    Tablet 25 milliGRAM(s) Oral every 6 hours  sodium chloride 0.9%. 1000 milliLiter(s) (20 mL/Hr) IV Continuous <Continuous>    MEDICATIONS  (PRN):  bisacodyl Suppository 10 milliGRAM(s) Rectal daily PRN Constipation  HYDROmorphone PCA (5 mG/mL) Rescue Clinician Bolus 7.5 milliGRAM(s) IV Push every 1 hour PRN Severe Pain (7 - 10)  LORazepam   Injectable 0.5 milliGRAM(s) IV Push every 1 hour PRN Agitation  polyethylene glycol 3350 17 Gram(s) Oral daily PRN Constipation  senna 2 Tablet(s) Oral at bedtime PRN Constipation  simethicone 80 milliGRAM(s) Chew every 8 hours PRN Gas    Pertinent Labs: none  Finger Sticks: pt now receiving daily fingersticks, value from yesterday 84      Skin per nursing documentation: intact  Edema: 1+ generalized and 2+ sandie foot edema noted     Estimated Needs:   [ x] no change since previous assessment  [ ] recalculated:     Previous Nutrition Diagnosis: Severe malnutrition   Nutrition Diagnosis is: ongoing, nutrition care plan in progress    New Nutrition Diagnosis:  Related to:    As evidenced by:      Interventions:     Recommend  1) Consider liberalizing diet to regular to further expand options in setting of decreased intake-ok with NP, will continue glucerna 2 daily  2) Continue to encourage po intake and obtain/honor food preferences as able     Monitoring and Evaluation:     Continue to monitor Nutritional intake, Tolerance to diet prescription, weights, labs, skin integrity    RD remains available upon request and will follow up per protocol

## 2019-11-26 NOTE — PROGRESS NOTE ADULT - PROBLEM SELECTOR PLAN 5
- c/w cymbalta  - Valium discontinued and Ativan 0.5 mg ordered IV q 4 ATC and 0.5 mg q 1 prn. Patient only appears to be comfortable when he is able to sleep. Wife agreeable to using Ativan to help relax patient despite that it may be sedating for him.    Anxiety may also be 2/2 to uncontrolled pain and disease process.

## 2019-11-26 NOTE — PROGRESS NOTE ADULT - PROBLEM SELECTOR PLAN 2
- EKG with prolonged QTc >480  - Increased methadone to 25mg q 6 over the weekend (11/23/19)  - patient denies nausea on assessment this morning, however, has very minimal oral intake.  - Ativan ordered ATC and prn

## 2019-11-26 NOTE — PROGRESS NOTE ADULT - PROBLEM SELECTOR PLAN 4
2/2 to ruptured gallbladder. S/p drain placement in IR. Not a surgical candidate at this time. Patient has not been responding to Meropenem which was recommended by ID and family and patient aware. Will c/w Meropenem as it may help with patient's symptoms, but patient and family aware that likely infection will be unable to get cleared. R    WBC count improved on yesterday's lab work, however, clinically, patient's condition appears to be worsening.

## 2019-11-26 NOTE — PROGRESS NOTE ADULT - ASSESSMENT
62 y/o PMH of traumatic work injury (2001) s/p multilumbar fusion (T8-S1) s/p hardware removal (2016, on daily opiates and methadone), chronic constipation, GERD, HTN, depression, anxiety, metastatic pancreatic adenocarcinoma s/p biliary stent placement (2m ago) and chemo sent in from [facility] for concern for biliary sepsis on 11/8.During this admission, CT showed gallbladder rupture, stable metastatic disease (to liver), and tumor trombosis. Palliative care was called pain Rx.      Wife reports patient had a rough night. Patient sleeping on morning assessment. Ativan 0.5 mg ordered IV q 4 ATC and q 1 prn. Valium discontinued. Patient remains with significant use of PCA, however, appears more comfortable when receiving benzos in conjunction with his Dilaudid PCA and Methadone.

## 2019-11-26 NOTE — PROGRESS NOTE ADULT - SUBJECTIVE AND OBJECTIVE BOX
GAP TEAM PALLIATIVE CARE UNIT PROGRESS NOTE:      [  ] Patient on hospice program.    INDICATION FOR PALLIATIVE CARE UNIT SERVICES: Management of severe pain in the setting of Stage IV pancreatic cancer and peritonitis s/p ruptured gallbladder.    INTERVAL HPI/OVERNIGHT EVENTS: Patient sleeping on assessment this morning, however, wife at bedside reports patient had a "bad night". Reports severe pain overnight that has slightly improved this morning. Total Dilaudid use of 200 mg in the past 24 hours. Valium discontinued and Ativan 0.5 mg ordered IV q 4 ATC and q 1 prn. Wife aware patient may be more sedated, however, he is in severe pain when not sedated and she would rather have him sleeping than in pain. C/w with current doses of methadone and Dilaudid/    DNR on chart:   Allergies    No Known Allergies    Intolerances    MEDICATIONS  (STANDING):  bisacodyl 5 milliGRAM(s) Oral at bedtime  chlorhexidine 4% Liquid 1 Application(s) Topical daily  DULoxetine 60 milliGRAM(s) Oral daily  enoxaparin Injectable 40 milliGRAM(s) SubCutaneous daily  famotidine Injectable 20 milliGRAM(s) IV Push daily  HYDROmorphone PCA (5 mG/mL) 30 milliLiter(s) PCA Continuous PCA Continuous  LORazepam   Injectable 0.5 milliGRAM(s) IV Push every 4 hours  meropenem  IVPB 1000 milliGRAM(s) IV Intermittent every 8 hours  methadone    Tablet 25 milliGRAM(s) Oral every 6 hours  sodium chloride 0.9%. 1000 milliLiter(s) (20 mL/Hr) IV Continuous <Continuous>    MEDICATIONS  (PRN):  bisacodyl Suppository 10 milliGRAM(s) Rectal daily PRN Constipation  HYDROmorphone PCA (5 mG/mL) Rescue Clinician Bolus 7.5 milliGRAM(s) IV Push every 1 hour PRN Severe Pain (7 - 10)  LORazepam   Injectable 0.5 milliGRAM(s) IV Push every 1 hour PRN Agitation  polyethylene glycol 3350 17 Gram(s) Oral daily PRN Constipation  senna 2 Tablet(s) Oral at bedtime PRN Constipation  simethicone 80 milliGRAM(s) Chew every 8 hours PRN Gas      ITEMS UNCHECKED ARE NOT PRESENT    PRESENT SYMPTOMS: [ ]Unable to obtain due to poor mentation   Source if other than patient:  [ ]Family   [ ]Team     Pain: [x ] yes [ ] no  QOL impact - limited mobility; unable to perform ADLs  Location - lower abdomen                   Aggravating factors -touch; movement; position  Quality -aching, burning  Radiation -n/a  Timing-constant  Severity (0-10 scale): 10/10  Minimal acceptable level (0-10 scale):     Dyspnea:                           [ ]Mild [ ]Moderate [ ]Severe  Anxiety:                             [x ]Mild [ ]Moderate [ ]Severe  Fatigue:                             [ ]Mild [x ]Moderate [ ]Severe  Nausea:                             [ ]Mild [ ]Moderate [ ]Severe  Loss of appetite:              [ ]Mild [ ]Moderate [x ]Severe  Constipation:                    [x ]Mild [ ]Moderate [ ]Severe    PAINAD Score:    [email or web address] (Ctrl +  left click to view)  		  Other Symptoms:  [x ]All other review of systems negative     Karnofsky Performance Score/Palliative Performance Status Version 2:        30-40%        [email or web address]/[email or web address]_performance_[email or web address]_[email or web address]  PHYSICAL EXAM:  Vital Signs Last 24 Hrs  T(C): 36.6 (26 Nov 2019 07:33), Max: 36.6 (26 Nov 2019 07:33)  T(F): 97.9 (26 Nov 2019 07:33), Max: 97.9 (26 Nov 2019 07:33)  HR: 79 (26 Nov 2019 07:33) (79 - 79)  BP: 128/85 (26 Nov 2019 07:33) (128/85 - 128/85)  BP(mean): --  RR: 20 (26 Nov 2019 07:33) (20 - 20)  SpO2: 93% (26 Nov 2019 07:33) (93% - 93%)    GENERAL:  [x ]Alert  [ x]Oriented x 3  [ ]Lethargic  [ ]Cachexia  [ ]Unarousable  [x ]Verbal  [ ]Non-Verbal [x] ill appearing male/pallor.   Behavioral:   [ ] Anxiety  [ ] Delirium [ ] Agitation [ ] Other  HEENT:  [x ]Normal   [ ]Dry mouth   [ ]ET Tube/Trach  [ ]Oral lesions  PULMONARY:   [x ]Clear [ ]Tachypnea  [ ]Audible excessive secretions   [ ]Rhonchi        [ ]Right [ ]Left [ ]Bilateral  [ ]Crackles        [ ]Right [ ]Left [ ]Bilateral  [ ]Wheezing     [ ]Right [ ]Left [ ]Bilateral  CARDIOVASCULAR:    [x ]Regular [ ]Irregular [x ]Tachy  [ ]Jean [ ]Murmur [ ]Other  GASTROINTESTINAL:  [ ]Soft  [x ]Distended   [x ]+BS  [ ]Non tender [x ]Tender  [ ]PEG [ ]OGT/ NGT   Last BM: 11/24/19    GENITOURINARY:  [ x]Normal [ ] Incontinent   [ ]Oliguria/Anuria   [ ]Heredia  MUSCULOSKELETAL:   [ ]Normal   [x ]Weakness  [ ]Bed/Wheelchair bound [x ]Edema  NEUROLOGIC:   [x ]No focal deficits  [ ] Cognitive impairment  [ ] Dysphagia [ ]Dysarthria [ ] Paresis [ ]Other   SKIN:   [x ]Normal   [ ]Pressure ulcer(s)  [ ]Rash    CRITICAL CARE:  [ ] Shock Present  [ ]Septic [ ]Cardiogenic [ ]Neurologic [ ]Hypovolemic  [ ]  Vasopressors [ ]  Inotropes   [ ] Respiratory failure present [ ] Mechanical Ventilation [ ] Non-invasive ventilatory support [ ] High-Flow  [ ] Acute  [ ] Chronic [ ] Hypoxic  [ ] Hypercarbic [ ] Other  [ ] Other organ failure     LABS:                                          9.1    17.27 )-----------( 469      ( 25 Nov 2019 13:03 )             27.7   11-25    135  |  97  |  10  ----------------------------<  105<H>  4.2   |  27  |  0.67    Ca    8.8      25 Nov 2019 13:03    TPro  6.6  /  Alb  2.2<L>  /  TBili  13.2<H>  /  DBili  x   /  AST  184<H>  /  ALT  94<H>  /  AlkPhos  897<H>  11-25        RADIOLOGY & ADDITIONAL STUDIES: reviewed    PROTEIN CALORIE MALNUTRITION:   [ ] PPSV2 < or = 30% [ ] significant weight loss [x ] poor nutritional intake [ ] anasarca [ ] catabolic state   Albumin, Serum: 2.2 g/dL (11-22-19 @ 06:17)   Artificial Nutrition [ ]     REFERRALS:   [ ]Chaplaincy  [ ] Hospice  [ ]Child Life  [x ]Social Work  [ ]Case management [ ]Holistic Therapy [ ] Physical Therapy [ ] Dietary   Goals of Care Document:   Progress Notes - Care Coordination [C. Provider] (11-20-19 @ 10:25)

## 2019-11-26 NOTE — PROGRESS NOTE ADULT - SUBJECTIVE AND OBJECTIVE BOX
Follow Up: ruptured cholecystitis, peritonitis    Interval History: pt afebrile and the WBC improved to 17    ROS:      All other systems negative    Constitutional: no fever, no chills  Head: no trauma  Eyes: no vision changes, no eye pain  ENT:  no sore throat, no rhinorrhea  Cardiovascular:  no chest pain, no palpitation  Respiratory:  no SOB, no cough  GI:  + abd pain, no vomiting, no diarrhea  urinary: no dysuria, no hematuria, no flank pain  musculoskeletal:  no joint pain, no joint swelling  skin:  no rash  neurology:  no headache, no seizure        Allergies  No Known Allergies        ANTIMICROBIALS:  meropenem  IVPB 1000 every 8 hours      OTHER MEDS:  bisacodyl 5 milliGRAM(s) Oral at bedtime  bisacodyl Suppository 10 milliGRAM(s) Rectal daily PRN  chlorhexidine 4% Liquid 1 Application(s) Topical daily  DULoxetine 60 milliGRAM(s) Oral daily  enoxaparin Injectable 40 milliGRAM(s) SubCutaneous daily  famotidine Injectable 20 milliGRAM(s) IV Push daily  HYDROmorphone PCA (5 mG/mL) 30 milliLiter(s) PCA Continuous PCA Continuous  HYDROmorphone PCA (5 mG/mL) Rescue Clinician Bolus 7.5 milliGRAM(s) IV Push every 1 hour PRN  LORazepam   Injectable 0.5 milliGRAM(s) IV Push every 4 hours  LORazepam   Injectable 0.5 milliGRAM(s) IV Push every 1 hour PRN  methadone    Tablet 25 milliGRAM(s) Oral every 6 hours  polyethylene glycol 3350 17 Gram(s) Oral two times a day  polyethylene glycol 3350 17 Gram(s) Oral daily PRN  senna 2 Tablet(s) Oral at bedtime PRN  simethicone 80 milliGRAM(s) Chew every 8 hours PRN  sodium chloride 0.9%. 1000 milliLiter(s) IV Continuous <Continuous>      Vital Signs Last 24 Hrs  T(C): 36.6 (26 Nov 2019 07:33), Max: 36.6 (26 Nov 2019 07:33)  T(F): 97.9 (26 Nov 2019 07:33), Max: 97.9 (26 Nov 2019 07:33)  HR: 79 (26 Nov 2019 07:33) (79 - 79)  BP: 128/85 (26 Nov 2019 07:33) (128/85 - 128/85)  BP(mean): --  RR: 20 (26 Nov 2019 07:33) (20 - 20)  SpO2: 93% (26 Nov 2019 07:33) (93% - 93%)    Physical Exam:  General:   pt appears uncomfortable  Head: atraumatic, normocephalic  Eye: palce sclera and conjunctiva  ENT:    no oropharyngeal lesions,   no LAD,   neck supple  Cardio:     regular S1, S2,  no murmur  Respiratory:    clear b/l,    no wheezing  abd:    distended with palpable liver and mass, diffuse tenderness more on RUQ, perc daysi with purulent drainage  :   no CVAT,  no suprapubic tenderness,   no  saavedra  Musculoskeletal:   no joint swelling  vascular: no phlebitis  Skin:    no rash  Neurologic:     no focal deficit  psych: normal affect                          9.1    17.27 )-----------( 469      ( 25 Nov 2019 13:03 )             27.7       11-25    135  |  97  |  10  ----------------------------<  105<H>  4.2   |  27  |  0.67    Ca    8.8      25 Nov 2019 13:03    TPro  6.6  /  Alb  2.2<L>  /  TBili  13.2<H>  /  DBili  x   /  AST  184<H>  /  ALT  94<H>  /  AlkPhos  897<H>  11-25          MICROBIOLOGY:  v  .Body Fluid Peritoneal Fluid  11-22-19   No growth  --    Rare polymorphonuclear leukocytes  No organisms seen  by cytocentrifuge      .Blood Blood-Peripheral  11-20-19   No growth at 5 days.  --  --      Peritoneal Peritoneal Fluid  11-18-19   No growth at 5 days  --    polymorphonuclear leukocytes seen  No organisms seen  by cytocentrifuge      .Body Fluid Bile Fluid  11-12-19   Numerous Klebsiella oxytoca  --  Klebsiella oxytoca      .Blood Blood-Peripheral  11-09-19   No growth at 5 days.  --  --      .Urine Clean Catch (Midstream)  11-09-19   <10,000 CFU/mL Normal Urogenital Veronika  --  --                RADIOLOGY:  Images below reviewed personally  < from: US Abdomen Limited (11.25.19 @ 15:58) >  IMPRESSION:  Moderate volume ascites, more prominent in the right and left lower   quadrants. Small in the upper quadrants.

## 2019-11-26 NOTE — PROGRESS NOTE ADULT - PROBLEM SELECTOR PLAN 8
Likely 2/2 to opioid use and minimal oral intake. Bowel regimen in place.     Patient has been refusing 2x/miralax. Miralax ordered prn. Patient had a bowel movement 11/24.

## 2019-11-27 NOTE — PROGRESS NOTE ADULT - PROBLEM SELECTOR PLAN 4
2/2 to ruptured gallbladder. S/p drain placement in IR. Not a surgical candidate at this time. Patient has not been responding to Meropenem which was recommended by ID and family and patient aware. Will c/w Meropenem as it may help with patient's symptoms, but patient and family aware that likely infection will be unable to get cleared. 2/2 to ruptured gallbladder. S/p drain placement in IR. Not a surgical candidate at this time. Patient has not been responding to Meropenem which was recommended by ID and family and patient aware. Will c/w Meropenem as it may help with patient's symptoms, but patient and family aware that infection is not able to get cleared.

## 2019-11-27 NOTE — PROGRESS NOTE ADULT - SUBJECTIVE AND OBJECTIVE BOX
GAP TEAM PALLIATIVE CARE UNIT PROGRESS NOTE:      [  ] Patient on hospice program.    INDICATION FOR PALLIATIVE CARE UNIT SERVICES: Management of severe pain in the setting of Stage IV pancreatic cancer and peritonitis s/p ruptured gallbladder.    INTERVAL HPI/OVERNIGHT EVENTS: Patient sleeping on assessment this morning, however, wife at bedside reports patient was able to sleep overnight with the Fentanyl dose. Pt is more lethargic today and was unable to press his PCA pump with 9 injections and 9 attempts, as well as RB x 1. Pt appeared to be in pain the AM. Discussed with wife, daughter and son Ralph over the phone decision to change to Dilaudid drip.     DNR on chart:   Allergies    No Known Allergies    Intolerances    MEDICATIONS  (STANDING):  chlorhexidine 4% Liquid 1 Application(s) Topical daily  DULoxetine 60 milliGRAM(s) Oral daily  famotidine Injectable 20 milliGRAM(s) IV Push daily  HYDROmorphone Infusion 6 mG/Hr (6 mL/Hr) IV Continuous <Continuous>  LORazepam   Injectable 0.5 milliGRAM(s) IV Push every 4 hours  meropenem  IVPB 1000 milliGRAM(s) IV Intermittent every 8 hours  methadone    Tablet 25 milliGRAM(s) Oral every 6 hours  sodium chloride 0.9%. 1000 milliLiter(s) (20 mL/Hr) IV Continuous <Continuous>    MEDICATIONS  (PRN):  bisacodyl Suppository 10 milliGRAM(s) Rectal daily PRN Constipation  HYDROmorphone  Injectable 8 milliGRAM(s) IV Push every 1 hour PRN pain  HYDROmorphone  Injectable 8 milliGRAM(s) IV Push every 1 hour PRN dyspnea  LORazepam   Injectable 0.5 milliGRAM(s) IV Push every 1 hour PRN Agitation    ITEMS UNCHECKED ARE NOT PRESENT    PRESENT SYMPTOMS: [ ]Unable to obtain due to poor mentation   Source if other than patient:  [ ]Family   [ ]Team     Pain: [x ] yes [ ] no  QOL impact - limited mobility; unable to perform ADLs  Location - lower abdomen                   Aggravating factors -touch; movement; position  Quality -aching, burning  Radiation -n/a  Timing-constant  Severity (0-10 scale): 10/10  Minimal acceptable level (0-10 scale):     Dyspnea:                           [ ]Mild [ ]Moderate [ ]Severe  Anxiety:                             [x ]Mild [ ]Moderate [ ]Severe  Fatigue:                             [ ]Mild [x ]Moderate [ ]Severe  Nausea:                             [ ]Mild [ ]Moderate [ ]Severe  Loss of appetite:              [ ]Mild [ ]Moderate [x ]Severe  Constipation:                    [x ]Mild [ ]Moderate [ ]Severe    PAINAD Score:    [email or web address] (Ctrl +  left click to view)  		  Other Symptoms:  [x ]All other review of systems negative     Karnofsky Performance Score/Palliative Performance Status Version 2:  20%        [email or web address]  PHYSICAL EXAM:  Vital Signs Last 24 Hrs  T(C): --  T(F): --  HR: --  BP: --  BP(mean): --  RR: --  SpO2: --    GENERAL:  [x ]Alert  [ x]Oriented x 3  [ ]Lethargic  [ ]Cachexia  [ ]Unarousable  [x ]Verbal  [ ]Non-Verbal [x] ill appearing male/pallor.   Behavioral:   [ ] Anxiety  [ ] Delirium [ ] Agitation [ ] Other  HEENT:  [x ]Normal   [ ]Dry mouth   [ ]ET Tube/Trach  [ ]Oral lesions  PULMONARY:   [x ]Clear [ ]Tachypnea  [ ]Audible excessive secretions   [ ]Rhonchi        [ ]Right [ ]Left [ ]Bilateral  [ ]Crackles        [ ]Right [ ]Left [ ]Bilateral  [ ]Wheezing     [ ]Right [ ]Left [ ]Bilateral  CARDIOVASCULAR:    [x ]Regular [ ]Irregular [x ]Tachy  [ ]Jean [ ]Murmur [ ]Other  GASTROINTESTINAL:  [ ]Soft  [x ]Distended   [x ]+BS  [ ]Non tender [x ]Tender  [ ]PEG [ ]OGT/ NGT   Last BM: 11/24/19    GENITOURINARY:  [ x]Normal [ ] Incontinent   [ ]Oliguria/Anuria   [ ]Heredia  MUSCULOSKELETAL:   [ ]Normal   [x ]Weakness  [ ]Bed/Wheelchair bound [x ]Edema  NEUROLOGIC:   [x ]No focal deficits  [ ] Cognitive impairment  [ ] Dysphagia [ ]Dysarthria [ ] Paresis [ ]Other   SKIN:   [x ]Normal   [ ]Pressure ulcer(s)  [ ]Rash    CRITICAL CARE:  [ ] Shock Present  [ ]Septic [ ]Cardiogenic [ ]Neurologic [ ]Hypovolemic  [ ]  Vasopressors [ ]  Inotropes   [ ] Respiratory failure present [ ] Mechanical Ventilation [ ] Non-invasive ventilatory support [ ] High-Flow  [ ] Acute  [ ] Chronic [ ] Hypoxic  [ ] Hypercarbic [ ] Other  [ ] Other organ failure     LABS:                                          9.1    17.27 )-----------( 469      ( 25 Nov 2019 13:03 )             27.7   11-25    135  |  97  |  10  ----------------------------<  105<H>  4.2   |  27  |  0.67    Ca    8.8      25 Nov 2019 13:03    TPro  6.6  /  Alb  2.2<L>  /  TBili  13.2<H>  /  DBili  x   /  AST  184<H>  /  ALT  94<H>  /  AlkPhos  897<H>  11-25    RADIOLOGY & ADDITIONAL STUDIES: reviewed    PROTEIN CALORIE MALNUTRITION:   [x ] PPSV2 < or = 30% [ ] significant weight loss [x ] poor nutritional intake [ ] anasarca [ ] catabolic state   Albumin, Serum: 2.2 g/dL (11-22-19 @ 06:17)   Artificial Nutrition [ ]     REFERRALS:   [ ]Chaplaincy  [ ] Hospice  [ ]Child Life  [x ]Social Work  [ ]Case management [ ]Holistic Therapy [ ] Physical Therapy [ ] Dietary   Goals of Care Document:   Progress Notes - Care Coordination [C. Provider] (11-20-19 @ 10:25) GAP TEAM PALLIATIVE CARE UNIT PROGRESS NOTE:      [  ] Patient on hospice program.    INDICATION FOR PALLIATIVE CARE UNIT SERVICES: Management of severe pain in the setting of Stage IV pancreatic cancer and peritonitis s/p ruptured gallbladder.    INTERVAL HPI/OVERNIGHT EVENTS: Patient sleeping on assessment this morning, however, wife at bedside reports patient was able to sleep overnight with the Fentanyl dose. Pt is more lethargic today and was unable to press his PCA pump with 9 injections and 9 attempts, as well as RB x 1. Pt appeared to be in pain the AM. Discussed with wife, daughter and son Ralph over the phone decision to change to Dilaudid drip.     DNR on chart:   Allergies    No Known Allergies    Intolerances    MEDICATIONS  (STANDING):  chlorhexidine 4% Liquid 1 Application(s) Topical daily  DULoxetine 60 milliGRAM(s) Oral daily  famotidine Injectable 20 milliGRAM(s) IV Push daily  HYDROmorphone Infusion 6 mG/Hr (6 mL/Hr) IV Continuous <Continuous>  LORazepam   Injectable 0.5 milliGRAM(s) IV Push every 4 hours  meropenem  IVPB 1000 milliGRAM(s) IV Intermittent every 8 hours  methadone    Tablet 25 milliGRAM(s) Oral every 6 hours  sodium chloride 0.9%. 1000 milliLiter(s) (20 mL/Hr) IV Continuous <Continuous>    MEDICATIONS  (PRN):  bisacodyl Suppository 10 milliGRAM(s) Rectal daily PRN Constipation  HYDROmorphone  Injectable 8 milliGRAM(s) IV Push every 1 hour PRN pain  HYDROmorphone  Injectable 8 milliGRAM(s) IV Push every 1 hour PRN dyspnea  LORazepam   Injectable 0.5 milliGRAM(s) IV Push every 1 hour PRN Agitation    ITEMS UNCHECKED ARE NOT PRESENT    PRESENT SYMPTOMS: [ x]Unable to obtain due to poor mentation   Source if other than patient:  [ ]Family   [ ]Team     Pain: [x ] yes [ ] no  QOL impact - limited mobility; unable to perform ADLs  Location - lower abdomen                   Aggravating factors -touch; movement; position  Quality -aching, burning  Radiation -n/a  Timing-constant  Severity (0-10 scale): 10/10  Minimal acceptable level (0-10 scale):     Dyspnea:                           [ ]Mild [ ]Moderate [ ]Severe  Anxiety:                             [x ]Mild [ ]Moderate [ ]Severe  Fatigue:                             [ ]Mild [x ]Moderate [ ]Severe  Nausea:                             [ ]Mild [ ]Moderate [ ]Severe  Loss of appetite:              [ ]Mild [ ]Moderate [x ]Severe  Constipation:                    [x ]Mild [ ]Moderate [ ]Severe    PAINAD Score: 4    [email or web address] (Ctrl +  left click to view)  		  Other Symptoms:  [ ]All other review of systems negative     Karnofsky Performance Score/Palliative Performance Status Version 2:  20%        [email or web address]  PHYSICAL EXAM:  Vital Signs Last 24 Hrs  T(C): --  T(F): --  HR: --  BP: --  BP(mean): --  RR: --  SpO2: --    GENERAL:  [ ]Alert  [ ]Oriented x 3  [x ]Lethargic  [ ]Cachexia  [ ]Unarousable  [x ]Verbal  [ ]Non-Verbal [x] ill appearing male/pallor.   Behavioral:   [ ] Anxiety  [ ] Delirium [ ] Agitation [ ] Other  HEENT:  [x ]Normal   [ ]Dry mouth   [ ]ET Tube/Trach  [ ]Oral lesions  PULMONARY:   [x ]Clear [ ]Tachypnea  [ ]Audible excessive secretions   [ ]Rhonchi        [ ]Right [ ]Left [ ]Bilateral  [ ]Crackles        [ ]Right [ ]Left [ ]Bilateral  [ ]Wheezing     [ ]Right [ ]Left [ ]Bilateral  CARDIOVASCULAR:    [x ]Regular [ ]Irregular [x ]Tachy  [ ]Jean [ ]Murmur [ ]Other  GASTROINTESTINAL:  [ ]Soft  [x ]Distended   [x ]+BS  [ ]Non tender [x ]Tender  [ ]PEG [ ]OGT/ NGT   Last BM: 11/26/19    GENITOURINARY:  [ x]Normal [ ] Incontinent   [ ]Oliguria/Anuria   [ ]Heredia  MUSCULOSKELETAL:   [ ]Normal   [x ]Weakness  [ ]Bed/Wheelchair bound [x ]Edema  NEUROLOGIC:   [ ]No focal deficits  [ ] Cognitive impairment  [ ] Dysphagia [ ]Dysarthria [ ] Paresis [x ]Other: Lethargic.   SKIN:   [x ]Normal   [ ]Pressure ulcer(s)  [ ]Rash    CRITICAL CARE:  [ ] Shock Present  [ ]Septic [ ]Cardiogenic [ ]Neurologic [ ]Hypovolemic  [ ]  Vasopressors [ ]  Inotropes   [ ] Respiratory failure present [ ] Mechanical Ventilation [ ] Non-invasive ventilatory support [ ] High-Flow  [ ] Acute  [ ] Chronic [ ] Hypoxic  [ ] Hypercarbic [ ] Other  [ ] Other organ failure     LABS:             No new labs.     RADIOLOGY & ADDITIONAL STUDIES: reviewed    PROTEIN CALORIE MALNUTRITION:   [x ] PPSV2 < or = 30% [ ] significant weight loss [x ] poor nutritional intake [ ] anasarca [ ] catabolic state   Albumin, Serum: 2.2 g/dL (11-22-19 @ 06:17)   Artificial Nutrition [ ]     REFERRALS:   [ ]Chaplaincy  [ ] Hospice  [ ]Child Life  [x ]Social Work  [ ]Case management [ ]Holistic Therapy [ ] Physical Therapy [ ] Dietary   Goals of Care Document:   Progress Notes - Care Coordination [C. Provider] (11-20-19 @ 10:25)

## 2019-11-27 NOTE — PROGRESS NOTE ADULT - PROBLEM SELECTOR PLAN 8
Likely 2/2 to opioid use and minimal oral intake. Bowel regimen in place.   Patient has been refusing 2x/miralax. Miralax ordered prn. Patient had a bowel movement 11/24. Likely 2/2 to opioid use and minimal oral intake. Bowel regimen in place.   Patient has been refusing 2x/miralax. Miralax ordered prn. Patient had a bowel movement 11/26

## 2019-11-27 NOTE — PROGRESS NOTE ADULT - PROBLEM SELECTOR PLAN 1
- Methadone increased over the weekend to 25 mg q 6 hours yesterday.  - Dilaudid PCA discontinued as pt is no longer able to press, discussed with family will change to Dilaudid drip   - Continue with Dilaudid drip @ 6 mL /hr   - Continue with Dilaudid 8mg IV q/ 1 hr PRN for breakthrough pain - Methadone  25 mg q 6 hours   - Dilaudid PCA discontinued as pt is no longer able to press, discussed with family will change to Dilaudid drip   - Continue with Dilaudid drip @ 6 mg /hr   - Continue with Dilaudid 8mg IV q/ 1 hr PRN for breakthrough pain

## 2019-11-27 NOTE — PROGRESS NOTE ADULT - PROBLEM SELECTOR PLAN 3
- Patient not a candidate for DMT at this time 2/2 to active infection. Likely reaching the end of his disease course. Family and patient would like to focus on symptom management at this time.  - follows with Dr. Ward at [facility]

## 2019-11-27 NOTE — PROGRESS NOTE ADULT - PROBLEM SELECTOR PLAN 2
- EKG with prolonged QTc >480  - Increased methadone to 25mg q 6 over the weekend (11/23/19)  - patient denies nausea on assessment this morning, however, has very minimal oral intake.  - Ativan ordered ATC and prn - EKG with prolonged QTc >480 but < 500  - As QTc is < 500 and the patient is still symptomatic will keep Methadone as it is.   - patient denies nausea on assessment this morning, however, has very minimal oral intake.  - Ativan 0.5 mg q 4 ATC and prn

## 2019-11-27 NOTE — PROGRESS NOTE ADULT - ASSESSMENT
62 y/o PMH of traumatic work injury (2001) s/p multilumbar fusion (T8-S1) s/p hardware removal (2016, on daily opiates and methadone), chronic constipation, GERD, HTN, depression, anxiety, metastatic pancreatic adenocarcinoma s/p biliary stent placement (2m ago) and chemo sent in from [facility] for concern for biliary sepsis on 11/8.During this admission, CT showed gallbladder rupture, stable metastatic disease (to liver), and tumor trombosis. Palliative care was called pain Rx.      Pt is more lethargic today and was unable to press his PCA pump with 9 injections and 9 attempts, as well as RB x 1. Pt appeared to be in pain the AM. Discussed with wife, daughter and son Ralph over the phone decision to change to Dilaudid drip.

## 2019-11-27 NOTE — PROGRESS NOTE ADULT - ASSESSMENT
63 m with DM, HTN, traumatic work injury (2001) s/p multilumbar fusion (T8-S1) s/p hardware removal (2016, on daily opiates and methadone), metastatic pancreatic adenocarcinoma s/p biliary stent placement (9/17/19) sent in from [facility] for concern for biliary sepsis on 11/8/19.   Came in with leukocytosis 26k, afebrile, tachycardic to 100  CT C/A/P with possible pneumonia, distended GB with likely GB rupture as well as new moderate loculated ascites.  s/p percutaneous cholecystostomy today with foul smelling fluid 11/12, but had worsening leukocytosis and repeat CT showed peritonitis and loculated ascites s/p tap 11/18 with 1370 WBC, 50% PMN, cx negative,  repeat tap 11/22, , PMN: 9, cx negative    metastatic pancreatic ca with ruptured gall bladder and cholecystitis s/p percutaneous cholecystostomy 11/12  IR biliary cx: klebsiella oxytoca, R to amp/sulbactam  R side pulmonary findings likely due to cholecystitis and contagious inflammation   peritonitis with loculated ascites s/p tap over 4 liter cloudy fluid drained, WBC 1370, PMN 50%, cx negative, repeat tap 11/22, , PMN: 9, cx negative  worsening LFTs and leukocytosis , repeat CT unremarkable, decreased loculated fluid around the gallbladder and cholecystostomy in place     * pt actively dying and on Dilaudid pump  * s/p 9 days of zosyn, started 11/8, switched to meropenem 11/20, now day 8  * if within goals of care would continue antibiotics for symptom relief   * poor prognosis and antibiotics alone does not seem to be curative  * would switch to PO levofloxacin 500 qd and flagyl 500 q 12 but pt has not been eating for days

## 2019-11-27 NOTE — PROGRESS NOTE ADULT - PROBLEM SELECTOR PLAN 6
-Abdominal distension. s/p paracentesis on Friday with minimal relief.  - Patient s/p bedside sonogram on 11/26 that displayed moderate volume ascites. - At this time, it likely is not beneficial to continue with multiple taps as fluid rapidly reaccumulates and he is not a candidate for a pleurx.   - C/w managing symptoms and ongoing goals of care discussions with patient and family.

## 2019-11-27 NOTE — PROGRESS NOTE ADULT - SUBJECTIVE AND OBJECTIVE BOX
Follow Up: ruptured cholecystitis, peritonitis    Interval History: pt afebrile but actively dying and has not been eating for days and on Dilaudid pump    ROS:      All other systems negative    Constitutional: no fever, no chills  Head: no trauma  Eyes: no vision changes, no eye pain  ENT:  no sore throat, no rhinorrhea  Cardiovascular:  no chest pain, no palpitation  Respiratory:  no SOB, no cough  GI:  + abd pain, no vomiting, no diarrhea  urinary: no dysuria, no hematuria, no flank pain  musculoskeletal:  no joint pain, no joint swelling  skin:  no rash          Allergies  No Known Allergies        ANTIMICROBIALS:  meropenem  IVPB 1000 every 8 hours      OTHER MEDS:  bisacodyl Suppository 10 milliGRAM(s) Rectal daily PRN  chlorhexidine 4% Liquid 1 Application(s) Topical daily  DULoxetine 60 milliGRAM(s) Oral daily  famotidine Injectable 20 milliGRAM(s) IV Push daily  HYDROmorphone  Injectable 8 milliGRAM(s) IV Push every 1 hour PRN  HYDROmorphone  Injectable 8 milliGRAM(s) IV Push every 1 hour PRN  HYDROmorphone Infusion 6 mG/Hr IV Continuous <Continuous>  LORazepam   Injectable 0.5 milliGRAM(s) IV Push every 4 hours  LORazepam   Injectable 0.5 milliGRAM(s) IV Push every 1 hour PRN  methadone    Tablet 25 milliGRAM(s) Oral every 6 hours  sodium chloride 0.9%. 1000 milliLiter(s) IV Continuous <Continuous>      Vital Signs Last 24 Hrs  T(C): --  T(F): --  HR: --  BP: --  BP(mean): --  RR: --  SpO2: --    Physical Exam:  General:   pt comfortable and sleeping  Head: atraumatic, normocephalic  Eye: palce sclera and conjunctiva  ENT:    no oropharyngeal lesions,   no LAD,   neck supple  Cardio:     regular S1, S2,  no murmur  Respiratory:    clear b/l,    no wheezing  abd:    distended with palpable liver and mass, diffuse tenderness more on RUQ, perc daysi with purulent drainage  :   no CVAT,  no suprapubic tenderness,   no  saavedra  Musculoskeletal:   no joint swelling  vascular: no phlebitis  Skin:    no rash                              9.1    17.27 )-----------( 469      ( 25 Nov 2019 13:03 )             27.7       11-25    135  |  97  |  10  ----------------------------<  105<H>  4.2   |  27  |  0.67    Ca    8.8      25 Nov 2019 13:03    TPro  6.6  /  Alb  2.2<L>  /  TBili  13.2<H>  /  DBili  x   /  AST  184<H>  /  ALT  94<H>  /  AlkPhos  897<H>  11-25          MICROBIOLOGY:  v  .Body Fluid Peritoneal Fluid  11-22-19   No growth  --    Rare polymorphonuclear leukocytes  No organisms seen  by cytocentrifuge      .Blood Blood-Peripheral  11-20-19   No growth at 5 days.  --  --      Peritoneal Peritoneal Fluid  11-18-19   No growth at 5 days  --    polymorphonuclear leukocytes seen  No organisms seen  by cytocentrifuge      .Body Fluid Bile Fluid  11-12-19   Numerous Klebsiella oxytoca  --  Klebsiella oxytoca      .Blood Blood-Peripheral  11-09-19   No growth at 5 days.  --  --      .Urine Clean Catch (Midstream)  11-09-19   <10,000 CFU/mL Normal Urogenital Veronika  --  --                RADIOLOGY:  Images below reviewed personally  < from: US Abdomen Limited (11.25.19 @ 15:58) >  IMPRESSION:  Moderate volume ascites, more prominent in the right and left lower   quadrants. Small in the upper quadrants.      < from: CT Abdomen and Pelvis w/ IV Cont (11.21.19 @ 14:41) >    IMPRESSION:   Loculated fluid around the gallbladder has decreased. Stable liver   lesions.    Minimal decrease in size of air and sludge filled gallbladder with tip of   the percutaneous cholecystostomy tube in place.    Pancreatic lesions are unchanged.    Moderate ascites unchanged.

## 2019-11-27 NOTE — PROGRESS NOTE ADULT - PROBLEM SELECTOR PLAN 5
- c/w Cymbalta  - Valium discontinued and Ativan 0.5 mg ordered IV q 4 ATC and 0.5 mg q 1 prn. - Patient only appears to be comfortable when he is able to sleep. Wife agreeable to using Ativan to help relax patient despite that it may be sedating for him.  - Anxiety may also be 2/2 to uncontrolled pain and disease process. - c/w Cymbalta as long as he is able to take it.   - Ativan as above.

## 2019-11-28 NOTE — PROGRESS NOTE ADULT - PROBLEM SELECTOR PLAN 5
-Abdominal distension. s/p paracentesis last Friday with minimal relief.  - Patient s/p bedside sonogram on 11/26 that displayed moderate volume ascites. - At this time, it likely is not beneficial to continue with multiple taps as fluid rapidly reaccumulates and he is not a candidate for a pleurx.   - C/w managing symptoms and ongoing goals of care discussions with patient and family.

## 2019-11-28 NOTE — PROGRESS NOTE ADULT - ASSESSMENT
64 y/o PMH of traumatic work injury (2001) s/p multilumbar fusion (T8-S1) s/p hardware removal (2016, on daily opiates and methadone), chronic constipation, GERD, HTN, depression, anxiety, metastatic pancreatic adenocarcinoma s/p biliary stent placement (2m ago) and chemo sent in from [facility] for concern for biliary sepsis on 11/8.During this admission, CT showed gallbladder rupture, stable metastatic disease (to liver), and tumor trombosis. Palliative care was called pain Rx.

## 2019-11-28 NOTE — PROGRESS NOTE ADULT - PROBLEM SELECTOR PLAN 3
2/2 to ruptured gallbladder. S/p drain placement in IR. Not a surgical candidate at this time. Patient has been responding to Meropenem minimally but is aiding in symptom control. Hence Will c/w Meropenem as it may help with patient's symptoms, but patient and family aware that infection is not able to get cleared.

## 2019-11-28 NOTE — PROGRESS NOTE ADULT - PROBLEM SELECTOR PLAN 8
Transferred to PCU for pain management. No further DMT. At this time, focus will be on managing symptoms purely. Pt is DNR and MOLST completed.   Son Ralph and HCP was updated on pts condition, is aware of the decline. Goal continues to focus on comfort care. Emotional support provided.

## 2019-11-28 NOTE — PROGRESS NOTE ADULT - PROBLEM SELECTOR PLAN 2
- Patient not a candidate for DMT at this time. Likely reaching the end of his disease course. Family and patient would like to focus on symptom management at this time.  - follows with Dr. Ward at [facility]

## 2019-11-28 NOTE — PROGRESS NOTE ADULT - PROBLEM SELECTOR PLAN 6
Due to significant progression of the disease and likely the patient has entered into the dying process, will discontinue the Fingerstick check.

## 2019-11-28 NOTE — PROGRESS NOTE ADULT - SUBJECTIVE AND OBJECTIVE BOX
GAP TEAM PALLIATIVE CARE UNIT PROGRESS NOTE:      [  ] Patient on hospice program.    INDICATION FOR PALLIATIVE CARE UNIT SERVICES: Management of severe pain in the setting of Stage IV pancreatic cancer and peritonitis s/p ruptured gallbladder.    INTERVAL HPI/OVERNIGHT EVENTS: Patient appears much more comfortable than yesterday. PCA was discontinued and was put on a dilaudid drip. Also Ativan ATC was discontinued and ativan drip was initiated. Scopalamine patch was placed along with ATC robinul. Sleeping on assessment this morning, however, wife at bedside reports patient was able to sleep overnight.     DNR on chart:   Allergies    No Known Allergies    Intolerances    MEDICATIONS  (STANDING):  chlorhexidine 4% Liquid 1 Application(s) Topical daily  HYDROmorphone Infusion 6 mG/Hr (6 mL/Hr) IV Continuous <Continuous>  LORazepam  gtt@ 0.002  meropenem  IVPB 1000 milliGRAM(s) IV Intermittent every 8 hours  methadone 12.5 milliGRAM(s) IVP every 6 hours  sodium chloride 0.9%. 1000 milliLiter(s) (20 mL/Hr) IV Continuous <Continuous>  Robinul 0.4mg q 6 hours    MEDICATIONS  (PRN):  bisacodyl Suppository 10 milliGRAM(s) Rectal daily PRN Constipation  HYDROmorphone  Injectable 8 milliGRAM(s) IV Push every 1 hour PRN pain  HYDROmorphone  Injectable 8 milliGRAM(s) IV Push every 1 hour PRN dyspnea  LORazepam   Injectable 0.5 milliGRAM(s) IV Push every 1 hour PRN Agitation    ITEMS UNCHECKED ARE NOT PRESENT    PRESENT SYMPTOMS: [ x]Unable to obtain due to poor mentation   Source if other than patient:  [x ]Family   [x ]Team     Pain: [ ] yes [ ] no  QOL impact -   Location -                   Aggravating factors -  Quality -  Radiation -  Timing-  Severity (0-10 scale):   Minimal acceptable level (0-10 scale):     Dyspnea:                           [ ]Mild [ ]Moderate [ ]Severe  Anxiety:                             [x ]Mild [ ]Moderate [ ]Severe  Fatigue:                             [ ]Mild [x ]Moderate [ ]Severe  Nausea:                             [ ]Mild [ ]Moderate [ ]Severe  Loss of appetite:              [ ]Mild [ ]Moderate [x ]Severe  Constipation:                    [x ]Mild [ ]Moderate [ ]Severe    PAINAD Score: 4    [email or web address] (Ctrl +  left click to view)  		  Other Symptoms:  [ ]All other review of systems negative     Karnofsky Performance Score/Palliative Performance Status Version 2:  10%        [email or web address]  PHYSICAL EXAM:  Vital Signs Last 24 Hrs  T(F): --102.2  HR: -- 114  BP: -- 109/68  RR: -- 20  SpO2: --73%    GENERAL:  [ ]Alert  [ ]Oriented x 3  [x ]Lethargic  [ ]Cachexia  [ ]Unarousable  [x ]Verbal  [ ]Non-Verbal [x] ill appearing male/pallor.   Behavioral:   [ ] Anxiety  [ ] Delirium [ ] Agitation [ ] Other  HEENT:  [x ]Normal   [ ]Dry mouth   [ ]ET Tube/Trach  [ ]Oral lesions  PULMONARY:   [x ]Clear [ ]Tachypnea  [ ]Audible excessive secretions   [ ]Rhonchi        [ ]Right [ ]Left [ ]Bilateral  [ ]Crackles        [ ]Right [ ]Left [ ]Bilateral  [ ]Wheezing     [ ]Right [ ]Left [ ]Bilateral  CARDIOVASCULAR:    [x ]Regular [ ]Irregular [x ]Tachy  [ ]Jean [ ]Murmur [ ]Other  GASTROINTESTINAL:  [ ]Soft  [x ]Distended   [x ]+BS  [ ]Non tender [x ]Tender  [ ]PEG [ ]OGT/ NGT   Last BM: 11/26/19    GENITOURINARY:  [ x]Normal [ ] Incontinent   [ ]Oliguria/Anuria   [ ]Heredia  MUSCULOSKELETAL:   [ ]Normal   [x ]Weakness  [ ]Bed/Wheelchair bound [x ]Edema  NEUROLOGIC:   [ ]No focal deficits  [ ] Cognitive impairment  [ ] Dysphagia [ ]Dysarthria [ ] Paresis [x ]Other: Lethargic.   SKIN:   [x ]Normal   [ ]Pressure ulcer(s)  [ ]Rash    CRITICAL CARE:  [ ] Shock Present  [ ]Septic [ ]Cardiogenic [ ]Neurologic [ ]Hypovolemic  [ ]  Vasopressors [ ]  Inotropes   [ ] Respiratory failure present [ ] Mechanical Ventilation [ ] Non-invasive ventilatory support [ ] High-Flow  [ ] Acute  [ ] Chronic [ ] Hypoxic  [ ] Hypercarbic [ ] Other  [ ] Other organ failure     LABS:             No new labs.     RADIOLOGY & ADDITIONAL STUDIES: reviewed    PROTEIN CALORIE MALNUTRITION:   [x ] PPSV2 < or = 30% [ ] significant weight loss [x ] poor nutritional intake [ ] anasarca [ ] catabolic state   Albumin, Serum: 2.2 g/dL (11-22-19 @ 06:17)   Artificial Nutrition [ ]     REFERRALS:   [ ]Chaplaincy  [ ] Hospice  [ ]Child Life  [x ]Social Work  [ ]Case management [ ]Holistic Therapy [ ] Physical Therapy [ ] Dietary   Goals of Care Document:   Progress Notes - Care Coordination [C. Provider] (11-20-19 @ 10:25) GAP TEAM PALLIATIVE CARE UNIT PROGRESS NOTE:      [  ] Patient on hospice program.    INDICATION FOR PALLIATIVE CARE UNIT SERVICES: Management of severe pain in the setting of Stage IV pancreatic cancer and peritonitis s/p ruptured gallbladder.    INTERVAL HPI/OVERNIGHT EVENTS: Patient appears much more comfortable than yesterday. PCA was discontinued and was put on a dilaudid drip. Also Ativan ATC was discontinued and ativan drip was initiated. Scopalamine patch was placed along with ATC robinul. Sleeping on assessment this morning, however, wife at bedside reports patient was able to sleep overnight.     DNR on chart: yes  Allergies    No Known Allergies    Intolerances    MEDICATIONS  (STANDING):  chlorhexidine 4% Liquid 1 Application(s) Topical daily  HYDROmorphone Infusion 6 mG/Hr (6 mL/Hr) IV Continuous <Continuous>  LORazepam  gtt@ 0.002  meropenem  IVPB 1000 milliGRAM(s) IV Intermittent every 8 hours  methadone 12.5 milliGRAM(s) IVP every 6 hours  sodium chloride 0.9%. 1000 milliLiter(s) (20 mL/Hr) IV Continuous <Continuous>  Robinul 0.4mg q 6 hours    MEDICATIONS  (PRN):  bisacodyl Suppository 10 milliGRAM(s) Rectal daily PRN Constipation  HYDROmorphone  Injectable 8 milliGRAM(s) IV Push every 1 hour PRN pain  HYDROmorphone  Injectable 8 milliGRAM(s) IV Push every 1 hour PRN dyspnea  LORazepam   Injectable 0.5 milliGRAM(s) IV Push every 1 hour PRN Agitation    ITEMS UNCHECKED ARE NOT PRESENT    PRESENT SYMPTOMS: [ x]Unable to obtain due to poor mentation   Source if other than patient:  [x ]Family   [x ]Team     Pain: [ ] yes [ ] no  QOL impact -   Location -                   Aggravating factors -  Quality -  Radiation -  Timing-  Severity (0-10 scale):   Minimal acceptable level (0-10 scale):     Dyspnea:                           [ ]Mild [ ]Moderate [ ]Severe  Anxiety:                             [x ]Mild [ ]Moderate [ ]Severe  Fatigue:                             [ ]Mild [x ]Moderate [ ]Severe  Nausea:                             [ ]Mild [ ]Moderate [ ]Severe  Loss of appetite:              [ ]Mild [ ]Moderate [x ]Severe  Constipation:                    [x ]Mild [ ]Moderate [ ]Severe    PAINAD Score: 4    [email or web address] (Ctrl +  left click to view)  		  Other Symptoms:  [ ]All other review of systems negative     Karnofsky Performance Score/Palliative Performance Status Version 2:  10%        [email or web address]    PHYSICAL EXAM:  Vital Signs Last 24 Hrs  T(F): --102.2  HR: -- 114  BP: -- 109/68  RR: -- 20  SpO2: --73%    GENERAL:  [ ]Alert  [ ]Oriented x 3  [x ]Lethargic  [ ]Cachexia  [ ]Unarousable  [x ]Verbal  [ ]Non-Verbal [x] ill appearing male/pallor.   Behavioral:   [ ] Anxiety  [ ] Delirium [ ] Agitation [ ] Other  HEENT:  [x ]Normal   [ ]Dry mouth   [ ]ET Tube/Trach  [ ]Oral lesions  PULMONARY:   [x ]Clear [ ]Tachypnea  [ ]Audible excessive secretions   [ ]Rhonchi        [ ]Right [ ]Left [ ]Bilateral  [ ]Crackles        [ ]Right [ ]Left [ ]Bilateral  [ ]Wheezing     [ ]Right [ ]Left [ ]Bilateral  CARDIOVASCULAR:    [x ]Regular [ ]Irregular [x ]Tachy  [ ]Jean [ ]Murmur [ ]Other  GASTROINTESTINAL:  [ ]Soft  [x ]Distended   [x ]+BS  [ ]Non tender [x ]Tender  [ ]PEG [ ]OGT/ NGT   Last BM: 11/26/19    GENITOURINARY:  [ x]Normal [ ] Incontinent   [ ]Oliguria/Anuria   [ ]Heredia  MUSCULOSKELETAL:   [ ]Normal   [x ]Weakness  [ ]Bed/Wheelchair bound [x ]Edema  NEUROLOGIC:   [ ]No focal deficits  [ ] Cognitive impairment  [ ] Dysphagia [ ]Dysarthria [ ] Paresis [x ]Other: Lethargic.   SKIN:   [x ]Normal   [ ]Pressure ulcer(s)  [ ]Rash    CRITICAL CARE:  [ ] Shock Present  [ ]Septic [ ]Cardiogenic [ ]Neurologic [ ]Hypovolemic  [ ]  Vasopressors [ ]  Inotropes   [ ] Respiratory failure present [ ] Mechanical Ventilation [ ] Non-invasive ventilatory support [ ] High-Flow  [ ] Acute  [ ] Chronic [ ] Hypoxic  [ ] Hypercarbic [ ] Other  [ ] Other organ failure     LABS: reviewed.              No new labs.     RADIOLOGY & ADDITIONAL STUDIES: reviewed    PROTEIN CALORIE MALNUTRITION:   [x ] PPSV2 < or = 30% [ ] significant weight loss [x ] poor nutritional intake [ ] anasarca [ ] catabolic state   Albumin, Serum: 2.2 g/dL (11-22-19 @ 06:17)   Artificial Nutrition [ ]     REFERRALS:   [ ]Chaplaincy  [ ] Hospice  [ ]Child Life  [x ]Social Work  [ ]Case management [ ]Holistic Therapy [ ] Physical Therapy [ ] Dietary   Goals of Care Document:   Progress Notes - Care Coordination [C. Provider] (11-20-19 @ 10:25)

## 2019-11-28 NOTE — PROGRESS NOTE ADULT - PROBLEM SELECTOR PLAN 1
- Methadone  12.5 mg IVP q 6 hours   - Dilaudid PCA discontinued as pt is no longer able to press, changed to Dilaudid drip   - Continue with Dilaudid drip @ 6 mg /hr   - Continue with Dilaudid 12 mg IV q/ 1 hr PRN for breakthrough pain  - Pt required PRN Dilaudid X 1 in last 24 hours

## 2019-11-29 NOTE — PROGRESS NOTE ADULT - PROBLEM SELECTOR PLAN 6
Due to significant progression of the disease and likely the patient has entered into the dying process, will discontinue the Fingerstick check. Due to significant progression of the disease and likely the patient has entered into the dying process, will discontinue the Fingerstick check.    No indication for further surveillance of the diabetes.

## 2019-11-29 NOTE — PROGRESS NOTE ADULT - ASSESSMENT
63 m with DM, HTN, traumatic work injury (2001) s/p multilumbar fusion (T8-S1) s/p hardware removal (2016, on daily opiates and methadone), metastatic pancreatic adenocarcinoma s/p biliary stent placement (9/17/19) sent in from [facility] for concern for biliary sepsis on 11/8/19.   Came in with leukocytosis 26k, afebrile, tachycardic to 100  CT C/A/P with possible pneumonia, distended GB with likely GB rupture as well as new moderate loculated ascites.  s/p percutaneous cholecystostomy today with foul smelling fluid 11/12, but had worsening leukocytosis and repeat CT showed peritonitis and loculated ascites s/p tap 11/18 with 1370 WBC, 50% PMN, cx negative,  repeat tap 11/22, , PMN: 9, cx negative    metastatic pancreatic ca with ruptured gall bladder and cholecystitis s/p percutaneous cholecystostomy 11/12  IR biliary cx: klebsiella oxytoca, R to amp/sulbactam  R side pulmonary findings likely due to cholecystitis and contagious inflammation   peritonitis with loculated ascites s/p tap over 4 liter cloudy fluid drained, WBC 1370, PMN 50%, cx negative, repeat tap 11/22, , PMN: 9, cx negative  worsening LFTs and leukocytosis , repeat CT unremarkable, decreased loculated fluid around the gallbladder and cholecystostomy in place     * s/p 9 days of zosyn, started 11/8, and 10 days of meropenem 11/20,   * pt actively dying and on Dilaudid pump, would DC antibiotics  * will sign off, please call with questions

## 2019-11-29 NOTE — PROGRESS NOTE ADULT - PROBLEM SELECTOR PLAN 8
Transferred to PCU for pain management. No further DMT. At this time, focus will be on managing symptoms purely. Pt is DNR and MOLST completed.   Son Ralph and HCP was updated on pts condition, is aware of the decline. Goal continues to focus on comfort care. Emotional support provided. Transferred to PCU for pain management. No further DMT. At this time, focus will be on managing symptoms purely. Pt is DNR and has a MOLST in place.    Son Ralph and HCP was updated on patient's condition, and are aware of the decline. Goal continues to focus on comfort care. Emotional support provided.    Support given to wife at bedside this morning. She said his children were at the bedside until 9 pm yesterday and plan to be back today. Patient is bedbound and needs full assistance with his care. PPS 10 %

## 2019-11-29 NOTE — PROGRESS NOTE ADULT - PROBLEM SELECTOR PLAN 1
- Methadone  12.5 mg IVP q 6 hours (converted from oral regimen)  - Dilaudid PCA discontinued on 11/27/19 as patient was no longer able to self administer. Converted to Dilaudid drip at 6 mg/hour.   - Continue with Dilaudid drip @ 6 mg /hr   - Continue with Dilaudid 12 mg IV q/ 1 hr PRN for breakthrough pain  - No prn use in the past 24 hours, however, patient will receive a dose after assessment this morning.

## 2019-11-29 NOTE — PROGRESS NOTE ADULT - SUBJECTIVE AND OBJECTIVE BOX
Follow Up: ruptured cholecystitis, peritonitis    Interval History: pt on dilaudid PCA and actively dying    ROS:    Unobtainable because: on dilaudid pump and sedated        Allergies  No Known Allergies        ANTIMICROBIALS:      OTHER MEDS:  acetaminophen  Suppository .. 650 milliGRAM(s) Rectal every 6 hours PRN  bisacodyl Suppository 10 milliGRAM(s) Rectal daily PRN  chlorhexidine 4% Liquid 1 Application(s) Topical daily  glycopyrrolate Injectable 0.4 milliGRAM(s) IV Push every 6 hours  HYDROmorphone  Injectable 12 milliGRAM(s) IV Push every 1 hour PRN  HYDROmorphone  Injectable 12 milliGRAM(s) IV Push every 1 hour PRN  HYDROmorphone Infusion 6 mG/Hr IV Continuous <Continuous>  LORazepam   Injectable 1 milliGRAM(s) IV Push every 1 hour PRN  LORazepam Infusion 0.005 mG/kG/Hr IV Continuous <Continuous>  methadone Injectable 12.5 milliGRAM(s) IV Push every 6 hours  scopolamine   Patch 1 Patch Transdermal every 72 hours  sodium chloride 0.9%. 1000 milliLiter(s) IV Continuous <Continuous>      Vital Signs Last 24 Hrs  T(C): 36.9 (29 Nov 2019 07:39), Max: 36.9 (29 Nov 2019 07:39)  T(F): 98.4 (29 Nov 2019 07:39), Max: 98.4 (29 Nov 2019 07:39)  HR: 96 (29 Nov 2019 07:39) (96 - 96)  BP: 105/62 (29 Nov 2019 07:39) (105/62 - 105/62)  BP(mean): --  RR: 20 (29 Nov 2019 07:39) (20 - 20)  SpO2: 67% (29 Nov 2019 07:39) (67% - 67%)    Physical Exam:  General:   pt comfortable and sleeping  Head: atraumatic, normocephalic  Eye: pale sclera and conjunctiva  ENT:    no oropharyngeal lesions,   no LAD,   neck supple  Cardio:     regular S1, S2,  no murmur  Respiratory:    clear b/l,    no wheezing  abd:    distended with palpable liver and mass, diffuse tenderness more on RUQ, perc daysi with purulent drainage  :   no CVAT,  no suprapubic tenderness,   no  saavedra  Musculoskeletal:   no joint swelling  vascular: no phlebitis  Skin:    no rash                  MICROBIOLOGY:  v  .Body Fluid Peritoneal Fluid  11-22-19   No growth at 5 days  --    Rare polymorphonuclear leukocytes  No organisms seen  by cytocentrifuge      .Blood Blood-Peripheral  11-20-19   No growth at 5 days.  --  --      Peritoneal Peritoneal Fluid  11-18-19   No growth at 5 days  --    polymorphonuclear leukocytes seen  No organisms seen  by cytocentrifuge      .Body Fluid Bile Fluid  11-12-19   Numerous Klebsiella oxytoca  --  Klebsiella oxytoca      .Blood Blood-Peripheral  11-09-19   No growth at 5 days.  --  --      .Urine Clean Catch (Midstream)  11-09-19   <10,000 CFU/mL Normal Urogenital Veronika  --  --                RADIOLOGY:  Images below reviewed personally  < from: CT Abdomen and Pelvis w/ IV Cont (11.21.19 @ 14:41) >  Loculated fluid around the gallbladder has decreased. Stable liver   lesions.    Minimal decrease in size of air and sludge filled gallbladder with tip of   the percutaneous cholecystostomy tube in place.    Pancreatic lesions are unchanged.    Moderate ascites unchanged.

## 2019-11-29 NOTE — PROGRESS NOTE ADULT - ASSESSMENT
64 y/o PMH of traumatic work injury (2001) s/p multilumbar fusion (T8-S1) s/p hardware removal (2016, on daily opiates and methadone), chronic constipation, GERD, HTN, depression, anxiety, metastatic pancreatic adenocarcinoma s/p biliary stent placement (2m ago) and chemo sent in from [facility] for concern for biliary sepsis on 11/8.During this admission, CT showed gallbladder rupture, stable metastatic disease (to liver), and tumor trombosis. Palliative care was called pain Rx.       Appears to be entering the active dying process. Minimally verbal and unable to take anything by mouth. No use of prn medication in the past 24 hours, however, appears to be in discomfort on AM assessment and will be given prn doses of Ativan and Dilaudid.

## 2019-11-29 NOTE — PROGRESS NOTE ADULT - PROBLEM SELECTOR PLAN 2
Patient not a candidate for DMT at this time. Likely reaching the end of his disease course. Family and patient would like to focus on symptom management at this time.    Appears to be entering the dying process.

## 2019-11-29 NOTE — PROGRESS NOTE ADULT - PROBLEM SELECTOR PLAN 3
2/2 to ruptured gallbladder. S/p drain placement in IR. Not a surgical candidate at this time. Patient has been responding to Meropenem minimally but is aiding in symptom control.  C/w with Meropenem at this time. Patient and family were made  aware that infection is not able to get cleared. 2/2 to ruptured gallbladder. S/p drain placement in IR. Not a surgical candidate at this time.    ID will sign off at this time and Meropenem discontinued as patient appears to be in the dying process.

## 2019-11-29 NOTE — PROGRESS NOTE ADULT - PROBLEM SELECTOR PLAN 7
Patient had a bowel movement 11/26 Patient had a bowel movement 11/26. Dulcolax suppository ordered prn.

## 2019-11-29 NOTE — PROGRESS NOTE ADULT - SUBJECTIVE AND OBJECTIVE BOX
GAP TEAM PALLIATIVE CARE UNIT PROGRESS NOTE:      [  ] Patient on hospice program.    INDICATION FOR PALLIATIVE CARE UNIT SERVICES: Management of severe pain in the setting of Stage IV pancreatic cancer and peritonitis s/p ruptured gallbladder.    INTERVAL HPI/OVERNIGHT EVENTS: Patient with severe pallor and jaundiced and appear to be uncomfortable on assessment. Remains with some facial grimacing and moaning. No prn use of medication in the past 24 hours. Patient to be given prn doses and will re-evaluate. Remains on Dilaudid drip at 6 mg/hour, Ativan drip at 0.2 mg/hour, Robinul ATC and Scopolamine patch. Appears to be entering the active dying process.      DNR on chart: yes  Allergies    No Known Allergies    Intolerances    MEDICATIONS  (STANDING):  chlorhexidine 4% Liquid 1 Application(s) Topical daily  glycopyrrolate Injectable 0.4 milliGRAM(s) IV Push every 6 hours  HYDROmorphone Infusion 6 mG/Hr (6 mL/Hr) IV Continuous <Continuous>  LORazepam Infusion 0.002 mG/kG/Hr (0.2 mL/Hr) IV Continuous <Continuous>  meropenem  IVPB 1000 milliGRAM(s) IV Intermittent every 8 hours  methadone Injectable 12.5 milliGRAM(s) IV Push every 6 hours  scopolamine   Patch 1 Patch Transdermal every 72 hours  sodium chloride 0.9%. 1000 milliLiter(s) (20 mL/Hr) IV Continuous <Continuous>    MEDICATIONS  (PRN):  acetaminophen  Suppository .. 650 milliGRAM(s) Rectal every 6 hours PRN Temp greater or equal to 38C (100.4F), Mild Pain (1 - 3)  bisacodyl Suppository 10 milliGRAM(s) Rectal daily PRN Constipation  HYDROmorphone  Injectable 12 milliGRAM(s) IV Push every 1 hour PRN pain  HYDROmorphone  Injectable 12 milliGRAM(s) IV Push every 1 hour PRN dyspnea  LORazepam   Injectable 1 milliGRAM(s) IV Push every 1 hour PRN Agitation    ITEMS UNCHECKED ARE NOT PRESENT    PRESENT SYMPTOMS: [ x]Unable to obtain due to poor mentation   Source if other than patient:  [x ]Family   [x ]Team     Pain: [ ] yes [ ] no  QOL impact -   Location -                   Aggravating factors -  Quality -  Radiation -  Timing-  Severity (0-10 scale):   Minimal acceptable level (0-10 scale):     Dyspnea:                           [ ]Mild [ ]Moderate [ ]Severe  Anxiety:                             [x ]Mild [ ]Moderate [ ]Severe  Fatigue:                             [ ]Mild [x ]Moderate [ ]Severe  Nausea:                             [ ]Mild [ ]Moderate [ ]Severe  Loss of appetite:              [ ]Mild [ ]Moderate [x ]Severe  Constipation:                    [x ]Mild [ ]Moderate [ ]Severe    PAINAD Score: 6    [email or web address] (Ctrl +  left click to view)  		  Other Symptoms:  [ ]All other review of systems negative     Karnofsky Performance Score/Palliative Performance Status Version 2:  10%        http://[email or web address]/files/news/palliative_performance_scale_[email or web address].pdf    PHYSICAL EXAM:  Vital Signs Last 24 Hrs  T(C): 36.9 (29 Nov 2019 07:39), Max: 36.9 (29 Nov 2019 07:39)  T(F): 98.4 (29 Nov 2019 07:39), Max: 98.4 (29 Nov 2019 07:39)  HR: 96 (29 Nov 2019 07:39) (96 - 96)  BP: 105/62 (29 Nov 2019 07:39) (105/62 - 105/62)  BP(mean): --  RR: 20 (29 Nov 2019 07:39) (20 - 20)  SpO2: 67% (29 Nov 2019 07:39) (67% - 67%)    GENERAL:  [ ]Alert  [ ]Oriented x 3  [x ]Lethargic  [ ]Cachexia  [ ]Unarousable  [x ]Verbal-minimally  [ ]Non-Verbal [x] ill appearing male/pallor and jaundiced  Behavioral:   [ ] Anxiety  [ ] Delirium [ ] Agitation [ ] Other  HEENT:  [x ]Normal   [x ]Dry mouth   [ ]ET Tube/Trach  [ ]Oral lesions  PULMONARY:   [x ]Clear [ ]Tachypnea  [ ]Audible excessive secretions   [ ]Rhonchi        [ ]Right [ ]Left [ ]Bilateral  [ ]Crackles        [ ]Right [ ]Left [ ]Bilateral  [ ]Wheezing     [ ]Right [ ]Left [ ]Bilateral  CARDIOVASCULAR:    [x ]Regular [ ]Irregular [x ]Tachy  [ ]Jean [ ]Murmur [ ]Other  GASTROINTESTINAL:  [ ]Soft  [x ]Distended   [x ]+BS  [ ]Non tender [x ]Tender  [ ]PEG [ ]OGT/ NGT   Last BM: 11/26/19    GENITOURINARY:  [ x]Normal [ ] Incontinent   [ ]Oliguria/Anuria   [ ]Heredia  MUSCULOSKELETAL:   [ ]Normal   [x ]Weakness  [ x]Bed/Wheelchair bound [x ]Edema  NEUROLOGIC:   [ ]No focal deficits  [ ] Cognitive impairment  [ ] Dysphagia [ ]Dysarthria [ ] Paresis [x ]Other: Lethargic.   SKIN:   [x ]Normal   [ ]Pressure ulcer(s)  [ ]Rash    CRITICAL CARE:  [ ] Shock Present  [ ]Septic [ ]Cardiogenic [ ]Neurologic [ ]Hypovolemic  [ ]  Vasopressors [ ]  Inotropes   [ ] Respiratory failure present [ ] Mechanical Ventilation [ ] Non-invasive ventilatory support [ ] High-Flow  [ ] Acute  [ ] Chronic [ ] Hypoxic  [ ] Hypercarbic [ ] Other  [ ] Other organ failure     LABS: reviewed.              No new labs.     RADIOLOGY & ADDITIONAL STUDIES: reviewed    PROTEIN CALORIE MALNUTRITION:   [x ] PPSV2 < or = 30% [ ] significant weight loss [x ] poor nutritional intake [ ] anasarca [ ] catabolic state   Albumin, Serum: 2.2 g/dL (11-22-19 @ 06:17)   Artificial Nutrition [ ]     REFERRALS:   [ ]Chaplaincy  [ ] Hospice  [ ]Child Life  [x ]Social Work  [ ]Case management [ ]Holistic Therapy [ ] Physical Therapy [ ] Dietary   Goals of Care Document:   Progress Notes - Care Coordination [C. Provider] (11-20-19 @ 10:25) GAP TEAM PALLIATIVE CARE UNIT PROGRESS NOTE:      [  ] Patient on hospice program.    INDICATION FOR PALLIATIVE CARE UNIT SERVICES: Management of severe pain in the setting of Stage IV pancreatic cancer and peritonitis s/p ruptured gallbladder.    INTERVAL HPI/OVERNIGHT EVENTS: Patient with severe pallor and jaundiced and appear to be uncomfortable on assessment. Remains with some facial grimacing and moaning. No prn use of medication in the past 24 hours. Patient to be given prn doses and will re-evaluate. Remains on Dilaudid drip at 6 mg/hour, Ativan drip at 0.2 mg/hour, Robinul ATC and Scopolamine patch. Appears to be entering the active dying process.      DNR on chart: yes  Allergies    No Known Allergies    Intolerances    MEDICATIONS  (STANDING):  chlorhexidine 4% Liquid 1 Application(s) Topical daily  glycopyrrolate Injectable 0.4 milliGRAM(s) IV Push every 6 hours  HYDROmorphone Infusion 6 mG/Hr (6 mL/Hr) IV Continuous <Continuous>  LORazepam Infusion 0.002 mG/kG/Hr (0.2 mL/Hr) IV Continuous <Continuous>  methadone Injectable 12.5 milliGRAM(s) IV Push every 6 hours  scopolamine   Patch 1 Patch Transdermal every 72 hours  sodium chloride 0.9%. 1000 milliLiter(s) (20 mL/Hr) IV Continuous <Continuous>    MEDICATIONS  (PRN):  acetaminophen  Suppository .. 650 milliGRAM(s) Rectal every 6 hours PRN Temp greater or equal to 38C (100.4F), Mild Pain (1 - 3)  bisacodyl Suppository 10 milliGRAM(s) Rectal daily PRN Constipation  HYDROmorphone  Injectable 12 milliGRAM(s) IV Push every 1 hour PRN pain  HYDROmorphone  Injectable 12 milliGRAM(s) IV Push every 1 hour PRN dyspnea  LORazepam   Injectable 1 milliGRAM(s) IV Push every 1 hour PRN Agitation    ITEMS UNCHECKED ARE NOT PRESENT    PRESENT SYMPTOMS: [ x]Unable to obtain due to poor mentation   Source if other than patient:  [x ]Family   [x ]Team     Pain: [ ] yes [ ] no  QOL impact -   Location -                   Aggravating factors -  Quality -  Radiation -  Timing-  Severity (0-10 scale):   Minimal acceptable level (0-10 scale):     Dyspnea:                           [ ]Mild [ ]Moderate [ ]Severe  Anxiety:                             [x ]Mild [ ]Moderate [ ]Severe  Fatigue:                             [ ]Mild [x ]Moderate [ ]Severe  Nausea:                             [ ]Mild [ ]Moderate [ ]Severe  Loss of appetite:              [ ]Mild [ ]Moderate [x ]Severe  Constipation:                    [x ]Mild [ ]Moderate [ ]Severe    PAINAD Score: 6    [email or web address].[email or web address]/cog/painad.pdf (Ctrl +  left click to view)  		  Other Symptoms:  [ ]All other review of systems negative     Karnofsky Performance Score/Palliative Performance Status Version 2:  10%        http://npcrc.org/files/news/palliative_performance_scale_ppsv2.pdf    PHYSICAL EXAM:  Vital Signs Last 24 Hrs  T(C): 36.9 (29 Nov 2019 07:39), Max: 36.9 (29 Nov 2019 07:39)  T(F): 98.4 (29 Nov 2019 07:39), Max: 98.4 (29 Nov 2019 07:39)  HR: 96 (29 Nov 2019 07:39) (96 - 96)  BP: 105/62 (29 Nov 2019 07:39) (105/62 - 105/62)  BP(mean): --  RR: 20 (29 Nov 2019 07:39) (20 - 20)  SpO2: 67% (29 Nov 2019 07:39) (67% - 67%)    GENERAL:  [ ]Alert  [ ]Oriented x 3  [x ]Lethargic  [ ]Cachexia  [ ]Unarousable  [x ]Verbal-minimally  [ ]Non-Verbal [x] ill appearing male/pallor and jaundiced  Behavioral:   [ ] Anxiety  [ ] Delirium [ ] Agitation [ ] Other  HEENT:  [x ]Normal   [x ]Dry mouth   [ ]ET Tube/Trach  [ ]Oral lesions  PULMONARY:   [x ]Clear [ ]Tachypnea  [ ]Audible excessive secretions   [ ]Rhonchi        [ ]Right [ ]Left [ ]Bilateral  [ ]Crackles        [ ]Right [ ]Left [ ]Bilateral  [ ]Wheezing     [ ]Right [ ]Left [ ]Bilateral  CARDIOVASCULAR:    [x ]Regular [ ]Irregular [x ]Tachy  [ ]Jean [ ]Murmur [ ]Other  GASTROINTESTINAL:  [ ]Soft  [x ]Distended   [x ]+BS  [ ]Non tender [x ]Tender  [ ]PEG [ ]OGT/ NGT   Last BM: 11/26/19    GENITOURINARY:  [ x]Normal [ ] Incontinent   [ ]Oliguria/Anuria   [ ]Heredia  MUSCULOSKELETAL:   [ ]Normal   [x ]Weakness  [ x]Bed/Wheelchair bound [x ]Edema  NEUROLOGIC:   [ ]No focal deficits  [ ] Cognitive impairment  [ ] Dysphagia [ ]Dysarthria [ ] Paresis [x ]Other: Lethargic.   SKIN:   [x ]Normal   [ ]Pressure ulcer(s)  [ ]Rash    CRITICAL CARE:  [ ] Shock Present  [ ]Septic [ ]Cardiogenic [ ]Neurologic [ ]Hypovolemic  [ ]  Vasopressors [ ]  Inotropes   [ ] Respiratory failure present [ ] Mechanical Ventilation [ ] Non-invasive ventilatory support [ ] High-Flow  [ ] Acute  [ ] Chronic [ ] Hypoxic  [ ] Hypercarbic [ ] Other  [ ] Other organ failure     LABS: reviewed.              No new labs.     RADIOLOGY & ADDITIONAL STUDIES: reviewed    PROTEIN CALORIE MALNUTRITION:   [x ] PPSV2 < or = 30% [ ] significant weight loss [x ] poor nutritional intake [ ] anasarca [ ] catabolic state   Albumin, Serum: 2.2 g/dL (11-22-19 @ 06:17)   Artificial Nutrition [ ]     REFERRALS:   [ ]Chaplaincy  [ ] Hospice  [ ]Child Life  [x ]Social Work  [ ]Case management [ ]Holistic Therapy [ ] Physical Therapy [ ] Dietary   Goals of Care Document:   Progress Notes - Care Coordination [C. Provider] (11-20-19 @ 10:25) GAP TEAM PALLIATIVE CARE UNIT PROGRESS NOTE:      [  ] Patient on hospice program.    INDICATION FOR PALLIATIVE CARE UNIT SERVICES: Management of severe pain in the setting of Stage IV pancreatic cancer and peritonitis s/p ruptured gallbladder.    INTERVAL HPI/OVERNIGHT EVENTS: Patient with severe pallor and jaundiced and appear to be uncomfortable on assessment. Remains with some facial grimacing and moaning. No prn use of medication in the past 24 hours. Patient to be given prn doses and will re-evaluate. Remains on Dilaudid drip at 6 mg/hour, Robinul ATC and Scopolamine patch. Ativan drip increased to 0.4 mg/hour. Appears to be entering the active dying process.      DNR on chart: yes  Allergies    No Known Allergies    Intolerances    MEDICATIONS  (STANDING):  chlorhexidine 4% Liquid 1 Application(s) Topical daily  glycopyrrolate Injectable 0.4 milliGRAM(s) IV Push every 6 hours  HYDROmorphone Infusion 6 mG/Hr (6 mL/Hr) IV Continuous <Continuous>  LORazepam Infusion 0.005 mG/kG/Hr (0.4 mL/Hr) IV Continuous <Continuous>  methadone Injectable 12.5 milliGRAM(s) IV Push every 6 hours  scopolamine   Patch 1 Patch Transdermal every 72 hours  sodium chloride 0.9%. 1000 milliLiter(s) (20 mL/Hr) IV Continuous <Continuous>    MEDICATIONS  (PRN):  acetaminophen  Suppository .. 650 milliGRAM(s) Rectal every 6 hours PRN Temp greater or equal to 38C (100.4F), Mild Pain (1 - 3)  bisacodyl Suppository 10 milliGRAM(s) Rectal daily PRN Constipation  HYDROmorphone  Injectable 12 milliGRAM(s) IV Push every 1 hour PRN pain  HYDROmorphone  Injectable 12 milliGRAM(s) IV Push every 1 hour PRN dyspnea  LORazepam   Injectable 1 milliGRAM(s) IV Push every 1 hour PRN Agitation    ITEMS UNCHECKED ARE NOT PRESENT    PRESENT SYMPTOMS: [ x]Unable to obtain due to poor mentation   Source if other than patient:  [x ]Family   [x ]Team     Pain: [ ] yes [ ] no  QOL impact -   Location -                   Aggravating factors -  Quality -  Radiation -  Timing-  Severity (0-10 scale):   Minimal acceptable level (0-10 scale):     Dyspnea:                           [ ]Mild [ ]Moderate [ ]Severe  Anxiety:                             [x ]Mild [ ]Moderate [ ]Severe  Fatigue:                             [ ]Mild [x ]Moderate [ ]Severe  Nausea:                             [ ]Mild [ ]Moderate [ ]Severe  Loss of appetite:              [ ]Mild [ ]Moderate [x ]Severe  Constipation:                    [x ]Mild [ ]Moderate [ ]Severe    PAINAD Score: 6    http://geriatrictoolkit.[email or web address]/cog/painad.pdf (Ctrl +  left click to view)  		  Other Symptoms:  [ ]All other review of systems negative     Karnofsky Performance Score/Palliative Performance Status Version 2:  10%        http://npcrc.org/files/news/palliative_performance_scale_ppsv2.pdf    PHYSICAL EXAM:  Vital Signs Last 24 Hrs  T(C): 36.9 (29 Nov 2019 07:39), Max: 36.9 (29 Nov 2019 07:39)  T(F): 98.4 (29 Nov 2019 07:39), Max: 98.4 (29 Nov 2019 07:39)  HR: 96 (29 Nov 2019 07:39) (96 - 96)  BP: 105/62 (29 Nov 2019 07:39) (105/62 - 105/62)  BP(mean): --  RR: 20 (29 Nov 2019 07:39) (20 - 20)  SpO2: 67% (29 Nov 2019 07:39) (67% - 67%)    GENERAL:  [ ]Alert  [ ]Oriented x 3  [x ]Lethargic  [ ]Cachexia  [ ]Unarousable  [x ]Verbal-minimally  [ ]Non-Verbal [x] ill appearing male/pallor and jaundiced  Behavioral:   [ ] Anxiety  [ ] Delirium [ ] Agitation [ ] Other  HEENT:  [x ]Normal   [x ]Dry mouth   [ ]ET Tube/Trach  [ ]Oral lesions  PULMONARY:   [x ]Clear [ ]Tachypnea  [ ]Audible excessive secretions   [ ]Rhonchi        [ ]Right [ ]Left [ ]Bilateral  [ ]Crackles        [ ]Right [ ]Left [ ]Bilateral  [ ]Wheezing     [ ]Right [ ]Left [ ]Bilateral  CARDIOVASCULAR:    [x ]Regular [ ]Irregular [x ]Tachy  [ ]Jean [ ]Murmur [ ]Other  GASTROINTESTINAL:  [ ]Soft  [x ]Distended   [x ]+BS  [ ]Non tender [x ]Tender  [ ]PEG [ ]OGT/ NGT   Last BM: 11/26/19    GENITOURINARY:  [ x]Normal [ ] Incontinent   [ ]Oliguria/Anuria   [ ]Heredia  MUSCULOSKELETAL:   [ ]Normal   [x ]Weakness  [ x]Bed/Wheelchair bound [x ]Edema  NEUROLOGIC:   [ ]No focal deficits  [ ] Cognitive impairment  [ ] Dysphagia [ ]Dysarthria [ ] Paresis [x ]Other: Lethargic.   SKIN:   [x ]Normal   [ ]Pressure ulcer(s)  [ ]Rash    CRITICAL CARE:  [ ] Shock Present  [ ]Septic [ ]Cardiogenic [ ]Neurologic [ ]Hypovolemic  [ ]  Vasopressors [ ]  Inotropes   [ ] Respiratory failure present [ ] Mechanical Ventilation [ ] Non-invasive ventilatory support [ ] High-Flow  [ ] Acute  [ ] Chronic [ ] Hypoxic  [ ] Hypercarbic [ ] Other  [ ] Other organ failure     LABS: reviewed.              No new labs.     RADIOLOGY & ADDITIONAL STUDIES: reviewed    PROTEIN CALORIE MALNUTRITION:   [x ] PPSV2 < or = 30% [ ] significant weight loss [x ] poor nutritional intake [ ] anasarca [ ] catabolic state   Albumin, Serum: 2.2 g/dL (11-22-19 @ 06:17)   Artificial Nutrition [ ]     REFERRALS:   [ ]Chaplaincy  [ ] Hospice  [ ]Child Life  [x ]Social Work  [ ]Case management [ ]Holistic Therapy [ ] Physical Therapy [ ] Dietary   Goals of Care Document:   Progress Notes - Care Coordination [C. Provider] (11-20-19 @ 10:25) GAP TEAM PALLIATIVE CARE UNIT PROGRESS NOTE:      [  ] Patient on hospice program.    INDICATION FOR PALLIATIVE CARE UNIT SERVICES: Management of severe pain in the setting of Stage IV pancreatic cancer and peritonitis s/p ruptured gallbladder.    INTERVAL HPI/OVERNIGHT EVENTS: Patient with severe pallor and jaundiced and appear to be uncomfortable on assessment. Remains with some facial grimacing and moaning. No prn use of medication in the past 24 hours. Patient to be given prn doses and will re-evaluate. Remains on Dilaudid drip at 6 mg/hour, Robinul ATC and Scopolamine patch. Ativan drip increased to 0.4 mg/hour. Appears to be entering the active dying process. Wife at bedside and aware of the patient's condition.     DNR on chart: yes  Allergies    No Known Allergies    Intolerances    MEDICATIONS  (STANDING):  chlorhexidine 4% Liquid 1 Application(s) Topical daily  glycopyrrolate Injectable 0.4 milliGRAM(s) IV Push every 6 hours  HYDROmorphone Infusion 6 mG/Hr (6 mL/Hr) IV Continuous <Continuous>  LORazepam Infusion 0.005 mG/kG/Hr (0.4 mL/Hr) IV Continuous <Continuous>  methadone Injectable 12.5 milliGRAM(s) IV Push every 6 hours  scopolamine   Patch 1 Patch Transdermal every 72 hours  sodium chloride 0.9%. 1000 milliLiter(s) (20 mL/Hr) IV Continuous <Continuous>    MEDICATIONS  (PRN):  acetaminophen  Suppository .. 650 milliGRAM(s) Rectal every 6 hours PRN Temp greater or equal to 38C (100.4F), Mild Pain (1 - 3)  bisacodyl Suppository 10 milliGRAM(s) Rectal daily PRN Constipation  HYDROmorphone  Injectable 12 milliGRAM(s) IV Push every 1 hour PRN pain  HYDROmorphone  Injectable 12 milliGRAM(s) IV Push every 1 hour PRN dyspnea  LORazepam   Injectable 1 milliGRAM(s) IV Push every 1 hour PRN Agitation    ITEMS UNCHECKED ARE NOT PRESENT    PRESENT SYMPTOMS: [ x]Unable to obtain due to poor mentation   Source if other than patient:  [x ]Family   [x ]Team     Pain: [x ] yes [ ] no  QOL impact -   Location -                   Aggravating factors -  Quality -  Radiation -  Timing-  Severity (0-10 scale):   Minimal acceptable level (0-10 scale):     Dyspnea:                           [ ]Mild [ ]Moderate [ ]Severe  Anxiety:                             [x ]Mild [ ]Moderate [ ]Severe  Fatigue:                             [ ]Mild [x ]Moderate [ ]Severe  Nausea:                             [ ]Mild [ ]Moderate [ ]Severe  Loss of appetite:              [ ]Mild [ ]Moderate [x ]Severe  Constipation:                    [x ]Mild [ ]Moderate [ ]Severe    PAINAD Score: 6    [email or web address]/painad.[email or web address] (Ctrl +  left click to view)  		  Other Symptoms:  [ ]All other review of systems negative     Karnofsky Performance Score/Palliative Performance Status Version 2:  10%        http://[email or web address].org/files/news/palliative_performance_scale_[email or web address].pdf    PHYSICAL EXAM:  Vital Signs Last 24 Hrs  T(C): 36.9 (29 Nov 2019 07:39), Max: 36.9 (29 Nov 2019 07:39)  T(F): 98.4 (29 Nov 2019 07:39), Max: 98.4 (29 Nov 2019 07:39)  HR: 96 (29 Nov 2019 07:39) (96 - 96)  BP: 105/62 (29 Nov 2019 07:39) (105/62 - 105/62)  BP(mean): --  RR: 20 (29 Nov 2019 07:39) (20 - 20)  SpO2: 67% (29 Nov 2019 07:39) (67% - 67%)    GENERAL:  [ ]Alert  [ ]Oriented x 3  [x ]Lethargic  [ ]Cachexia  [ ]Unarousable  [x ]Verbal-minimally  [ ]Non-Verbal [x] ill appearing male/pallor and jaundiced  Behavioral:   [ ] Anxiety  [ ] Delirium [ ] Agitation [ ] Other  HEENT:  [x ]Normal   [x ]Dry mouth   [ ]ET Tube/Trach  [ ]Oral lesions  PULMONARY:   [x ]Clear [ ]Tachypnea  [ ]Audible excessive secretions   [ ]Rhonchi        [ ]Right [ ]Left [ ]Bilateral  [ ]Crackles        [ ]Right [ ]Left [ ]Bilateral  [ ]Wheezing     [ ]Right [ ]Left [ ]Bilateral  CARDIOVASCULAR:    [x ]Regular [ ]Irregular [x ]Tachy  [ ]Jean [ ]Murmur [ ]Other  GASTROINTESTINAL:  [ ]Soft  [x ]Distended   [x ]+BS  [ ]Non tender [x ]Tender  [ ]PEG [ ]OGT/ NGT   Last BM: 11/26/19    GENITOURINARY:  [ x]Normal [ ] Incontinent   [ ]Oliguria/Anuria   [ ]Heredia  MUSCULOSKELETAL:   [ ]Normal   [x ]Weakness  [ x]Bed/Wheelchair bound [x ]Edema  NEUROLOGIC:   [ ]No focal deficits  [ ] Cognitive impairment  [ ] Dysphagia [ ]Dysarthria [ ] Paresis [x ]Other: Lethargic.   SKIN:   [ ]Normal   [ ]Pressure ulcer(s)  [ ]Rash [x] Jaundice     CRITICAL CARE:  [ ] Shock Present  [ ]Septic [ ]Cardiogenic [ ]Neurologic [ ]Hypovolemic  [ ]  Vasopressors [ ]  Inotropes   [ ] Respiratory failure present [ ] Mechanical Ventilation [ ] Non-invasive ventilatory support [ ] High-Flow  [ ] Acute  [ ] Chronic [ ] Hypoxic  [ ] Hypercarbic [ ] Other  [ ] Other organ failure     LABS: reviewed.              No new labs.     RADIOLOGY & ADDITIONAL STUDIES: reviewed    PROTEIN CALORIE MALNUTRITION:   [x ] PPSV2 < or = 30% [ ] significant weight loss [x ] poor nutritional intake [ ] anasarca [ ] catabolic state   Albumin, Serum: 2.2 g/dL (11-22-19 @ 06:17)   Artificial Nutrition [ ]     REFERRALS:   [ ]Chaplaincy  [ ] Hospice  [ ]Child Life  [x ]Social Work  [ ]Case management [ ]Holistic Therapy [ ] Physical Therapy [ ] Dietary   Goals of Care Document:   Progress Notes - Care Coordination [C. Provider] (11-20-19 @ 10:25)

## 2019-11-29 NOTE — PROGRESS NOTE ADULT - PROBLEM SELECTOR PLAN 4
Patient unable to take anything by mouth and Cymbalta discontinued. Ativan drip started at 0.2 mg/hour Patient unable to take anything by mouth and Cymbalta discontinued. Ativan drip started at 0.2 mg/hour yesterday. Ativan 1 mg IV q 1 prn for breakthrough anxiety/agitation. No prn use of Ativan in the past 24 hours. Patient unable to take anything by mouth and Cymbalta discontinued. Ativan drip started at 0.2 mg/hour 2 days ago and increased to 0.4 mg this morning. Ativan 1 mg IV q 1 prn for breakthrough anxiety/agitation. No prn use of Ativan in the past 24 hours.

## 2019-11-29 NOTE — PROGRESS NOTE ADULT - PROBLEM SELECTOR PLAN 5
-Abdominal distension. s/p paracentesis last Friday with minimal relief.  - Patient s/p bedside sonogram on 11/26 that displayed moderate volume ascites. - At this time, it likely is not beneficial to continue with multiple taps as fluid rapidly reaccumulates and he is not a candidate for a pleurx.   - C/w managing symptoms and ongoing goals of care discussions with patient and family. -Abdominal distension. s/p paracentesis last Friday with minimal relief.  - Patient s/p bedside sonogram on 11/26 that displayed moderate volume ascites. - At this time, it likely is not beneficial to continue with multiple taps as fluid rapidly reaccumulates and he is not a candidate for a pleur-x.   - C/w medical management of symptoms.

## 2019-11-30 NOTE — PROGRESS NOTE ADULT - PROBLEM SELECTOR PLAN 3
2/2 to ruptured gallbladder. S/p drain placement in IR. Not a surgical candidate at this time.    ID will signed off at this time and Meropenem discontinued as patient appears to be in the dying process.

## 2019-11-30 NOTE — PROGRESS NOTE ADULT - PROBLEM SELECTOR PLAN 4
Ativan drip 0.4 mg/hr  Ativan 1 mg IV q 1 prn for breakthrough anxiety/agitation.   Prn use of Ativan in the past 24 hours X 2.

## 2019-11-30 NOTE — PROGRESS NOTE ADULT - PROBLEM SELECTOR PLAN 5
-Abdominal distension.  - Patient s/p bedside sonogram on 11/26 that displayed moderate volume ascites. - At this time, it likely is not beneficial to continue with multiple taps as fluid rapidly reaccumulates and he is not a candidate for a pleur-x.   - C/w medical management of symptoms.

## 2019-11-30 NOTE — PROGRESS NOTE ADULT - PROBLEM SELECTOR PLAN 2
Patient not a candidate for DMT at this time. Likely reaching the end of his disease course. Family and patient would like to focus on symptom management at this time.  Appears to have entered the dying process.

## 2019-11-30 NOTE — PROGRESS NOTE ADULT - PROBLEM SELECTOR PLAN 1
c/w  Methadone  12.5 mg IVP q 6 hours (converted from oral regimen)  c/w Dilaudid drip @ 6 mg /hr   c/w Dilaudid 12 mg IV q/ 1 hr PRN pain  Prn use of Dialudid in the past 24 hours X 1. c/w  Methadone  12.5 mg IVP q 6 hours (converted from oral regimen)  c/w Dilaudid drip @ 6 mg /hr   c/w Dilaudid 12 mg IV q/ 1 hr PRN pain  Prn use of Dilaudid in the past 24 hours X 1.

## 2019-11-30 NOTE — PROGRESS NOTE ADULT - PROBLEM SELECTOR PLAN 6
Due to significant progression of the disease and likely the patient has entered into the dying process, Fingerstick check d/amaya.    No indication for further surveillance of the diabetes.

## 2019-11-30 NOTE — PROGRESS NOTE ADULT - SUBJECTIVE AND OBJECTIVE BOX
GAP TEAM PALLIATIVE CARE UNIT PROGRESS NOTE:      [  ] Patient on hospice program.    INDICATION FOR PALLIATIVE CARE UNIT SERVICES: Management of severe pain in the setting of Stage IV pancreatic cancer and peritonitis s/p ruptured gallbladder.    INTERVAL HPI/OVERNIGHT EVENTS: Patient with severe pallor and jaundiced and appeared to be have slight laboured breathing today morning. Remains with some facial grimacing and moaning. In the past 24 hours pt received ativan X 2, Dilaudid X1 and Tylenol X 1. Remains on Dilaudid drip at 6 mg/hour, ativan drip 0.4mg/hr, Robinul ATC and Scopolamine patch. Appears to be entering the active dying process. Wife at bedside and aware of the patient's condition.     DNR on chart: yes  Allergies    No Known Allergies    Intolerances    MEDICATIONS  (STANDING):  chlorhexidine 4% Liquid 1 Application(s) Topical daily  glycopyrrolate Injectable 0.4 milliGRAM(s) IV Push every 6 hours  HYDROmorphone Infusion 6 mG/Hr (6 mL/Hr) IV Continuous <Continuous>  LORazepam Infusion 0.005 mG/kG/Hr (0.4 mL/Hr) IV Continuous <Continuous>  methadone Injectable 12.5 milliGRAM(s) IV Push every 6 hours  scopolamine   Patch 1 Patch Transdermal every 72 hours  sodium chloride 0.9%. 1000 milliLiter(s) (20 mL/Hr) IV Continuous <Continuous>    MEDICATIONS  (PRN):  acetaminophen  Suppository .. 650 milliGRAM(s) Rectal every 6 hours PRN Temp greater or equal to 38C (100.4F), Mild Pain (1 - 3)  bisacodyl Suppository 10 milliGRAM(s) Rectal daily PRN Constipation  HYDROmorphone  Injectable 12 milliGRAM(s) IV Push every 1 hour PRN pain  HYDROmorphone  Injectable 12 milliGRAM(s) IV Push every 1 hour PRN dyspnea  LORazepam   Injectable 1 milliGRAM(s) IV Push every 1 hour PRN Agitation    ITEMS UNCHECKED ARE NOT PRESENT    PRESENT SYMPTOMS: [ x]Unable to obtain due to poor mentation   Source if other than patient:  [x ]Family   [x ]Team     Pain: [x ] yes [ ] no  QOL impact -   Location -                   Aggravating factors -  Quality -  Radiation -  Timing-  Severity (0-10 scale):   Minimal acceptable level (0-10 scale):     Dyspnea:                           [ ]Mild [x ]Moderate [ ]Severe  Anxiety:                             [x ]Mild [ ]Moderate [ ]Severe  Fatigue:                             [ ]Mild [x ]Moderate [ ]Severe  Nausea:                             [ ]Mild [ ]Moderate [ ]Severe  Loss of appetite:              [ ]Mild [ ]Moderate [x ]Severe  Constipation:                    [x ]Mild [ ]Moderate [ ]Severe    PAINAD Score: 6    [email or web address] (Ctrl +  left click to view)  		  Other Symptoms:  [ ]All other review of systems negative     Karnofsky Performance Score/Palliative Performance Status Version 2:  10%        [email or web address]_[email or web address]    PHYSICAL EXAM:  Vital Signs Last 24 Hrs  T(F): 100.9  HR: 116  BP: 87/59  RR: 20   SpO2: 66%     GENERAL:  [ ]Alert  [ ]Oriented x 0  [x ]Lethargic  [ ]Cachexia  [ ]Unarousable  []Verbal  [ x]Non-Verbal  [x] ill appearing male/pallor and jaundiced  Behavioral:   [ ] Anxiety  [ ] Delirium [ ] Agitation [ ] Other  HEENT:  [x ]Normal   [x ]Dry mouth   [ ]ET Tube/Trach  [ ]Oral lesions Jaundiced  PULMONARY:   [x ]Clear [ ]Tachypnea  [ ]Audible excessive secretions   [ ]Rhonchi        [ ]Right [ ]Left [ ]Bilateral  [ ]Crackles        [ ]Right [ ]Left [ ]Bilateral  [ ]Wheezing     [ ]Right [ ]Left [ ]Bilateral  CARDIOVASCULAR:    [x ]Regular [ ]Irregular [x ]Tachy  [ ]Jean [ ]Murmur [ ]Other  GASTROINTESTINAL:  [ ]Soft  [x ]Distended   [x ]+BS  [ ]Non tender [x ]Tender  [ ]PEG [ ]OGT/ NGT   Last BM: 11/26/19    GENITOURINARY:  [ x]Normal [ ] Incontinent   [ ]Oliguria/Anuria   [ ]Heredia  MUSCULOSKELETAL:   [ ]Normal   [x ]Weakness  [ x]Bed/Wheelchair bound [x ]Edema  NEUROLOGIC:   [ ]No focal deficits  [ ] Cognitive impairment  [ ] Dysphagia [ ]Dysarthria [ ] Paresis [x ]Other: Lethargic.   SKIN:   [ ]Normal   [ ]Pressure ulcer(s)  [ ]Rash [x] Jaundice     CRITICAL CARE:  [ ] Shock Present  [ ]Septic [ ]Cardiogenic [ ]Neurologic [ ]Hypovolemic  [ ]  Vasopressors [ ]  Inotropes   [ ] Respiratory failure present [ ] Mechanical Ventilation [ ] Non-invasive ventilatory support [ ] High-Flow  [ ] Acute  [ ] Chronic [ ] Hypoxic  [ ] Hypercarbic [ ] Other  [ ] Other organ failure     LABS: reviewed.              No new labs.     RADIOLOGY & ADDITIONAL STUDIES: reviewed    PROTEIN CALORIE MALNUTRITION:   [x ] PPSV2 < or = 30% [ ] significant weight loss [x ] poor nutritional intake [ ] anasarca [ ] catabolic state   Albumin, Serum: 2.2 g/dL (11-22-19 @ 06:17)   Artificial Nutrition [ ]     REFERRALS:   [ ]Chaplaincy  [ ] Hospice  [ ]Child Life  [x ]Social Work  [ ]Case management [ ]Holistic Therapy [ ] Physical Therapy [ ] Dietary   Goals of Care Document:   Progress Notes - Care Coordination [C. Provider] (11-20-19 @ 10:25)

## 2019-12-01 NOTE — PROGRESS NOTE ADULT - SUBJECTIVE AND OBJECTIVE BOX
GAP TEAM PALLIATIVE CARE UNIT PROGRESS NOTE:      [  ] Patient on hospice program.    INDICATION FOR PALLIATIVE CARE UNIT SERVICES:  Management of severe pain in the setting of Stage IV pancreatic cancer and peritonitis s/p ruptured gallbladder.    INTERVAL HPI/OVERNIGHT EVENTS:  Patient with jaundice and pallor. Remains comfortable on Dilaudid and Ativan Drip.   Patient required 1 prn of Dilaudid for Dyspnea and 2 PRN of Tylenol.    DNR on chart: Yes    Allergies    No Known Allergies    Intolerances    MEDICATIONS  (STANDING):  chlorhexidine 4% Liquid 1 Application(s) Topical daily  glycopyrrolate Injectable 0.4 milliGRAM(s) IV Push every 6 hours  HYDROmorphone Infusion 6 mG/Hr (6 mL/Hr) IV Continuous <Continuous>  LORazepam Infusion 0.005 mG/kG/Hr (0.4 mL/Hr) IV Continuous <Continuous>  methadone Injectable 12.5 milliGRAM(s) IV Push every 6 hours  scopolamine   Patch 1 Patch Transdermal every 72 hours  sodium chloride 0.9%. 1000 milliLiter(s) (20 mL/Hr) IV Continuous <Continuous>    MEDICATIONS  (PRN):  acetaminophen  Suppository .. 650 milliGRAM(s) Rectal every 6 hours PRN Temp greater or equal to 38C (100.4F), Mild Pain (1 - 3)  bisacodyl Suppository 10 milliGRAM(s) Rectal daily PRN Constipation  HYDROmorphone  Injectable 12 milliGRAM(s) IV Push every 1 hour PRN pain  HYDROmorphone  Injectable 12 milliGRAM(s) IV Push every 1 hour PRN dyspnea  LORazepam   Injectable 1 milliGRAM(s) IV Push every 1 hour PRN Agitation      ITEMS UNCHECKED ARE NOT PRESENT    PRESENT SYMPTOMS:  [ x]Unable to obtain due to poor mentation   Source if other than patient:  [x ]Family   [x ]Team     Pain: [x ] yes [ ] no  QOL impact -   Location -                   Aggravating factors -  Quality -  Radiation -  Timing-  Severity (0-10 scale):   Minimal acceptable level (0-10 scale):     Dyspnea:                           [ ]Mild [x ]Moderate [ ]Severe  Anxiety:                             [x ]Mild [ ]Moderate [ ]Severe  Fatigue:                             [ ]Mild [x ]Moderate [ ]Severe  Nausea:                             [ ]Mild [ ]Moderate [ ]Severe  Loss of appetite:              [ ]Mild [ ]Moderate [x ]Severe  Constipation:                    [x ]Mild [ ]Moderate [ ]Severe    PAINAD Score: 6    [email or web address] (Ctrl +  left click to view)  		  Other Symptoms:  [ ]All other review of systems negative     Karnofsky Performance Score/Palliative Performance Status Version 2:  10%              [email or web address]    PHYSICAL EXAM:  Vital Signs Last 24 Hrs  T(C): 37.9 (01 Dec 2019 07:32), Max: 38.1 (01 Dec 2019 00:15)  T(F): 100.2 (01 Dec 2019 07:32), Max: 100.6 (01 Dec 2019 00:15)  HR: 88 (01 Dec 2019 07:32) (88 - 88)  BP: 69/43 (01 Dec 2019 07:32) (69/43 - 69/43)  BP(mean): --  RR: 14 (01 Dec 2019 07:32) (14 - 14)  SpO2: 77% (01 Dec 2019 07:32) (77% - 77%) I&O's Summary    30 Nov 2019 07:01  -  01 Dec 2019 07:00  --------------------------------------------------------  IN: 0 mL / OUT: 200 mL / NET: -200 mL    GENERAL:  [ ]Alert  [ ]Oriented x 0  [x ]Lethargic  [ ]Cachexia  [ ]Unarousable  []Verbal  [ x]Non-Verbal  [x] ill appearing male/pallor and jaundiced  Behavioral:   [ ] Anxiety  [ ] Delirium [ ] Agitation [ ] Other  HEENT:  [x ]Normal   [x ]Dry mouth   [ ]ET Tube/Trach  [ ]Oral lesions Jaundiced  PULMONARY:   [x ]Clear [ ]Tachypnea  [ ]Audible excessive secretions   [ ]Rhonchi        [ ]Right [ ]Left [ ]Bilateral  [ ]Crackles        [ ]Right [ ]Left [ ]Bilateral  [ ]Wheezing     [ ]Right [ ]Left [ ]Bilateral  CARDIOVASCULAR:    [x ]Regular [ ]Irregular [x ]Tachy  [ ]Jean [ ]Murmur [ ]Other  GASTROINTESTINAL:  [ ]Soft  [x ]Distended   [x ]+BS  [ ]Non tender [x ]Tender  [ ]PEG [ ]OGT/ NGT   Last BM: 11/26/19    GENITOURINARY:  [ x]Normal [ ] Incontinent   [ ]Oliguria/Anuria   [ ]Heredia  MUSCULOSKELETAL:   [ ]Normal   [x ]Weakness  [ x]Bed/Wheelchair bound [x ]Edema  NEUROLOGIC:   [ ]No focal deficits  [ ] Cognitive impairment  [ ] Dysphagia [ ]Dysarthria [ ] Paresis [x ]Other: Lethargic.   SKIN:   [ ]Normal   [ ]Pressure ulcer(s)  [ ]Rash [x] Jaundice     CRITICAL CARE:  [ ] Shock Present  [ ]Septic [ ]Cardiogenic [ ]Neurologic [ ]Hypovolemic  [ ]  Vasopressors [ ]  Inotropes   [ ] Respiratory failure present [ ] Mechanical Ventilation [ ] Non-invasive ventilatory support [ ] High-Flow  [ ] Acute  [ ] Chronic [ ] Hypoxic  [ ] Hypercarbic [ ] Other  [ ] Other organ failure     LABS:  No new labs    RADIOLOGY & ADDITIONAL STUDIES:  No new labs    PROTEIN CALORIE MALNUTRITION:   [ ] PPSV2 < or = 30% [ ] significant weight loss [ ] poor nutritional intake [ ] anasarca [ ] catabolic state   Albumin, Serum: 2.2 g/dL (11-25-19 @ 13:03)   Artificial Nutrition [ ]     REFERRALS:   [ ]Chaplaincy  [ ] Hospice  [ ]Child Life  [ ]Social Work  [ ]Case management [ ]Holistic Therapy [ ] Physical Therapy [ ] Dietary     Goals of Care Document:   Progress Notes - Care Coordination [C. Provider] (11-29-19 @ 15:19) GAP TEAM PALLIATIVE CARE UNIT PROGRESS NOTE:      [  ] Patient on hospice program.    INDICATION FOR PALLIATIVE CARE UNIT SERVICES:  Management of severe pain in the setting of Stage IV pancreatic cancer and peritonitis s/p ruptured gallbladder.    INTERVAL HPI/OVERNIGHT EVENTS:  Patient with jaundice and pallor. Remains comfortable on Dilaudid and Ativan Drip.   Patient required 1 prn of Dilaudid for Dyspnea and 2 PRN of Tylenol.    DNR on chart: Yes    Allergies    No Known Allergies    Intolerances    MEDICATIONS  (STANDING):  chlorhexidine 4% Liquid 1 Application(s) Topical daily  glycopyrrolate Injectable 0.4 milliGRAM(s) IV Push every 6 hours  HYDROmorphone Infusion 6 mG/Hr (6 mL/Hr) IV Continuous <Continuous>  LORazepam Infusion 0.005 mG/kG/Hr (0.4 mL/Hr) IV Continuous <Continuous>  methadone Injectable 12.5 milliGRAM(s) IV Push every 6 hours  scopolamine   Patch 1 Patch Transdermal every 72 hours  sodium chloride 0.9%. 1000 milliLiter(s) (20 mL/Hr) IV Continuous <Continuous>    MEDICATIONS  (PRN):  acetaminophen  Suppository .. 650 milliGRAM(s) Rectal every 6 hours PRN Temp greater or equal to 38C (100.4F), Mild Pain (1 - 3)  bisacodyl Suppository 10 milliGRAM(s) Rectal daily PRN Constipation  HYDROmorphone  Injectable 12 milliGRAM(s) IV Push every 1 hour PRN pain  HYDROmorphone  Injectable 12 milliGRAM(s) IV Push every 1 hour PRN dyspnea  LORazepam   Injectable 1 milliGRAM(s) IV Push every 1 hour PRN Agitation      ITEMS UNCHECKED ARE NOT PRESENT    PRESENT SYMPTOMS:  [ x]Unable to obtain due to poor mentation   Source if other than patient:  [x ]Family   [x ]Team     Pain: [x ] yes [ ] no  QOL impact -   Location -                   Aggravating factors -  Quality -  Radiation -  Timing-  Severity (0-10 scale):   Minimal acceptable level (0-10 scale):     Dyspnea:                           [ ]Mild [x ]Moderate [ ]Severe  Anxiety:                             [x ]Mild [ ]Moderate [ ]Severe  Fatigue:                             [ ]Mild [x ]Moderate [ ]Severe  Nausea:                             [ ]Mild [ ]Moderate [ ]Severe  Loss of appetite:              [ ]Mild [ ]Moderate [x ]Severe  Constipation:                    [x ]Mild [ ]Moderate [ ]Severe    PAINAD Score: 6    [email or web address] (Ctrl +  left click to view)  		  Other Symptoms:  [ ]All other review of systems negative     Karnofsky Performance Score/Palliative Performance Status Version 2:  10%              [email or web address]    PHYSICAL EXAM:  Vital Signs Last 24 Hrs  T(C): 37.9 (01 Dec 2019 07:32), Max: 38.1 (01 Dec 2019 00:15)  T(F): 100.2 (01 Dec 2019 07:32), Max: 100.6 (01 Dec 2019 00:15)  HR: 88 (01 Dec 2019 07:32) (88 - 88)  BP: 69/43 (01 Dec 2019 07:32) (69/43 - 69/43)  BP(mean): --  RR: 14 (01 Dec 2019 07:32) (14 - 14)  SpO2: 77% (01 Dec 2019 07:32) (77% - 77%) I&O's Summary    30 Nov 2019 07:01  -  01 Dec 2019 07:00  --------------------------------------------------------  IN: 0 mL / OUT: 200 mL / NET: -200 mL    GENERAL:  [ ]Alert  [ ]Oriented x 0  [x ]Lethargic  [ ]Cachexia  [ ]Unarousable  []Verbal  [ x]Non-Verbal  [x] ill appearing male/pallor and jaundiced  Behavioral:   [ ] Anxiety  [ ] Delirium [ ] Agitation [ ] Other  HEENT:  [x ]Normal   [x ]Dry mouth   [ ]ET Tube/Trach  [ ]Oral lesions Jaundiced  PULMONARY:   [x ]Clear [ ]Tachypnea  [ ]Audible excessive secretions   [ ]Rhonchi        [ ]Right [ ]Left [ ]Bilateral  [ ]Crackles        [ ]Right [ ]Left [ ]Bilateral  [ ]Wheezing     [ ]Right [ ]Left [ ]Bilateral  CARDIOVASCULAR:    [x ]Regular [ ]Irregular [x ]Tachy  [ ]Jean [ ]Murmur [ ]Other  GASTROINTESTINAL:  [ ]Soft  [x ]Distended   [x ]+BS  [ ]Non tender [x ]Tender  [ ]PEG [ ]OGT/ NGT   Last BM: 11/26/19    GENITOURINARY:  [ x]Normal [ ] Incontinent   [ ]Oliguria/Anuria   [ ]Heredia  MUSCULOSKELETAL:   [ ]Normal   [x ]Weakness  [ x]Bed/Wheelchair bound [x ]Edema  NEUROLOGIC:   [ ]No focal deficits  [ ] Cognitive impairment  [ ] Dysphagia [ ]Dysarthria [ ] Paresis [x ]Other: Lethargic.   SKIN:   [ ]Normal   [ ]Pressure ulcer(s)  [ ]Rash [x] Jaundice     CRITICAL CARE:  [ ] Shock Present  [ ]Septic [ ]Cardiogenic [ ]Neurologic [ ]Hypovolemic  [ ]  Vasopressors [ ]  Inotropes   [ ] Respiratory failure present [ ] Mechanical Ventilation [ ] Non-invasive ventilatory support [ ] High-Flow  [ ] Acute  [ ] Chronic [ ] Hypoxic  [ ] Hypercarbic [ ] Other  [ ] Other organ failure     LABS:  No new labs    RADIOLOGY & ADDITIONAL STUDIES:  No new labs    PROTEIN CALORIE MALNUTRITION:   [x ] PPSV2 < or = 30% [ ] significant weight loss [ ] poor nutritional intake [ ] anasarca [ ] catabolic state   Albumin, Serum: 2.2 g/dL (11-25-19 @ 13:03)   Artificial Nutrition [ ]     REFERRALS:   [ ]Chaplaincy  [ ] Hospice  [ ]Child Life  [ ]Social Work  [ ]Case management [ ]Holistic Therapy [ ] Physical Therapy [ ] Dietary     Goals of Care Document:   Progress Notes - Care Coordination [C. Provider] (11-29-19 @ 15:19)

## 2019-12-01 NOTE — PROGRESS NOTE ADULT - PROBLEM SELECTOR PLAN 9
-c/w home simethicone and sulfucrate  -c/w famotidine
Continue home simethicone and sucralfate  Continue famotidine
Continue home simethicone and sucralfate.  Continue famotidine.
Continue home simethicone and sucralfate.  Continue famotidine.
Continue home simethicone and sulfucrate  Continue famotidine
Transferred to PCU for pain management. Based on current condition along with diagnostics, likely patient will not be able to receive any further DMT. Patient has been unresponsive to antibiotics and is not a surgical candidate at this time.    Meeting was on the phone with two sons Friday (including HCP son Gamal) and wife at bedside. They are aware of diagnosis/prognosis and that the disease is incurable and life expectancy is limited 2/2 to active infection and inability to proceed with DMT. At this time, focus will be on managing symptoms.     Patient and son agreeable to DNR and MOLST completed. Patient remains with capacity at this time. Ultimately goal would be to take patient home if symptoms can be managed.    At this time, symptoms remain not well controlled and patient continues to have doses of medication titrated.
Transferred to PCU for pain management. Based on current condition along with diagnostics, likely patient will not be able to receive any further DMT. Patient has been unresponsive to antibiotics and is not a surgical candidate at this time.    Meeting was on the phone with two sons Friday (including HCP son Gamal) and wife at bedside. They are aware of diagnosis/prognosis and that the disease is incurable and life expectancy is limited 2/2 to active infection and inability to proceed with DMT. At this time, focus will be on managing symptoms.     Patient and son agreeable to DNR and MOLST completed. Patient remains with capacity at this time. Ultimately goal would be to take patient home if symptoms can be managed.    At this time, symptoms remain not well controlled and patient continues to have doses of medication titrated. Utilizing Ativan as well for management of anxiety.
Transferred to PCU for pain management. Based on current condition along with diagnostics, likely patient will not be able to receive any further DMT. Patient has been unresponsive to antibiotics and is not a surgical candidate at this time.  proceed with DMT. At this time, focus will be on managing symptoms.   Patient and son agreeable to DNR and MOLST completed. Patient remains with capacity at this time. Ultimately goal would be to take patient home if symptoms can be managed.  At this time, symptoms remain not well controlled and patient continues to have doses of medication titrated. Utilizing Ativan as well for management of anxiety.  Son Ralph and HCP was updated on pts condition, is aware pt is starting do decline. Goal continues to focus on comfort care. Emotional support provided.
Transferred to PCU for pain management. No further DMT. At this time, focus will be on managing symptoms purely. Pt is DNR and has a MOLST in place.    Son Ralph and HCP was updated on patient's condition, and are aware of the decline. Goal continues to focus on comfort care. Emotional support provided.    Support given to wife at bedside this morning. She said his children were at the bedside until 9 pm yesterday and plan to be back today.
Transferred to PCU for pain management. No further DMT. At this time, focus will be on managing symptoms purely. Pt is DNR and has a MOLST in place.    Son Ralph and HCP was updated on patient's condition, and are aware of the decline. Goal continues to focus on comfort care. Emotional support provided.    Support given to wife at bedside this morning. She said his children were at the bedside until 9 pm yesterday and plan to be back today.
-c/w home simethicone and sulfucrate  -c/w famotidine
Continue home simethicone and sucralfate.  Continue famotidine.
Transferred to PCU for pain management. No further DMT. At this time, focus will be on managing symptoms purely. Pt is DNR and has a MOLST in place.    Son Ralph and HCP was updated on patient's condition, and are aware of the decline. Goal continues to focus on comfort care. Emotional support provided.    Support given to wife at bedside this morning. She said his children were at the bedside until 9 pm yesterday and plan to be back today.

## 2019-12-01 NOTE — PROGRESS NOTE ADULT - PROBLEM SELECTOR PROBLEM 3
Anxiety
Pancreatic cancer metastasized to liver
Peritonitis
Pancreatic cancer metastasized to liver

## 2019-12-01 NOTE — PROGRESS NOTE ADULT - PROBLEM SELECTOR PROBLEM 2
Nausea
Pancreatic cancer metastasized to liver
Postlaminectomy Syndrome
Nausea
Postlaminectomy Syndrome

## 2019-12-01 NOTE — PROGRESS NOTE ADULT - REASON FOR ADMISSION
Acrocyanosis
sepsis

## 2019-12-01 NOTE — PROGRESS NOTE ADULT - PROBLEM SELECTOR PROBLEM 1
Acute cholecystitis
Pain due to neoplasm
Sepsis
Acute cholecystitis
Pain due to neoplasm
Sepsis

## 2019-12-01 NOTE — PROGRESS NOTE ADULT - PROBLEM SELECTOR PROBLEM 9
GERD (gastroesophageal reflux disease)
Palliative care encounter
GERD (gastroesophageal reflux disease)
Palliative care encounter

## 2019-12-01 NOTE — PROGRESS NOTE ADULT - PROBLEM SELECTOR PLAN 1
c/w  Methadone  12.5 mg IVP q 6 hours (converted from oral regimen)  c/w Dilaudid drip @ 6 mg /hr   c/w Dilaudid 12 mg IV q/ 1 hr PRN pain  Prn use of Dialudid in the past 24 hours X 1.

## 2019-12-01 NOTE — PROGRESS NOTE ADULT - ASSESSMENT
62 y/o PMH of traumatic work injury (2001) s/p multilumbar fusion (T8-S1) s/p hardware removal (2016, on daily opiates and methadone), chronic constipation, GERD, HTN, depression, anxiety, metastatic pancreatic adenocarcinoma s/p biliary stent placement (2m ago) and chemo sent in from [facility] for concern for biliary sepsis on 11/8.During this admission, CT showed gallbladder rupture, stable metastatic disease (to liver), and tumor trombosis. Palliative care was called pain Rx.

## 2019-12-01 NOTE — PROGRESS NOTE ADULT - ATTENDING COMMENTS
# gallbladder perforation-  s/p percutaneous cholecystostomy tube placed by IR on 11-12-19   CT abdomen done showing " Distended gallbladder with discontinuity of the wall and lobulated fluid along the inferior liver image highly concerning for gallbladder rupture. New moderate loculated ascites. "   surgical also following appreciate their recs  numerous G- rods from culture found from fluid from procedure  on zosyn  -renal function improving, continue to monitor, renally dose meds  -no plan for inpatient chemotherapy at this time
Feet are stable on today's examination  Plan for EMELINA and Arterial duplex     [place] [id number]
No further vascular testing or intervention  Will sign off    [place] [id number]
Patient interviewed/examined.  Agree with history, PE, A/P as above.      Olu Castle MD
# gallbladder perforation-  s/p percutaneous cholecystostomy tube placed by IR on 11-12-19   fluid from this procedure showing Klebsiella oxytoca  CT abdomen done showing " Distended gallbladder with discontinuity of the wall and lobulated fluid along the inferior liver image highly concerning for gallbladder rupture. New moderate loculated ascites. "   surgery also following appreciate their recs  -on zosyn  -renal function improving, continue to monitor, renally dose meds  -no plan for inpatient chemotherapy at this time  - appreciate excellent medical care    leukocytosis- check cbc with diff  check LFTS  could be reactive still from infection/procedure  continue to trend
63 y.o male with metastatic pancreatic cancer admitted with pain.    # abdominal pain- CT abdomen done showing " Distended gallbladder with discontinuity of the wall and lobulated fluid along the inferior liver image highly concerning for gallbladder rupture. New moderate loculated ascites. "   surgical consult, IR consult   on zosyn  -renal function improving, continue to monitor, renally dose meds  -no plan for inpatient chemotherapy at this time      #Pancreatic adenocarcinoma, Stage IV  -holding chemotherapy while admitted  -patient on complex pain regimen including methadone, dilaudid, and fentanyl; pain control per primary team; please ensure patient is on bowel regimen as well to avoid constipation  -will continue to follow
Daryn Ac  Pager: [phone]. If no response or past 5 pm call [phone].
S/P IR drainage of gallbladder with percutaneous cholecystostomy. Drainage of bile from bag demonstrates adequate drainage. Would continue IV antibiotics given persistent sepsis. Patient not a candidate for any surgical procedures given advanced disease. Recommend palliative/hospice consult. .
Patient seen and examined.  Agree with fellow note.
Patient seen and examined.  Agree with fellow note.
Pt seen with Fellow.  Agree with above plan.  GOC: focus on comfort and management of symptoms.  Symptoms: no changes to current regimen.
Pt seen with Fellow.  Agree with above plan.
Pt seen with Fellow.  Agree with above plan.  GOC: comfort, actively dying, wife at bedside, support provided.  Symptoms: complex symptom management regimen, no changes made. Continue with same.
Patient was evaluated in the morning (when his symptoms appeared to be better controlled); however, I re-evaluated him in the afternoon (with his son and daughter by his bedside) and the was very uncomfortable (2/2 to pain and labored breathing). Symptoms improved after Dilaudid 8 and 12 mg IV were given. He also received Ativan 1 mg. After it symptoms became controlled. Will then continue Dilaudid infusion @ 6 mg/hour with 12 mg IV q 1 PRN. Will also start Ativan 0.2 mg/hour with 1 mg IV q 1 PRN. Will DC Ativan q 4 ATC. Due to oropharyngeal secretions, Glyco and Scop patch were also started.
I have personally seen and examined this patient and agree with the above assessment and plan, which I have reviewed and edited where appropriate.   Family meeting with patient, wife, two sons who are the HCP and available via telephone.  Situation reviewed in detail.  Active infection due to GB rupture with resistant organisms and rising white count despite broad antibiotic coverage.  Goal will be to focus on symptom management and the titration of medications to that end.  IR has agreed to do a palliative paracentesis as this has offered pain relief in the past.  Cultures should be send on the fluid if this will impact future placement of pleur-x although it could still be placed for compassionate pain relief.  Patient has agreed to DNR/DNI and that all care be directed towards relieving suffering.  >16      minutes for advanced care planning discussion separate and in addition to the e and m service provided.

## 2019-12-02 NOTE — DISCHARGE NOTE FOR THE EXPIRED PATIENT - HOSPITAL COURSE
64 y/o PMH of traumatic work injury () s/p multilumbar fusion (T8-S1) s/p hardware removal (, on daily opiates and methadone), chronic constipation, GERD, HTN, depression, anxiety, metastatic pancreatic adenocarcinoma s/p biliary stent placement (2m ago) and chemo sent in from [facility] for concern for biliary sepsis on . He was admitted on 19 for abd pain, weight loss 25 lbs for the prior 3 weeks and jaundice and found on CTAP w/ intrahepatic biliary ductal dilation and multiple hepatic lesions that were new and EUS/ERCP was + for malignant adenocarcinoma cancer cells and CT guided biopsy of R liver lesion + also for malignant adenocarcinoma w/ elevated CA 19-9 1832. Started on Gemcitabine +Abraxane on 10/17 for C1D1 for 3 wk on and 1 wk off but due to thrombocytopenia and fatigue, nausea and vomiting, C1D15 was held.  He went in for a check up appointment on  and the labs were significant for leukocytosis of >20 and the patient himself has been having increased amount of fatigue and weakness with decreased PO intake for the past 2 days. he reports no fevers, n/v/d/CP/SOB, cough, sick contacts or recent travels. Ever since his pancreatic adenocarcinoma diagnosis he has had  generalized abd pain and it has now worsened these last few days. Long standing history of LBP w/ extensive neurosurgical followup and pain managment doctors with currently Methadone 40 mg BID, Morphine ER 40 BID, dialudid 8mg x12 each day, spinal cord stimulator () and pain pump in  in abdomen which is no longer there. As per last oncology visit, he was scheduled for PCA pump installation on Friday, 2019 by Dr. Rico and Dr. Lilly for pain control.   Patient admitted, treated for sepsis, found to have a ruptured gall bladder.  Given his metastatic cancer, he was managed conservatively and ultimately transferred to the PCU for symptom control during the dying process.  He  on 2019.

## 2019-12-11 ENCOUNTER — APPOINTMENT (OUTPATIENT)
Dept: INFUSION THERAPY | Facility: HOSPITAL | Age: 64
End: 2019-12-11

## 2019-12-18 ENCOUNTER — APPOINTMENT (OUTPATIENT)
Dept: INFUSION THERAPY | Facility: HOSPITAL | Age: 64
End: 2019-12-18

## 2019-12-18 LAB
CULTURE RESULTS: SIGNIFICANT CHANGE UP
SPECIMEN SOURCE: SIGNIFICANT CHANGE UP

## 2019-12-21 LAB
CULTURE RESULTS: SIGNIFICANT CHANGE UP
SPECIMEN SOURCE: SIGNIFICANT CHANGE UP

## 2019-12-24 ENCOUNTER — APPOINTMENT (OUTPATIENT)
Dept: INFUSION THERAPY | Facility: HOSPITAL | Age: 64
End: 2019-12-24

## 2020-01-14 NOTE — ED PROVIDER NOTE - PSH
Ankle Fracture  s/p ORIF left ankle 2008  History of lumbar fusion  2008  Insertion of Intrathecal Dilaudid Pump  4/2011  S/P Arthroscopy of Left Knee    S/P insertion of spinal cord stimulator  2013  S/P Knee Replacement  left knee 2009  S/P Spinal Surgery  corrective lumbar fusion 2012  S/P Tonsillectomy  childhood  S/P  shunt nonverbal cues (demo/gestures)/supervision/verbal cues

## 2020-05-07 NOTE — H&P ADULT - NSTOBACCOSCREENHP_GEN_A_NCS
5/7/2020    FLU/COVID-19 CLINIC EVALUATION    HPI  SYMPTOMS:    Symptom duration, days:  [] 1   [x] 2   [] 3   [] 4   [] 5   [] 6   [] 7   [] 8   [] 9   [] 10   [] 11   [] 12   [] 13 [] 14 +      Symptom course:   [x] Worsening     [] Stable     [] Improving    [x] Fevers  [] Symptom (not measured)  [x] Measured (Result:100.6)  [x] Chills  [x] Cough  [] Coughing up blood  [] Productive  [] Dry  [] Chest Congestion  [] Chest Tightness  [] Nasal Congestion  [] Runny Nose  Sneezing  [] Feeling short of breath  [x] Fatigue  [x] Chest pain  [x] Headaches  []Tolerable  [x] Severe  [] Sore throat  [x] Muscle aches  [x] Nausea  [] Decreased appetite  [x] Vomiting  []Unable to keep fluids down  [x] Diarrhea  []Severe    [x] OTHER SYMPTOMS:   Loss of taste and smell    RISK FACTORS: no known exposures  [] Pregnant or possibly pregnant  [] Age over 61  [] Diabetes  [] Heart disease  [] Asthma  [] COPD/Other chronic lung diseases  [] Active Cancer  [] On Chemotherapy  [] Taking oral steroids  [] History Lymphoma/Leukemia  [] Close contact with a lab confirmed COVID-19 patient within 14 days of symptom onset  [] History of travel from affected geographical areas within 14 days of symptom onset  [] Health Care Worker Exposure no symptoms  [] Health Care Worker Exposure symptomatic      VITALS:  Vitals:    05/07/20 1011   Pulse: 76   Temp: 98.5 °F (36.9 °C)   TempSrc: Oral   SpO2: 96%        PHYSICAL EXAMINATION:  [x]Alert   [x]Oriented to person/place/time    [x]No apparent distress     []Toxic appearing    [x]Breathing appears normal     []Appears tachypneic         [x]Speaking in full sentences     TESTS:  POCT FLU:  [] Positive     []Negative  POCT STREP:  [] Positive     []Negative    [x] COVID-19 Test sent: [x] Blue   [] Red   [] Chest X-ray     ASSESSMENT:  [] Flu  [] Strep Throat  [] Uncertain Viral Respiratory Illness  [x] Possible COVID-19  [] Exposure COVID-19  [] Other:     PLAN:  [] Discharge home with written
No

## 2020-05-11 NOTE — PHYSICAL THERAPY INITIAL EVALUATION ADULT - STAIR PATTERN, REHAB EVAL
[facility] [phone]  Luige Efrain [address] [facility] [phone]    Pacemaker/Defibrillator Clinic          05/11/20        Drew Mathis  [address] [place] [id number]        Dear Drew Mathis    This letter is to inform you that we received the transmission from your monitor at home that checks your implanted heart device. The next date your monitor will automatically transmit will be 10-14-20. If your report needs attention we will notify you. Your device and monitor are wireless and most transmit cellularly, but please periodically check your monitor is still plugged in to the electrical outlet. If you still use the telephone land line to send please ensure the connection to the phone celestine is secure. This will help to ensure successful automatic transmissions in the future. Also, the monitor needs to be close to you while sleeping at night. Please be aware that the remote device transmission sites are periodically monitored only during regular business hours during which simultaneous in-office device clinics are being run. If your transmission requires attention, we will contact you as soon as possible. Thank you.             [facility]
step over step

## 2020-09-30 NOTE — H&P ADULT - ATTENDING COMMENTS
negative... metastatic adenoCA pancreas last chemo 2 weeks ago - ongoing hair loss.  leukocytosis with RLL pneumonia as possible source of sepsis.  empiric Zosyn.    chronic pain opiate dependence - recommend chronic pain mgmt consult  I-stop reviewed; meds reconciled.    bowel regimen for constipation    MERRILL/ATN: resolving with IVF.    No acute abdominal findings on exam; some chronic tenderness    poor overall prognosis.  GOC discussion prior to discharge.    patient with multiple co-morbid conditions; high risk for future complications despite optimal medical therapy     Rudy Singh MD, MHA, FACP, FH  Pager: [phone]  If no response or off-hours, page [phone]

## 2020-11-04 NOTE — PROGRESS NOTE ADULT - NSHPATTENDINGPLANDISCUSS_GEN_ALL_CORE
Spoke with the patient just to let her know that she is being seen by a provider in [place] and not [place].  She voiced that that was ok and she would continue with the visit.       ----- Message from Michael Vera sent at 11/4/2020  3:39 PM CST -----  Contact: Oj  Pt is returning a call in regards to a missed call she received from this office. Please call her back at [phone].    ThanksDD      
PCU
PCU
PCU team
PCU team
the PCU team
the primary team
the primary team and IR
Medicine NP

## 2020-11-04 NOTE — PATIENT PROFILE ADULT - NUMBER OF YRS
Vicki Arec  36 year old    Patient's last menstrual period was 10/26/2020 (approximate).  Chief Complaint   Patient presents with   • Pre-Op Exam       HPI: 35yo  here for preop exam prior to sterilization procedure. No complaints. Taking OCP.     Review of Systems   Constitutional: Negative for activity change, appetite change, fatigue and unexpected weight change.   Respiratory: Negative for cough and shortness of breath.    Cardiovascular: Negative for chest pain and palpitations.   Gastrointestinal: Negative for abdominal pain, anal bleeding, blood in stool, constipation, diarrhea and nausea.   Endocrine: Negative for cold intolerance.   Genitourinary: Negative for difficulty urinating, dyspareunia, dysuria, genital sores, menstrual problem, pelvic pain, vaginal bleeding and vaginal discharge.   Musculoskeletal: Negative for arthralgias and myalgias.   Skin: Negative for rash.   Allergic/Immunologic: Negative for food allergies.   Neurological: Negative for dizziness, seizures and headaches.   Hematological: Does not bruise/bleed easily.   Psychiatric/Behavioral: Negative for dysphoric mood. The patient is not nervous/anxious.        OB History    Para Term  AB Living   2 0 0 0 2 0   SAB TAB Ectopic Molar Multiple Live Births   0 0 0 0 0 0       Past Medical History   Past Medical History:   Diagnosis Date   • Abnormal Pap smear of cervix        , Past Surgical History   Past Surgical History:   Procedure Laterality Date   • Colposcopy     • Dilation and curettage of uterus   and     Elective Ab x 2.   • No past surgeries     , Family Medical History    Family History   Problem Relation Age of Onset   • Diabetes Mother    • Hypertension Mother    • Hypertension Father     and Social History    Social History     Tobacco Use   • Smoking status: Never Smoker   • Smokeless tobacco: Never Used   Substance Use Topics   • Alcohol use: Yes     Comment: Social drinker   •  Drug use: Never       ALLERGIES:  No Known Allergies  Current Outpatient Medications   Medication Sig Dispense Refill   • levonorgestrel-ethinyl estradiol (VIENVA) 0.1-20 MG-MCG per tablet Take 1 tablet by mouth daily. 84 tablet 0     No current facility-administered medications for this visit.        Vitals:    11/04/20 1257   BP: 99/66   Pulse: 85   Resp: 16   Temp: 99.4 °F (37.4 °C)   TempSrc: Tympanic   Weight: 64.9 kg (143 lb)   Height: 5' 1\" (1.549 m)   PainSc:  0     Body mass index is 27.02 kg/m².    Physical Exam  Constitutional:       Appearance: Normal appearance.   Cardiovascular:      Rate and Rhythm: Normal rate and regular rhythm.      Heart sounds: Normal heart sounds.   Pulmonary:      Effort: Pulmonary effort is normal.      Breath sounds: Normal breath sounds. No wheezing, rhonchi or rales.   Abdominal:      General: Abdomen is flat.      Palpations: Abdomen is soft.      Tenderness: There is no abdominal tenderness.   Neurological:      Mental Status: She is alert.   Skin:     General: Skin is warm and dry.   Vitals signs reviewed.          Assessment:    Problem List Items Addressed This Visit        Other    Consultation for female sterilization     Pt requests permanent sterilization. I have counseled her about sterilization R/B/SE via laparoscopic bilateral salpingectomy, including permanent intention, risk of anesthetic, possible bleeding/infection, possible failure/ectopic, possible regret, possible bowel/urinary tract injury, possible conversion to open procedure, post op complications including infection/ileus/VTE. I have also counseled her re: benefit of opportunistic salpingectomy to reduce her ovarian cancer risk. She states understanding and wishes to proceed. Plan laparoscopic bilateral salpingectomy. All pt ?s answered. Surgery scheduled for 11/12/20. For Covid testing 11/9/20.             Other Visit Diagnoses     Encounter for preprocedure screening laboratory testing for COVID-19     -  Primary    Relevant Orders    2019 NOVEL CORONAVIRUS (SARS-COV-2)          I spent 15  minutes in face-to-face time with pt, engaging in examination, counseling and review of old records.      0

## 2020-11-30 NOTE — DIETITIAN INITIAL EVALUATION ADULT. - OTHER INFO
negative
Diet PTA: as per spouse and sons, pt c poor PO intake over the last 2 months, pt was taking Glucerna shakes at home about 2 daily and at times would have some of his meals. Son reports pt is able to tolerate some high fat foods and some he does not do well with. Noted DM noted to be new diagnosis, however family aware and noted pt was educated by 2 different RDs during 2 previous admissions for T2DM. As per spouse, pt  was checking his Finger sticks occasionally in the morning and values were 130-140s. Noted per chart, pt was on Metformin at home.     Spouse reports NKFA. States pt was on multivitamin supplement prior to chemo.     Family confirms wt changes: pt was about 230 pounds over the summer, then his wt was down to about 217 pounds in September as per previous RD note and noted current wt of about 190 pounds.

## 2021-01-04 NOTE — CONSULT NOTE ADULT - ASSESSMENT
IMP: 63 yo male with productive cough, R lung infiltrate on chest imaging; CAP  Upper GI abnormalities noted on chest CT    PLAN: Tx for CAP as ordered  Neb tx PRN  GI reflux/aspiration precautions given esophageal findings on chest CT  OOB    Thank you,  Will follow,    Paul Watt MD [facility]  [phone]  (Shukri/Sue/Tommy) no motor or sensory deficits.

## 2021-03-11 NOTE — ED ADULT NURSE NOTE - CAS TRG GENERAL AIRWAY, MLM
In Encounter please print Prescan APS - PLEASE PRINT out of the Med Info Forms  Add any additional information if needed and once the DISABILITY Form is reviewed; please sign and date.     Then please send back to Medical Release via interoffice mail, fax, or e-mail back to: [email or web address]     Fax #: [phone]    Thank you, Medical Release.      Patent

## 2021-06-04 NOTE — DIETITIAN INITIAL EVALUATION ADULT. - DOB: +DATEOFBIRTH
Diarrhea  Abdominal pain  - colonoscopy and EGD with coltye prep and MAC sedation  - stool testing  - bland diet  - ok to use low dose imodium Statement Selected

## 2021-06-18 NOTE — PHYSICAL THERAPY INITIAL EVALUATION ADULT - LEVEL OF INDEPENDENCE: SCOOT/BRIDGE, REHAB EVAL
independent
Patient, an 80 year old female with a PMH of short-term memory loss, HTN, HLD, Permanent Atrial Fibrillation (on Diltiazem and Multaq, Not on AC) who was BIBEMS due to generalized weakness. History obtained from daughter Nea at bedside. As per her, patient was in [place] with her for 4 weeks where she ran out of her Multaq 3 days ago. They returned back on Wednesday (2 days ago) and patient took her medication yesterday. Patient has been feeling tired since yesterday. Today at around 1.30 pm, when daughter went to see patient (Lives in same building), she found the patient sitting on bathroom floor. Patient does not remember what happened. Unclear if any LOC or head trauma but no injuries as per daughter. Patient told the daughter she was cleaning. Patient denied any chest pain, SOB at the time. At time of examination in the ED, patient denies any headache, dizziness, chest pain, palpitations, shortness of breath, abdominal pain, nausea/vomiting/diarrhea.  As per daughter, patient had bleeding from eliquis in the past so it was stopped.     GOC: FULL CODE

## 2021-07-15 NOTE — ED ADULT NURSE NOTE - PSYCHOSOCIAL WDL
Addended by: DAWSON MARCIAL on: 7/15/2021 01:38 PM     Modules accepted: Orders     Alert and oriented x 3, normal mood and affect, no apparent risk to self or others.

## 2021-10-21 NOTE — H&P ADULT - CONSTITUTIONAL COMMENTS
[facility]    Medicine Progress Note - Hospitalist Service, Gold 11       Date of Admission:  10/16/2021    Assessment & Plan           Essie Guzman is a 67-year-old M with a history of metastatic gastric adenocarcinoma complicated by malignant obstruction of the right kidney resulting in hydronephrosis s/p right ureteral stent (9/10) and PCN (10/1) who presented to the ED on 10/17 for acute on chronic malignancy related abdominal and groin pain, which has since improved significantly with IV opiates in the ED.  He is being admitted for pain control and optimization of his outpatient oral pain regimen.     Today's plan:  - Continue current dose of methadone  - Continue IV iron while inpatient    # Poorly differentiated metastatic gastric adenocarcinoma (patient has not begun pembrolizumab infusions yet due to insurance related issues)  # Malignancy-related chronic abdominal pain  - 10/18: Pain mgmt consult: Stop MS Contin, start Methadone -- with improvement in pain control.    - EKG with QTc stable      # Malignant obstruction of R ureter   # R hydronephrosis s/p ureteral stent (9/10) and percutaneous nephrostomy (10/1)  - Continue PTA oxybutynin 10mg daily    # Staph epi PNT pyuria vs. PNT colonization, RIGHT PNT  - CT did not show hydronephrosis  - Monitor PNT drainage, Follow PNT cultures  - ID consulted, Cefepime stopped, not suspected infectious, monitor PNT output     # Normocytic anemia, severe iron deficiency  # Dark stools, likely from PO iron, with stable hemoglobin  Iron studies done 9/10/2021 consistent with iron deficiency anemia: Iron 15, TIBC normal at 377, ferritin 14.  Hemoglobin on presentation was 7.8, which is stable at his recent baseline of ~7.5-8. Patient does report dark stools for at least the past month. He is on ferrous sulfate, which could explain the dark stools, though he also has a history of GI bleed. I think hemorrhage is less likely  given stable hemoglobin.  - restart pta iron but give IV iron while inpatient  - Last EGD on 11/2020 with normal esophagus and non-bleeding gastric ulcer which turned out to be the gastric cancer.  Last colonoscopy in 2015 was normal and was recommended repeat in 10 years.      # Mild hyponatremia  Monitor clinically. In the past month, patient has been running in the high 120s-mid 130s. Suspect ADH release in the context of chronic illness/malignancy.      # Mild hypokalemia, stable     # Severe malnutrition in the context of chronic illness  - Ensure supplements ordered         Diet: Regular Diet Adult  Snacks/Supplements Adult: Ensure Clear; Between Meals  Snacks/Supplements Adult: Mansi Tyche Standard Oral Supplement; With Meals    DVT Prophylaxis: Low Risk/Ambulatory with no VTE prophylaxis indicated  Hamm Catheter: Not present  Central Lines: None  Code Status: Full Code      Disposition Plan   Expected discharge: 10/22/2021   recommended to prior living arrangement once adequate pain management/ tolerating PO medications and antibiotic plan established.     The patient's care was discussed with the Bedside Nurse, Patient and Patient's Family.    Magdalena Braxton MD  Hospitalist Service, [address]  Securely message with the Vocera Web Console (learn more here)  Text page via [employer] Paging/Directory  Please see sign in/sign out for up to date coverage information    Clinically Significant Risk Factors Present on Admission                ______________________________________________________________________    Interval History   Pain improved quite a bit but feels that he needs another day to ensure he can manage things at home.     No chest pain,dyspnea, fever.     Data reviewed today: I reviewed all medications, new labs and imaging results over the last 24 hours. I personally reviewed no images or EKG's today.    Physical Exam   Vital Signs: Temp: 98.2  F  (36.8  C) Temp src: Oral BP: 121/81 Pulse: 69   Resp: 18 SpO2: 97 % O2 Device: None (Room air)    Weight: 112 lbs 3.2 oz    General Appearance: NAD  HEENT: No thursh / ulcer, EOMI  Respiratory: CTAB on RA   Cardiovascular: RRR  GI: soft, NTND   + R nephrostomy tube  Extremities: No LE edema noted  Neuro: Moving all extremities  Skin: No rash on exposed areas   Psych: Alert and oriented, appropriate mood and affect, speech coherent / logical   weak
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
## 2022-06-27 NOTE — PROGRESS NOTE ADULT - PROBLEM SELECTOR PLAN 1
initial CT imaging concerning for gallbladder wall rupture and PNA.  Patient  s/p cholecystostomy tube placement by IR.  CT abd /pelvis done yesterday concerning for loculated ascites and peritonitis  now s/p paracentesis by IR yesterday ; 4100ml of liquid removed ; Pt reports improvement in abd pain.  WBC improving ; 27 today  Continue Zosyn for now per ID  Palliative care following ; possible transfer to PCU for pain control pending bed availability Oxybutynin Counseling:  I discussed with the patient the risks of oxybutynin including but not limited to skin rash, drowsiness, dry mouth, difficulty urinating, and blurred vision.

## 2022-07-11 NOTE — ED PROVIDER NOTE - NS ED ATTENDING STATEMENT MOD
AxOx4 Pt in for fall from standing with negative LOC or blood thinners. Onset of pain pt fell from standing in ER waiting room. ER staff was notified by Security and pt was found sitting upright in a chair, breathing normally with a good perfusing color. Pt reported neck pain with pain on palpation. Cervical Collar was applied. Pt moved to room 4 and Dr. Brittny Mckeon made aware. I have personally seen and examined this patient.  I have fully participated in the care of this patient. I have reviewed all pertinent clinical information, including history, physical exam, plan and the Resident’s note and agree except as noted.

## 2022-11-04 NOTE — ED PROVIDER NOTE - CONSTITUTIONAL, MLM
normal... Awake, alert, oriented to person, place, time/situation and in no apparent distress. Obese. Soledad Olmos NP

## 2023-05-10 NOTE — ED ADULT TRIAGE NOTE - AS TEMP SITE
Abdomen , soft,  mild RLQ tenderness,  nondistended , no guarding or rigidity , no masses palpable , normal bowel sounds , Liver and Spleen , no hepatomegaly present , no hepatosplenomegaly , liver nontender , spleen not palpable  oral

## 2023-07-04 NOTE — PROGRESS NOTE ADULT - PROBLEM SELECTOR PROBLEM 4
Patient is a 67year wife remains at the bedside. Chronic pain

## 2023-11-13 NOTE — ED PROVIDER NOTE - CONSTITUTIONAL [-], MLM
Thank you for allowing Nayely Jiang and our ENT team to participate in your care.  If your medications are too expensive, please give the nurse a call.  We can possibly change this medication.  If you have a scheduling or an appointment question please contact our Health Unit Coordinator at their direct line [phone].   ALL nursing questions or concerns can be directed to your ENT nurse at: [phone] - Fdn     Complete allergy testing via blood panel  Start flonase  Start zyrtec (or generic equivalent)  We will call you with the results of the allergy test  
no fever/no chills

## 2023-12-14 NOTE — PROGRESS NOTE ADULT - PROBLEM SELECTOR PLAN 1
CT imaging concerning for gallbladder wall rupture and PNA.  ID input appreciated.  Suspect cholecystitis and possible PNA versus inflammatory changes secondary to the cholecystitis.  Patient now s/p cholecystostomy tube placement by IR.  Continue Zosyn.  Blood cultures remain negative to date, but prelim biliary fluid culture growing numerous gram negative rods. Other

## 2023-12-18 NOTE — ED PROVIDER NOTE - NORMAL, MLM
yoanna all pertinent systems normal PRINCIPAL DISCHARGE DIAGNOSIS  Diagnosis: Altered mental status  Assessment and Plan of Treatment: You were admitted to the hospital for altered mental status. The following was done in the hospital.   # COPD exacerbation:   -Admitted in the CEU and due to your worsening condition you had to be on AVAPS and even transferred to the ICU. You were treated for pneumonia with antibiotics and also steroids for the flare.   - Thoracocentesis( taking out the fluids from the lungs) was done on 11/27 and the specimen were tested.   - RVp was negative   - You completed the course of steroids and antibiotics   - You are stable enough to be discharged   # Heart Failure:   - You were treated with diuretics and monitored for outputs and daily weight   - Please follow with Dr Pacheco   # LEft upper arm hematoma   You were off the blood thinner and monitored  - AFTer your hemoglobin stabilized we restarted the blood thinner and your hemoglobin has been stable   # Hx of MDR Klebsiella pneumoniae UTI   -You were previously treated with AVycaz   # Elevated ALp   - Follow up with Hepatology   # Chronic Atrial Fibrillation   - Continue metoprolol   # Neck mass   Outpatient follow up        SECONDARY DISCHARGE DIAGNOSES  Diagnosis: Acute UTI  Assessment and Plan of Treatment:     Diagnosis: Pleural effusion  Assessment and Plan of Treatment:     Diagnosis: Hypercapnia  Assessment and Plan of Treatment:     Diagnosis: Hypomagnesemia  Assessment and Plan of Treatment:      PRINCIPAL DISCHARGE DIAGNOSIS  Diagnosis: Altered mental status  Assessment and Plan of Treatment: You were admitted to the hospital for altered mental status. The following was done in the hospital.   # COPD exacerbation:   -Admitted in the CEU and due to your worsening condition you had to be on AVAPS and even transferred to the ICU. You were treated for pneumonia with antibiotics and also steroids for the flare.   - Thoracocentesis( taking out the fluids from the lungs) was done on 11/27 and the specimen were tested.   - RVp was negative   - You completed the course of steroids and antibiotics   - You are stable enough to be discharged   # Heart Failure:   - You were treated with diuretics and monitored for outputs and daily weight   - Please follow with Dr Pacheco   # LEft upper arm hematoma   You were off the blood thinner and monitored  - AFTer your hemoglobin stabilized we restarted the blood thinner and your hemoglobin has been stable   # Hx of MDR Klebsiella pneumoniae UTI   -You were previously treated with AVycaz   # Elevated ALp   - Follow up with Hepatology   # Chronic Atrial Fibrillation   -Your metoprolol dose has been increased 75 mg two times a day   # Neck mass   Outpatient follow up        SECONDARY DISCHARGE DIAGNOSES  Diagnosis: Acute UTI  Assessment and Plan of Treatment:     Diagnosis: Pleural effusion  Assessment and Plan of Treatment:     Diagnosis: Hypercapnia  Assessment and Plan of Treatment:     Diagnosis: Hypomagnesemia  Assessment and Plan of Treatment:

## 2024-01-28 NOTE — PROGRESS NOTE ADULT - SUBJECTIVE AND OBJECTIVE BOX
Vascular Cardiology  Progress note     [facility] [id number]              EMAIL [email or web address]   OFFICE [phone]    CC:  Acrocyanosis     INTERVAL HISTORY:     Patient had placement of biliary drain by IR       Allergies    No Known Allergies    Intolerances    	    MEDICATIONS:  cloNIDine 0.1 milliGRAM(s) Oral two times a day  enoxaparin Injectable 40 milliGRAM(s) SubCutaneous daily  piperacillin/tazobactam IVPB.. 3.375 Gram(s) IV Intermittent every 8 hours  diazepam    Tablet 2 milliGRAM(s) Oral every 8 hours PRN  DULoxetine 60 milliGRAM(s) Oral daily  fentaNYL   Patch 100 MICROgram(s)/Hr. 1 Patch Transdermal every 48 hours  HYDROmorphone  Injectable 3.2 milliGRAM(s) IV Push every 3 hours PRN  methadone    Tablet 20 milliGRAM(s) Oral every 6 hours  ondansetron Injectable 4 milliGRAM(s) IV Push every 6 hours PRN  bisacodyl 5 milliGRAM(s) Oral at bedtime  famotidine    Tablet 20 milliGRAM(s) Oral daily  polyethylene glycol 3350 17 Gram(s) Oral two times a day  senna 2 Tablet(s) Oral at bedtime  simethicone 80 milliGRAM(s) Chew every 8 hours PRN  sucralfate suspension 1 Gram(s) Oral four times a day  dextrose 40% Gel 15 Gram(s) Oral once PRN  dextrose 50% Injectable 12.5 Gram(s) IV Push once  dextrose 50% Injectable 25 Gram(s) IV Push once  dextrose 50% Injectable 25 Gram(s) IV Push once  glucagon  Injectable 1 milliGRAM(s) IntraMuscular once PRN  insulin lispro (HumaLOG) corrective regimen sliding scale   SubCutaneous three times a day before meals  insulin lispro (HumaLOG) corrective regimen sliding scale   SubCutaneous at bedtime  chlorhexidine 4% Liquid 1 Application(s) Topical daily  dextrose 5%. 1000 milliLiter(s) IV Continuous <Continuous>  influenza   Vaccine 0.5 milliLiter(s) IntraMuscular once  sodium chloride 0.9%. 1000 milliLiter(s) IV Continuous <Continuous>      PAST MEDICAL & SURGICAL HISTORY:  History of diabetes mellitus, type II  Anxiety and depression  Diastolic dysfunction: stage I  Empyema lun2015 left lung, s/p VATS, decortication  H/O peptic ulcer: over 10 years ago  history of renal calculus  Herniated Disc: S/P work injury   Hypertension: Dx: 2002  Spinal Stenosis  Postlaminectomy Syndrome  S/P  shunt  S/P insertion of spinal cord stimulator:   History of lumbar fusion:   Insertion of Intrathecal Dilaudid Pump: 2011  S/P Spinal Surgery: corrective lumbar fusion   S/P Knee Replacement: left knee   S/P Arthroscopy of Left Knee  S/P Tonsillectomy: childhood  Ankle Fracture: s/p ORIF left ankle       FAMILY HISTORY:  No pertinent family history in first degree relatives      SOCIAL HISTORY:  unchanged    REVIEW OF SYSTEMS:  CONSTITUTIONAL: No fever, weight loss, or fatigue  EYES: No eye pain, visual disturbances, or discharge  ENMT:  No difficulty hearing, tinnitus, vertigo; No sinus or throat pain  NECK: No pain or stiffness  RESPIRATORY: No cough, wheezing, chills or hemoptysis; No Shortness of Breath  CARDIOVASCULAR: No chest pain, palpitations, passing out, dizziness, or leg swelling  GASTROINTESTINAL: No abdominal or epigastric pain. No nausea, vomiting, or hematemesis; No diarrhea or constipation. No melena or hematochezia.  GENITOURINARY: No dysuria, frequency, hematuria, or incontinence  NEUROLOGICAL: No headaches, memory loss, loss of strength, numbness, or tremors  SKIN: No itching, burning, rashes, or lesions   LYMPH Nodes: No enlarged glands  ENDOCRINE: No heat or cold intolerance; No hair loss  MUSCULOSKELETAL: No joint pain or swelling; No muscle, back, or extremity pain  PSYCHIATRIC: No depression, anxiety, mood swings, or difficulty sleeping  HEME/LYMPH: No easy bruising, or bleeding gums  ALLERY AND IMMUNOLOGIC: No hives or eczema	    [ x] All others negative	  [ ] Unable to obtain    PHYSICAL EXAM:  T(C): 36.8 (19 @ 08:31), Max: 36.8 (19 @ 08:31)  HR: 93 (19 @ 08:31) (88 - 98)  BP: 124/87 (19 @ 08:31) (112/76 - 124/87)  RR: 18 (19 @ 08:31) (18 - 18)  SpO2: 94% (19 @ 08:31) (94% - 98%)  Wt(kg): --  I&O's Summary    2019 07:01  -  2019 07:00  --------------------------------------------------------  IN: 580 mL / OUT: 0 mL / NET: 580 mL        Appearance:  	  HEENT:   Normal oral mucosa, PERRL, EOMI	  Carotid: Right:    Left:  no appreciated   Lymphatic: No lymphadenopathy  Cardiovascular:  + S1S2 RRR   Respiratory: CTA b/l   Psychiatry:  AAO x 3  Gastrointestinal: distended, mild tenderness to palpation of the RUQ and RLQ, + BS	  Skin: No rashes, No ecchymoses, No cyanosis	  Neurologic:  no focal neurologic deficits   Extremities: skin  of the bilateral lower extremities:  + purplish hue to the tips of the toes of the bilateral lower extremities     Vascular Pulse Exam:  Femoral: + palpable pulses bilaterally   Popiteal: + pulses palpated bilaterally   Right DP: []palpable [x]non-palpable [x]audible on doppler      Left DP :   []palpable [x]non-palpable [x]audible on doppler   Right PT: [x]palpable [] non-palpable [x]audible triphasic on doppler   Left PT:  [x] palpable [] non-palpable [x]audible  triphasic on doppler       Foot Exam:  cold extremities on examination and upon rewarming of the digits and elevation of the feet there was resolution of cyanosis with brisk capillary refill      LABS:	 	    CBC Full  -  ( 2019 09:38 )  WBC Count : 14.58 K/uL  Hemoglobin : 10.5 g/dL  Hematocrit : 33.7 %  Platelet Count - Automated : 584 K/uL  Mean Cell Volume : 84.9 fl  Mean Cell Hemoglobin : 26.4 pg  Mean Cell Hemoglobin Concentration : 31.2 gm/dL  Auto Neutrophil # : x  Auto Lymphocyte # : x  Auto Monocyte # : x  Auto Eosinophil # : x  Auto Basophil # : x  Auto Neutrophil % : x  Auto Lymphocyte % : x  Auto Monocyte % : x  Auto Eosinophil % : x  Auto Basophil % : x    11-    127<L>  |  90<L>  |  22  ----------------------------<  145<H>  4.3   |  29  |  0.84  11-11    127<L>  |  91<L>  |  21  ----------------------------<  166<H>  3.4<L>   |  29  |  0.80    Ca    8.6      2019 07:22  Ca    8.4      2019 07:05    TPro  6.2  /  Alb  2.3<L>  /  TBili  0.9  /  DBili  x   /  AST  28  /  ALT  24  /  AlkPhos  186<H>    TPro  6.1  /  Alb  2.3<L>  /  TBili  0.9  /  DBili  x   /  AST  29  /  ALT  23  /  AlkPhos  182<H>            Assessment:  63-year man with PMH of traumatic work injury () s/p multi-lumbar fusion (T8-S1) s/p hardware removal (, on daily opiates and methadone), chronic constipation, GERD, HTN, DM type 2, depression, anxiety, metastatic pancreatic adenocarcinoma s/p biliary stent placement () sent in from [facility] for concern for biliary sepsis on 19 and found to have ruptured gallbladder pending placement of drain by IR. Family was concerned for change in color of the tis of the bilateral lower extremities noted that they were blue/purplish color concern for PAD     1. Bilateral lower extremity discoloration secondary to acrocyanosis  - palpable and dopplerable pulses of the bilateral lower extremities       Plan:  - recommend EMELINA/PVR and US arterial duplex of the bilateral lower extremities  - keep extremities warm   - will follow up      Thank you  Lillian Tejeda D.O.     Vascular Cardiology Service    Please call with any questions:   SPECTRA - [id number]  Office [phone]  email:  [email or web address] Vascular Cardiology  Progress note     [facility] [id number]              EMAIL [email or web address]   OFFICE [phone]    CC:  Acrocyanosis     INTERVAL HISTORY:     Patient had placement of biliary drain by IR       Allergies    No Known Allergies    Intolerances    	    MEDICATIONS:  cloNIDine 0.1 milliGRAM(s) Oral two times a day  enoxaparin Injectable 40 milliGRAM(s) SubCutaneous daily  piperacillin/tazobactam IVPB.. 3.375 Gram(s) IV Intermittent every 8 hours  diazepam    Tablet 2 milliGRAM(s) Oral every 8 hours PRN  DULoxetine 60 milliGRAM(s) Oral daily  fentaNYL   Patch 100 MICROgram(s)/Hr. 1 Patch Transdermal every 48 hours  HYDROmorphone  Injectable 3.2 milliGRAM(s) IV Push every 3 hours PRN  methadone    Tablet 20 milliGRAM(s) Oral every 6 hours  ondansetron Injectable 4 milliGRAM(s) IV Push every 6 hours PRN  bisacodyl 5 milliGRAM(s) Oral at bedtime  famotidine    Tablet 20 milliGRAM(s) Oral daily  polyethylene glycol 3350 17 Gram(s) Oral two times a day  senna 2 Tablet(s) Oral at bedtime  simethicone 80 milliGRAM(s) Chew every 8 hours PRN  sucralfate suspension 1 Gram(s) Oral four times a day  dextrose 40% Gel 15 Gram(s) Oral once PRN  dextrose 50% Injectable 12.5 Gram(s) IV Push once  dextrose 50% Injectable 25 Gram(s) IV Push once  dextrose 50% Injectable 25 Gram(s) IV Push once  glucagon  Injectable 1 milliGRAM(s) IntraMuscular once PRN  insulin lispro (HumaLOG) corrective regimen sliding scale   SubCutaneous three times a day before meals  insulin lispro (HumaLOG) corrective regimen sliding scale   SubCutaneous at bedtime  chlorhexidine 4% Liquid 1 Application(s) Topical daily  dextrose 5%. 1000 milliLiter(s) IV Continuous <Continuous>  influenza   Vaccine 0.5 milliLiter(s) IntraMuscular once  sodium chloride 0.9%. 1000 milliLiter(s) IV Continuous <Continuous>      PAST MEDICAL & SURGICAL HISTORY:  History of diabetes mellitus, type II  Anxiety and depression  Diastolic dysfunction: stage I  Empyema lun2015 left lung, s/p VATS, decortication  H/O peptic ulcer: over 10 years ago  history of renal calculus  Herniated Disc: S/P work injury   Hypertension: Dx: 2002  Spinal Stenosis  Postlaminectomy Syndrome  S/P  shunt  S/P insertion of spinal cord stimulator:   History of lumbar fusion:   Insertion of Intrathecal Dilaudid Pump: 2011  S/P Spinal Surgery: corrective lumbar fusion   S/P Knee Replacement: left knee   S/P Arthroscopy of Left Knee  S/P Tonsillectomy: childhood  Ankle Fracture: s/p ORIF left ankle       FAMILY HISTORY:  No pertinent family history in first degree relatives      SOCIAL HISTORY:  unchanged    REVIEW OF SYSTEMS:  CONSTITUTIONAL: No fever, weight loss, or fatigue  EYES: No eye pain, visual disturbances, or discharge  ENMT:  No difficulty hearing, tinnitus, vertigo; No sinus or throat pain  NECK: No pain or stiffness  RESPIRATORY: No cough, wheezing, chills or hemoptysis; No Shortness of Breath  CARDIOVASCULAR: No chest pain, palpitations, passing out, dizziness, or leg swelling  GASTROINTESTINAL: No abdominal or epigastric pain. No nausea, vomiting, or hematemesis; No diarrhea or constipation. No melena or hematochezia.  GENITOURINARY: No dysuria, frequency, hematuria, or incontinence  NEUROLOGICAL: No headaches, memory loss, loss of strength, numbness, or tremors  SKIN: No itching, burning, rashes, or lesions   LYMPH Nodes: No enlarged glands  ENDOCRINE: No heat or cold intolerance; No hair loss  MUSCULOSKELETAL: No joint pain or swelling; No muscle, back, or extremity pain  PSYCHIATRIC: No depression, anxiety, mood swings, or difficulty sleeping  HEME/LYMPH: No easy bruising, or bleeding gums  ALLERY AND IMMUNOLOGIC: No hives or eczema	    [ x] All others negative	  [ ] Unable to obtain    PHYSICAL EXAM:  T(C): 36.8 (19 @ 08:31), Max: 36.8 (19 @ 08:31)  HR: 93 (19 @ 08:31) (88 - 98)  BP: 124/87 (19 @ 08:31) (112/76 - 124/87)  RR: 18 (19 @ 08:31) (18 - 18)  SpO2: 94% (19 @ 08:31) (94% - 98%)  Wt(kg): --  I&O's Summary    2019 07:01  -  2019 07:00  --------------------------------------------------------  IN: 580 mL / OUT: 0 mL / NET: 580 mL        Appearance:  	  HEENT:   Normal oral mucosa, PERRL, EOMI	  Carotid: Right:    Left:  no appreciated   Lymphatic: No lymphadenopathy  Cardiovascular:  + S1S2 RRR   Respiratory: CTA b/l   Psychiatry:  AAO x 3  Gastrointestinal: distended, mild tenderness to palpation of the RUQ and RLQ, + BS	  Skin: No rashes, No ecchymoses, No cyanosis	  Neurologic:  no focal neurologic deficits   Extremities: skin  of the bilateral lower extremities: pallor of the lower extremities with good cap refill   Vascular Pulse Exam:  Femoral: + palpable pulses bilaterally   Popiteal: + pulses palpated bilaterally   Right DP: []palpable [x]non-palpable [x]audible on doppler      Left DP :   []palpable [x]non-palpable [x]audible on doppler   Right PT: [x]palpable [] non-palpable [x]audible triphasic on doppler   Left PT:  [x] palpable [] non-palpable [x]audible  triphasic on doppler       Foot Exam:  cold extremities on examination and upon rewarming of the digits and elevation of the feet there was resolution of cyanosis with brisk capillary refill      LABS:	 	    CBC Full  -  ( 2019 09:38 )  WBC Count : 14.58 K/uL  Hemoglobin : 10.5 g/dL  Hematocrit : 33.7 %  Platelet Count - Automated : 584 K/uL  Mean Cell Volume : 84.9 fl  Mean Cell Hemoglobin : 26.4 pg  Mean Cell Hemoglobin Concentration : 31.2 gm/dL  Auto Neutrophil # : x  Auto Lymphocyte # : x  Auto Monocyte # : x  Auto Eosinophil # : x  Auto Basophil # : x  Auto Neutrophil % : x  Auto Lymphocyte % : x  Auto Monocyte % : x  Auto Eosinophil % : x  Auto Basophil % : x    11-12    127<L>  |  90<L>  |  22  ----------------------------<  145<H>  4.3   |  29  |  0.84  11-11    127<L>  |  91<L>  |  21  ----------------------------<  166<H>  3.4<L>   |  29  |  0.80    Ca    8.6      2019 07:22  Ca    8.4      2019 07:05    TPro  6.2  /  Alb  2.3<L>  /  TBili  0.9  /  DBili  x   /  AST  28  /  ALT  24  /  AlkPhos  186<H>    TPro  6.1  /  Alb  2.3<L>  /  TBili  0.9  /  DBili  x   /  AST  29  /  ALT  23  /  AlkPhos  182<H>            Assessment:  63-year man with PMH of traumatic work injury () s/p multi-lumbar fusion (T8-S1) s/p hardware removal (, on daily opiates and methadone), chronic constipation, GERD, HTN, DM type 2, depression, anxiety, metastatic pancreatic adenocarcinoma s/p biliary stent placement () sent in from [facility] for concern for biliary sepsis on 19 and found to have ruptured gallbladder pending placement of drain by IR. Family was concerned for change in color of the tis of the bilateral lower extremities noted that they were blue/purplish color concern for PAD     1. Bilateral lower extremity discoloration secondary to acrocyanosis  - palpable and dopplerable pulses of the bilateral lower extremities       Plan:  - recommend EMELINA/PVR and US arterial duplex of the bilateral lower extremities  - keep extremities warm   - will follow up      Thank you  Lillian Tejeda D.O.     Vascular Cardiology Service    Please call with any questions:   SPECTRA - [id number]  Office [phone]  email:  [email or web address]

## 2024-10-12 NOTE — ED ADULT NURSE NOTE - CHPI ED NUR SYMPTOMS NEG
The patient is a 80y Male complaining of  no blood in stool/no burning urination/no hematuria/no vomiting/no dysuria

## 2024-10-12 NOTE — PROGRESS NOTE ADULT - PROBLEM SELECTOR PLAN 1
- c/w fentanyl 175mcg  - on methadone 80mg daily (20mg Q6)  - c/w dilaudid 3mg IV (used 5 doses/24 hours)  - on cymbalta as well 12-Oct-2024 11:16

## 2025-04-24 NOTE — SWALLOW BEDSIDE ASSESSMENT ADULT - NS ASR SWALLOW FINDINGS DISCUS
Detail Level: Detailed Depth Of Biopsy: dermis Was A Bandage Applied: Yes Size Of Lesion In Cm: 0 Biopsy Type: H and E Biopsy Method: Personna blade Anesthesia Type: 1% lidocaine with epinephrine Anesthesia Volume In Cc: 0.5 Hemostasis: Aluminum Chloride Wound Care: Vaseline Dressing: Band-Aid Destruction After The Procedure: No Type Of Destruction Used: Curettage Curettage Text: The wound bed was treated with curettage after the biopsy was performed. Radiology MD Mejía, KAMLESH Blackwell; RN/Physician/Nursing/Patient/Family Cryotherapy Text: The wound bed was treated with cryotherapy after the biopsy was performed. Electrodesiccation Text: The wound bed was treated with electrodesiccation after the biopsy was performed. Electrodesiccation And Curettage Text: The wound bed was treated with electrodesiccation and curettage after the biopsy was performed. Silver Nitrate Text: The wound bed was treated with silver nitrate after the biopsy was performed. Lab: 6 Lab Facility: 3 Medical Necessity Information: It is in your best interest to select a reason for this procedure from the list below. All of these items fulfill various CMS LCD requirements except the new and changing color options. Consent: Verbal consent was obtained and risks were reviewed including but not limited to scarring, infection, bleeding, scabbing, incomplete removal, nerve damage and allergy to anesthesia. Post-Care Instructions: I reviewed with the patient in detail post-care instructions. Patient is to keep biopsy site moist with vaseline and covered. patient is also to use gentle soap and water to clean everyday. Notification Instructions: Patient will be notified of biopsy results. However, patient instructed to call the office if not contacted within 2 weeks. Billing Type: Third-Party Bill Information: Selecting Yes will display possible errors in your note based on the variables you have selected. This validation is only offered as a suggestion for you. PLEASE NOTE THAT THE VALIDATION TEXT WILL BE REMOVED WHEN YOU FINALIZE YOUR NOTE. IF YOU WANT TO FAX A PRELIMINARY NOTE YOU WILL NEED TO TOGGLE THIS TO 'NO' IF YOU DO NOT WANT IT IN YOUR FAXED NOTE.

## 2025-05-04 NOTE — DIETITIAN INITIAL EVALUATION ADULT. - PROBLEM SELECTOR PLAN 7
hold off home BP meds while currently in sepsis  -on HCTZ and clondine 0.1 at home  -c/w clondine for now and hold off HCTZ
Admission
